# Patient Record
Sex: MALE | Race: WHITE | NOT HISPANIC OR LATINO | Employment: OTHER | URBAN - METROPOLITAN AREA
[De-identification: names, ages, dates, MRNs, and addresses within clinical notes are randomized per-mention and may not be internally consistent; named-entity substitution may affect disease eponyms.]

---

## 2017-01-10 ENCOUNTER — ALLSCRIPTS OFFICE VISIT (OUTPATIENT)
Dept: OTHER | Facility: OTHER | Age: 72
End: 2017-01-10

## 2017-01-18 ENCOUNTER — APPOINTMENT (OUTPATIENT)
Dept: OCCUPATIONAL THERAPY | Facility: MEDICAL CENTER | Age: 72
End: 2017-01-18
Payer: MEDICARE

## 2017-01-18 ENCOUNTER — GENERIC CONVERSION - ENCOUNTER (OUTPATIENT)
Dept: OTHER | Facility: OTHER | Age: 72
End: 2017-01-18

## 2017-01-18 PROCEDURE — 97165 OT EVAL LOW COMPLEX 30 MIN: CPT

## 2017-01-18 PROCEDURE — G8991 OTHER PT/OT GOAL STATUS: HCPCS

## 2017-01-18 PROCEDURE — G8990 OTHER PT/OT CURRENT STATUS: HCPCS

## 2017-01-18 PROCEDURE — 97140 MANUAL THERAPY 1/> REGIONS: CPT

## 2017-01-20 ENCOUNTER — ALLSCRIPTS OFFICE VISIT (OUTPATIENT)
Dept: OTHER | Facility: OTHER | Age: 72
End: 2017-01-20

## 2017-01-23 ENCOUNTER — APPOINTMENT (OUTPATIENT)
Dept: OCCUPATIONAL THERAPY | Facility: MEDICAL CENTER | Age: 72
End: 2017-01-23
Payer: MEDICARE

## 2017-01-23 PROCEDURE — 97010 HOT OR COLD PACKS THERAPY: CPT

## 2017-01-23 PROCEDURE — 97140 MANUAL THERAPY 1/> REGIONS: CPT

## 2017-01-23 PROCEDURE — 97110 THERAPEUTIC EXERCISES: CPT

## 2017-01-25 ENCOUNTER — APPOINTMENT (OUTPATIENT)
Dept: OCCUPATIONAL THERAPY | Facility: MEDICAL CENTER | Age: 72
End: 2017-01-25
Payer: MEDICARE

## 2017-01-25 PROCEDURE — 97140 MANUAL THERAPY 1/> REGIONS: CPT

## 2017-01-25 PROCEDURE — 97110 THERAPEUTIC EXERCISES: CPT

## 2017-01-30 ENCOUNTER — APPOINTMENT (OUTPATIENT)
Dept: OCCUPATIONAL THERAPY | Facility: MEDICAL CENTER | Age: 72
End: 2017-01-30
Payer: MEDICARE

## 2017-01-30 PROCEDURE — 97140 MANUAL THERAPY 1/> REGIONS: CPT

## 2017-01-30 PROCEDURE — 97010 HOT OR COLD PACKS THERAPY: CPT

## 2017-01-30 PROCEDURE — 97110 THERAPEUTIC EXERCISES: CPT

## 2017-02-01 ENCOUNTER — APPOINTMENT (OUTPATIENT)
Dept: OCCUPATIONAL THERAPY | Facility: MEDICAL CENTER | Age: 72
End: 2017-02-01
Payer: MEDICARE

## 2017-02-01 PROCEDURE — 97140 MANUAL THERAPY 1/> REGIONS: CPT

## 2017-02-01 PROCEDURE — 97110 THERAPEUTIC EXERCISES: CPT

## 2017-02-01 PROCEDURE — 97010 HOT OR COLD PACKS THERAPY: CPT

## 2017-02-06 ENCOUNTER — APPOINTMENT (OUTPATIENT)
Dept: OCCUPATIONAL THERAPY | Facility: MEDICAL CENTER | Age: 72
End: 2017-02-06
Payer: MEDICARE

## 2017-02-06 PROCEDURE — 97010 HOT OR COLD PACKS THERAPY: CPT

## 2017-02-06 PROCEDURE — 97110 THERAPEUTIC EXERCISES: CPT

## 2017-02-06 PROCEDURE — 97140 MANUAL THERAPY 1/> REGIONS: CPT

## 2017-02-08 ENCOUNTER — APPOINTMENT (OUTPATIENT)
Dept: OCCUPATIONAL THERAPY | Facility: MEDICAL CENTER | Age: 72
End: 2017-02-08
Payer: MEDICARE

## 2017-02-08 PROCEDURE — 97140 MANUAL THERAPY 1/> REGIONS: CPT

## 2017-02-08 PROCEDURE — 97110 THERAPEUTIC EXERCISES: CPT

## 2017-02-08 PROCEDURE — 97010 HOT OR COLD PACKS THERAPY: CPT

## 2017-02-13 ENCOUNTER — APPOINTMENT (OUTPATIENT)
Dept: OCCUPATIONAL THERAPY | Facility: MEDICAL CENTER | Age: 72
End: 2017-02-13
Payer: MEDICARE

## 2017-02-13 PROCEDURE — 97140 MANUAL THERAPY 1/> REGIONS: CPT

## 2017-02-13 PROCEDURE — 97010 HOT OR COLD PACKS THERAPY: CPT

## 2017-02-13 PROCEDURE — 97110 THERAPEUTIC EXERCISES: CPT

## 2017-02-15 ENCOUNTER — APPOINTMENT (OUTPATIENT)
Dept: OCCUPATIONAL THERAPY | Facility: MEDICAL CENTER | Age: 72
End: 2017-02-15
Payer: MEDICARE

## 2017-02-15 PROCEDURE — 97140 MANUAL THERAPY 1/> REGIONS: CPT

## 2017-02-15 PROCEDURE — 97010 HOT OR COLD PACKS THERAPY: CPT

## 2017-02-15 PROCEDURE — 97110 THERAPEUTIC EXERCISES: CPT

## 2017-02-20 ENCOUNTER — APPOINTMENT (OUTPATIENT)
Dept: OCCUPATIONAL THERAPY | Facility: MEDICAL CENTER | Age: 72
End: 2017-02-20
Payer: MEDICARE

## 2017-02-20 PROCEDURE — 97110 THERAPEUTIC EXERCISES: CPT

## 2017-02-20 PROCEDURE — 97140 MANUAL THERAPY 1/> REGIONS: CPT

## 2017-02-20 PROCEDURE — G8991 OTHER PT/OT GOAL STATUS: HCPCS

## 2017-02-20 PROCEDURE — G8990 OTHER PT/OT CURRENT STATUS: HCPCS

## 2017-02-20 PROCEDURE — 97010 HOT OR COLD PACKS THERAPY: CPT

## 2017-02-21 ENCOUNTER — ALLSCRIPTS OFFICE VISIT (OUTPATIENT)
Dept: OTHER | Facility: OTHER | Age: 72
End: 2017-02-21

## 2017-02-22 ENCOUNTER — GENERIC CONVERSION - ENCOUNTER (OUTPATIENT)
Dept: OTHER | Facility: OTHER | Age: 72
End: 2017-02-22

## 2017-03-04 ENCOUNTER — OFFICE VISIT (OUTPATIENT)
Dept: URGENT CARE | Facility: MEDICAL CENTER | Age: 72
End: 2017-03-04
Payer: MEDICARE

## 2017-03-04 PROCEDURE — 99213 OFFICE O/P EST LOW 20 MIN: CPT

## 2017-03-04 PROCEDURE — G0463 HOSPITAL OUTPT CLINIC VISIT: HCPCS

## 2017-03-10 ENCOUNTER — ALLSCRIPTS OFFICE VISIT (OUTPATIENT)
Dept: OTHER | Facility: OTHER | Age: 72
End: 2017-03-10

## 2017-03-10 DIAGNOSIS — E11.9 TYPE 2 DIABETES MELLITUS WITHOUT COMPLICATIONS (HCC): ICD-10-CM

## 2017-03-10 DIAGNOSIS — N28.89 OTHER SPECIFIED DISORDERS OF KIDNEY AND URETER: ICD-10-CM

## 2017-03-10 DIAGNOSIS — R10.11 RIGHT UPPER QUADRANT PAIN: ICD-10-CM

## 2017-03-21 ENCOUNTER — HOSPITAL ENCOUNTER (OUTPATIENT)
Dept: ULTRASOUND IMAGING | Facility: HOSPITAL | Age: 72
Discharge: HOME | End: 2017-03-21
Payer: MEDICARE

## 2017-03-21 DIAGNOSIS — R10.11 RIGHT UPPER QUADRANT PAIN: ICD-10-CM

## 2017-03-21 PROCEDURE — 76705 ECHO EXAM OF ABDOMEN: CPT

## 2017-03-22 ENCOUNTER — GENERIC CONVERSION - ENCOUNTER (OUTPATIENT)
Dept: OTHER | Facility: OTHER | Age: 72
End: 2017-03-22

## 2017-03-25 LAB
BUN SERPL-MCNC: 21 MG/DL (ref 7–25)
BUN/CREA RATIO (HISTORICAL): ABNORMAL (CALC) (ref 6–22)
CALCIUM SERPL-MCNC: 9.2 MG/DL (ref 8.6–10.3)
CHLORIDE SERPL-SCNC: 103 MMOL/L (ref 98–110)
CO2 SERPL-SCNC: 25 MMOL/L (ref 20–31)
CREAT SERPL-MCNC: 0.97 MG/DL (ref 0.7–1.18)
EGFR AFRICAN AMERICAN (HISTORICAL): 91 ML/MIN/1.73M2
EGFR-AMERICAN CALC (HISTORICAL): 79 ML/MIN/1.73M2
GLUCOSE (HISTORICAL): 141 MG/DL (ref 65–99)
POTASSIUM SERPL-SCNC: 4.1 MMOL/L (ref 3.5–5.3)
SODIUM SERPL-SCNC: 138 MMOL/L (ref 135–146)

## 2017-03-28 ENCOUNTER — HOSPITAL ENCOUNTER (OUTPATIENT)
Dept: CT IMAGING | Facility: HOSPITAL | Age: 72
Discharge: HOME | End: 2017-03-28
Payer: MEDICARE

## 2017-03-28 DIAGNOSIS — N28.89 OTHER SPECIFIED DISORDERS OF KIDNEY AND URETER: ICD-10-CM

## 2017-03-28 PROCEDURE — 74170 CT ABD WO CNTRST FLWD CNTRST: CPT

## 2017-03-28 RX ADMIN — IOHEXOL 100 ML: 350 INJECTION, SOLUTION INTRAVENOUS at 20:28

## 2017-03-30 ENCOUNTER — GENERIC CONVERSION - ENCOUNTER (OUTPATIENT)
Dept: OTHER | Facility: OTHER | Age: 72
End: 2017-03-30

## 2017-04-03 ENCOUNTER — TRANSCRIBE ORDERS (OUTPATIENT)
Dept: ADMINISTRATIVE | Facility: HOSPITAL | Age: 72
End: 2017-04-03

## 2017-04-03 ENCOUNTER — ALLSCRIPTS OFFICE VISIT (OUTPATIENT)
Dept: OTHER | Facility: OTHER | Age: 72
End: 2017-04-03

## 2017-04-03 DIAGNOSIS — N28.89 VASCULAR DISORDERS OF KIDNEY: Primary | ICD-10-CM

## 2017-04-03 LAB
CLARITY UR: NORMAL
COLOR UR: YELLOW
GLUCOSE (HISTORICAL): NORMAL
HGB UR QL STRIP.AUTO: NORMAL
KETONES UR STRIP-MCNC: NORMAL MG/DL
LEUKOCYTE ESTERASE UR QL STRIP: NORMAL
NITRITE UR QL STRIP: NORMAL
PH UR STRIP.AUTO: 6 [PH]
PROT UR STRIP-MCNC: NORMAL MG/DL
SP GR UR STRIP.AUTO: 1.03

## 2017-04-04 ENCOUNTER — HOSPITAL ENCOUNTER (OUTPATIENT)
Dept: CT IMAGING | Facility: HOSPITAL | Age: 72
Discharge: HOME | End: 2017-04-04
Attending: UROLOGY
Payer: MEDICARE

## 2017-04-04 DIAGNOSIS — N28.89 OTHER SPECIFIED DISORDERS OF KIDNEY AND URETER: ICD-10-CM

## 2017-04-04 PROCEDURE — 71250 CT THORAX DX C-: CPT

## 2017-04-05 ENCOUNTER — GENERIC CONVERSION - ENCOUNTER (OUTPATIENT)
Dept: OTHER | Facility: OTHER | Age: 72
End: 2017-04-05

## 2017-04-12 ENCOUNTER — HOSPITAL ENCOUNTER (OUTPATIENT)
Dept: NON INVASIVE DIAGNOSTICS | Facility: HOSPITAL | Age: 72
Discharge: HOME | End: 2017-04-12
Attending: UROLOGY
Payer: MEDICARE

## 2017-04-12 ENCOUNTER — LAB (OUTPATIENT)
Dept: LAB | Facility: HOSPITAL | Age: 72
End: 2017-04-12
Attending: UROLOGY
Payer: MEDICARE

## 2017-04-12 ENCOUNTER — TRANSCRIBE ORDERS (OUTPATIENT)
Dept: ADMINISTRATIVE | Facility: HOSPITAL | Age: 72
End: 2017-04-12

## 2017-04-12 DIAGNOSIS — N28.89 OTHER SPECIFIED DISORDERS OF KIDNEY AND URETER: ICD-10-CM

## 2017-04-12 DIAGNOSIS — N28.89 VASCULAR DISORDERS OF KIDNEY: Primary | ICD-10-CM

## 2017-04-12 DIAGNOSIS — E11.9 TYPE 2 DIABETES MELLITUS WITHOUT COMPLICATIONS (HCC): ICD-10-CM

## 2017-04-12 DIAGNOSIS — N28.89 VASCULAR DISORDERS OF KIDNEY: ICD-10-CM

## 2017-04-12 LAB
ATRIAL RATE: 60 BPM
PR INTERVAL: 184 MS
QRS AXIS: 161 DEGREES
QRSD INTERVAL: 102 MS
QT INTERVAL: 408 MS
QTC INTERVAL: 408 MS
T WAVE AXIS: 146 DEGREES
VENTRICULAR RATE: 60 BPM

## 2017-04-12 PROCEDURE — 93005 ELECTROCARDIOGRAM TRACING: CPT

## 2017-04-17 ENCOUNTER — LAB (OUTPATIENT)
Dept: LAB | Facility: HOSPITAL | Age: 72
End: 2017-04-17
Attending: UROLOGY
Payer: MEDICARE

## 2017-04-17 ENCOUNTER — LAB REQUISITION (OUTPATIENT)
Dept: LAB | Facility: HOSPITAL | Age: 72
End: 2017-04-17
Payer: MEDICARE

## 2017-04-17 ENCOUNTER — TRANSCRIBE ORDERS (OUTPATIENT)
Dept: ADMINISTRATIVE | Facility: HOSPITAL | Age: 72
End: 2017-04-17

## 2017-04-17 DIAGNOSIS — E13.8 DIABETES MELLITUS OF OTHER TYPE WITH COMPLICATION, UNSPECIFIED LONG TERM INSULIN USE STATUS: ICD-10-CM

## 2017-04-17 DIAGNOSIS — N28.89 OTHER SPECIFIED DISORDERS OF KIDNEY AND URETER: ICD-10-CM

## 2017-04-17 DIAGNOSIS — E13.8 OTHER SPECIFIED DIABETES MELLITUS WITH UNSPECIFIED COMPLICATIONS (HCC): ICD-10-CM

## 2017-04-17 DIAGNOSIS — N28.89 VASCULAR DISORDERS OF KIDNEY: ICD-10-CM

## 2017-04-17 DIAGNOSIS — N28.89 VASCULAR DISORDERS OF KIDNEY: Primary | ICD-10-CM

## 2017-04-17 LAB
ABO GROUP BLD: NORMAL
ANION GAP SERPL CALCULATED.3IONS-SCNC: 8 MMOL/L (ref 4–13)
BASOPHILS # BLD AUTO: 0.03 THOUSANDS/ΜL (ref 0–0.1)
BASOPHILS NFR BLD AUTO: 1 % (ref 0–1)
BLD GP AB SCN SERPL QL: NEGATIVE
BUN SERPL-MCNC: 19 MG/DL (ref 5–25)
CALCIUM SERPL-MCNC: 9 MG/DL (ref 8.3–10.1)
CHLORIDE SERPL-SCNC: 104 MMOL/L (ref 100–108)
CO2 SERPL-SCNC: 27 MMOL/L (ref 21–32)
CREAT SERPL-MCNC: 0.96 MG/DL (ref 0.6–1.3)
EOSINOPHIL # BLD AUTO: 0.38 THOUSAND/ΜL (ref 0–0.61)
EOSINOPHIL NFR BLD AUTO: 6 % (ref 0–6)
ERYTHROCYTE [DISTWIDTH] IN BLOOD BY AUTOMATED COUNT: 14 % (ref 11.6–15.1)
EST. AVERAGE GLUCOSE BLD GHB EST-MCNC: 134 MG/DL
GFR SERPL CREATININE-BSD FRML MDRD: >60 ML/MIN/1.73SQ M
GLUCOSE SERPL-MCNC: 105 MG/DL (ref 65–140)
HBA1C MFR BLD: 6.3 % (ref 4.2–6.3)
HCT VFR BLD AUTO: 43.2 % (ref 36.5–49.3)
HGB BLD-MCNC: 15.1 G/DL (ref 12–17)
LYMPHOCYTES # BLD AUTO: 2.12 THOUSANDS/ΜL (ref 0.6–4.47)
LYMPHOCYTES NFR BLD AUTO: 35 % (ref 14–44)
MCH RBC QN AUTO: 31.7 PG (ref 26.8–34.3)
MCHC RBC AUTO-ENTMCNC: 35 G/DL (ref 31.4–37.4)
MCV RBC AUTO: 91 FL (ref 82–98)
MONOCYTES # BLD AUTO: 0.44 THOUSAND/ΜL (ref 0.17–1.22)
MONOCYTES NFR BLD AUTO: 7 % (ref 4–12)
NEUTROPHILS # BLD AUTO: 3.15 THOUSANDS/ΜL (ref 1.85–7.62)
NEUTS SEG NFR BLD AUTO: 51 % (ref 43–75)
NRBC BLD AUTO-RTO: 0 /100 WBCS
PLATELET # BLD AUTO: 203 THOUSANDS/UL (ref 149–390)
PMV BLD AUTO: 10.4 FL (ref 8.9–12.7)
POTASSIUM SERPL-SCNC: 4 MMOL/L (ref 3.5–5.3)
RBC # BLD AUTO: 4.77 MILLION/UL (ref 3.88–5.62)
RH BLD: POSITIVE
SODIUM SERPL-SCNC: 139 MMOL/L (ref 136–145)
SPECIMEN EXPIRATION DATE: NORMAL
WBC # BLD AUTO: 6.12 THOUSAND/UL (ref 4.31–10.16)

## 2017-04-17 PROCEDURE — 85025 COMPLETE CBC W/AUTO DIFF WBC: CPT

## 2017-04-17 PROCEDURE — 86901 BLOOD TYPING SEROLOGIC RH(D): CPT | Performed by: UROLOGY

## 2017-04-17 PROCEDURE — 80048 BASIC METABOLIC PNL TOTAL CA: CPT

## 2017-04-17 PROCEDURE — 86850 RBC ANTIBODY SCREEN: CPT | Performed by: UROLOGY

## 2017-04-17 PROCEDURE — 36415 COLL VENOUS BLD VENIPUNCTURE: CPT

## 2017-04-17 PROCEDURE — 83036 HEMOGLOBIN GLYCOSYLATED A1C: CPT

## 2017-04-17 PROCEDURE — 86900 BLOOD TYPING SEROLOGIC ABO: CPT | Performed by: UROLOGY

## 2017-04-27 ENCOUNTER — ALLSCRIPTS OFFICE VISIT (OUTPATIENT)
Dept: OTHER | Facility: OTHER | Age: 72
End: 2017-04-27

## 2017-05-01 ENCOUNTER — ANESTHESIA EVENT (OUTPATIENT)
Dept: PERIOP | Facility: HOSPITAL | Age: 72
DRG: 658 | End: 2017-05-01
Payer: MEDICARE

## 2017-05-08 ENCOUNTER — HOSPITAL ENCOUNTER (INPATIENT)
Facility: HOSPITAL | Age: 72
LOS: 3 days | Discharge: HOME | DRG: 658 | End: 2017-05-11
Attending: UROLOGY | Admitting: UROLOGY
Payer: MEDICARE

## 2017-05-08 ENCOUNTER — ANESTHESIA (OUTPATIENT)
Dept: PERIOP | Facility: HOSPITAL | Age: 72
DRG: 658 | End: 2017-05-08
Payer: MEDICARE

## 2017-05-08 DIAGNOSIS — N28.89 RENAL MASS: ICD-10-CM

## 2017-05-08 LAB
ABO GROUP BLD: NORMAL
ANION GAP SERPL CALCULATED.3IONS-SCNC: 7 MMOL/L (ref 4–13)
BLD GP AB SCN SERPL QL: NEGATIVE
BUN SERPL-MCNC: 17 MG/DL (ref 5–25)
CALCIUM SERPL-MCNC: 9.1 MG/DL (ref 8.3–10.1)
CHLORIDE SERPL-SCNC: 105 MMOL/L (ref 100–108)
CO2 SERPL-SCNC: 27 MMOL/L (ref 21–32)
CREAT SERPL-MCNC: 1.09 MG/DL (ref 0.6–1.3)
ERYTHROCYTE [DISTWIDTH] IN BLOOD BY AUTOMATED COUNT: 14.1 % (ref 11.6–15.1)
GFR SERPL CREATININE-BSD FRML MDRD: >60 ML/MIN/1.73SQ M
GLUCOSE SERPL-MCNC: 112 MG/DL (ref 65–140)
GLUCOSE SERPL-MCNC: 162 MG/DL (ref 65–140)
GLUCOSE SERPL-MCNC: 173 MG/DL (ref 65–140)
HCT VFR BLD AUTO: 31.8 % (ref 36.5–49.3)
HGB BLD-MCNC: 10.9 G/DL (ref 12–17)
MCH RBC QN AUTO: 31.1 PG (ref 26.8–34.3)
MCHC RBC AUTO-ENTMCNC: 34.3 G/DL (ref 31.4–37.4)
MCV RBC AUTO: 91 FL (ref 82–98)
PLATELET # BLD AUTO: 164 THOUSANDS/UL (ref 149–390)
PMV BLD AUTO: 10.2 FL (ref 8.9–12.7)
POTASSIUM SERPL-SCNC: 3.9 MMOL/L (ref 3.5–5.3)
POTASSIUM SERPL-SCNC: 4.3 MMOL/L (ref 3.5–5.3)
RBC # BLD AUTO: 3.51 MILLION/UL (ref 3.88–5.62)
RH BLD: POSITIVE
SODIUM SERPL-SCNC: 139 MMOL/L (ref 136–145)
SPECIMEN EXPIRATION DATE: NORMAL
WBC # BLD AUTO: 11.81 THOUSAND/UL (ref 4.31–10.16)

## 2017-05-08 PROCEDURE — 84295 ASSAY OF SERUM SODIUM: CPT

## 2017-05-08 PROCEDURE — 84132 ASSAY OF SERUM POTASSIUM: CPT

## 2017-05-08 PROCEDURE — 86900 BLOOD TYPING SEROLOGIC ABO: CPT | Performed by: UROLOGY

## 2017-05-08 PROCEDURE — 86901 BLOOD TYPING SEROLOGIC RH(D): CPT | Performed by: UROLOGY

## 2017-05-08 PROCEDURE — 82948 REAGENT STRIP/BLOOD GLUCOSE: CPT

## 2017-05-08 PROCEDURE — 82947 ASSAY GLUCOSE BLOOD QUANT: CPT

## 2017-05-08 PROCEDURE — 88307 TISSUE EXAM BY PATHOLOGIST: CPT | Performed by: UROLOGY

## 2017-05-08 PROCEDURE — 85027 COMPLETE CBC AUTOMATED: CPT | Performed by: UROLOGY

## 2017-05-08 PROCEDURE — 0TB04ZZ EXCISION OF RIGHT KIDNEY, PERCUTANEOUS ENDOSCOPIC APPROACH: ICD-10-PCS | Performed by: UROLOGY

## 2017-05-08 PROCEDURE — 85014 HEMATOCRIT: CPT

## 2017-05-08 PROCEDURE — 86850 RBC ANTIBODY SCREEN: CPT | Performed by: UROLOGY

## 2017-05-08 PROCEDURE — 8E0W4CZ ROBOTIC ASSISTED PROCEDURE OF TRUNK REGION, PERCUTANEOUS ENDOSCOPIC APPROACH: ICD-10-PCS | Performed by: UROLOGY

## 2017-05-08 PROCEDURE — 86923 COMPATIBILITY TEST ELECTRIC: CPT

## 2017-05-08 PROCEDURE — 82330 ASSAY OF CALCIUM: CPT

## 2017-05-08 PROCEDURE — 84132 ASSAY OF SERUM POTASSIUM: CPT | Performed by: UROLOGY

## 2017-05-08 PROCEDURE — 82803 BLOOD GASES ANY COMBINATION: CPT

## 2017-05-08 PROCEDURE — 80048 BASIC METABOLIC PNL TOTAL CA: CPT | Performed by: UROLOGY

## 2017-05-08 RX ORDER — DEXTROSE, SODIUM CHLORIDE, SODIUM LACTATE, POTASSIUM CHLORIDE, AND CALCIUM CHLORIDE 5; .6; .31; .03; .02 G/100ML; G/100ML; G/100ML; G/100ML; G/100ML
125 INJECTION, SOLUTION INTRAVENOUS CONTINUOUS
Status: DISCONTINUED | OUTPATIENT
Start: 2017-05-08 | End: 2017-05-10

## 2017-05-08 RX ORDER — MAGNESIUM HYDROXIDE 1200 MG/15ML
LIQUID ORAL
Status: DISCONTINUED | OUTPATIENT
Start: 1840-12-31 | End: 2017-05-08 | Stop reason: HOSPADM

## 2017-05-08 RX ORDER — SODIUM CHLORIDE 9 MG/ML
INJECTION, SOLUTION INTRAVENOUS CONTINUOUS PRN
Status: DISCONTINUED | OUTPATIENT
Start: 2017-05-08 | End: 2017-05-08 | Stop reason: SURG

## 2017-05-08 RX ORDER — MAGNESIUM HYDROXIDE/ALUMINUM HYDROXICE/SIMETHICONE 120; 1200; 1200 MG/30ML; MG/30ML; MG/30ML
30 SUSPENSION ORAL EVERY 6 HOURS PRN
Status: DISCONTINUED | OUTPATIENT
Start: 2017-05-08 | End: 2017-05-11 | Stop reason: HOSPADM

## 2017-05-08 RX ORDER — MAGNESIUM HYDROXIDE 1200 MG/15ML
LIQUID ORAL AS NEEDED
Status: DISCONTINUED | OUTPATIENT
Start: 2017-05-08 | End: 2017-05-08 | Stop reason: HOSPADM

## 2017-05-08 RX ORDER — ONDANSETRON 2 MG/ML
INJECTION INTRAMUSCULAR; INTRAVENOUS AS NEEDED
Status: DISCONTINUED | OUTPATIENT
Start: 2017-05-08 | End: 2017-05-08 | Stop reason: SURG

## 2017-05-08 RX ORDER — LIDOCAINE HYDROCHLORIDE 10 MG/ML
INJECTION, SOLUTION INFILTRATION; PERINEURAL AS NEEDED
Status: DISCONTINUED | OUTPATIENT
Start: 2017-05-08 | End: 2017-05-08 | Stop reason: SURG

## 2017-05-08 RX ORDER — ONDANSETRON 2 MG/ML
4 INJECTION INTRAMUSCULAR; INTRAVENOUS EVERY 6 HOURS PRN
Status: DISCONTINUED | OUTPATIENT
Start: 2017-05-08 | End: 2017-05-11 | Stop reason: HOSPADM

## 2017-05-08 RX ORDER — PRAVASTATIN SODIUM 40 MG
40 TABLET ORAL
Status: DISCONTINUED | OUTPATIENT
Start: 2017-05-09 | End: 2017-05-11 | Stop reason: HOSPADM

## 2017-05-08 RX ORDER — ROCURONIUM BROMIDE 10 MG/ML
INJECTION, SOLUTION INTRAVENOUS AS NEEDED
Status: DISCONTINUED | OUTPATIENT
Start: 2017-05-08 | End: 2017-05-08 | Stop reason: SURG

## 2017-05-08 RX ORDER — FENTANYL CITRATE/PF 50 MCG/ML
25 SYRINGE (ML) INJECTION
Status: COMPLETED | OUTPATIENT
Start: 2017-05-08 | End: 2017-05-08

## 2017-05-08 RX ORDER — GLYCOPYRROLATE 0.2 MG/ML
INJECTION INTRAMUSCULAR; INTRAVENOUS AS NEEDED
Status: DISCONTINUED | OUTPATIENT
Start: 2017-05-08 | End: 2017-05-08 | Stop reason: SURG

## 2017-05-08 RX ORDER — ALBUMIN, HUMAN INJ 5% 5 %
SOLUTION INTRAVENOUS CONTINUOUS PRN
Status: DISCONTINUED | OUTPATIENT
Start: 2017-05-08 | End: 2017-05-08 | Stop reason: SURG

## 2017-05-08 RX ORDER — PROPOFOL 10 MG/ML
INJECTION, EMULSION INTRAVENOUS AS NEEDED
Status: DISCONTINUED | OUTPATIENT
Start: 2017-05-08 | End: 2017-05-08 | Stop reason: SURG

## 2017-05-08 RX ORDER — FENTANYL CITRATE 50 UG/ML
INJECTION, SOLUTION INTRAMUSCULAR; INTRAVENOUS AS NEEDED
Status: DISCONTINUED | OUTPATIENT
Start: 2017-05-08 | End: 2017-05-08 | Stop reason: SURG

## 2017-05-08 RX ORDER — MIDAZOLAM HYDROCHLORIDE 1 MG/ML
INJECTION INTRAMUSCULAR; INTRAVENOUS AS NEEDED
Status: DISCONTINUED | OUTPATIENT
Start: 2017-05-08 | End: 2017-05-08 | Stop reason: SURG

## 2017-05-08 RX ORDER — ONDANSETRON 2 MG/ML
4 INJECTION INTRAMUSCULAR; INTRAVENOUS ONCE
Status: DISCONTINUED | OUTPATIENT
Start: 2017-05-08 | End: 2017-05-08 | Stop reason: HOSPADM

## 2017-05-08 RX ORDER — HYDROMORPHONE HYDROCHLORIDE 2 MG/ML
INJECTION, SOLUTION INTRAMUSCULAR; INTRAVENOUS; SUBCUTANEOUS AS NEEDED
Status: DISCONTINUED | OUTPATIENT
Start: 2017-05-08 | End: 2017-05-08 | Stop reason: SURG

## 2017-05-08 RX ORDER — DOCUSATE SODIUM 100 MG/1
100 CAPSULE, LIQUID FILLED ORAL 2 TIMES DAILY
Status: DISCONTINUED | OUTPATIENT
Start: 2017-05-08 | End: 2017-05-11 | Stop reason: HOSPADM

## 2017-05-08 RX ORDER — METOCLOPRAMIDE HYDROCHLORIDE 5 MG/ML
INJECTION INTRAMUSCULAR; INTRAVENOUS AS NEEDED
Status: DISCONTINUED | OUTPATIENT
Start: 2017-05-08 | End: 2017-05-08 | Stop reason: SURG

## 2017-05-08 RX ORDER — MORPHINE SULFATE 4 MG/ML
3 INJECTION, SOLUTION INTRAMUSCULAR; INTRAVENOUS
Status: DISCONTINUED | OUTPATIENT
Start: 2017-05-08 | End: 2017-05-11 | Stop reason: HOSPADM

## 2017-05-08 RX ORDER — TAMSULOSIN HYDROCHLORIDE 0.4 MG/1
0.4 CAPSULE ORAL
Status: DISCONTINUED | OUTPATIENT
Start: 2017-05-09 | End: 2017-05-11 | Stop reason: HOSPADM

## 2017-05-08 RX ORDER — SODIUM CHLORIDE, SODIUM LACTATE, POTASSIUM CHLORIDE, CALCIUM CHLORIDE 600; 310; 30; 20 MG/100ML; MG/100ML; MG/100ML; MG/100ML
50 INJECTION, SOLUTION INTRAVENOUS CONTINUOUS
Status: DISCONTINUED | OUTPATIENT
Start: 2017-05-08 | End: 2017-05-08

## 2017-05-08 RX ADMIN — LIDOCAINE HYDROCHLORIDE 100 MG: 10 INJECTION, SOLUTION INFILTRATION; PERINEURAL at 13:49

## 2017-05-08 RX ADMIN — PROPOFOL 200 MG: 10 INJECTION, EMULSION INTRAVENOUS at 13:49

## 2017-05-08 RX ADMIN — NEOSTIGMINE METHYLSULFATE 4 MG: 1 INJECTION, SOLUTION INTRAMUSCULAR; INTRAVENOUS; SUBCUTANEOUS at 18:52

## 2017-05-08 RX ADMIN — DEXAMETHASONE SODIUM PHOSPHATE 10 MG: 10 INJECTION INTRAMUSCULAR; INTRAVENOUS at 13:43

## 2017-05-08 RX ADMIN — ONDANSETRON 4 MG: 2 INJECTION INTRAMUSCULAR; INTRAVENOUS at 19:18

## 2017-05-08 RX ADMIN — ONDANSETRON 4 MG: 2 INJECTION INTRAMUSCULAR; INTRAVENOUS at 18:52

## 2017-05-08 RX ADMIN — ROCURONIUM BROMIDE 20 MG: 10 INJECTION, SOLUTION INTRAVENOUS at 16:35

## 2017-05-08 RX ADMIN — ALBUMIN HUMAN: 0.05 INJECTION, SOLUTION INTRAVENOUS at 17:37

## 2017-05-08 RX ADMIN — DEXMEDETOMIDINE HYDROCHLORIDE 0.5 MCG/KG/HR: 100 INJECTION, SOLUTION INTRAVENOUS at 14:00

## 2017-05-08 RX ADMIN — SODIUM CHLORIDE, SODIUM LACTATE, POTASSIUM CHLORIDE, AND CALCIUM CHLORIDE 50 ML/HR: .6; .31; .03; .02 INJECTION, SOLUTION INTRAVENOUS at 11:37

## 2017-05-08 RX ADMIN — ROCURONIUM BROMIDE 50 MG: 10 INJECTION, SOLUTION INTRAVENOUS at 13:49

## 2017-05-08 RX ADMIN — FENTANYL CITRATE 50 MCG: 50 INJECTION, SOLUTION INTRAMUSCULAR; INTRAVENOUS at 14:54

## 2017-05-08 RX ADMIN — GLYCOPYRROLATE 0.1 MG: 0.2 INJECTION INTRAMUSCULAR; INTRAVENOUS at 13:43

## 2017-05-08 RX ADMIN — FENTANYL CITRATE 25 MCG: 50 INJECTION INTRAMUSCULAR; INTRAVENOUS at 19:55

## 2017-05-08 RX ADMIN — Medication 2000 MG: at 18:00

## 2017-05-08 RX ADMIN — FENTANYL CITRATE 25 MCG: 50 INJECTION INTRAMUSCULAR; INTRAVENOUS at 20:01

## 2017-05-08 RX ADMIN — ROCURONIUM BROMIDE 20 MG: 10 INJECTION, SOLUTION INTRAVENOUS at 14:54

## 2017-05-08 RX ADMIN — DEXTROSE, SODIUM CHLORIDE, SODIUM LACTATE, POTASSIUM CHLORIDE, AND CALCIUM CHLORIDE 125 ML/HR: 5; .6; .31; .03; .02 INJECTION, SOLUTION INTRAVENOUS at 20:37

## 2017-05-08 RX ADMIN — HYDROMORPHONE HYDROCHLORIDE 0.5 MG: 2 INJECTION, SOLUTION INTRAMUSCULAR; INTRAVENOUS; SUBCUTANEOUS at 19:10

## 2017-05-08 RX ADMIN — SODIUM CHLORIDE: 0.9 INJECTION, SOLUTION INTRAVENOUS at 13:59

## 2017-05-08 RX ADMIN — SODIUM BICARBONATE 50 MEQ: 84 INJECTION, SOLUTION INTRAVENOUS at 18:34

## 2017-05-08 RX ADMIN — HYDROMORPHONE HYDROCHLORIDE 0.4 MG: 1 INJECTION, SOLUTION INTRAMUSCULAR; INTRAVENOUS; SUBCUTANEOUS at 20:21

## 2017-05-08 RX ADMIN — GLYCOPYRROLATE 0.8 MG: 0.2 INJECTION INTRAMUSCULAR; INTRAVENOUS at 18:52

## 2017-05-08 RX ADMIN — FENTANYL CITRATE 25 MCG: 50 INJECTION INTRAMUSCULAR; INTRAVENOUS at 19:45

## 2017-05-08 RX ADMIN — ALBUMIN HUMAN: 0.05 INJECTION, SOLUTION INTRAVENOUS at 14:55

## 2017-05-08 RX ADMIN — METOCLOPRAMIDE HYDROCHLORIDE 10 MG: 5 INJECTION INTRAMUSCULAR; INTRAVENOUS at 19:19

## 2017-05-08 RX ADMIN — ALBUMIN HUMAN: 0.05 INJECTION, SOLUTION INTRAVENOUS at 14:30

## 2017-05-08 RX ADMIN — FENTANYL CITRATE 50 MCG: 50 INJECTION, SOLUTION INTRAMUSCULAR; INTRAVENOUS at 13:49

## 2017-05-08 RX ADMIN — ALBUMIN HUMAN: 0.05 INJECTION, SOLUTION INTRAVENOUS at 15:45

## 2017-05-08 RX ADMIN — Medication 2000 MG: at 14:01

## 2017-05-08 RX ADMIN — MIDAZOLAM HYDROCHLORIDE 2 MG: 1 INJECTION, SOLUTION INTRAMUSCULAR; INTRAVENOUS at 13:43

## 2017-05-08 RX ADMIN — HYDROMORPHONE HYDROCHLORIDE 0.5 MG: 2 INJECTION, SOLUTION INTRAMUSCULAR; INTRAVENOUS; SUBCUTANEOUS at 19:23

## 2017-05-08 RX ADMIN — ALBUMIN HUMAN: 0.05 INJECTION, SOLUTION INTRAVENOUS at 18:00

## 2017-05-08 RX ADMIN — ONDANSETRON 4 MG: 2 INJECTION INTRAMUSCULAR; INTRAVENOUS at 13:43

## 2017-05-08 RX ADMIN — FENTANYL CITRATE 25 MCG: 50 INJECTION INTRAMUSCULAR; INTRAVENOUS at 20:07

## 2017-05-08 RX ADMIN — SODIUM CHLORIDE, SODIUM LACTATE, POTASSIUM CHLORIDE, AND CALCIUM CHLORIDE: .6; .31; .03; .02 INJECTION, SOLUTION INTRAVENOUS at 13:45

## 2017-05-08 RX ADMIN — HYDROMORPHONE HYDROCHLORIDE 0.4 MG: 1 INJECTION, SOLUTION INTRAMUSCULAR; INTRAVENOUS; SUBCUTANEOUS at 20:30

## 2017-05-08 RX ADMIN — GLYCOPYRROLATE 0.3 MG: 0.2 INJECTION INTRAMUSCULAR; INTRAVENOUS at 15:20

## 2017-05-09 LAB
ANION GAP SERPL CALCULATED.3IONS-SCNC: 11 MMOL/L (ref 4–13)
BUN SERPL-MCNC: 20 MG/DL (ref 5–25)
CALCIUM SERPL-MCNC: 8.8 MG/DL (ref 8.3–10.1)
CHLORIDE SERPL-SCNC: 103 MMOL/L (ref 100–108)
CO2 SERPL-SCNC: 26 MMOL/L (ref 21–32)
CREAT SERPL-MCNC: 1.1 MG/DL (ref 0.6–1.3)
ERYTHROCYTE [DISTWIDTH] IN BLOOD BY AUTOMATED COUNT: 14.3 % (ref 11.6–15.1)
GFR SERPL CREATININE-BSD FRML MDRD: >60 ML/MIN/1.73SQ M
GLUCOSE SERPL-MCNC: 132 MG/DL (ref 65–140)
GLUCOSE SERPL-MCNC: 149 MG/DL (ref 65–140)
GLUCOSE SERPL-MCNC: 154 MG/DL (ref 65–140)
GLUCOSE SERPL-MCNC: 160 MG/DL (ref 65–140)
GLUCOSE SERPL-MCNC: 175 MG/DL (ref 65–140)
GLUCOSE SERPL-MCNC: 182 MG/DL (ref 65–140)
GLUCOSE SERPL-MCNC: 188 MG/DL (ref 65–140)
HCT VFR BLD AUTO: 31.7 % (ref 36.5–49.3)
HGB BLD-MCNC: 10.7 G/DL (ref 12–17)
MCH RBC QN AUTO: 30.7 PG (ref 26.8–34.3)
MCHC RBC AUTO-ENTMCNC: 33.8 G/DL (ref 31.4–37.4)
MCV RBC AUTO: 91 FL (ref 82–98)
PLATELET # BLD AUTO: 189 THOUSANDS/UL (ref 149–390)
PMV BLD AUTO: 10.9 FL (ref 8.9–12.7)
POTASSIUM SERPL-SCNC: 4.1 MMOL/L (ref 3.5–5.3)
RBC # BLD AUTO: 3.49 MILLION/UL (ref 3.88–5.62)
SODIUM SERPL-SCNC: 140 MMOL/L (ref 136–145)
WBC # BLD AUTO: 9.66 THOUSAND/UL (ref 4.31–10.16)

## 2017-05-09 PROCEDURE — 85027 COMPLETE CBC AUTOMATED: CPT | Performed by: UROLOGY

## 2017-05-09 PROCEDURE — 80048 BASIC METABOLIC PNL TOTAL CA: CPT | Performed by: UROLOGY

## 2017-05-09 PROCEDURE — 82948 REAGENT STRIP/BLOOD GLUCOSE: CPT

## 2017-05-09 RX ORDER — HYDROCODONE BITARTRATE AND ACETAMINOPHEN 5; 325 MG/1; MG/1
2 TABLET ORAL EVERY 6 HOURS PRN
Qty: 20 TABLET | Refills: 0 | Status: SHIPPED | OUTPATIENT
Start: 2017-05-09 | End: 2017-05-19

## 2017-05-09 RX ORDER — HYDROCODONE BITARTRATE AND ACETAMINOPHEN 5; 325 MG/1; MG/1
2 TABLET ORAL EVERY 6 HOURS PRN
Status: DISCONTINUED | OUTPATIENT
Start: 2017-05-09 | End: 2017-05-11 | Stop reason: HOSPADM

## 2017-05-09 RX ADMIN — MORPHINE SULFATE 3 MG: 4 INJECTION, SOLUTION INTRAMUSCULAR; INTRAVENOUS at 11:17

## 2017-05-09 RX ADMIN — CEFAZOLIN SODIUM 1000 MG: 1 SOLUTION INTRAVENOUS at 10:36

## 2017-05-09 RX ADMIN — MORPHINE SULFATE 3 MG: 4 INJECTION, SOLUTION INTRAMUSCULAR; INTRAVENOUS at 07:48

## 2017-05-09 RX ADMIN — MORPHINE SULFATE 3 MG: 4 INJECTION, SOLUTION INTRAMUSCULAR; INTRAVENOUS at 01:23

## 2017-05-09 RX ADMIN — INSULIN LISPRO 1 UNITS: 100 INJECTION, SOLUTION INTRAVENOUS; SUBCUTANEOUS at 13:06

## 2017-05-09 RX ADMIN — DEXTROSE, SODIUM CHLORIDE, SODIUM LACTATE, POTASSIUM CHLORIDE, AND CALCIUM CHLORIDE 125 ML/HR: 5; .6; .31; .03; .02 INJECTION, SOLUTION INTRAVENOUS at 04:24

## 2017-05-09 RX ADMIN — INSULIN LISPRO 1 UNITS: 100 INJECTION, SOLUTION INTRAVENOUS; SUBCUTANEOUS at 05:18

## 2017-05-09 RX ADMIN — ENOXAPARIN SODIUM 40 MG: 40 INJECTION SUBCUTANEOUS at 08:48

## 2017-05-09 RX ADMIN — CEFAZOLIN SODIUM 1000 MG: 1 SOLUTION INTRAVENOUS at 01:15

## 2017-05-09 RX ADMIN — DEXTROSE, SODIUM CHLORIDE, SODIUM LACTATE, POTASSIUM CHLORIDE, AND CALCIUM CHLORIDE 125 ML/HR: 5; .6; .31; .03; .02 INJECTION, SOLUTION INTRAVENOUS at 12:40

## 2017-05-09 RX ADMIN — DOCUSATE SODIUM 100 MG: 100 CAPSULE, LIQUID FILLED ORAL at 17:08

## 2017-05-09 RX ADMIN — INSULIN LISPRO 1 UNITS: 100 INJECTION, SOLUTION INTRAVENOUS; SUBCUTANEOUS at 17:40

## 2017-05-09 RX ADMIN — HYDROCODONE BITARTRATE AND ACETAMINOPHEN 2 TABLET: 5; 325 TABLET ORAL at 13:47

## 2017-05-09 RX ADMIN — DOCUSATE SODIUM 100 MG: 100 CAPSULE, LIQUID FILLED ORAL at 08:48

## 2017-05-09 RX ADMIN — DEXTROSE, SODIUM CHLORIDE, SODIUM LACTATE, POTASSIUM CHLORIDE, AND CALCIUM CHLORIDE 125 ML/HR: 5; .6; .31; .03; .02 INJECTION, SOLUTION INTRAVENOUS at 20:35

## 2017-05-09 RX ADMIN — MORPHINE SULFATE 3 MG: 4 INJECTION, SOLUTION INTRAMUSCULAR; INTRAVENOUS at 04:19

## 2017-05-09 RX ADMIN — Medication 1 SPRAY: at 01:33

## 2017-05-09 RX ADMIN — INSULIN LISPRO 1 UNITS: 100 INJECTION, SOLUTION INTRAVENOUS; SUBCUTANEOUS at 02:05

## 2017-05-09 RX ADMIN — MORPHINE SULFATE 3 MG: 4 INJECTION, SOLUTION INTRAMUSCULAR; INTRAVENOUS at 23:46

## 2017-05-09 RX ADMIN — TAMSULOSIN HYDROCHLORIDE 0.4 MG: 0.4 CAPSULE ORAL at 17:08

## 2017-05-09 RX ADMIN — HYDROCODONE BITARTRATE AND ACETAMINOPHEN 2 TABLET: 5; 325 TABLET ORAL at 19:41

## 2017-05-09 RX ADMIN — PRAVASTATIN SODIUM 40 MG: 40 TABLET ORAL at 17:08

## 2017-05-10 LAB
ANION GAP SERPL CALCULATED.3IONS-SCNC: 7 MMOL/L (ref 4–13)
BUN SERPL-MCNC: 13 MG/DL (ref 5–25)
CALCIUM SERPL-MCNC: 8.8 MG/DL (ref 8.3–10.1)
CHLORIDE SERPL-SCNC: 104 MMOL/L (ref 100–108)
CO2 SERPL-SCNC: 29 MMOL/L (ref 21–32)
CREAT FLD-MCNC: 0.99 MG/DL
CREAT SERPL-MCNC: 0.98 MG/DL (ref 0.6–1.3)
ERYTHROCYTE [DISTWIDTH] IN BLOOD BY AUTOMATED COUNT: 14.5 % (ref 11.6–15.1)
GFR SERPL CREATININE-BSD FRML MDRD: >60 ML/MIN/1.73SQ M
GLUCOSE SERPL-MCNC: 124 MG/DL (ref 65–140)
GLUCOSE SERPL-MCNC: 136 MG/DL (ref 65–140)
GLUCOSE SERPL-MCNC: 142 MG/DL (ref 65–140)
GLUCOSE SERPL-MCNC: 145 MG/DL (ref 65–140)
GLUCOSE SERPL-MCNC: 160 MG/DL (ref 65–140)
HCT VFR BLD AUTO: 31.6 % (ref 36.5–49.3)
HGB BLD-MCNC: 10.5 G/DL (ref 12–17)
MCH RBC QN AUTO: 30.8 PG (ref 26.8–34.3)
MCHC RBC AUTO-ENTMCNC: 33.2 G/DL (ref 31.4–37.4)
MCV RBC AUTO: 93 FL (ref 82–98)
PLATELET # BLD AUTO: 164 THOUSANDS/UL (ref 149–390)
PMV BLD AUTO: 11.1 FL (ref 8.9–12.7)
POTASSIUM SERPL-SCNC: 3.4 MMOL/L (ref 3.5–5.3)
RBC # BLD AUTO: 3.41 MILLION/UL (ref 3.88–5.62)
SODIUM SERPL-SCNC: 140 MMOL/L (ref 136–145)
WBC # BLD AUTO: 8.16 THOUSAND/UL (ref 4.31–10.16)

## 2017-05-10 PROCEDURE — 82570 ASSAY OF URINE CREATININE: CPT | Performed by: UROLOGY

## 2017-05-10 PROCEDURE — 82948 REAGENT STRIP/BLOOD GLUCOSE: CPT

## 2017-05-10 PROCEDURE — 80048 BASIC METABOLIC PNL TOTAL CA: CPT | Performed by: UROLOGY

## 2017-05-10 PROCEDURE — 85027 COMPLETE CBC AUTOMATED: CPT | Performed by: UROLOGY

## 2017-05-10 RX ORDER — POTASSIUM CHLORIDE 20 MEQ/1
40 TABLET, EXTENDED RELEASE ORAL ONCE
Status: COMPLETED | OUTPATIENT
Start: 2017-05-10 | End: 2017-05-10

## 2017-05-10 RX ADMIN — POTASSIUM CHLORIDE 40 MEQ: 1500 TABLET, EXTENDED RELEASE ORAL at 11:53

## 2017-05-10 RX ADMIN — HYDROCODONE BITARTRATE AND ACETAMINOPHEN 2 TABLET: 5; 325 TABLET ORAL at 02:47

## 2017-05-10 RX ADMIN — DOCUSATE SODIUM 100 MG: 100 CAPSULE, LIQUID FILLED ORAL at 08:22

## 2017-05-10 RX ADMIN — HYDROCODONE BITARTRATE AND ACETAMINOPHEN 2 TABLET: 5; 325 TABLET ORAL at 17:29

## 2017-05-10 RX ADMIN — TAMSULOSIN HYDROCHLORIDE 0.4 MG: 0.4 CAPSULE ORAL at 16:15

## 2017-05-10 RX ADMIN — PRAVASTATIN SODIUM 40 MG: 40 TABLET ORAL at 16:15

## 2017-05-10 RX ADMIN — HYDROCODONE BITARTRATE AND ACETAMINOPHEN 2 TABLET: 5; 325 TABLET ORAL at 11:53

## 2017-05-10 RX ADMIN — INSULIN LISPRO 1 UNITS: 100 INJECTION, SOLUTION INTRAVENOUS; SUBCUTANEOUS at 08:32

## 2017-05-10 RX ADMIN — HYDROCODONE BITARTRATE AND ACETAMINOPHEN 2 TABLET: 5; 325 TABLET ORAL at 23:54

## 2017-05-10 RX ADMIN — ENOXAPARIN SODIUM 40 MG: 40 INJECTION SUBCUTANEOUS at 08:22

## 2017-05-10 RX ADMIN — DOCUSATE SODIUM 100 MG: 100 CAPSULE, LIQUID FILLED ORAL at 17:30

## 2017-05-11 VITALS
DIASTOLIC BLOOD PRESSURE: 73 MMHG | HEART RATE: 71 BPM | RESPIRATION RATE: 18 BRPM | HEIGHT: 71 IN | TEMPERATURE: 97.8 F | WEIGHT: 203.04 LBS | OXYGEN SATURATION: 95 % | SYSTOLIC BLOOD PRESSURE: 144 MMHG | BODY MASS INDEX: 28.43 KG/M2

## 2017-05-11 LAB
ABO GROUP BLD BPU: NORMAL
ABO GROUP BLD BPU: NORMAL
BASE EXCESS BLDA CALC-SCNC: -3 MMOL/L (ref -2–3)
BPU ID: NORMAL
BPU ID: NORMAL
CA-I BLD-SCNC: 1.11 MMOL/L (ref 1.12–1.32)
GLUCOSE SERPL-MCNC: 130 MG/DL (ref 65–140)
GLUCOSE SERPL-MCNC: 195 MG/DL (ref 65–140)
HCO3 BLDA-SCNC: 24.4 MMOL/L (ref 22–28)
HCT VFR BLD CALC: 32 % (ref 36.5–49.3)
HGB BLDA-MCNC: 10.9 G/DL (ref 12–17)
PCO2 BLD: 26 MMOL/L (ref 21–32)
PCO2 BLD: 51.5 MM HG (ref 36–44)
PH BLD: 7.28 [PH] (ref 7.35–7.45)
PO2 BLD: 179 MM HG (ref 75–129)
POTASSIUM BLD-SCNC: 4.1 MMOL/L (ref 3.5–5.3)
SAO2 % BLD FROM PO2: 99 % (ref 95–98)
SODIUM BLD-SCNC: 137 MMOL/L (ref 136–145)
SPECIMEN SOURCE: ABNORMAL
UNIT DISPENSE STATUS: NORMAL
UNIT DISPENSE STATUS: NORMAL
UNIT PRODUCT CODE: NORMAL
UNIT PRODUCT CODE: NORMAL
UNIT RH: NORMAL
UNIT RH: NORMAL

## 2017-05-11 PROCEDURE — 82948 REAGENT STRIP/BLOOD GLUCOSE: CPT

## 2017-05-11 RX ADMIN — MORPHINE SULFATE 3 MG: 4 INJECTION, SOLUTION INTRAMUSCULAR; INTRAVENOUS at 02:14

## 2017-05-11 RX ADMIN — HYDROCODONE BITARTRATE AND ACETAMINOPHEN 2 TABLET: 5; 325 TABLET ORAL at 05:41

## 2017-05-19 ENCOUNTER — ALLSCRIPTS OFFICE VISIT (OUTPATIENT)
Dept: OTHER | Facility: OTHER | Age: 72
End: 2017-05-19

## 2017-05-19 ENCOUNTER — TRANSCRIBE ORDERS (OUTPATIENT)
Dept: ADMINISTRATIVE | Facility: HOSPITAL | Age: 72
End: 2017-05-19

## 2017-05-19 DIAGNOSIS — N28.89 OTHER SPECIFIED DISORDERS OF KIDNEY AND URETER: ICD-10-CM

## 2017-05-19 DIAGNOSIS — C64.2 MALIGNANT NEOPLASM OF LEFT KIDNEY, EXCEPT RENAL PELVIS (HCC): ICD-10-CM

## 2017-05-19 DIAGNOSIS — C64.2 MALIGNANT NEOPLASM OF LEFT KIDNEY, EXCEPT RENAL PELVIS (HCC): Primary | ICD-10-CM

## 2017-05-19 DIAGNOSIS — N28.89 VASCULAR DISORDERS OF KIDNEY: ICD-10-CM

## 2017-05-25 ENCOUNTER — GENERIC CONVERSION - ENCOUNTER (OUTPATIENT)
Dept: OTHER | Facility: OTHER | Age: 72
End: 2017-05-25

## 2017-06-20 ENCOUNTER — ALLSCRIPTS OFFICE VISIT (OUTPATIENT)
Dept: OTHER | Facility: OTHER | Age: 72
End: 2017-06-20

## 2017-06-28 ENCOUNTER — GENERIC CONVERSION - ENCOUNTER (OUTPATIENT)
Dept: OTHER | Facility: OTHER | Age: 72
End: 2017-06-28

## 2017-06-29 ENCOUNTER — ALLSCRIPTS OFFICE VISIT (OUTPATIENT)
Dept: OTHER | Facility: OTHER | Age: 72
End: 2017-06-29

## 2017-06-29 ENCOUNTER — GENERIC CONVERSION - ENCOUNTER (OUTPATIENT)
Dept: OTHER | Facility: OTHER | Age: 72
End: 2017-06-29

## 2017-06-29 DIAGNOSIS — S39.012A STRAIN OF MUSCLE, FASCIA AND TENDON OF LOWER BACK, INITIAL ENCOUNTER: ICD-10-CM

## 2017-06-30 ENCOUNTER — APPOINTMENT (OUTPATIENT)
Dept: RADIOLOGY | Facility: MEDICAL CENTER | Age: 72
End: 2017-06-30
Payer: MEDICARE

## 2017-06-30 DIAGNOSIS — S39.012A STRAIN OF MUSCLE, FASCIA AND TENDON OF LOWER BACK, INITIAL ENCOUNTER: ICD-10-CM

## 2017-06-30 PROCEDURE — 72110 X-RAY EXAM L-2 SPINE 4/>VWS: CPT

## 2017-07-05 ENCOUNTER — GENERIC CONVERSION - ENCOUNTER (OUTPATIENT)
Dept: OTHER | Facility: OTHER | Age: 72
End: 2017-07-05

## 2017-08-01 ENCOUNTER — APPOINTMENT (OUTPATIENT)
Dept: RADIOLOGY | Facility: CLINIC | Age: 72
End: 2017-08-01
Payer: MEDICARE

## 2017-08-01 ENCOUNTER — ALLSCRIPTS OFFICE VISIT (OUTPATIENT)
Dept: OTHER | Facility: OTHER | Age: 72
End: 2017-08-01

## 2017-08-01 DIAGNOSIS — M17.10 PRIMARY OSTEOARTHRITIS OF ONE KNEE: ICD-10-CM

## 2017-08-01 DIAGNOSIS — M17.11 PRIMARY OSTEOARTHRITIS OF RIGHT KNEE: ICD-10-CM

## 2017-08-01 PROCEDURE — 73564 X-RAY EXAM KNEE 4 OR MORE: CPT

## 2017-08-01 PROCEDURE — 73562 X-RAY EXAM OF KNEE 3: CPT

## 2017-08-17 ENCOUNTER — ANESTHESIA EVENT (OUTPATIENT)
Dept: PERIOP | Facility: HOSPITAL | Age: 72
DRG: 470 | End: 2017-08-17
Payer: MEDICARE

## 2017-08-17 RX ORDER — SODIUM CHLORIDE 9 MG/ML
125 INJECTION, SOLUTION INTRAVENOUS CONTINUOUS
Status: CANCELLED | OUTPATIENT
Start: 2017-09-11

## 2017-08-18 ENCOUNTER — TRANSCRIBE ORDERS (OUTPATIENT)
Dept: ADMINISTRATIVE | Facility: HOSPITAL | Age: 72
End: 2017-08-18

## 2017-08-18 ENCOUNTER — APPOINTMENT (OUTPATIENT)
Dept: PREADMISSION TESTING | Facility: HOSPITAL | Age: 72
End: 2017-08-18
Payer: MEDICARE

## 2017-08-18 ENCOUNTER — APPOINTMENT (OUTPATIENT)
Dept: LAB | Facility: HOSPITAL | Age: 72
End: 2017-08-18
Attending: ORTHOPAEDIC SURGERY
Payer: MEDICARE

## 2017-08-18 VITALS
WEIGHT: 193.2 LBS | SYSTOLIC BLOOD PRESSURE: 120 MMHG | HEIGHT: 71 IN | HEART RATE: 61 BPM | TEMPERATURE: 96.4 F | RESPIRATION RATE: 16 BRPM | DIASTOLIC BLOOD PRESSURE: 80 MMHG | BODY MASS INDEX: 27.05 KG/M2

## 2017-08-18 DIAGNOSIS — Z01.818 PREOP EXAMINATION: ICD-10-CM

## 2017-08-18 DIAGNOSIS — Z01.818 PREOP EXAMINATION: Primary | ICD-10-CM

## 2017-08-18 DIAGNOSIS — M17.11 OSTEOARTHRITIS OF RIGHT KNEE, UNSPECIFIED OSTEOARTHRITIS TYPE: ICD-10-CM

## 2017-08-18 LAB
ABO GROUP BLD: NORMAL
ANION GAP SERPL CALCULATED.3IONS-SCNC: 7 MMOL/L (ref 4–13)
APTT PPP: 24 SECONDS (ref 23–35)
BASOPHILS # BLD AUTO: 0.07 THOUSANDS/ΜL (ref 0–0.1)
BASOPHILS NFR BLD AUTO: 1 % (ref 0–1)
BLD GP AB SCN SERPL QL: NEGATIVE
BUN SERPL-MCNC: 23 MG/DL (ref 5–25)
CALCIUM SERPL-MCNC: 9.3 MG/DL (ref 8.3–10.1)
CHLORIDE SERPL-SCNC: 103 MMOL/L (ref 100–108)
CO2 SERPL-SCNC: 31 MMOL/L (ref 21–32)
CREAT SERPL-MCNC: 1.13 MG/DL (ref 0.6–1.3)
EOSINOPHIL # BLD AUTO: 0.38 THOUSAND/ΜL (ref 0–0.61)
EOSINOPHIL NFR BLD AUTO: 6 % (ref 0–6)
ERYTHROCYTE [DISTWIDTH] IN BLOOD BY AUTOMATED COUNT: 14.7 % (ref 11.6–15.1)
EST. AVERAGE GLUCOSE BLD GHB EST-MCNC: 143 MG/DL
GFR SERPL CREATININE-BSD FRML MDRD: 65 ML/MIN/1.73SQ M
GLUCOSE P FAST SERPL-MCNC: 132 MG/DL (ref 65–99)
HBA1C MFR BLD: 6.6 % (ref 4.2–6.3)
HCT VFR BLD AUTO: 41.5 % (ref 36.5–49.3)
HGB BLD-MCNC: 14.5 G/DL (ref 12–17)
INR PPP: 0.97 (ref 0.86–1.16)
LYMPHOCYTES # BLD AUTO: 1.89 THOUSANDS/ΜL (ref 0.6–4.47)
LYMPHOCYTES NFR BLD AUTO: 29 % (ref 14–44)
MCH RBC QN AUTO: 30.8 PG (ref 26.8–34.3)
MCHC RBC AUTO-ENTMCNC: 34.9 G/DL (ref 31.4–37.4)
MCV RBC AUTO: 88 FL (ref 82–98)
MONOCYTES # BLD AUTO: 0.55 THOUSAND/ΜL (ref 0.17–1.22)
MONOCYTES NFR BLD AUTO: 8 % (ref 4–12)
NEUTROPHILS # BLD AUTO: 3.72 THOUSANDS/ΜL (ref 1.85–7.62)
NEUTS SEG NFR BLD AUTO: 56 % (ref 43–75)
NRBC BLD AUTO-RTO: 0 /100 WBCS
PLATELET # BLD AUTO: 220 THOUSANDS/UL (ref 149–390)
PMV BLD AUTO: 10.9 FL (ref 8.9–12.7)
POTASSIUM SERPL-SCNC: 4.6 MMOL/L (ref 3.5–5.3)
PROTHROMBIN TIME: 12.9 SECONDS (ref 12.1–14.4)
RBC # BLD AUTO: 4.71 MILLION/UL (ref 3.88–5.62)
RH BLD: POSITIVE
SODIUM SERPL-SCNC: 141 MMOL/L (ref 136–145)
SPECIMEN EXPIRATION DATE: NORMAL
WBC # BLD AUTO: 6.61 THOUSAND/UL (ref 4.31–10.16)

## 2017-08-18 PROCEDURE — 86900 BLOOD TYPING SEROLOGIC ABO: CPT

## 2017-08-18 PROCEDURE — 85730 THROMBOPLASTIN TIME PARTIAL: CPT

## 2017-08-18 PROCEDURE — 86850 RBC ANTIBODY SCREEN: CPT

## 2017-08-18 PROCEDURE — 85610 PROTHROMBIN TIME: CPT

## 2017-08-18 PROCEDURE — 83036 HEMOGLOBIN GLYCOSYLATED A1C: CPT

## 2017-08-18 PROCEDURE — 80048 BASIC METABOLIC PNL TOTAL CA: CPT

## 2017-08-18 PROCEDURE — 86901 BLOOD TYPING SEROLOGIC RH(D): CPT

## 2017-08-18 PROCEDURE — 85025 COMPLETE CBC W/AUTO DIFF WBC: CPT

## 2017-08-18 PROCEDURE — 36415 COLL VENOUS BLD VENIPUNCTURE: CPT

## 2017-08-18 RX ORDER — FERROUS SULFATE 325(65) MG
325 TABLET ORAL 2 TIMES DAILY WITH MEALS
COMMUNITY
End: 2018-04-27 | Stop reason: ALTCHOICE

## 2017-08-18 RX ORDER — ASCORBIC ACID 500 MG
500 TABLET ORAL 2 TIMES DAILY
COMMUNITY
End: 2018-04-27 | Stop reason: ALTCHOICE

## 2017-08-18 RX ORDER — FOLIC ACID 1 MG/1
1 TABLET ORAL DAILY
COMMUNITY
End: 2018-04-27 | Stop reason: ALTCHOICE

## 2017-08-18 RX ORDER — LORATADINE 10 MG/1
10 TABLET ORAL DAILY PRN
COMMUNITY
End: 2018-04-27 | Stop reason: ALTCHOICE

## 2017-08-24 ENCOUNTER — ALLSCRIPTS OFFICE VISIT (OUTPATIENT)
Dept: OTHER | Facility: OTHER | Age: 72
End: 2017-08-24

## 2017-09-11 ENCOUNTER — HOSPITAL ENCOUNTER (INPATIENT)
Facility: HOSPITAL | Age: 72
LOS: 1 days | Discharge: HOME HEALTH | DRG: 470 | End: 2017-09-12
Attending: ORTHOPAEDIC SURGERY | Admitting: ORTHOPAEDIC SURGERY
Payer: MEDICARE

## 2017-09-11 ENCOUNTER — ANESTHESIA (OUTPATIENT)
Dept: PERIOP | Facility: HOSPITAL | Age: 72
DRG: 470 | End: 2017-09-11
Payer: MEDICARE

## 2017-09-11 DIAGNOSIS — M17.11 PRIMARY OSTEOARTHRITIS OF RIGHT KNEE: Primary | ICD-10-CM

## 2017-09-11 LAB
ABO GROUP BLD: NORMAL
BLD GP AB SCN SERPL QL: NEGATIVE
GLUCOSE SERPL-MCNC: 119 MG/DL (ref 65–140)
GLUCOSE SERPL-MCNC: 137 MG/DL (ref 65–140)
GLUCOSE SERPL-MCNC: 158 MG/DL (ref 65–140)
RH BLD: POSITIVE
SPECIMEN EXPIRATION DATE: NORMAL

## 2017-09-11 PROCEDURE — 86901 BLOOD TYPING SEROLOGIC RH(D): CPT | Performed by: ORTHOPAEDIC SURGERY

## 2017-09-11 PROCEDURE — C1776 JOINT DEVICE (IMPLANTABLE): HCPCS | Performed by: ORTHOPAEDIC SURGERY

## 2017-09-11 PROCEDURE — 97163 PT EVAL HIGH COMPLEX 45 MIN: CPT

## 2017-09-11 PROCEDURE — 0SRC0J9 REPLACEMENT OF RIGHT KNEE JOINT WITH SYNTHETIC SUBSTITUTE, CEMENTED, OPEN APPROACH: ICD-10-PCS | Performed by: ORTHOPAEDIC SURGERY

## 2017-09-11 PROCEDURE — C1713 ANCHOR/SCREW BN/BN,TIS/BN: HCPCS | Performed by: ORTHOPAEDIC SURGERY

## 2017-09-11 PROCEDURE — 82948 REAGENT STRIP/BLOOD GLUCOSE: CPT

## 2017-09-11 PROCEDURE — 86850 RBC ANTIBODY SCREEN: CPT | Performed by: ORTHOPAEDIC SURGERY

## 2017-09-11 PROCEDURE — G8978 MOBILITY CURRENT STATUS: HCPCS

## 2017-09-11 PROCEDURE — 86900 BLOOD TYPING SEROLOGIC ABO: CPT | Performed by: ORTHOPAEDIC SURGERY

## 2017-09-11 DEVICE — ATTUNE KNEE SYSTEM TIBIAL INSERT FIXED BEARING POSTERIOR STABILIZED 6 5MM AOX
Type: IMPLANTABLE DEVICE | Site: KNEE | Status: FUNCTIONAL
Brand: ATTUNE

## 2017-09-11 DEVICE — ATTUNE PATELLA MEDIALIZED DOME 38MM CEMENTED AOX
Type: IMPLANTABLE DEVICE | Site: PATELLA | Status: FUNCTIONAL
Brand: ATTUNE

## 2017-09-11 DEVICE — SMARTSET GMV HIGH PERFORMANCE GENTAMICIN MEDIUM VISCOSITY BONE CEMENT 40G
Type: IMPLANTABLE DEVICE | Site: KNEE | Status: FUNCTIONAL
Brand: SMARTSET

## 2017-09-11 DEVICE — ATTUNE KNEE SYSTEM TIBIAL BASE FIXED BEARING SIZE 7 CEMENTED
Type: IMPLANTABLE DEVICE | Site: KNEE | Status: FUNCTIONAL
Brand: ATTUNE

## 2017-09-11 DEVICE — ATTUNE KNEE SYSTEM FEMORAL POSTERIOR STABILIZED SIZE 6 RIGHT CEMENTED
Type: IMPLANTABLE DEVICE | Site: KNEE | Status: FUNCTIONAL
Brand: ATTUNE

## 2017-09-11 RX ORDER — SODIUM CHLORIDE, SODIUM LACTATE, POTASSIUM CHLORIDE, CALCIUM CHLORIDE 600; 310; 30; 20 MG/100ML; MG/100ML; MG/100ML; MG/100ML
100 INJECTION, SOLUTION INTRAVENOUS CONTINUOUS
Status: DISCONTINUED | OUTPATIENT
Start: 2017-09-11 | End: 2017-09-12 | Stop reason: HOSPADM

## 2017-09-11 RX ORDER — FENTANYL CITRATE/PF 50 MCG/ML
25 SYRINGE (ML) INJECTION
Status: DISCONTINUED | OUTPATIENT
Start: 2017-09-11 | End: 2017-09-11 | Stop reason: HOSPADM

## 2017-09-11 RX ORDER — OXYCODONE HYDROCHLORIDE 10 MG/1
10 TABLET ORAL EVERY 4 HOURS PRN
Status: DISCONTINUED | OUTPATIENT
Start: 2017-09-12 | End: 2017-09-12 | Stop reason: HOSPADM

## 2017-09-11 RX ORDER — DOCUSATE SODIUM 100 MG/1
100 CAPSULE, LIQUID FILLED ORAL 2 TIMES DAILY
Status: DISCONTINUED | OUTPATIENT
Start: 2017-09-11 | End: 2017-09-12 | Stop reason: HOSPADM

## 2017-09-11 RX ORDER — CALCIUM CARBONATE 200(500)MG
1000 TABLET,CHEWABLE ORAL DAILY PRN
Status: DISCONTINUED | OUTPATIENT
Start: 2017-09-11 | End: 2017-09-12 | Stop reason: HOSPADM

## 2017-09-11 RX ORDER — SENNOSIDES 8.6 MG
1 TABLET ORAL DAILY
Status: DISCONTINUED | OUTPATIENT
Start: 2017-09-12 | End: 2017-09-12 | Stop reason: HOSPADM

## 2017-09-11 RX ORDER — TRAMADOL HYDROCHLORIDE 50 MG/1
50 TABLET ORAL EVERY 6 HOURS SCHEDULED
Status: DISCONTINUED | OUTPATIENT
Start: 2017-09-12 | End: 2017-09-12 | Stop reason: HOSPADM

## 2017-09-11 RX ORDER — HYDROMORPHONE HCL 110MG/55ML
0.5 PATIENT CONTROLLED ANALGESIA SYRINGE INTRAVENOUS EVERY 2 HOUR PRN
Status: DISCONTINUED | OUTPATIENT
Start: 2017-09-11 | End: 2017-09-12 | Stop reason: HOSPADM

## 2017-09-11 RX ORDER — EPHEDRINE SULFATE 50 MG/ML
INJECTION, SOLUTION INTRAVENOUS AS NEEDED
Status: DISCONTINUED | OUTPATIENT
Start: 2017-09-11 | End: 2017-09-11 | Stop reason: SURG

## 2017-09-11 RX ORDER — PROPOFOL 10 MG/ML
INJECTION, EMULSION INTRAVENOUS AS NEEDED
Status: DISCONTINUED | OUTPATIENT
Start: 2017-09-11 | End: 2017-09-11 | Stop reason: SURG

## 2017-09-11 RX ORDER — CELECOXIB 200 MG/1
400 CAPSULE ORAL
Status: DISCONTINUED | OUTPATIENT
Start: 2017-09-11 | End: 2017-09-11 | Stop reason: HOSPADM

## 2017-09-11 RX ORDER — PANTOPRAZOLE SODIUM 40 MG/1
40 TABLET, DELAYED RELEASE ORAL
Status: DISCONTINUED | OUTPATIENT
Start: 2017-09-12 | End: 2017-09-12 | Stop reason: HOSPADM

## 2017-09-11 RX ORDER — ASPIRIN 325 MG
325 TABLET ORAL 2 TIMES DAILY
Status: DISCONTINUED | OUTPATIENT
Start: 2017-09-11 | End: 2017-09-12 | Stop reason: HOSPADM

## 2017-09-11 RX ORDER — ACETAMINOPHEN 325 MG/1
650 TABLET ORAL EVERY 6 HOURS SCHEDULED
Status: DISCONTINUED | OUTPATIENT
Start: 2017-09-11 | End: 2017-09-12 | Stop reason: HOSPADM

## 2017-09-11 RX ORDER — BUPIVACAINE HYDROCHLORIDE 7.5 MG/ML
INJECTION, SOLUTION INTRASPINAL AS NEEDED
Status: DISCONTINUED | OUTPATIENT
Start: 2017-09-11 | End: 2017-09-11 | Stop reason: SURG

## 2017-09-11 RX ORDER — PROPOFOL 10 MG/ML
INJECTION, EMULSION INTRAVENOUS CONTINUOUS PRN
Status: DISCONTINUED | OUTPATIENT
Start: 2017-09-11 | End: 2017-09-11 | Stop reason: SURG

## 2017-09-11 RX ORDER — SIMETHICONE 80 MG
80 TABLET,CHEWABLE ORAL 4 TIMES DAILY PRN
Status: DISCONTINUED | OUTPATIENT
Start: 2017-09-11 | End: 2017-09-12 | Stop reason: HOSPADM

## 2017-09-11 RX ORDER — ACETAMINOPHEN 325 MG/1
650 TABLET ORAL EVERY 4 HOURS PRN
Status: DISCONTINUED | OUTPATIENT
Start: 2017-09-11 | End: 2017-09-12 | Stop reason: HOSPADM

## 2017-09-11 RX ORDER — GABAPENTIN 300 MG/1
300 CAPSULE ORAL
Status: DISCONTINUED | OUTPATIENT
Start: 2017-09-11 | End: 2017-09-11 | Stop reason: HOSPADM

## 2017-09-11 RX ORDER — ONDANSETRON 2 MG/ML
4 INJECTION INTRAMUSCULAR; INTRAVENOUS EVERY 4 HOURS PRN
Status: ACTIVE | OUTPATIENT
Start: 2017-09-11 | End: 2017-09-12

## 2017-09-11 RX ORDER — TRANEXAMIC ACID 100 MG/ML
1000 INJECTION, SOLUTION INTRAVENOUS ONCE
Status: DISCONTINUED | OUTPATIENT
Start: 2017-09-11 | End: 2017-09-12 | Stop reason: HOSPADM

## 2017-09-11 RX ORDER — SODIUM CHLORIDE, SODIUM LACTATE, POTASSIUM CHLORIDE, CALCIUM CHLORIDE 600; 310; 30; 20 MG/100ML; MG/100ML; MG/100ML; MG/100ML
75 INJECTION, SOLUTION INTRAVENOUS CONTINUOUS
Status: DISCONTINUED | OUTPATIENT
Start: 2017-09-11 | End: 2017-09-12 | Stop reason: HOSPADM

## 2017-09-11 RX ORDER — METOCLOPRAMIDE HYDROCHLORIDE 5 MG/ML
5 INJECTION INTRAMUSCULAR; INTRAVENOUS EVERY 6 HOURS PRN
Status: ACTIVE | OUTPATIENT
Start: 2017-09-11 | End: 2017-09-12

## 2017-09-11 RX ORDER — ROPIVACAINE HYDROCHLORIDE 5 MG/ML
INJECTION, SOLUTION EPIDURAL; INFILTRATION; PERINEURAL AS NEEDED
Status: DISCONTINUED | OUTPATIENT
Start: 2017-09-11 | End: 2017-09-11 | Stop reason: SURG

## 2017-09-11 RX ORDER — CELECOXIB 200 MG/1
200 CAPSULE ORAL DAILY
Status: DISCONTINUED | OUTPATIENT
Start: 2017-09-14 | End: 2017-09-12 | Stop reason: HOSPADM

## 2017-09-11 RX ORDER — ACETAMINOPHEN 325 MG/1
975 TABLET ORAL
Status: DISCONTINUED | OUTPATIENT
Start: 2017-09-11 | End: 2017-09-11 | Stop reason: HOSPADM

## 2017-09-11 RX ORDER — HYDRALAZINE HYDROCHLORIDE 20 MG/ML
5 INJECTION INTRAMUSCULAR; INTRAVENOUS EVERY 6 HOURS PRN
Status: DISCONTINUED | OUTPATIENT
Start: 2017-09-11 | End: 2017-09-12 | Stop reason: HOSPADM

## 2017-09-11 RX ORDER — KETOROLAC TROMETHAMINE 30 MG/ML
15 INJECTION, SOLUTION INTRAMUSCULAR; INTRAVENOUS EVERY 6 HOURS SCHEDULED
Status: DISCONTINUED | OUTPATIENT
Start: 2017-09-11 | End: 2017-09-12 | Stop reason: HOSPADM

## 2017-09-11 RX ORDER — GABAPENTIN 300 MG/1
300 CAPSULE ORAL DAILY
Status: DISCONTINUED | OUTPATIENT
Start: 2017-09-12 | End: 2017-09-12 | Stop reason: HOSPADM

## 2017-09-11 RX ORDER — MIDAZOLAM HYDROCHLORIDE 1 MG/ML
INJECTION INTRAMUSCULAR; INTRAVENOUS AS NEEDED
Status: DISCONTINUED | OUTPATIENT
Start: 2017-09-11 | End: 2017-09-11 | Stop reason: SURG

## 2017-09-11 RX ORDER — MORPHINE SULFATE 0.5 MG/ML
INJECTION, SOLUTION EPIDURAL; INTRATHECAL; INTRAVENOUS AS NEEDED
Status: DISCONTINUED | OUTPATIENT
Start: 2017-09-11 | End: 2017-09-11 | Stop reason: SURG

## 2017-09-11 RX ORDER — TRANEXAMIC ACID 100 MG/ML
INJECTION, SOLUTION INTRAVENOUS AS NEEDED
Status: DISCONTINUED | OUTPATIENT
Start: 2017-09-11 | End: 2017-09-11 | Stop reason: SURG

## 2017-09-11 RX ORDER — FENTANYL CITRATE 50 UG/ML
INJECTION, SOLUTION INTRAMUSCULAR; INTRAVENOUS AS NEEDED
Status: DISCONTINUED | OUTPATIENT
Start: 2017-09-11 | End: 2017-09-11 | Stop reason: SURG

## 2017-09-11 RX ORDER — MAGNESIUM HYDROXIDE 1200 MG/15ML
LIQUID ORAL AS NEEDED
Status: DISCONTINUED | OUTPATIENT
Start: 2017-09-11 | End: 2017-09-11 | Stop reason: HOSPADM

## 2017-09-11 RX ORDER — ONDANSETRON 2 MG/ML
4 INJECTION INTRAMUSCULAR; INTRAVENOUS ONCE AS NEEDED
Status: DISCONTINUED | OUTPATIENT
Start: 2017-09-11 | End: 2017-09-11 | Stop reason: HOSPADM

## 2017-09-11 RX ORDER — OXYCODONE HYDROCHLORIDE 5 MG/1
5 TABLET ORAL EVERY 4 HOURS PRN
Status: DISCONTINUED | OUTPATIENT
Start: 2017-09-12 | End: 2017-09-12 | Stop reason: HOSPADM

## 2017-09-11 RX ORDER — SODIUM CHLORIDE 9 MG/ML
125 INJECTION, SOLUTION INTRAVENOUS CONTINUOUS
Status: DISCONTINUED | OUTPATIENT
Start: 2017-09-11 | End: 2017-09-12 | Stop reason: HOSPADM

## 2017-09-11 RX ORDER — MORPHINE SULFATE 4 MG/ML
2 INJECTION, SOLUTION INTRAMUSCULAR; INTRAVENOUS EVERY 4 HOURS PRN
Status: DISCONTINUED | OUTPATIENT
Start: 2017-09-12 | End: 2017-09-12 | Stop reason: HOSPADM

## 2017-09-11 RX ADMIN — SODIUM CHLORIDE 125 ML/HR: 0.9 INJECTION, SOLUTION INTRAVENOUS at 09:25

## 2017-09-11 RX ADMIN — CEFAZOLIN SODIUM 1000 MG: 1 SOLUTION INTRAVENOUS at 22:29

## 2017-09-11 RX ADMIN — PROPOFOL 100 MCG/KG/MIN: 10 INJECTION, EMULSION INTRAVENOUS at 13:28

## 2017-09-11 RX ADMIN — EPHEDRINE SULFATE 5 MG: 50 INJECTION, SOLUTION INTRAMUSCULAR; INTRAVENOUS; SUBCUTANEOUS at 14:12

## 2017-09-11 RX ADMIN — CELECOXIB 400 MG: 200 CAPSULE ORAL at 04:00

## 2017-09-11 RX ADMIN — EPHEDRINE SULFATE 10 MG: 50 INJECTION, SOLUTION INTRAMUSCULAR; INTRAVENOUS; SUBCUTANEOUS at 14:31

## 2017-09-11 RX ADMIN — ASPIRIN 325 MG: 325 TABLET ORAL at 20:11

## 2017-09-11 RX ADMIN — MORPHINE SULFATE 0.15 MG: 0.5 INJECTION, SOLUTION EPIDURAL; INTRATHECAL; INTRAVENOUS at 13:21

## 2017-09-11 RX ADMIN — MIDAZOLAM HYDROCHLORIDE 2 MG: 1 INJECTION, SOLUTION INTRAMUSCULAR; INTRAVENOUS at 12:34

## 2017-09-11 RX ADMIN — ACETAMINOPHEN 975 MG: 325 TABLET, FILM COATED ORAL at 09:21

## 2017-09-11 RX ADMIN — TRANEXAMIC ACID 1000 MG: 100 INJECTION, SOLUTION INTRAVENOUS at 13:34

## 2017-09-11 RX ADMIN — KETOROLAC TROMETHAMINE 15 MG: 30 INJECTION, SOLUTION INTRAMUSCULAR at 18:11

## 2017-09-11 RX ADMIN — BUPIVACAINE HYDROCHLORIDE IN DEXTROSE 1.8 ML: 7.5 INJECTION, SOLUTION SUBARACHNOID at 13:21

## 2017-09-11 RX ADMIN — FENTANYL CITRATE 100 MCG: 50 INJECTION, SOLUTION INTRAMUSCULAR; INTRAVENOUS at 12:34

## 2017-09-11 RX ADMIN — ROPIVACAINE HYDROCHLORIDE 30 ML: 5 INJECTION, SOLUTION EPIDURAL; INFILTRATION; PERINEURAL at 12:37

## 2017-09-11 RX ADMIN — EPHEDRINE SULFATE 5 MG: 50 INJECTION, SOLUTION INTRAMUSCULAR; INTRAVENOUS; SUBCUTANEOUS at 14:10

## 2017-09-11 RX ADMIN — KETOROLAC TROMETHAMINE 15 MG: 30 INJECTION, SOLUTION INTRAMUSCULAR at 23:02

## 2017-09-11 RX ADMIN — ACETAMINOPHEN 650 MG: 325 TABLET, FILM COATED ORAL at 23:01

## 2017-09-11 RX ADMIN — SODIUM CHLORIDE: 0.9 INJECTION, SOLUTION INTRAVENOUS at 13:34

## 2017-09-11 RX ADMIN — DEXAMETHASONE SODIUM PHOSPHATE 4 MG: 10 INJECTION INTRAMUSCULAR; INTRAVENOUS at 14:02

## 2017-09-11 RX ADMIN — PROPOFOL 30 MG: 10 INJECTION, EMULSION INTRAVENOUS at 13:26

## 2017-09-11 RX ADMIN — SODIUM CHLORIDE, SODIUM LACTATE, POTASSIUM CHLORIDE, AND CALCIUM CHLORIDE 100 ML/HR: 600; 310; 30; 20 INJECTION, SOLUTION INTRAVENOUS at 16:12

## 2017-09-11 RX ADMIN — ACETAMINOPHEN 650 MG: 325 TABLET, FILM COATED ORAL at 18:11

## 2017-09-11 RX ADMIN — GABAPENTIN 300 MG: 300 CAPSULE ORAL at 09:19

## 2017-09-11 RX ADMIN — ONDANSETRON HYDROCHLORIDE 4 MG: 2 INJECTION, SOLUTION INTRAVENOUS at 14:02

## 2017-09-11 RX ADMIN — DOCUSATE SODIUM 100 MG: 100 CAPSULE, LIQUID FILLED ORAL at 22:29

## 2017-09-11 RX ADMIN — CEFAZOLIN SODIUM 2000 MG: 2 SOLUTION INTRAVENOUS at 13:24

## 2017-09-12 VITALS
DIASTOLIC BLOOD PRESSURE: 65 MMHG | OXYGEN SATURATION: 100 % | TEMPERATURE: 96.9 F | RESPIRATION RATE: 16 BRPM | HEART RATE: 47 BPM | BODY MASS INDEX: 26.6 KG/M2 | WEIGHT: 190 LBS | HEIGHT: 71 IN | SYSTOLIC BLOOD PRESSURE: 101 MMHG

## 2017-09-12 LAB
ANION GAP SERPL CALCULATED.3IONS-SCNC: 5 MMOL/L (ref 4–13)
BUN SERPL-MCNC: 18 MG/DL (ref 5–25)
CALCIUM SERPL-MCNC: 7.9 MG/DL (ref 8.3–10.1)
CHLORIDE SERPL-SCNC: 104 MMOL/L (ref 100–108)
CO2 SERPL-SCNC: 26 MMOL/L (ref 21–32)
CREAT SERPL-MCNC: 1.03 MG/DL (ref 0.6–1.3)
GFR SERPL CREATININE-BSD FRML MDRD: 73 ML/MIN/1.73SQ M
GLUCOSE SERPL-MCNC: 105 MG/DL (ref 65–140)
GLUCOSE SERPL-MCNC: 117 MG/DL (ref 65–140)
GLUCOSE SERPL-MCNC: 125 MG/DL (ref 65–140)
HCT VFR BLD AUTO: 35.9 % (ref 36.5–49.3)
HGB BLD-MCNC: 12.3 G/DL (ref 12–17)
POTASSIUM SERPL-SCNC: 4.2 MMOL/L (ref 3.5–5.3)
SODIUM SERPL-SCNC: 135 MMOL/L (ref 136–145)

## 2017-09-12 PROCEDURE — 82948 REAGENT STRIP/BLOOD GLUCOSE: CPT

## 2017-09-12 PROCEDURE — 97110 THERAPEUTIC EXERCISES: CPT

## 2017-09-12 PROCEDURE — 80048 BASIC METABOLIC PNL TOTAL CA: CPT | Performed by: PHYSICIAN ASSISTANT

## 2017-09-12 PROCEDURE — G8987 SELF CARE CURRENT STATUS: HCPCS

## 2017-09-12 PROCEDURE — 97116 GAIT TRAINING THERAPY: CPT

## 2017-09-12 PROCEDURE — 85014 HEMATOCRIT: CPT | Performed by: PHYSICIAN ASSISTANT

## 2017-09-12 PROCEDURE — 85018 HEMOGLOBIN: CPT | Performed by: PHYSICIAN ASSISTANT

## 2017-09-12 PROCEDURE — 97166 OT EVAL MOD COMPLEX 45 MIN: CPT

## 2017-09-12 PROCEDURE — G8988 SELF CARE GOAL STATUS: HCPCS

## 2017-09-12 PROCEDURE — G8989 SELF CARE D/C STATUS: HCPCS

## 2017-09-12 RX ORDER — GABAPENTIN 300 MG/1
300 CAPSULE ORAL DAILY
Qty: 30 CAPSULE | Refills: 0 | Status: SHIPPED | OUTPATIENT
Start: 2017-09-12 | End: 2018-04-27 | Stop reason: ALTCHOICE

## 2017-09-12 RX ORDER — ASPIRIN 325 MG
325 TABLET ORAL 2 TIMES DAILY
Qty: 60 TABLET | Refills: 0 | Status: SHIPPED | OUTPATIENT
Start: 2017-09-12 | End: 2018-04-27 | Stop reason: ALTCHOICE

## 2017-09-12 RX ORDER — DIPHENHYDRAMINE HYDROCHLORIDE 50 MG/ML
12.5 INJECTION INTRAMUSCULAR; INTRAVENOUS EVERY 6 HOURS PRN
Status: DISCONTINUED | OUTPATIENT
Start: 2017-09-12 | End: 2017-09-12 | Stop reason: HOSPADM

## 2017-09-12 RX ORDER — PROMETHAZINE HYDROCHLORIDE 12.5 MG/1
12.5 TABLET ORAL EVERY 6 HOURS PRN
Qty: 30 TABLET | Refills: 0 | Status: SHIPPED | OUTPATIENT
Start: 2017-09-12 | End: 2018-03-09 | Stop reason: HOSPADM

## 2017-09-12 RX ORDER — TRAMADOL HYDROCHLORIDE 50 MG/1
50 TABLET ORAL EVERY 6 HOURS SCHEDULED
Qty: 40 TABLET | Refills: 0 | Status: SHIPPED | OUTPATIENT
Start: 2017-09-12 | End: 2017-09-22

## 2017-09-12 RX ORDER — CELECOXIB 200 MG/1
200 CAPSULE ORAL DAILY
Qty: 30 CAPSULE | Refills: 0 | Status: SHIPPED | OUTPATIENT
Start: 2017-09-14 | End: 2018-04-27 | Stop reason: ALTCHOICE

## 2017-09-12 RX ORDER — OXYCODONE HYDROCHLORIDE 5 MG/1
5 TABLET ORAL EVERY 4 HOURS PRN
Qty: 60 TABLET | Refills: 0 | Status: SHIPPED | OUTPATIENT
Start: 2017-09-12 | End: 2017-09-22

## 2017-09-12 RX ADMIN — GABAPENTIN 300 MG: 300 CAPSULE ORAL at 09:11

## 2017-09-12 RX ADMIN — SENNOSIDES 8.6 MG: 8.6 TABLET, FILM COATED ORAL at 09:11

## 2017-09-12 RX ADMIN — ACETAMINOPHEN 650 MG: 325 TABLET, FILM COATED ORAL at 11:19

## 2017-09-12 RX ADMIN — DIPHENHYDRAMINE HYDROCHLORIDE 12.5 MG: 50 INJECTION, SOLUTION INTRAMUSCULAR; INTRAVENOUS at 03:42

## 2017-09-12 RX ADMIN — KETOROLAC TROMETHAMINE 15 MG: 30 INJECTION, SOLUTION INTRAMUSCULAR at 06:20

## 2017-09-12 RX ADMIN — ASPIRIN 325 MG: 325 TABLET ORAL at 09:11

## 2017-09-12 RX ADMIN — ACETAMINOPHEN 650 MG: 325 TABLET, FILM COATED ORAL at 06:20

## 2017-09-12 RX ADMIN — DOCUSATE SODIUM 100 MG: 100 CAPSULE, LIQUID FILLED ORAL at 09:11

## 2017-09-12 RX ADMIN — CEFAZOLIN SODIUM 1000 MG: 1 SOLUTION INTRAVENOUS at 06:20

## 2017-09-12 RX ADMIN — PANTOPRAZOLE SODIUM 40 MG: 40 TABLET, DELAYED RELEASE ORAL at 07:02

## 2017-09-12 RX ADMIN — KETOROLAC TROMETHAMINE 15 MG: 30 INJECTION, SOLUTION INTRAMUSCULAR at 11:18

## 2017-09-12 RX ADMIN — SODIUM CHLORIDE, SODIUM LACTATE, POTASSIUM CHLORIDE, AND CALCIUM CHLORIDE 100 ML/HR: 600; 310; 30; 20 INJECTION, SOLUTION INTRAVENOUS at 03:21

## 2017-09-19 ENCOUNTER — ALLSCRIPTS OFFICE VISIT (OUTPATIENT)
Dept: OTHER | Facility: OTHER | Age: 72
End: 2017-09-19

## 2017-09-19 ENCOUNTER — APPOINTMENT (OUTPATIENT)
Dept: RADIOLOGY | Facility: CLINIC | Age: 72
End: 2017-09-19
Payer: MEDICARE

## 2017-09-19 DIAGNOSIS — Z47.1 AFTERCARE FOLLOWING JOINT REPLACEMENT SURGERY: ICD-10-CM

## 2017-09-19 DIAGNOSIS — M19.90 OSTEOARTHRITIS: ICD-10-CM

## 2017-09-19 PROCEDURE — 73562 X-RAY EXAM OF KNEE 3: CPT

## 2017-10-02 ENCOUNTER — APPOINTMENT (OUTPATIENT)
Dept: PHYSICAL THERAPY | Facility: MEDICAL CENTER | Age: 72
End: 2017-10-02
Payer: MEDICARE

## 2017-10-02 DIAGNOSIS — M19.90 OSTEOARTHRITIS: ICD-10-CM

## 2017-10-02 DIAGNOSIS — Z47.1 AFTERCARE FOLLOWING JOINT REPLACEMENT SURGERY: ICD-10-CM

## 2017-10-02 PROCEDURE — G8990 OTHER PT/OT CURRENT STATUS: HCPCS

## 2017-10-02 PROCEDURE — G8991 OTHER PT/OT GOAL STATUS: HCPCS

## 2017-10-02 PROCEDURE — 97161 PT EVAL LOW COMPLEX 20 MIN: CPT

## 2017-10-03 ENCOUNTER — GENERIC CONVERSION - ENCOUNTER (OUTPATIENT)
Dept: OTHER | Facility: OTHER | Age: 72
End: 2017-10-03

## 2017-10-04 ENCOUNTER — APPOINTMENT (OUTPATIENT)
Dept: PHYSICAL THERAPY | Facility: MEDICAL CENTER | Age: 72
End: 2017-10-04
Payer: MEDICARE

## 2017-10-10 ENCOUNTER — GENERIC CONVERSION - ENCOUNTER (OUTPATIENT)
Dept: OTHER | Facility: OTHER | Age: 72
End: 2017-10-10

## 2017-10-10 ENCOUNTER — APPOINTMENT (OUTPATIENT)
Dept: PHYSICAL THERAPY | Facility: MEDICAL CENTER | Age: 72
End: 2017-10-10
Payer: MEDICARE

## 2017-10-12 ENCOUNTER — APPOINTMENT (OUTPATIENT)
Dept: PHYSICAL THERAPY | Facility: MEDICAL CENTER | Age: 72
End: 2017-10-12
Payer: MEDICARE

## 2017-10-12 PROCEDURE — 97140 MANUAL THERAPY 1/> REGIONS: CPT

## 2017-10-12 PROCEDURE — 97110 THERAPEUTIC EXERCISES: CPT

## 2017-10-17 ENCOUNTER — APPOINTMENT (OUTPATIENT)
Dept: PHYSICAL THERAPY | Facility: MEDICAL CENTER | Age: 72
End: 2017-10-17
Payer: MEDICARE

## 2017-10-17 ENCOUNTER — GENERIC CONVERSION - ENCOUNTER (OUTPATIENT)
Dept: OTHER | Facility: OTHER | Age: 72
End: 2017-10-17

## 2017-10-17 PROCEDURE — 97140 MANUAL THERAPY 1/> REGIONS: CPT

## 2017-10-17 PROCEDURE — 97110 THERAPEUTIC EXERCISES: CPT

## 2017-10-19 ENCOUNTER — APPOINTMENT (OUTPATIENT)
Dept: PHYSICAL THERAPY | Facility: MEDICAL CENTER | Age: 72
End: 2017-10-19
Payer: MEDICARE

## 2017-10-19 PROCEDURE — 97140 MANUAL THERAPY 1/> REGIONS: CPT

## 2017-10-19 PROCEDURE — 97110 THERAPEUTIC EXERCISES: CPT

## 2017-10-24 ENCOUNTER — APPOINTMENT (OUTPATIENT)
Dept: PHYSICAL THERAPY | Facility: MEDICAL CENTER | Age: 72
End: 2017-10-24
Payer: MEDICARE

## 2017-10-24 PROCEDURE — 97140 MANUAL THERAPY 1/> REGIONS: CPT

## 2017-10-24 PROCEDURE — 97110 THERAPEUTIC EXERCISES: CPT

## 2017-10-26 ENCOUNTER — APPOINTMENT (OUTPATIENT)
Dept: PHYSICAL THERAPY | Facility: MEDICAL CENTER | Age: 72
End: 2017-10-26
Payer: MEDICARE

## 2017-10-26 PROCEDURE — 97140 MANUAL THERAPY 1/> REGIONS: CPT

## 2017-10-26 PROCEDURE — 97110 THERAPEUTIC EXERCISES: CPT

## 2017-10-31 ENCOUNTER — APPOINTMENT (OUTPATIENT)
Dept: PHYSICAL THERAPY | Facility: MEDICAL CENTER | Age: 72
End: 2017-10-31
Payer: MEDICARE

## 2017-10-31 PROCEDURE — 97140 MANUAL THERAPY 1/> REGIONS: CPT

## 2017-10-31 PROCEDURE — 97112 NEUROMUSCULAR REEDUCATION: CPT

## 2017-11-02 ENCOUNTER — APPOINTMENT (OUTPATIENT)
Dept: PHYSICAL THERAPY | Facility: MEDICAL CENTER | Age: 72
End: 2017-11-02
Payer: MEDICARE

## 2017-11-02 PROCEDURE — 97112 NEUROMUSCULAR REEDUCATION: CPT

## 2017-11-02 PROCEDURE — 97140 MANUAL THERAPY 1/> REGIONS: CPT

## 2017-11-02 PROCEDURE — 97110 THERAPEUTIC EXERCISES: CPT

## 2017-11-06 ENCOUNTER — TRANSCRIBE ORDERS (OUTPATIENT)
Dept: ADMINISTRATIVE | Facility: HOSPITAL | Age: 72
End: 2017-11-06

## 2017-11-06 ENCOUNTER — APPOINTMENT (OUTPATIENT)
Dept: RADIOLOGY | Facility: MEDICAL CENTER | Age: 72
End: 2017-11-06
Payer: MEDICARE

## 2017-11-06 DIAGNOSIS — N28.89 OTHER SPECIFIED DISORDERS OF KIDNEY AND URETER: ICD-10-CM

## 2017-11-06 DIAGNOSIS — C64.2 MALIGNANT NEOPLASM OF LEFT KIDNEY, EXCEPT RENAL PELVIS (HCC): ICD-10-CM

## 2017-11-06 PROCEDURE — 71020 HB CHEST X-RAY 2VW FRONTAL&LATL: CPT

## 2017-11-07 ENCOUNTER — GENERIC CONVERSION - ENCOUNTER (OUTPATIENT)
Dept: OTHER | Facility: OTHER | Age: 72
End: 2017-11-07

## 2017-11-07 ENCOUNTER — APPOINTMENT (OUTPATIENT)
Dept: PHYSICAL THERAPY | Facility: MEDICAL CENTER | Age: 72
End: 2017-11-07
Payer: MEDICARE

## 2017-11-07 PROCEDURE — 97110 THERAPEUTIC EXERCISES: CPT

## 2017-11-07 PROCEDURE — 97140 MANUAL THERAPY 1/> REGIONS: CPT

## 2017-11-09 ENCOUNTER — HOSPITAL ENCOUNTER (OUTPATIENT)
Dept: CT IMAGING | Facility: HOSPITAL | Age: 72
Discharge: HOME | End: 2017-11-09
Attending: UROLOGY
Payer: MEDICARE

## 2017-11-09 DIAGNOSIS — N28.89 OTHER SPECIFIED DISORDERS OF KIDNEY AND URETER: ICD-10-CM

## 2017-11-09 DIAGNOSIS — C64.2 MALIGNANT NEOPLASM OF LEFT KIDNEY, EXCEPT RENAL PELVIS (HCC): ICD-10-CM

## 2017-11-09 DIAGNOSIS — C64.1 MALIGNANT NEOPLASM OF RIGHT KIDNEY, EXCEPT RENAL PELVIS (HCC): ICD-10-CM

## 2017-11-09 PROCEDURE — 74178 CT ABD&PLV WO CNTR FLWD CNTR: CPT

## 2017-11-09 RX ADMIN — IOHEXOL 100 ML: 350 INJECTION, SOLUTION INTRAVENOUS at 10:03

## 2017-11-14 ENCOUNTER — APPOINTMENT (OUTPATIENT)
Dept: PHYSICAL THERAPY | Facility: MEDICAL CENTER | Age: 72
End: 2017-11-14
Payer: MEDICARE

## 2017-11-14 PROCEDURE — 97110 THERAPEUTIC EXERCISES: CPT

## 2017-11-14 PROCEDURE — 97140 MANUAL THERAPY 1/> REGIONS: CPT

## 2017-11-16 ENCOUNTER — APPOINTMENT (OUTPATIENT)
Dept: PHYSICAL THERAPY | Facility: MEDICAL CENTER | Age: 72
End: 2017-11-16
Payer: MEDICARE

## 2017-11-16 PROCEDURE — 97140 MANUAL THERAPY 1/> REGIONS: CPT

## 2017-11-16 PROCEDURE — 97110 THERAPEUTIC EXERCISES: CPT

## 2017-11-17 ENCOUNTER — ALLSCRIPTS OFFICE VISIT (OUTPATIENT)
Dept: OTHER | Facility: OTHER | Age: 72
End: 2017-11-17

## 2017-11-17 ENCOUNTER — TRANSCRIBE ORDERS (OUTPATIENT)
Dept: ADMINISTRATIVE | Facility: HOSPITAL | Age: 72
End: 2017-11-17

## 2017-11-17 DIAGNOSIS — C64.2 MALIGNANT NEOPLASM OF LEFT KIDNEY, EXCEPT RENAL PELVIS (HCC): Primary | ICD-10-CM

## 2017-11-19 NOTE — PROGRESS NOTES
Assessment    1  Renal cell carcinoma of right kidney (189 0) (C64 1)    Plan  Renal cell carcinoma of left kidney    · US RETROPERITONEAL COMPLETE; Status:Hold For - Scheduling; Requestedfor:28Uxb7931; Perform:[facility] Radiology; IVL:64RSD4206;ZCMZFTK;HCHQ carcinoma of left kidney; Ordered By:Ray Hidalgo; Renal cell carcinoma of left kidney, Renal cell carcinoma of right kidney    · Follow-up visit in 6 months Evaluation and Treatment  Follow-up  Status: Hold For -Scheduling  Requested for: 14OFU2303   Ordered; For: Renal cell carcinoma of left kidney, Renal cell carcinoma of right kidney; Ordered By: Vida Finley Performed:  Due: 44DIT0133   · * CT CHEST WO CONTRAST; Status:Active; Requested for:72Eqf9782 08:00AM;    Perform:[facility] Radiology; Order Comments:no oral contrast; YZI:29BCF6092; Last Updated By:Nereyda Lomas; 11/17/2017 4:26:34 PM;Ordered;cell carcinoma of left kidney, Renal cell carcinoma of right kidney; Ordered By:Ray Hidalgo; Discussion/Summary  Discussion Summary:   My impression is status post da Walter robot-assisted laparoscopic right partial nephrectomy secondary to 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 (pathologic stage pTI a)  scan and CXR discussed  Recommend f/u 6 mos with renal U/S and CT scan chest (h/o 4 mm lung nodule)  Resume tamsulosin 0 4 mg QHS  Goals and Barriers: The patient has the current Goals: Remain cancer free  The patent has the current Barriers: None  Patient's Capacity to Self-Care: Patient is able to Self-Care  Chief Complaint  Chief Complaint Free Text Note Form: Patient presents for f/u RCC s/p R nephrectomy 5/8/17      History of Present Illness  HPI: Danette Whittington is a 61-year-old male who underwent a right da Walter robot-assisted laparoscopic partial nephrectomy  Surgery is performed in May 2017  Pathology revealed a 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   Surgery was challenging based on his significant abdominal adhesions requiring an extensive laparoscopic lysis of adhesions prior to docking the robot  The operative case was uneventful  he has no issues other than nocturia  he is intermittently taking his tamsulosin  Review of Systems  Complete-Male Urology:  Genitourinary: Empty sensation, but-- no dysuria,-- no hematuria,-- no incontinence-- and-- no feelings of urinary urgency--   The patient presents with complaints of occasional episodes of urinary hesitancy  The patient presents with complaints of 4 episodes of nocturia  Active Problems  1  Aftercare following right knee joint replacement surgery (V54 81,V43 65) (Z47 1,Z96 651)   2  Arthritis (716 90) (M19 90)   3  Arthritis of knee (716 96) (M17 10)   4  BPH (benign prostatic hyperplasia) (600 00) (N40 0)   5  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 02)   6  CMC arthritis, thumb, degenerative (715 34) (M18 9)   7  DM2 (diabetes mellitus, type 2) (250 00) (E11 9)   8  Mixed hyperlipidemia (272 2) (E78 2)   9  Osteoarthritis of right knee (715 96) (M17 11)   10  Postherpetic neuralgia (053 19) (B02 29)   11  Renal cell carcinoma of left kidney (189 0) (C64 2)   12  Status post total bilateral knee replacement (V43 65) (Z96 653)   13  Strain of abdominal muscle, initial encounter (848 8) (S39 011A)   14  Strain of lumbar region, initial encounter (847 2) (S39 012A)   15  Vitamin D deficiency (268 9) (E55 9)    Past Medical History  1  History of Acute bacterial prostatitis (601 0) (N41 0)   2  History of Acute right-sided low back pain without sciatica (724 2) (M54 5)   3  History of Allergic dermatitis due to poison ivy (692 6) (L23 7)   4  History of Cellulitis (682 9) (L03 90)   5  History of Colonoscopy (Fiberoptic) Screening   6  History of Contact dermatitis (692 9) (L25 9)   7  History of Contact dermatitis due to poison ivy (692 6) (L23 7)   8  History of Depression screening (V79 0) (Z13 89)   9   History of Encounter for pre-operative examination (V72 84) (Z01 818)   10  History of Encounter for pre-operative examination (V72 84) (Z01 818)   11  History of Encounter for prostate cancer screening (V76 44) (Z12 5)   12  History of Enlarged prostate without lower urinary tract symptoms (luts) (600 00) (N40 0)   13  History of Fatigue (780 79) (R53 83)   14  History of Flu vaccine need (V04 81) (Z23)   15  History of Flu vaccine need (V04 81) (Z23)   16  History of allergic reaction (V15 09) (Z88 9)   17  History of bee sting allergy (V15 06) (Z91 030)   18  History of dermatitis (V13 3) (Z87 2)   19  History of epistaxis (V12 69) (Z87 898)   20  History of fatigue (V13 89) (Z87 898)   21  History of herpes zoster (V12 09) (Z86 19)   22  History of insect bite (V15 59) (Z87 828)   23  History of leukocytosis (V12 3) (Z86 2)   24  History of seborrheic keratosis (V13 3) (Z87 2)   25  History of Hornet/Wasp/Bee Sting (989 5)   26  History of Knee pain (719 46) (M25 569)   27  History of Need for pneumococcal vaccine (V03 82) (Z23)   28  History of Nonvenomous Insect Bite Of Right Forearm (913 4)   29  History of Osteoarthrosis, localized, primary, knee (715 16) (M17 10)   30  History of Renal mass (593 9) (N28 89)   31  History of Rhus dermatitis (692 6) (L23 7)   32  History of Right upper quadrant abdominal pain (789 01) (R10 11)   33  History of Screening for genitourinary condition (V81 6) (Z13 89)   34  History of Screening for neurological condition (V80 09) (Z13 89)   35  History of Thumb pain (729 5) (M79 646)    Surgical History  1  History of Nephrectomy    Family History  Mother    1  Family history of Cancer (199 1) (C80 1)  Father    2  Family history of Stroke (434 91) (I63 9)    Social History     · Former smoker (O98 41) (R76 295)    Current Meds   1  [employer] Vitamin D 2000 UNIT CAPS; Therapy: (Recorded:71Zks1314) to Recorded   2  EpiPen 2-Ramses 0 3 MG/0 3ML Injection Solution Auto-injector; use as directed;  Therapy: 97MEB6906 to (Last Rx:26May2015)  Requested for: 08EYJ2751 Ordered   3  MetFORMIN HCl - 500 MG Oral Tablet; Take 1 tablet twice daily; Therapy: 45Zyc1291 to (Last Taran Elders)  Requested for: 01Tia1526 Ordered   4  Simvastatin 20 MG Oral Tablet; Take 1 tablet daily; Therapy: 70BCR6243 to (Evaluate:64Pnc1710)  Requested for: 43PZU8705; Last Rx:71Hpx8316 Ordered   5  Tamsulosin HCl - 0 4 MG Oral Capsule; TAKE 1 CAPSULE Bedtime; Therapy: 52YZG4272 to (Evaluate:28Jun2017)  Requested for: 30Edl7729; Last Rx:85Hvn4350 Ordered   6  Tetanus-Diphtheria Toxoids Td 2-2 LF/0 5ML Intramuscular Suspension; 0 5 mls IM; Therapy: 20Qma2297 to (Last Rx:29Hiv1747) Ordered   7  TraMADol HCl - 50 MG Oral Tablet; TAKE 1 TABLET EVERY 6 HOURS AS NEEDED Recorded    Allergies  1  No Known Drug Allergies    Vitals  Vital Signs    Recorded: 39EPS0712 03:48PM   Heart Rate 64   Systolic 780   Diastolic 78   Height 5 ft 11 in   Weight 180 lb 4 oz   BMI Calculated 25 14   BSA Calculated 2 02       Physical Exam   Additional Exam:  NAD, soft, nt, nd, surgical scars noted  no hernia  skin warm, ext - edema, neuro intact  Results/Data  CT ABDOMEN PELVIS W WO CONTRAST 39NRC9162 09:19AM [facility] Order Number: [id number]  Performing Comments: CT scan abdomen and pelvis with, without, and delayed IV contrast  Oral contrast not required  - Patient Instructions: To schedule this appointment, please contact Central Scheduling at [phone]  **  [facility] **               NOTHING TO EAT 3 HOURS PRIOR TO TEST               CLEAR LIQUIDS ARE OK     Test Name Result Flag Reference   CT ABDOMEN PELVIS W WO CONTRAST (Report)       CT ABDOMEN AND PELVIS WITH AND WITHOUT IV CONTRAST   INDICATION: 61-year-old man with history of partial right nephrectomy for renal cell carcinoma in 5/2017  Follow-up  COMPARISON: Preoperative CT from March 28, 2017  Abdominal sonogram from March 21, 2017     TECHNIQUE: CT of the kidneys was performed without intravenous contrast  Dynamic postcontrast CT evaluation of the abdomen and pelvis was performed in both nephrographic and delayed phases after the administration of intravenous contrast  Reformatted   images were created in axial, sagittal, and coronal planes  Radiation dose length product (DLP) for this visit: 1264 mGy-cm   This examination, like all CT scans performed in the [facility], was performed utilizing techniques to minimize radiation dose exposure, including the use of iterative   reconstruction and automated exposure control  IV Contrast: 100 mL of iohexol (OMNIPAQUE)     Enteric Contrast: Not administered  FINDINGS:   ABDOMEN   RIGHT KIDNEY AND URETER:  Postoperative changes, following partial nephrectomy  No evidence of residual or recurrent renal cell carcinoma  No evidence of ureteral mass  No hydronephrosis or hydroureter  No urinary tract calculi  No perinephric collection  LEFT KIDNEY AND URETER:  Renal sinus cysts  No solid left renal mass  No evidence of left ureteral mass  No hydronephrosis or hydroureter  No urinary tract calculi  No perinephric collection  URINARY BLADDER:  Bladder floor elevated by enlarged prostate  Bladder wall thickness normal  No evidence of mass  No calculi  LUNG BASES: Unremarkable  LIVER/BILIARY TREE: Stable 2 2 cm cyst in the medial segment of the left lobe  No other liver masses  Bile ducts normal    GALLBLADDER: Postcholecystectomy  SPLEEN: Unremarkable  PANCREAS: Unremarkable  ADRENAL GLANDS: Unremarkable  STOMACH, BOWEL AND APPENDIX: Small hiatal hernia containing a portion of the gastric fundus  Stomach otherwise normal in appearance  Small intestine and colon unremarkable  Normal appendix  ABDOMINOPELVIC CAVITY: No lymphadenopathy or mass  No ascites  No extraluminal gas  VESSELS: Unremarkable for patient's age  No aortic aneurysm  PELVIS   REPRODUCTIVE ORGANS: Prostatomegaly     ABDOMINAL WALL/INGUINAL REGIONS: Unremarkable  OSSEOUS STRUCTURES: No acute fracture or destructive osseous lesion  IMPRESSION:   1  Post partial right nephrectomy for renal cell carcinoma  2  No evidence of local recurrence of tumor  3  No evidence of metastases in the abdomen, pelvis or included portions of the skeleton  Workstation performed: [id number]   Signed by:  Leona Walton MD  11/13/17     * XR CHEST PA & LATERAL [id number] 08:08AM [facility] Order Number: [id number]     Test Name Result Flag Reference   XR CHEST PA & LATERAL (Report)       CHEST - DUAL ENERGY   INDICATION: N28 89: Other specified disorders of kidney and ureter  C64 2: Malignant neoplasm of left kidney, except renal pelvis  History taken directly from the electronic ordering system  COMPARISON: Chest x-ray dated August 18, 2015  VIEWS: PA (including soft tissue/bone algorithms) and lateral projections   IMAGES: 4   FINDINGS:      Cardiomediastinal silhouette appears unremarkable  The lungs are clear  Mild chronic obstructive airway changes are present  No pneumothorax or pleural effusion  Visualized osseous structures appear within normal limits for the patient's age  There are right upper quadrant surgical clips  IMPRESSION:   No active pulmonary disease  Workstation performed: [id number]   Signed by:  Porsche Oakes MD  11/7/17     Future Appointments    Date/Time Provider Specialty Site   03/09/2018 08:30 AM WILLIAM Conti   Orthopedic Surgery [facility]   01/04/2018 09:15 AM Devon Conroy DO Family Medicine [address]       Signatures   Electronically signed by : Shadi Mcdaniel MD; Nov 17 2017  4:27PM EST                       (Author)

## 2017-11-21 ENCOUNTER — APPOINTMENT (OUTPATIENT)
Dept: PHYSICAL THERAPY | Facility: MEDICAL CENTER | Age: 72
End: 2017-11-21
Payer: MEDICARE

## 2017-11-24 ENCOUNTER — APPOINTMENT (OUTPATIENT)
Dept: PHYSICAL THERAPY | Facility: MEDICAL CENTER | Age: 72
End: 2017-11-24
Payer: MEDICARE

## 2017-12-28 ENCOUNTER — LAB CONVERSION - ENCOUNTER (OUTPATIENT)
Dept: OTHER | Facility: OTHER | Age: 72
End: 2017-12-28

## 2017-12-28 LAB
A/G RATIO (HISTORICAL): 1.2 (CALC) (ref 1–2.5)
ALBUMIN SERPL BCP-MCNC: 4.1 G/DL (ref 3.6–5.1)
ALP SERPL-CCNC: 49 U/L (ref 40–115)
ALT SERPL W P-5'-P-CCNC: 18 U/L (ref 9–46)
AST SERPL W P-5'-P-CCNC: 13 U/L (ref 10–35)
BASOPHILS # BLD AUTO: 1 %
BASOPHILS # BLD AUTO: 71 CELLS/UL (ref 0–200)
BILIRUB SERPL-MCNC: 0.3 MG/DL (ref 0.2–1.2)
BUN SERPL-MCNC: 18 MG/DL (ref 7–25)
BUN/CREA RATIO (HISTORICAL): ABNORMAL (CALC) (ref 6–22)
CALCIUM SERPL-MCNC: 9.7 MG/DL (ref 8.6–10.3)
CHLORIDE SERPL-SCNC: 102 MMOL/L (ref 98–110)
CHOLEST SERPL-MCNC: 149 MG/DL
CHOLEST/HDLC SERPL: 4 (CALC)
CO2 SERPL-SCNC: 27 MMOL/L (ref 20–31)
CREAT SERPL-MCNC: 0.98 MG/DL (ref 0.7–1.18)
DEPRECATED RDW RBC AUTO: 13.4 % (ref 11–15)
EGFR AFRICAN AMERICAN (HISTORICAL): 90 ML/MIN/1.73M2
EGFR-AMERICAN CALC (HISTORICAL): 77 ML/MIN/1.73M2
EOSINOPHIL # BLD AUTO: 426 CELLS/UL (ref 15–500)
EOSINOPHIL # BLD AUTO: 6 %
GAMMA GLOBULIN (HISTORICAL): 3.5 G/DL (CALC) (ref 1.9–3.7)
GLUCOSE (HISTORICAL): 134 MG/DL (ref 65–99)
HBA1C MFR BLD HPLC: 6.2 % OF TOTAL HGB
HCT VFR BLD AUTO: 43.2 % (ref 38.5–50)
HDLC SERPL-MCNC: 37 MG/DL
HGB BLD-MCNC: 14.6 G/DL (ref 13.2–17.1)
LDL CHOLESTEROL (HISTORICAL): 88 MG/DL (CALC)
LYMPHOCYTES # BLD AUTO: 2286 CELLS/UL (ref 850–3900)
LYMPHOCYTES # BLD AUTO: 32.2 %
MCH RBC QN AUTO: 30.3 PG (ref 27–33)
MCHC RBC AUTO-ENTMCNC: 33.8 G/DL (ref 32–36)
MCV RBC AUTO: 89.6 FL (ref 80–100)
MONOCYTES # BLD AUTO: 568 CELLS/UL (ref 200–950)
MONOCYTES (HISTORICAL): 8 %
NEUTROPHILS # BLD AUTO: 3749 CELLS/UL (ref 1500–7800)
NEUTROPHILS # BLD AUTO: 52.8 %
NON-HDL-CHOL (CHOL-HDL) (HISTORICAL): 112 MG/DL (CALC)
PLATELET # BLD AUTO: 231 THOUSAND/UL (ref 140–400)
PMV BLD AUTO: 11.2 FL (ref 7.5–12.5)
POTASSIUM SERPL-SCNC: 4.3 MMOL/L (ref 3.5–5.3)
PROSTATE SPECIFIC ANTIGEN TOTAL (HISTORICAL): 2.6 NG/ML
RBC # BLD AUTO: 4.82 MILLION/UL (ref 4.2–5.8)
SODIUM SERPL-SCNC: 138 MMOL/L (ref 135–146)
TOTAL PROTEIN (HISTORICAL): 7.6 G/DL (ref 6.1–8.1)
TRIGL SERPL-MCNC: 141 MG/DL
TSH SERPL DL<=0.05 MIU/L-ACNC: 2.37 MIU/L (ref 0.4–4.5)
WBC # BLD AUTO: 7.1 THOUSAND/UL (ref 3.8–10.8)

## 2018-01-04 ENCOUNTER — ALLSCRIPTS OFFICE VISIT (OUTPATIENT)
Dept: OTHER | Facility: OTHER | Age: 73
End: 2018-01-04

## 2018-01-11 NOTE — PROGRESS NOTES
Assessment    1  Strain of abdominal muscle, initial encounter (848 8) (S39 011A)   2  Right upper quadrant abdominal pain (789 01) (R10 11)    Plan  Right upper quadrant abdominal pain    · US ABDOMEN LIMITED; Status:Hold For - Scheduling; Requested for:10Mar2017;   Strain of abdominal muscle, initial encounter    · Cyclobenzaprine HCl - 10 MG Oral Tablet; TAKE 1 TABLET AT BEDTIME AS  NEEDED    Discussion/Summary    --Right upper quadrant abdominal pain/strain: Most likely musculoskeletal, no evidence of visceral etiology    pain under R sided ribcage x 1 month  no trauma  worse w/ certain movements  appetitie good  no n/v/d/c  no bloody stools  pt was seen at urgent care on 3/4 and given naproxen w/o benefit  His examination today is consistent with musculoskeletal strain  Will give Rx for Flexeril  I also advised him to apply warm compresses  Lastly, I gave him an Rx for limited abdominal ultrasound to schedule if symptoms do not improve within one week  Possible side effects of new medications were reviewed with the patient/guardian today  The treatment plan was reviewed with the patient/guardian  The patient/guardian understands and agrees with the treatment plan      Chief Complaint  pt here for a f/u to his urgent care visit from the 4th for a strain of his abdominal wall muscle      History of Present Illness  pain under R sided ribcage x 1 month  no trauma  worse w/ certain movements  appetitie good  no n/v/d/c  no bloody stools  pt was seen at urgent care on 3/4 and given naproxen      Review of Systems    Constitutional: No fever or chills, feels well, no tiredness, no recent weight gain or weight loss  Cardiovascular: No complaints of slow heart rate, no fast heart rate, no chest pain, no palpitations, no leg claudication, no lower extremity  Respiratory: No complaints of shortness of breath, no wheezing, no cough, no SOB on exertion, no orthopnea or PND  Gastrointestinal: as noted in HPI  Genitourinary: No complaints of dysuria, no incontinence, no hesitancy, no nocturia, no genital lesion, no testicular pain  Musculoskeletal: as noted in HPI  Active Problems    1  Acute right-sided low back pain without sciatica (724 2) (M54 5)   2  Arthritis (716 90) (M19 90)   3  Arthritis of knee (716 96) (M17 10)   4  BPH (benign prostatic hyperplasia) (600 00) (N40 0)   5  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 02)   6  CMC arthritis, thumb, degenerative (715 34) (M18 9)   7  Contact dermatitis due to poison ivy (692 6) (L23 7)   8  Depression screening (V79 0) (Z13 89)   9  Dermatitis (692 9) (L30 9)   10  DM2 (diabetes mellitus, type 2) (250 00) (E11 9)   11  Encounter for pre-operative examination (V72 84) (Z01 818)   12  Enlarged prostate without lower urinary tract symptoms (luts) (600 00) (N40 0)   13  Epistaxis (784 7) (R04 0)   14  Fatigue (780 79) (R53 83)   15  Flu vaccine need (V04 81) (Z23)   16  Knee pain (719 46) (M25 569)   17  Mixed hyperlipidemia (272 2) (E78 2)   18  Need for pneumococcal vaccine (V03 82) (Z23)   19  Postherpetic neuralgia (053 19) (B02 29)   20  Screening for genitourinary condition (V81 6) (Z13 89)   21  Screening for neurological condition (V80 09) (Z13 89)   22  Seborrheic keratosis (702 19) (L82 1)   23  Status post total left knee replacement (V43 65) (Z96 652)   24  Strain of abdominal muscle, initial encounter (848 8) (S39 011A)   25  Thumb pain (729 5) (M79 646)   26  Vitamin D deficiency (268 9) (E55 9)    Past Medical History    1  History of Acute bacterial prostatitis (601 0) (N41 0)   2  History of Allergic dermatitis due to poison ivy (692 6) (L23 7)   3  History of Cellulitis (682 9) (L03 90)   4  History of Colonoscopy (Fiberoptic) Screening   5  History of Contact dermatitis (692 9) (L25 9)   6  History of Fatigue (780 79) (R53 83)   7  History of Flu vaccine need (V04 81) (Z23)   8  History of allergic reaction (V15 09) (Z88 9)   9  History of bee sting allergy (V15 06) (Z91 030)   10  History of herpes zoster (V12 09) (Z86 19)   11  History of insect bite (V15 59) (Z87 828)   12  History of leukocytosis (V12 3) (Z86 2)   13  History of Hornet/Wasp/Bee Sting (989 5)   14  History of Nonvenomous Insect Bite Of Right Forearm (913 4)   15  History of Osteoarthrosis, localized, primary, knee (715 16) (M17 10)   16  History of Rhus dermatitis (692 6) (L23 7)    The active problems and past medical history were reviewed and updated today  Family History  Mother    1  Family history of Cancer (199 1) (C80 1)  Father    2  Family history of Stroke (434 91) (I63 9)    Social History    · Former smoker (R52 33) (B63 318)  The social history was reviewed and updated today  The social history was reviewed and is unchanged  Current Meds   1  Betamethasone Dipropionate 0 05 % External Cream; APPLY SPARINGLY TO AFFECTED   AREA(S) TWICE DAILY; Therapy: 48Shf3609 to (Last Rx:20Jun2016)  Requested for: 20Jun2016 Ordered   2  EpiPen 2-Ramses 0 3 MG/0 3ML Injection Solution Auto-injector; use as directed; Therapy: 41KQB0082 to (Last Rx:96Oap1840)  Requested for: 07BRB9672 Ordered   3  MetFORMIN HCl - 500 MG Oral Tablet; take 1 tablet twice a day; Therapy: 32Ylc4121 to (Rhiannon Clubs)  Requested for: 81OJY4213; Last   Rx:02Nov2016 Ordered   4  Naproxen 500 MG Oral Tablet; TAKE 1 TABLET TWICE DAILY WITH MEALS; Therapy: 14LNS6109 to (Evaluate:18Mar2017)  Requested for: 40MEB3516; Last   Rx:04Mar2017 Ordered   5  Simvastatin 20 MG Oral Tablet; Take 1 tablet daily; Therapy: 55JIY0076 to (Evaluate:09Mqb9229)  Requested for: 89Gjs5137; Last   Rx:96Zfg7586 Ordered   6  Tamsulosin HCl - 0 4 MG Oral Capsule; TAKE 1 CAPSULE Bedtime; Therapy: 34VLA5374 to (Evaluate:61Vxp9294)  Requested for: 35Sem5504; Last   Rx:00Iup7728 Ordered   7  Tetanus-Diphtheria Toxoids Td 2-2 LF/0 5ML Intramuscular Suspension; 0 5 mls IM;    Therapy: 46Tpp5329 to (Last Rx:79Hkb8517) Ordered    The medication list was reviewed and updated today  Allergies    1  No Known Drug Allergies    Vitals  Vital Signs    Recorded: 08ACA6832 12:49PM   Temperature 97 F, Tympanic   Heart Rate 69   Respiration 20   Systolic 326   Diastolic 74   Height 5 ft 11 3 in   Weight 196 lb 4 96 oz   BMI Calculated 27 15   BSA Calculated 2 1   O2 Saturation 98   Pain Scale 0     Physical Exam    Constitutional   General appearance: No acute distress, well appearing and well nourished  Pulmonary   Respiratory effort: No increased work of breathing or signs of respiratory distress  Auscultation of lungs: Clear to auscultation, equal breath sounds bilaterally, no wheezes, no rales, no rhonci  Cardiovascular   Palpation of heart: Normal PMI, no thrills  Auscultation of heart: Normal rate and rhythm, normal S1 and S2, without murmurs  Examination of extremities for edema and/or varicosities: Normal     Carotid pulses: Normal     Abdomen   Abdomen: Abnormal   Mild point tenderness right upper quadrant    Somewhat reproducible with certain range of motion  Liver and spleen: No hepatomegaly or splenomegaly  Health Management  History of Colonoscopy (Fiberoptic) Screening   COLONOSCOPY; every 3 years; Last 41ZQS9323; Next Due: 85OWM3853; Near Due    Signatures   Electronically signed by :  Conrad Montague DO; Mar 10 2017  1:16PM EST                       (Author)

## 2018-01-12 VITALS
SYSTOLIC BLOOD PRESSURE: 150 MMHG | HEIGHT: 71 IN | BODY MASS INDEX: 26.93 KG/M2 | HEART RATE: 70 BPM | DIASTOLIC BLOOD PRESSURE: 86 MMHG | WEIGHT: 192.38 LBS

## 2018-01-12 VITALS
DIASTOLIC BLOOD PRESSURE: 76 MMHG | WEIGHT: 180.25 LBS | HEIGHT: 71 IN | SYSTOLIC BLOOD PRESSURE: 138 MMHG | HEART RATE: 88 BPM | BODY MASS INDEX: 25.23 KG/M2

## 2018-01-13 VITALS
HEART RATE: 64 BPM | WEIGHT: 180.25 LBS | HEIGHT: 71 IN | BODY MASS INDEX: 25.23 KG/M2 | SYSTOLIC BLOOD PRESSURE: 132 MMHG | DIASTOLIC BLOOD PRESSURE: 78 MMHG

## 2018-01-13 VITALS
RESPIRATION RATE: 18 BRPM | DIASTOLIC BLOOD PRESSURE: 78 MMHG | TEMPERATURE: 96.8 F | WEIGHT: 187.5 LBS | OXYGEN SATURATION: 98 % | HEART RATE: 70 BPM | BODY MASS INDEX: 26.25 KG/M2 | HEIGHT: 71 IN | SYSTOLIC BLOOD PRESSURE: 120 MMHG

## 2018-01-13 NOTE — MISCELLANEOUS
Message  I spoke with patient regarding abdominal ultrasound from March 21 showing right renal mass measuring 3 5 cm at greatest dimension  I advised him to schedule follow-up CAT scan of abdomen with contrast ( attention right kidney)  Will check BUN/creatinine creatinine at [facility] prior to contrast administration  Will call with results      Signatures   Electronically signed by :  Joel Sam DO; Mar 22 2017  6:48PM EST                       (Author)

## 2018-01-14 VITALS
RESPIRATION RATE: 20 BRPM | WEIGHT: 188.13 LBS | SYSTOLIC BLOOD PRESSURE: 122 MMHG | BODY MASS INDEX: 26.34 KG/M2 | HEIGHT: 71 IN | HEART RATE: 79 BPM | DIASTOLIC BLOOD PRESSURE: 78 MMHG | TEMPERATURE: 96.3 F | OXYGEN SATURATION: 97 %

## 2018-01-14 VITALS
HEIGHT: 71 IN | BODY MASS INDEX: 26.77 KG/M2 | TEMPERATURE: 96.1 F | DIASTOLIC BLOOD PRESSURE: 80 MMHG | WEIGHT: 191.25 LBS | HEART RATE: 60 BPM | RESPIRATION RATE: 16 BRPM | SYSTOLIC BLOOD PRESSURE: 132 MMHG | OXYGEN SATURATION: 99 %

## 2018-01-14 VITALS
BODY MASS INDEX: 27.16 KG/M2 | HEIGHT: 71 IN | WEIGHT: 194 LBS | DIASTOLIC BLOOD PRESSURE: 90 MMHG | SYSTOLIC BLOOD PRESSURE: 150 MMHG | HEART RATE: 72 BPM

## 2018-01-14 VITALS
SYSTOLIC BLOOD PRESSURE: 126 MMHG | DIASTOLIC BLOOD PRESSURE: 74 MMHG | WEIGHT: 180.4 LBS | HEART RATE: 68 BPM | HEIGHT: 71 IN | BODY MASS INDEX: 25.26 KG/M2

## 2018-01-14 VITALS
DIASTOLIC BLOOD PRESSURE: 69 MMHG | BODY MASS INDEX: 28.05 KG/M2 | WEIGHT: 200.38 LBS | HEART RATE: 64 BPM | HEIGHT: 71 IN | SYSTOLIC BLOOD PRESSURE: 145 MMHG

## 2018-01-14 VITALS
WEIGHT: 191.25 LBS | HEIGHT: 71 IN | BODY MASS INDEX: 26.77 KG/M2 | SYSTOLIC BLOOD PRESSURE: 123 MMHG | HEART RATE: 64 BPM | DIASTOLIC BLOOD PRESSURE: 77 MMHG

## 2018-01-14 VITALS
RESPIRATION RATE: 20 BRPM | HEIGHT: 71 IN | DIASTOLIC BLOOD PRESSURE: 86 MMHG | BODY MASS INDEX: 26.51 KG/M2 | WEIGHT: 189.38 LBS | OXYGEN SATURATION: 98 % | TEMPERATURE: 97.6 F | HEART RATE: 71 BPM | SYSTOLIC BLOOD PRESSURE: 136 MMHG

## 2018-01-14 VITALS
OXYGEN SATURATION: 98 % | BODY MASS INDEX: 27.48 KG/M2 | HEIGHT: 71 IN | SYSTOLIC BLOOD PRESSURE: 122 MMHG | WEIGHT: 196.31 LBS | RESPIRATION RATE: 20 BRPM | DIASTOLIC BLOOD PRESSURE: 74 MMHG | HEART RATE: 69 BPM | TEMPERATURE: 97 F

## 2018-01-14 NOTE — PROGRESS NOTES
Preliminary Nursing Report                Patient Information    Initial Encounter Entry Date:   2017 9:42 AM EST (Automated Transmission Automated Transmission)       Last Modified:   {Karishma Skinner}              Legal Name: Carol Gallo Number:        YOB: 1945        Age (years): 80        Gender: M        Body Mass Index (BMI): 27 kg/m2        Height: 71 in  Weight: 194 lbs (88 kgs)           Address:   [address] [place] [id number] [place]              Phone: [phone]   (consent to leave messages)        Email:        Ethnicity: Decline to State        Druze:        Marital Status:        Preferred Language: English        Race: Other Race                    Patient Insurance Information        Primary Insurance Information Carrier Name: {Primary  CarrierName}           Carrier Address:   {Primary  CarrierAddress}              Carrier Phone: {Primary  CarrierPhone}          Group Number: {Primary  GroupNumber}          Policy Number: {Primary  PolicyNumber}          Insured Name: {Primary  InsuredName}          Insured : {Primary  InsuredDOB}          Relationship to Insured: {Primary  RelationshiptoInsured}           Secondary Insurance Information Carrier Name: {Secondary  CarrierName}           Carrier Address:   {Secondary  CarrierAddress}              Carrier Phone: {Secondary  CarrierPhone}          Group Number: {Secondary  GroupNumber}          Policy Number: {Secondary  PolicyNumber}          Insured Name: {Secondary  InsuredName}          Insured : {Secondary  InsuredDOB}          Relationship to Insured: {Secondary  RelationshiptoInsured}                       Health Profile   Booking #:   Han Slater #: [id number]               DOS: 2017    Surgery : LAPAROSCOPY, SURGICAL; PARTIAL NEPHRECTOMY    Add'l Procedures/Notes:     Surgery Risk: Intermediate          Precautions     DM2 (diabetes mellitus, type 2)       Renal mass Allergies    No Known Drug Allergies             Medications    [employer] Vitamin D 2000 UNIT CAPS       EpiPen 2-Ramses 0 3 MG/0 3ML Injection Solution Auto-injector       MetFORMIN HCl - 500 MG Oral Tablet       Simvastatin 20 MG Oral Tablet       Tamsulosin HCl - 0 4 MG Oral Capsule       Tetanus-Diphtheria Toxoids Td 2-2 LF/0 5ML Intramuscular Suspension               Conditions    Acute right-sided low back pain without sciatica       Arthritis       Arthritis of knee       BPH (benign prostatic hyperplasia)       Carpal tunnel syndrome of left wrist       CMC arthritis, thumb, degenerative       Contact dermatitis due to poison ivy       Depression screening       DM2 (diabetes mellitus, type 2)       Encounter for pre-operative examination       Enlarged prostate without lower urinary tract symptoms (luts)       Epistaxis       Fatigue       Flu vaccine need       Knee pain       Mixed hyperlipidemia       Need for pneumococcal vaccine       Postherpetic neuralgia       Renal mass       Right upper quadrant abdominal pain       Screening for genitourinary condition       Screening for neurological condition       Seborrheic keratosis       Status post total left knee replacement       Strain of abdominal muscle, initial encounter       Thumb pain       Vitamin D deficiency               Family History    None             Surgical History    None             Social History    Former smoker                               Patient Instructions       Medical Procedure Risk  NPO Instructions   The day before surgery it is recommended to have a light dinner at your usual time and you are allowed a light snack early in the evening  Do not eat anything heavy or eat a big meal after 7pm  Do not eat or drink anything after midnight prior to your surgery  If you are supposed to take any of your medications, do so with a sip of water   Failure to follow these instructions can lead to an increased risk of lung complications and may result in a delay or cancellation of your procedure  If you have any questions, contact your institution for further instructions  No candy, no gum, no mints, no chewing tobacco          DM2 (diabetes mellitus, type 2), , MetFORMIN HCl - 500 MG Oral Tablet  Medication Instruction (Diabetic Medication)   Please decrease your morning insulin dose to one-half of normal dose  If you are taking oral diabetes medications, the morning dose should be omitted  If taking metformin (Glucophage), discontinue the medication for 24 hours prior to surgery  If you have an insulin pump, continue at a basal rate only  Simvastatin 20 MG Oral Tablet  Medication Instruction (Cholesterol Medication) 81, 82  Please continue to take this medication on your normal schedule  If this is an oral medication and you take in the morning, you may do so with a sip of water  Renal mass  Dialysis   If undergoing dialysis, please perform 24 to 48 before surgery and avoid interfering with dialysis schedule  Testing Considerations       ? Basic Metabolic Panel (BMP) t  If test was completed and normal within last six months, repeat test is not necessary  Triggered by: DM2 (diabetes mellitus, type 2), Renal mass         ? Blood Glucose on Day of Surgery t  Please check the blood sugar on the morning of surgery  Triggered by: DM2 (diabetes mellitus, type 2)         ? Complete Blood Count (CBC) t, client, client  If test was completed and normal within last six months, repeat test is not necessary  Triggered by: Renal mass, Age or Facility Rec         ? Electrocardiogram (ECG) t  Patient does not need new test if normal ECG is present within the last six months and no change in clinical condition  Triggered by: DM2 (diabetes mellitus, type 2), Renal mass, Age or Facility Rec         ?  Hemoglobin A1c (HbA1c) client  If test was completed and normal within the last three months, repeat test is not necessary  Triggered by: DM2 (diabetes mellitus, type 2), Age or Facility Rec         ? Serum Potassium (K) on the morning of surgery t  Triggered by: Renal mass         ? Type and Screen client  Type and Screen - Blood: If there is anticipated or possible large blood loss with this procedure, then a Type and Screen for Blood should be ordered  Triggered by: Age or Facility Rec               Consultations       ? Primary Care Physician Evaluation   Primary care physician may need to evaluate patient prior to surgery  This is likely NOT necessary if the listed conditions are chronic and stable  Triggered by: Renal mass               Miscellaneous Questions         Question: Are you able to walk up a flight of stairs, walk up a hill or do heavy housework WITHOUT having chest pain or shortness of breath? Answer: YES                   Allergies/Conditions/Medications Not Found        The following were not recognized by our system when generating the recommendations  Please consider if this would impact any preoperative protocols  ? Mixed hyperlipidemia       ? Screening for neurological condition       ? Tetanus-Diphtheria Toxoids Td 2-2 LF/0 5ML Intramuscular Suspension                  Appointment Information         Date:    05/08/2017        Location:    [facility]        Address:           Directions:                      Footnotes revision 14      ?? Denotes a free-text entry  Legal Disclaimer: Any and all recommendations and services provided herein are designed to assist in the preoperative care of the patient  Nothing contained herein is designed to replace, eliminate or alleviate the responsibility of the attending physician to supervise and determine the patient?s preoperative care and course of treatment  Failure to provide complete, accurate information may negatively impact the system?s ability to recommend the proper preoperative protocol       THE ATTENDING PHYSICIAN IS RESPONSIBLE TO REVIEW THE SUGGESTED PREOPERATIVE PROTOCOLS/COURSE OF TREATMENT AND PRESCRIBE THE FINAL COURSE OF PREOPERATIVE TREATMENT IN CONSULTATION WITH THE PATIENT  THE ePREOP SYSTEM AND ITS MATERIALS ARE PROVIDED ? AS IS? WITHOUT WARRANTY OF ANY KIND, EXPRESS OR IMPLIED, INCLUDING, BUT NOT LIMITED TO, WARRANTIES OF PERFORMANCE OR MERCHANTABILITY OR FITNESS FOR A PARTICULAR PURPOSE  PATIENT AND PHYSICIANS HEREBY AGREE THAT THEIR USE OF THE MATERIALS AND RESOURCES ACT AS A CONSENT TO RELEASE AND WAIVE ePREOP FROM ANY AND ALL CLAIMS OF WARRANTY, TORT OR CONTRACT LAW OF ANY KIND             Electronically signed by:Frank Jeneal Ahumada MD  Apr 5 2017 12:06PM EST

## 2018-01-15 VITALS
HEART RATE: 67 BPM | SYSTOLIC BLOOD PRESSURE: 148 MMHG | WEIGHT: 196.38 LBS | BODY MASS INDEX: 27.16 KG/M2 | DIASTOLIC BLOOD PRESSURE: 89 MMHG

## 2018-01-15 NOTE — RESULT NOTES
Verified Results  * XR SPINE LUMBAR MINIMUM 4 VIEWS NON INJURY [id number] 11:13AM Lashaun Heard Order Number: [id number]     Test Name Result Flag Reference   XR SPINE LUMBAR MINIMUM 4 VIEWS (Report)     LUMBAR SPINE     INDICATION: Lower back pain  COMPARISON: Lumbar spine radiograph 6/21/2016     VIEWS: AP, lateral and bilateral oblique projections;      IMAGES: 5     FINDINGS:     Minor scoliotic deformity is noted  Alignment is otherwise unremarkable  There is no radiographic evidence of acute fracture or destructive osseous lesion  There are scattered endplate degenerative changes throughout the lumbar spine  There are facet joint degenerative changes at L4-5 and L5-S1  There are surgical clips in the right upper quadrant of the abdomen  IMPRESSION:     No acute osseous abnormality  Degenerative changes as described         Workstation performed: [id number]     Signed by:   Erica Espitia MD   7/5/17

## 2018-01-16 NOTE — MISCELLANEOUS
Message  Message Free Text Note Form: Radiologist called yesterday regarding patient's ultrasound  He needs further workup regarding possible mass in kidney  CT with attention to kidney  I was in contact with Dr Agatha Coreas and he would prefer to call patient with results later today        Signatures   Electronically signed by : Negro Ramirez DO; Mar 22 2017  7:09AM EST                       (Author)

## 2018-01-16 NOTE — MISCELLANEOUS
Message  I spoke with patient  The CAT scan of his abdomen showed 3 5 cm right renal mass  Will refer to urology for evaluation and probable nephrectomy      Signatures   Electronically signed by :  Candie Thorne DO; Mar 30 2017  5:23PM EST                       (Author)

## 2018-01-17 NOTE — RESULT NOTES
Verified Results  * XR SPINE LUMBAR 2 OR 3 VIEWS 21Jun2016 02:04PM Boni Peer Order Number: [id number]     Test Name Result Flag Reference   XR SPINE LUMBAR 2 OR 3 VIEWS (Report)     LUMBAR SPINE     INDICATION: Low back pain  Pain radiating down the right leg  COMPARISON: None     VIEWS: AP, lateral and coned-down projections; 3 images     FINDINGS:     Alignment is unremarkable  There is no radiographic evidence of acute fracture or destructive osseous lesion  Minor age-appropriate lumbar degenerative changes are seen  Visualized soft tissues appear unremarkable  IMPRESSION:     No acute osseous abnormality  Minor age-appropriate degenerative changes         Workstation performed: [id number]     Signed by:   Gregor Eisenmenger, MD   6/22/16

## 2018-01-18 NOTE — PROGRESS NOTES
History of Present Illness  Care Coordination Encounter Information:   Type of Encounter: Telephonic   Contact: Initial Contact    Spoke to Patient   Initial contact with patient following discharge 5/11/17 dx: partial right nephrectomy, pleasant, reports he is doing well  Followed-up with urologist on 5/19/17 for results of biopsy; Pt reports he is cancer free  No further treatment required, needs CT scan, CXR and blood work in 6 months  Denies any pain, incisions healing well, no fever/chills  Blood sugars WNL has not taken blood sugar recently, last A1c 4/17/17 6 3%, continues taking Metformin 500mg BID  Follows diabetic diet, appetite good  CC contact information given with any questions or concerns, no further outreach required at this time  Care Coordination SL Nurse [employer]:   The reason for call is to discuss outreach for follow up/needed services  Active Problems    1  Arthritis (716 90) (M19 90)   2  Arthritis of knee (716 96) (M17 10)   3  BPH (benign prostatic hyperplasia) (600 00) (N40 0)   4  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 02)   5  CMC arthritis, thumb, degenerative (715 34) (M18 9)   6  DM2 (diabetes mellitus, type 2) (250 00) (E11 9)   7  Enlarged prostate without lower urinary tract symptoms (luts) (600 00) (N40 0)   8  Mixed hyperlipidemia (272 2) (E78 2)   9  Postherpetic neuralgia (053 19) (B02 29)   10  Renal cell carcinoma of left kidney (189 0) (C64 2)   11  Renal mass (593 9) (N28 89)   12  Right upper quadrant abdominal pain (789 01) (R10 11)   13  Status post total left knee replacement (V43 65) (Z96 652)   14  Strain of abdominal muscle, initial encounter (848 8) (S39 011A)   15  Vitamin D deficiency (268 9) (E55 9)    Past Medical History    1  History of Acute bacterial prostatitis (601 0) (N41 0)   2  History of Acute right-sided low back pain without sciatica (724 2) (M54 5)   3  History of Allergic dermatitis due to poison ivy (692 6) (L23 7)   4  History of Cellulitis (682 9) (L03 90)   5  History of Colonoscopy (Fiberoptic) Screening   6  History of Contact dermatitis (692 9) (L25 9)   7  History of Contact dermatitis due to poison ivy (692 6) (L23 7)   8  History of Depression screening (V79 0) (Z13 89)   9  History of Encounter for pre-operative examination (V72 84) (Z01 818)   10  History of Encounter for pre-operative examination (V72 84) (Z01 818)   11  History of Fatigue (780 79) (R53 83)   12  History of Flu vaccine need (V04 81) (Z23)   13  History of Flu vaccine need (V04 81) (Z23)   14  History of allergic reaction (V15 09) (Z88 9)   15  History of bee sting allergy (V15 06) (Z91 030)   16  History of dermatitis (V13 3) (Z87 2)   17  History of epistaxis (V12 69) (Z87 898)   18  History of fatigue (V13 89) (Z87 898)   19  History of herpes zoster (V12 09) (Z86 19)   20  History of insect bite (V15 59) (Z87 828)   21  History of leukocytosis (V12 3) (Z86 2)   22  History of seborrheic keratosis (V13 3) (Z87 2)   23  History of Hornet/Wasp/Bee Sting (989 5)   24  History of Knee pain (719 46) (M25 569)   25  History of Need for pneumococcal vaccine (V03 82) (Z23)   26  History of Nonvenomous Insect Bite Of Right Forearm (913 4)   27  History of Osteoarthrosis, localized, primary, knee (715 16) (M17 10)   28  History of Rhus dermatitis (692 6) (L23 7)   29  History of Screening for genitourinary condition (V81 6) (Z13 89)   30  History of Screening for neurological condition (V80 09) (Z13 89)   31  History of Thumb pain (729 5) (M79 646)    Surgical History    1  History of Nephrectomy    Family History  Mother    1  Family history of Cancer (199 1) (C80 1)  Father    2  Family history of Stroke (434 91) (I63 9)    Social History    · Former smoker (Q05 79) (U17 821)    Current Meds    1  Tamsulosin HCl - 0 4 MG Oral Capsule; TAKE 1 CAPSULE Bedtime;    Therapy: 45ZEZ5970 to (Evaluate:28Jun2017)  Requested for: 20Jcf9801; Last   Rx:77Kxm2316 Ordered 2  MetFORMIN HCl - 500 MG Oral Tablet; Take 1 tablet twice daily; Therapy: 14Apr2013 to (Evaluate:98Qes0838)  Requested for: 50JHZ1400; Last   Rx:45Hqe7181 Ordered    3  Tetanus-Diphtheria Toxoids Td 2-2 LF/0 5ML Intramuscular Suspension   (Tetanus-Diphtheria Toxoids Td 2-2 LF/0 5ML Intramuscular Suspension); 0 5 mls   IM; Therapy: 78Mhd1324 to (Last Rx:24Mnn0726) Ordered    4  Simvastatin 20 MG Oral Tablet; Take 1 tablet daily; Therapy: 11DWE7173 to (Evaluate:29Apr2017)  Requested for: 31Oct2016; Last   Rx:20Qbu4995 Ordered    5  EpiPen 2-Ramses 0 3 MG/0 3ML Injection Solution Auto-injector; use as directed; Therapy: 55DHE7761 to (Last Rx:26May2015)  Requested for: 07AHD3299 Ordered    6  CVS Vitamin D 2000 UNIT CAPS; Therapy: (Recorded:03Apr2017) to Recorded    Allergies    1  No Known Drug Allergies    Health Management   COLONOSCOPY; every 3 years; Last 55UKH9846; Next Due: 48WFM8234; Near Due    End of Encounter Meds    1  Tamsulosin HCl - 0 4 MG Oral Capsule; TAKE 1 CAPSULE Bedtime; Therapy: 26NKR6035 to (Evaluate:28Jun2017)  Requested for: 51Auf8275; Last   Rx:13Hwa6969 Ordered    2  MetFORMIN HCl - 500 MG Oral Tablet; Take 1 tablet twice daily; Therapy: 14Apr2013 to (Evaluate:90Vww5295)  Requested for: 56IJZ6535; Last   Rx:90Wdq6305 Ordered    3  Tetanus-Diphtheria Toxoids Td 2-2 LF/0 5ML Intramuscular Suspension   (Tetanus-Diphtheria Toxoids Td 2-2 LF/0 5ML Intramuscular Suspension); 0 5 mls   IM; Therapy: 34Lbc1150 to (Last Rx:19Rtc1056) Ordered    4  Simvastatin 20 MG Oral Tablet; Take 1 tablet daily; Therapy: 64POV0859 to (Evaluate:29Apr2017)  Requested for: 31Oct2016; Last   Rx:86Gtt4763 Ordered    5  EpiPen 2-Ramses 0 3 MG/0 3ML Injection Solution Auto-injector; use as directed; Therapy: 08EFU9080 to (Last Rx:40Tmb5703)  Requested for: 95OCL8553 Ordered    6  CVS Vitamin D 2000 UNIT CAPS;    Therapy: (Recorded:03Apr2017) to Recorded    Future Appointments    Date/Time Provider Specialty Site   06/29/2017 08:45 AM Riki Madrigal DO Family Medicine [address]   11/17/2017 03:45 PM Odilon Diaz MD Urology [facility]     Patient Care Team    Care Team Member Role Specialty Office Number   Chagon[place]ul Yogesh ORTIZ   Specialist Orthopedic Surgery [phone]   Gómez Daley MD  Urology [phone]     Signatures   Electronically signed by : Citlali Leal RN; May 25 2017  2:07PM EST                       (Author)

## 2018-01-22 VITALS
BODY MASS INDEX: 26.07 KG/M2 | HEART RATE: 74 BPM | DIASTOLIC BLOOD PRESSURE: 70 MMHG | WEIGHT: 186.25 LBS | HEIGHT: 71 IN | SYSTOLIC BLOOD PRESSURE: 140 MMHG

## 2018-01-22 VITALS
WEIGHT: 185.25 LBS | HEART RATE: 78 BPM | HEIGHT: 71 IN | SYSTOLIC BLOOD PRESSURE: 132 MMHG | DIASTOLIC BLOOD PRESSURE: 72 MMHG | BODY MASS INDEX: 25.94 KG/M2

## 2018-01-22 VITALS
HEIGHT: 71 IN | SYSTOLIC BLOOD PRESSURE: 132 MMHG | HEART RATE: 92 BPM | DIASTOLIC BLOOD PRESSURE: 78 MMHG | BODY MASS INDEX: 26.07 KG/M2 | WEIGHT: 186.25 LBS

## 2018-01-22 VITALS
WEIGHT: 182.25 LBS | HEART RATE: 87 BPM | SYSTOLIC BLOOD PRESSURE: 151 MMHG | DIASTOLIC BLOOD PRESSURE: 79 MMHG | BODY MASS INDEX: 25.42 KG/M2

## 2018-01-23 VITALS
BODY MASS INDEX: 27.95 KG/M2 | WEIGHT: 195.25 LBS | TEMPERATURE: 96.4 F | RESPIRATION RATE: 17 BRPM | OXYGEN SATURATION: 98 % | SYSTOLIC BLOOD PRESSURE: 130 MMHG | HEART RATE: 92 BPM | DIASTOLIC BLOOD PRESSURE: 90 MMHG | HEIGHT: 70 IN

## 2018-01-23 NOTE — PROGRESS NOTES
Assessment    1  DM2 (diabetes mellitus, type 2) (250 00) (E11 9)   2  Mixed hyperlipidemia (272 2) (E78 2)   3  Encounter for preventive health examination (V70 0) (Z00 00)   4  Insomnia (780 52) (G47 00)   5  Arthritis of knee (716 96) (M17 10)   6  Renal cell carcinoma of right kidney (189 0) (C64 1)    Plan  BPH (benign prostatic hyperplasia), DM2 (diabetes mellitus, type 2), Mixed  hyperlipidemia    · Tamsulosin HCl - 0 4 MG Oral Capsule; TAKE 1 CAPSULE Bedtime  DM2 (diabetes mellitus, type 2)    · MetFORMIN HCl - 500 MG Oral Tablet; Take 1 tablet twice daily  DM2 (diabetes mellitus, type 2), Mixed hyperlipidemia    · (1) COMPREHENSIVE METABOLIC PANEL; Status:Hold For - Exact Date; Requested  for:After 63PTI4817;    · (1) HEMOGLOBIN A1C; Status:Hold For - Exact Date; Requested for:After 09PJC6410;    · (1) LIPID PANEL FASTING W DIRECT LDL REFLEX; Status:Hold For - Exact Date; Requested for:After 71FFI2839;    · (1) MICROALBUMIN CREATININE RATIO, RANDOM URINE; Status:Hold For - Exact  Date; Requested for:After 37DYF4635; Mixed hyperlipidemia    · Simvastatin 20 MG Oral Tablet; Take 1 tablet daily    Discussion/Summary    Initially AWV  Patient was given a freezer packet and living will information today  Depression screen and fall risk were negative  Patient was given a flu shot today  Current with all other age appropriate vaccinations, colonoscopy, and PSA screening  Impression: Initial Annual Wellness Visit  Cardiovascular screening and counseling: the risks and benefits of screening were discussed  Diabetes screening and counseling: the risks and benefits of screening were discussed  Colorectal cancer screening and counseling: the risks and benefits of screening were discussed  Prostate cancer screening and counseling: the risks and benefits of screening were discussed  Osteoporosis screening and counseling: the risks and benefits of screening were discussed     Abdominal aortic aneurysm screening and counseling: the risks and benefits of screening were discussed  Glaucoma screening and counseling: the risks and benefits of screening were discussed  HIV screening and counseling: the risks and benefits of screening were discussed  Immunizations: the risks and benefits of influenza vaccination were discussed with the patient, influenza vaccine is due today, the risks and benefits of pneumococcal vaccination were discussed with the patient and the risks and benefits of the Zostavax vaccine were discussed with the patient  Advance Directive Planning: not complete, paperwork and instructions were given to the patient, he was encouraged to follow-up with me to discuss his questions and/or decisions  Advice and education were given regarding nutrition (non-diabetic)  Chief Complaint  Pt presents for a subsequent AWV  History of Present Illness  HPI: Initially [address]   Welcome to [employer] and Wellness Visits: The patient is being seen for the initial annual wellness visit  Medicare Screening and Risk Factors   Hospitalizations: he has been previously hospitalizied, he has been hospitalized 2 times and Kidney cancer and knee replacement  Medicare Screening Tests Risk Questions   Abdominal aortic aneurysm risk assessment: over 72years of age  Osteoporosis risk assessment:  and over 48years of age  HIV risk assessment: none indicated  Drug and Alcohol Use: The patient has never smoked cigarettes  The patient reports never drinking alcohol  Alcohol concern:   The patient has no concerns about alcohol abuse  He has never used illicit drugs  Diet and Physical Activity: Current diet includes low salt food choices, frequent junk food, 1 servings of fruit per day, 2 servings of meat per day, 3 servings of whole grains per day and 3 cups of coffee per day  He exercises 4 times per week  Exercise: strength training 60 minutes per day     Mood Disorder and Cognitive Impairment Screening: PHQ-9 Depression Scale   Over the past 2 weeks, how often have you been bothered by the following problems? 1 ) Little interest or pleasure in doing things? Not at all    2 ) Feeling down, depressed or hopeless? Not at all    3 ) Trouble falling asleep or sleeping too much? Several days  4 ) Feeling tired or having little energy? Not at all    5 ) Poor appetite or overeating? Not at all    6 ) Feeling bad about yourself, or that you are a failure, or have let yourself or your family down? Not at all    7 ) Trouble concentrating on things, such as reading a newspaper or watching television? Not at all    8 ) Moving or speaking so slowly that other people could have noticed, or the opposite, moving or speaking faster than usual? Not at all  TOTAL SCORE: 1  How difficult have these problems made it for you to do your work, take care of things at home, or get along with people? Not at all  Depression screening score was 1     negative for symptoms  He denies feeling down, depressed, or hopeless over the past two weeks  He denies feeling little interest or pleasure in doing things over the past two weeks  Cognitive impairment screening: denies difficulty learning/retaining new information, denies difficulty handling complex tasks, denies difficulty with reasoning, denies difficulty with spatial ability and orientation, denies difficulty with language and denies difficulty with behavior  Functional Ability/Level of Safety: Hearing is normal bilaterally, normal in the right ear and normal in the left ear  He denies hearing difficulties  He does not use a hearing aid  The patient is currently able to do activities of daily living without limitations, able to do instrumental activities of daily living without limitations, able to participate in social activities without limitations and able to drive without limitations   Activities of daily living details: does not need help using the phone, no transportation help needed, does not need help shopping, no meal preparation help needed, does not need help doing housework, does not need help doing laundry, does not need help managing medications and does not need help managing money  Fall risk factors: The patient fell 0 times in the past 12 months  Injury History: polypharmacy, no alcohol use, no mobility impairment, no antidepressant use, no deconditioning, no postural hypotension, no sedative use, no visual impairment, no urinary incontinence, antihypertensive use, no cognitive impairment, up and go test was normal and no previous fall  Home safety risk factors:  no unfamiliar surroundings, no loose rugs, no poor household lighting, no uneven floors, no household clutter, grab bars in the bathroom and handrails on the stairs  Advance Directives: Advance directives: living will, durable power of  for health care directives and advance directives  Co-Managers and Medical Equipment/Suppliers: See Patient Care Team      Patient Care Team    Care Team Member Role Specialty Office Number   Evelyn Mamie ORTIZ  Specialist Orthopedic Surgery [phone]   Susie Cleveland MD  Urology [phone]     Active Problems    1  Aftercare following right knee joint replacement surgery (V54 81,V43 65) (Z47 1,Z96 651)   2  Arthritis of knee (716 96) (M17 10)   3  BPH (benign prostatic hyperplasia) (600 00) (N40 0)   4  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 02)   5  CMC arthritis, thumb, degenerative (715 34) (M18 9)   6  DM2 (diabetes mellitus, type 2) (250 00) (E11 9)   7  Mixed hyperlipidemia (272 2) (E78 2)   8  Osteoarthritis of right knee (715 96) (M17 11)   9  Postherpetic neuralgia (053 19) (B02 29)   10  Renal cell carcinoma of right kidney (189 0) (C64 1)   11  Status post total bilateral knee replacement (V43 65) (Z96 653)   12  Strain of abdominal muscle, initial encounter (848 8) (S39 011A)   13   Strain of lumbar region, initial encounter (847 2) (S39 012A)   14   Vitamin D deficiency (268 9) (E55 9)    Past Medical History    · History of Acute bacterial prostatitis (601 0) (N41 0)   · History of Acute right-sided low back pain without sciatica (724 2) (M54 5)   · History of Allergic dermatitis due to poison ivy (692 6) (L23 7)   · History of Cellulitis (682 9) (L03 90)   · History of Colonoscopy (Fiberoptic) Screening   · History of Contact dermatitis (692 9) (L25 9)   · History of Contact dermatitis due to poison ivy (692 6) (L23 7)   · History of Depression screening (V79 0) (Z13 89)   · History of Encounter for pre-operative examination (V72 84) (Z01 818)   · History of Encounter for pre-operative examination (V72 84) (Z01 818)   · History of Encounter for prostate cancer screening (V76 44) (Z12 5)   · History of Enlarged prostate without lower urinary tract symptoms (luts) (600 00) (N40 0)   · History of Fatigue (780 79) (R53 83)   · History of Flu vaccine need (V04 81) (Z23)   · History of Flu vaccine need (V04 81) (Z23)   · History of allergic reaction (V15 09) (Z88 9)   · History of arthritis (V13 4) (Z87 39)   · History of bee sting allergy (V15 06) (Z91 030)   · History of dermatitis (V13 3) (Z87 2)   · History of epistaxis (V12 69) (U09 224)   · History of fatigue (V13 89) (R21 962)   · History of herpes zoster (V12 09) (Z86 19)   · History of insect bite (V15 59) (Z87 828)   · History of leukocytosis (V12 3) (Z86 2)   · History of seborrheic keratosis (V13 3) (Z87 2)   · History of Hornet/Wasp/Bee Sting (989 5)   · History of Knee pain (719 46) (M25 569)   · History of Need for pneumococcal vaccine (V03 82) (Z23)   · History of Nonvenomous Insect Bite Of Right Forearm (913 4)   · History of Osteoarthrosis, localized, primary, knee (715 16) (M17 10)   · History of Renal mass (593 9) (N28 89)   · History of Rhus dermatitis (692 6) (L23 7)   · History of Right upper quadrant abdominal pain (789 01) (R10 11)   · History of Screening for genitourinary condition (V81 6) (Z13 89)   · History of Screening for neurological condition (V80 09) (Z13 89)   · History of Thumb pain (729 5) (M79 646)    Surgical History    · History of Nephrectomy    Family History  Mother    · Family history of Cancer (199 1) (C80 1)  Father    · Family history of Stroke (434 91) (I63 9)    Social History    · Former smoker (M10 97) (G32 031)    Current Meds   1  [employer] Vitamin D 2000 UNIT CAPS; Therapy: (Recorded:03Apr2017) to Recorded   2  EpiPen 2-Ramses 0 3 MG/0 3ML Injection Solution Auto-injector; use as directed; Therapy: 55HTE2277 to (Last Rx:00Fnn2707)  Requested for: 23FQR1968 Ordered   3  MetFORMIN HCl - 500 MG Oral Tablet; Take 1 tablet twice daily; Therapy: 37Jrl6247 to (Last Rx:03Jan2018)  Requested for: 73RQB5248 Ordered   4  Simvastatin 20 MG Oral Tablet; Take 1 tablet daily; Therapy: 76WXU7640 to ([facility])  Requested for: 12ZZT9609; Last   Rx:03Jan2018 Ordered   5  Tamsulosin HCl - 0 4 MG Oral Capsule; TAKE 1 CAPSULE Bedtime; Therapy: 31UKV3010 to ([facility])  Requested for: 36WDJ5391; Last   SH:15CSH6004 Ordered   6  Tetanus-Diphtheria Toxoids Td 2-2 LF/0 5ML Intramuscular Suspension; 0 5 mls IM; Therapy: 44Bjw9750 to (Last Rx:68Mbg8416) Ordered    Allergies    1  No Known Drug Allergies    Immunizations   ** Printed in Appendix #1 below  Vitals  Signs    Temperature: 96 4 F, Tympanic  Heart Rate: 92  Respiration Quality: Normal  Respiration: 17  Systolic: 006, LUE, Sitting  Diastolic: 90, LUE, Sitting  Height: 5 ft 9 78 in  Weight: 195 lb 4 oz  BMI Calculated: 28 19  BSA Calculated: 2 06  O2 Saturation: 98, RA  Pain Scale: 0    Health Management  History of Colonoscopy (Fiberoptic) Screening   COLONOSCOPY; every 3 years; Last 83HVP0199; Next Due: 89KEX8708; Overdue    Future Appointments    Date/Time Provider Specialty Site   03/09/2018 08:30 AM WILLIAM Ashby   Orthopedic Surgery [facility]   2018 11:00 AM Ellen Freed MD Urology [address]     Signatures   Electronically signed by :  Ahmet Hargrove DO; 2018 10:07AM EST                       (Author)    Appendix #1     Patient: Jeferson Castillo ; : 1945; MRN: [id number]      1 2 3 4 5    Influenza  05-Oct-2012 09-Oct-2013 18-Nov-2014 05-Oct-2015 28-Nov-2016    PCV  08-Dec-2015        PPSV  09-Oct-2013        Tdap  15-Nov-2004        Zoster  05-Oct-2012

## 2018-01-29 ENCOUNTER — TELEPHONE (OUTPATIENT)
Dept: FAMILY MEDICINE CLINIC | Facility: CLINIC | Age: 73
End: 2018-01-29

## 2018-01-29 DIAGNOSIS — E78.2 MIXED HYPERLIPIDEMIA: ICD-10-CM

## 2018-01-29 DIAGNOSIS — E11.8 TYPE 2 DIABETES MELLITUS WITH COMPLICATION, WITHOUT LONG-TERM CURRENT USE OF INSULIN (HCC): Primary | ICD-10-CM

## 2018-01-29 PROBLEM — C64.1 RENAL CELL CARCINOMA OF RIGHT KIDNEY (HCC): Status: ACTIVE | Noted: 2017-11-17

## 2018-01-29 PROBLEM — C64.2 RENAL CELL CARCINOMA OF LEFT KIDNEY (HCC): Status: ACTIVE | Noted: 2017-05-19

## 2018-01-29 PROBLEM — G47.00 INSOMNIA: Status: ACTIVE | Noted: 2018-01-04

## 2018-01-29 RX ORDER — EPINEPHRINE 0.3 MG/.3ML
INJECTION SUBCUTANEOUS
COMMUNITY
Start: 2015-05-26

## 2018-01-29 RX ORDER — TAMSULOSIN HYDROCHLORIDE 0.4 MG/1
1 CAPSULE ORAL
COMMUNITY
Start: 2015-12-08 | End: 2019-10-03 | Stop reason: SDUPTHER

## 2018-01-29 RX ORDER — SIMVASTATIN 20 MG
20 TABLET ORAL DAILY
Qty: 90 TABLET | Refills: 3 | Status: SHIPPED | OUTPATIENT
Start: 2018-01-29 | End: 2018-04-27 | Stop reason: SDUPTHER

## 2018-01-29 RX ORDER — SIMVASTATIN 20 MG
1 TABLET ORAL DAILY
COMMUNITY
Start: 2013-02-26 | End: 2018-01-29 | Stop reason: SDUPTHER

## 2018-01-29 NOTE — TELEPHONE ENCOUNTER
Pt needs 2 rxs on your desk sent to [facility] but the rx for metformin needs to be a 90 day and it is only written for 20 tablets  [employer] is his new carrier

## 2018-01-30 ENCOUNTER — APPOINTMENT (OUTPATIENT)
Dept: RADIOLOGY | Facility: CLINIC | Age: 73
End: 2018-01-30
Payer: MEDICARE

## 2018-01-30 ENCOUNTER — OFFICE VISIT (OUTPATIENT)
Dept: OBGYN CLINIC | Facility: MEDICAL CENTER | Age: 73
End: 2018-01-30
Payer: MEDICARE

## 2018-01-30 VITALS
HEART RATE: 67 BPM | DIASTOLIC BLOOD PRESSURE: 74 MMHG | HEIGHT: 71 IN | WEIGHT: 194 LBS | SYSTOLIC BLOOD PRESSURE: 139 MMHG | BODY MASS INDEX: 27.16 KG/M2

## 2018-01-30 DIAGNOSIS — M25.561 RIGHT KNEE PAIN, UNSPECIFIED CHRONICITY: ICD-10-CM

## 2018-01-30 DIAGNOSIS — M17.11 PRIMARY OSTEOARTHRITIS OF RIGHT KNEE: Primary | ICD-10-CM

## 2018-01-30 PROCEDURE — 99213 OFFICE O/P EST LOW 20 MIN: CPT | Performed by: ORTHOPAEDIC SURGERY

## 2018-01-30 PROCEDURE — 73562 X-RAY EXAM OF KNEE 3: CPT

## 2018-01-30 NOTE — PROGRESS NOTES
Brianna Lopez  (54 y o  male)  : 1945  MRN: [id number]  Encounter Date: 2018    Chief Complaint   Patient presents with    Right Knee - Pain       Visit Diagnosis / Orders   Diagnoses and all orders for this visit:    Primary osteoarthritis of right knee    Right knee pain, unspecified chronicity  -     XR knee 3 vw right non injury          Assessment / Plan  4 1/2 months s/p R total knee replacement on 17  · Patient was instructed continue with his HEP for strengthening his hip and quad  · Patient was instructed to take NSAIDs prn as well as applying ice/heat  · Patient was instructed to call back sooner if he is experiencing increased pain, any drainage or signs of infections  Return in about 6 weeks (around 3/13/2018) for Recheck with 4VW R knee xrays  History of Present Illness  Brianna Lopez is a 71 y o  male who presents today s/p R total knee replacement on 17  Pt is here before his 6 month check up which was scheduled in March  Pt here c/o severe pain that is limiting him  Pt states that he cannot get up from sitting in a chair and cannot get up from a kneeling position without severe pain from mid thigh to mid shin  Pt states that sometimes his knee is Stiff and warm  As the day goes on it progressively gets worse  Pt also c/o medial and lateral knee pain when pushing an item with his leg  Pt rates his pain a 9/10 at his worst and a 3/10 at rest  Pt denies numbness and tingling  Review of Systems  Pertinent items are noted in HPI  All other systems were reviewed and are negative  Physical Exam  /74   Pulse 67   Ht 5' 11" (1 803 m)   Wt 88 kg (194 lb)   BMI 27 06 kg/m²   Cons: Appears well  No apparent distress  Psych: Alert  Oriented x3  Mood and affect normal   Eyes: PERRLA, EOMI  Resp: Normal effort  No audible wheezing or stridor  CV: Palpable pulse  No discernable arrhythmia  No LE edema    Lymph:  No palpable cervical, axillary, or inguinal lymphadenopathy  Skin: Warm  No palpable masses  No visible lesions  Neuro: Normal and symmetric DTR's  Normal muscle tone  Right Knee Exam  Alignment:  Normal knee alignment  Inspection:  No swelling  No erythema  No ecchymosis  No muscle atrophy  No deformity  Incision healed  no signs of infection  Palpation:  moderate tenderness at medial and lateral joint line as well as medial retinaculum   mild effusion  No warmth  No crepitus  ROM:  Knee Extension- 10  Knee Flexion- 125  Strength:  Knee Extension- 5/5  Knee Flexion- 5/5  Stability:  No objective knee instability  Stable Varus / Valgus stress, Lachman, and Posterior drawer  Tests:  can perform a SLR  Patella:  Patella tracks centrally without crepitus  Neurovascular:  Sensation intact in DP/SP/Mratin/Sa/T nerve distributions  Brisk capillary refill in all toes  Gait:  Normal       Studies Reviewed  I have personally reviewed pertinent films in PACS  XR: right knee - TKA components appear well-aligned, new radiolucencies seen under the tibial component compared with prior films      Medical, Surgical, Family, and Social History  The patient's medical history, family history, and social history, were reviewed and updated as appropriate      Past Medical History:   Diagnosis Date    Arthritis     right knee    BPH (benign prostatic hypertrophy)     Cancer (HCC)     kidney    Diabetes mellitus ([facility] [address] )     Type II, NIDDM    History of colon polyps     Hyperlipidemia     Seasonal allergies     Wears glasses     Wears partial dentures     Upper and Lower       Past Surgical History:   Procedure Laterality Date    CHOLECYSTECTOMY      Open    COLONOSCOPY      GANGLION CYST EXCISION Left     Left wrist    JOINT REPLACEMENT Left 08/2015    Left TKR    KIDNEY SURGERY      HI LAP,PARTIAL NEPHRECTOMY Right 5/8/2017    Procedure: LAPAROSCOPIC LYSIS OF ADHESIONS, ROBOTIC PARTIAL NEPHRECTOMY, WITH INTRA-OP LAPAROSCOPIC ULTRASOUND; Surgeon: Breanna Patino MD;  [facility]: [facility];  Service: Urology    PA REPAIR INTERCARP/CARP-METACARP JT Left 12/5/2016    Procedure: ARTHROPLASTY THUMB [facility] ;  Surgeon: Charlotte Culver MD;  Location: [facility];  Service: Orthopedics    PA REVISE MEDIAN N/CARPAL TUNNEL SURG Left 12/5/2016    Procedure: RELEASE CARPAL TUNNEL;  Surgeon: Charlotte Culver MD;  [facility];  Service: Orthopedics    PA TOTAL KNEE ARTHROPLASTY Right 9/11/2017    Procedure: ARTHROPLASTY KNEE TOTAL;  Surgeon: Charlotte Culver MD;  [facility];  Service: Orthopedics    TONSILLECTOMY AND ADENOIDECTOMY         Family History   Problem Relation Age of Onset    Cancer Mother        Social History     Occupational History    Not on file  Social History Main Topics    Smoking status: Former Smoker     Packs/day: 0 25     Types: Cigarettes    Smokeless tobacco: Never Used      Comment: Smoked from ages 16 to 25      Alcohol use Yes      Comment: 1-2 x year    Drug use: No    Sexual activity: Not on file       Allergies   Allergen Reactions    Other Other (See Comments)     Bee stings - redness  Epi Pen         Current Outpatient Prescriptions:     ascorbic acid (VITAMIN C) 500 mg tablet, Take 500 mg by mouth 2 (two) times a day, Disp: , Rfl:     Cholecalciferol (VITAMIN D-3 PO), Take 2,000 Units by mouth daily, Disp: , Rfl:     EPINEPHrine (EPIPEN 2-MOIZ) 0 3 mg/0 3 mL SOAJ, Inject as directed, Disp: , Rfl:     loratadine (CLARITIN) 10 mg tablet, Take 10 mg by mouth daily as needed for allergies, Disp: , Rfl:     metFORMIN (GLUCOPHAGE) 500 mg tablet, Take 1 tablet (500 mg total) by mouth 2 (two) times a day, Disp: 180 tablet, Rfl: 3    simvastatin (ZOCOR) 20 mg tablet, Take 1 tablet (20 mg total) by mouth daily, Disp: 90 tablet, Rfl: 3    tamsulosin (FLOMAX) 0 4 mg, Take 0 4 mg by mouth daily with dinner, Disp: , Rfl:     aspirin 325 mg tablet, Take 1 tablet by mouth 2 (two) times a day for 30 days, Disp: 60 tablet, Rfl: 0    celecoxib (CeleBREX) 200 mg capsule, Take 1 capsule by mouth daily, Disp: 30 capsule, Rfl: 0    ferrous sulfate 325 (65 Fe) mg tablet, Take 325 mg by mouth 2 (two) times a day with meals, Disp: , Rfl:     folic acid (FOLVITE) 1 mg tablet, Take 1 mg by mouth daily, Disp: , Rfl:     gabapentin (NEURONTIN) 300 mg capsule, Take 1 capsule by mouth daily, Disp: 30 capsule, Rfl: 0    promethazine (PHENERGAN) 12 5 MG tablet, Take 1 tablet by mouth every 6 (six) hours as needed for nausea or vomiting, Disp: 30 tablet, Rfl: 0    tamsulosin (FLOMAX) 0 4 mg, Take 1 capsule by mouth, Disp: , Rfl:

## 2018-02-20 ENCOUNTER — TELEPHONE (OUTPATIENT)
Dept: OBGYN CLINIC | Facility: HOSPITAL | Age: 73
End: 2018-02-20

## 2018-02-20 DIAGNOSIS — Z96.651 TOTAL KNEE REPLACEMENT STATUS, RIGHT: Primary | ICD-10-CM

## 2018-02-20 RX ORDER — AMOXICILLIN 500 MG/1
2000 CAPSULE ORAL
Qty: 8 CAPSULE | Refills: 1 | Status: SHIPPED | OUTPATIENT
Start: 2018-02-20 | End: 2018-02-21

## 2018-02-20 NOTE — TELEPHONE ENCOUNTER
Caller: patient  Call back number: [phone]  Patient's doctor: Dr Tona Chiu    Patient needs an antibiotic for his dentist appointment 3/9/18  He has no allergies  He uses [address]

## 2018-03-09 ENCOUNTER — APPOINTMENT (OUTPATIENT)
Dept: RADIOLOGY | Facility: CLINIC | Age: 73
End: 2018-03-09
Payer: MEDICARE

## 2018-03-09 ENCOUNTER — OFFICE VISIT (OUTPATIENT)
Dept: OBGYN CLINIC | Facility: MEDICAL CENTER | Age: 73
End: 2018-03-09
Payer: MEDICARE

## 2018-03-09 VITALS
WEIGHT: 190 LBS | HEART RATE: 65 BPM | DIASTOLIC BLOOD PRESSURE: 79 MMHG | SYSTOLIC BLOOD PRESSURE: 127 MMHG | HEIGHT: 71 IN | BODY MASS INDEX: 26.6 KG/M2

## 2018-03-09 DIAGNOSIS — Z96.651 S/P TKR (TOTAL KNEE REPLACEMENT) USING CEMENT, RIGHT: ICD-10-CM

## 2018-03-09 DIAGNOSIS — Z96.651 TOTAL KNEE REPLACEMENT STATUS, RIGHT: Primary | ICD-10-CM

## 2018-03-09 PROCEDURE — 73564 X-RAY EXAM KNEE 4 OR MORE: CPT

## 2018-03-09 PROCEDURE — 99213 OFFICE O/P EST LOW 20 MIN: CPT | Performed by: ORTHOPAEDIC SURGERY

## 2018-03-09 NOTE — PROGRESS NOTES
Elsa Shipman  (29 y o  male)  : 1945  MRN: [id number]  Encounter Date: 3/9/2018    Chief Complaint   Patient presents with    Right Knee - Follow-up       Visit Diagnosis / Orders   Diagnoses and all orders for this visit:    Total knee replacement status, right  -     Cancel: XR knee 4+ vw right injury  -     XR knee 4+ vw right injury          Assessment / Plan  S/p R total knee replacement on 17  · Patient was instructed continue with his HEP for strengthening his hip and quad  · Patient was instructed to take NSAIDs prn as well as applying ice/heat  · Patient was instructed to call back sooner if he is experiencing increased pain, any drainage or signs of infections  · Will consider referring him to Dr Cheli Gage in the future if his knee pain continues for back evaluation to rule out that as a potential cause  Return in about 3 months (around 2018)  We will get repeat x-rays at that time of his right knee  History of Present Illness  Elsa Shipman is a 71 y o  male who presents today s/p R total knee replacement on 17  Patient feels that his discomfort he has experience last visit on 2018 has continued since last visit  Pt states that he still has discomfort while getting up from sitting in a chair and cannot get up from a kneeling position without pain in the lateral posterior aspect of his knee  He is denying swelling getting worse since last visit the most days he does not really notice most swelling  He denies any instability  He does have occasional back discomfort that only seems to bother him when he does certain exercises  He denies any radiation of the pain from his back down his legs at this time  Review of Systems  Pertinent items are noted in HPI  All other systems were reviewed and are negative  Physical Exam  /79   Pulse 65   Ht 5' 11" (1 803 m)   Wt 86 2 kg (190 lb)   BMI 26 50 kg/m²   Cons: Appears well    No apparent distress  Psych: Alert  Oriented x3  Mood and affect normal   Eyes: PERRLA, EOMI  Resp: Normal effort  No audible wheezing or stridor  CV: Palpable pulse  No discernable arrhythmia  No LE edema  Lymph:  No palpable cervical, axillary, or inguinal lymphadenopathy  Skin: Warm  No palpable masses  No visible lesions  Neuro: Normal and symmetric DTR's  Normal muscle tone  Right Knee Exam  Alignment:  Normal knee alignment  Inspection:  No swelling  No erythema  No ecchymosis  No muscle atrophy  No deformity  Incision healed  no signs of infection  Palpation:  moderate tenderness at the lateral joint line and slightly over the lateral hamstring tendons  No effusion noted  No warmth  No crepitus  ROM:  Knee Extension -5  Knee Flexion- 125  Strength:  Knee Extension- 5/5  Knee Flexion- 5/5  Stability:  No objective knee instability  Stable Varus / Valgus stress, Lachman, and Posterior drawer  Tests:  can perform a SLR  Patella:  Patella tracks centrally without crepitus  Neurovascular:  Sensation intact in DP/SP/Martin/Sa/T nerve distributions  Brisk capillary refill in all toes  Gait:  Normal       Studies Reviewed  I have personally reviewed pertinent films in PACS  XR: right knee from 03/09/2018- TKA components appear well-aligned, radiolucencies 1st visualized last visit under the tibial component do not seem to have changed  Medical, Surgical, Family, and Social History  The patient's medical history, family history, and social history, were reviewed and updated as appropriate      Past Medical History:   Diagnosis Date    Arthritis     right knee    BPH (benign prostatic hypertrophy)     Cancer (HCC)     kidney    Diabetes mellitus (HCC)     Type II, NIDDM    History of colon polyps     Hyperlipidemia     Seasonal allergies     Wears glasses     Wears partial dentures     Upper and Lower       Past Surgical History:   Procedure Laterality Date    CHOLECYSTECTOMY      Open    COLONOSCOPY      GANGLION CYST EXCISION Left     Left wrist    JOINT REPLACEMENT Left 08/2015    Left TKR    KIDNEY SURGERY      NV LAP,PARTIAL NEPHRECTOMY Right 5/8/2017    Procedure: LAPAROSCOPIC LYSIS OF ADHESIONS, ROBOTIC PARTIAL NEPHRECTOMY, WITH INTRA-OP LAPAROSCOPIC ULTRASOUND;  Surgeon: Lenny Erickson MD;  [facility];  Service: Urology    NV REPAIR INTERCARP/CARP-METACARP JT Left 12/5/2016    Procedure: ARTHROPLASTY THUMB [facility] ;  Surgeon: Shen Ramirez MD;  [facility];  Service: Orthopedics    NV REVISE MEDIAN N/CARPAL TUNNEL SURG Left 12/5/2016    Procedure: RELEASE CARPAL TUNNEL;  Surgeon: Shen Ramirez MD;  [facility];  Service: Orthopedics    NV TOTAL KNEE ARTHROPLASTY Right 9/11/2017    Procedure: ARTHROPLASTY KNEE TOTAL;  Surgeon: Shen Ramirez MD;  [facility];  Service: Orthopedics    TONSILLECTOMY AND ADENOIDECTOMY         Family History   Problem Relation Age of Onset    Cancer Mother        Social History     Occupational History    Not on file  Social History Main Topics    Smoking status: Former Smoker     Packs/day: 0 25     Types: Cigarettes    Smokeless tobacco: Never Used      Comment: Smoked from ages 16 to 25      Alcohol use Yes      Comment: 1-2 x year    Drug use: No    Sexual activity: Not on file       Allergies   Allergen Reactions    Other Other (See Comments)     Bee stings - redness  Epi Pen         Current Outpatient Prescriptions:     ascorbic acid (VITAMIN C) 500 mg tablet, Take 500 mg by mouth 2 (two) times a day, Disp: , Rfl:     celecoxib (CeleBREX) 200 mg capsule, Take 1 capsule by mouth daily, Disp: 30 capsule, Rfl: 0    Cholecalciferol (VITAMIN D-3 PO), Take 2,000 Units by mouth daily, Disp: , Rfl:     EPINEPHrine (EPIPEN 2-MOIZ) 0 3 mg/0 3 mL SOAJ, Inject as directed, Disp: , Rfl:     ferrous sulfate 325 (65 Fe) mg tablet, Take 325 mg by mouth 2 (two) times a day with meals, Disp: , Rfl:     folic acid (FOLVITE) 1 mg tablet, Take 1 mg by mouth daily, Disp: , Rfl:     gabapentin (NEURONTIN) 300 mg capsule, Take 1 capsule by mouth daily, Disp: 30 capsule, Rfl: 0    loratadine (CLARITIN) 10 mg tablet, Take 10 mg by mouth daily as needed for allergies, Disp: , Rfl:     metFORMIN (GLUCOPHAGE) 500 mg tablet, Take 1 tablet (500 mg total) by mouth 2 (two) times a day, Disp: 180 tablet, Rfl: 3    simvastatin (ZOCOR) 20 mg tablet, Take 1 tablet (20 mg total) by mouth daily, Disp: 90 tablet, Rfl: 3    tamsulosin (FLOMAX) 0 4 mg, Take 0 4 mg by mouth daily with dinner, Disp: , Rfl:     tamsulosin (FLOMAX) 0 4 mg, Take 1 capsule by mouth, Disp: , Rfl:     aspirin 325 mg tablet, Take 1 tablet by mouth 2 (two) times a day for 30 days, Disp: 60 tablet, Rfl: 0

## 2018-03-14 ENCOUNTER — TELEPHONE (OUTPATIENT)
Dept: OBGYN CLINIC | Facility: HOSPITAL | Age: 73
End: 2018-03-14

## 2018-03-14 NOTE — TELEPHONE ENCOUNTER
Patient sees Dr Phillip Galeano  He had a total knee replacement and he is inquiring about the exact implant that was used  He has a few questions regarding it

## 2018-04-25 PROBLEM — N28.89 RENAL MASS, RIGHT: Status: RESOLVED | Noted: 2017-05-08 | Resolved: 2018-04-25

## 2018-04-27 ENCOUNTER — OFFICE VISIT (OUTPATIENT)
Dept: FAMILY MEDICINE CLINIC | Facility: CLINIC | Age: 73
End: 2018-04-27
Payer: MEDICARE

## 2018-04-27 VITALS
TEMPERATURE: 96.9 F | OXYGEN SATURATION: 99 % | HEIGHT: 70 IN | RESPIRATION RATE: 18 BRPM | WEIGHT: 193.9 LBS | BODY MASS INDEX: 27.76 KG/M2 | DIASTOLIC BLOOD PRESSURE: 82 MMHG | SYSTOLIC BLOOD PRESSURE: 126 MMHG | HEART RATE: 66 BPM

## 2018-04-27 DIAGNOSIS — E11.8 TYPE 2 DIABETES MELLITUS WITH COMPLICATION, WITHOUT LONG-TERM CURRENT USE OF INSULIN (HCC): ICD-10-CM

## 2018-04-27 DIAGNOSIS — E78.2 MIXED HYPERLIPIDEMIA: ICD-10-CM

## 2018-04-27 DIAGNOSIS — N40.0 BENIGN PROSTATIC HYPERPLASIA WITHOUT LOWER URINARY TRACT SYMPTOMS: ICD-10-CM

## 2018-04-27 DIAGNOSIS — M19.90 ARTHRITIS: ICD-10-CM

## 2018-04-27 DIAGNOSIS — S16.1XXA STRAIN OF NECK MUSCLE, INITIAL ENCOUNTER: ICD-10-CM

## 2018-04-27 DIAGNOSIS — S16.1XXA STRAIN OF NECK MUSCLE, INITIAL ENCOUNTER: Primary | ICD-10-CM

## 2018-04-27 PROCEDURE — 99214 OFFICE O/P EST MOD 30 MIN: CPT | Performed by: FAMILY MEDICINE

## 2018-04-27 RX ORDER — METHOCARBAMOL 750 MG/1
TABLET, FILM COATED ORAL
Qty: 30 TABLET | Refills: 1 | Status: SHIPPED | OUTPATIENT
Start: 2018-04-27 | End: 2018-04-27 | Stop reason: SDUPTHER

## 2018-04-27 RX ORDER — METHOCARBAMOL 750 MG/1
TABLET, FILM COATED ORAL
Qty: 30 TABLET | Refills: 0 | Status: SHIPPED | OUTPATIENT
Start: 2018-04-27 | End: 2018-07-09 | Stop reason: ALTCHOICE

## 2018-04-27 RX ORDER — MELOXICAM 15 MG/1
15 TABLET ORAL DAILY
Qty: 30 TABLET | Refills: 0 | Status: SHIPPED | OUTPATIENT
Start: 2018-04-27 | End: 2018-10-30

## 2018-04-27 RX ORDER — MELOXICAM 15 MG/1
15 TABLET ORAL DAILY
Qty: 30 TABLET | Refills: 1 | Status: SHIPPED | OUTPATIENT
Start: 2018-04-27 | End: 2018-04-27 | Stop reason: SDUPTHER

## 2018-04-27 RX ORDER — SIMVASTATIN 20 MG
20 TABLET ORAL DAILY
Qty: 90 TABLET | Refills: 0 | Status: SHIPPED | OUTPATIENT
Start: 2018-04-27 | End: 2018-07-09 | Stop reason: SDUPTHER

## 2018-04-27 RX ORDER — SIMVASTATIN 20 MG
20 TABLET ORAL DAILY
Qty: 90 TABLET | Refills: 1 | Status: SHIPPED | OUTPATIENT
Start: 2018-04-27 | End: 2018-04-27 | Stop reason: SDUPTHER

## 2018-04-27 NOTE — ASSESSMENT & PLAN NOTE
Patient with 2 week history of right-sided neck pain without radiation  Patient denies any recent trauma  No past history of cervical problems  Patient is a side sleeper  Upon examination he exhibits mild point tenderness paravertebral musculature  No neurologic abnormalities were appreciated  Recommend, trial of meloxicam and Robaxin  Apply heat twice daily  Start OTC capsaicin cream twice daily  I also advised him to have his wife check his sleep position to make sure that his spine is in alignment when he is sleeping    Lastly, I gave him an order for an x-ray of cervical spine to get done in 1 week if symptoms are not improving

## 2018-04-27 NOTE — PROGRESS NOTES
Assessment/Plan:    Cervical strain  Patient with 2 week history of right-sided neck pain without radiation  Patient denies any recent trauma  No past history of cervical problems  Patient is a side sleeper  Upon examination he exhibits mild point tenderness paravertebral musculature  No neurologic abnormalities were appreciated  Recommend, trial of meloxicam and Robaxin  Apply heat twice daily  Start OTC capsaicin cream twice daily  I also advised him to have his wife check his sleep position to make sure that his spine is in alignment when he is sleeping  Lastly, I gave him an order for an x-ray of cervical spine to get done in 1 week if symptoms are not improving    Mixed hyperlipidemia  Doing well on simvastatin 20 mg daily  He is current with lab testing  Med refilled today    DM2 (diabetes mellitus, type 2) ([facility] [address] )  Doing well on metformin 500 b i d  without side effects  Patient is current with laboratory testing  Medication was refilled today       Diagnoses and all orders for this visit:    Strain of neck muscle, initial encounter  -     meloxicam (MOBIC) 15 mg tablet; Take 1 tablet (15 mg total) by mouth daily  -     methocarbamol (ROBAXIN) 750 mg tablet; 1 tab PO BID PRN  -     XR spine cervical complete 4 or 5 vw non injury; Future    Mixed hyperlipidemia  -     simvastatin (ZOCOR) 20 mg tablet; Take 1 tablet (20 mg total) by mouth daily    Type 2 diabetes mellitus with complication, without long-term current use of insulin (HCC)  -     metFORMIN (GLUCOPHAGE) 500 mg tablet; Take 1 tablet (500 mg total) by mouth 2 (two) times a day    Benign prostatic hyperplasia without lower urinary tract symptoms    Arthritis          Subjective:      Patient ID: Nano Davila is a 71 y o  male  2 week hx of R post neck pain  No recent injuries/trauma  No radiculopathy  Taking aleve w/o benefit  Patient also would like refills of his simvastatin and metformin sent to his mail order    He is doing well on these medications without side effects  The following portions of the patient's history were reviewed and updated as appropriate: allergies, current medications, past family history, past medical history, past social history, past surgical history and problem list     Review of Systems   Respiratory: Negative  Cardiovascular: Negative  Gastrointestinal: Negative  Genitourinary: Negative  Musculoskeletal: Positive for neck pain  Objective:      /82   Pulse 66   Temp (!) 96 9 °F (36 1 °C) (Tympanic)   Resp 18   Ht 5' 10 47" (1 79 m)   Wt 88 kg (193 lb 14 4 oz)   SpO2 99%   BMI 27 45 kg/m²          Physical Exam   Musculoskeletal:   Mild point tenderness right paravertebral musculature right cervical region  Mild decrease in range-of-motion with right rotation and neck flexion

## 2018-04-27 NOTE — ASSESSMENT & PLAN NOTE
Doing well on metformin 500 b i d  without side effects  Patient is current with laboratory testing    Medication was refilled today

## 2018-05-10 ENCOUNTER — APPOINTMENT (OUTPATIENT)
Dept: RADIOLOGY | Facility: MEDICAL CENTER | Age: 73
End: 2018-05-10
Payer: MEDICARE

## 2018-05-10 DIAGNOSIS — S16.1XXA STRAIN OF NECK MUSCLE, INITIAL ENCOUNTER: ICD-10-CM

## 2018-05-10 PROCEDURE — 72050 X-RAY EXAM NECK SPINE 4/5VWS: CPT

## 2018-05-16 ENCOUNTER — TELEPHONE (OUTPATIENT)
Dept: FAMILY MEDICINE CLINIC | Facility: CLINIC | Age: 73
End: 2018-05-16

## 2018-05-17 ENCOUNTER — HOSPITAL ENCOUNTER (OUTPATIENT)
Dept: ULTRASOUND IMAGING | Facility: HOSPITAL | Age: 73
Discharge: HOME | End: 2018-05-17
Attending: UROLOGY
Payer: MEDICARE

## 2018-05-17 ENCOUNTER — HOSPITAL ENCOUNTER (OUTPATIENT)
Dept: CT IMAGING | Facility: HOSPITAL | Age: 73
Discharge: HOME | End: 2018-05-17
Attending: UROLOGY
Payer: MEDICARE

## 2018-05-17 DIAGNOSIS — C64.1 MALIGNANT NEOPLASM OF RIGHT KIDNEY, EXCEPT RENAL PELVIS (HCC): ICD-10-CM

## 2018-05-17 DIAGNOSIS — C64.2 MALIGNANT NEOPLASM OF LEFT KIDNEY, EXCEPT RENAL PELVIS (HCC): ICD-10-CM

## 2018-05-17 PROCEDURE — 76770 US EXAM ABDO BACK WALL COMP: CPT

## 2018-05-17 PROCEDURE — 71250 CT THORAX DX C-: CPT

## 2018-05-18 NOTE — TELEPHONE ENCOUNTER
I called and left a message for Anna Monzon on his cell phone  consent ok informing him of  the results of his xray  I requested that he please call us back to confirm where he would prefer to go for physical therapy

## 2018-05-18 NOTE — TELEPHONE ENCOUNTER
Pt called the office back he was given information of his x ray results  Pt will call back he said he feels a little better  He may take pills temporarily pt will call if he decides to do physical therapy

## 2018-05-24 ENCOUNTER — TELEPHONE (OUTPATIENT)
Dept: UROLOGY | Facility: AMBULATORY SURGERY CENTER | Age: 73
End: 2018-05-24

## 2018-05-25 ENCOUNTER — TELEPHONE (OUTPATIENT)
Dept: UROLOGY | Facility: AMBULATORY SURGERY CENTER | Age: 73
End: 2018-05-25

## 2018-05-25 NOTE — TELEPHONE ENCOUNTER
Regarding: Test Results Question  Contact: [phone]  ----- Message from Diego Cuellar sent at 5/24/2018  3:29 PM EDT -----       ----- Message from Bryan Mcgee to Steven Howe MD sent at 5/24/2018  3:12 PM -----   Did you get the results of my chest CT? I did get the ultrasound results on my kidneys and bladder

## 2018-05-25 NOTE — TELEPHONE ENCOUNTER
Spoke with patient and informed of CT findings  He will come for scheduled appointment with FT on 6/15 @ 1:45  All questions answered at this time

## 2018-05-25 NOTE — TELEPHONE ENCOUNTER
Attempted to call patient  Spoke with patient's wife, Batsheva Mancia  She will have patient return call for CT results

## 2018-05-25 NOTE — TELEPHONE ENCOUNTER
Ct chest shows stable 2mm let lung nodule  4mm lung nodule has resolved  Patient should keep appointment as scheduled with Dr Cisneros [address]  Will arrange future follow up imaging then

## 2018-05-25 NOTE — TELEPHONE ENCOUNTER
Pt returned call ,nurse was in a room with a pt asked if nurse could call him on his cell @ [phone]

## 2018-06-12 ENCOUNTER — APPOINTMENT (OUTPATIENT)
Dept: RADIOLOGY | Facility: CLINIC | Age: 73
End: 2018-06-12
Payer: MEDICARE

## 2018-06-12 ENCOUNTER — OFFICE VISIT (OUTPATIENT)
Dept: OBGYN CLINIC | Facility: MEDICAL CENTER | Age: 73
End: 2018-06-12
Payer: MEDICARE

## 2018-06-12 VITALS
SYSTOLIC BLOOD PRESSURE: 145 MMHG | HEIGHT: 71 IN | WEIGHT: 190 LBS | HEART RATE: 62 BPM | DIASTOLIC BLOOD PRESSURE: 75 MMHG | BODY MASS INDEX: 26.6 KG/M2

## 2018-06-12 DIAGNOSIS — Z96.651 TOTAL KNEE REPLACEMENT STATUS, RIGHT: ICD-10-CM

## 2018-06-12 DIAGNOSIS — Z96.651 S/P TKR (TOTAL KNEE REPLACEMENT) USING CEMENT, RIGHT: ICD-10-CM

## 2018-06-12 DIAGNOSIS — Z96.651 TOTAL KNEE REPLACEMENT STATUS, RIGHT: Primary | ICD-10-CM

## 2018-06-12 PROCEDURE — 99213 OFFICE O/P EST LOW 20 MIN: CPT | Performed by: ORTHOPAEDIC SURGERY

## 2018-06-12 PROCEDURE — 73562 X-RAY EXAM OF KNEE 3: CPT

## 2018-06-12 NOTE — PROGRESS NOTES
Darline Sapp  (5 y o  male)  : 1945  MRN: [id number]  Encounter Date: 2018    Chief Complaint   Patient presents with    Right Knee - Follow-up       Assessment / Plan  S/p R total knee replacement on 17  · Patient was instructed to continue with his HEP for strengthening his hip and quad  · Continue with NSAIDs prn as well as applying ice/heat  · Patient was instructed to call back sooner if he is experiencing increased pain, any drainage or signs of infections  Return in about 3 months (around 2018)  We will get repeat x-rays at that time of his right knee  History of Present Illness  Darline Sapp is a 68 y o  male who presents today s/p R total knee replacement on 17  Feels that he is improving but very slowly at this time  He continues with some discomfort in the posterior and lateral aspect of his knee with occasional radiation up the IT band/hamstrings when he is kneeling  He is tolerating other activities without any issue at this time and feels that he still has days when his discomfort wakes him up at night  He is denying any swelling at this time  He denies any instability  He does have occasional back discomfort that only seems to bother him when he does certain exercises  He denies any radiation of the pain from his back down his legs at this time  Review of Systems  Pertinent items are noted in HPI  All other systems were reviewed and are negative  Physical Exam  /75   Pulse 62   Ht 5' 11" (1 803 m)   Wt 86 2 kg (190 lb)   BMI 26 50 kg/m²   Cons: Appears well  No apparent distress  Psych: Alert  Oriented x3  Mood and affect normal   Eyes: PERRLA, EOMI  Resp: Normal effort  No audible wheezing or stridor  CV: Palpable pulse  No discernable arrhythmia  No LE edema  Lymph:  No palpable cervical, axillary, or inguinal lymphadenopathy  Skin: Warm  No palpable masses  No visible lesions  Neuro: Normal and symmetric DTR's  Normal muscle tone  Right Knee Exam  Alignment:  Normal knee alignment  Inspection:  No swelling  No erythema  No ecchymosis  No muscle atrophy  No deformity  Incision healed  no signs of infection  Palpation:  moderate tenderness at the lateral joint line and slightly over the lateral hamstring tendons  No effusion noted  No warmth  No crepitus  ROM:  Knee Extension 0°  Knee Flexion 125°  Strength:  Knee Extension- 5/5  Knee Flexion- 5/5  Stability:  No objective knee instability  Stable Varus / Valgus stress, Lachman, and Posterior drawer  Tests:  can perform a SLR  Patella:  Patella tracks centrally without crepitus  Neurovascular:  Sensation intact in DP/SP/Martin/Sa/T nerve distributions  Brisk capillary refill in all toes  Gait:  Normal       Studies Reviewed  I have personally reviewed pertinent films in PACS  XR: right knee from 06/12/2018- TKA components appear well-aligned, radiolucencies under the tibial component do not seem to have changed since last x-ray 3 months ago  Medical, Surgical, Family, and Social History  The patient's medical history, family history, and social history, were reviewed and updated as appropriate      Past Medical History:   Diagnosis Date    Allergic dermatitis due to poison ivy     last assessed: 7/28/2015    Allergic reaction     last assessed: 5/26/2015    Arthritis     right knee    Bee sting allergy     last assessed: 6/30/2015    BPH (benign prostatic hypertrophy)     Cancer (HCC)     kidney    [facility]     Type II, NIDDM    Enlarged prostate with lower urinary tract symptoms (LUTS)     last assessed: 8/17/2015    Epistaxis     last assessed: 6/20/2016    Herpes zoster     last assessed: 11/21/2013    History of colon polyps     Hyperlipidemia     Leukocytosis     last assessed: 11/18/2014    Osteoarthrosis, localized, primary, knee     last assessed: 10/25/2016    Renal mass     last assessed: 5/19/2017    Seasonal allergies  Wears glasses     Wears partial dentures     Upper and Lower       Past Surgical History:   Procedure Laterality Date    CHOLECYSTECTOMY      Open    COLONOSCOPY      GANGLION CYST EXCISION Left     Left wrist    JOINT REPLACEMENT Left 08/2015    Left TKR    KIDNEY SURGERY      NEPHRECTOMY  05/08/2017    KS LAP,PARTIAL NEPHRECTOMY Right 5/8/2017    Procedure: LAPAROSCOPIC LYSIS OF ADHESIONS, ROBOTIC PARTIAL NEPHRECTOMY, WITH INTRA-OP LAPAROSCOPIC ULTRASOUND;  Surgeon: Jose Rhoades MD;  [facility];  Service: Urology    KS REPAIR INTERCARP/CARP-METACARP JT Left 12/5/2016    Procedure: ARTHROPLASTY THUMB [address] ;  Surgeon: Anisha Pierre MD;  [facility];  Service: Orthopedics    KS REVISE MEDIAN N/CARPAL TUNNEL SURG Left 12/5/2016    Procedure: RELEASE CARPAL TUNNEL;  Surgeon: Anisha Pierre MD;  [facility];  Service: Orthopedics    KS TOTAL KNEE ARTHROPLASTY Right 9/11/2017    Procedure: ARTHROPLASTY KNEE TOTAL;  Surgeon: Anisha Pierre MD;  [facility];  Service: Orthopedics    TONSILLECTOMY AND ADENOIDECTOMY         Family History   Problem Relation Age of Onset    Cancer Mother     Stroke Father        Social History     Occupational History    Not on file  Social History Main Topics    Smoking status: Former Smoker     Packs/day: 0 25     Types: Cigarettes    Smokeless tobacco: Never Used      Comment: Smoked from ages 16 to 25      Alcohol use Yes      Comment: 1-2 x year    Drug use: No    Sexual activity: Not on file       Allergies   Allergen Reactions    Other Other (See Comments)     Bee stings - redness  Epi Pen         Current Outpatient Prescriptions:     Cholecalciferol (VITAMIN D-3 PO), Take 2,000 Units by mouth daily, Disp: , Rfl:     EPINEPHrine (EPIPEN 2-MOIZ) 0 3 mg/0 3 mL SOAJ, Inject as directed, Disp: , Rfl:     meloxicam (MOBIC) 15 mg tablet, Take 1 tablet (15 mg total) by mouth daily, Disp: 30 tablet, Rfl: 0    metFORMIN (GLUCOPHAGE) 500 mg tablet, Take 1 tablet (500 mg total) by mouth 2 (two) times a day, Disp: 180 tablet, Rfl: 0    methocarbamol (ROBAXIN) 750 mg tablet, 1 tab PO BID PRN, Disp: 30 tablet, Rfl: 0    simvastatin (ZOCOR) 20 mg tablet, Take 1 tablet (20 mg total) by mouth daily, Disp: 90 tablet, Rfl: 0    tamsulosin (FLOMAX) 0 4 mg, Take 1 capsule by mouth, Disp: , Rfl:

## 2018-06-15 ENCOUNTER — OFFICE VISIT (OUTPATIENT)
Dept: UROLOGY | Facility: CLINIC | Age: 73
End: 2018-06-15
Payer: MEDICARE

## 2018-06-15 VITALS
HEIGHT: 71 IN | DIASTOLIC BLOOD PRESSURE: 82 MMHG | SYSTOLIC BLOOD PRESSURE: 124 MMHG | BODY MASS INDEX: 27.3 KG/M2 | WEIGHT: 195 LBS

## 2018-06-15 DIAGNOSIS — C64.1 MALIGNANT NEOPLASM OF RIGHT KIDNEY (HCC): Primary | ICD-10-CM

## 2018-06-15 PROCEDURE — 99213 OFFICE O/P EST LOW 20 MIN: CPT | Performed by: UROLOGY

## 2018-06-15 NOTE — PROGRESS NOTES
6/15/2018    Brigitte Arellano  1945  [id number]        Assessment  Status post right robot assisted laparoscopic partial nephrectomy secondary to right renal cell carcinoma, resolution of 4 mm lung nodule    Discussion  I provided the patient with reassurance that follow-up imaging has not shown signs of recurrence  I recommend that he return in 6 months with a BMP, CT scan abdomen and pelvis with and without IV contrast         History of Present Illness  68 y o  male with a history of a right renal mass suspicious for renal cell carcinoma  In May 2017 he underwent a robot assisted laparoscopic right partial nephrectomy  Final pathology revealed a 2 6 cm renal cell carcinoma with negative surgical margins  Final pathology pT1a  He returns in follow-up today  A CT of the chest shows resolution of the 4 mm lung nodule  A retroperitoneal ultrasound shows no evidence of recurrence  AUA Symptom Score  AUA SYMPTOM SCORE      Most Recent Value   AUA SYMPTOM SCORE   How often have you had a sensation of not emptying your bladder completely after you finished urinating? 0   How often have you had to urinate again less than two hours after you finished urinating? 1   How often have you found you stopped and started again several times when you urinate?  0   How often have you found it difficult to postpone urination? 0   How often have you had a weak urinary stream?  0   How often have you had to push or strain to begin urination? 0   How many times did you most typically get up to urinate from the time you went to bed at night until the time you got up in the morning? 2   Quality of Life: If you were to spend the rest of your life with your urinary condition just the way it is now, how would you feel about that?  1   AUA SYMPTOM SCORE  3          Review of Systems  Review of Systems   Constitutional: Negative  HENT: Negative  Eyes: Negative  Respiratory: Negative      Cardiovascular: Negative  Gastrointestinal: Negative  Endocrine: Negative  Genitourinary: Negative  Musculoskeletal: Negative  Skin: Negative  Allergic/Immunologic: Negative  Neurological: Negative  Hematological: Negative  Psychiatric/Behavioral: Negative  All other systems reviewed and are negative          Past Medical History  Past Medical History:   Diagnosis Date    Allergic dermatitis due to poison ivy     last assessed: 7/28/2015    Allergic reaction     last assessed: 5/26/2015    Arthritis     right knee    Bee sting allergy     last assessed: 6/30/2015    BPH (benign prostatic hypertrophy)     Cancer (HCC)     kidney    [facility]     Type II, NIDDM    Enlarged prostate with lower urinary tract symptoms (LUTS)     last assessed: 8/17/2015    Epistaxis     last assessed: 6/20/2016    Herpes zoster     last assessed: 11/21/2013    History of colon polyps     Hyperlipidemia     Leukocytosis     last assessed: 11/18/2014    Osteoarthrosis, localized, primary, knee     last assessed: 10/25/2016    Renal mass     last assessed: 5/19/2017    Seasonal allergies     Wears glasses     Wears partial dentures     Upper and Lower       Past Social History  Past Surgical History:   Procedure Laterality Date    CHOLECYSTECTOMY      Open    COLONOSCOPY      GANGLION CYST EXCISION Left     Left wrist    JOINT REPLACEMENT Left 08/2015    Left TKR    KIDNEY SURGERY      NEPHRECTOMY  05/08/2017    MO LAP,PARTIAL NEPHRECTOMY Right 5/8/2017    Procedure: LAPAROSCOPIC LYSIS OF ADHESIONS, ROBOTIC PARTIAL NEPHRECTOMY, WITH INTRA-OP LAPAROSCOPIC ULTRASOUND;  Surgeon: Kota Jenkins MD;  [facility];  Service: Urology    MO REPAIR INTERCARP/CARP-METACARP JT Left 12/5/2016    Procedure: ARTHROPLASTY THUMB [facility] ;  Surgeon: Regan Haro MD;  [facility];  Service: Orthopedics    MO REVISE MEDIAN N/CARPAL TUNNEL SURG Left 12/5/2016    Procedure: RELEASE CARPAL TUNNEL;  Surgeon: Jose Raul Paz MD;  [facility];  Service: Orthopedics    OR TOTAL KNEE ARTHROPLASTY Right 9/11/2017    Procedure: ARTHROPLASTY KNEE TOTAL;  Surgeon: Jose Raul Paz MD;  [facility];  Service: Orthopedics    TONSILLECTOMY AND ADENOIDECTOMY         Past Family History  Family History   Problem Relation Age of Onset    Cancer Mother     Stroke Father        Past Social history  Social History     Social History    Marital status: /Civil Union     Spouse name: N/A    Number of children: N/A    Years of education: N/A     Occupational History    Not on file  Social History Main Topics    Smoking status: Former Smoker     Packs/day: 0 25     Types: Cigarettes    Smokeless tobacco: Never Used      Comment: Smoked from ages 16 to 25   Alcohol use Yes      Comment: 1-2 x year    Drug use: No    Sexual activity: Not on file     Other Topics Concern    Not on file     Social History Narrative    No narrative on file       Current Medications  Current Outpatient Prescriptions   Medication Sig Dispense Refill    Cholecalciferol (VITAMIN D-3 PO) Take 2,000 Units by mouth daily      EPINEPHrine (EPIPEN 2-MOIZ) 0 3 mg/0 3 mL SOAJ Inject as directed      metFORMIN (GLUCOPHAGE) 500 mg tablet Take 1 tablet (500 mg total) by mouth 2 (two) times a day 180 tablet 0    methocarbamol (ROBAXIN) 750 mg tablet 1 tab PO BID PRN 30 tablet 0    simvastatin (ZOCOR) 20 mg tablet Take 1 tablet (20 mg total) by mouth daily 90 tablet 0    meloxicam (MOBIC) 15 mg tablet Take 1 tablet (15 mg total) by mouth daily 30 tablet 0    tamsulosin (FLOMAX) 0 4 mg Take 1 capsule by mouth       No current facility-administered medications for this visit  Allergies  Allergies   Allergen Reactions    Other Other (See Comments)     Bee stings - redness  Epi Pen       Past Medical History, Social History, Family History, medications and allergies were reviewed      Vitals  Vitals: 06/15/18 1345   BP: 124/82   Weight: 88 5 kg (195 lb)   Height: 5' 11" (1 803 m)       Physical Exam  Physical Exam   On examination he is in no acute distress  His abdomen is soft nontender nondistended  Incisions are well-healed without evidence of herniation  Skin is warm  Extremities without edema    Neurologic is grossly intact and nonfocal   Gait normal   Affect normal         Results  No results found for: PSA  Lab Results   Component Value Date    GLUCOSE 125 09/12/2017    CALCIUM 9 7 12/27/2017     12/27/2017    K 4 3 12/27/2017    CO2 27 12/27/2017     12/27/2017    BUN 18 12/27/2017    CREATININE 0 98 12/27/2017     Lab Results   Component Value Date    WBC 7 1 12/27/2017    HGB 14 6 12/27/2017    HCT 43 2 12/27/2017    MCV 89 6 12/27/2017     12/27/2017         Office Urine Dip  No results found for this or any previous visit (from the past 1 hour(s)) ]

## 2018-07-03 LAB
ALBUMIN SERPL-MCNC: 4.1 G/DL (ref 3.6–5.1)
ALBUMIN/CREAT UR: 3 MCG/MG CREAT
ALBUMIN/GLOB SERPL: 1.4 (CALC) (ref 1–2.5)
ALP SERPL-CCNC: 40 U/L (ref 40–115)
ALT SERPL-CCNC: 19 U/L (ref 9–46)
AST SERPL-CCNC: 13 U/L (ref 10–35)
BILIRUB SERPL-MCNC: 0.4 MG/DL (ref 0.2–1.2)
BUN SERPL-MCNC: 32 MG/DL (ref 7–25)
BUN/CREAT SERPL: 30 (CALC) (ref 6–22)
CALCIUM SERPL-MCNC: 9.5 MG/DL (ref 8.6–10.3)
CHLORIDE SERPL-SCNC: 105 MMOL/L (ref 98–110)
CHOLEST SERPL-MCNC: 127 MG/DL
CHOLEST/HDLC SERPL: 3.3 (CALC)
CO2 SERPL-SCNC: 25 MMOL/L (ref 20–31)
CREAT SERPL-MCNC: 1.07 MG/DL (ref 0.7–1.18)
CREAT UR-MCNC: 198 MG/DL (ref 20–370)
GLOBULIN SER CALC-MCNC: 2.9 G/DL (CALC) (ref 1.9–3.7)
GLUCOSE SERPL-MCNC: 130 MG/DL (ref 65–99)
HBA1C MFR BLD: 6.3 % OF TOTAL HGB
HDLC SERPL-MCNC: 38 MG/DL
LDLC SERPL CALC-MCNC: 68 MG/DL (CALC)
MICROALBUMIN UR-MCNC: 0.5 MG/DL
NONHDLC SERPL-MCNC: 89 MG/DL (CALC)
POTASSIUM SERPL-SCNC: 4.2 MMOL/L (ref 3.5–5.3)
PROT SERPL-MCNC: 7 G/DL (ref 6.1–8.1)
SL AMB EGFR AFRICAN AMERICAN: 80 ML/MIN/1.73M2
SL AMB EGFR NON AFRICAN AMERICAN: 69 ML/MIN/1.73M2
SODIUM SERPL-SCNC: 139 MMOL/L (ref 135–146)
TRIGL SERPL-MCNC: 126 MG/DL

## 2018-07-09 ENCOUNTER — OFFICE VISIT (OUTPATIENT)
Dept: FAMILY MEDICINE CLINIC | Facility: CLINIC | Age: 73
End: 2018-07-09
Payer: MEDICARE

## 2018-07-09 VITALS
BODY MASS INDEX: 27.61 KG/M2 | OXYGEN SATURATION: 98 % | WEIGHT: 197.2 LBS | HEART RATE: 67 BPM | DIASTOLIC BLOOD PRESSURE: 78 MMHG | SYSTOLIC BLOOD PRESSURE: 126 MMHG | RESPIRATION RATE: 17 BRPM | TEMPERATURE: 96.7 F | HEIGHT: 71 IN

## 2018-07-09 DIAGNOSIS — E11.8 TYPE 2 DIABETES MELLITUS WITH COMPLICATION, WITHOUT LONG-TERM CURRENT USE OF INSULIN (HCC): ICD-10-CM

## 2018-07-09 DIAGNOSIS — Z13.0 SCREENING FOR DEFICIENCY ANEMIA: ICD-10-CM

## 2018-07-09 DIAGNOSIS — C64.2 RENAL CELL CARCINOMA OF LEFT KIDNEY (HCC): ICD-10-CM

## 2018-07-09 DIAGNOSIS — C64.1 RENAL CELL CARCINOMA OF RIGHT KIDNEY (HCC): ICD-10-CM

## 2018-07-09 DIAGNOSIS — E78.2 MIXED HYPERLIPIDEMIA: ICD-10-CM

## 2018-07-09 DIAGNOSIS — N40.0 BENIGN PROSTATIC HYPERPLASIA WITHOUT LOWER URINARY TRACT SYMPTOMS: ICD-10-CM

## 2018-07-09 DIAGNOSIS — M19.90 ARTHRITIS: Primary | ICD-10-CM

## 2018-07-09 DIAGNOSIS — M67.442 GANGLION OF FLEXOR TENDON SHEATH OF LEFT THUMB: ICD-10-CM

## 2018-07-09 DIAGNOSIS — Z12.5 SCREENING FOR PROSTATE CANCER: ICD-10-CM

## 2018-07-09 PROBLEM — G47.00 INSOMNIA: Status: RESOLVED | Noted: 2018-01-04 | Resolved: 2018-07-09

## 2018-07-09 PROCEDURE — 99214 OFFICE O/P EST MOD 30 MIN: CPT | Performed by: FAMILY MEDICINE

## 2018-07-09 RX ORDER — SIMVASTATIN 20 MG
20 TABLET ORAL DAILY
Qty: 90 TABLET | Refills: 1 | Status: SHIPPED | OUTPATIENT
Start: 2018-07-09 | End: 2019-01-09 | Stop reason: SDUPTHER

## 2018-07-09 NOTE — ASSESSMENT & PLAN NOTE
Status post right total knee arthroplasty 2017 by Dr Juno Shirley  Patient has been having recent left shoulder problems    I advised him to follow up with Dr Mccoy Skill should his pain persist or worsen

## 2018-07-09 NOTE — ASSESSMENT & PLAN NOTE
Lab Results   Component Value Date    HGBA1C 6 3 (H) 07/02/2018       No results for input(s): POCGLU in the last 72 hours  Blood Sugar Average: Last 72 hrs:   A1c 6 3%, was 6 2%    Doing well on metformin 500 b i d   Patient is due for eye exam   He states he will be scheduling this shortly

## 2018-07-09 NOTE — PROGRESS NOTES
Assessment/Plan:    Arthritis   Status post right total knee arthroplasty 2017 by Dr Nati Lee  Patient has been having recent left shoulder problems  I advised him to follow up with Dr Neva Bearden should his pain persist or worsen    DM2 (diabetes mellitus, type 2) ([facility] )  Lab Results   Component Value Date    HGBA1C 6 3 (H) 07/02/2018       No results for input(s): POCGLU in the last 72 hours  Blood Sugar Average: Last 72 hrs:   A1c 6 3%, was 6 2%  Doing well on metformin 500 b i d   Patient is due for eye exam   He states he will be scheduling this shortly    Ganglion of flexor tendon sheath of left thumb   Small ganglion left thumb  Will refer to hand specialist if symptoms worsen    Mixed hyperlipidemia   Cholesterol 127/68, was 149/88  Improved since last blood draw  Continue simvastatin 20 mg daily  Will continue to follow    Renal cell carcinoma of right kidney [facility])   Status post right partial nephrectomy  Continue regular follow-up with Urology (Dr Francisca Pineda)    BPH (benign prostatic hyperplasia)   Stable on tamsulosin 0 4 mg nightly  Diagnoses and all orders for this visit:    Arthritis    Type 2 diabetes mellitus with complication, without long-term current use of insulin (HCC)  -     Comprehensive metabolic panel; Future  -     Hemoglobin A1C; Future  -     Microalbumin, Random Urine (W/Creatinine); Future  -     TSH, 3rd generation with Free T4 reflex; Future  -     metFORMIN (GLUCOPHAGE) 500 mg tablet; Take 1 tablet (500 mg total) by mouth 2 (two) times a day  -     Comprehensive metabolic panel  -     Microalbumin, Random Urine (W/Creatinine)  -     TSH, 3rd generation with Free T4 reflex    Mixed hyperlipidemia  -     Lipid Panel with Direct LDL reflex; Future  -     simvastatin (ZOCOR) 20 mg tablet;  Take 1 tablet (20 mg total) by mouth daily  -     Lipid Panel with Direct LDL reflex    Renal cell carcinoma of left kidney (HCC)    Screening for deficiency anemia  -     CBC and differential; Future  -     CBC and differential    Screening for prostate cancer  -     PSA, Total Screen; Future  -     PSA, Total Screen    Ganglion of flexor tendon sheath of left thumb    Renal cell carcinoma of right kidney (HCC)    Benign prostatic hyperplasia without lower urinary tract symptoms      6 MOS, FBW [facility]IOR AT [facility]    Subjective:      Patient ID: Nano Davila is a 68 y o  male  Patient presents for recheck of chronic medical problems today  Overall is feeling well  He does complain of a lump on his left thumb  He also is having occasional pain in his right shoulder  Otherwise doing well on metformin for diabetes  Doing well on simvastatin for cholesterol        The following portions of the patient's history were reviewed and updated as appropriate: allergies, current medications, past family history, past medical history, past social history, past surgical history and problem list     Review of Systems   Respiratory: Negative  Cardiovascular: Negative  Gastrointestinal: Negative  Genitourinary: Negative  Objective:      /78 (BP Location: Left arm, Patient Position: Sitting, Cuff Size: Standard)   Pulse 67   Temp (!) 96 7 °F (35 9 °C) (Tympanic)   Resp 17   Ht 5' 11" (1 803 m)   Wt 89 4 kg (197 lb 3 2 oz)   SpO2 98%   BMI 27 50 kg/m²          Physical Exam   Cardiovascular: Normal rate, regular rhythm, normal heart sounds and intact distal pulses  Carotids: no bruits  Ext: no edema   Pulmonary/Chest: Effort normal  No respiratory distress  He has no wheezes  He has no rales  Psychiatric: He has a normal mood and affect   His behavior is normal  Thought content normal

## 2018-07-09 NOTE — ASSESSMENT & PLAN NOTE
Cholesterol 127/68, was 149/88  Improved since last blood draw  Continue simvastatin 20 mg daily    Will continue to follow

## 2018-07-16 ENCOUNTER — TELEPHONE (OUTPATIENT)
Dept: FAMILY MEDICINE CLINIC | Facility: CLINIC | Age: 73
End: 2018-07-16

## 2018-07-16 NOTE — TELEPHONE ENCOUNTER
Pt called the office and stated that [facility] a [facility]  When he stepped on a gavin nail  They way [facility] billed it out medicare will not pay for this  The pt stated if you would write a letter stating this was preventive that it would be covered  He does not recall the date, and is not at home at this time  He will call back wit that information  Thank you!

## 2018-07-17 NOTE — TELEPHONE ENCOUNTER
Can you please type letter stating that patient received a tetanus show on 10/16/17 for preventative care reasons?

## 2018-07-17 NOTE — TELEPHONE ENCOUNTER
Pt called the office and states he had the tetanus injection on 10/16/2017  Pt was informed I will out a message out for Dr Kline to address when he is back in the office

## 2018-07-20 ENCOUNTER — TELEPHONE (OUTPATIENT)
Dept: FAMILY MEDICINE CLINIC | Facility: CLINIC | Age: 73
End: 2018-07-20

## 2018-08-14 LAB
LEFT EYE DIABETIC RETINOPATHY: NORMAL
RIGHT EYE DIABETIC RETINOPATHY: NORMAL

## 2018-08-21 ENCOUNTER — APPOINTMENT (OUTPATIENT)
Dept: RADIOLOGY | Facility: CLINIC | Age: 73
End: 2018-08-21
Payer: MEDICARE

## 2018-08-21 ENCOUNTER — OFFICE VISIT (OUTPATIENT)
Dept: OBGYN CLINIC | Facility: MEDICAL CENTER | Age: 73
End: 2018-08-21
Payer: MEDICARE

## 2018-08-21 VITALS
HEART RATE: 67 BPM | HEIGHT: 71 IN | SYSTOLIC BLOOD PRESSURE: 142 MMHG | DIASTOLIC BLOOD PRESSURE: 85 MMHG | BODY MASS INDEX: 27.72 KG/M2 | WEIGHT: 198 LBS

## 2018-08-21 DIAGNOSIS — M75.81 TENDINITIS OF RIGHT ROTATOR CUFF: ICD-10-CM

## 2018-08-21 DIAGNOSIS — M25.511 ACUTE PAIN OF RIGHT SHOULDER: ICD-10-CM

## 2018-08-21 DIAGNOSIS — M25.511 ACUTE PAIN OF RIGHT SHOULDER: Primary | ICD-10-CM

## 2018-08-21 PROCEDURE — 99213 OFFICE O/P EST LOW 20 MIN: CPT | Performed by: ORTHOPAEDIC SURGERY

## 2018-08-21 PROCEDURE — 73030 X-RAY EXAM OF SHOULDER: CPT

## 2018-08-21 NOTE — PROGRESS NOTES
Orthopaedic Surgery - Office Note  Esperanza Kowalski (13 y o  male)   : 1945   MRN: [id number]  Encounter Date: 2018    Chief Complaint   Patient presents with    Right Shoulder - Pain       Assessment / Plan  Right rotator cuff tendinitis     · Start physical therapy for right shoulder strengthening, may transition to a HEP  · Apply ice and heat as needed   · NSAID'S as needed for pain   · Consider MRI or CSI at next visit if no improvement of symptoms with physical therapy   Return for as previously scheduled   History of Present Illness  Esperanza Kowalski is a 68 y o  male who presents to the office today for an initial evaluation of right shoulder pain  The patient states the pain has been ongoing for 2 months without a result of an injury  The patient describes the pain as being deep and achy in nature  The patient experiences most of his pain anteriorly with overhead lifting and twisting movements, such as twisting a door knob  The patient states this pain wakes him up from sleep at night  The patient denies associated numbness or tingling and he is not currently taking anything for pain relief  Review of Systems  Pertinent items are noted in HPI  All other systems were reviewed and are negative  Physical Exam  /85   Pulse 67   Ht 5' 11" (1 803 m)   Wt 89 8 kg (198 lb)   BMI 27 62 kg/m²   Cons: Appears well  No apparent distress  Psych: Alert  Oriented x3  Mood and affect normal   Eyes: PERRLA, EOMI  Resp: Normal effort  No audible wheezing or stridor  CV: Palpable pulse  No discernable arrhythmia  No LE edema  Lymph:  No palpable cervical, axillary, or inguinal lymphadenopathy  Skin: Warm  No palpable masses  No visible lesions  Neuro: Normal muscle tone  Normal and symmetric DTR's  Right Shoulder Exam  Alignment / Posture:  Normal shoulder posture  Inspection:  No swelling  No ecchymosis  Palpation:  moderate subacromial tenderness    ROM:  Normal shoulder ROM  Strength:  4/5 supraspinatus, infraspinatus, and subscapularis  Stability:  No objective shoulder instability  Tests: (+) Alfonso  (+) Neer  (-) Empty can  (-) Belly press  Neurovascular:  Sensation intact in Ax/R/M/U nerve distributions  Palpable radial pulse  Studies Reviewed  I have personally reviewed pertinent films in PACS and my interpretation is No fracture, mild degenerative changes   Procedures  No procedures today  Medical, Surgical, Family, and Social History  The patient's medical history, family history, and social history, were reviewed and updated as appropriate      Past Medical History:   Diagnosis Date    Allergic dermatitis due to poison ivy     last assessed: 7/28/2015    Allergic reaction     last assessed: 5/26/2015    Arthritis     right knee    Bee sting allergy     last assessed: 6/30/2015    BPH (benign prostatic hypertrophy)     Cancer (HCC)     kidney    [facility]     Type II, NIDDM    Enlarged prostate with lower urinary tract symptoms (LUTS)     last assessed: 8/17/2015    Epistaxis     last assessed: 6/20/2016    Herpes zoster     last assessed: 11/21/2013    History of colon polyps     Hyperlipidemia     Leukocytosis     last assessed: 11/18/2014    Osteoarthrosis, localized, primary, knee     last assessed: 10/25/2016    Renal mass     last assessed: 5/19/2017    Seasonal allergies     Wears glasses     Wears partial dentures     Upper and Lower       Past Surgical History:   Procedure Laterality Date    CHOLECYSTECTOMY      Open    COLONOSCOPY      GANGLION CYST EXCISION Left     Left wrist    JOINT REPLACEMENT Left 08/2015    Left TKR    KIDNEY SURGERY      NEPHRECTOMY  05/08/2017    MT LAP,PARTIAL NEPHRECTOMY Right 5/8/2017    Procedure: LAPAROSCOPIC LYSIS OF ADHESIONS, ROBOTIC PARTIAL NEPHRECTOMY, WITH INTRA-OP LAPAROSCOPIC ULTRASOUND;  Surgeon: Kodi Mack MD;  [facility];  Service: Urology    MT REPAIR INTERCARP/CARP-METACARP JT Left 12/5/2016    Procedure: ARTHROPLASTY THUMB [facility] ;  Surgeon: Rene Sommer MD;  [facility];  Service: Orthopedics    ME REVISE MEDIAN N/CARPAL TUNNEL SURG Left 12/5/2016    Procedure: RELEASE CARPAL TUNNEL;  Surgeon: Rene Sommer MD;  [facility];  Service: Orthopedics    ME TOTAL KNEE ARTHROPLASTY Right 9/11/2017    Procedure: ARTHROPLASTY KNEE TOTAL;  Surgeon: Rene Sommer MD;  [facility];  Service: Orthopedics    TONSILLECTOMY AND ADENOIDECTOMY         Family History   Problem Relation Age of Onset    Cancer Mother     Stroke Father        Social History     Occupational History    Not on file  Social History Main Topics    Smoking status: Former Smoker     Packs/day: 0 25     Types: Cigarettes    Smokeless tobacco: Never Used      Comment: Smoked from ages 16 to 25      Alcohol use Yes      Comment: 1-2 x year    Drug use: No    Sexual activity: Not on file       Allergies   Allergen Reactions    Other Other (See Comments)     Bee stings - redness           Current Outpatient Prescriptions:     Cholecalciferol (VITAMIN D-3 PO), Take 2,000 Units by mouth daily, Disp: , Rfl:     EPINEPHrine (EPIPEN 2-MOIZ) 0 3 mg/0 3 mL SOAJ, Inject as directed, Disp: , Rfl:     meloxicam (MOBIC) 15 mg tablet, Take 1 tablet (15 mg total) by mouth daily, Disp: 30 tablet, Rfl: 0    metFORMIN (GLUCOPHAGE) 500 mg tablet, Take 1 tablet (500 mg total) by mouth 2 (two) times a day, Disp: 180 tablet, Rfl: 1    simvastatin (ZOCOR) 20 mg tablet, Take 1 tablet (20 mg total) by mouth daily, Disp: 90 tablet, Rfl: 1    tamsulosin (FLOMAX) 0 4 mg, Take 1 capsule by mouth, Disp: , Rfl:       Ericka Jasso    Scribe Attestation    I,:   Mary Jasso am acting as a scribe while in the presence of the attending physician :        I,:   Rene Sommer MD personally performed the services described in this documentation    as scribed in my presence :

## 2018-08-27 ENCOUNTER — EVALUATION (OUTPATIENT)
Dept: PHYSICAL THERAPY | Facility: MEDICAL CENTER | Age: 73
End: 2018-08-27
Payer: MEDICARE

## 2018-08-27 DIAGNOSIS — M25.511 ACUTE PAIN OF RIGHT SHOULDER: Primary | ICD-10-CM

## 2018-08-27 PROCEDURE — 97161 PT EVAL LOW COMPLEX 20 MIN: CPT | Performed by: PHYSICAL THERAPIST

## 2018-08-27 PROCEDURE — G8991 OTHER PT/OT GOAL STATUS: HCPCS | Performed by: PHYSICAL THERAPIST

## 2018-08-27 PROCEDURE — G8990 OTHER PT/OT CURRENT STATUS: HCPCS | Performed by: PHYSICAL THERAPIST

## 2018-08-27 NOTE — PROGRESS NOTES
PT Evaluation     Today's date: 2018  Patient name: Yomi Terry  :   MRN: [id number]  Referring provider: Baljeet Lal MD  Dx:   Encounter Diagnosis     ICD-10-CM    1  Acute pain of right shoulder M25 511 Ambulatory referral to Physical Therapy                  Assessment  Impairments: abnormal muscle firing, abnormal muscle tone, abnormal or restricted ROM, activity intolerance, impaired physical strength, lacks appropriate home exercise program and pain with function    Assessment details: Yomi Terry is a pleasant 68 y o  male presents with complaints of right shoulder pain  No further referral appears necessary at this time  Primary movement diagnosis of poor scapulohumeral rhythm coupled with thoracic hypomobility resulting in symptoms of pain and limiting his participation/performance in reaching, sleeping, and lifting  Primary impairments include:  1) ER weakness/pain - addressing via neuromuscular re-education   2) Thoracic hypomobility - addressing via mobilizations and self performed mobility   3) R sided cervical mobility deficits - addressing via mobilizations   4) Mild R GHJ capsular restriction - addressing via mobilizations     Skilled PT services appropriate to facilitate return to prior level of function with transition to home exercise program for independent management when appropriate  Understanding of Dx/Px/POC: good   Prognosis: good    Goals  Patient will successfully transition to home exercise program   Patient will be able to manage symptoms independently      Patient will report no pain with lifting of 5LB or more   Patient will report ability to sleep on right side  Patient will return to normal fitness routine without limitation     Plan  Patient would benefit from: skilled PT  Referral necessary: No  Planned modality interventions: thermotherapy: hydrocollator packs  Planned therapy interventions: home exercise program, manual therapy, neuromuscular re-education, patient education, functional ROM exercises, strengthening, stretching, joint mobilization, graded activity, graded exercise, therapeutic exercise, body mechanics training, motor coordination training and activity modification  Frequency: 2x week  Duration in weeks: 12  Treatment plan discussed with: patient        Subjective Evaluation    History of Present Illness  Mechanism of injury: Kim Kyle is a 68 y o  male presenting to therapy with complaints of right shoulder pain of 2 month duration  Insidious onset  Pain with reaching, laying with arms behind head, or sleeping on his right side  Denies numbness or tingling but does note long standing history of R neck pain and limited motion  Saw Dr Nael Fink and was referred to PT prior to consideration of further imaging or treatment  Pain  Current pain ratin  At best pain ratin  At worst pain ratin  Quality: dull ache and sharp    Patient Goals  Patient goals for therapy: decreased pain          Objective     Postural Observations  Seated posture: fair  Standing posture: fair        Neurological Testing     Sensation   Cervical/Thoracic   Left   Intact: light touch    Right   Intact: light touch    Shoulder   Left Shoulder   Intact: light touch    Right Shoulder   Intact: light touch    Reflexes   Left   Biceps (C5/C6): normal (2+)  Triceps (C7): normal (2+)    Right   Biceps (C5/C6): normal (2+)  Triceps (C7): normal (2+)    Active Range of Motion   Cervical/Thoracic Spine   Cervical    Extension: with pain  Left rotation: 55 degrees   Right rotation: 45 degrees with pain  Left Shoulder   Normal active range of motion    Right Shoulder   Normal active range of motion  Flexion: Right shoulder active forward flexion: end range  WFL and with pain  Extension: WFL  Abduction: Right shoulder active abduction: end range   WFL and with pain  External rotation BT: [facility]  Internal rotation BTB: WFL    Passive Range of Motion   Left Shoulder   Normal passive range of motion    Right Shoulder   Normal passive range of motion    Joint Play   Left Shoulder  Joints within functional limits are the anterior capsule, posterior capsule, inferior capsule and AC joint  Right Shoulder  Joints within functional limits are the anterior capsule, inferior capsule and AC joint  Hypomobile in the posterior capsule  Hypomobile: T1, T2, T3, T4 and T5     Strength/Myotome Testing     Left Shoulder   Normal muscle strength    Right Shoulder   Normal muscle strength    Planes of Motion   External rotation at 0°: 3+     Tests     Right Shoulder   Positive empty can and Hawkin's  Negative belly press, drop arm and external rotation lag sign       Additional Tests Details  Painful resisted ER           Precautions: Hx Cancer    Daily Treatment Diary     Manual  8/27            GHJ posterior mobilization Grade IV  AF            Supine mid thoracic HVLAT AF            Cervical R sided upglides AF                                          Exercise Diary              Gloria 3 X 10             Supine thoracic extension stretch 2 min             Supine ABC's 2#  2X                                                                                                                                                                                                                                             Modalities

## 2018-09-04 ENCOUNTER — OFFICE VISIT (OUTPATIENT)
Dept: PHYSICAL THERAPY | Facility: MEDICAL CENTER | Age: 73
End: 2018-09-04
Payer: MEDICARE

## 2018-09-04 DIAGNOSIS — M25.511 ACUTE PAIN OF RIGHT SHOULDER: Primary | ICD-10-CM

## 2018-09-04 PROCEDURE — 97110 THERAPEUTIC EXERCISES: CPT | Performed by: PHYSICAL THERAPIST

## 2018-09-04 PROCEDURE — G8990 OTHER PT/OT CURRENT STATUS: HCPCS | Performed by: PHYSICAL THERAPIST

## 2018-09-04 PROCEDURE — G8991 OTHER PT/OT GOAL STATUS: HCPCS | Performed by: PHYSICAL THERAPIST

## 2018-09-04 PROCEDURE — 97140 MANUAL THERAPY 1/> REGIONS: CPT | Performed by: PHYSICAL THERAPIST

## 2018-09-05 NOTE — PROGRESS NOTES
Daily Note     Today's date: 2018  Patient name: Leopoldo Conquest  :   MRN: [id number]  Referring provider: Maida Rodriguez MD  Dx:   Encounter Diagnosis     ICD-10-CM    1  Acute pain of right shoulder M25 511                   Subjective: Suhail Mansfield reports that his shoulder is feeling better  He has continued to modify his workout routine to avoid overhead activity as directed and follows up with MD next week        Objective: See treatment diary below  Precautions: None    Daily Treatment Diary     Manual             GHJ posterior mobilizations   AF           Posterior capsule stretch  AF           R side cervical upglides C2-C6  AF                                         Exercise Diary              UBE  5' back           Supine ABC's   2#  2X           TB row and extension  Black   3X10           Robbery  RTB  3X10           Thoracic stretch over pillow  3 min                                                                                                                                                                                                                   Modalities                                                               Assessment:  Primary impairments include:  1) ER weakness/pain - addressing via neuromuscular re-education   2) Thoracic hypomobility - addressing via mobilizations and self performed mobility   3) R sided cervical mobility deficits - addressing via mobilizations   4) Mild R GHJ capsular restriction - addressing via mobilizations   Goals  Patient will successfully transition to home exercise program  - met   Patient will be able to manage symptoms independently  - met   Patient will report no pain with lifting of 5LB or more  - progressing   Patient will report ability to sleep on right side - progressing   Patient will return to normal fitness routine without limitation - partially met with current modifications     Plan:  Will continue with current HEP and return only if needed

## 2018-09-14 ENCOUNTER — APPOINTMENT (OUTPATIENT)
Dept: RADIOLOGY | Facility: CLINIC | Age: 73
End: 2018-09-14
Payer: MEDICARE

## 2018-09-14 ENCOUNTER — OFFICE VISIT (OUTPATIENT)
Dept: OBGYN CLINIC | Facility: MEDICAL CENTER | Age: 73
End: 2018-09-14
Payer: MEDICARE

## 2018-09-14 VITALS
HEART RATE: 62 BPM | BODY MASS INDEX: 26.6 KG/M2 | HEIGHT: 71 IN | SYSTOLIC BLOOD PRESSURE: 147 MMHG | WEIGHT: 190 LBS | DIASTOLIC BLOOD PRESSURE: 79 MMHG

## 2018-09-14 DIAGNOSIS — M75.81 TENDINITIS OF RIGHT ROTATOR CUFF: ICD-10-CM

## 2018-09-14 DIAGNOSIS — Z96.651 TOTAL KNEE REPLACEMENT STATUS, RIGHT: Primary | ICD-10-CM

## 2018-09-14 DIAGNOSIS — Z96.651 TOTAL KNEE REPLACEMENT STATUS, RIGHT: ICD-10-CM

## 2018-09-14 PROCEDURE — 73562 X-RAY EXAM OF KNEE 3: CPT

## 2018-09-14 PROCEDURE — 99213 OFFICE O/P EST LOW 20 MIN: CPT | Performed by: ORTHOPAEDIC SURGERY

## 2018-09-14 NOTE — PROGRESS NOTES
Orthopaedic Surgery - Office Note  Paul Sharpe (70 y o  male)   : 1945   MRN: [id number]  Encounter Date: 2018    Chief Complaint   Patient presents with    Right Shoulder - Follow-up       Assessment / Plan  Status post right TKA on 2017  Right rotator cuff tendonitis    · Continue activities as tolerated right knee  · MRI for right shoulder was ordered  · Consider cortisone injection  Return for after MRI  History of Present Illness  Paul Sharpe is a 68 y o  male who presents to the office status post right TKA on 2017  He reports no pain or symptoms at this time  He no longer has pain with kneeling  He can perform all daily functions without limitations  He denies any numbness or tingling  He is also experiencing right shoulder pain  He describes a deep ache constantly, worse at night  His pain is keeping him awake at night  He has been going to physical therapy which has made his pain worse since last visit  Review of Systems  Pertinent items are noted in HPI  All other systems were reviewed and are negative  Physical Exam  /79   Pulse 62   Ht 5' 11" (1 803 m)   Wt 86 2 kg (190 lb)   BMI 26 50 kg/m²   Cons: Appears well  No apparent distress  Psych: Alert  Oriented x3  Mood and affect normal   Eyes: PERRLA, EOMI  Resp: Normal effort  No audible wheezing or stridor  CV: Palpable pulse  No discernable arrhythmia  No LE edema  Lymph:  No palpable cervical, axillary, or inguinal lymphadenopathy  Skin: Warm  No palpable masses  No visible lesions  Neuro: Normal muscle tone  Normal and symmetric DTR's  Right Knee Exam  Alignment:  Normal knee alignment  Inspection:  No swelling  No erythema  No ecchymosis  Incision healed  Palpation:  No tenderness  ROM:  Normal hip ROM  5-130 degrees knee ROM  Strength:  5/5 quadriceps and hamstrings  Able to actively dorsiflex & plantarflex ankle and toes  Stability:  No objective knee instability  Stable Varus / Valgus stress, Lachman, and Posterior drawer  Tests:  No pertinent positive or negative tests  Patella:  Patella tracks centrally without crepitus  Neurovascular:  Sensation intact in DP/SP/Martin/Sa/T nerve distributions  Palpable DP & PT pulses  Gait:  Normal      Right Shoulder Exam  Alignment / Posture:  Normal cervical alignment  Normal shoulder posture  Inspection:  No swelling  No edema  No ecchymosis  No muscle atrophy  Palpation:  Tender to palpation at subacromial bursa  ROM:  Normal shoulder ROM with pain  Strength:  FE 5/5  ER 4/5  Stability:  No objective shoulder instability  Tests: (+) Alfonso  (+) Neer  (-) Belly press  Neurovascular:  Sensation intact in Ax/R/M/U nerve distributions  Palpable radial pulse  Studies Reviewed  I have personally reviewed pertinent films in PACS  XR of right knee - Hardware in good alignment with no signs of loosening    Procedures  No procedures today  Medical, Surgical, Family, and Social History  The patient's medical history, family history, and social history, were reviewed and updated as appropriate      Past Medical History:   Diagnosis Date    Allergic dermatitis due to poison ivy     last assessed: 7/28/2015    Allergic reaction     last assessed: 5/26/2015    Arthritis     right knee    Bee sting allergy     last assessed: 6/30/2015    BPH (benign prostatic hypertrophy)     Cancer (HCC)     kidney    [facility]     Type II, NIDDM    Enlarged prostate with lower urinary tract symptoms (LUTS)     last assessed: 8/17/2015    Epistaxis     last assessed: 6/20/2016    Herpes zoster     last assessed: 11/21/2013    History of colon polyps     Hyperlipidemia     Leukocytosis     last assessed: 11/18/2014    Osteoarthrosis, localized, primary, knee     last assessed: 10/25/2016    Renal mass     last assessed: 5/19/2017    Seasonal allergies     Wears glasses     Wears partial dentures     Upper and Lower Past Surgical History:   Procedure Laterality Date    CHOLECYSTECTOMY      Open    COLONOSCOPY      GANGLION CYST EXCISION Left     Left wrist    JOINT REPLACEMENT Left 08/2015    Left TKR    KIDNEY SURGERY      NEPHRECTOMY  05/08/2017    UT LAP,PARTIAL NEPHRECTOMY Right 5/8/2017    Procedure: LAPAROSCOPIC LYSIS OF ADHESIONS, ROBOTIC PARTIAL NEPHRECTOMY, WITH INTRA-OP LAPAROSCOPIC ULTRASOUND;  Surgeon: Emma eLe MD;  [facility];  Service: Urology    UT REPAIR INTERCARP/CARP-METACARP JT Left 12/5/2016    Procedure: ARTHROPLASTY THUMB Aia 16 ;  Surgeon: Shelly Staples MD;  [facility];  Service: Orthopedics    UT REVISE MEDIAN N/CARPAL TUNNEL SURG Left 12/5/2016    Procedure: RELEASE CARPAL TUNNEL;  Surgeon: Shelly Staples MD;  [facility];  Service: Orthopedics    UT TOTAL KNEE ARTHROPLASTY Right 9/11/2017    Procedure: ARTHROPLASTY KNEE TOTAL;  Surgeon: Shelly Staples MD;  [facility];  Service: Orthopedics    TONSILLECTOMY AND ADENOIDECTOMY         Family History   Problem Relation Age of Onset    Cancer Mother     Stroke Father        Social History     Occupational History    Not on file  Social History Main Topics    Smoking status: Former Smoker     Packs/day: 0 25     Types: Cigarettes    Smokeless tobacco: Never Used      Comment: Smoked from ages 16 to 25      Alcohol use Yes      Comment: 1-2 x year    Drug use: No    Sexual activity: Not on file       Allergies   Allergen Reactions    Other Other (See Comments)     Bee stings - redness           Current Outpatient Prescriptions:     Cholecalciferol (VITAMIN D-3 PO), Take 2,000 Units by mouth daily, Disp: , Rfl:     EPINEPHrine (EPIPEN 2-MOIZ) 0 3 mg/0 3 mL SOAJ, Inject as directed, Disp: , Rfl:     meloxicam (MOBIC) 15 mg tablet, Take 1 tablet (15 mg total) by mouth daily, Disp: 30 tablet, Rfl: 0    metFORMIN (GLUCOPHAGE) 500 mg tablet, Take 1 tablet (500 mg total) by mouth 2 (two) times a day, Disp: 180 tablet, Rfl: 1    simvastatin (ZOCOR) 20 mg tablet, Take 1 tablet (20 mg total) by mouth daily, Disp: 90 tablet, Rfl: 1    tamsulosin (FLOMAX) 0 4 mg, Take 1 capsule by mouth, Disp: , Rfl:       [facility]    I,:   Kris Starks am acting as a scribe while in the presence of the attending physician :        I,:   Julian Holly MD personally performed the services described in this documentation    as scribed in my presence :

## 2018-09-24 ENCOUNTER — HOSPITAL ENCOUNTER (OUTPATIENT)
Dept: MRI IMAGING | Facility: HOSPITAL | Age: 73
Discharge: HOME | End: 2018-09-24
Attending: ORTHOPAEDIC SURGERY
Payer: MEDICARE

## 2018-09-24 DIAGNOSIS — M75.81 TENDINITIS OF RIGHT ROTATOR CUFF: ICD-10-CM

## 2018-09-24 PROCEDURE — 73221 MRI JOINT UPR EXTREM W/O DYE: CPT

## 2018-10-02 ENCOUNTER — OFFICE VISIT (OUTPATIENT)
Dept: OBGYN CLINIC | Facility: MEDICAL CENTER | Age: 73
End: 2018-10-02
Payer: MEDICARE

## 2018-10-02 ENCOUNTER — TELEPHONE (OUTPATIENT)
Dept: FAMILY MEDICINE CLINIC | Facility: CLINIC | Age: 73
End: 2018-10-02

## 2018-10-02 VITALS
HEIGHT: 71 IN | SYSTOLIC BLOOD PRESSURE: 151 MMHG | DIASTOLIC BLOOD PRESSURE: 76 MMHG | HEART RATE: 73 BPM | WEIGHT: 197 LBS | BODY MASS INDEX: 27.58 KG/M2

## 2018-10-02 DIAGNOSIS — IMO0001 SUPRASPINATUS TENDON TEAR, RIGHT, SUBSEQUENT ENCOUNTER: Primary | ICD-10-CM

## 2018-10-02 PROCEDURE — 99213 OFFICE O/P EST LOW 20 MIN: CPT | Performed by: ORTHOPAEDIC SURGERY

## 2018-10-02 PROCEDURE — 20610 DRAIN/INJ JOINT/BURSA W/O US: CPT | Performed by: ORTHOPAEDIC SURGERY

## 2018-10-02 RX ORDER — BUPIVACAINE HYDROCHLORIDE 2.5 MG/ML
4 INJECTION, SOLUTION INFILTRATION; PERINEURAL
Status: COMPLETED | OUTPATIENT
Start: 2018-10-02 | End: 2018-10-02

## 2018-10-02 RX ORDER — METHYLPREDNISOLONE ACETATE 40 MG/ML
1 INJECTION, SUSPENSION INTRA-ARTICULAR; INTRALESIONAL; INTRAMUSCULAR; SOFT TISSUE
Status: COMPLETED | OUTPATIENT
Start: 2018-10-02 | End: 2018-10-02

## 2018-10-02 RX ADMIN — METHYLPREDNISOLONE ACETATE 1 ML: 40 INJECTION, SUSPENSION INTRA-ARTICULAR; INTRALESIONAL; INTRAMUSCULAR; SOFT TISSUE at 10:43

## 2018-10-02 RX ADMIN — BUPIVACAINE HYDROCHLORIDE 4 ML: 2.5 INJECTION, SOLUTION INFILTRATION; PERINEURAL at 10:43

## 2018-10-02 NOTE — TELEPHONE ENCOUNTER
Call patient  I reviewed his vascular screening test results  Overall everything looked good    We will scan it into our chart

## 2018-10-02 NOTE — TELEPHONE ENCOUNTER
Pt came into the office and dropped off results form his life line screening exam for you to review  Pt's results are in on your desk in  your physical/ forms folder for you to review  Once reviewed they need to be scanned into pt's chart  Any questions and or concerns please call Kasie Matias at [phone]

## 2018-10-02 NOTE — PROGRESS NOTES
Orthopaedic Surgery - Office Note  Jaja Mcneill (62 y o  male)   : 1945   MRN: [id number]  Encounter Date: 10/2/2018    Chief Complaint   Patient presents with    Right Shoulder - Follow-up     Assessment / Plan  Right shoulder partial-thickness articular sided supraspinatus tear  · Corticosteroid injection of the right subacromial space  · Continue home physical therapy exercises learned previously  · With regards to the enchondroma found on the right shoulder MRI, we discussed with the patient at this is most likely benign however a bone biopsy is required for definitive diagnosis  If he should decide to pursue bone biopsy, a referral to interventional Radiology will be made for biopsy  Return in about 4 weeks (around 10/30/2018)  History of Present Illness  Jaja Mcneill is a 68 y o  male who presents for follow-up regarding his right shoulder  He received an MRI  With regards to his symptoms, they remain unchanged with pain in the anterolateral aspect of the shoulder that is worse at night  Pain does not radiate  Pain is associated with any numbness or tingling  He did complete the 2 week course of physical therapy that did not significantly improve his pain  Review of Systems  Pertinent items are noted in HPI  All other systems were reviewed and are negative  Physical Exam  /76   Pulse 73   Ht 5' 11" (1 803 m)   Wt 89 4 kg (197 lb)   BMI 27 48 kg/m²   Cons: Appears well  No apparent distress  Psych: Alert  Oriented x3  Mood and affect normal   Eyes: PERRLA, EOMI  Resp: Normal effort  No audible wheezing or stridor  CV: Palpable pulse  No discernable arrhythmia  No LE edema  Lymph:  No palpable cervical, axillary, or inguinal lymphadenopathy  Skin: Warm  No palpable masses  No visible lesions  Neuro: Normal muscle tone  Normal and symmetric DTR's  Right Shoulder Exam  Alignment / Posture:  Normal cervical alignment  Inspection:  No swelling  No erythema  No ecchymosis  Palpation:  No tenderness  ROM:  Normal shoulder ROM  Strength:  5/5 forward elevation, 4/5 external rotation  Stability:  No objective shoulder instability  Tests: (+) Alfonso  (+) Neer  (-) Belly press  Neurovascular:  Sensation intact in Ax/R/M/U nerve distributions  2+ radial pulse  Studies Reviewed  MRI of the right shoulder reviewed personally today showing near full-thickness articular surface insertional tear of the posterior supraspinatus measuring 1 2 cm anterior to posterior  No tendon retraction or muscle atrophy  Of note is a benign-appearing 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    Large joint arthrocentesis  Date/Time: 10/2/2018 10:43 AM  Consent given by: patient  Site marked: site marked  Timeout: Immediately prior to procedure a time out was called to verify the correct patient, procedure, equipment, support staff and site/side marked as required   Supporting Documentation  Indications: pain   Procedure Details  Location: shoulder - R subacromial bursa  Needle size: 22 G  Ultrasound guidance: no  Approach: anterolateral  Medications administered: 4 mL bupivacaine 0 25 %; 1 mL methylPREDNISolone acetate 40 mg/mL    Patient tolerance: patient tolerated the procedure well with no immediate complications  Dressing:  Sterile dressing applied        Medical, Surgical, Family, and Social History  The patient's medical history, family history, and social history, were reviewed and updated as appropriate      Past Medical History:   Diagnosis Date    Allergic dermatitis due to poison ivy     last assessed: 7/28/2015    Allergic reaction     last assessed: 5/26/2015    Arthritis     right knee    Bee sting allergy     last assessed: 6/30/2015    BPH (benign prostatic hypertrophy)     Cancer (HCC)     kidney    Diabetes mellitus (HCC)     Type II, NIDDM    Enlarged prostate with lower urinary tract symptoms (LUTS)     last assessed: 8/17/2015    Epistaxis     last assessed: 6/20/2016    Herpes zoster     last assessed: 11/21/2013    History of colon polyps     Hyperlipidemia     Leukocytosis     last assessed: 11/18/2014    Osteoarthrosis, localized, primary, knee     last assessed: 10/25/2016    Renal mass     last assessed: 5/19/2017    Seasonal allergies     Wears glasses     Wears partial dentures     Upper and Lower       Past Surgical History:   Procedure Laterality Date    CHOLECYSTECTOMY      Open    COLONOSCOPY      GANGLION CYST EXCISION Left     Left wrist    JOINT REPLACEMENT Left 08/2015    Left TKR    KIDNEY SURGERY      NEPHRECTOMY  05/08/2017    HI LAP,PARTIAL NEPHRECTOMY Right 5/8/2017    Procedure: LAPAROSCOPIC LYSIS OF ADHESIONS, ROBOTIC PARTIAL NEPHRECTOMY, WITH INTRA-OP LAPAROSCOPIC ULTRASOUND;  Surgeon: Bonita Pickering MD;  [facility];  Service: Urology    HI REPAIR INTERCARP/CARP-METACARP JT Left 12/5/2016    Procedure: ARTHROPLASTY THUMB Aia 16 ;  Surgeon: Clare Thomas MD;  [facility];  Service: Orthopedics    HI REVISE MEDIAN N/CARPAL TUNNEL SURG Left 12/5/2016    Procedure: RELEASE CARPAL TUNNEL;  Surgeon: Clare Thomas MD;  [facility];  Service: Orthopedics    HI TOTAL KNEE ARTHROPLASTY Right 9/11/2017    Procedure: ARTHROPLASTY KNEE TOTAL;  Surgeon: Clare Thomas MD;  [facility];  Service: Orthopedics    TONSILLECTOMY AND ADENOIDECTOMY         Family History   Problem Relation Age of Onset    Cancer Mother     Stroke Father        Social History     Occupational History    Not on file  Social History Main Topics    Smoking status: Former Smoker     Packs/day: 0 25     Types: Cigarettes    Smokeless tobacco: Never Used      Comment: Smoked from ages 16 to 25      Alcohol use Yes      Comment: 1-2 x year    Drug use: No    Sexual activity: Not on file       Allergies   Allergen Reactions    Other Other (See Comments)     Bee stings - redness           Current Outpatient Prescriptions:     Cholecalciferol (VITAMIN D-3 PO), Take 2,000 Units by mouth daily, Disp: , Rfl:     EPINEPHrine (EPIPEN 2-MOIZ) 0 3 mg/0 3 mL SOAJ, Inject as directed, Disp: , Rfl:     meloxicam (MOBIC) 15 mg tablet, Take 1 tablet (15 mg total) by mouth daily, Disp: 30 tablet, Rfl: 0    metFORMIN (GLUCOPHAGE) 500 mg tablet, Take 1 tablet (500 mg total) by mouth 2 (two) times a day, Disp: 180 tablet, Rfl: 1    simvastatin (ZOCOR) 20 mg tablet, Take 1 tablet (20 mg total) by mouth daily, Disp: 90 tablet, Rfl: 1    tamsulosin (FLOMAX) 0 4 mg, Take 1 capsule by mouth, Disp: , Rfl:       Jean Omer    I,:    am acting as a scribe while in the presence of the attending physician :        I,:    personally performed the services described in this documentation    as scribed in my presence :

## 2018-10-05 ENCOUNTER — TELEPHONE (OUTPATIENT)
Dept: OBGYN CLINIC | Facility: HOSPITAL | Age: 73
End: 2018-10-05

## 2018-10-05 DIAGNOSIS — M89.9 HUMERUS LESION, RIGHT: Primary | ICD-10-CM

## 2018-10-10 ENCOUNTER — TELEPHONE (OUTPATIENT)
Dept: FAMILY MEDICINE CLINIC | Facility: CLINIC | Age: 73
End: 2018-10-10

## 2018-10-10 NOTE — TELEPHONE ENCOUNTER
Call patient, I reviewed his MRI  I agree with Dr Ambar West    I do not see any reason to have this biopsied

## 2018-10-10 NOTE — TELEPHONE ENCOUNTER
----- Message from Colon Falling sent at 10/10/2018  4:26 PM EDT -----  Regarding: Test Results Question  Contact: [phone]  Dr Pranav Shelton  I had an mri on my shoulder a few weeks ago  The results showed an abnormal lesion  Dr Erich Brooks said it didn't require a biopsy but with my history I was going to have one  Could you read the results of my mri and let me know what your opinion is? Thank you

## 2018-10-11 ENCOUNTER — TELEPHONE (OUTPATIENT)
Dept: OBGYN CLINIC | Facility: HOSPITAL | Age: 73
End: 2018-10-11

## 2018-10-11 NOTE — TELEPHONE ENCOUNTER
Dr Tip Solomon    Patient called to say that he spoke with his PCP about the biopsy  He said that his PCP agreed with you and that he does not want to have it done now  The appt on 10/25/18 looks like it was already CX by Dr Ousmane Holland  Patient wants to make sure that everyone knows that he does not want it rescheduled

## 2018-10-15 ENCOUNTER — IMMUNIZATION (OUTPATIENT)
Dept: FAMILY MEDICINE CLINIC | Facility: CLINIC | Age: 73
End: 2018-10-15
Payer: MEDICARE

## 2018-10-15 DIAGNOSIS — Z23 NEED FOR INFLUENZA VACCINATION: Primary | ICD-10-CM

## 2018-10-15 PROCEDURE — 90662 IIV NO PRSV INCREASED AG IM: CPT | Performed by: FAMILY MEDICINE

## 2018-10-15 PROCEDURE — G0008 ADMIN INFLUENZA VIRUS VAC: HCPCS | Performed by: FAMILY MEDICINE

## 2018-10-16 ENCOUNTER — TELEPHONE (OUTPATIENT)
Dept: UROLOGY | Facility: CLINIC | Age: 73
End: 2018-10-16

## 2018-10-16 NOTE — TELEPHONE ENCOUNTER
Advised patient that appointment was moved to PA's schedule, as Dr Juan Hernandez not in the office that afternoon  Instructed patient appointment was moved to [facility] TREVON GUEVARA's schedule to avoid moving his appointment day  Patient agreeable to seeing Valentin Nunesutant instead of Dr Juan Hernandez

## 2018-10-16 NOTE — TELEPHONE ENCOUNTER
----- Message from Ashvin Arreola sent at 10/16/2018  8:00 AM EDT -----  Regarding: Visit Follow-Up Question  Contact: [phone]  Why is my December 21 appointment with a different doctor

## 2018-10-26 ENCOUNTER — OFFICE VISIT (OUTPATIENT)
Dept: FAMILY MEDICINE CLINIC | Facility: CLINIC | Age: 73
End: 2018-10-26
Payer: MEDICARE

## 2018-10-26 ENCOUNTER — TELEPHONE (OUTPATIENT)
Dept: FAMILY MEDICINE CLINIC | Facility: CLINIC | Age: 73
End: 2018-10-26

## 2018-10-26 ENCOUNTER — DOCUMENTATION (OUTPATIENT)
Dept: FAMILY MEDICINE CLINIC | Facility: CLINIC | Age: 73
End: 2018-10-26

## 2018-10-26 ENCOUNTER — HOSPITAL ENCOUNTER (OUTPATIENT)
Dept: MRI IMAGING | Facility: HOSPITAL | Age: 73
Discharge: HOME | End: 2018-10-26
Payer: MEDICARE

## 2018-10-26 VITALS
WEIGHT: 199.9 LBS | SYSTOLIC BLOOD PRESSURE: 142 MMHG | HEART RATE: 77 BPM | HEIGHT: 70 IN | DIASTOLIC BLOOD PRESSURE: 80 MMHG | RESPIRATION RATE: 17 BRPM | BODY MASS INDEX: 28.62 KG/M2 | OXYGEN SATURATION: 99 % | TEMPERATURE: 96.3 F

## 2018-10-26 DIAGNOSIS — G44.53 THUNDERCLAP HEADACHE: ICD-10-CM

## 2018-10-26 DIAGNOSIS — R51.9 SUDDEN ONSET OF SEVERE HEADACHE: ICD-10-CM

## 2018-10-26 DIAGNOSIS — R51.9 SUDDEN ONSET OF SEVERE HEADACHE: Primary | ICD-10-CM

## 2018-10-26 PROCEDURE — 70551 MRI BRAIN STEM W/O DYE: CPT

## 2018-10-26 PROCEDURE — 70544 MR ANGIOGRAPHY HEAD W/O DYE: CPT

## 2018-10-26 PROCEDURE — 99214 OFFICE O/P EST MOD 30 MIN: CPT | Performed by: FAMILY MEDICINE

## 2018-10-26 NOTE — PROGRESS NOTES
Assessment/Plan:    Sudden onset of severe headache  Pt c/o of brief severe jolts of pain R posterior head x 1 week,   Worsening in the past day or so     No n/v  No blurred vision  Pt never experienced anything like this before  He describes them as the worst headache of his life  (10/10)  No family history of aneurysm  Upon examination, patient has been exhibiting sudden brief severe jolts of pain in his right posterior head  Region approximately every 30-60 seconds  Will sent for stat MRI/ MRA of the brain         Diagnoses and all orders for this visit:    Sudden onset of severe headache  -     MRI brain wo contrast mra head wo; Future    Thunderclap headache  -     MRI brain wo contrast mra head wo; Future          Subjective:      Patient ID: Brianna Lopez is a 68 y o  male  Pt c/o of brief severe jolts of pain R posterior head x 1 week,   Worsening in the past day or so     No n/v  No blurred vision  Pt never experienced anything like this before  He describes them as the worst headache of his life  (10/10)  No family history of aneurysm  The following portions of the patient's history were reviewed and updated as appropriate: allergies, current medications, past family history, past medical history, past social history, past surgical history and problem list     Review of Systems   Respiratory: Negative  Cardiovascular: Negative  Gastrointestinal: Negative  Genitourinary: Negative  Neurological: Positive for headaches  Negative for dizziness, tremors, seizures, syncope, facial asymmetry, speech difficulty, weakness, light-headedness and numbness           Objective:      /80 (BP Location: Left arm, Patient Position: Sitting, Cuff Size: Adult)   Pulse 77   Temp (!) 96 3 °F (35 7 °C) (Tympanic)   Resp 17   Ht 5' 10" (1 778 m)   Wt 90 7 kg (199 lb 14 4 oz)   SpO2 99%   BMI 28 68 kg/m²          Physical Exam   Constitutional: He is oriented to person, place, and time    Neurological: He is alert and oriented to person, place, and time  He has normal reflexes

## 2018-10-26 NOTE — TELEPHONE ENCOUNTER
I spoke to patient - right now nothing but Tylenol for headache  I did call radiology and they will have a radiologist read the exam this evening

## 2018-10-26 NOTE — TELEPHONE ENCOUNTER
Kayla Monaco called the office he went to [address] [place] [address] for an MRI of his brain at 1:00pm today  Pt is asking if there are no results back he is asking could you please send something for the pain to the [facility] on  [address] [place] [address] so he can sleep tonight

## 2018-10-26 NOTE — ASSESSMENT & PLAN NOTE
Pt c/o of brief severe jolts of pain R posterior head x 1 week,   Worsening in the past day or so     No n/v  No blurred vision  Pt never experienced anything like this before  He describes them as the worst headache of his life  (10/10)  No family history of aneurysm  Upon examination, patient has been exhibiting sudden brief severe jolts of pain in his right posterior head  Region approximately every 30-60 seconds    Will sent for stat MRI/ MRA of the brain

## 2018-10-27 NOTE — TELEPHONE ENCOUNTER
I spoke w/ patient  He states symptoms pain has improved since his office visit  I will call him w/ final MRI/MRA results   He was also notified that if his sxs were to worsen, he should go to the ER

## 2018-10-29 DIAGNOSIS — G44.009 CLUSTER HEADACHE, NOT INTRACTABLE, UNSPECIFIED CHRONICITY PATTERN: Primary | ICD-10-CM

## 2018-10-29 RX ORDER — TRAMADOL HYDROCHLORIDE 50 MG/1
TABLET ORAL
Qty: 30 TABLET | Refills: 0 | Status: SHIPPED | OUTPATIENT
Start: 2018-10-29 | End: 2018-10-30

## 2018-10-30 ENCOUNTER — TELEPHONE (OUTPATIENT)
Dept: FAMILY MEDICINE CLINIC | Facility: CLINIC | Age: 73
End: 2018-10-30

## 2018-10-30 ENCOUNTER — OFFICE VISIT (OUTPATIENT)
Dept: OBGYN CLINIC | Facility: MEDICAL CENTER | Age: 73
End: 2018-10-30
Payer: MEDICARE

## 2018-10-30 VITALS
SYSTOLIC BLOOD PRESSURE: 134 MMHG | BODY MASS INDEX: 27.16 KG/M2 | WEIGHT: 194 LBS | HEIGHT: 71 IN | DIASTOLIC BLOOD PRESSURE: 86 MMHG | HEART RATE: 55 BPM

## 2018-10-30 DIAGNOSIS — M75.111 INCOMPLETE TEAR OF RIGHT ROTATOR CUFF: Primary | ICD-10-CM

## 2018-10-30 DIAGNOSIS — D16.9 ENCHONDROMA: ICD-10-CM

## 2018-10-30 PROCEDURE — 99213 OFFICE O/P EST LOW 20 MIN: CPT | Performed by: STUDENT IN AN ORGANIZED HEALTH CARE EDUCATION/TRAINING PROGRAM

## 2018-10-30 NOTE — PROGRESS NOTES
Orthopaedic Surgery - Office Note  Amos Arora (47 y o  male)   : 1945   MRN: [id number]  Encounter Date: 10/30/2018    Chief Complaint   Patient presents with    Right Shoulder - Follow-up       Assessment / Plan  Right near full-thickness, articular surface insertional tear of posterior supraspinatus  Enchondroma    · Caitlin Huerta has had good response with CSI received on 10/2/2018  · He had also decided against getting a biopsy of his enchondroma  · At this time, we recommended continued home exercises  · He will follow up PRN if pain worsens and/or desired surveillance of enchondroma with repeat imaging  · He acknowledged understanding and agreement with the plan    History of Present Illness  Amos Arora is a 68 y o  male who presents for follow up of right near full-thickness supraspinatus tear as evidenced by MRI  He was last seen at our office on 10/2/18, at which time he received a CSI  Incidentally, he was noted to have an enchondroma on MRI  Since the last visit, Caitlin Huerta states that he has improved pain control with the CSI  He occasionally gets intermittent right anterior shoulder pain, sharp in quality, 7/10 intensity, resolves on its own quickly, without radiating  He is not taking anything for the pain  He is performing home exercises 3-4 times a week  Movement exacerbates his pain, without any specific movements  Denies numbness, tingling, weakness  Review of Systems  Pertinent items are noted in HPI  All other systems were reviewed and are negative  Physical Exam  /86   Pulse 55   Ht 5' 11" (1 803 m)   Wt 88 kg (194 lb)   BMI 27 06 kg/m²   Cons: Appears well  No apparent distress  Psych: Alert  Oriented x3  Mood and affect normal   Eyes: PERRLA, EOMI  Resp: Normal effort  No audible wheezing or stridor  CV: Palpable pulse  No discernable arrhythmia  Lymph:  No palpable cervical, axillary, or inguinal lymphadenopathy  Skin: Warm  No palpable masses  No visible lesions  Neuro: Normal muscle tone  Normal and symmetric DTR's  Right Shoulder Exam  Alignment / Posture:  Normal shoulder posture  Inspection:  No swelling  No edema  No erythema  No ecchymosis  Palpation:  No tenderness  ROM:  Shoulder   Shoulder ER 60  Shoulder IR T8  Strength:  Supraspinatus 4+/5  Infraspinatus 4/5  Subscapularis 5/5  Stability:  Not tested  Tests: (-) Kendra Oz  (-) Neer  (-) Drop arm  (-) Lift off  (-) Speed  Neurovascular:  Sensation intact in Ax/R/M/U nerve distributions  2+ radial pulse  Studies Reviewed  I have personally reviewed pertinent films in PACS  Procedures  No procedures today  Medical, Surgical, Family, and Social History  The patient's medical history, family history, and social history, were reviewed and updated as appropriate      Past Medical History:   Diagnosis Date    Allergic dermatitis due to poison ivy     last assessed: 7/28/2015    Allergic reaction     last assessed: 5/26/2015    Arthritis     right knee    Bee sting allergy     last assessed: 6/30/2015    BPH (benign prostatic hypertrophy)     Cancer (HCC)     kidney    Diabetes [facility] )     Type II, NIDDM    Enlarged prostate with lower urinary tract symptoms (LUTS)     last assessed: 8/17/2015    Epistaxis     last assessed: 6/20/2016    Herpes zoster     last assessed: 11/21/2013    History of colon polyps     Hyperlipidemia     Leukocytosis     last assessed: 11/18/2014    Osteoarthrosis, localized, primary, knee     last assessed: 10/25/2016    Renal mass     last assessed: 5/19/2017    Seasonal allergies     Wears glasses     Wears partial dentures     Upper and Lower       Past Surgical History:   Procedure Laterality Date    CHOLECYSTECTOMY      Open    COLONOSCOPY      GANGLION CYST EXCISION Left     Left wrist    JOINT REPLACEMENT Left 08/2015    Left TKR    KIDNEY SURGERY      NEPHRECTOMY  05/08/2017    [place] LAP,PARTIAL NEPHRECTOMY Right 5/8/2017    Procedure: LAPAROSCOPIC LYSIS OF ADHESIONS, ROBOTIC PARTIAL NEPHRECTOMY, WITH INTRA-OP LAPAROSCOPIC ULTRASOUND;  Surgeon: Chidi Ross MD;  [facility];  Service: Urology    NV REPAIR INTERCARP/CARP-METACARP JT Left 12/5/2016    Procedure: ARTHROPLASTY THUMB [facility] ;  Surgeon: Kevin Chirinos MD;  [facility];  Service: Orthopedics    NV REVISE MEDIAN N/CARPAL TUNNEL SURG Left 12/5/2016    Procedure: RELEASE CARPAL TUNNEL;  Surgeon: Kevin Chirinos MD;  [facility];  Service: Orthopedics    NV TOTAL KNEE ARTHROPLASTY Right 9/11/2017    Procedure: ARTHROPLASTY KNEE TOTAL;  Surgeon: Kevin Chirinos MD;  [facility];  Service: Orthopedics    TONSILLECTOMY AND ADENOIDECTOMY         Family History   Problem Relation Age of Onset    Cancer Mother     Stroke Father        Social History     Occupational History    Not on file  Social History Main Topics    Smoking status: Former Smoker     Packs/day: 0 25     Types: Cigarettes    Smokeless tobacco: Never Used      Comment: Smoked from ages 16 to 25      Alcohol use Yes      Comment: 1-2 x year    Drug use: No    Sexual activity: Not on file       Allergies   Allergen Reactions    Other Other (See Comments)     Bee stings - redness           Current Outpatient Prescriptions:     Cholecalciferol (VITAMIN D-3 PO), Take 2,000 Units by mouth daily, Disp: , Rfl:     EPINEPHrine (EPIPEN 2-MOIZ) 0 3 mg/0 3 mL SOAJ, Inject as directed, Disp: , Rfl:     metFORMIN (GLUCOPHAGE) 500 mg tablet, Take 1 tablet (500 mg total) by mouth 2 (two) times a day, Disp: 180 tablet, Rfl: 1    simvastatin (ZOCOR) 20 mg tablet, Take 1 tablet (20 mg total) by mouth daily, Disp: 90 tablet, Rfl: 1    tamsulosin (FLOMAX) 0 4 mg, Take 1 capsule by mouth, Disp: , Rfl:       Rivera Doherty MD    Scribe Attestation    I,:    am acting as a scribe while in the presence of the attending physician :        I,:    personally performed the services described in this documentation    as scribed in my presence :

## 2018-12-08 LAB
BUN SERPL-MCNC: 20 MG/DL (ref 7–25)
BUN/CREAT SERPL: NORMAL (CALC) (ref 6–22)
CALCIUM SERPL-MCNC: 9.8 MG/DL (ref 8.6–10.3)
CHLORIDE SERPL-SCNC: 101 MMOL/L (ref 98–110)
CO2 SERPL-SCNC: 28 MMOL/L (ref 20–32)
CREAT SERPL-MCNC: 1.07 MG/DL (ref 0.7–1.18)
GLUCOSE SERPL-MCNC: 95 MG/DL (ref 65–139)
POTASSIUM SERPL-SCNC: 4.2 MMOL/L (ref 3.5–5.3)
SL AMB EGFR AFRICAN AMERICAN: 80 ML/MIN/1.73M2
SL AMB EGFR NON AFRICAN AMERICAN: 69 ML/MIN/1.73M2
SODIUM SERPL-SCNC: 137 MMOL/L (ref 135–146)

## 2018-12-15 ENCOUNTER — HOSPITAL ENCOUNTER (OUTPATIENT)
Dept: CT IMAGING | Facility: HOSPITAL | Age: 73
Discharge: HOME | End: 2018-12-15
Attending: UROLOGY
Payer: MEDICARE

## 2018-12-15 DIAGNOSIS — C64.1 MALIGNANT NEOPLASM OF RIGHT KIDNEY (HCC): ICD-10-CM

## 2018-12-15 PROCEDURE — 74178 CT ABD&PLV WO CNTR FLWD CNTR: CPT

## 2018-12-15 RX ADMIN — IODIXANOL 100 ML: 320 INJECTION, SOLUTION INTRAVASCULAR at 07:54

## 2018-12-20 NOTE — PROGRESS NOTES
12/21/2018      Chief Complaint   Patient presents with    renal cell carcinoma       Assessment and Plan    68 y o  male managed by Dr Lizzie Rivas    1  pT1a right renal cell carcinoma status post right robot assisted laparoscopic partial nephrectomy 5/8/17  - BMP with a Cr of 1 07  - CT reveals no CT evidence of recurrent or metastatic disease within abdomen and pelvis  - according to guidelines can begin yearly surveillance   - FU 1 year with BMP, CT, and chest xray prior       History of Present Illness  Brianna Lopez is a 68 y o  male here for follow up evaluation of pT1a right renal cell carcinoma status post right robot assisted laparoscopic partial nephrectomy 5/8/17  He had a 4 mm nodule in the lungs which has resolved  His most recent BMP and CT scan within normal limits  His creatinine is 1 07 his CT reveals no evidence of recurrent or metastatic disease  His lower urinary tract symptoms are as below  He has no urinary complaints at his visit today  Review of Systems   Constitutional: Negative for activity change, chills and fever  Gastrointestinal: Negative for abdominal distention and abdominal pain  Musculoskeletal: Negative for back pain and gait problem  Psychiatric/Behavioral: Negative for behavioral problems and confusion  Urinary Incontinence Screening      Most Recent Value   Urinary Incontinence   Urinary Incontinence? No   Incomplete emptying? Yes   Urinary frequency? Yes [sometimes]   Urinary urgency? Yes [once in awhile]   Urinary hesitancy? No   Dysuria (painful difficult urination)? No   Nocturia (waking up to use the bathroom)? Yes [sometimes x 4]   Straining (having to push to go)? No   Weak stream?  No   Intermittent stream?  No   Post void dribbling? No          AUA SYMPTOM SCORE      Most Recent Value   AUA SYMPTOM SCORE   How often have you had a sensation of not emptying your bladder completely after you finished urinating?   1   How often have you had to urinate again less than two hours after you finished urinating? 3   How often have you found you stopped and started again several times when you urinate? 1   How often have you found it difficult to postpone urination? 2   How often have you had a weak urinary stream?  1   How often have you had to push or strain to begin urination? 0   How many times did you most typically get up to urinate from the time you went to bed at night until the time you got up in the morning?   2   Quality of Life: If you were to spend the rest of your life with your urinary condition just the way it is now, how would you feel about that?  0   AUA SYMPTOM SCORE  10          Past Medical History  Past Medical History:   Diagnosis Date    Allergic dermatitis due to poison ivy     last assessed: 7/28/2015    Allergic reaction     last assessed: 5/26/2015    Arthritis     right knee    Bee sting allergy     last assessed: 6/30/2015    BPH (benign prostatic hypertrophy)     Cancer (HCC)     kidney    [facility]     Type II, NIDDM    Enlarged prostate with lower urinary tract symptoms (LUTS)     last assessed: 8/17/2015    Epistaxis     last assessed: 6/20/2016    Herpes zoster     last assessed: 11/21/2013    History of colon polyps     Hyperlipidemia     Leukocytosis     last assessed: 11/18/2014    Osteoarthrosis, localized, primary, knee     last assessed: 10/25/2016    Renal mass     last assessed: 5/19/2017    Seasonal allergies     Wears glasses     Wears partial dentures     Upper and Lower       Past Social History  Past Surgical History:   Procedure Laterality Date    CHOLECYSTECTOMY      Open    COLONOSCOPY      GANGLION CYST EXCISION Left     Left wrist    JOINT REPLACEMENT Left 08/2015    Left TKR    KIDNEY SURGERY      NEPHRECTOMY  05/08/2017    [place] LAP,PARTIAL NEPHRECTOMY Right 5/8/2017    Procedure: LAPAROSCOPIC LYSIS OF ADHESIONS, ROBOTIC PARTIAL NEPHRECTOMY, WITH INTRA-OP LAPAROSCOPIC ULTRASOUND;  Surgeon: Sayda Villalobos MD;  [facility];  Service: Urology    MA REPAIR INTERCARP/CARP-METACARP JT Left 12/5/2016    Procedure: ARTHROPLASTY THUMB [address] ;  Surgeon: Carrie Carrera MD;  [facility];  Service: Orthopedics    MA REVISE MEDIAN N/CARPAL TUNNEL SURG Left 12/5/2016    Procedure: RELEASE CARPAL TUNNEL;  Surgeon: Carrie Carrera MD;  [facility];  Service: Orthopedics    MA TOTAL KNEE ARTHROPLASTY Right 9/11/2017    Procedure: ARTHROPLASTY KNEE TOTAL;  Surgeon: Carrie Carrera MD;  [facility];  Service: Orthopedics    TONSILLECTOMY AND ADENOIDECTOMY       History   Smoking Status    Former Smoker    Packs/day: 0 25    Types: Cigarettes   Smokeless Tobacco    Never Used     Comment: Smoked from ages 16 to 25  Past Family History  Family History   Problem Relation Age of Onset    Cancer Mother     Stroke Father        Past Social history  Social History     Social History    Marital status: /Civil Union     Spouse name: N/A    Number of children: N/A    Years of education: N/A     Occupational History    Not on file  Social History Main Topics    Smoking status: Former Smoker     Packs/day: 0 25     Types: Cigarettes    Smokeless tobacco: Never Used      Comment: Smoked from ages 16 to 25      Alcohol use Yes      Comment: 1-2 x year    Drug use: No    Sexual activity: Not on file     Other Topics Concern    Not on file     Social History Narrative    No narrative on file       Current Medications  Current Outpatient Prescriptions   Medication Sig Dispense Refill    Cholecalciferol (VITAMIN D-3 PO) Take 2,000 Units by mouth daily      EPINEPHrine (EPIPEN 2-MOIZ) 0 3 mg/0 3 mL SOAJ Inject as directed      metFORMIN (GLUCOPHAGE) 500 mg tablet Take 1 tablet (500 mg total) by mouth 2 (two) times a day 180 tablet 1    simvastatin (ZOCOR) 20 mg tablet Take 1 tablet (20 mg total) by mouth daily 90 tablet 1    tamsulosin (FLOMAX) 0 4 mg Take 1 capsule by mouth      traMADol (ULTRAM) 50 mg tablet        No current facility-administered medications for this visit  Allergies  Allergies   Allergen Reactions    Other Other (See Comments)     Bee stings - redness           The following portions of the patient's history were reviewed and updated as appropriate: allergies, current medications, past medical history, past social history, past surgical history and problem list       Vitals  Vitals:    12/21/18 1342   BP: 118/60   Pulse: 68   Weight: 88 5 kg (195 lb)   Height: 5' 11" (1 803 m)       Physical Exam  Constitutional   General appearance: Patient is seated and in no acute distress, well appearing and well nourished  Head and Face   Head and face: Normal     Eyes   Conjunctiva and lids: No erythema, swelling or discharge  Ears, Nose, Mouth, and Throat   Hearing: Normal     Pulmonary   Respiratory effort: No increased work of breathing or signs of respiratory distress  Cardiovascular   Examination of extremities for edema and/or varicosities: Normal     Abdomen   Abdomen: Non-tender, no masses  Musculoskeletal   Gait and station: Normal     Skin   Skin and subcutaneous tissue: Warm, dry, and intact  No visible lesions or rashes  Psychiatric   Judgment and insight: Normal  Recent and remote memory:  Normal  Mood and affect: Normal      Results  No results found for this or any previous visit (from the past 1 hour(s))  ]  No results found for: PSA  Lab Results   Component Value Date    GLUCOSE 195 (H) 05/08/2017    CALCIUM 9 8 12/07/2018     12/27/2017    K 4 2 12/07/2018    CO2 28 12/07/2018     12/07/2018    BUN 20 12/07/2018    CREATININE 0 98 12/27/2017     Lab Results   Component Value Date    WBC 7 1 12/27/2017    HGB 14 6 12/27/2017    HCT 43 2 12/27/2017    MCV 89 6 12/27/2017     12/27/2017           Orders  Orders Placed This Encounter   Procedures    CT abdomen pelvis w wo contrast     Standing Status:   Future     Standing Expiration Date:   12/21/2019     Order Specific Question:   What is the patient's sedation requirement? Answer:   No Sedation    Basic metabolic panel     This is a patient instruction: Patient fasting for 8 hours or longer recommended       Standing Status:   Future     Standing Expiration Date:   12/21/2019

## 2018-12-21 ENCOUNTER — OFFICE VISIT (OUTPATIENT)
Dept: UROLOGY | Facility: CLINIC | Age: 73
End: 2018-12-21
Payer: MEDICARE

## 2018-12-21 VITALS
HEART RATE: 68 BPM | HEIGHT: 71 IN | WEIGHT: 195 LBS | DIASTOLIC BLOOD PRESSURE: 60 MMHG | SYSTOLIC BLOOD PRESSURE: 118 MMHG | BODY MASS INDEX: 27.3 KG/M2

## 2018-12-21 DIAGNOSIS — C64.1 RENAL CELL CARCINOMA OF RIGHT KIDNEY (HCC): Primary | ICD-10-CM

## 2018-12-21 PROCEDURE — 99213 OFFICE O/P EST LOW 20 MIN: CPT | Performed by: PHYSICIAN ASSISTANT

## 2018-12-21 RX ORDER — TRAMADOL HYDROCHLORIDE 50 MG/1
50 TABLET ORAL EVERY 6 HOURS PRN
COMMUNITY
Start: 2018-10-30 | End: 2019-02-04 | Stop reason: HOSPADM

## 2019-01-04 LAB
ALBUMIN SERPL-MCNC: 4.4 G/DL (ref 3.6–5.1)
ALBUMIN/CREAT UR: 4 MCG/MG CREAT
ALBUMIN/GLOB SERPL: 1.5 (CALC) (ref 1–2.5)
ALP SERPL-CCNC: 44 U/L (ref 40–115)
ALT SERPL-CCNC: 26 U/L (ref 9–46)
AST SERPL-CCNC: 17 U/L (ref 10–35)
BASOPHILS # BLD AUTO: 63 CELLS/UL (ref 0–200)
BASOPHILS NFR BLD AUTO: 0.9 %
BILIRUB SERPL-MCNC: 0.7 MG/DL (ref 0.2–1.2)
BUN SERPL-MCNC: 26 MG/DL (ref 7–25)
BUN/CREAT SERPL: 24 (CALC) (ref 6–22)
CALCIUM SERPL-MCNC: 9.7 MG/DL (ref 8.6–10.3)
CHLORIDE SERPL-SCNC: 101 MMOL/L (ref 98–110)
CHOLEST SERPL-MCNC: 144 MG/DL
CHOLEST/HDLC SERPL: 4.1 (CALC)
CO2 SERPL-SCNC: 30 MMOL/L (ref 20–32)
CREAT SERPL-MCNC: 1.09 MG/DL (ref 0.7–1.18)
CREAT UR-MCNC: 146 MG/DL (ref 20–320)
EOSINOPHIL # BLD AUTO: 427 CELLS/UL (ref 15–500)
EOSINOPHIL NFR BLD AUTO: 6.1 %
ERYTHROCYTE [DISTWIDTH] IN BLOOD BY AUTOMATED COUNT: 13.4 % (ref 11–15)
GLOBULIN SER CALC-MCNC: 3 G/DL (CALC) (ref 1.9–3.7)
GLUCOSE SERPL-MCNC: 143 MG/DL (ref 65–99)
HBA1C MFR BLD: 6.6 % OF TOTAL HGB
HCT VFR BLD AUTO: 44 % (ref 38.5–50)
HDLC SERPL-MCNC: 35 MG/DL
HGB BLD-MCNC: 15 G/DL (ref 13.2–17.1)
LDLC SERPL CALC-MCNC: 85 MG/DL (CALC)
LYMPHOCYTES # BLD AUTO: 2639 CELLS/UL (ref 850–3900)
LYMPHOCYTES NFR BLD AUTO: 37.7 %
MCH RBC QN AUTO: 30.9 PG (ref 27–33)
MCHC RBC AUTO-ENTMCNC: 34.1 G/DL (ref 32–36)
MCV RBC AUTO: 90.7 FL (ref 80–100)
MICROALBUMIN UR-MCNC: 0.6 MG/DL
MONOCYTES # BLD AUTO: 686 CELLS/UL (ref 200–950)
MONOCYTES NFR BLD AUTO: 9.8 %
NEUTROPHILS # BLD AUTO: 3185 CELLS/UL (ref 1500–7800)
NEUTROPHILS NFR BLD AUTO: 45.5 %
NONHDLC SERPL-MCNC: 109 MG/DL (CALC)
PLATELET # BLD AUTO: 228 THOUSAND/UL (ref 140–400)
PMV BLD REES-ECKER: 11.2 FL (ref 7.5–12.5)
POTASSIUM SERPL-SCNC: 4.8 MMOL/L (ref 3.5–5.3)
PROT SERPL-MCNC: 7.4 G/DL (ref 6.1–8.1)
PSA SERPL-MCNC: 2.9 NG/ML
RBC # BLD AUTO: 4.85 MILLION/UL (ref 4.2–5.8)
SL AMB EGFR AFRICAN AMERICAN: 78 ML/MIN/1.73M2
SL AMB EGFR NON AFRICAN AMERICAN: 67 ML/MIN/1.73M2
SODIUM SERPL-SCNC: 138 MMOL/L (ref 135–146)
TRIGL SERPL-MCNC: 138 MG/DL
TSH SERPL-ACNC: 4.04 MIU/L (ref 0.4–4.5)
WBC # BLD AUTO: 7 THOUSAND/UL (ref 3.8–10.8)

## 2019-01-09 ENCOUNTER — OFFICE VISIT (OUTPATIENT)
Dept: FAMILY MEDICINE CLINIC | Facility: CLINIC | Age: 74
End: 2019-01-09
Payer: MEDICARE

## 2019-01-09 VITALS
SYSTOLIC BLOOD PRESSURE: 142 MMHG | HEIGHT: 71 IN | DIASTOLIC BLOOD PRESSURE: 78 MMHG | BODY MASS INDEX: 27.29 KG/M2 | OXYGEN SATURATION: 98 % | WEIGHT: 194.9 LBS | RESPIRATION RATE: 17 BRPM | HEART RATE: 69 BPM | TEMPERATURE: 97 F

## 2019-01-09 DIAGNOSIS — M75.81 TENDINITIS OF RIGHT ROTATOR CUFF: ICD-10-CM

## 2019-01-09 DIAGNOSIS — E78.2 MIXED HYPERLIPIDEMIA: ICD-10-CM

## 2019-01-09 DIAGNOSIS — M19.90 ARTHRITIS: ICD-10-CM

## 2019-01-09 DIAGNOSIS — Z96.651 S/P TKR (TOTAL KNEE REPLACEMENT) USING CEMENT, RIGHT: ICD-10-CM

## 2019-01-09 DIAGNOSIS — G47.00 INSOMNIA, UNSPECIFIED TYPE: ICD-10-CM

## 2019-01-09 DIAGNOSIS — E11.8 TYPE 2 DIABETES MELLITUS WITH COMPLICATION, WITHOUT LONG-TERM CURRENT USE OF INSULIN (HCC): Primary | ICD-10-CM

## 2019-01-09 PROCEDURE — 99214 OFFICE O/P EST MOD 30 MIN: CPT | Performed by: FAMILY MEDICINE

## 2019-01-09 RX ORDER — SIMVASTATIN 40 MG
40 TABLET ORAL DAILY
Qty: 90 TABLET | Refills: 1 | Status: SHIPPED | OUTPATIENT
Start: 2019-01-09 | End: 2019-07-12 | Stop reason: SDUPTHER

## 2019-01-09 NOTE — ASSESSMENT & PLAN NOTE
Cholesterol 144/85  Patient still not at LDL goal of less than 70  Will increase simvastatin to 40 mg daily    Will continue to monitor

## 2019-01-09 NOTE — PROGRESS NOTES
[facility]      NAME: Amos Arora  AGE: 68 y o  SEX: male  : 1945   MRN: [id number]    DATE: 2019  TIME: 9:24 AM    Assessment and Plan     Problem List Items Addressed This Visit     Tendinitis of right rotator cuff       Patient still with ongoing right shoulder pain  He is being followed by Dr Xiomara Cooper         DM2 [facility]diabetes mellitus, type 2) [facility] ) - Primary     Lab Results   Component Value Date    HGBA1C 6 6 (H) 2019       No results for input(s): POCGLU in the last 72 hours  Blood Sugar Average: Last 72 hrs:   A1c 6 6%, was 6 2%  Doing well on metformin 500 mg b i d , but his diet has not been good over the holidays  He is still exercising regularly  Stressed dietary compliance  Microfilament negative today  Microalbumin was negative  Current with eye exams         Relevant Orders    Comprehensive metabolic panel    Hemoglobin A1c (w/out EAG)    Mixed hyperlipidemia      Cholesterol 144/85  Patient still not at LDL goal of less than 70  Will increase simvastatin to 40 mg daily  Will continue to monitor         Relevant Medications    simvastatin (ZOCOR) 40 mg tablet    Other Relevant Orders    Lipid Panel with Direct LDL reflex    S/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, right      Status post bilateral total knee replacements by Dr Xiomara Cooper  Patient doing well         RESOLVED: Insomnia              Return to office in:  6 months, AWV, FBW prior at [facility]    Chief Complaint     Chief Complaint   Patient presents with    Follow-up     6M & BW Review       History of Present Illness      Patient presents for recheck of chronic medical problems today  He has been having ongoing problems with his right rotator cuff  He is seeing Orthopedics for this  He is doing well on metformin for diabetes, but his diet has not been good over the holidays  Doing well on simvastatin for cholesterol    Patient had labs drawn         The following portions of the patient's history were reviewed and updated as appropriate: allergies, current medications, past family history, past medical history, past social history, past surgical history and problem list     Review of Systems   Review of Systems   Respiratory: Negative  Cardiovascular: Negative  Gastrointestinal: Negative  Genitourinary: Negative  Active Problem List     Patient Active Problem List   Diagnosis    Primary osteoarthritis of right knee    Tendinitis of right rotator cuff    BPH (benign prostatic hyperplasia)    DM2 (diabetes mellitus, type 2) ([address] )    Mixed hyperlipidemia    Postherpetic neuralgia    Renal cell carcinoma of right kidney (HCC)    Vitamin D deficiency    S/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, right    Cervical strain    Ganglion of flexor tendon sheath of left thumb    Sudden onset of severe headache       Objective   /78 (BP Location: Left arm, Patient Position: Sitting, Cuff Size: Standard)   Pulse 69   Temp (!) 97 °F (36 1 °C) (Tympanic)   Resp 17   Ht 5' 11" (1 803 m)   Wt 88 4 kg (194 lb 14 4 oz)   SpO2 98%   BMI 27 18 kg/m²     Physical Exam   Cardiovascular: Normal rate, regular rhythm, normal heart sounds and intact distal pulses  Pulses are no weak pulses  Pulses:       Dorsalis pedis pulses are 2+ on the right side, and 2+ on the left side  Posterior tibial pulses are 2+ on the right side, and 2+ on the left side  Carotids: no bruits  Ext: no edema   Pulmonary/Chest: Effort normal  No respiratory distress  He has no wheezes  He has no rales  Feet:   Right Foot:   Skin Integrity: Negative for ulcer, skin breakdown, erythema, warmth, callus or dry skin  Left Foot:   Skin Integrity: Negative for ulcer, skin breakdown, erythema, warmth, callus or dry skin  Psychiatric: He has a normal mood and affect   His behavior is normal  Thought content normal      Right Foot/Ankle   Right Foot Inspection  Skin Exam: skin normal and skin intact no dry skin, no warmth, no callus, no erythema, no maceration, no abnormal color, no pre-ulcer, no ulcer and no callus                          Toe Exam: ROM and strength within normal limits  Sensory       Monofilament testing: intact  Vascular  Capillary refills: < 3 seconds  The right DP pulse is 2+  The right PT pulse is 2+  Left Foot/Ankle  Left Foot Inspection  Skin Exam: skin normal and skin intactno dry skin, no warmth, no erythema, no maceration, normal color, no pre-ulcer, no ulcer and no callus                         Toe Exam: ROM and strength within normal limits                   Sensory       Monofilament: intact  Vascular  Capillary refills: < 3 seconds  The left DP pulse is 2+  The left PT pulse is 2+  Assign Risk Category:  No deformity present; No loss of protective sensation;  No weak pulses       Risk: 0    Pertinent Laboratory/Diagnostic Studies:   lab results from January 3rd    Current Medications     Current Outpatient Prescriptions:     Cholecalciferol (VITAMIN D-3 PO), Take 2,000 Units by mouth daily, Disp: , Rfl:     EPINEPHrine (EPIPEN 2-MOIZ) 0 3 mg/0 3 mL SOAJ, Inject as directed, Disp: , Rfl:     metFORMIN (GLUCOPHAGE) 500 mg tablet, Take 1 tablet (500 mg total) by mouth 2 (two) times a day, Disp: 180 tablet, Rfl: 1    simvastatin (ZOCOR) 40 mg tablet, Take 1 tablet (40 mg total) by mouth daily, Disp: 90 tablet, Rfl: 1    tamsulosin (FLOMAX) 0 4 mg, Take 1 capsule by mouth, Disp: , Rfl:     traMADol (ULTRAM) 50 mg tablet, , Disp: , Rfl:     Health Maintenance     Health Maintenance   Topic Date Due    Hepatitis C Screening  1945    Medicare Annual Wellness Visit (AWV)  1945    CRC Screening: Colonoscopy  06/05/2017    Diabetic Foot Exam  12/20/2017    HEMOGLOBIN A1C  07/03/2019    DM Eye Exam  08/14/2019    Fall Risk  08/27/2019    Depression Screening PHQ  08/27/2019    URINE MICROALBUMIN  01/03/2020    DTaP,Tdap,and Td Vaccines (3 - Td) 10/16/2027    INFLUENZA VACCINE  Completed    Pneumococcal PPSV23/PCV13 65+ Years / High and Highest Risk  Completed     Immunization History   Administered Date(s) Administered    Influenza 11/18/2014, 10/05/2015, 11/28/2016, 01/04/2018, 10/15/2018    Influenza Split High Dose Preservative Free IM 11/18/2014, 10/05/2015, 11/28/2016, 01/04/2018    Influenza TIV (IM) 10/05/2012, 10/09/2013    Influenza, high dose seasonal 0 5 mL 10/15/2018    Pneumococcal Conjugate 13-Valent 12/08/2015, 12/09/2015    Pneumococcal Polysaccharide PPV23 10/09/2013    Tdap 11/15/2004, 10/16/2017    Zoster 10/05/2012       Ruddy Fabry, DO  [facility]

## 2019-01-09 NOTE — ASSESSMENT & PLAN NOTE
Lab Results   Component Value Date    HGBA1C 6 6 (H) 01/03/2019       No results for input(s): POCGLU in the last 72 hours  Blood Sugar Average: Last 72 hrs:   A1c 6 6%, was 6 2%  Doing well on metformin 500 mg b i d , but his diet has not been good over the holidays  He is still exercising regularly  Stressed dietary compliance  Microfilament negative today  Microalbumin was negative    Current with eye exams

## 2019-01-15 ENCOUNTER — OFFICE VISIT (OUTPATIENT)
Dept: OBGYN CLINIC | Facility: MEDICAL CENTER | Age: 74
End: 2019-01-15
Payer: MEDICARE

## 2019-01-15 VITALS
HEIGHT: 71 IN | HEART RATE: 59 BPM | WEIGHT: 195.2 LBS | BODY MASS INDEX: 27.33 KG/M2 | DIASTOLIC BLOOD PRESSURE: 82 MMHG | SYSTOLIC BLOOD PRESSURE: 120 MMHG

## 2019-01-15 DIAGNOSIS — M75.111 INCOMPLETE TEAR OF RIGHT ROTATOR CUFF: Primary | ICD-10-CM

## 2019-01-15 DIAGNOSIS — D16.9 ENCHONDROMA: ICD-10-CM

## 2019-01-15 PROCEDURE — 1123F ACP DISCUSS/DSCN MKR DOCD: CPT | Performed by: PHYSICIAN ASSISTANT

## 2019-01-15 PROCEDURE — 99214 OFFICE O/P EST MOD 30 MIN: CPT | Performed by: ORTHOPAEDIC SURGERY

## 2019-01-15 NOTE — PROGRESS NOTES
Orthopaedic Surgery - Office Note  Rob Duarte (22 y o  male)   : 1945   MRN: [id number]  Encounter Date: 1/15/2019    Chief Complaint   Patient presents with    Right Shoulder - Follow-up       Assessment / Plan  Right near full-thickness, articular surface insertional tear of posterior supraspinatus    · The diagnosis and treatment options were reviewed  · The patient wishes to proceed with right shoulder arthroscopy with rotator cuff repair and possible biceps tenodesis   · The risks, benefits, and alternatives were discussed  · Written consent was obtained  · Post op therapy was ordered today   · Patient was fit with a post op sling today   Return for Recheck after sx  History of Present Illness  Rob Duarte is a 68 y o  male who presents Right near full-thickness, articular surface insertional tear of posterior supraspinatus  Patient states that the pain in his right shoulder has returned  He did receive a CSI on 10/2/18 into his subacromial bursa which did provide him with partial relief  Patient states that when he reaches to grab something or driving causes him the most pain  He describes a pain to the lateral aspect of his right shoulder  He states that the pain in his right shoulder is waking him up nightly  He does take tramadol for his pain however this medication tends to keep him awake  Patient denies numbness and tingling  Review of Systems  Pertinent items are noted in HPI  All other systems were reviewed and are negative  Physical Exam  /82 Comment: manual  Pulse 59   Ht 5' 11" (1 803 m)   Wt 88 5 kg (195 lb 3 2 oz)   BMI 27 22 kg/m²   Cons: Appears well  No apparent distress  Psych: Alert  Oriented x3  Mood and affect normal   Eyes: PERRLA, EOMI  Resp: Normal effort  No audible wheezing or stridor  CV: Palpable pulse  No discernable arrhythmia  No LE edema  Lymph:  No palpable cervical, axillary, or inguinal lymphadenopathy  Skin: Warm  No palpable masses  No visible lesions  Neuro: Normal muscle tone  Normal and symmetric DTR's  Right Shoulder Exam  Alignment / Posture:  Normal shoulder posture  Inspection:  No swelling  Palpation:  subacromial bursa  tenderness  ROM:  Shoulder   Shoulder ER neutral  Shoulder IR T12  Strength:  Supraspinatus 4+/5  Infraspinatus 4/5  Subscapularis 5/5  Stability:  Not tested  Tests: (+) Alfonso  (+) Speed  (-) Drop arm  (-) Belly press  Neurovascular:  Sensation intact in Ax/R/M/U nerve distributions  2+ radial pulse  Studies Reviewed  No studies to review    Procedures  No procedures today  Medical, Surgical, Family, and Social History  The patient's medical history, family history, and social history, were reviewed and updated as appropriate      Past Medical History:   Diagnosis Date    Allergic dermatitis due to poison ivy     last assessed: 7/28/2015    Allergic reaction     last assessed: 5/26/2015    Arthritis     right knee    Bee sting allergy     last assessed: 6/30/2015    BPH (benign prostatic hypertrophy)     Cancer (HCC)     kidney    Diabetes mellitus ([facility] )     Type II, NIDDM    Enlarged prostate with lower urinary tract symptoms (LUTS)     last assessed: 8/17/2015    Epistaxis     last assessed: 6/20/2016    Herpes zoster     last assessed: 11/21/2013    History of colon polyps     Hyperlipidemia     Leukocytosis     last assessed: 11/18/2014    Osteoarthrosis, localized, primary, knee     last assessed: 10/25/2016    Renal mass     last assessed: 5/19/2017    Seasonal allergies     Wears glasses     Wears partial dentures     Upper and Lower       Past Surgical History:   Procedure Laterality Date    CHOLECYSTECTOMY      Open    COLONOSCOPY      GANGLION CYST EXCISION Left     Left wrist    JOINT REPLACEMENT Left 08/2015    Left TKR    KIDNEY SURGERY      NEPHRECTOMY  05/08/2017    VA LAP,PARTIAL NEPHRECTOMY Right 5/8/2017    Procedure: LAPAROSCOPIC LYSIS OF ADHESIONS, ROBOTIC PARTIAL NEPHRECTOMY, WITH INTRA-OP LAPAROSCOPIC ULTRASOUND;  Surgeon: Amelia Will MD;  [facility];  Service: Urology    FL REPAIR INTERCARP/CARP-METACARP JT Left 12/5/2016    Procedure: ARTHROPLASTY THUMB Aia 16 ;  Surgeon: Umberto Zavaleta MD;  [facility];  Service: Orthopedics    FL REVISE MEDIAN N/CARPAL TUNNEL SURG Left 12/5/2016    Procedure: RELEASE CARPAL TUNNEL;  Surgeon: Umberto Zavaleta MD;  [facility];  Service: Orthopedics    FL TOTAL KNEE ARTHROPLASTY Right 9/11/2017    Procedure: ARTHROPLASTY KNEE TOTAL;  Surgeon: Umberto Zavaleta MD;  [facility];  Service: Orthopedics    TONSILLECTOMY AND ADENOIDECTOMY         Family History   Problem Relation Age of Onset    Cancer Mother     Stroke Father        Social History     Occupational History    Not on file  Social History Main Topics    Smoking status: Former Smoker     Packs/day: 0 25     Types: Cigarettes    Smokeless tobacco: Never Used      Comment: Smoked from ages 16 to 25      Alcohol use Yes      Comment: 1-2 x year    Drug use: No    Sexual activity: Not on file       Allergies   Allergen Reactions    Other Other (See Comments)     Bee stings - redness           Current Outpatient Prescriptions:     Cholecalciferol (VITAMIN D-3 PO), Take 2,000 Units by mouth daily, Disp: , Rfl:     EPINEPHrine (EPIPEN 2-MOIZ) 0 3 mg/0 3 mL SOAJ, Inject as directed, Disp: , Rfl:     metFORMIN (GLUCOPHAGE) 500 mg tablet, Take 1 tablet (500 mg total) by mouth 2 (two) times a day, Disp: 180 tablet, Rfl: 1    simvastatin (ZOCOR) 40 mg tablet, Take 1 tablet (40 mg total) by mouth daily, Disp: 90 tablet, Rfl: 1    tamsulosin (FLOMAX) 0 4 mg, Take 1 capsule by mouth, Disp: , Rfl:     traMADol (ULTRAM) 50 mg tablet, , Disp: , Rfl:       Betsy Wilkins    Scribe Attestation    I,:   Ramon Gibson am acting as a scribe while in the presence of the attending physician :        I,:   José Viramontes Paige Askew MD personally performed the services described in this documentation    as scribed in my presence :

## 2019-01-23 ENCOUNTER — APPOINTMENT (OUTPATIENT)
Dept: PREADMISSION TESTING | Facility: HOSPITAL | Age: 74
End: 2019-01-23
Payer: MEDICARE

## 2019-01-23 ENCOUNTER — ANESTHESIA EVENT (OUTPATIENT)
Dept: PERIOP | Facility: HOSPITAL | Age: 74
End: 2019-01-23
Payer: MEDICARE

## 2019-01-23 ENCOUNTER — HOSPITAL ENCOUNTER (OUTPATIENT)
Dept: NON INVASIVE DIAGNOSTICS | Facility: HOSPITAL | Age: 74
Discharge: HOME | End: 2019-01-23
Attending: ORTHOPAEDIC SURGERY
Payer: MEDICARE

## 2019-01-23 DIAGNOSIS — Z01.818 PREOP EXAMINATION: ICD-10-CM

## 2019-01-23 PROCEDURE — 93005 ELECTROCARDIOGRAM TRACING: CPT

## 2019-01-23 RX ORDER — SODIUM CHLORIDE 9 MG/ML
125 INJECTION, SOLUTION INTRAVENOUS CONTINUOUS
Status: CANCELLED | OUTPATIENT
Start: 2019-02-04

## 2019-01-23 RX ORDER — COVID-19 ANTIGEN TEST
440 KIT MISCELLANEOUS AS NEEDED
COMMUNITY
End: 2019-02-04 | Stop reason: HOSPADM

## 2019-01-23 NOTE — ANESTHESIA PREPROCEDURE EVALUATION
Review of Systems/Medical History  Patient summary reviewed  Chart reviewed      Cardiovascular  Exercise tolerance (METS): >4,  Hyperlipidemia,    Pulmonary  Negative pulmonary ROS        GI/Hepatic            Endo/Other  Diabetes type 2 Oral agent,      GYN  Negative gynecology ROS          Hematology  Negative hematology ROS      Musculoskeletal    Arthritis     Neurology    Headaches,    Psychology   Negative psychology ROS              Physical Exam    Airway    Mallampati score: II  TM Distance: <3 FB  Neck ROM: full     Dental       Cardiovascular  Rhythm: regular, Rate: normal,     Pulmonary  Breath sounds clear to auscultation,     Other Findings        Anesthesia Plan  ASA Score- 2     Anesthesia Type- general with ASA Monitors  Additional Monitors:   Airway Plan:     Comment: No Block preop/  Will consider post op if needed  Plan Factors- Patient instructed to abstain from smoking on day of procedure  Patient did not smoke on day of surgery  Induction- intravenous  Postoperative Plan- Plan for postoperative opioid use  Informed Consent- Anesthetic plan and risks discussed with patient

## 2019-01-23 NOTE — PRE-PROCEDURE INSTRUCTIONS
Pre-Surgery Instructions:   Medication Instructions    Cholecalciferol (VITAMIN D-3 PO) Patient was instructed by Physician and understands   EPINEPHrine (EPIPEN 2-MOIZ) 0 3 mg/0 3 mL SOAJ Patient was instructed by Physician and understands   metFORMIN (GLUCOPHAGE) 500 mg tablet Patient was instructed by Physician and understands   Naproxen Sodium (ALEVE) 220 MG CAPS Patient was instructed by Physician and understands   simvastatin (ZOCOR) 40 mg tablet Patient was instructed by Physician and understands   tamsulosin (FLOMAX) 0 4 mg Patient was instructed by Physician and understands   traMADol (ULTRAM) 50 mg tablet Patient was instructed by Physician and understands  Seen by Dr Sanya Field and was told to stop NSAIDS and supplements one week preop and no medications are needed on morning of surgery  Instructed to let PCP, surgeon and PAT office know if becomes ill with fever/infection symptoms

## 2019-01-24 LAB
ATRIAL RATE: 56 BPM
P AXIS: 21 DEGREES
PR INTERVAL: 212 MS
QRS AXIS: -23 DEGREES
QRSD INTERVAL: 118 MS
QT INTERVAL: 426 MS
QTC INTERVAL: 411 MS
T WAVE AXIS: 26 DEGREES
VENTRICULAR RATE: 56 BPM

## 2019-01-24 PROCEDURE — 93010 ELECTROCARDIOGRAM REPORT: CPT | Performed by: INTERNAL MEDICINE

## 2019-02-04 ENCOUNTER — HOSPITAL ENCOUNTER (OUTPATIENT)
Facility: HOSPITAL | Age: 74
Setting detail: OUTPATIENT SURGERY
Discharge: HOME | End: 2019-02-04
Attending: ORTHOPAEDIC SURGERY | Admitting: ORTHOPAEDIC SURGERY
Payer: MEDICARE

## 2019-02-04 ENCOUNTER — ANESTHESIA (OUTPATIENT)
Dept: PERIOP | Facility: HOSPITAL | Age: 74
End: 2019-02-04
Payer: MEDICARE

## 2019-02-04 VITALS
OXYGEN SATURATION: 95 % | BODY MASS INDEX: 27.47 KG/M2 | RESPIRATION RATE: 16 BRPM | HEIGHT: 71 IN | SYSTOLIC BLOOD PRESSURE: 135 MMHG | TEMPERATURE: 98.2 F | HEART RATE: 72 BPM | DIASTOLIC BLOOD PRESSURE: 68 MMHG | WEIGHT: 196.2 LBS

## 2019-02-04 DIAGNOSIS — M75.111 INCOMPLETE TEAR OF RIGHT ROTATOR CUFF: Primary | ICD-10-CM

## 2019-02-04 LAB
GLUCOSE SERPL-MCNC: 145 MG/DL (ref 65–140)
GLUCOSE SERPL-MCNC: 146 MG/DL (ref 65–140)

## 2019-02-04 PROCEDURE — C1713 ANCHOR/SCREW BN/BN,TIS/BN: HCPCS | Performed by: ORTHOPAEDIC SURGERY

## 2019-02-04 PROCEDURE — 23430 REPAIR BICEPS TENDON: CPT | Performed by: PHYSICIAN ASSISTANT

## 2019-02-04 PROCEDURE — 29827 SHO ARTHRS SRG RT8TR CUF RPR: CPT | Performed by: PHYSICIAN ASSISTANT

## 2019-02-04 PROCEDURE — 82948 REAGENT STRIP/BLOOD GLUCOSE: CPT

## 2019-02-04 PROCEDURE — 29826 SHO ARTHRS SRG DECOMPRESSION: CPT | Performed by: ORTHOPAEDIC SURGERY

## 2019-02-04 PROCEDURE — 29827 SHO ARTHRS SRG RT8TR CUF RPR: CPT | Performed by: ORTHOPAEDIC SURGERY

## 2019-02-04 PROCEDURE — 29824 SHO ARTHRS SRG DSTL CLAVICLC: CPT | Performed by: PHYSICIAN ASSISTANT

## 2019-02-04 PROCEDURE — 29824 SHO ARTHRS SRG DSTL CLAVICLC: CPT | Performed by: ORTHOPAEDIC SURGERY

## 2019-02-04 PROCEDURE — 23430 REPAIR BICEPS TENDON: CPT | Performed by: ORTHOPAEDIC SURGERY

## 2019-02-04 PROCEDURE — 29826 SHO ARTHRS SRG DECOMPRESSION: CPT | Performed by: PHYSICIAN ASSISTANT

## 2019-02-04 DEVICE — ANCHOR SUT 4.5 X 14MM BCMPS CRKSCR FLLY THRD: Type: IMPLANTABLE DEVICE | Site: SHOULDER | Status: FUNCTIONAL

## 2019-02-04 DEVICE — ANCHOR SUT 4.75 X 19.1MM W/CLD EYELET SWIVELOCK STRL: Type: IMPLANTABLE DEVICE | Site: SHOULDER | Status: FUNCTIONAL

## 2019-02-04 DEVICE — SYSTEM IMPLANT DEL PROX TENODSIS REPAIR: Type: IMPLANTABLE DEVICE | Site: SHOULDER | Status: FUNCTIONAL

## 2019-02-04 RX ORDER — MIDAZOLAM HYDROCHLORIDE 1 MG/ML
INJECTION INTRAMUSCULAR; INTRAVENOUS AS NEEDED
Status: DISCONTINUED | OUTPATIENT
Start: 2019-02-04 | End: 2019-02-04 | Stop reason: SURG

## 2019-02-04 RX ORDER — SODIUM CHLORIDE 9 MG/ML
125 INJECTION, SOLUTION INTRAVENOUS CONTINUOUS
Status: DISCONTINUED | OUTPATIENT
Start: 2019-02-04 | End: 2019-02-04 | Stop reason: HOSPADM

## 2019-02-04 RX ORDER — PROMETHAZINE HYDROCHLORIDE 12.5 MG/1
12.5 TABLET ORAL EVERY 6 HOURS PRN
Qty: 30 TABLET | Refills: 0 | Status: SHIPPED | OUTPATIENT
Start: 2019-02-04 | End: 2019-05-31 | Stop reason: ALTCHOICE

## 2019-02-04 RX ORDER — OXYCODONE HYDROCHLORIDE AND ACETAMINOPHEN 5; 325 MG/1; MG/1
2 TABLET ORAL EVERY 4 HOURS PRN
Status: DISCONTINUED | OUTPATIENT
Start: 2019-02-04 | End: 2019-02-04 | Stop reason: HOSPADM

## 2019-02-04 RX ORDER — DEXAMETHASONE SODIUM PHOSPHATE 4 MG/ML
INJECTION, SOLUTION INTRA-ARTICULAR; INTRALESIONAL; INTRAMUSCULAR; INTRAVENOUS; SOFT TISSUE AS NEEDED
Status: DISCONTINUED | OUTPATIENT
Start: 2019-02-04 | End: 2019-02-04 | Stop reason: SURG

## 2019-02-04 RX ORDER — CEFAZOLIN SODIUM 2 G/50ML
2000 SOLUTION INTRAVENOUS ONCE
Status: COMPLETED | OUTPATIENT
Start: 2019-02-04 | End: 2019-02-04

## 2019-02-04 RX ORDER — ALBUTEROL SULFATE 2.5 MG/3ML
2.5 SOLUTION RESPIRATORY (INHALATION) ONCE AS NEEDED
Status: DISCONTINUED | OUTPATIENT
Start: 2019-02-04 | End: 2019-02-04 | Stop reason: HOSPADM

## 2019-02-04 RX ORDER — OXYCODONE HYDROCHLORIDE 5 MG/1
5 TABLET ORAL EVERY 4 HOURS PRN
Qty: 45 TABLET | Refills: 0 | Status: SHIPPED | OUTPATIENT
Start: 2019-02-04 | End: 2019-02-14

## 2019-02-04 RX ORDER — NAPROXEN 500 MG/1
500 TABLET ORAL 2 TIMES DAILY WITH MEALS
Qty: 60 TABLET | Refills: 0 | Status: SHIPPED | OUTPATIENT
Start: 2019-02-04 | End: 2019-05-31 | Stop reason: ALTCHOICE

## 2019-02-04 RX ORDER — NEOSTIGMINE METHYLSULFATE 1 MG/ML
INJECTION INTRAVENOUS AS NEEDED
Status: DISCONTINUED | OUTPATIENT
Start: 2019-02-04 | End: 2019-02-04 | Stop reason: SURG

## 2019-02-04 RX ORDER — ROCURONIUM BROMIDE 10 MG/ML
INJECTION, SOLUTION INTRAVENOUS AS NEEDED
Status: DISCONTINUED | OUTPATIENT
Start: 2019-02-04 | End: 2019-02-04 | Stop reason: SURG

## 2019-02-04 RX ORDER — FENTANYL CITRATE 50 UG/ML
INJECTION, SOLUTION INTRAMUSCULAR; INTRAVENOUS AS NEEDED
Status: DISCONTINUED | OUTPATIENT
Start: 2019-02-04 | End: 2019-02-04 | Stop reason: SURG

## 2019-02-04 RX ORDER — ROPIVACAINE HYDROCHLORIDE 5 MG/ML
INJECTION, SOLUTION EPIDURAL; INFILTRATION; PERINEURAL AS NEEDED
Status: DISCONTINUED | OUTPATIENT
Start: 2019-02-04 | End: 2019-02-04 | Stop reason: SURG

## 2019-02-04 RX ORDER — ONDANSETRON 2 MG/ML
INJECTION INTRAMUSCULAR; INTRAVENOUS AS NEEDED
Status: DISCONTINUED | OUTPATIENT
Start: 2019-02-04 | End: 2019-02-04 | Stop reason: SURG

## 2019-02-04 RX ORDER — GLYCOPYRROLATE 0.2 MG/ML
INJECTION INTRAMUSCULAR; INTRAVENOUS AS NEEDED
Status: DISCONTINUED | OUTPATIENT
Start: 2019-02-04 | End: 2019-02-04 | Stop reason: SURG

## 2019-02-04 RX ORDER — ONDANSETRON 2 MG/ML
4 INJECTION INTRAMUSCULAR; INTRAVENOUS ONCE AS NEEDED
Status: DISCONTINUED | OUTPATIENT
Start: 2019-02-04 | End: 2019-02-04 | Stop reason: HOSPADM

## 2019-02-04 RX ORDER — EPHEDRINE SULFATE 50 MG/ML
INJECTION, SOLUTION INTRAVENOUS AS NEEDED
Status: DISCONTINUED | OUTPATIENT
Start: 2019-02-04 | End: 2019-02-04 | Stop reason: SURG

## 2019-02-04 RX ORDER — OXYCODONE HYDROCHLORIDE AND ACETAMINOPHEN 5; 325 MG/1; MG/1
1 TABLET ORAL EVERY 4 HOURS PRN
Status: DISCONTINUED | OUTPATIENT
Start: 2019-02-04 | End: 2019-02-04 | Stop reason: HOSPADM

## 2019-02-04 RX ORDER — FENTANYL CITRATE/PF 50 MCG/ML
25 SYRINGE (ML) INJECTION
Status: DISCONTINUED | OUTPATIENT
Start: 2019-02-04 | End: 2019-02-04 | Stop reason: HOSPADM

## 2019-02-04 RX ORDER — PROPOFOL 10 MG/ML
INJECTION, EMULSION INTRAVENOUS AS NEEDED
Status: DISCONTINUED | OUTPATIENT
Start: 2019-02-04 | End: 2019-02-04 | Stop reason: SURG

## 2019-02-04 RX ADMIN — ONDANSETRON 4 MG: 2 INJECTION INTRAMUSCULAR; INTRAVENOUS at 12:57

## 2019-02-04 RX ADMIN — EPHEDRINE SULFATE 10 MG: 50 INJECTION, SOLUTION INTRAMUSCULAR; INTRAVENOUS; SUBCUTANEOUS at 11:25

## 2019-02-04 RX ADMIN — SODIUM CHLORIDE: 0.9 INJECTION, SOLUTION INTRAVENOUS at 11:37

## 2019-02-04 RX ADMIN — FENTANYL CITRATE 100 MCG: 50 INJECTION, SOLUTION INTRAMUSCULAR; INTRAVENOUS at 10:09

## 2019-02-04 RX ADMIN — MIDAZOLAM 4 MG: 1 INJECTION INTRAMUSCULAR; INTRAVENOUS at 10:09

## 2019-02-04 RX ADMIN — GLYCOPYRROLATE 0.6 MG: 0.2 INJECTION, SOLUTION INTRAMUSCULAR; INTRAVENOUS at 13:04

## 2019-02-04 RX ADMIN — ROCURONIUM BROMIDE 40 MG: 10 INJECTION INTRAVENOUS at 11:13

## 2019-02-04 RX ADMIN — DEXAMETHASONE SODIUM PHOSPHATE 8 MG: 4 INJECTION, SOLUTION INTRAMUSCULAR; INTRAVENOUS at 11:20

## 2019-02-04 RX ADMIN — LIDOCAINE HYDROCHLORIDE 50 MG: 20 INJECTION, SOLUTION INTRAVENOUS at 11:13

## 2019-02-04 RX ADMIN — CEFAZOLIN SODIUM 2000 MG: 2 SOLUTION INTRAVENOUS at 11:25

## 2019-02-04 RX ADMIN — SODIUM CHLORIDE 125 ML/HR: 0.9 INJECTION, SOLUTION INTRAVENOUS at 08:46

## 2019-02-04 RX ADMIN — NEOSTIGMINE METHYLSULFATE 3 MG: 1 INJECTION INTRAVENOUS at 13:04

## 2019-02-04 RX ADMIN — PROPOFOL 40 MG: 10 INJECTION, EMULSION INTRAVENOUS at 12:53

## 2019-02-04 RX ADMIN — PROPOFOL 200 MG: 10 INJECTION, EMULSION INTRAVENOUS at 11:13

## 2019-02-04 RX ADMIN — SODIUM CHLORIDE 125 ML/HR: 0.9 INJECTION, SOLUTION INTRAVENOUS at 13:52

## 2019-02-04 RX ADMIN — ROPIVACAINE HYDROCHLORIDE 30 ML: 5 INJECTION, SOLUTION EPIDURAL; INFILTRATION; PERINEURAL at 10:14

## 2019-02-04 NOTE — ANESTHESIA PROCEDURE NOTES
Peripheral Block    Patient location during procedure: holding area  Start time: 2/4/2019 10:09 AM  Reason for block: at surgeon's request and post-op pain management  Staffing  Anesthesiologist: Anastacio Ware  Performed: anesthesiologist   Preanesthetic Checklist  Completed: patient identified, site marked, surgical consent, pre-op evaluation, timeout performed, IV checked, risks and benefits discussed and monitors and equipment checked  Peripheral Block  Patient position: supine  Prep: ChloraPrep  Patient monitoring: continuous pulse ox and frequent blood pressure checks  Block type: interscalene  Laterality: right  Injection technique: single-shot  Procedures: ultrasound guided and nerve stimulator  Local infiltration: ropivacaine  Infiltration strength: 0 5 %  Dose: 30 mL  Needle  Needle type: Stimuplex   Needle gauge: 22 G  Needle length: 5 cm  Needle localization: nerve stimulator, anatomical landmarks and ultrasound guidance  Assessment  Injection assessment: incremental injection, local visualized surrounding nerve on ultrasound and negative aspiration for heme  Paresthesia pain: none  Heart rate change: no  Slow fractionated injection: yes  Post-procedure:  site cleaned  patient tolerated the procedure well with no immediate complications

## 2019-02-04 NOTE — OP NOTE
OPERATIVE REPORT    PATIENT NAME: Colleen Weeks   :    MRN: [id number]  Pt Location: [facility]    SURGERY DATE: 2019    SURGEON(S) and ROLE:  Primary: Rivera Dumont MD  Assisting: Alvin Cazares PA-C; Lala Almeida PA-C    NOTE:  The presence of a physician assistant was necessary to help with patient positioning, surgical exposure, wound retraction, wound closure, and other key portions of the procedure  No qualified resident was available for this case  PREOPERATIVE DIAGNOSES:  Right Shoulder  Rotator Cuff Tear (supraspinatus)  Superior Labral Tear  AC Joint Arthrosis    POSTOPERATIVE DIAGNOSES:  Right Shoulder  Same as Preoperative Diagnoses    PROCEDURES:  Right Shoulder Arthroscopy with:  Rotator Cuff Repair (supraspinatus)  Subacromial Decompression  Distal Clavicle Excision  Open Subpectoral Biceps Tenodesis      ANESTHESIA TYPE:  General endotracheal and Interscalene block    ANESTHESIA STAFF:   Anesthesiologist: Vicky Pacheco DO  CRNA: Jose Rafael Baez CRNA    ESTIMATED BLOOD LOSS:  4 mL    PERIOPERATIVE ANTIBIOTICS:  cefazolin, 2 grams    IMPLANTS:  Arthrex Corkscrew 4 5 mm (x1)     Swivelock 4 75 mm (x2)     LHB Tenodesis Button      Implant Name Type Inv  Item Serial No   Lot No  LRB No  Used Action   ANCHOR SUT 4 5 X 14MM BCMPS CRKSCR FLLY THRD - [id number]  ANCHOR SUT 4 5 X 14MM BCMPS CRKSCR FLLY THRD  [employer] [id number] Right 1 Implanted   ANCHOR SUT 4 75 X 19  1MM CLS EYELET Adah Pink - [id number]  ANCHOR SUT 4 75 X 19  1MM CLS EYELET Adah [place]  [employer] [id number] Right 1 Implanted   ANCHOR SUT 4 75 X 19  1MM CLS EYELET Adah [place] - [id number]  ANCHOR SUT 4 75 X 19  1MM CLS EYELET Adah [place]  [employer] [id number] Right 1 Implanted   SYSTEM IMPLANT DEL PROX TENODSIS REPAIR - [id number]  SYSTEM IMPLANT DEL PROX TENODSIS REPAIR  [address] [id number] Right 1 Implanted       SPECIMENS:  * No specimens in log *      OPERATIVE INDICATIONS:  The patient is a 68 y o  male with right shoulder pain and a rotator cuff tear  Surgical treatment was indicated due to persistent symptoms despite non-surgical treatment  After a thorough discussion of the potential risks, benefits, and alternative treatments, the patient agreed to proceed with surgery  The patient understands that the risks of surgery include, but are not limited to: failure of repair, infection, neurovascular injury, wound healing complications, venous thromboembolism, persistent pain, stiffness, instability, recurrence of symptoms, potential need for additional surgeries, and loss of limb or life  Oral and written consent for surgery was obtained from the patient preoperatively  EXAM UNDER ANESTHESIA:  Operative shoulder:   FE-170   ER-80   AER-90   AIR-80    PROCEDURE AND TECHNIQUE:  On the day of surgery, the patient was identified in the preoperative holding area  The operative site was marked by the surgeon  The patient was taken into the operating room  A time-out was conducted to confirm the patient's identity, the operative site, and the proposed procedure  The patient was anesthetized, and perioperative antibiotics were administered  The patient was positioned beach chair on the OSI frame  All bony prominences were padded  The operative site was prepped and draped using standard sterile technique  A posterior portal was established, and the arthroscope was placed into the glenohumeral joint  An anterosuperior portal was established through the rotator interval   Diagnostic arthroscopy was performed  The glenohumeral joint demonstrated moderate synovitis which was debrided with a motorized shaver  The glenoid demonstrated pristine articular cartilage  The humeral head demonstrated pristine articular cartilage  The long head of the biceps tendon had  mild inflammation    The superior labrum had a Type 2 degenerative tear from 10 o'clock posterosuperior to 1 o'clock anterosuperior  The biceps tendon was released from the superior labrum  The superior labrum was debrided with a motorized shaver to remove all unstable tissue  An open subpectoral biceps tenodesis was performed  An incision was made just lateral to the axillary crease centered over the inferior border of the pectoralis major tendon  The biceps tendon was identified in the biciptial groove and delivered into the wound  The tendon was stitiched with a FiberLoop suture  The biciptial groove was prepared with a rasp, and the tenodesis was secured with a unicortical biceps button  The repair demonstrated appropriate soft tissue tension and excellent stability through a full elbow range of motion  The posterior capsulolabral complex  was intact  The anterior capsulolabral complex was intact       The subscapularis tendon was intact       The posterosuperior rotator cuff had a partial-thickness articular side tear > 50% supraspinatus measuring 1 cm anterior to posterior  The subacromial bursa was inflamed  A thorough subacromial bursectomy was performed through a lateral portal   The rotator cuff was debrided to remove the torn and degenerative tissue  The greater tuberosity was prepared to a bleeding bone bed with a humaira  The rotator cuff was repaired with a double row suture bridge construct using one 4 5mm Corkscrew (Arthrex) and two 4 75mm Swivelock (Arthrex) placed in the greater tuberosity  The tendon was fixed securely to its footprint without undue tension, and the repair was stable with gentle shoulder rotation  There was a Type 2 acromion, and acromioplasty was performed  The [facility] joint was identified medially, and a distal clavicle excision was performed  Approximately 8-10 mm of distal clavicle was removed with a humaira, taking care to preserve the posterior and superior AC ligaments  At the conclusion of the procedure, the instruments were withdrawn    The portals and incisions were closed with absorbable sutures and steri-strips  A sterile dressing was applied  The surgical drapes were removed  The operative arm was immobilized in a sling with abduction pillow  The patient was awakened from anesthesia and transported to the PACU in stable condition        COMPLICATIONS:  None    PATIENT DISPOSITION:  PACU       SIGNATURE:  Rivera Dumont MD  DATE:  February 4, 2019  TIME:  1:05 PM

## 2019-02-04 NOTE — DISCHARGE INSTRUCTIONS
POSTOPERATIVE INSTRUCTIONS following SHOULDER SURGERY    MEDICATIONS:  · Resume all home medications unless otherwise instructed by your surgeon  · Pain Medication:  Oxycodone 5 mg, 1-3 tablets every 3 hours as needed  · If you were given a regional anesthetic (nerve block), please begin taking the pain medication as soon as you get home, even if you have minimal or no pain  DO NOT WAIT FOR THE NERVE BLOCK TO WEAR OFF  · Possible side effects include nausea, constipation, and urinary retention  If you experience these side effects, please call our office for assistance  · Pain med refills are authorized only during office hours (8am-4pm, Mon-Fri)  · Anti-Inflammatory:  Naproxen 500 mg, 1 tablet every 12 hours for 4 weeks  · TAKE WITH FOOD  Stop if you experience nausea, reflux, or stomach pain  · Nausea Medication:  Promethazine 12 5 mg, 1 tablet every 6 hours as needed  · Fill prescription ONLY if you expericnce severe nausea  WOUND CARE:  · Keep the dressing clean and dry  Light drainage may occur the first 2 days postop  · You may remove the dressings and get the incision wet in the shower 72 hours after surgery  DO NOT remove steri-strips or sutures  DO NOT immerse the incision under water  Carefully pat the incision dry  If there is wound drainage, re-apply a fresh dry gauze dressing  · Please call our office ([phone]) if you experience either of the following:  · Sudden increase in swelling, redness, or warmth at the surgical site  · Excessive incisional drainage that persists beyond the 3rd day after surgery  · Oral temperature greater than 101 degrees, not relieved with Tylenol  · Shortness of breath, chest pain, nausea, or any other concerning symptoms    SWELLING CONTROL:  · Cold Therapy: The cold therapy device may be used either continuously or only as needed, according to your preference  Do not let the pad directly touch your skin    Alternatively, apply ice (20 min on, 20 min off) as often as you feel is necessary  SLING:  · Wear your sling AT ALL TIMES (including sleep) until your first postoperative office visit  You may remove the sling for showering but must keep your arm at your side  ACTIVITY:   · DO NOT lift, carry, push, or pull anything with your operative arm  · Shoulder:  DO NOT actively (on your own) raise your operative arm away from the side of you body or rotate it out away from your body unless instructed by your surgeon or physical therapist   · Place a pillow behind the elbow while lying down  · Sleeping in a more upright position (recliner) may be more comfortable initially  · Wrist / Finger Motion:  With the sling on, move your wrist and fingers through a full range of motion 20 times per hour while awake  PHYSICAL THERAPY:  · Begin therapy 5 TO 7 DAYS AFTER SURGERY  You were given a prescription for therapy at your preoperative office visit  If you do not have physical therapy scheduled yet, please call our office for assistance  FOLLOW-UP APPOINTMENT:  · 4-5 days after surgery with:    Dr Zenobia De La O Thompson, [facility]  [address] [place] [address], [address] [place] [address], [facility], [address]  [phone] ([facility] [address])  [phone] (After Hours)

## 2019-02-04 NOTE — H&P (VIEW-ONLY)
Orthopaedic Surgery - Office Note  Bobbi Gomez (54 y o  male)   : 1945   MRN: [id number]  Encounter Date: 1/15/2019    Chief Complaint   Patient presents with    Right Shoulder - Follow-up       Assessment / Plan  Right near full-thickness, articular surface insertional tear of posterior supraspinatus    · The diagnosis and treatment options were reviewed  · The patient wishes to proceed with right shoulder arthroscopy with rotator cuff repair and possible biceps tenodesis   · The risks, benefits, and alternatives were discussed  · Written consent was obtained  · Post op therapy was ordered today   · Patient was fit with a post op sling today   Return for Recheck after sx  History of Present Illness  Bobbi Gomez is a 68 y o  male who presents Right near full-thickness, articular surface insertional tear of posterior supraspinatus  Patient states that the pain in his right shoulder has returned  He did receive a CSI on 10/2/18 into his subacromial bursa which did provide him with partial relief  Patient states that when he reaches to grab something or driving causes him the most pain  He describes a pain to the lateral aspect of his right shoulder  He states that the pain in his right shoulder is waking him up nightly  He does take tramadol for his pain however this medication tends to keep him awake  Patient denies numbness and tingling  Review of Systems  Pertinent items are noted in HPI  All other systems were reviewed and are negative  Physical Exam  /82 Comment: manual  Pulse 59   Ht 5' 11" (1 803 m)   Wt 88 5 kg (195 lb 3 2 oz)   BMI 27 22 kg/m²   Cons: Appears well  No apparent distress  Psych: Alert  Oriented x3  Mood and affect normal   Eyes: PERRLA, EOMI  Resp: Normal effort  No audible wheezing or stridor  CV: Palpable pulse  No discernable arrhythmia  No LE edema  Lymph:  No palpable cervical, axillary, or inguinal lymphadenopathy  Skin: Warm  No palpable masses  No visible lesions  Neuro: Normal muscle tone  Normal and symmetric DTR's  Right Shoulder Exam  Alignment / Posture:  Normal shoulder posture  Inspection:  No swelling  Palpation:  subacromial bursa  tenderness  ROM:  Shoulder   Shoulder ER neutral  Shoulder IR T12  Strength:  Supraspinatus 4+/5  Infraspinatus 4/5  Subscapularis 5/5  Stability:  Not tested  Tests: (+) Alfonso  (+) Speed  (-) Drop arm  (-) Belly press  Neurovascular:  Sensation intact in Ax/R/M/U nerve distributions  2+ radial pulse  Studies Reviewed  No studies to review    Procedures  No procedures today  Medical, Surgical, Family, and Social History  The patient's medical history, family history, and social history, were reviewed and updated as appropriate      Past Medical History:   Diagnosis Date    Allergic dermatitis due to poison ivy     last assessed: 7/28/2015    Allergic reaction     last assessed: 5/26/2015    Arthritis     right knee    Bee sting allergy     last assessed: 6/30/2015    BPH (benign prostatic hypertrophy)     Cancer (HCC)     kidney    [facility] )     Type II, NIDDM    Enlarged prostate with lower urinary tract symptoms (LUTS)     last assessed: 8/17/2015    Epistaxis     last assessed: 6/20/2016    Herpes zoster     last assessed: 11/21/2013    History of colon polyps     Hyperlipidemia     Leukocytosis     last assessed: 11/18/2014    Osteoarthrosis, localized, primary, knee     last assessed: 10/25/2016    Renal mass     last assessed: 5/19/2017    Seasonal allergies     Wears glasses     Wears partial dentures     Upper and Lower       Past Surgical History:   Procedure Laterality Date    CHOLECYSTECTOMY      Open    COLONOSCOPY      GANGLION CYST EXCISION Left     Left wrist    JOINT REPLACEMENT Left 08/2015    Left TKR    KIDNEY SURGERY      NEPHRECTOMY  05/08/2017    MI LAP,PARTIAL NEPHRECTOMY Right 5/8/2017    Procedure: LAPAROSCOPIC LYSIS OF ADHESIONS, ROBOTIC PARTIAL NEPHRECTOMY, WITH INTRA-OP LAPAROSCOPIC ULTRASOUND;  Surgeon: Joey Diaz MD;  [facility];  Service: Urology    WI REPAIR INTERCARP/CARP-METACARP JT Left 12/5/2016    Procedure: ARTHROPLASTY THUMB [facility] ;  Surgeon: David Lazcano MD;  [facility];  Service: Orthopedics    WI REVISE MEDIAN N/CARPAL TUNNEL SURG Left 12/5/2016    Procedure: RELEASE CARPAL TUNNEL;  Surgeon: David Lazcano MD;  [facility];  Service: Orthopedics    WI TOTAL KNEE ARTHROPLASTY Right 9/11/2017    Procedure: ARTHROPLASTY KNEE TOTAL;  Surgeon: David Lazcano MD;  [facility];  Service: Orthopedics    TONSILLECTOMY AND ADENOIDECTOMY         Family History   Problem Relation Age of Onset    Cancer Mother     Stroke Father        Social History     Occupational History    Not on file  Social History Main Topics    Smoking status: Former Smoker     Packs/day: 0 25     Types: Cigarettes    Smokeless tobacco: Never Used      Comment: Smoked from ages 16 to 25      Alcohol use Yes      Comment: 1-2 x year    Drug use: No    Sexual activity: Not on file       Allergies   Allergen Reactions    Other Other (See Comments)     Bee stings - redness           Current Outpatient Prescriptions:     Cholecalciferol (VITAMIN D-3 PO), Take 2,000 Units by mouth daily, Disp: , Rfl:     EPINEPHrine (EPIPEN 2-MOIZ) 0 3 mg/0 3 mL SOAJ, Inject as directed, Disp: , Rfl:     metFORMIN (GLUCOPHAGE) 500 mg tablet, Take 1 tablet (500 mg total) by mouth 2 (two) times a day, Disp: 180 tablet, Rfl: 1    simvastatin (ZOCOR) 40 mg tablet, Take 1 tablet (40 mg total) by mouth daily, Disp: 90 tablet, Rfl: 1    tamsulosin (FLOMAX) 0 4 mg, Take 1 capsule by mouth, Disp: , Rfl:     traMADol (ULTRAM) 50 mg tablet, , Disp: , Rfl:       Betsy Wilkins    Scribe Attestation    I,:   Jefry Winters am acting as a scribe while in the presence of the attending physician :        I,:   Nancy Real Tip Solomon MD personally performed the services described in this documentation    as scribed in my presence :

## 2019-02-08 ENCOUNTER — OFFICE VISIT (OUTPATIENT)
Dept: OBGYN CLINIC | Facility: MEDICAL CENTER | Age: 74
End: 2019-02-08

## 2019-02-08 VITALS
HEIGHT: 71 IN | SYSTOLIC BLOOD PRESSURE: 122 MMHG | WEIGHT: 195 LBS | BODY MASS INDEX: 27.3 KG/M2 | HEART RATE: 62 BPM | DIASTOLIC BLOOD PRESSURE: 75 MMHG

## 2019-02-08 DIAGNOSIS — Z98.890 STATUS POST ARTHROSCOPY OF RIGHT SHOULDER: ICD-10-CM

## 2019-02-08 DIAGNOSIS — M75.111 INCOMPLETE TEAR OF RIGHT ROTATOR CUFF: Primary | ICD-10-CM

## 2019-02-08 PROCEDURE — 99024 POSTOP FOLLOW-UP VISIT: CPT | Performed by: ORTHOPAEDIC SURGERY

## 2019-02-08 NOTE — PROGRESS NOTES
Orthopaedic Surgery - Office Note  Diya Fields (32 y o  male)   : 1945   MRN: [id number]  Encounter Date: 2019    Chief Complaint   Patient presents with    Right Shoulder - Post-op       Assessment / Plan  Status post right shoulder arthroscopy with rotator cuff repair, subacromial decompression, distal clavicle excision and open subpectoral biceps tenodesis on 2019  · Start physical therapy following accelerated rotator cuff repair protocol which was provided to the patient at today's visit  · Continue with ice and analgesics as needed  · Continue with sling and lifting restrictions as instructed  Return in about 4 weeks (around 3/8/2019)  History of Present Illness  Diya Fields is a 68 y o  male who presents status post right shoulder arthroscopy with rotator cuff repair, subacromial decompression, distal clavicle excision and open subpectoral biceps tenodesis on 2019  Patient is doing well at this time he has been compliant with the sling and lifting restrictions up to this point  He is scheduled for outpatient physical therapy to start on 2019  He does note to have numbness on the medial aspect of his right thumb since surgery but full movement and no numbness anywhere else  He is denying any drainage or erythema from the incisions and his pain has been well controlled with ice and postop analgesics  Review of Systems  Pertinent items are noted in HPI  All other systems were reviewed and are negative  Physical Exam  /75   Pulse 62   Ht 5' 11" (1 803 m)   Wt 88 5 kg (195 lb)   BMI 27 20 kg/m²   Cons: Appears well  No apparent distress  Psych: Alert  Oriented x3  Mood and affect normal   Eyes: PERRLA, EOMI  Resp: Normal effort  No audible wheezing or stridor  CV: Palpable pulse  No discernable arrhythmia  No LE edema  Lymph:  No palpable cervical, axillary, or inguinal lymphadenopathy  Skin: Warm  No palpable masses    No visible lesions  Neuro: Normal muscle tone  Normal and symmetric DTR's  Right Shoulder Exam  Alignment / Posture:  Normal shoulder posture  Normal scapular position  Inspection:  Mild as expected swelling  No erythema  Incisions clean and dry  Palpation:  Mild tenderness at Mallika-incisional area  ROM:  Normal elbow ROM  Normal wrist ROM  Normal finger ROM  Strength:  Not tested  Stability:  Not tested  Tests: No pertinent positive or negative tests  Neurovascular:  Patient does have numbness on the medial surface of his right thumb only all other nerve distributions out without numbness or tingling  Fingers warm and perfused  Studies Reviewed  No studies to review    Procedures  No procedures today  Medical, Surgical, Family, and Social History  The patient's medical history, family history, and social history, were reviewed and updated as appropriate      Past Medical History:   Diagnosis Date    Allergic reaction     last assessed: 5/26/2015    Arthritis     right knee    Bee sting allergy     last assessed: 6/30/2015    BPH (benign prostatic hypertrophy)     Cancer ([facility] ) 2017    Right kidney    Diabetes mellitus ([facility] )     Type II, NIDDM    Enlarged prostate with lower urinary tract symptoms (LUTS)     last assessed: 8/17/2015    Herpes zoster     last assessed: 11/21/2013    History of colon polyps     Hyperlipidemia     Leukocytosis     last assessed: 11/18/2014    Osteoarthrosis, localized, primary, knee     last assessed: 10/25/2016    Seasonal allergies     Shoulder pain, right     Wears glasses     Wears partial dentures     Upper and Lower       Past Surgical History:   Procedure Laterality Date    CARPAL TUNNEL RELEASE Left     CHOLECYSTECTOMY      Open    COLONOSCOPY      GANGLION CYST EXCISION Left     Left wrist    JOINT REPLACEMENT Left 08/2015    Left TKR    JOINT REPLACEMENT Right 09/2017    Right TKR    NEPHRECTOMY  05/08/2017    Partial right nephrectomy    MN LAP,PARTIAL NEPHRECTOMY Right 5/8/2017    Procedure: LAPAROSCOPIC LYSIS OF ADHESIONS, ROBOTIC PARTIAL NEPHRECTOMY, WITH INTRA-OP LAPAROSCOPIC ULTRASOUND;  Surgeon: Lenny Erickson MD;  [facility];  Service: Urology    MN REPAIR BICEPS LONG TENDON  2/4/2019    Procedure: TENODESIS BICEPS OPEN PROXIMAL;  Surgeon: Shen Ramirez MD;  [facility];  Service: Orthopedics    MN REPAIR INTERCARP/CARP-METACARP JT Left 12/5/2016    Procedure: ARTHROPLASTY THUMB [facility] ;  Surgeon: Shen Ramirez MD;  [facility];  Service: Orthopedics    MN REVISE MEDIAN N/CARPAL TUNNEL SURG Left 12/5/2016    Procedure: RELEASE CARPAL TUNNEL;  Surgeon: Shen Rmairez MD;  [facility];  Service: Orthopedics    MN SHLDR ARTHROSCOP,SURG,W/ROTAT CUFF REPR Right 2/4/2019    Procedure: REPAIR ROTATOR CUFF ARTHROSCOPIC; SUBACROMIAL DECOMPRESSION; DISTAL CLAVICLE EXCISION;  Surgeon: Shen Ramirez MD;  [facility];  Service: Orthopedics    MN TOTAL KNEE ARTHROPLASTY Right 9/11/2017    Procedure: ARTHROPLASTY KNEE TOTAL;  Surgeon: Shen Ramirez MD;  [facility];  Service: Orthopedics    TONSILLECTOMY AND ADENOIDECTOMY         Family History   Problem Relation Age of Onset    Cancer Mother     Stroke Father        Social History     Occupational History    Not on file  Social History Main Topics    Smoking status: Former Smoker     Packs/day: 0 25     Types: Cigarettes    Smokeless tobacco: Never Used      Comment: Smoked from ages 16 to 25      Alcohol use Yes      Comment: 1-2 x year    Drug use: No    Sexual activity: Not on file       Allergies   Allergen Reactions    Bee Venom Edema         Current Outpatient Prescriptions:     naproxen (NAPROSYN) 500 mg tablet, Take 1 tablet (500 mg total) by mouth 2 (two) times a day with meals, Disp: 60 tablet, Rfl: 0    oxyCODONE (ROXICODONE) 5 mg immediate release tablet, Take 1 tablet (5 mg total) by mouth every 4 (four) hours as needed for moderate pain for up to 10 days Max Daily Amount: 30 mg, Disp: 45 tablet, Rfl: 0    Cholecalciferol (VITAMIN D-3 PO), Take 2,000 Units by mouth daily, Disp: , Rfl:     EPINEPHrine (EPIPEN 2-MOIZ) 0 3 mg/0 3 mL SOAJ, Inject as directed, Disp: , Rfl:     metFORMIN (GLUCOPHAGE) 500 mg tablet, Take 1 tablet (500 mg total) by mouth 2 (two) times a day, Disp: 180 tablet, Rfl: 1    promethazine (PHENERGAN) 12 5 MG tablet, Take 1 tablet (12 5 mg total) by mouth every 6 (six) hours as needed for nausea or vomiting (Patient not taking: Reported on 2/8/2019 ), Disp: 30 tablet, Rfl: 0    simvastatin (ZOCOR) 40 mg tablet, Take 1 tablet (40 mg total) by mouth daily, Disp: 90 tablet, Rfl: 1    tamsulosin (FLOMAX) 0 4 mg, Take 1 capsule by mouth daily with dinner  , Disp: , Rfl:       Lala Almeida PA-C    Scribe Attestation    I,:    am acting as a scribe while in the presence of the attending physician :        I,:    personally performed the services described in this documentation    as scribed in my presence :

## 2019-02-11 ENCOUNTER — EVALUATION (OUTPATIENT)
Dept: PHYSICAL THERAPY | Facility: MEDICAL CENTER | Age: 74
End: 2019-02-11
Payer: MEDICARE

## 2019-02-11 DIAGNOSIS — Z47.89 ENCOUNTER FOR OTHER ORTHOPEDIC AFTERCARE: ICD-10-CM

## 2019-02-11 DIAGNOSIS — M75.111 INCOMPLETE TEAR OF RIGHT ROTATOR CUFF: Primary | ICD-10-CM

## 2019-02-11 PROCEDURE — 97161 PT EVAL LOW COMPLEX 20 MIN: CPT | Performed by: PHYSICAL THERAPIST

## 2019-02-11 NOTE — PROGRESS NOTES
PT Evaluation     Today's date: 2019  Patient name: Colleen Weeks  :   MRN: [id number]  Referring provider: Sobia Peterson MD  Dx:   Encounter Diagnosis     ICD-10-CM    1  Incomplete tear of right rotator cuff M75 111 Ambulatory referral to Physical Therapy   2  Encounter for other orthopedic aftercare Z47 89                   Assessment  Assessment details: Colleen Weeks is a 68 y o  male was evaluated on 2019  for Incomplete tear of right rotator cuff  (primary encounter diagnosis)  Encounter for other orthopedic aftercare  Colleen Weeks has the above listed impairments resulting in functional deficits and negative impact to quality of life  Patient is appropriate for skilled PT intervention to promote maximal return to function and patient specific goals  Patient agrees with outlined treatment plan and all questions were answered to their satisfaction  Impairments: abnormal muscle firing, abnormal muscle tone, abnormal or restricted ROM, impaired physical strength, lacks appropriate home exercise program and pain with function  Understanding of Dx/Px/POC: good   Prognosis: good    Goals  Patient will successfully transition to home exercise program   Patient will be able to manage symptoms independently      Patient will maximize passive shoulder ROM by 12 weeks  Patient will return to yardwork without limitation in 16 weeks  Patient will dress self without limitation in 12 weeks       Plan  Patient would benefit from: skilled PT  Referral necessary: No  Planned modality interventions: thermotherapy: hydrocollator packs  Planned therapy interventions: home exercise program, manual therapy, neuromuscular re-education, patient education, functional ROM exercises, strengthening, stretching, joint mobilization, graded activity, graded exercise, therapeutic exercise, body mechanics training, motor coordination training and activity modification  Frequency: 2x week  Duration in weeks: 12  Treatment plan discussed with: patient        Subjective Evaluation    History of Present Illness  Date of surgery: 2019  Mechanism of injury: Maria Guadalupe Aldrich is 68 y o  male presenting to therapy s/p R RTC repair on   He reports pain is well controlled post operatively, notes that he is sleeping fairly well  Has been compliant with sling and ready to begin outpatient PT  He is right hand dominant and is hoping to return to daily activity including yardwork, painting, dressing, bathing  Denies any fever, chills, shortness of breath  Incisions are clean and dry  Pain  Current pain ratin  At best pain ratin  At worst pain ratin  Quality: dull ache    Patient Goals  Patient goals for therapy: decreased pain, independence with ADLs/IADLs, return to sport/leisure activities and increased strength          Objective   Red flag screen (-)  Dermatomes:  WNL - notes some thumb numbness that was present upon waking from surgery, feels sling may have been compressing  Shoulder AROM: Full on left, not tested on R   Shoulder PROM:  Flexion 90 deg,  ER 25 deg, IR NT      Flowsheet Rows      Most Recent Value   PT/OT G-Codes   Current Score  32   Projected Score  69          Precautions: Protocol for accelerated with bicep tenodesis (no active flexion)     Daily Treatment Diary     Manual              PROM within limits  AF                                                                    Exercise Diary              Pendulums              UT stretch             Scapular retractions             Elbow PROM                                                                                                                                                                                                                                  Modalities

## 2019-02-13 ENCOUNTER — APPOINTMENT (OUTPATIENT)
Dept: PHYSICAL THERAPY | Facility: MEDICAL CENTER | Age: 74
End: 2019-02-13
Payer: MEDICARE

## 2019-02-15 ENCOUNTER — OFFICE VISIT (OUTPATIENT)
Dept: PHYSICAL THERAPY | Facility: MEDICAL CENTER | Age: 74
End: 2019-02-15
Payer: MEDICARE

## 2019-02-15 DIAGNOSIS — Z47.89 ENCOUNTER FOR OTHER ORTHOPEDIC AFTERCARE: ICD-10-CM

## 2019-02-15 DIAGNOSIS — M75.111 INCOMPLETE TEAR OF RIGHT ROTATOR CUFF: Primary | ICD-10-CM

## 2019-02-15 PROCEDURE — 97140 MANUAL THERAPY 1/> REGIONS: CPT

## 2019-02-15 NOTE — PROGRESS NOTES
Daily Note     Today's date: 2/15/2019  Patient name: Vitaly Sanabria  : [birth year]  MRN: [id number]  Referring provider: Esteban Gonzalez MD  Dx:   Encounter Diagnosis     ICD-10-CM    1  Incomplete tear of right rotator cuff M75 111    2  Encounter for other orthopedic aftercare Z47 89                   Subjective: Pt stated his R shldr was feeling "pretty good" today prior to start of session with no pain  Reports if he accidentally bumps into something or forgets he shouldn't be using his arm, he gets a sharp pain  Objective: See treatment diary below  Precautions: Protocol for accelerated with bicep tenodesis (no active flexion)     Daily Treatment Diary     Manual  2/11 2/15           PROM within limits  AF AFB                                                                   Exercise Diary              Pendulums   cc/ccw,  A/P,  M/L  20x ea           UT stretch  30"x3           Scapular retractions  3"x20           Elbow PROM   AFB                                                                                                                                                                                                                               Modalities                                                         Assessment: Tolerated treatment well  Was able to perform all exercise with no increase of pain  PROM performed within limits dictated by referring physician and overseeing PT  Occasional verbal cues needed for proper form with exercises  Reminder needed to use motion of body to move arm during pendulums  Was able to self correct once cues received  Patient would benefit from continued PT to improve available ROM and limit pain  Plan: Continue per plan of care

## 2019-02-18 ENCOUNTER — OFFICE VISIT (OUTPATIENT)
Dept: PHYSICAL THERAPY | Facility: MEDICAL CENTER | Age: 74
End: 2019-02-18
Payer: MEDICARE

## 2019-02-18 DIAGNOSIS — M75.111 INCOMPLETE TEAR OF RIGHT ROTATOR CUFF: Primary | ICD-10-CM

## 2019-02-18 DIAGNOSIS — Z47.89 ENCOUNTER FOR OTHER ORTHOPEDIC AFTERCARE: ICD-10-CM

## 2019-02-18 PROCEDURE — 97140 MANUAL THERAPY 1/> REGIONS: CPT | Performed by: PHYSICAL THERAPIST

## 2019-02-18 NOTE — PROGRESS NOTES
Daily Note     Today's date: 2019  Patient name: Yordy Adler  :   MRN: [id number]  Referring provider: Yonis Vasquez MD  Dx:   Encounter Diagnosis     ICD-10-CM    1  Incomplete tear of right rotator cuff M75 111    2  Encounter for other orthopedic aftercare Z47 89                   Subjective: Juanita Mosqueda reports that he is doing well, no new issues        Objective: See treatment diary below  Precautions: Protocol for accelerated with bicep tenodesis (no active flexion)     Daily Treatment Diary     Manual  2/11 2/15 2/18          PROM within limits  AF AFB AF                                                                  Exercise Diary              Pendulums   cc/ccw,  A/P,  M/L  20x ea home          UT stretch  30"x3           Scapular retractions  3"x20           Elbow PROM   AFB                                                                                                                                                                                                                               Modalities                                                         Assessment: Tolerated treatment well  Patient performed manual only given time constraints  Able to achieve PROM at  goals by end of session  Progress as able      Plan: Continue per plan of care

## 2019-02-20 ENCOUNTER — OFFICE VISIT (OUTPATIENT)
Dept: PHYSICAL THERAPY | Facility: MEDICAL CENTER | Age: 74
End: 2019-02-20
Payer: MEDICARE

## 2019-02-20 DIAGNOSIS — Z47.89 ENCOUNTER FOR OTHER ORTHOPEDIC AFTERCARE: ICD-10-CM

## 2019-02-20 DIAGNOSIS — M75.111 INCOMPLETE TEAR OF RIGHT ROTATOR CUFF: Primary | ICD-10-CM

## 2019-02-20 PROCEDURE — 97140 MANUAL THERAPY 1/> REGIONS: CPT

## 2019-02-20 NOTE — PROGRESS NOTES
Daily Note     Today's date: 2019  Patient name: Brianna Lopez  :   MRN: [id number]  Referring provider: Mercedes Rivas MD  Dx:   Encounter Diagnosis     ICD-10-CM    1  Incomplete tear of right rotator cuff M75 111    2  Encounter for other orthopedic aftercare Z47 89                   Subjective: Pt reports that his shoulder is feeling pretty good and denies pain   Pt states that the only thing that bothers him is tightness in his elbow from wearing the sling  Objective: See treatment diary below  Precautions: Protocol for accelerated with bicep tenodesis (no active flexion)     Daily Treatment Diary     Manual  2/11 2/15 2/18 2/20         PROM within limits  AF AFB AF KO                                                                 Exercise Diary              Pendulums   cc/ccw,  A/P,  M/L  20x ea home hep         UT stretch  30"x3  hep         Scapular retractions  3"x20  hep         Elbow PROM   AFB  KO                                                                                                                                                                                                                             Modalities                                                           Assessment: Tolerated treatment well  Patient displays good mobility and no guarding w/PROM  Plan: Continue per plan of care

## 2019-02-25 ENCOUNTER — OFFICE VISIT (OUTPATIENT)
Dept: PHYSICAL THERAPY | Facility: MEDICAL CENTER | Age: 74
End: 2019-02-25
Payer: MEDICARE

## 2019-02-25 DIAGNOSIS — M75.111 INCOMPLETE TEAR OF RIGHT ROTATOR CUFF: Primary | ICD-10-CM

## 2019-02-25 DIAGNOSIS — Z47.89 ENCOUNTER FOR OTHER ORTHOPEDIC AFTERCARE: ICD-10-CM

## 2019-02-25 DIAGNOSIS — E11.8 TYPE 2 DIABETES MELLITUS WITH COMPLICATION, WITHOUT LONG-TERM CURRENT USE OF INSULIN (HCC): ICD-10-CM

## 2019-02-25 PROCEDURE — 97110 THERAPEUTIC EXERCISES: CPT | Performed by: PHYSICAL THERAPIST

## 2019-02-25 PROCEDURE — 97140 MANUAL THERAPY 1/> REGIONS: CPT | Performed by: PHYSICAL THERAPIST

## 2019-02-25 NOTE — PROGRESS NOTES
Daily Note     Today's date: 2019  Patient name: Rocío Greene  :   MRN: [id number]  Referring provider: Bisi Odell MD  Dx:   Encounter Diagnosis     ICD-10-CM    1  Incomplete tear of right rotator cuff M75 111    2  Encounter for other orthopedic aftercare Z47 89        Start Time: 136  Stop Time:  [id number]  Total time in clinic (min): 30 minutes    Subjective: Patient reports no pain pre-treatment  He mentions everything at home is going pretty good thus far  Objective: See treatment diary below    Precautions: Protocol for accelerated with bicep tenodesis (no active flexion)     Daily Treatment Diary     Manual  2/11 2/15 2/18 2/20 2/25        PROM within limits  AF AFB AF KO SK                                                                Exercise Diary              Pendulums   cc/ccw,  A/P,  M/L  20x ea home hep Cc/cc A/P  MlL  20x ea        UT stretch  30"x3  hep         Scapular retractions  3"x20  hep 3" x20        Elbow PROM   AFB  KO SK                                                                                                                                                                                                                            Modalities                                                         Assessment: Tolerated treatment well  Patient exhibited good technique with therapeutic exercises and would benefit from continued PT      Plan: Continue per plan of care  Progress as per protocol and patient tolerance

## 2019-02-27 ENCOUNTER — APPOINTMENT (OUTPATIENT)
Dept: PHYSICAL THERAPY | Facility: MEDICAL CENTER | Age: 74
End: 2019-02-27
Payer: MEDICARE

## 2019-03-01 ENCOUNTER — OFFICE VISIT (OUTPATIENT)
Dept: PHYSICAL THERAPY | Facility: MEDICAL CENTER | Age: 74
End: 2019-03-01
Payer: MEDICARE

## 2019-03-01 DIAGNOSIS — Z47.89 ENCOUNTER FOR OTHER ORTHOPEDIC AFTERCARE: ICD-10-CM

## 2019-03-01 DIAGNOSIS — M75.111 INCOMPLETE TEAR OF RIGHT ROTATOR CUFF: Primary | ICD-10-CM

## 2019-03-01 PROCEDURE — 97110 THERAPEUTIC EXERCISES: CPT

## 2019-03-01 PROCEDURE — 97140 MANUAL THERAPY 1/> REGIONS: CPT

## 2019-03-01 NOTE — PROGRESS NOTES
Daily Note     Today's date: 3/1/2019  Patient name: Luz Marcelino  :   MRN: [id number]  Referring provider: Fabienne Copeland MD  Dx:   Encounter Diagnosis     ICD-10-CM    1  Incomplete tear of right rotator cuff M75 111    2  Encounter for other orthopedic aftercare Z47 [id number]        Start Time: 5162  Stop Time: 0940  Total time in clinic (min): 35 minutes    Subjective: Pt reports feeling ok today with pain level rated as 3-4/10 prior to start of session  States yesterday was a really bad day with 0-6/38 pain, and uncertain as to what could have caused this increased pain  Objective: See treatment diary below  Precautions: Protocol for accelerated with bicep tenodesis (no active flexion)     Daily Treatment Diary     Manual  2/11 2/15 2/18 2/20 2/25 3/1       PROM within limits  AF AFB AF KO SK [employer]                                                               Exercise Diary      2/25 3/1       Pendulums   cc/ccw,  A/P,  M/L  20x ea home hep Cc/cc A/P  MlL  20x ea Cc/cc A/P  MlL  30x ea       UT stretch  30"x3  hep  30"x3       Scapular retractions  3"x20  hep 3" x20 3" x20       Elbow PROM   AFB  KO SK AFB       Digiflex      Green  20x                                                                                                                                                                                                              Modalities                                                         Assessment: Tolerated treatment well  Digiflex added to program today  Pain reported during PROM in ER at proximal tricep mm, which resolved with short rest break  Was able to perform all exercise with no c/o pain  Pt notes he has appt with referring physician early next week  Patient would benefit from continued PT to further improve ROM and gain strength as set per protocol  Plan: Continue per plan of care

## 2019-03-04 ENCOUNTER — APPOINTMENT (OUTPATIENT)
Dept: PHYSICAL THERAPY | Facility: MEDICAL CENTER | Age: 74
End: 2019-03-04
Payer: MEDICARE

## 2019-03-05 ENCOUNTER — OFFICE VISIT (OUTPATIENT)
Dept: OBGYN CLINIC | Facility: MEDICAL CENTER | Age: 74
End: 2019-03-05

## 2019-03-05 VITALS
BODY MASS INDEX: 27.3 KG/M2 | HEIGHT: 71 IN | WEIGHT: 195 LBS | SYSTOLIC BLOOD PRESSURE: 149 MMHG | DIASTOLIC BLOOD PRESSURE: 82 MMHG | HEART RATE: 64 BPM

## 2019-03-05 DIAGNOSIS — M75.111 INCOMPLETE TEAR OF RIGHT ROTATOR CUFF: Primary | ICD-10-CM

## 2019-03-05 PROCEDURE — 99024 POSTOP FOLLOW-UP VISIT: CPT | Performed by: ORTHOPAEDIC SURGERY

## 2019-03-05 NOTE — PROGRESS NOTES
Orthopaedic Surgery - Office Note  Ronna Hoffman (2 y o  male)   : 1945   MRN: [id number]  Encounter Date: 3/5/2019    Chief Complaint   Patient presents with    Right Shoulder - Follow-up       Assessment / Plan  Status post right shoulder arthroscopy with rotator cuff repair, subacromial decompression, distal clavicle excision and open subpectoral biceps tenodesis on 2019  · Continue outpatient PT   · He may dc use of his sling at this point in time  · He was instructed to refrain from pushing, lifting and pulling activities   · He is cleared for driving   · No strengthening exercises at this point in time  Another copy of the protocol was given to the patient today to bring to therapy   Return in about 1 month (around 2019) for Recheck  History of Present Illness  Ronna Hoffman is a 68 y o  male who presents Status post right shoulder arthroscopy with rotator cuff repair, subacromial decompression, distal clavicle excision and open subpectoral biceps tenodesis on 2019  Patient has been in formal physical therapy 2x per week and is tolerating her exercises well  Patient Still complains of numbness to his right thumb that has been present since after sx which has remained unchanged  He only describes occasional achy pain to his right shoulder  Review of Systems  Pertinent items are noted in HPI  All other systems were reviewed and are negative  Physical Exam  /82   Pulse 64   Ht 5' 11" (1 803 m)   Wt 88 5 kg (195 lb)   BMI 27 20 kg/m²   Cons: Appears well  No apparent distress  Psych: Alert  Oriented x3  Mood and affect normal   Eyes: PERRLA, EOMI  Resp: Normal effort  No audible wheezing or stridor  CV: Palpable pulse  No discernable arrhythmia  No LE edema  Lymph:  No palpable cervical, axillary, or inguinal lymphadenopathy  Skin: Warm  No palpable masses  No visible lesions  Neuro: Normal muscle tone  Normal and symmetric DTR's       Right Shoulder Exam  Alignment / Posture:  Normal shoulder posture  Inspection:  Incision healed  Palpation:  minimal bicipital groove tenderness  ROM:  Shoulder FE passive 110  Shoulder ER passive 10  Strength:  Not tested  Stability:  Not tested  Tests: not tested  Neurovascular:   2+ radial pulse  Studies Reviewed  No studies to review    Procedures  No procedures today  Medical, Surgical, Family, and Social History  The patient's medical history, family history, and social history, were reviewed and updated as appropriate      Past Medical History:   Diagnosis Date    Allergic reaction     last assessed: 5/26/2015    Arthritis     right knee    Bee sting allergy     last assessed: 6/30/2015    BPH (benign prostatic hypertrophy)     Cancer ([facility] ) 2017    Right kidney    Diabetes mellitus ([facility] )     Type II, NIDDM    Enlarged prostate with lower urinary tract symptoms (LUTS)     last assessed: 8/17/2015    Herpes zoster     last assessed: 11/21/2013    History of colon polyps     Hyperlipidemia     Leukocytosis     last assessed: 11/18/2014    Osteoarthrosis, localized, primary, knee     last assessed: 10/25/2016    Seasonal allergies     Shoulder pain, right     Wears glasses     Wears partial dentures     Upper and Lower       Past Surgical History:   Procedure Laterality Date    CARPAL TUNNEL RELEASE Left     CHOLECYSTECTOMY      Open    COLONOSCOPY      GANGLION CYST EXCISION Left     Left wrist    JOINT REPLACEMENT Left 08/2015    Left TKR    JOINT REPLACEMENT Right 09/2017    Right TKR    NEPHRECTOMY  05/08/2017    Partial right nephrectomy    NJ LAP,PARTIAL NEPHRECTOMY Right 5/8/2017    Procedure: LAPAROSCOPIC LYSIS OF ADHESIONS, ROBOTIC PARTIAL NEPHRECTOMY, WITH INTRA-OP LAPAROSCOPIC ULTRASOUND;  Surgeon: Alexx Purcell MD;  [facility];  Service: Urology    NJ REPAIR BICEPS LONG TENDON  2/4/2019    Procedure: TENODESIS BICEPS OPEN PROXIMAL;  Surgeon: Leonie Briones MD;  [facility];  Service: Orthopedics    FL REPAIR INTERCARP/CARP-METACARP JT Left 12/5/2016    Procedure: ARTHROPLASTY THUMB Aia 16 ;  Surgeon: Tj Cook MD;  [facility];  Service: Orthopedics    FL REVISE MEDIAN N/CARPAL TUNNEL SURG Left 12/5/2016    Procedure: RELEASE CARPAL TUNNEL;  Surgeon: Tj Cook MD;  [facility];  Service: Orthopedics    FL SHLDR ARTHROSCOP,SURG,W/ROTAT CUFF REPR Right 2/4/2019    Procedure: REPAIR ROTATOR CUFF ARTHROSCOPIC; SUBACROMIAL DECOMPRESSION; DISTAL CLAVICLE EXCISION;  Surgeon: Tj Cook MD;  [facility];  Service: Orthopedics    FL TOTAL KNEE ARTHROPLASTY Right 9/11/2017    Procedure: ARTHROPLASTY KNEE TOTAL;  Surgeon: Tj Cook MD;  [facility];  Service: Orthopedics    TONSILLECTOMY AND ADENOIDECTOMY         Family History   Problem Relation Age of Onset    Cancer Mother     Stroke Father        Social History     Occupational History    Not on file   Tobacco Use    Smoking status: Former Smoker     Packs/day: 0 25     Types: Cigarettes    Smokeless tobacco: Never Used    Tobacco comment: Smoked from ages 16 to 25     Substance and Sexual Activity    Alcohol use: Yes     Comment: 1-2 x year    Drug use: No    Sexual activity: Not on file       Allergies   Allergen Reactions    Bee Venom Edema         Current Outpatient Medications:     Cholecalciferol (VITAMIN D-3 PO), Take 2,000 Units by mouth daily, Disp: , Rfl:     EPINEPHrine (EPIPEN 2-MOIZ) 0 3 mg/0 3 mL SOAJ, Inject as directed, Disp: , Rfl:     metFORMIN (GLUCOPHAGE) 500 mg tablet, Take 1 tablet (500 mg total) by mouth 2 (two) times a day, Disp: 180 tablet, Rfl: 1    naproxen (NAPROSYN) 500 mg tablet, Take 1 tablet (500 mg total) by mouth 2 (two) times a day with meals (Patient not taking: Reported on 3/5/2019), Disp: 60 tablet, Rfl: 0    promethazine (PHENERGAN) 12 5 MG tablet, Take 1 tablet (12 5 mg total) by mouth every 6 (six) hours as needed for nausea or vomiting (Patient not taking: Reported on 2/8/2019 ), Disp: 30 tablet, Rfl: 0    simvastatin (ZOCOR) 40 mg tablet, Take 1 tablet (40 mg total) by mouth daily, Disp: 90 tablet, Rfl: 1    tamsulosin (FLOMAX) 0 4 mg, Take 1 capsule by mouth daily with dinner  , Disp: , Rfl:       Kyle Hastings    Scribe Attestation    I,:   Kyle Hastings am acting as a scribe while in the presence of the attending physician :        I,:   Rivera Dumont MD personally performed the services described in this documentation    as scribed in my presence :

## 2019-03-05 NOTE — PATIENT INSTRUCTIONS
· Continue outpatient PT   · He may discontinue use of his sling at this point in time  · He was instructed to refrain from pushing, lifting and pulling activities   · He is cleared for driving   · No strengthening exercises at this point in time  Another copy of the protocol was given to the patient today to bring to therapy   Return in about 1 month (around 4/2/2019) for Recheck

## 2019-03-06 ENCOUNTER — OFFICE VISIT (OUTPATIENT)
Dept: PHYSICAL THERAPY | Facility: MEDICAL CENTER | Age: 74
End: 2019-03-06
Payer: MEDICARE

## 2019-03-06 DIAGNOSIS — M75.111 INCOMPLETE TEAR OF RIGHT ROTATOR CUFF: Primary | ICD-10-CM

## 2019-03-06 DIAGNOSIS — Z47.89 ENCOUNTER FOR OTHER ORTHOPEDIC AFTERCARE: ICD-10-CM

## 2019-03-06 PROCEDURE — 97140 MANUAL THERAPY 1/> REGIONS: CPT

## 2019-03-06 PROCEDURE — 97110 THERAPEUTIC EXERCISES: CPT

## 2019-03-06 NOTE — PROGRESS NOTES
Daily Note     Today's date: 3/6/2019  Patient name: Amos Arora  :   MRN: [id number]  Referring provider: Bobbi Mccauley MD  Dx:   Encounter Diagnosis     ICD-10-CM    1  Incomplete tear of right rotator cuff M75 111    2  Encounter for other orthopedic aftercare Z47 89        Start Time: 1130  Stop Time: 1210  Total time in clinic (min): 40 minutes    Subjective: Pt reports 2-4/10 pain today prior to start of session  States this pain is more of an "ache" than anything  Notices this increase of soreness since the discharge of his sling and having less restrictions on the use of his R UE  Objective: See treatment diary below  Precautions: Protocol for accelerated with bicep tenodesis (no active flexion)     Daily Treatment Diary     Manual  2/11 2/15 2/18 2/20 2/25 3/1 3/6      PROM within limits  AF AFB AF KO SK [employer] AFB                                                              Exercise Diary      2/25 3/1 3/6      Pendulums   cc/ccw,  A/P,  M/L  20x ea home hep Cc/cc A/P  MlL  20x ea Cc/cc A/P  MlL  30x ea Cc/cc A/P  MlL  30x ea      UT stretch  30"x3  hep  30"x3 30"x3      Scapular retractions  3"x20  hep 3" x20 3" x20 5" x30      Elbow PROM   AFB  KO SK AFB Self PROM  x20      Digiflex      Green  20x Green  20x      Supine cane  AAROM flex       10"  x10      Supine scap punches       3"  2x10      SL ER       3"  2x10                                                                                                                                                                      Modalities                                                         Assessment: Tolerated treatment well  Available PROM continues to show improvement  Supine AAROM w/ cane, sup scap punches, and SL ER added to program per protocol  Would likely be able to handle prone I ext next session  Was able to complete all exercise with no c/o pain    Patient would benefit from continued PT      Plan: Continue per plan of care  Progress per protocol

## 2019-03-08 ENCOUNTER — OFFICE VISIT (OUTPATIENT)
Dept: PHYSICAL THERAPY | Facility: MEDICAL CENTER | Age: 74
End: 2019-03-08
Payer: MEDICARE

## 2019-03-08 DIAGNOSIS — M75.111 INCOMPLETE TEAR OF RIGHT ROTATOR CUFF: Primary | ICD-10-CM

## 2019-03-08 DIAGNOSIS — Z47.89 ENCOUNTER FOR OTHER ORTHOPEDIC AFTERCARE: ICD-10-CM

## 2019-03-08 PROCEDURE — 97110 THERAPEUTIC EXERCISES: CPT | Performed by: PHYSICAL THERAPIST

## 2019-03-08 PROCEDURE — 97140 MANUAL THERAPY 1/> REGIONS: CPT | Performed by: PHYSICAL THERAPIST

## 2019-03-08 NOTE — PROGRESS NOTES
Daily Note     Today's date: 3/8/2019  Patient name: Jaja Mcneill  :   MRN: [id number]  Referring provider: Chaya Kanner, MD  Dx:   Encounter Diagnosis     ICD-10-CM    1  Incomplete tear of right rotator cuff M75 111    2  Encounter for other orthopedic aftercare Z47 89        Start Time: 1000  Stop Time: 1035  Total time in clinic (min): 35 minutes    Subjective: Patient reports having no pain pre-treatment  He mentions he was a little sore after the added exercises last visit but it subsided relatively quickly  Objective: See treatment diary below    Precautions: Protocol for accelerated with bicep tenodesis (no active flexion)     Daily Treatment Diary     Manual  2/11 2/15 2/18 2/20 2/25 3/1 3/6 3/8     PROM within limits  AF AFB AF KO SK AFB AFB SK                                                             Exercise Diary      2/25 3/1 3/6 3/8     Pendulums   cc/ccw,  A/P,  M/L  20x ea home hep Cc/cc A/P  MlL  20x ea Cc/cc A/P  MlL  30x ea Cc/cc A/P  MlL  30x ea Cc/cc A/P  MlL  30x ea     UT stretch  30"x3  hep  30"x3 30"x3 30"x3     Scapular retractions  3"x20  hep 3" x20 3" x20 5" x30 5" x30     Elbow PROM   AFB  KO SK AFB Self PROM  x20      Digiflex      Green  20x Green  20x Green  25x     Supine cane  AAROM flex       10"  x10 10"  x10     Supine scap punches       3"  2x10 3"  2x10     SL ER       3"  2x10 3"  2x10     Elbow AROM        x25                                                                                                                                                        Modalities                                                         Assessment: Tolerated treatment well  Completed elbow AROM with no limitations  He reports newer exercises today felt easier than last visit  Patient exhibited good technique with therapeutic exercises and would benefit from continued PT      Plan: Continue per plan of care    Progress as per protocol and patient tolerance

## 2019-03-12 ENCOUNTER — OFFICE VISIT (OUTPATIENT)
Dept: PHYSICAL THERAPY | Facility: MEDICAL CENTER | Age: 74
End: 2019-03-12
Payer: MEDICARE

## 2019-03-12 DIAGNOSIS — M75.111 INCOMPLETE TEAR OF RIGHT ROTATOR CUFF: Primary | ICD-10-CM

## 2019-03-12 DIAGNOSIS — Z47.89 ENCOUNTER FOR OTHER ORTHOPEDIC AFTERCARE: ICD-10-CM

## 2019-03-12 PROCEDURE — 97140 MANUAL THERAPY 1/> REGIONS: CPT | Performed by: PHYSICAL THERAPIST

## 2019-03-12 PROCEDURE — 97110 THERAPEUTIC EXERCISES: CPT | Performed by: PHYSICAL THERAPIST

## 2019-03-13 NOTE — PROGRESS NOTES
Daily Note     Today's date: 3/12/2019  Patient name: Maria Guadalupe Aldrich  :   MRN: [id number]  Referring provider: Abel Leung MD  Dx:   Encounter Diagnosis     ICD-10-CM    1  Incomplete tear of right rotator cuff M75 111    2  Encounter for other orthopedic aftercare Z47 89                   Subjective:  Tyron Lorenzana reports that he is doing well with no issues, feels that he is using his shoulder more than he should and it gets sore from time to time       Objective: See treatment diary below  Precautions: Protocol for accelerated with bicep tenodesis (no active flexion)     Daily Treatment Diary     Manual  2/11 2/15 2/18 2/20 2/25 3/1 3/6 3/8 3/12    PROM within limits  AF AFB AF KO SK AFB AFB SK AF                                                            Exercise Diary      2/25 3/1 3/6 3/8     Pendulums   cc/ccw,  A/P,  M/L  20x ea home hep Cc/cc A/P  MlL  20x ea Cc/cc A/P  MlL  30x ea Cc/cc A/P  MlL  30x ea Cc/cc A/P  MlL  30x ea     UT stretch  30"x3  hep  30"x3 30"x3 30"x3     Scapular retractions  3"x20  hep 3" x20 3" x20 5" x30 5" x30     Elbow PROM   AFB  KO SK AFB Self PROM  x20      Digiflex      Green  20x Green  20x Green  25x     Supine cane  AAROM flex       10"  x10 10"  x10 10"x1-    Supine scap punches       3"  2x10 3"  2x10 3X10    SL ER       3"  2x10 3"  2x10 3X10    Elbow AROM        x25 30    Prone extension         3X10    Supine ER stretch         30 sec  X5                                                                                                                             Modalities                                                         Assessment: Tolerated treatment well  Patient improving ROM, remains limited in ER  Continue to progress motion within limits of protocol      Plan: Continue per plan of care

## 2019-03-15 ENCOUNTER — OFFICE VISIT (OUTPATIENT)
Dept: PHYSICAL THERAPY | Facility: MEDICAL CENTER | Age: 74
End: 2019-03-15
Payer: MEDICARE

## 2019-03-15 DIAGNOSIS — Z47.89 ENCOUNTER FOR OTHER ORTHOPEDIC AFTERCARE: ICD-10-CM

## 2019-03-15 DIAGNOSIS — M75.111 INCOMPLETE TEAR OF RIGHT ROTATOR CUFF: Primary | ICD-10-CM

## 2019-03-15 PROCEDURE — 97110 THERAPEUTIC EXERCISES: CPT | Performed by: PHYSICAL THERAPIST

## 2019-03-15 PROCEDURE — 97140 MANUAL THERAPY 1/> REGIONS: CPT | Performed by: PHYSICAL THERAPIST

## 2019-03-15 NOTE — PROGRESS NOTES
Daily Note     Today's date: 3/15/2019  Patient name: Rachel Cochran  :   MRN: [id number]  Referring provider: Jen Fam MD  Dx:   Encounter Diagnosis     ICD-10-CM    1  Incomplete tear of right rotator cuff M75 111    2  Encounter for other orthopedic aftercare Z47 89                   Subjective: Savoy Legacy reports that he is doing well, has some upper trap soreness today but otherwise doing well       Objective: See treatment diary below  Precautions: Protocol for accelerated with bicep tenodesis (no active flexion)     Daily Treatment Diary     Manual  2/11 2/15 2/18 2/20 2/25 3/1 3/6 3/8 3/12 3/15   PROM within limits  AF AFB AF KO SK AFB AFB SK AF AF   Cervical mob's           AF   Mid thoracic seated HVLAT - single arm          AF                                 Exercise Diary      2/25 3/1 3/6 3/8     Pendulums   cc/ccw,  A/P,  M/L  20x ea home hep Cc/cc A/P  MlL  20x ea Cc/cc A/P  MlL  30x ea Cc/cc A/P  MlL  30x ea Cc/cc A/P  MlL  30x ea     UT stretch  30"x3  hep  30"x3 30"x3 30"x3  30 sec  X3   Scapular retractions  3"x20  hep 3" x20 3" x20 5" x30 5" x30     Elbow PROM   AFB  KO SK AFB Self PROM  x20      Digiflex      Green  20x Green  20x Green  25x     Supine cane  AAROM flex       10"  x10 10"  x10 10"x1- No cane  2X10   Supine scap punches       3"  2x10 3"  2x10 3X10 [id number]   SL ER       3"  2x10 3"  2x10 3X10    Elbow AROM        x25 30 30   Prone extension         3X10 3X10   Supine ER stretch         30 sec  X5 30 sec  X5                                                                                                                            Modalities                                            Assessment: Tolerated treatment well  Patient with reduced UT soreness post session, instructed to continue working on scapular exercises for home to reduce UT overcompensation  Progress as able      Plan: Continue per plan of care

## 2019-03-19 ENCOUNTER — APPOINTMENT (OUTPATIENT)
Dept: PHYSICAL THERAPY | Facility: MEDICAL CENTER | Age: 74
End: 2019-03-19
Payer: MEDICARE

## 2019-03-22 ENCOUNTER — OFFICE VISIT (OUTPATIENT)
Dept: PHYSICAL THERAPY | Facility: MEDICAL CENTER | Age: 74
End: 2019-03-22
Payer: MEDICARE

## 2019-03-22 DIAGNOSIS — M75.111 INCOMPLETE TEAR OF RIGHT ROTATOR CUFF: Primary | ICD-10-CM

## 2019-03-22 DIAGNOSIS — Z47.89 ENCOUNTER FOR OTHER ORTHOPEDIC AFTERCARE: ICD-10-CM

## 2019-03-22 PROCEDURE — 97140 MANUAL THERAPY 1/> REGIONS: CPT | Performed by: PHYSICAL MEDICINE & REHABILITATION

## 2019-03-22 PROCEDURE — 97112 NEUROMUSCULAR REEDUCATION: CPT | Performed by: PHYSICAL MEDICINE & REHABILITATION

## 2019-03-22 PROCEDURE — 97110 THERAPEUTIC EXERCISES: CPT | Performed by: PHYSICAL MEDICINE & REHABILITATION

## 2019-03-22 NOTE — PROGRESS NOTES
PT Re-Evaluation     Today's date: 3/22/2019  Patient name: Yordy Dior  :   MRN: [id number]  Referring provider: Alesia Polk MD  Dx:   Encounter Diagnosis     ICD-10-CM    1  Incomplete tear of right rotator cuff M75 111    2  Encounter for other orthopedic aftercare Z47 89                 Assessment  Assessment details: Yordy Dior is a 68 y o  male was evaluated on 2019  for Incomplete tear of right rotator cuff  (primary encounter diagnosis)  Encounter for other orthopedic aftercare  Yordy Dior has the above listed impairments resulting in functional deficits and negative impact to quality of life  Timi Profit has made good progress inside of his protocol  Continued skilled physical therapy is warranted to allow him to continue to make progress towards his goals  Patient agrees with outlined treatment plan and all questions were answered to their satisfaction  Impairments: abnormal muscle firing, abnormal muscle tone, abnormal or restricted ROM, impaired physical strength, lacks appropriate home exercise program and pain with function  Understanding of Dx/Px/POC: good   Prognosis: good    Goals  Patient will successfully transition to home exercise program  - partially met  Patient will be able to manage symptoms independently  - partially met  Patient will maximize passive shoulder ROM by 12 weeks   - partially met  Patient will return to yardwork without limitation in 16 weeks - partially met  Patient will dress self without limitation in 12 weeks - met      Plan  Patient would benefit from: skilled PT  Referral necessary: No  Planned modality interventions: thermotherapy: hydrocollator packs  Planned therapy interventions: home exercise program, manual therapy, neuromuscular re-education, patient education, functional ROM exercises, strengthening, stretching, joint mobilization, graded activity, graded exercise, therapeutic exercise, body mechanics training, motor coordination training and activity modification  Frequency: 2x week  Duration in weeks: 12  Treatment plan discussed with: patient        Subjective Evaluation    History of Present Illness  Date of surgery: 2019  Mechanism of injury: Stephanie Orta reported "I feel 90% better than I did at the beginning  I still feel some twinge at certain points and strength wise I have not been allowed to make progress just yet "  Pain  Current pain ratin  At best pain ratin  At worst pain ratin  Quality: dull ache    Patient Goals  Patient goals for therapy: decreased pain, independence with ADLs/IADLs, return to sport/leisure activities and increased strength  Patient goal: wants to be able to go back to working out again, I need to be able to rake leaves        Objective     Red flag screen (-)  Dermatomes:  WNL - notes some thumb numbness that was present upon waking from surgery, feels sling may have been compressing  Shoulder AROM: R: flex: 140                                     Abduction: 88                                     ER: 47                                     IR: 60                                         Shoulder PROM:  Flex: 153                                  Abd: 97                                  ER: 53                                   IR: 60    Precautions: Protocol for accelerated with bicep tenodesis (no active flexion)     Daily Treatment Diary     Manual  3/22/2019  2/18 2/20 2/25 3/1 3/6 3/8 3/12 3/15   PROM within limits  JR  AF KO SK AFB AFB SK AF AF   Cervical mob's           AF   Mid thoracic seated HVLAT - single arm          AF                                 Exercise Diary  3/22/2019    2/25 3/1 3/6 3/8     Pendulums    home hep Cc/cc A/P  MlL  20x ea Cc/cc A/P  MlL  30x ea Cc/cc A/P  MlL  30x ea Cc/cc A/P  MlL  30x ea     UT stretch 3x 30sec   hep  30"x3 30"x3 30"x3  30 sec  X3   Scapular retractions    hep 3" x20 3" x20 5" x30 5" x30     Elbow PROM     KO SK AFB Self PROM  x20      Digiflex      Green  20x Green  20x Green  25x     Supine cane  AAROM flex 2x10      10"  x10 10"  x10 10"x1- No cane  2X10   Supine scap punches 3" 3x12      3"  2x10 3"  2x10 3X10 [id number]   SL ER 3x12      3"  2x10 3"  2x10 3X10    Elbow AROM x30       x25 30 30   Prone extension 3x12        3X10 3X10   Supine ER stretch 30sec x5        30 sec  X5 30 sec  X5                                                                                                                            Modalities

## 2019-03-26 ENCOUNTER — OFFICE VISIT (OUTPATIENT)
Dept: PHYSICAL THERAPY | Facility: MEDICAL CENTER | Age: 74
End: 2019-03-26
Payer: MEDICARE

## 2019-03-26 DIAGNOSIS — M75.111 INCOMPLETE TEAR OF RIGHT ROTATOR CUFF: Primary | ICD-10-CM

## 2019-03-26 DIAGNOSIS — Z47.89 ENCOUNTER FOR OTHER ORTHOPEDIC AFTERCARE: ICD-10-CM

## 2019-03-26 PROCEDURE — 97140 MANUAL THERAPY 1/> REGIONS: CPT | Performed by: PHYSICAL MEDICINE & REHABILITATION

## 2019-03-26 PROCEDURE — 97110 THERAPEUTIC EXERCISES: CPT | Performed by: PHYSICAL MEDICINE & REHABILITATION

## 2019-03-26 NOTE — PROGRESS NOTES
Daily Note     Today's date: 3/26/2019  Patient name: Yordy Adler  : [birth year]  MRN: [id number]  Referring provider: Yonis Vasquez MD  Dx:   Encounter Diagnosis     ICD-10-CM    1  Incomplete tear of right rotator cuff M75 111    2  Encounter for other orthopedic aftercare Z47 89                  Subjective: Juanita Mosqueda reported "I am feeling pretty good, I am looking forward to being able to start moving weight "      Objective: See treatment diary below  Precautions: Protocol for accelerated with bicep tenodesis (no active flexion)     Daily Treatment Diary     Manual  3/22/2019 3/26/2019  2/20 2/25 3/1 3/6 3/8 3/12 3/15   PROM within limits  JR JR  KO SK AFB AFB SK AF AF   Cervical mob's           AF   Mid thoracic seated HVLAT - single arm          AF                                 Exercise Diary  3/22/2019    2/25 3/1 3/6 3/8     Pendulums     hep Cc/cc A/P  MlL  20x ea Cc/cc A/P  MlL  30x ea Cc/cc A/P  MlL  30x ea Cc/cc A/P  MlL  30x ea     UT stretch 3x 30sec 3x 30sec  hep  30"x3 30"x3 30"x3  30 sec  X3   Scapular retractions    hep 3" x20 3" x20 5" x30 5" x30     Elbow PROM     KO SK AFB Self PROM  x20      Digiflex      Green  20x Green  20x Green  25x     Supine cane  AAROM flex 2x10 3x10     10"  x10 10"  x10 10"x1- No cane  2X10   Supine scap punches 3" 3x12 3" 3x12     3"  2x10 3"  2x10 3X10 [id number]   SL ER 3x12 3x12     3"  2x10 3"  2x10 3X10    Elbow AROM x30 x30      x25 30 30   Prone extension 3x12        3X10 3X10   Supine ER stretch 30sec x5 30sec x5       30 sec  X5 30 sec  X5   Rhythmic Stab  JR                                                                                                                       Modalities                                            Assessment: Tolerated treatment well  Patient would benefit from continued PT  Juanita Mosqueda was able to add rhythmic stabilization to his program which shows progress towards his goals   He denied pain or fatigue with the increase in exercises  He continues to demonstrate decreased flexion and ER ROM  Plan: Continue per plan of care

## 2019-03-29 ENCOUNTER — OFFICE VISIT (OUTPATIENT)
Dept: PHYSICAL THERAPY | Facility: MEDICAL CENTER | Age: 74
End: 2019-03-29
Payer: MEDICARE

## 2019-03-29 DIAGNOSIS — Z47.89 ENCOUNTER FOR OTHER ORTHOPEDIC AFTERCARE: ICD-10-CM

## 2019-03-29 DIAGNOSIS — M75.111 INCOMPLETE TEAR OF RIGHT ROTATOR CUFF: Primary | ICD-10-CM

## 2019-03-29 PROCEDURE — 97110 THERAPEUTIC EXERCISES: CPT | Performed by: PHYSICAL THERAPIST

## 2019-03-29 PROCEDURE — 97140 MANUAL THERAPY 1/> REGIONS: CPT | Performed by: PHYSICAL THERAPIST

## 2019-03-29 NOTE — PROGRESS NOTES
Daily Note     Today's date: 3/29/2019  Patient name: Tal Morrissey  : [birth year]  MRN: [id number]  Referring provider: Leonor Darden MD  Dx:   Encounter Diagnosis     ICD-10-CM    1  Incomplete tear of right rotator cuff M75 111    2  Encounter for other orthopedic aftercare Z47 [id number]                  Subjective: Max Young states that he continues to feel good  Objective: See treatment diary below    Precautions: Protocol for accelerated with bicep tenodesis (no active flexion)     Daily Treatment Diary     Manual  3/22/2019 3/26 3/29 2/20 2/25 3/1 3/6 3/8 3/12 3/15   PROM within limits  JR JR AZ KO SK AFB AFB SK AF AF   Cervical mob's           AF   Mid thoracic seated HVLAT - single arm          AF                                 Exercise Diary  3/22/2019    2/25 3/1 3/6 3/8     Pendulums     hep Cc/cc A/P  MlL  20x ea Cc/cc A/P  MlL  30x ea Cc/cc A/P  MlL  30x ea Cc/cc A/P  MlL  30x ea     UT stretch 3x 30sec 3x 30sec 3x 30s hep  30"x3 30"x3 30"x3  30 sec  X3   Scapular retractions   HEP hep 3" x20 3" x20 5" x30 5" x30     Elbow PROM     KO SK AFB Self PROM  x20      Digiflex      Green  20x Green  20x Green  25x     Supine cane  AAROM flex 2x10 3x10 3x10    10"  x10 10"  x10 10"x1- No cane  2X10   Supine scap punches 3" 3x12 3" 3x12 3x10    3"  2x10 3"  2x10 3X10 [id number]   SL ER 3x12 3x12 3x12    3"  2x10 3"  2x10 3X10    Elbow AROM x30 x30      x25 30 30   Prone extension 3x12        3X10 3X10   Supine ER stretch 30sec x5 30sec x5 30s x5      30 sec  X5 30 sec  X5   Rhythmic Stab  JR                                                                                                                       Modalities                                          Assessment: Patient demonstrates appropriate gains in PROM and scapular neuromuscular control  No pain reported with exercise progressions  Tolerated treatment well  Patient would benefit from continued PT  Plan: Continue per plan of care

## 2019-04-02 ENCOUNTER — OFFICE VISIT (OUTPATIENT)
Dept: OBGYN CLINIC | Facility: MEDICAL CENTER | Age: 74
End: 2019-04-02

## 2019-04-02 VITALS
SYSTOLIC BLOOD PRESSURE: 151 MMHG | BODY MASS INDEX: 27.1 KG/M2 | WEIGHT: 193.6 LBS | HEIGHT: 71 IN | HEART RATE: 59 BPM | DIASTOLIC BLOOD PRESSURE: 87 MMHG

## 2019-04-02 DIAGNOSIS — Z98.890 STATUS POST ARTHROSCOPY OF RIGHT SHOULDER: Primary | ICD-10-CM

## 2019-04-02 PROCEDURE — 99024 POSTOP FOLLOW-UP VISIT: CPT | Performed by: ORTHOPAEDIC SURGERY

## 2019-04-04 ENCOUNTER — OFFICE VISIT (OUTPATIENT)
Dept: PHYSICAL THERAPY | Facility: MEDICAL CENTER | Age: 74
End: 2019-04-04
Payer: MEDICARE

## 2019-04-04 DIAGNOSIS — S46.011D TRAUMATIC INCOMPLETE TEAR OF RIGHT ROTATOR CUFF, SUBSEQUENT ENCOUNTER: Primary | ICD-10-CM

## 2019-04-04 PROCEDURE — 97140 MANUAL THERAPY 1/> REGIONS: CPT | Performed by: PHYSICAL MEDICINE & REHABILITATION

## 2019-04-04 PROCEDURE — 97110 THERAPEUTIC EXERCISES: CPT | Performed by: PHYSICAL MEDICINE & REHABILITATION

## 2019-04-09 ENCOUNTER — OFFICE VISIT (OUTPATIENT)
Dept: PHYSICAL THERAPY | Facility: MEDICAL CENTER | Age: 74
End: 2019-04-09
Payer: MEDICARE

## 2019-04-09 DIAGNOSIS — S46.011D TRAUMATIC INCOMPLETE TEAR OF RIGHT ROTATOR CUFF, SUBSEQUENT ENCOUNTER: Primary | ICD-10-CM

## 2019-04-09 PROCEDURE — 97110 THERAPEUTIC EXERCISES: CPT | Performed by: PHYSICAL MEDICINE & REHABILITATION

## 2019-04-09 PROCEDURE — 97112 NEUROMUSCULAR REEDUCATION: CPT | Performed by: PHYSICAL MEDICINE & REHABILITATION

## 2019-04-11 ENCOUNTER — APPOINTMENT (OUTPATIENT)
Dept: PHYSICAL THERAPY | Facility: MEDICAL CENTER | Age: 74
End: 2019-04-11
Payer: MEDICARE

## 2019-04-16 ENCOUNTER — OFFICE VISIT (OUTPATIENT)
Dept: PHYSICAL THERAPY | Facility: MEDICAL CENTER | Age: 74
End: 2019-04-16
Payer: MEDICARE

## 2019-04-16 DIAGNOSIS — Z47.89 ENCOUNTER FOR OTHER ORTHOPEDIC AFTERCARE: ICD-10-CM

## 2019-04-16 DIAGNOSIS — S46.011D TRAUMATIC INCOMPLETE TEAR OF RIGHT ROTATOR CUFF, SUBSEQUENT ENCOUNTER: Primary | ICD-10-CM

## 2019-04-16 PROCEDURE — 97110 THERAPEUTIC EXERCISES: CPT | Performed by: PHYSICAL MEDICINE & REHABILITATION

## 2019-04-16 PROCEDURE — 97112 NEUROMUSCULAR REEDUCATION: CPT | Performed by: PHYSICAL MEDICINE & REHABILITATION

## 2019-04-23 ENCOUNTER — OFFICE VISIT (OUTPATIENT)
Dept: PHYSICAL THERAPY | Facility: MEDICAL CENTER | Age: 74
End: 2019-04-23
Payer: MEDICARE

## 2019-04-23 DIAGNOSIS — S46.011D TRAUMATIC INCOMPLETE TEAR OF RIGHT ROTATOR CUFF, SUBSEQUENT ENCOUNTER: Primary | ICD-10-CM

## 2019-04-23 DIAGNOSIS — Z47.89 ENCOUNTER FOR OTHER ORTHOPEDIC AFTERCARE: ICD-10-CM

## 2019-04-23 PROCEDURE — 97112 NEUROMUSCULAR REEDUCATION: CPT | Performed by: PHYSICAL MEDICINE & REHABILITATION

## 2019-04-23 PROCEDURE — 97110 THERAPEUTIC EXERCISES: CPT | Performed by: PHYSICAL MEDICINE & REHABILITATION

## 2019-04-29 ENCOUNTER — TELEPHONE (OUTPATIENT)
Dept: FAMILY MEDICINE CLINIC | Facility: CLINIC | Age: 74
End: 2019-04-29

## 2019-04-30 ENCOUNTER — OFFICE VISIT (OUTPATIENT)
Dept: OBGYN CLINIC | Facility: MEDICAL CENTER | Age: 74
End: 2019-04-30

## 2019-04-30 ENCOUNTER — OFFICE VISIT (OUTPATIENT)
Dept: PHYSICAL THERAPY | Facility: MEDICAL CENTER | Age: 74
End: 2019-04-30
Payer: MEDICARE

## 2019-04-30 VITALS
WEIGHT: 193 LBS | HEIGHT: 71 IN | SYSTOLIC BLOOD PRESSURE: 145 MMHG | HEART RATE: 65 BPM | BODY MASS INDEX: 27.02 KG/M2 | DIASTOLIC BLOOD PRESSURE: 79 MMHG

## 2019-04-30 DIAGNOSIS — Z98.890 STATUS POST ARTHROSCOPY OF RIGHT SHOULDER: Primary | ICD-10-CM

## 2019-04-30 DIAGNOSIS — Z47.89 ENCOUNTER FOR OTHER ORTHOPEDIC AFTERCARE: ICD-10-CM

## 2019-04-30 DIAGNOSIS — S46.011D TRAUMATIC INCOMPLETE TEAR OF RIGHT ROTATOR CUFF, SUBSEQUENT ENCOUNTER: Primary | ICD-10-CM

## 2019-04-30 PROCEDURE — 97110 THERAPEUTIC EXERCISES: CPT | Performed by: PHYSICAL MEDICINE & REHABILITATION

## 2019-04-30 PROCEDURE — 97140 MANUAL THERAPY 1/> REGIONS: CPT | Performed by: PHYSICAL MEDICINE & REHABILITATION

## 2019-04-30 PROCEDURE — 99024 POSTOP FOLLOW-UP VISIT: CPT | Performed by: ORTHOPAEDIC SURGERY

## 2019-05-15 ENCOUNTER — OFFICE VISIT (OUTPATIENT)
Dept: PODIATRY | Facility: CLINIC | Age: 74
End: 2019-05-15
Payer: MEDICARE

## 2019-05-15 VITALS
WEIGHT: 193 LBS | SYSTOLIC BLOOD PRESSURE: 145 MMHG | BODY MASS INDEX: 27.02 KG/M2 | HEIGHT: 71 IN | HEART RATE: 65 BPM | DIASTOLIC BLOOD PRESSURE: 79 MMHG

## 2019-05-15 DIAGNOSIS — E11.9 CONTROLLED TYPE 2 DIABETES MELLITUS WITHOUT COMPLICATION, WITHOUT LONG-TERM CURRENT USE OF INSULIN (HCC): Primary | ICD-10-CM

## 2019-05-15 DIAGNOSIS — B35.1 ONYCHOMYCOSIS: ICD-10-CM

## 2019-05-15 PROCEDURE — 99202 OFFICE O/P NEW SF 15 MIN: CPT | Performed by: PODIATRIST

## 2019-05-31 ENCOUNTER — OFFICE VISIT (OUTPATIENT)
Dept: FAMILY MEDICINE CLINIC | Facility: CLINIC | Age: 74
End: 2019-05-31
Payer: MEDICARE

## 2019-05-31 VITALS
HEIGHT: 71 IN | OXYGEN SATURATION: 95 % | RESPIRATION RATE: 16 BRPM | DIASTOLIC BLOOD PRESSURE: 70 MMHG | HEART RATE: 60 BPM | BODY MASS INDEX: 27.3 KG/M2 | SYSTOLIC BLOOD PRESSURE: 120 MMHG | WEIGHT: 195 LBS | TEMPERATURE: 97 F

## 2019-05-31 DIAGNOSIS — H66.90 ACUTE OTITIS MEDIA, UNSPECIFIED OTITIS MEDIA TYPE: Primary | ICD-10-CM

## 2019-05-31 PROBLEM — N28.89 VASCULAR DISORDER OF KIDNEY: Status: ACTIVE | Noted: 2019-05-31

## 2019-05-31 PROCEDURE — 99213 OFFICE O/P EST LOW 20 MIN: CPT | Performed by: NURSE PRACTITIONER

## 2019-05-31 RX ORDER — AMOXICILLIN 875 MG/1
875 TABLET, COATED ORAL 2 TIMES DAILY
Qty: 14 TABLET | Refills: 0 | Status: SHIPPED | OUTPATIENT
Start: 2019-05-31 | End: 2019-06-07

## 2019-06-29 ENCOUNTER — APPOINTMENT (OUTPATIENT)
Dept: LAB | Facility: CLINIC | Age: 74
End: 2019-06-29
Payer: MEDICARE

## 2019-06-29 ENCOUNTER — TRANSCRIBE ORDERS (OUTPATIENT)
Dept: LAB | Facility: CLINIC | Age: 74
End: 2019-06-29

## 2019-06-29 DIAGNOSIS — E78.2 MIXED HYPERLIPIDEMIA: ICD-10-CM

## 2019-06-29 DIAGNOSIS — E11.8 DIABETIC COMPLICATION (HCC): Primary | ICD-10-CM

## 2019-06-29 DIAGNOSIS — E11.8 DIABETIC COMPLICATION (HCC): ICD-10-CM

## 2019-06-29 LAB
ALBUMIN SERPL BCP-MCNC: 3.9 G/DL (ref 3.5–5)
ALP SERPL-CCNC: 40 U/L (ref 46–116)
ALT SERPL W P-5'-P-CCNC: 35 U/L (ref 12–78)
ANION GAP SERPL CALCULATED.3IONS-SCNC: 7 MMOL/L (ref 4–13)
AST SERPL W P-5'-P-CCNC: 17 U/L (ref 5–45)
BILIRUB SERPL-MCNC: 0.79 MG/DL (ref 0.2–1)
BUN SERPL-MCNC: 20 MG/DL (ref 5–25)
CALCIUM SERPL-MCNC: 9 MG/DL (ref 8.3–10.1)
CHLORIDE SERPL-SCNC: 105 MMOL/L (ref 100–108)
CHOLEST SERPL-MCNC: 129 MG/DL (ref 50–200)
CO2 SERPL-SCNC: 26 MMOL/L (ref 21–32)
CREAT SERPL-MCNC: 1.06 MG/DL (ref 0.6–1.3)
EST. AVERAGE GLUCOSE BLD GHB EST-MCNC: 148 MG/DL
GFR SERPL CREATININE-BSD FRML MDRD: 69 ML/MIN/1.73SQ M
GLUCOSE P FAST SERPL-MCNC: 108 MG/DL (ref 65–99)
HBA1C MFR BLD: 6.8 % (ref 4.2–6.3)
HDLC SERPL-MCNC: 42 MG/DL (ref 40–60)
LDLC SERPL CALC-MCNC: 65 MG/DL (ref 0–100)
POTASSIUM SERPL-SCNC: 4.1 MMOL/L (ref 3.5–5.3)
PROT SERPL-MCNC: 7.8 G/DL (ref 6.4–8.2)
SODIUM SERPL-SCNC: 138 MMOL/L (ref 136–145)
TRIGL SERPL-MCNC: 108 MG/DL

## 2019-06-29 PROCEDURE — 83036 HEMOGLOBIN GLYCOSYLATED A1C: CPT

## 2019-06-29 PROCEDURE — 80053 COMPREHEN METABOLIC PANEL: CPT

## 2019-06-29 PROCEDURE — 36415 COLL VENOUS BLD VENIPUNCTURE: CPT

## 2019-06-29 PROCEDURE — 80061 LIPID PANEL: CPT

## 2019-07-12 ENCOUNTER — OFFICE VISIT (OUTPATIENT)
Dept: FAMILY MEDICINE CLINIC | Facility: CLINIC | Age: 74
End: 2019-07-12
Payer: MEDICARE

## 2019-07-12 VITALS
OXYGEN SATURATION: 98 % | HEART RATE: 70 BPM | SYSTOLIC BLOOD PRESSURE: 120 MMHG | BODY MASS INDEX: 26.88 KG/M2 | WEIGHT: 192 LBS | HEIGHT: 71 IN | TEMPERATURE: 97 F | DIASTOLIC BLOOD PRESSURE: 70 MMHG | RESPIRATION RATE: 16 BRPM

## 2019-07-12 DIAGNOSIS — Z12.5 SCREENING FOR MALIGNANT NEOPLASM OF PROSTATE: ICD-10-CM

## 2019-07-12 DIAGNOSIS — E11.8 TYPE 2 DIABETES MELLITUS WITH COMPLICATION, WITHOUT LONG-TERM CURRENT USE OF INSULIN (HCC): ICD-10-CM

## 2019-07-12 DIAGNOSIS — C64.1 RENAL CELL CARCINOMA OF RIGHT KIDNEY (HCC): ICD-10-CM

## 2019-07-12 DIAGNOSIS — Z96.651 S/P TKR (TOTAL KNEE REPLACEMENT) USING CEMENT, RIGHT: ICD-10-CM

## 2019-07-12 DIAGNOSIS — Z00.00 ENCOUNTER FOR MEDICARE ANNUAL WELLNESS EXAM: Primary | ICD-10-CM

## 2019-07-12 DIAGNOSIS — G47.00 INSOMNIA, UNSPECIFIED TYPE: ICD-10-CM

## 2019-07-12 DIAGNOSIS — Z23 NEED FOR VACCINATION: ICD-10-CM

## 2019-07-12 DIAGNOSIS — E78.2 MIXED HYPERLIPIDEMIA: ICD-10-CM

## 2019-07-12 PROCEDURE — 99214 OFFICE O/P EST MOD 30 MIN: CPT | Performed by: FAMILY MEDICINE

## 2019-07-12 PROCEDURE — G0439 PPPS, SUBSEQ VISIT: HCPCS | Performed by: FAMILY MEDICINE

## 2019-07-12 RX ORDER — TRAZODONE HYDROCHLORIDE 50 MG/1
50 TABLET ORAL
Qty: 30 TABLET | Refills: 1 | Status: SHIPPED | OUTPATIENT
Start: 2019-07-12 | End: 2019-10-03 | Stop reason: SDUPTHER

## 2019-07-12 RX ORDER — SIMVASTATIN 40 MG
40 TABLET ORAL DAILY
Qty: 90 TABLET | Refills: 1 | Status: SHIPPED | OUTPATIENT
Start: 2019-07-12 | End: 2020-01-20

## 2019-07-12 NOTE — PATIENT INSTRUCTIONS
Obesity   AMBULATORY CARE:   Obesity  is when your body mass index (BMI) is greater than 30  Your healthcare provider will use your height and weight to measure your BMI  The risks of obesity include  many health problems, such as injuries or physical disability  You may need tests to check for the following:  · Diabetes     · High blood pressure or high cholesterol     · Heart disease     · Gallbladder or liver disease     · Cancer of the colon, breast, prostate, liver, or kidney     · Sleep apnea     · Arthritis or gout  Seek care immediately if:   · You have a severe headache, confusion, or difficulty speaking  · You have weakness on one side of your body  · You have chest pain, sweating, or shortness of breath  Contact your healthcare provider if:   · You have symptoms of gallbladder or liver disease, such as pain in your upper abdomen  · You have knee or hip pain and discomfort while walking  · You have symptoms of diabetes, such as intense hunger and thirst, and frequent urination  · You have symptoms of sleep apnea, such as snoring or daytime sleepiness  · You have questions or concerns about your condition or care  Treatment for obesity  focuses on helping you lose weight to improve your health  Even a small decrease in BMI can reduce the risk for many health problems  Your healthcare provider will help you set a weight-loss goal   · Lifestyle changes  are the first step in treating obesity  These include making healthy food choices and getting regular physical activity  Your healthcare provider may suggest a weight-loss program that involves coaching, education, and therapy  · Medicine  may help you lose weight when it is used with a healthy diet and physical activity  · Surgery  can help you lose weight if you are very obese and have other health problems  There are several types of weight-loss surgery  Ask your healthcare provider for more information    Be successful losing weight:   · Set small, realistic goals  An example of a small goal is to walk for 20 minutes 5 days a week  Anther goal is to lose 5% of your body weight  · Tell friends, family members, and coworkers about your goals  and ask for their support  Ask a friend to lose weight with you, or join a weight-loss support group  · Identify foods or triggers that may cause you to overeat , and find ways to avoid them  Remove tempting high-calorie foods from your home and workplace  Place a bowl of fresh fruit on your kitchen counter  If stress causes you to eat, then find other ways to cope with stress  · Keep a diary to track what you eat and drink  Also write down how many minutes of physical activity you do each day  Weigh yourself once a week and record it in your diary  Eating changes: You will need to eat 500 to 1,000 fewer calories each day than you currently eat to lose 1 to 2 pounds a week  The following changes will help you cut calories:  · Eat smaller portions  Use small plates, no larger than 9 inches in diameter  Fill your plate half full of fruits and vegetables  Measure your food using measuring cups until you know what a serving size looks like  · Eat 3 meals and 1 or 2 snacks each day  Plan your meals in advance  Marybeth Baker and eat at home most of the time  Eat slowly  · Eat fruits and vegetables at every meal   They are low in calories and high in fiber, which makes you feel full  Do not add butter, margarine, or cream sauce to vegetables  Use herbs to season steamed vegetables  · Eat less fat and fewer fried foods  Eat more baked or grilled chicken and fish  These protein sources are lower in calories and fat than red meat  Limit fast food  Dress your salads with olive oil and vinegar instead of bottled dressing  · Limit the amount of sugar you eat  Do not drink sugary beverages  Limit alcohol  Activity changes:  Physical activity is good for your body in many ways   It helps you burn calories and build strong muscles  It decreases stress and depression, and improves your mood  It can also help you sleep better  Talk to your healthcare provider before you begin an exercise program   · Exercise for at least 30 minutes 5 days a week  Start slowly  Set aside time each day for physical activity that you enjoy and that is convenient for you  It is best to do both weight training and an activity that increases your heart rate, such as walking, bicycling, or swimming  · Find ways to be more active  Do yard work and housecleaning  Walk up the stairs instead of using elevators  Spend your leisure time going to events that require walking, such as outdoor festivals or fairs  This extra physical activity can help you lose weight and keep it off  Follow up with your healthcare provider as directed: You may need to meet with a dietitian  Write down your questions so you remember to ask them during your visits  © 2017 [address] [employer] Information is for End User's use only and may not be sold, redistributed or otherwise used for commercial purposes  All illustrations and images included in CareNotes® are the copyrighted property of [employer]  or Julian Condon  The above information is an  only  It is not intended as medical advice for individual conditions or treatments  Talk to your doctor, nurse or pharmacist before following any medical regimen to see if it is safe and effective for you  Urinary Incontinence   WHAT YOU NEED TO KNOW:   What is urinary incontinence? Urinary incontinence (UI) is when you lose control of your bladder  What causes UI? UI occurs because your bladder cannot store or empty urine properly  The following are the most common types of UI:  · Stress incontinence  is when you leak urine due to increased bladder pressure  This may happen when you cough, sneeze, or exercise       · Urge incontinence  is when you feel the need to urinate right away and leak urine accidentally  · Mixed incontinence  is when you have both stress and urge UI  What are the signs and symptoms of UI?   · You feel like your bladder does not empty completely when you urinate  · You urinate often and need to urinate immediately  · You leak urine when you sleep, or you wake up with the urge to urinate  · You leak urine when you cough, sneeze, exercise, or laugh  How is UI diagnosed? Your healthcare provider will ask how often you leak urine and whether you have stress or urge symptoms  Tell him which medicines you take, how often you urinate, and how much liquid you drink each day  You may need any of the following tests:  · Urine tests  may show infection or kidney function  · A pelvic exam  may be done to check for blockages  A pelvic exam will also show if your bladder, uterus, or other organs have moved out of place  · An x-ray, ultrasound, or CT  may show problems with parts of your urinary system  You may be given contrast liquid to help your organs show up better in the pictures  Tell the healthcare provider if you have ever had an allergic reaction to contrast liquid  Do not enter the MRI room with anything metal  Metal can cause serious injury  Tell the healthcare provider if you have any metal in or on your body  · A bladder scan  will show how much urine is left in your bladder after you urinate  You will be asked to urinate and then healthcare providers will use a small ultrasound machine to check the urine left in your bladder  · Cystometry  is used to check the function of your urinary system  Your healthcare provider checks the pressure in your bladder while filling it with fluid  Your bladder pressure may also be tested when your bladder is full and while you urinate  How is UI treated? · Medicines  can help strengthen your bladder control      · Electrical stimulation  is used to send a small amount of electrical energy to your pelvic floor muscles  This helps control your bladder function  Electrodes may be placed outside your body or in your rectum  For women, the electrodes may be placed in the vagina  · A bulking agent  may be injected into the wall of your urethra to make it thicker  This helps keep your urethra closed and decreases urine leakage  · Devices  such as a clamp, pessary, or tampon may help stop urine leaks  Ask your healthcare provider for more information about these and other devices  · Surgery  may be needed if other treatments do not work  Several types of surgery can help improve your bladder control  Ask your healthcare provider for more information about the surgery you may need  How can I manage my symptoms? · Do pelvic muscle exercises often  Your pelvic muscles help you stop urinating  Squeeze these muscles tight for 5 seconds, then relax for 5 seconds  Gradually work up to squeezing for 10 seconds  Do 3 sets of 15 repetitions a day, or as directed  This will help strengthen your pelvic muscles and improve bladder control  · A catheter  may be used to help empty your bladder  A catheter is a tiny, plastic tube that is put into your bladder to drain your urine  Your healthcare provider may tell you to use a catheter to prevent your bladder from getting too full and leaking urine  · Keep a UI record  Write down how often you leak urine and how much you leak  Make a note of what you were doing when you leaked urine  · Train your bladder  Go to the bathroom at set times, such as every 2 hours, even if you do not feel the urge to go  You can also try to hold your urine when you feel the urge to go  For example, hold your urine for 5 minutes when you feel the urge to go  As that becomes easier, hold your urine for 10 minutes  · Drink liquids as directed  Ask your healthcare provider how much liquid to drink each day and which liquids are best for you   You may need to limit the amount of liquid you drink to help control your urine leakage  Limit or do not have drinks that contain caffeine or alcohol  Do not drink any liquid right before you go to bed  · Prevent constipation  Eat a variety of high-fiber foods  Good examples are high-fiber cereals, beans, vegetables, and whole-grain breads  Prune juice may help make your bowel movement softer  Walking is the best way to trigger your intestines to have a bowel movement  · Exercise regularly and maintain a healthy weight  Ask your healthcare provider how much you should weigh and about the best exercise plan for you  Weight loss and exercise will decrease pressure on your bladder and help you control your leakage  Ask him to help you create a weight loss plan if you are overweight  When should I seek immediate care? · You have severe pain  · You are confused or cannot think clearly  When should I contact my healthcare provider? · You have a fever  · You see blood in your urine  · You have pain when you urinate  · You have new or worse pain, even after treatment  · Your mouth feels dry or you have vision changes  · Your urine is cloudy or smells bad  · You have questions or concerns about your condition or care  CARE AGREEMENT:   You have the right to help plan your care  Learn about your health condition and how it may be treated  Discuss treatment options with your caregivers to decide what care you want to receive  You always have the right to refuse treatment  The above information is an  only  It is not intended as medical advice for individual conditions or treatments  Talk to your doctor, nurse or pharmacist before following any medical regimen to see if it is safe and effective for you  © 2017 [address] [employer] Information is for End User's use only and may not be sold, redistributed or otherwise used for commercial purposes   All illustrations and images included in CareNotes® are the copyrighted property of [employer]  or Julian Condon  Cigarette Smoking and Your Health   AMBULATORY CARE:   Risks to your health if you smoke:  Nicotine and other chemicals found in tobacco damage every cell in your body  Even if you are a light smoker, you have an increased risk for cancer, heart disease, and lung disease  If you are pregnant or have diabetes, smoking increases your risk for complications  Benefits to your health if you stop smoking:   · You decrease respiratory symptoms such as coughing, wheezing, and shortness of breath  · You reduce your risk for cancers of the lung, mouth, throat, kidney, bladder, pancreas, stomach, and cervix  If you already have cancer, you increase the benefits of chemotherapy  You also reduce your risk for cancer returning or a second cancer from developing  · You reduce your risk for heart disease, blood clots, heart attack, and stroke  · You reduce your risk for lung infections, and diseases such as pneumonia, asthma, chronic bronchitis, and emphysema  · Your circulation improves  More oxygen can be delivered to your body  If you have diabetes, you lower your risk for complications, such as kidney, artery, and eye diseases  You also lower your risk for nerve damage  Nerve damage can lead to amputations, poor vision, and blindness  · You improve your body's ability to heal and to fight infections  Benefits to the health of others if you stop smoking:  Tobacco is harmful to nonsmokers who breathe in your secondhand smoke  The following are ways the health of others around you may improve when you stop smoking:  · You lower the risks for lung cancer and heart disease in nonsmoking adults  · If you are pregnant, you lower the risk for miscarriage, early delivery, low birth weight, and stillbirth  You also lower your baby's risk for SIDS, obesity, developmental delay, and neurobehavioral problems, such as ADHD  · If you have children, you lower their risk for ear infections, colds, pneumonia, bronchitis, and asthma  For more information and support to stop smoking:   · Smokefree  gov  Phone: 0- 918 - 051-4908  Web Address: www smokefrMetaset  Follow up with your healthcare provider as directed:  Write down your questions so you remember to ask them during your visits  © 2017 [address] [employer] Information is for End User's use only and may not be sold, redistributed or otherwise used for commercial purposes  All illustrations and images included in CareNotes® are the copyrighted property of [employer]  or Julian Condon  The above information is an  only  It is not intended as medical advice for individual conditions or treatments  Talk to your doctor, nurse or pharmacist before following any medical regimen to see if it is safe and effective for you  Fall Prevention   WHAT YOU NEED TO KNOW:   What is fall prevention? Fall prevention includes ways to make your home and other areas safer  It also includes ways you can move more carefully to prevent a fall  What increases my risk for falls? · Lack of vitamin D    · Not getting enough sleep each night    · Trouble walking or keeping your balance, or foot problems    · Health conditions that cause changes in your blood pressure, vision, or muscle strength and coordination    · Medicines that make you dizzy, weak, or sleepy    · Problems seeing clearly    · Shoes that have high heels or are not supportive    · Tripping hazards, such as items left on the floor, no handrails on the stairs, or broken steps  How can I help protect myself from falls? · Stand or sit up slowly  This may help you keep your balance and prevent falls  If you need to get up during the night, sit up first  Be sure you are fully awake before you stand  Turn on the light before you start walking  Go slowly in case you are still sleepy   Make sure you will not trip over any pets sleeping in the bedroom  · Use assistive devices as directed  Your healthcare provider may suggest that you use a cane or walker to help you keep your balance  You may need to have grab bars put in your bathroom near the toilet or in the shower  · Wear shoes that fit well and have soles that   Wear shoes both inside and outside  Use slippers with good   Do not wear shoes with high heels  · Wear a personal alarm  This is a device that allows you to call 911 if you fall and need help  Ask your healthcare provider for more information  · Stay active  Exercise can help strengthen your muscles and improve your balance  Your healthcare provider may recommend water aerobics or walking  He or she may also recommend physical therapy to improve your coordination  Never start an exercise program without talking to your healthcare provider first      · Manage medical conditions  Keep all appointments with your healthcare providers  Visit your eye doctor as directed  How can I make my home safer? · Add items to prevent falls in the bathroom  Put nonslip strips on your bath or shower floor to prevent you from slipping  Use a bath mat if you do not have carpet in the bathroom  This will prevent you from falling when you step out of the bath or shower  Use a shower seat so you do not need to stand while you shower  Sit on the toilet or a chair in your bathroom to dry yourself and put on clothing  This will prevent you from losing your balance from drying or dressing yourself while you are standing  · Keep paths clear  Remove books, shoes, and other objects from walkways and stairs  Place cords for telephones and lamps out of the way so that you do not need to walk over them  Tape them down if you cannot move them  Remove small rugs  If you cannot remove a rug, secure it with double-sided tape  This will prevent you from tripping  · Install bright lights in your home  Use night lights to help light paths to the bathroom or kitchen  Always turn on the light before you start walking  · Keep items you use often on shelves within reach  Do not use a step stool to help you reach an item  · Paint or place reflective tape on the edges of your stairs  This will help you see the stairs better  Call 911 or have someone else call if:   · You have fallen and are unconscious  · You have fallen and cannot move part of your body  Contact your healthcare provider if:   · You have fallen and have pain or a headache  · You have questions or concerns about your condition or care  CARE AGREEMENT:   You have the right to help plan your care  Learn about your health condition and how it may be treated  Discuss treatment options with your caregivers to decide what care you want to receive  You always have the right to refuse treatment  The above information is an  only  It is not intended as medical advice for individual conditions or treatments  Talk to your doctor, nurse or pharmacist before following any medical regimen to see if it is safe and effective for you  © 2017 [address] [facility] Information is for End User's use only and may not be sold, redistributed or otherwise used for commercial purposes  All illustrations and images included in CareNotes® are the copyrighted property of [employer]  or Julian Condon  Advance Directives   WHAT YOU NEED TO KNOW:   What are advance directives? Advance directives are legal documents that state your wishes and plans for medical care  These plans are made ahead of time in case you lose your ability to make decisions for yourself  Advance directives can apply to any medical decision, such as the treatments you want, and if you want to donate organs  What are the types of advance directives? There are many types of advance directives, and each state has rules about how to use them   You may choose a combination of any of the following:  · Living will: This is a written record of the treatment you want  You can also choose which treatments you do not want, which to limit, and which to stop at a certain time  This includes surgery, medicine, IV fluid, and tube feedings  · Durable power of  for healthcare [facility]): This is a written record that states who you want to make healthcare choices for you when you are unable to make them for yourself  This person, called a proxy, is usually a family member or a friend  You may choose more than 1 proxy  · Do not resuscitate (DNR) order:  A DNR order is used in case your heart stops beating or you stop breathing  It is a request not to have certain forms of treatment, such as CPR  A DNR order may be included in other types of advance directives  · Medical directive: This covers the care that you want if you are in a coma, near death, or unable to make decisions for yourself  You can list the treatments you want for each condition  Treatment may include pain medicine, surgery, blood transfusions, dialysis, IV or tube feedings, and a ventilator (breathing machine)  · Values history: This document has questions about your views, beliefs, and how you feel and think about life  This information can help others choose the care that you would choose  Why are advance directives important? An advance directive helps you control your care  Although spoken wishes may be used, it is better to have your wishes written down  Spoken wishes can be misunderstood, or not followed  Treatments may be given even if you do not want them  An advance directive may make it easier for your family to make difficult choices about your care  How do I decide what to put in my advance directives? · Make informed decisions:  Make sure you fully understand treatments or care you may receive   Think about the benefits and problems your decisions could cause for you or your family  Talk to healthcare providers if you have concerns or questions before you write down your wishes  You may also want to talk with your Jainism or , or a   Check your state laws to make sure that what you put in your advance directive is legal      · Sign all forms:  Sign and date your advance directive when you have finished  You may also need 2 witnesses to sign the forms  Witnesses cannot be your doctor or his staff, your spouse, heirs or beneficiaries, people you owe money to, or your chosen proxy  Talk to your family, proxy, and healthcare providers about your advance directive  Give each person a copy, and keep one for yourself in a place you can get to easily  Do not keep it hidden or locked away  · Review and revise your plans: You can revise your advance directive at any time, as long as you are able to make decisions  Review your plan every year, and when there are changes in your life, or your health  When you make changes, let your family, proxy, and healthcare providers know  Give each a new copy  Where can I find more information? · [employer]  [address] , [address]  Phone: 2- 862 - 424-5668  Phone: 7- 916 - [phone]  Web Address: http://www  aafp org  · [address] [facility]  [address] [facility], [address] [facility] , [address] [place] [address]  Phone: 7- [phone] - [phone]  Phone: [phone]  Web Address: Rey tolbert  [facility] AGREEMENT:   You have the right to help plan your care  To help with this plan, you must learn about your health condition and treatment options  You must also learn about advance directives and how they are used  Work with your healthcare providers to decide what care will be used to treat you  You always have the right to refuse treatment  The above information is an  only   It is not intended as medical advice for individual conditions or treatments  Talk to your doctor, nurse or pharmacist before following any medical regimen to see if it is safe and effective for you  © 2017 [address] [employer] Information is for End User's use only and may not be sold, redistributed or otherwise used for commercial purposes  All illustrations and images included in CareNotes® are the copyrighted property of [employer]  or Julian Condon

## 2019-07-12 NOTE — PROGRESS NOTES
Assessment and Plan:   MEDICARE WELLNESS VISIT TODAY  PATIENT HAS A LIVING WILL AND HEALTHCARE POWER OF   DEPRESSION SCREEN AND FALL RISK WERE NEGATIVE  PATIENT IS CURRENT WITH AGE APPROPRIATE VACCINATIONS  RX GIVEN FOR SHINGRIX VACCINE  PATIENT IS CURRENT WITH COLONOSCOPY AND PSA      Problem List Items Addressed This Visit     DM2 (diabetes mellitus, type 2) ([facility] [address] )    Mixed hyperlipidemia    S/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, right      Other Visit Diagnoses     Encounter for Medicare annual wellness exam    -  Primary    Need for vaccination             History of Present Illness:     Patient presents for Welcome to Medicare visit  Patient Care Team:  Bernice Salomon DO as PCP - General  MD Joey Bird MD     Review of Systems:     Review of Systems   Gastrointestinal: Negative for bowel incontinence  Psychiatric/Behavioral: The patient is not nervous/anxious           Problem List:     Patient Active Problem List   Diagnosis    Primary osteoarthritis of right knee    Tendinitis of right rotator cuff    BPH (benign prostatic hyperplasia)    DM2 (diabetes mellitus, type 2) ([facility] [address] )    Mixed hyperlipidemia    Postherpetic neuralgia    Renal cell carcinoma of right kidney (HCC)    Vitamin D deficiency    S/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, right    Cervical strain    Ganglion of flexor tendon sheath of left thumb    Sudden onset of severe headache    Incomplete tear of right rotator cuff    Status post arthroscopy of right shoulder    Encounter for other orthopedic aftercare    Vascular disorder of kidney      Past Medical and Surgical History:     Past Medical History:   Diagnosis Date    Allergic reaction     last assessed: 5/26/2015    Arthritis     right knee    Bee sting allergy     last assessed: 6/30/2015    BPH (benign prostatic hypertrophy)     Cancer ([facility] [address] ) 2017    Right kidney    Diabetes mellitus ([facility] [address] )     Type II, NIDDM    Enlarged prostate with lower urinary tract symptoms (LUTS)     last assessed: 8/17/2015    Herpes zoster     last assessed: 11/21/2013    History of colon polyps     Hyperlipidemia     Leukocytosis     last assessed: 11/18/2014    Osteoarthrosis, localized, primary, knee     last assessed: 10/25/2016    Seasonal allergies     Shoulder pain, right     Wears glasses     Wears partial dentures     Upper and Lower     Past Surgical History:   Procedure Laterality Date    CARPAL TUNNEL RELEASE Left     CHOLECYSTECTOMY      Open    COLONOSCOPY      GANGLION CYST EXCISION Left     Left wrist    JOINT REPLACEMENT Left 08/2015    Left TKR    JOINT REPLACEMENT Right 09/2017    Right TKR    NEPHRECTOMY  05/08/2017    Partial right nephrectomy    MA LAP,PARTIAL NEPHRECTOMY Right 5/8/2017    Procedure: LAPAROSCOPIC LYSIS OF ADHESIONS, ROBOTIC PARTIAL NEPHRECTOMY, WITH INTRA-OP LAPAROSCOPIC ULTRASOUND;  Surgeon: Leo Correia MD;  [facility];  Service: Urology    MA REPAIR BICEPS LONG TENDON  2/4/2019    Procedure: TENODESIS BICEPS OPEN PROXIMAL;  Surgeon: Jose Raul Paz MD;  [facility];  Service: Orthopedics    MA REPAIR INTERCARP/CARP-METACARP JT Left 12/5/2016    Procedure: ARTHROPLASTY THUMB [facility] ;  Surgeon: Jose Raul Paz MD;  [facility];  Service: Orthopedics    MA REVISE MEDIAN N/CARPAL TUNNEL SURG Left 12/5/2016    Procedure: RELEASE CARPAL TUNNEL;  Surgeon: Jose Raul Paz MD;  [facility];  Service: Orthopedics    MA [address] ARTHROSCOP,SURG,W/ROTAT CUFF REPR Right 2/4/2019    Procedure: REPAIR ROTATOR CUFF ARTHROSCOPIC; SUBACROMIAL DECOMPRESSION; DISTAL CLAVICLE EXCISION;  Surgeon: Jose Raul Paz MD;  [facility];  Service: Orthopedics    MA TOTAL KNEE ARTHROPLASTY Right 9/11/2017    Procedure: ARTHROPLASTY KNEE TOTAL;  Surgeon: Jose Raul Paz MD;  [facility];  Service: Orthopedics    TONSILLECTOMY AND ADENOIDECTOMY        Family History:     Family History   Problem Relation Age of Onset    Cancer Mother     Stroke Father       Social History:     Social History     Tobacco Use   Smoking Status Former Smoker    Packs/day: 0 25    Types: Cigarettes   Smokeless Tobacco Never Used   Tobacco Comment    Smoked from ages 16 to 25  Social History     Substance and Sexual Activity   Alcohol Use Yes    Comment: 1-2 x year     Social History     Substance and Sexual Activity   Drug Use No      Medications and Allergies:     Current Outpatient Medications   Medication Sig Dispense Refill    Cholecalciferol (VITAMIN D-3 PO) Take 2,000 Units by mouth daily      EPINEPHrine (EPIPEN 2-MOIZ) 0 3 mg/0 3 mL SOAJ Inject as directed      metFORMIN (GLUCOPHAGE) 500 mg tablet Take 1 tablet (500 mg total) by mouth 2 (two) times a day 180 tablet 1    simvastatin (ZOCOR) 40 mg tablet Take 1 tablet (40 mg total) by mouth daily 90 tablet 1    tamsulosin (FLOMAX) 0 4 mg Take 1 capsule by mouth daily with dinner         No current facility-administered medications for this visit  Allergies   Allergen Reactions    Bee Venom Edema      Immunizations:     Immunization History   Administered Date(s) Administered    INFLUENZA 11/18/2014, 10/05/2015, 11/28/2016, 01/04/2018, 10/15/2018    Influenza Split High Dose Preservative Free IM 11/18/2014, 10/05/2015, 11/28/2016, 01/04/2018    Influenza TIV (IM) 10/05/2012, 10/09/2013    Influenza, high dose seasonal 0 5 mL 10/15/2018    Pneumococcal Conjugate 13-Valent 12/08/2015, 12/09/2015    Pneumococcal Polysaccharide PPV23 10/09/2013    Tdap 11/15/2004, 10/16/2017    Zoster 10/05/2012      Medicare Screening Tests and Risk Assessments:     Last Medicare Wellness visit information reviewed, patient interviewed and updates made to the record today  Health Risk Assessment:  Patient rates overall health as good  Patient feels that their physical health rating is Same  Eyesight was rated as Same   Hearing was rated as Same  Patient feels that their emotional and mental health rating is Same  Pain experienced by patient in the last 7 days has been A lot  Patient states that he has experienced weight loss or gain in last 6 months  Emotional/Mental Health:  Patient has not been feeling nervous/anxious  PHQ-9 Depression Screening:    Frequency of the following problems over the past two weeks:      1  Little interest or pleasure in doing things: 0 - not at all      2  Feeling down, depressed, or hopeless: 0 - not at all  PHQ-2 Score: 0          Broken Bones/Falls: Fall Risk Assessment:    In the past year, patient has experienced: No history of falling in past year          Bladder/Bowel:  Patient has not leaked urine accidently in the last six months  Patient reports no loss of bowel control  Immunizations:  Patient has had a flu vaccination within the last year  Patient has received a pneumonia shot  Patient has received a shingles shot  Patient has received tetanus/diphtheria shot  Home Safety:  Patient does not have trouble with stairs inside or outside of their home  Patient currently reports that there are no safety hazards present in home, working smoke alarms, working carbon monoxide detectors  Preventative Screenings:   prostate cancer screen performed, colon cancer screen completed, cholesterol screen completed, glaucoma eye exam completed,     Nutrition:  Current diet: Regular with servings of the following:    Medications:  Patient is currently taking over-the-counter supplements  List of OTC medications includes: vitamins  Patient is able to manage medications  Lifestyle Choices:  Patient reports no tobacco use  Patient has not smoked or used tobacco in the past   Patient reports no alcohol use  Patient drives a vehicle  Patient wears seat belt  Current level of exercise of physical activity described by patient as: walking,weight lifting          Activities of Daily Living:  Can get out of bed by his or her self, able to dress self, able to make own meals, able to do own shopping, able to bathe self, can do own laundry/housekeeping, can manage own money, pay bills and track expenses    Previous Hospitalizations:  No hospitalization or ED visit in past 12 months        Advanced Directives:  Patient has decided on a power of   Patient has spoken to designated power of   Patient has completed advanced directive  Preventative Screening/Counseling:      Cardiovascular:      General: Risks and Benefits Discussed          Diabetes:      General: Risks and Benefits Discussed          Colorectal Cancer:      General: Risks and Benefits Discussed          Prostate Cancer:      General: Risks and Benefits Discussed          Osteoporosis:      General: Risks and Benefits Discussed          AAA:      General: Risks and Benefits Discussed          Glaucoma:      General: Risks and Benefits Discussed          HIV:      General: Risks and Benefits Discussed          Hepatitis C:      General: Risks and Benefits Discussed        Advanced Directives:   Patient has living will for healthcare, Information on ACP and/or AD provided  End of life assessment reviewed with patient       Immunizations:      Influenza: Influenza UTD This Year and Influenza Recommended Annually      Pneumococcal: Risks & Benefits Discussed and Lifetime Vaccine Completed      Shingrix: Risks & Benefits Discussed      TD: Risks & Benefits Discussed        Referrals:  Referral(s) to: Nutrition      No exam data present     Physical Exam:     /70 (BP Location: Left arm, Patient Position: Sitting, Cuff Size: Adult)   Pulse 70   Temp (!) 97 °F (36 1 °C) (Tympanic)   Resp 16   Ht 5' 10 87" (1 8 m)   Wt 87 1 kg (192 lb)   SpO2 98%   BMI 26 88 kg/m²     Physical Exam

## 2019-07-12 NOTE — ASSESSMENT & PLAN NOTE
Lab Results   Component Value Date    HGBA1C 6 8 (H) 06/29/2019       No results for input(s): POCGLU in the last 72 hours  Blood Sugar Average: Last 72 hrs:   A1c 6 8%, was 6 6%  Patient compliant with metformin 500 b i d     We had a long discussion regarding improving diet and continuing exercise    Renal function is normal   Will continue to monitor

## 2019-07-12 NOTE — PROGRESS NOTES
[facility]      NAME: Becky Gupta  AGE: 69 y o  SEX: male  : 1945   MRN: [id number]    DATE: 2019  TIME: 9:31 AM    Assessment and Plan     Problem List Items Addressed This Visit     DM2 (diabetes mellitus, type 2) ([facility] [address] )     Lab Results   Component Value Date    HGBA1C 6 8 (H) 2019       No results for input(s): POCGLU in the last 72 hours  Blood Sugar Average: Last 72 hrs:   A1c 6 8%, was 6 6%  Patient compliant with metformin 500 b i d     We had a long discussion regarding improving diet and continuing exercise  Renal function is normal   Will continue to monitor         Relevant Orders    Comprehensive metabolic panel    Hemoglobin A1C    Insomnia    Relevant Medications    traZODone (DESYREL) 50 mg tablet    Mixed hyperlipidemia    Relevant Medications    simvastatin (ZOCOR) 40 mg tablet    Renal cell carcinoma of right kidney [facility])     Patient is status post robotic partial right nephrectomy 2017 by Dr Cisco Barros  Patient doing well and is seeing urologist on a yearly basis         S/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, right     Status post bilateral total knee replacement by Dr Werner Canales  Patient stable           Other Visit Diagnoses     Encounter for Medicare annual wellness exam    -  Primary    Need for vaccination        Relevant Medications    Zoster Vac Recomb Adjuvanted ([facility]) 48 MCG/0 5ML SUSR    Screening for malignant neoplasm of prostate        Relevant Orders    PSA, Total Screen              Return to office in:  6 MONTHS, FBW prior at [facility]) lab    Chief Complaint     Chief Complaint   Patient presents with   [facility] Wellness Visit    Follow-up     6 months check up       History of Present Illness     Patient presents for recheck of chronic medical problems today  Overall is feeling well  He still watches his diet and exercises regularly  He is compliant with all prescribed medications  Taking metformin for diabetes  Doing well on simvastatin for cholesterol  Still sees urology yearly  Patient had labs drawn on June 29      The following portions of the patient's history were reviewed and updated as appropriate: allergies, current medications, past family history, past medical history, past social history, past surgical history and problem list     Review of Systems   Review of Systems   Respiratory: Negative  Cardiovascular: Negative  Gastrointestinal: Negative  Genitourinary: Negative  Active Problem List     Patient Active Problem List   Diagnosis    Primary osteoarthritis of right knee    Tendinitis of right rotator cuff    BPH (benign prostatic hyperplasia)    DM2 (diabetes mellitus, type 2) ([facility] [address] )    Insomnia    Mixed hyperlipidemia    Postherpetic neuralgia    Renal cell carcinoma of right kidney (HCC)    Vitamin D deficiency    S/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, right    Cervical strain    Ganglion of flexor tendon sheath of left thumb    Sudden onset of severe headache    Incomplete tear of right rotator cuff    Status post arthroscopy of right shoulder    Encounter for other orthopedic aftercare    Vascular disorder of kidney       Objective   /70 (BP Location: Left arm, Patient Position: Sitting, Cuff Size: Adult)   Pulse 70   Temp (!) 97 °F (36 1 °C) (Tympanic)   Resp 16   Ht 5' 10 87" (1 8 m)   Wt 87 1 kg (192 lb)   SpO2 98%   BMI 26 88 kg/m²     Physical Exam   Cardiovascular: Normal rate, regular rhythm, normal heart sounds and intact distal pulses  Carotids: no bruits  Ext: no edema   Pulmonary/Chest: Effort normal  No respiratory distress  He has no wheezes  He has no rales  Psychiatric: He has a normal mood and affect   His behavior is normal  Thought content normal        Pertinent Laboratory/Diagnostic Studies:  LAB RESULTS    Current Medications     Current Outpatient Medications:     Cholecalciferol (VITAMIN D-3 PO), Take 2,000 Units by mouth daily, Disp: , Rfl:     EPINEPHrine (EPIPEN 2-MOIZ) 0 3 mg/0 3 mL SOAJ, Inject as directed, Disp: , Rfl:     metFORMIN (GLUCOPHAGE) 500 mg tablet, Take 1 tablet (500 mg total) by mouth 2 (two) times a day, Disp: 180 tablet, Rfl: 1    simvastatin (ZOCOR) 40 mg tablet, Take 1 tablet (40 mg total) by mouth daily, Disp: 90 tablet, Rfl: 1    tamsulosin (FLOMAX) 0 4 mg, Take 1 capsule by mouth daily with dinner  , Disp: , Rfl:     traZODone (DESYREL) 50 mg tablet, Take 1 tablet (50 mg total) by mouth daily at bedtime, Disp: 30 tablet, Rfl: 1    Zoster Vac Recomb Adjuvanted (SHINGRIX) 50 MCG/0 5ML SUSR, Inject 1 Syringe into a muscle once for 1 dose, Disp: 1 each, Rfl: 1    Health Maintenance     Health Maintenance   Topic Date Due    Hepatitis C Screening  1945    Medicare Annual Wellness Visit (AWV)  1945    BMI: Followup Plan  05/31/1964    HEPATITIS B VACCINES (1 of 3 - Risk 3-dose series) 05/31/1965    CRC Screening: Colonoscopy  06/05/2017    INFLUENZA VACCINE  07/01/2019    DM Eye Exam  08/14/2019    HEMOGLOBIN A1C  12/29/2019    URINE MICROALBUMIN  01/03/2020    Fall Risk  02/11/2020    Depression Screening PHQ  02/11/2020    Diabetic Foot Exam  05/15/2020    BMI: Adult  05/31/2020    DTaP,Tdap,and Td Vaccines (3 - Td) 10/16/2027    Pneumococcal Vaccine: 65+ Years  Completed    Pneumococcal Vaccine: Pediatrics (0 to 5 Years) and At-Risk Patients (6 to 59 Years)  Aged [employer] History   Administered Date(s) Administered    INFLUENZA 11/18/2014, 10/05/2015, 11/28/2016, 01/04/2018, 10/15/2018    Influenza Split High Dose Preservative Free IM 11/18/2014, 10/05/2015, 11/28/2016, 01/04/2018    Influenza TIV (IM) 10/05/2012, 10/09/2013    Influenza, high dose seasonal 0 5 mL 10/15/2018    Pneumococcal Conjugate 13-Valent 12/08/2015, 12/09/2015    Pneumococcal Polysaccharide PPV23 10/09/2013    Tdap 11/15/2004, 10/16/2017    Zoster 10/05/2012       DO Manuel Key  [facility]

## 2019-07-12 NOTE — ASSESSMENT & PLAN NOTE
Patient is status post robotic partial right nephrectomy 5/8/2017 by Dr Daphney Brooke    Patient doing well and is seeing urologist on a yearly basis

## 2019-07-22 DIAGNOSIS — R51.9 NONINTRACTABLE HEADACHE, UNSPECIFIED CHRONICITY PATTERN, UNSPECIFIED HEADACHE TYPE: Primary | ICD-10-CM

## 2019-07-22 RX ORDER — TRAMADOL HYDROCHLORIDE 50 MG/1
TABLET ORAL
Qty: 20 TABLET | Refills: 0 | Status: SHIPPED | OUTPATIENT
Start: 2019-07-22 | End: 2020-01-15 | Stop reason: ALTCHOICE

## 2019-08-07 ENCOUNTER — OFFICE VISIT (OUTPATIENT)
Dept: FAMILY MEDICINE CLINIC | Facility: CLINIC | Age: 74
End: 2019-08-07
Payer: MEDICARE

## 2019-08-07 VITALS
OXYGEN SATURATION: 98 % | HEART RATE: 61 BPM | DIASTOLIC BLOOD PRESSURE: 70 MMHG | RESPIRATION RATE: 17 BRPM | SYSTOLIC BLOOD PRESSURE: 128 MMHG | BODY MASS INDEX: 27.61 KG/M2 | WEIGHT: 197.25 LBS | TEMPERATURE: 97.8 F

## 2019-08-07 DIAGNOSIS — N28.89 VASCULAR DISORDER OF KIDNEY: ICD-10-CM

## 2019-08-07 DIAGNOSIS — L25.5 RHUS DERMATITIS: Primary | ICD-10-CM

## 2019-08-07 PROCEDURE — 99213 OFFICE O/P EST LOW 20 MIN: CPT | Performed by: FAMILY MEDICINE

## 2019-08-07 RX ORDER — METHYLPREDNISOLONE 4 MG/1
TABLET ORAL
Qty: 21 TABLET | Refills: 0 | Status: SHIPPED | OUTPATIENT
Start: 2019-08-07 | End: 2019-08-30 | Stop reason: ALTCHOICE

## 2019-08-07 RX ORDER — MOMETASONE FUROATE 1 MG/G
CREAM TOPICAL DAILY
Qty: 45 G | Refills: 2 | Status: SHIPPED | OUTPATIENT
Start: 2019-08-07 | End: 2020-01-15

## 2019-08-07 NOTE — PROGRESS NOTES
[facility]      NAME: Donya Lerma  AGE: 69 y o  SEX: male  : 1945   MRN: [id number]    DATE: 2019  TIME: 4:43 PM    Assessment and Plan     Problem List Items Addressed This Visit     Vascular disorder of kidney      Other Visit Diagnoses     Rhus dermatitis    -  Primary    2-3 day history of itchy rash on L forearm and under right eye  Pt has been working outside lately  will give rx for medrol dose pack along w/ elocon crm    Relevant Medications    methylPREDNISolone 4 MG tablet therapy pack    mometasone (ELOCON) 0 1 % cream              Return to office in: prn    Chief Complaint     Chief Complaint   Patient presents with    Rash on L forearm     and R side of face x yesterday  Pt states he was pulling weeds        History of Present Illness     2-3 day history of itchy rash on L forearm and under right eye  Pt has been working outside lately  The following portions of the patient's history were reviewed and updated as appropriate: allergies, current medications, past family history, past medical history, past social history, past surgical history and problem list     Review of Systems   Review of Systems   Skin: Positive for rash         Active Problem List     Patient Active Problem List   Diagnosis    Primary osteoarthritis of right knee    Tendinitis of right rotator cuff    BPH (benign prostatic hyperplasia)    DM2 (diabetes mellitus, type 2) ([facility] [address] )    Insomnia    Mixed hyperlipidemia    Postherpetic neuralgia    Renal cell carcinoma of right kidney (HCC)    Vitamin D deficiency    S/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, right    Cervical strain    Ganglion of flexor tendon sheath of left thumb    Sudden onset of severe headache    Incomplete tear of right rotator cuff    Status post arthroscopy of right shoulder    Encounter for other orthopedic aftercare    Vascular disorder of kidney    Nonintractable headache       Objective   BP 128/70 (BP Location: Left arm, Patient Position: Sitting, Cuff Size: Adult)   Pulse 61   Temp 97 8 °F (36 6 °C) (Tympanic)   Resp 17   Wt 89 5 kg (197 lb 4 oz)   SpO2 98%   BMI 27 61 kg/m²     Physical Exam   Skin: Rash noted         Pertinent Laboratory/Diagnostic Studies:  none    Current Medications     Current Outpatient Medications:     Cholecalciferol (VITAMIN D-3 PO), Take 2,000 Units by mouth daily, Disp: , Rfl:     EPINEPHrine (EPIPEN 2-MOIZ) 0 3 mg/0 3 mL SOAJ, Inject as directed, Disp: , Rfl:     metFORMIN (GLUCOPHAGE) 500 mg tablet, Take 1 tablet (500 mg total) by mouth 2 (two) times a day, Disp: 180 tablet, Rfl: 1    simvastatin (ZOCOR) 40 mg tablet, Take 1 tablet (40 mg total) by mouth daily, Disp: 90 tablet, Rfl: 1    tamsulosin (FLOMAX) 0 4 mg, Take 1 capsule by mouth daily with dinner  , Disp: , Rfl:     traMADol (ULTRAM) 50 mg tablet, 1 tab BID PRN, Disp: 20 tablet, Rfl: 0    traZODone (DESYREL) 50 mg tablet, Take 1 tablet (50 mg total) by mouth daily at bedtime, Disp: 30 tablet, Rfl: 1    methylPREDNISolone 4 MG tablet therapy pack, Use as directed on package, Disp: 21 tablet, Rfl: 0    mometasone (ELOCON) 0 1 % cream, Apply topically daily, Disp: 45 g, Rfl: 2    Health Maintenance     Health Maintenance   Topic Date Due    Hepatitis C Screening  1945    BMI: Followup Plan  05/31/1964    CRC Screening: Colonoscopy  06/05/2017    HEPATITIS B VACCINES (1 of 3 - Risk 3-dose series) 09/07/2019 (Originally 5/31/1965)    INFLUENZA VACCINE  10/07/2019 (Originally 7/1/2019)    DM Eye Exam  08/14/2019    HEMOGLOBIN A1C  12/29/2019    URINE MICROALBUMIN  01/03/2020    Diabetic Foot Exam  05/15/2020    Fall Risk  07/12/2020    Depression Screening PHQ  07/12/2020    Medicare Annual Wellness Visit (AWV)  07/12/2020    BMI: Adult  07/12/2020    DTaP,Tdap,and Td Vaccines (3 - Td) 10/16/2027    Pneumococcal Vaccine: 65+ Years  Completed    Pneumococcal Vaccine: Pediatrics (0 to 5 Years) and At-Risk Patients (6 to 59 Years)  Aged [employer] History   Administered Date(s) Administered    INFLUENZA 11/18/2014, 10/05/2015, 11/28/2016, 01/04/2018, 10/15/2018    Influenza Split High Dose Preservative Free IM 11/18/2014, 10/05/2015, 11/28/2016, 01/04/2018    Influenza TIV (IM) 10/05/2012, 10/09/2013    Influenza, high dose seasonal 0 5 mL 10/15/2018    Pneumococcal Conjugate 13-Valent 12/08/2015, 12/09/2015    Pneumococcal Polysaccharide PPV23 10/09/2013    Tdap 11/15/2004, 10/16/2017    Zoster 10/05/2012       Merle White DO  [facility]

## 2019-08-19 NOTE — TELEPHONE ENCOUNTER
I put in a referral for IR for bone biopsy for this patient can be please try to get this patient scheduled with them 
I spoke to Rashida in IR today and she states that the radiologist wanted to speak to Dr Baldev Stokes or Sadaf Antonio to discuss the case  I did provide the necessary numbers for him to reach them 
Jim Waggoner  [phone]    Dr Phillip Galeano    Patient would like to proceed with bone biopsy  Please advise   thanks
See note from 10/11/2018 regarding requesting for bone biopsy not to be done at this time
no

## 2019-08-21 ENCOUNTER — OFFICE VISIT (OUTPATIENT)
Dept: PODIATRY | Facility: CLINIC | Age: 74
End: 2019-08-21
Payer: MEDICARE

## 2019-08-21 VITALS
SYSTOLIC BLOOD PRESSURE: 120 MMHG | WEIGHT: 197.2 LBS | DIASTOLIC BLOOD PRESSURE: 71 MMHG | BODY MASS INDEX: 28.23 KG/M2 | HEIGHT: 70 IN

## 2019-08-21 DIAGNOSIS — M79.674 PAIN IN TOE OF RIGHT FOOT: ICD-10-CM

## 2019-08-21 DIAGNOSIS — B35.1 ONYCHOMYCOSIS: Primary | ICD-10-CM

## 2019-08-21 DIAGNOSIS — M79.675 PAIN IN TOE OF LEFT FOOT: ICD-10-CM

## 2019-08-21 PROCEDURE — 11720 DEBRIDE NAIL 1-5: CPT | Performed by: PODIATRIST

## 2019-08-21 NOTE — PROGRESS NOTES
Patient presents for nail care  Chief complaint is pain in his right 3rd toenail and left 2nd toenail  Each of these toenails are thickened yellow secondary to onychomycosis  Shoe pressure causes pain  The remainder of the toenails are elongated  Treatment:  Debridement of right 3rd toenail and left 2nd toenail performed reducing the toenails in thickness and removing devitalized tissue and subungual debris  Trimmed remaining elongated toenails

## 2019-08-26 DIAGNOSIS — E11.8 TYPE 2 DIABETES MELLITUS WITH COMPLICATION, WITHOUT LONG-TERM CURRENT USE OF INSULIN (HCC): ICD-10-CM

## 2019-08-27 ENCOUNTER — OFFICE VISIT (OUTPATIENT)
Dept: URGENT CARE | Facility: CLINIC | Age: 74
End: 2019-08-27
Payer: MEDICARE

## 2019-08-27 VITALS
WEIGHT: 197 LBS | BODY MASS INDEX: 28.2 KG/M2 | SYSTOLIC BLOOD PRESSURE: 142 MMHG | OXYGEN SATURATION: 96 % | TEMPERATURE: 97.6 F | HEIGHT: 70 IN | HEART RATE: 66 BPM | DIASTOLIC BLOOD PRESSURE: 80 MMHG | RESPIRATION RATE: 18 BRPM

## 2019-08-27 DIAGNOSIS — J02.9 ACUTE PHARYNGITIS, UNSPECIFIED ETIOLOGY: Primary | ICD-10-CM

## 2019-08-27 DIAGNOSIS — J06.9 UPPER RESPIRATORY TRACT INFECTION, UNSPECIFIED TYPE: ICD-10-CM

## 2019-08-27 LAB — S PYO AG THROAT QL: NEGATIVE

## 2019-08-27 PROCEDURE — 99213 OFFICE O/P EST LOW 20 MIN: CPT | Performed by: PHYSICIAN ASSISTANT

## 2019-08-27 PROCEDURE — G0463 HOSPITAL OUTPT CLINIC VISIT: HCPCS | Performed by: PHYSICIAN ASSISTANT

## 2019-08-27 PROCEDURE — 87880 STREP A ASSAY W/OPTIC: CPT | Performed by: PHYSICIAN ASSISTANT

## 2019-08-27 NOTE — PATIENT INSTRUCTIONS
This appears to be  a viral illness and no antibiotic is indicated at this time  Tylenol  as needed for sore throat, or any fevers, body aches and pains  Cough drops, throat lozenges as needed  Vaporizer  Fluids  Rest     You may use over the counter cough / cold medication as directed  Coricidin HPB  Nasal saline spray may be helpful or steamy air and then blowing nose to help clear sinuses and nasal passages  Cough suppressants may cause drowsiness therefore recommend home use only or bedtime use only  If you have a fever, it  typically lasts  approximately 3-5 days  Sore throat typically lasts 5-7 days  Nasal drainage, congestions and / or cough typically peak around 8-10 days and then slowly resolve over next few weeks  Yellow or green mucous does not necessarily mean a bacterial infection  If any difficulty breathing in lungs, profound weakness, chest pain proceed to ER for further evaluation, otherwise follow up with PCP if not improving as anticipated

## 2019-08-27 NOTE — PROGRESS NOTES
[facility]    NAME: Mariza Vasquez is a 69 y o  male  : 1945    MRN: [id number]  DATE: 2019  TIME: 7:44 PM    Assessment and Plan   Acute pharyngitis, unspecified etiology [J02 9]  1  Acute pharyngitis, unspecified etiology  POCT rapid strepA   2  Upper respiratory tract infection, unspecified type         Patient Instructions     Patient Instructions   This appears to be  a viral illness and no antibiotic is indicated at this time  Tylenol  as needed for sore throat, or any fevers, body aches and pains  Cough drops, throat lozenges as needed  Vaporizer  Fluids  Rest     You may use over the counter cough / cold medication as directed  Coricidin HPB  Nasal saline spray may be helpful or steamy air and then blowing nose to help clear sinuses and nasal passages  Cough suppressants may cause drowsiness therefore recommend home use only or bedtime use only  If you have a fever, it  typically lasts  approximately 3-5 days  Sore throat typically lasts 5-7 days  Nasal drainage, congestions and / or cough typically peak around 8-10 days and then slowly resolve over next few weeks  Yellow or green mucous does not necessarily mean a bacterial infection  If any difficulty breathing in lungs, profound weakness, chest pain proceed to ER for further evaluation, otherwise follow up with PCP if not improving as anticipated  Chief Complaint     Chief Complaint   Patient presents with    Sore Throat     sore throat for 2 day       History of Present Illness   Mariza Vasquez presents to the clinic c/o  50-year-old male with sore throat that started a couple days ago  About 5 days ago in noted runny nose nasal congestion and postnasal drip  No fever chills  No chest pain or shortness of breath  Has sucked on some throat lozenges but not has not done anything else to help himself  Review of Systems   Review of Systems   Constitutional: Negative      HENT: Positive for congestion, postnasal drip, rhinorrhea, sore throat and trouble swallowing  Negative for facial swelling, hearing loss, mouth sores, nosebleeds, sinus pressure, sinus pain, sneezing, tinnitus and voice change  Eyes: Negative  Respiratory: Positive for cough  Negative for apnea, choking, chest tightness, shortness of breath, wheezing and stridor  Cardiovascular: Negative  Hematological: Negative          Current Medications     Long-Term Medications   Medication Sig Dispense Refill    metFORMIN (GLUCOPHAGE) 500 mg tablet Take 1 tablet (500 mg total) by mouth 2 (two) times a day 180 tablet 1    mometasone (ELOCON) 0 1 % cream Apply topically daily 45 g 2    simvastatin (ZOCOR) 40 mg tablet Take 1 tablet (40 mg total) by mouth daily 90 tablet 1    tamsulosin (FLOMAX) 0 4 mg Take 1 capsule by mouth daily with dinner        traZODone (DESYREL) 50 mg tablet Take 1 tablet (50 mg total) by mouth daily at bedtime 30 tablet 1       Current Allergies     Allergies as of 08/27/2019 - Reviewed 08/27/2019   Allergen Reaction Noted    Bee venom Edema 02/04/2019          The following portions of the patient's history were reviewed and updated as appropriate: allergies, current medications, past family history, past medical history, past social history, past surgical history and problem list   Past Medical History:   Diagnosis Date    Allergic reaction     last assessed: 5/26/2015    Arthritis     right knee    Bee sting allergy     last assessed: 6/30/2015    BPH (benign prostatic hypertrophy)     Cancer ([facility] ) 2017    Right kidney    Diabetes mellitus ([facility] )     Type II, NIDDM    Enlarged prostate with lower urinary tract symptoms (LUTS)     last assessed: 8/17/2015    Herpes zoster     last assessed: 11/21/2013    History of colon polyps     Hyperlipidemia     Leukocytosis     last assessed: 11/18/2014    Osteoarthrosis, localized, primary, knee     last assessed: 10/25/2016    Seasonal allergies  Shoulder pain, right     Wears glasses     Wears partial dentures     Upper and Lower     Past Surgical History:   Procedure Laterality Date    CARPAL TUNNEL RELEASE Left     CHOLECYSTECTOMY      Open    COLONOSCOPY      GANGLION CYST EXCISION Left     Left wrist    JOINT REPLACEMENT Left 08/2015    Left TKR    JOINT REPLACEMENT Right 09/2017    Right TKR    NEPHRECTOMY  05/08/2017    Partial right nephrectomy    WI LAP,PARTIAL NEPHRECTOMY Right 5/8/2017    Procedure: LAPAROSCOPIC LYSIS OF ADHESIONS, ROBOTIC PARTIAL NEPHRECTOMY, WITH INTRA-OP LAPAROSCOPIC ULTRASOUND;  Surgeon: Ari Olea MD;  [facility];  Service: Urology    WI REPAIR BICEPS LONG TENDON  2/4/2019    Procedure: TENODESIS BICEPS OPEN PROXIMAL;  Surgeon: Kael Kiran MD;  [facility];  Service: Orthopedics    WI REPAIR INTERCARP/CARP-METACARP JT Left 12/5/2016    Procedure: ARTHROPLASTY THUMB [address] ;  Surgeon: Kael Kiran MD;  [facility];  Service: Orthopedics    WI REVISE MEDIAN N/CARPAL TUNNEL SURG Left 12/5/2016    Procedure: RELEASE CARPAL TUNNEL;  Surgeon: Kael Kiran MD;  [facility];  Service: Orthopedics    WI [address] ARTHROSCOP,SURG,W/ROTAT CUFF REPR Right 2/4/2019    Procedure: REPAIR ROTATOR CUFF ARTHROSCOPIC; SUBACROMIAL DECOMPRESSION; DISTAL CLAVICLE EXCISION;  Surgeon: Kael Kiran MD;  [facility];  Service: Orthopedics    WI TOTAL KNEE ARTHROPLASTY Right 9/11/2017    Procedure: ARTHROPLASTY KNEE TOTAL;  Surgeon: Kael Kiran MD;  [facility];  Service: Orthopedics    TONSILLECTOMY AND ADENOIDECTOMY       Family History   Problem Relation Age of Onset    Cancer Mother     Stroke Father        Objective   /80   Pulse 66   Temp 97 6 °F (36 4 °C) (Tympanic)   Resp 18   Ht 5' 10" (1 778 m)   Wt 89 4 kg (197 lb)   SpO2 96%   BMI 28 27 kg/m²   No LMP for male patient         Physical Exam     Physical Exam   Constitutional: He is oriented to person, place, and time  He appears well-developed and well-nourished  Non-toxic appearance  He does not appear ill  No distress  HENT:   Head: Normocephalic and atraumatic  Right Ear: Hearing, tympanic membrane, external ear and ear canal normal  No drainage, swelling or tenderness  No middle ear effusion  Left Ear: Hearing, tympanic membrane, external ear and ear canal normal  No drainage, swelling or tenderness  No middle ear effusion  Mouth/Throat: Uvula is midline and mucous membranes are normal  No uvula swelling  Posterior oropharyngeal edema and posterior oropharyngeal erythema present  No oropharyngeal exudate or tonsillar abscesses  Tonsils are 0 on the right  Tonsils are 0 on the left  Cobblestoning and patchy redness posterior pharynx  Nares red edematous with decreased patency and clear mucus bilaterally  Eyes: Pupils are equal, round, and reactive to light  Conjunctivae and EOM are normal  Right eye exhibits no discharge  Left eye exhibits no discharge  No scleral icterus  Neck: Normal range of motion  Neck supple  No tracheal deviation present  Cardiovascular: Normal rate, regular rhythm, normal heart sounds and intact distal pulses  Exam reveals no gallop and no friction rub  No murmur heard  Pulmonary/Chest: Effort normal and breath sounds normal  No respiratory distress  He has no wheezes  He has no rales  Lymphadenopathy:     He has no cervical adenopathy  Neurological: He is alert and oriented to person, place, and time  Skin: Skin is warm and dry  No rash noted  He is not diaphoretic  Psychiatric: He has a normal mood and affect  Nursing note and vitals reviewed

## 2019-08-30 ENCOUNTER — OFFICE VISIT (OUTPATIENT)
Dept: FAMILY MEDICINE CLINIC | Facility: CLINIC | Age: 74
End: 2019-08-30
Payer: MEDICARE

## 2019-08-30 VITALS
DIASTOLIC BLOOD PRESSURE: 78 MMHG | OXYGEN SATURATION: 96 % | WEIGHT: 193.6 LBS | HEART RATE: 69 BPM | TEMPERATURE: 98 F | SYSTOLIC BLOOD PRESSURE: 132 MMHG | HEIGHT: 70 IN | RESPIRATION RATE: 18 BRPM | BODY MASS INDEX: 27.72 KG/M2

## 2019-08-30 DIAGNOSIS — J02.9 PHARYNGITIS, UNSPECIFIED ETIOLOGY: Primary | ICD-10-CM

## 2019-08-30 DIAGNOSIS — H69.83 DYSFUNCTION OF BOTH EUSTACHIAN TUBES: ICD-10-CM

## 2019-08-30 PROCEDURE — 99213 OFFICE O/P EST LOW 20 MIN: CPT | Performed by: NURSE PRACTITIONER

## 2019-08-30 PROCEDURE — 1123F ACP DISCUSS/DSCN MKR DOCD: CPT | Performed by: NURSE PRACTITIONER

## 2019-08-30 RX ORDER — AMOXICILLIN 875 MG/1
875 TABLET, COATED ORAL 2 TIMES DAILY
Qty: 14 TABLET | Refills: 0 | Status: SHIPPED | OUTPATIENT
Start: 2019-08-30 | End: 2019-09-06

## 2019-08-30 RX ORDER — FLUTICASONE PROPIONATE 50 MCG
1 SPRAY, SUSPENSION (ML) NASAL DAILY
Qty: 1 BOTTLE | Refills: 2 | Status: SHIPPED | OUTPATIENT
Start: 2019-08-30

## 2019-08-30 NOTE — PROGRESS NOTES
[facility]    ASSESSMENT AND PLAN     1  Pharyngitis, unspecified etiology  Presents today with S/S consistent of pharyngitis  Recently evaluated for same 8/27 at urgent care  Has been taking OTC cold medicine without relief  Symptoms have been worsening  Physical assessment today as below  Treat with 7 day course amoxicillin  Dosing use reviewed  Maintain hydration, warm salt water gargles, OTC cold medicine and or lozenges  Re-evaluate if symptoms persist or worsen    - amoxicillin (AMOXIL) 875 mg tablet; Take 1 tablet (875 mg total) by mouth 2 (two) times a day for 7 days  Dispense: 14 tablet; Refill: 0    2  Dysfunction of both eustachian tubes  c/o ear fullness  Bilateral ear assessment benign as below  Recommend trialing Flonase  Consider allergy med    - fluticasone (FLONASE) 50 mcg/act nasal spray; 1 spray into each nostril daily  Dispense: 1 Bottle; Refill: 2            SUBJECTIVE       Patient ID: Rosy Chatman is a 69 y o  male  Chief Complaint   Patient presents with    Sore Throat     started Sunday night  hard to swallow        HISTORY OF PRESENT ILLNESS    Presents today with complaints of sore throat and full ears  States his symptoms started 3 days ago  He was evaluated at urgent care on the 28th  They told him to continue taking over-the-counter sinus medicine  He states his symptoms have worsened  Throat is red, scratchy and painful to swallow  He denies any fever        The following portions of the patient's history were reviewed and updated as appropriate: allergies, current medications, past family history, past medical history, past social history, past surgical history and problem list     REVIEW OF SYSTEMS  Review of Systems   Constitutional: Negative  HENT: Positive for congestion, postnasal drip, sinus pressure, sore throat and trouble swallowing  Ear pain: Fullness  Respiratory: Negative  Cardiovascular: Negative  Neurological: Negative  Psychiatric/Behavioral: Negative  OBJECTIVE      VITAL SIGNS  /78 (BP Location: Left arm, Patient Position: Sitting, Cuff Size: Adult)   Pulse 69   Temp 98 °F (36 7 °C) (Tympanic)   Resp 18   Ht 5' 10" (1 778 m)   Wt 87 8 kg (193 lb 9 6 oz)   SpO2 96%   BMI 27 78 kg/m²     CURRENT MEDICATIONS    Current Outpatient Medications:     Cholecalciferol (VITAMIN D-3 PO), Take 2,000 Units by mouth daily, Disp: , Rfl:     EPINEPHrine (EPIPEN 2-MOIZ) 0 3 mg/0 3 mL SOAJ, Inject as directed, Disp: , Rfl:     metFORMIN (GLUCOPHAGE) 500 mg tablet, Take 1 tablet (500 mg total) by mouth 2 (two) times a day, Disp: 180 tablet, Rfl: 1    mometasone (ELOCON) 0 1 % cream, Apply topically daily, Disp: 45 g, Rfl: 2    simvastatin (ZOCOR) 40 mg tablet, Take 1 tablet (40 mg total) by mouth daily, Disp: 90 tablet, Rfl: 1    tamsulosin (FLOMAX) 0 4 mg, Take 1 capsule by mouth daily with dinner  , Disp: , Rfl:     traMADol (ULTRAM) 50 mg tablet, 1 tab BID PRN, Disp: 20 tablet, Rfl: 0    traZODone (DESYREL) 50 mg tablet, Take 1 tablet (50 mg total) by mouth daily at bedtime, Disp: 30 tablet, Rfl: 1    amoxicillin (AMOXIL) 875 mg tablet, Take 1 tablet (875 mg total) by mouth 2 (two) times a day for 7 days, Disp: 14 tablet, Rfl: 0    fluticasone (FLONASE) 50 mcg/act nasal spray, 1 spray into each nostril daily, Disp: 1 Bottle, Rfl: 2      PHYSICAL EXAMINATION   Physical Exam   Constitutional: He is oriented to person, place, and time  Vital signs are normal  He appears well-developed and well-nourished  HENT:   Head: Normocephalic  Right Ear: Hearing, tympanic membrane, external ear and ear canal normal  No middle ear effusion  Left Ear: Hearing, tympanic membrane, external ear and ear canal normal   No middle ear effusion  Nose: Mucosal edema present  Mouth/Throat: Mucous membranes are normal  Posterior oropharyngeal erythema present  No oropharyngeal exudate     Cardiovascular: Normal rate and regular rhythm  Pulmonary/Chest: Effort normal and breath sounds normal  No respiratory distress  Musculoskeletal: Normal range of motion  Lymphadenopathy:        Head (right side): No submental and no submandibular adenopathy present  Head (left side): No submental and no submandibular adenopathy present  He has no cervical adenopathy  Neurological: He is alert and oriented to person, place, and time  Skin: Skin is warm, dry and intact  Psychiatric: He has a normal mood and affect  Nursing note and vitals reviewed

## 2019-10-03 ENCOUNTER — OFFICE VISIT (OUTPATIENT)
Dept: FAMILY MEDICINE CLINIC | Facility: CLINIC | Age: 74
End: 2019-10-03
Payer: MEDICARE

## 2019-10-03 VITALS
OXYGEN SATURATION: 98 % | HEART RATE: 66 BPM | HEIGHT: 71 IN | RESPIRATION RATE: 16 BRPM | SYSTOLIC BLOOD PRESSURE: 130 MMHG | BODY MASS INDEX: 26.71 KG/M2 | TEMPERATURE: 97.8 F | WEIGHT: 190.8 LBS | DIASTOLIC BLOOD PRESSURE: 78 MMHG

## 2019-10-03 DIAGNOSIS — R39.12 BENIGN PROSTATIC HYPERPLASIA WITH WEAK URINARY STREAM: ICD-10-CM

## 2019-10-03 DIAGNOSIS — N40.1 BENIGN PROSTATIC HYPERPLASIA WITH WEAK URINARY STREAM: ICD-10-CM

## 2019-10-03 DIAGNOSIS — K12.1 STOMATITIS: ICD-10-CM

## 2019-10-03 DIAGNOSIS — Z23 NEED FOR INFLUENZA VACCINATION: Primary | ICD-10-CM

## 2019-10-03 DIAGNOSIS — G47.00 INSOMNIA, UNSPECIFIED TYPE: ICD-10-CM

## 2019-10-03 DIAGNOSIS — N41.1 CHRONIC PROSTATITIS: ICD-10-CM

## 2019-10-03 LAB
SL AMB  POCT GLUCOSE, UA: NEGATIVE
SL AMB LEUKOCYTE ESTERASE,UA: NEGATIVE
SL AMB POCT BILIRUBIN,UA: NEGATIVE
SL AMB POCT BLOOD,UA: NEGATIVE
SL AMB POCT CLARITY,UA: NORMAL
SL AMB POCT COLOR,UA: YELLOW
SL AMB POCT KETONES,UA: NEGATIVE
SL AMB POCT NITRITE,UA: NEGATIVE
SL AMB POCT PH,UA: 5.5
SL AMB POCT SPECIFIC GRAVITY,UA: 1.01
SL AMB POCT URINE PROTEIN: NEGATIVE
SL AMB POCT UROBILINOGEN: 0.2

## 2019-10-03 PROCEDURE — 81003 URINALYSIS AUTO W/O SCOPE: CPT | Performed by: FAMILY MEDICINE

## 2019-10-03 PROCEDURE — 99214 OFFICE O/P EST MOD 30 MIN: CPT | Performed by: FAMILY MEDICINE

## 2019-10-03 PROCEDURE — G0008 ADMIN INFLUENZA VIRUS VAC: HCPCS | Performed by: FAMILY MEDICINE

## 2019-10-03 PROCEDURE — 90662 IIV NO PRSV INCREASED AG IM: CPT | Performed by: FAMILY MEDICINE

## 2019-10-03 RX ORDER — TAMSULOSIN HYDROCHLORIDE 0.4 MG/1
0.4 CAPSULE ORAL
Qty: 30 CAPSULE | Refills: 5 | Status: SHIPPED | OUTPATIENT
Start: 2019-10-03 | End: 2020-07-15

## 2019-10-03 RX ORDER — CIPROFLOXACIN 500 MG/1
500 TABLET, FILM COATED ORAL EVERY 12 HOURS SCHEDULED
Qty: 20 TABLET | Refills: 0 | Status: SHIPPED | OUTPATIENT
Start: 2019-10-03 | End: 2019-10-13

## 2019-10-03 RX ORDER — TRAZODONE HYDROCHLORIDE 50 MG/1
50 TABLET ORAL
Qty: 30 TABLET | Refills: 1 | Status: SHIPPED | OUTPATIENT
Start: 2019-10-03 | End: 2019-12-02 | Stop reason: SDUPTHER

## 2019-10-03 NOTE — PROGRESS NOTES
[address] [facility]      NAME: Shashank Meza  AGE: 69 y o  SEX: male  : 1945   MRN: [id number]    DATE: 10/3/2019  TIME: 12:39 PM    Assessment and Plan     Problem List Items Addressed This Visit     BPH (benign prostatic hyperplasia)     Intermittent urinary hesitancy/urgency x  A few months  Stream is weak at time  No dysuria  Ran out of tamsulosin a few mos ago  Sees dr Yoselin Ko regularly due to hx of renal cell CA  BITA exam today shows  Boggy / enlarged prostate  Urinalysis dip was unremarkable  Give Rx for Cipro 500 b i d  Times 10 days  Also recommend restarting tamsulosin 0 4 mg at HS  Continue regular follow-up with Urology  Call further problems           Relevant Medications    tamsulosin (FLOMAX) 0 4 mg    Insomnia      Patient had done well on trazodone 50 mg HS, but ran out of Rx  Will refill today         Relevant Medications    traZODone (DESYREL) 50 mg tablet    Stomatitis      Patient was sore tongue Rx given Magic mouthwash  Call further problems         Relevant Medications    al mag oxide-diphenhydramine-lidocaine viscous (MAGIC MOUTHWASH) 1:1:1 suspension    Chronic prostatitis    Relevant Medications    ciprofloxacin (CIPRO) 500 mg tablet    Other Relevant Orders    POCT urine dip auto non-scope (Completed)      Other Visit Diagnoses     Need for influenza vaccination    -  Primary    Relevant Orders    influenza vaccine, [id number], high-dose, PF 0 5 mL (FLUZONE HIGH-DOSE) (Completed)              Return to office in:  TR[facility] Keep next regularly scheduled appointment    Chief Complaint     Chief Complaint   Patient presents with    Urinary Frequency     Difficultly Urinating       History of Present Illness     Intermittent urinary hesitancy/urgency x  A few months  Stream is weak at time  No dysuria  Ran out of tamsulosin a few mos ago  Sees dr Yoselin Ko regularly due to hx of renal cell CA         The following portions of the patient's history were reviewed and updated as appropriate: allergies, current medications, past family history, past medical history, past social history, past surgical history and problem list     Review of Systems   Review of Systems   Respiratory: Negative  Cardiovascular: Negative  Gastrointestinal: Negative  Genitourinary: Positive for decreased urine volume, difficulty urinating and frequency  Negative for dysuria         Active Problem List     Patient Active Problem List   Diagnosis    Primary osteoarthritis of right knee    Tendinitis of right rotator cuff    BPH (benign prostatic hyperplasia)    DM2 [facility]diabetes mellitus, type 2) ([facility] )    Insomnia    Mixed hyperlipidemia    Postherpetic neuralgia    Renal cell carcinoma of right kidney (HCC)    Vitamin D deficiency    S/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, right    Cervical strain    Ganglion of flexor tendon sheath of left thumb    Sudden onset of severe headache    Incomplete tear of right rotator cuff    Status post arthroscopy of right shoulder    Encounter for other orthopedic aftercare    Vascular disorder of kidney    Nonintractable headache    Stomatitis    Chronic prostatitis       Objective   /78 (BP Location: Left arm, Patient Position: Sitting, Cuff Size: Standard)   Pulse 66   Temp 97 8 °F (36 6 °C) (Tympanic)   Resp 16   Ht 5' 11" (1 803 m)   Wt 86 5 kg (190 lb 12 8 oz)   SpO2 98%   BMI 26 61 kg/m²     Physical Exam    Pertinent Laboratory/Diagnostic Studies:   urinalysis    Current Medications     Current Outpatient Medications:     Cholecalciferol (VITAMIN D-3 PO), Take 2,000 Units by mouth daily, Disp: , Rfl:     EPINEPHrine (EPIPEN 2-MOIZ) 0 3 mg/0 3 mL SOAJ, Inject as directed, Disp: , Rfl:     fluticasone (FLONASE) 50 mcg/act nasal spray, 1 spray into each nostril daily, Disp: 1 Bottle, Rfl: 2    metFORMIN (GLUCOPHAGE) 500 mg tablet, Take 1 tablet (500 mg total) by mouth 2 (two) times a day, Disp: 180 tablet, Rfl: 1   mometasone (ELOCON) 0 1 % cream, Apply topically daily, Disp: 45 g, Rfl: 2    simvastatin (ZOCOR) 40 mg tablet, Take 1 tablet (40 mg total) by mouth daily, Disp: 90 tablet, Rfl: 1    tamsulosin (FLOMAX) 0 4 mg, Take 1 capsule (0 4 mg total) by mouth daily with dinner, Disp: 30 capsule, Rfl: 5    traMADol (ULTRAM) 50 mg tablet, 1 tab BID PRN, Disp: 20 tablet, Rfl: 0    traZODone (DESYREL) 50 mg tablet, Take 1 tablet (50 mg total) by mouth daily at bedtime, Disp: 30 tablet, Rfl: 1    al mag oxide-diphenhydramine-lidocaine viscous (MAGIC MOUTHWASH) 1:1:1 suspension, Swish and spit 10 mL every 4 (four) hours as needed for mouth pain or discomfort, Disp: 120 mL, Rfl: 2    ciprofloxacin (CIPRO) 500 mg tablet, Take 1 tablet (500 mg total) by mouth every 12 (twelve) hours for 10 days, Disp: 20 tablet, Rfl: 0    Health Maintenance     Health Maintenance   Topic Date Due    Hepatitis C Screening  1945    BMI: Followup Plan  05/31/1964    HEPATITIS B VACCINES (1 of 3 - Risk 3-dose series) 05/31/1965    INFLUENZA VACCINE  10/07/2019 (Originally 7/1/2019)    HEMOGLOBIN A1C  12/29/2019    URINE MICROALBUMIN  01/03/2020    Diabetic Foot Exam  05/15/2020    Fall Risk  07/12/2020    Depression Screening PHQ  07/12/2020    Medicare Annual Wellness Visit (AWV)  07/12/2020    DM Eye Exam  08/14/2020    BMI: Adult  08/30/2020    CRC Screening: Colonoscopy  06/05/2024    DTaP,Tdap,and Td Vaccines (3 - Td) 10/16/2027    Pneumococcal Vaccine: 65+ Years  Completed    Pneumococcal Vaccine: Pediatrics (0 to 5 Years) and At-Risk Patients (6 to 59 Years)  Aged [employer] History   Administered Date(s) Administered    INFLUENZA 11/18/2014, 10/05/2015, 11/28/2016, 01/04/2018, 10/15/2018    Influenza Split High Dose Preservative Free IM 11/18/2014, 10/05/2015, 11/28/2016, 01/04/2018    Influenza TIV (IM) 10/05/2012, 10/09/2013    Influenza, high dose seasonal 0 5 mL 10/15/2018, 10/03/2019    Pneumococcal Conjugate 13-Valent 12/08/2015, 12/09/2015    Pneumococcal Polysaccharide PPV23 10/09/2013    Tdap 11/15/2004, 10/16/2017    Zoster 10/05/2012       Kate Ennis DO  [facility]

## 2019-10-03 NOTE — ASSESSMENT & PLAN NOTE
Intermittent urinary hesitancy/urgency x  A few months  Stream is weak at time  No dysuria  Ran out of tamsulosin a few mos ago  Sees dr Luz Robin regularly due to hx of renal cell CA  BITA exam today shows  Boggy / enlarged prostate  Urinalysis dip was unremarkable  Give Rx for Cipro 500 b i d  Times 10 days  Also recommend restarting tamsulosin 0 4 mg at HS  Continue regular follow-up with Urology    Call further problems

## 2019-10-16 ENCOUNTER — OFFICE VISIT (OUTPATIENT)
Dept: PODIATRY | Facility: CLINIC | Age: 74
End: 2019-10-16
Payer: MEDICARE

## 2019-10-16 VITALS — BODY MASS INDEX: 27.38 KG/M2 | WEIGHT: 195.6 LBS | HEIGHT: 71 IN

## 2019-10-16 DIAGNOSIS — M79.672 LEFT FOOT PAIN: ICD-10-CM

## 2019-10-16 DIAGNOSIS — M65.872 OTHER SYNOVITIS AND TENOSYNOVITIS, LEFT ANKLE AND FOOT: Primary | ICD-10-CM

## 2019-10-16 PROCEDURE — 20550 NJX 1 TENDON SHEATH/LIGAMENT: CPT | Performed by: PODIATRIST

## 2019-10-16 PROCEDURE — 99213 OFFICE O/P EST LOW 20 MIN: CPT | Performed by: PODIATRIST

## 2019-10-16 RX ORDER — LIDOCAINE HYDROCHLORIDE 10 MG/ML
1 INJECTION, SOLUTION INFILTRATION; PERINEURAL ONCE
Status: COMPLETED | OUTPATIENT
Start: 2019-10-16 | End: 2019-10-16

## 2019-10-16 RX ORDER — TRIAMCINOLONE ACETONIDE 40 MG/ML
20 INJECTION, SUSPENSION INTRA-ARTICULAR; INTRAMUSCULAR ONCE
Status: COMPLETED | OUTPATIENT
Start: 2019-10-16 | End: 2019-10-16

## 2019-10-16 RX ADMIN — TRIAMCINOLONE ACETONIDE 20 MG: 40 INJECTION, SUSPENSION INTRA-ARTICULAR; INTRAMUSCULAR at 16:27

## 2019-10-16 RX ADMIN — LIDOCAINE HYDROCHLORIDE 1 ML: 10 INJECTION, SOLUTION INFILTRATION; PERINEURAL at 16:26

## 2019-10-16 NOTE — PROGRESS NOTES
Assessment/Plan:    Explained to patient that symptoms are most consistent with tendinitis  Discussed treatment options  Recommend avoiding oral anti-inflammatories due to renal issues  Injected       Foot injection     Date/Time 10/16/2019 4:31 PM     Performed by  Milana Ignacio DPM     Authorized by Milana Ignacio DPM      Universal Protocol Consent: Verbal consent obtained  Consent given by: patient  Patient understanding: patient states understanding of the procedure being performed  Patient identity confirmed: verbally with patient        Site preparation: Isopropyl alcohol    Local anesthesia used: yes     Anesthesia   Local anesthesia used: yes  Local Anesthetic: lidocaine 1% without epinephrine     Procedure Details   Procedure Notes: Injected left foot with 0 5 cc Kenalog 40 along with 1 cc 1% xylocaine  the medial aspect of the left foot with 0 5 cc Kenalog 40 along with 1 cc 1% xylocaine  Patient is rescheduled in 1 month  No problem-specific Assessment & Plan notes found for this encounter  Diagnoses and all orders for this visit:    Other synovitis and tenosynovitis, left ankle and foot  -     lidocaine (XYLOCAINE) 1 % injection 1 mL  -     triamcinolone acetonide (KENALOG-40) 40 mg/mL injection 20 mg    Left foot pain          Subjective:      Patient ID: Ray Gaitan is a 69 y o  male  HPI     Patient, a 71-year-old type 2 diabetic presents with left foot pain  Patient states that he has been in pain for approximately 3 weeks  No trauma related  Pain is present along the inner aspect of the left foot and occurs only when he walks  Patient notes that the pain is intermittent and that there is times when he has no discomfort  He relates no discomfort when trying to sleep  No right foot pain is related      The following portions of the patient's history were reviewed and updated as appropriate: allergies, current medications, past family history, past medical history, past social history, past surgical history and problem list     Review of Systems   Constitutional: Negative  Cardiovascular: Negative  Gastrointestinal: Negative  Genitourinary:        History of kidney disorder  Musculoskeletal: Negative  Objective:      Ht 5' 11" (1 803 m)   Wt 88 7 kg (195 lb 9 6 oz)   BMI 27 28 kg/m²          Physical Exam   Cardiovascular: Regular rhythm and intact distal pulses  Musculoskeletal: He exhibits tenderness  Pain with palpation medial aspect left foot at the abductor hallucis muscle belly  No pain with forced abduction of the left foot  No edema or ecchymosis  Skin:   All toenails are thickened yellow secondary to onychomycosis

## 2019-11-13 ENCOUNTER — OFFICE VISIT (OUTPATIENT)
Dept: PODIATRY | Facility: CLINIC | Age: 74
End: 2019-11-13
Payer: MEDICARE

## 2019-11-13 VITALS
BODY MASS INDEX: 27.16 KG/M2 | HEIGHT: 71 IN | WEIGHT: 194 LBS | DIASTOLIC BLOOD PRESSURE: 72 MMHG | HEART RATE: 81 BPM | SYSTOLIC BLOOD PRESSURE: 121 MMHG

## 2019-11-13 DIAGNOSIS — M79.672 LEFT FOOT PAIN: ICD-10-CM

## 2019-11-13 DIAGNOSIS — M65.872 OTHER SYNOVITIS AND TENOSYNOVITIS, LEFT ANKLE AND FOOT: Primary | ICD-10-CM

## 2019-11-13 PROCEDURE — 99213 OFFICE O/P EST LOW 20 MIN: CPT | Performed by: PODIATRIST

## 2019-11-13 NOTE — PROGRESS NOTES
Assessment/Plan:    Explained to patient that his symptoms are most consistent with a contusion of the left foot  Patient cannot take oral anti-inflammatories  He prefers to avoid another cortisone injection  Patient placed on Voltaren gel for t i d  application  No problem-specific Assessment & Plan notes found for this encounter  Diagnoses and all orders for this visit:    Other synovitis and tenosynovitis, left ankle and foot    Left foot pain          Subjective:      Patient ID: Arlene Wood is a 69 y o  male  HPI     Patient presents for assessment of left foot  Patient has no pain along the abductor hallucis muscle belly where injection was given at last visit  However, he now has shooting pain in the area of the 1st metatarsal shaft and neck  This pain has developed over the past few weeks  Patient recalls no trauma to the area  The following portions of the patient's history were reviewed and updated as appropriate: allergies, current medications, past family history, past medical history, past social history, past surgical history and problem list     Review of Systems   Gastrointestinal: Negative  Genitourinary:        History of renal disease   Musculoskeletal: Negative  Skin:        All toenails mycotic             Objective:      /72   Pulse 81   Ht 5' 11" (1 803 m)   Wt 88 kg (194 lb)   BMI 27 06 kg/m²          Physical Exam   Constitutional: He appears well-developed and well-nourished  Cardiovascular: Regular rhythm and intact distal pulses  Musculoskeletal: He exhibits tenderness  Pain with palpation shaft and neck of 1st metatarsal bone left foot  No pain with range of motion left great toe  No pain with palpation of EHL tendon     Skin:   All toenails are thickened yellow with subungual debris

## 2019-12-02 DIAGNOSIS — G47.00 INSOMNIA, UNSPECIFIED TYPE: ICD-10-CM

## 2019-12-02 RX ORDER — TRAZODONE HYDROCHLORIDE 50 MG/1
50 TABLET ORAL
Qty: 90 TABLET | Refills: 0 | Status: SHIPPED | OUTPATIENT
Start: 2019-12-02 | End: 2020-01-29 | Stop reason: SDUPTHER

## 2019-12-02 NOTE — TELEPHONE ENCOUNTER
Last seen 10/3/19  Patient requests 90 day supply Trazodone to be [place] [facility] [place]    Next OV 1/15/20

## 2019-12-09 ENCOUNTER — APPOINTMENT (OUTPATIENT)
Dept: LAB | Facility: CLINIC | Age: 74
End: 2019-12-09
Payer: MEDICARE

## 2019-12-09 DIAGNOSIS — C64.1 RENAL CELL CARCINOMA OF RIGHT KIDNEY (HCC): ICD-10-CM

## 2019-12-09 LAB
ANION GAP SERPL CALCULATED.3IONS-SCNC: 4 MMOL/L (ref 4–13)
BUN SERPL-MCNC: 24 MG/DL (ref 5–25)
CALCIUM SERPL-MCNC: 9.7 MG/DL (ref 8.3–10.1)
CHLORIDE SERPL-SCNC: 105 MMOL/L (ref 100–108)
CO2 SERPL-SCNC: 30 MMOL/L (ref 21–32)
CREAT SERPL-MCNC: 1.08 MG/DL (ref 0.6–1.3)
GFR SERPL CREATININE-BSD FRML MDRD: 68 ML/MIN/1.73SQ M
GLUCOSE P FAST SERPL-MCNC: 133 MG/DL (ref 65–99)
POTASSIUM SERPL-SCNC: 4.1 MMOL/L (ref 3.5–5.3)
SODIUM SERPL-SCNC: 139 MMOL/L (ref 136–145)

## 2019-12-09 PROCEDURE — 36415 COLL VENOUS BLD VENIPUNCTURE: CPT

## 2019-12-09 PROCEDURE — 80048 BASIC METABOLIC PNL TOTAL CA: CPT

## 2019-12-16 ENCOUNTER — HOSPITAL ENCOUNTER (OUTPATIENT)
Dept: RADIOLOGY | Facility: HOSPITAL | Age: 74
Discharge: HOME | End: 2019-12-16
Payer: MEDICARE

## 2019-12-16 ENCOUNTER — HOSPITAL ENCOUNTER (OUTPATIENT)
Dept: CT IMAGING | Facility: HOSPITAL | Age: 74
Discharge: HOME | End: 2019-12-16
Payer: MEDICARE

## 2019-12-16 DIAGNOSIS — C64.1 RENAL CELL CARCINOMA OF RIGHT KIDNEY (HCC): ICD-10-CM

## 2019-12-16 PROCEDURE — 71046 X-RAY EXAM CHEST 2 VIEWS: CPT

## 2019-12-16 PROCEDURE — 74178 CT ABD&PLV WO CNTR FLWD CNTR: CPT

## 2019-12-16 RX ADMIN — IOHEXOL 100 ML: 350 INJECTION, SOLUTION INTRAVENOUS at 08:37

## 2020-01-07 ENCOUNTER — OFFICE VISIT (OUTPATIENT)
Dept: UROLOGY | Facility: CLINIC | Age: 75
End: 2020-01-07
Payer: MEDICARE

## 2020-01-07 VITALS
SYSTOLIC BLOOD PRESSURE: 124 MMHG | WEIGHT: 194 LBS | HEART RATE: 66 BPM | HEIGHT: 71 IN | BODY MASS INDEX: 27.16 KG/M2 | DIASTOLIC BLOOD PRESSURE: 72 MMHG

## 2020-01-07 DIAGNOSIS — Z12.5 SCREENING FOR PROSTATE CANCER: ICD-10-CM

## 2020-01-07 DIAGNOSIS — N40.0 BENIGN PROSTATIC HYPERPLASIA WITHOUT LOWER URINARY TRACT SYMPTOMS: ICD-10-CM

## 2020-01-07 DIAGNOSIS — C64.1 RENAL CELL CARCINOMA OF RIGHT KIDNEY (HCC): Primary | ICD-10-CM

## 2020-01-07 PROBLEM — N41.1 CHRONIC PROSTATITIS: Status: RESOLVED | Noted: 2019-10-03 | Resolved: 2020-01-07

## 2020-01-07 PROCEDURE — 99214 OFFICE O/P EST MOD 30 MIN: CPT | Performed by: UROLOGY

## 2020-01-07 NOTE — LETTER
January 7, 2020 Sunny Lo, [address] [facility] [address]  [address]    Patient: Genesis Juarez   YOB: 1945   Date of Visit: 1/7/2020       Dear Dr Pawel Simental:    Thank you for referring Ana Lara to me for evaluation  Below are my notes for this consultation  If you have questions, please do not hesitate to call me  I look forward to following your patient along with you  Sincerely,        Gabe Suarez MD        CC: MD Gabe Stanford MD  1/7/2020  9:04 AM  Incomplete    UROLOGY ROUTINE FOLLOW UP NOTE     CHIEF COMPLAINT   Genesis Juarez is a 69 y o  male with a complaint of   Chief Complaint   Patient presents with    Renal Cell Carcinoma     History of Present Illness:     69 y o  male with a history of pT1a right renal cell carcinoma status post right robotic assisted laparoscopic partial nephrectomy 5/8/2017  Patient undergoing yearly imaging surveillance  Patient presents to review his updated films  He remains on Flomax for some very mild urinary symptoms  He has no urinary bother  He has been undergoing routine PSA testing and digital rectal exams with his primary care doctor  AUA SYMPTOM SCORE      Most Recent Value   AUA SYMPTOM SCORE   How often have you had a sensation of not emptying your bladder completely after you finished urinating? 0   How often have you had to urinate again less than two hours after you finished urinating? 1   How often have you found you stopped and started again several times when you urinate?  0   How often have you found it difficult to postpone urination? 1   How often have you had a weak urinary stream?  1   How often have you had to push or strain to begin urination? 0   How many times did you most typically get up to urinate from the time you went to bed at night until the time you got up in the morning?   3   Quality of Life: If you were to spend the rest of your life with your urinary condition just the way it is now, how would you feel about that?  1   AUA SYMPTOM SCORE  6        Lab Results   Component Value Date    PSA 2 9 01/03/2019 2017 PSA 2 6    Past Medical History:     Past Medical History:   Diagnosis Date    Allergic reaction     last assessed: 5/26/2015    Arthritis     right knee    Bee sting allergy     last assessed: 6/30/2015    BPH (benign prostatic hypertrophy)     Cancer ([facility] [address] ) 2017    Right kidney    [facility]     Type II, NIDDM    Enlarged prostate with lower urinary tract symptoms (LUTS)     last assessed: 8/17/2015    Herpes zoster     last assessed: 11/21/2013    History of colon polyps     Hyperlipidemia     Leukocytosis     last assessed: 11/18/2014    Osteoarthrosis, localized, primary, knee     last assessed: 10/25/2016    Seasonal allergies     Shoulder pain, right     Wears glasses     Wears partial dentures     Upper and Lower       PAST SURGICAL HISTORY:     Past Surgical History:   Procedure Laterality Date    CARPAL TUNNEL RELEASE Left     CHOLECYSTECTOMY      Open    COLONOSCOPY      GANGLION CYST EXCISION Left     Left wrist    JOINT REPLACEMENT Left 08/2015    Left TKR    JOINT REPLACEMENT Right 09/2017    Right TKR    NEPHRECTOMY  05/08/2017    Partial right nephrectomy    WY LAP,PARTIAL NEPHRECTOMY Right 5/8/2017    Procedure: LAPAROSCOPIC LYSIS OF ADHESIONS, ROBOTIC PARTIAL NEPHRECTOMY, WITH INTRA-OP LAPAROSCOPIC ULTRASOUND;  Surgeon: Korey Harvey MD;  [facility];  Service: Urology    WY REPAIR BICEPS LONG TENDON  2/4/2019    Procedure: TENODESIS BICEPS OPEN PROXIMAL;  Surgeon: Deisy Lang MD;  [facility];  Service: Orthopedics    WY REPAIR INTERCARP/CARP-METACARP JT Left 12/5/2016    Procedure: ARTHROPLASTY THUMB [address] 16 ;  Surgeon: Deisy Lang MD;  [facility];  Service: Orthopedics    WY REVISE MEDIAN N/CARPAL TUNNEL SURG Left 12/5/2016    Procedure: RELEASE CARPAL TUNNEL;  Surgeon: Adams Erazo MD;  [facility];  Service: Orthopedics    AL SHLDR ARTHROSCOP,SURG,W/ROTAT CUFF REPR Right 2/4/2019    Procedure: REPAIR ROTATOR CUFF ARTHROSCOPIC; SUBACROMIAL DECOMPRESSION; DISTAL CLAVICLE EXCISION;  Surgeon: Adams Erazo MD;  [facility];  Service: Orthopedics    AL TOTAL KNEE ARTHROPLASTY Right 9/11/2017    Procedure: ARTHROPLASTY KNEE TOTAL;  Surgeon: Adams Erazo MD;  [facility];  Service: Orthopedics    TONSILLECTOMY AND ADENOIDECTOMY         CURRENT MEDICATIONS:     Current Outpatient Medications   Medication Sig Dispense Refill    al mag oxide-diphenhydramine-lidocaine viscous (MAGIC MOUTHWASH) 1:1:1 suspension Swish and spit 10 mL every 4 (four) hours as needed for mouth pain or discomfort 120 mL 2    Cholecalciferol (VITAMIN D-3 PO) Take 2,000 Units by mouth daily      diclofenac sodium (VOLTAREN) 1 % Apply 2 g topically 3 (three) times a day 180 g 2    EPINEPHrine (EPIPEN 2-MOIZ) 0 3 mg/0 3 mL SOAJ Inject as directed      fluticasone (FLONASE) 50 mcg/act nasal spray 1 spray into each nostril daily 1 Bottle 2    metFORMIN (GLUCOPHAGE) 500 mg tablet Take 1 tablet (500 mg total) by mouth 2 (two) times a day 180 tablet 1    mometasone (ELOCON) 0 1 % cream Apply topically daily 45 g 2    simvastatin (ZOCOR) 40 mg tablet Take 1 tablet (40 mg total) by mouth daily 90 tablet 1    tamsulosin (FLOMAX) 0 4 mg Take 1 capsule (0 4 mg total) by mouth daily with dinner 30 capsule 5    traMADol (ULTRAM) 50 mg tablet 1 tab BID PRN 20 tablet 0    traZODone (DESYREL) 50 mg tablet Take 1 tablet (50 mg total) by mouth daily at bedtime 90 tablet 0     No current facility-administered medications for this visit          ALLERGIES:     Allergies   Allergen Reactions    Bee Venom Edema       SOCIAL HISTORY:     Social History     Socioeconomic History    Marital status: /Civil Union     Spouse name: None    Number of children: None    Years of education: None    Highest education level: None   Occupational History    None   Social Needs    Financial resource strain: None    Food insecurity:     Worry: None     Inability: None    Transportation needs:     Medical: None     Non-medical: None   Tobacco Use    Smoking status: Former Smoker     Packs/day: 0 25     Types: Cigarettes    Smokeless tobacco: Never Used    Tobacco comment: Smoked from ages 16 to 25  Substance and Sexual Activity    Alcohol use: Yes     Comment: 1-2 x year    Drug use: No    Sexual activity: None   Lifestyle    Physical activity:     Days per week: None     Minutes per session: None    Stress: None   Relationships    Social connections:     Talks on phone: None     Gets together: None     Attends Taoism service: None     Active member of club or organization: None     Attends meetings of clubs or organizations: None     Relationship status: None    Intimate partner violence:     Fear of current or ex partner: None     Emotionally abused: None     Physically abused: None     Forced sexual activity: None   Other Topics Concern    None   Social History Narrative    None       SOCIAL HISTORY:     Family History   Problem Relation Age of Onset    Cancer Mother     Stroke Father        REVIEW OF SYSTEMS:     Review of Systems   Constitutional: Negative  Respiratory: Negative  Cardiovascular: Negative  Gastrointestinal: Negative  Genitourinary: Negative  Musculoskeletal: Negative  Skin: Negative  Psychiatric/Behavioral: Negative  PHYSICAL EXAM:     /72   Pulse 66   Ht 5' 11" (1 803 m)   Wt 88 kg (194 lb)   BMI 27 06 kg/m²      General:  Healthy appearing male in no acute distress  They have a normal affect  There is not appear to be any gross neurologic defects or abnormalities  HEENT:  Normocephalic, atraumatic    Neck is supple without any palpable lymphadenopathy  Cardiovascular:  Patient has normal palpable distal radial pulses  There is no significant peripheral edema  No JVD is noted  Respiratory:  Patient has unlabored respirations  There is no audible wheeze or rhonchi  Abdomen:  Abdomen is with healed midline ex-lap and separate robotic surgical scars  Abdomen is soft and nontender  There is no tympany  Inguinal and umbilical hernia are not appreciated  Genitourinary: Patient defers rectal exam, undergoes BITA with PCP q6mos    Musculoskeletal:  Patient does not have significant CVA tenderness in the  flank with palpation or percussion  They full range of motion in all 4 extremities  Strength in all 4 extremities appears congruent  Patient is able to ambulate without assistance or difficulty  Dermatologic:  Patient has no skin abnormalities or rashes  LABS:     CBC:   Lab Results   Component Value Date    WBC 7 0 2019    HGB 15 0 2019    HCT 44 0 2019    MCV 90 7 2019     2019       BMP:   Lab Results   Component Value Date    GLUCOSE 195 (H) 2017    CALCIUM 9 7 2019     2017    K 4 1 2019    CO2 30 2019     2019    BUN 24 2019    CREATININE 1 08 2019       IMAGIN/16/19  CHEST      INDICATION:   C64 1: Malignant neoplasm of right kidney, except renal pelvis        COMPARISON:  Chest radiograph from 2017 and chest CT from 2017      EXAM PERFORMED/VIEWS:  XR CHEST PA & LATERAL  WITH DUAL ENERGY SUBTRACTION      FINDINGS:     Cardiomediastinal silhouette appears unremarkable      Lungs are clear  No effusion or pneumothorax       Mild degenerative disease in the spine        Cholecystectomy      IMPRESSION:     No acute cardiopulmonary disease  19  CT ABDOMEN AND PELVIS WITH AND WITHOUT IV CONTRAST     INDICATION:   C64 1: Malignant neoplasm of right kidney, except renal pelvis  Hematuria    Right-sided kidney cancer      COMPARISON:  Abdomen and pelvic CT from 12/15/2018      TECHNIQUE: Initial CT of the abdomen and pelvis was performed without intravenous contrast   Subsequent dynamic CT evaluation of the abdomen and pelvis was performed after the administration of intravenous contrast in both nephrographic and delayed   phases after the administration of intravenous contrast   Axial, sagittal, and coronal 2D reformatted images were created from the source data and submitted for interpretation       Radiation dose length product (DLP) for this visit:  1450 mGy-cm   This examination, like all CT scans performed in the [facility], was performed utilizing techniques to minimize radiation dose exposure, including the use of iterative   reconstruction and automated exposure control      IV Contrast:  100 mL of iohexol (OMNIPAQUE)  Enteric Contrast:  Enteric contrast was not administered      FINDINGS:     ABDOMEN     RIGHT KIDNEY AND URETER:  Unchanged partial nephrectomy defect in the right kidney  No solid renal mass  No detectable urothelial mass  No hydronephrosis or hydroureter  No urinary tract calculi  No perinephric collection      LEFT KIDNEY AND URETER:  No solid renal mass  No detectable urothelial mass  No hydronephrosis or hydroureter  No urinary tract calculi  No perinephric collection      URINARY BLADDER:  No bladder wall mass  No calculi         LOWER CHEST:  No clinically significant abnormality identified in the visualized lower chest      LIVER/BILIARY TREE:  Small simple cyst in the left lobe of liver  No suspicious renal masses      GALLBLADDER:  Gallbladder is surgically absent      SPLEEN:  Unremarkable      PANCREAS:  Unremarkable      ADRENAL GLANDS:  Unremarkable      STOMACH AND BOWEL:  There is colonic diverticulosis without evidence of acute diverticulitis      ABDOMINOPELVIC CAVITY:  No ascites  No free intraperitoneal air   No lymphadenopathy      VESSELS:  Unremarkable for patient's age      PELVIS     REPRODUCTIVE ORGANS:  Unchanged enlarged prostate      APPENDIX: No findings to suggest appendicitis      ABDOMINAL WALL/INGUINAL REGIONS:  Small bilateral fat-containing inguinal hernias      OSSEOUS STRUCTURES: Stable nonaggressive appearing lucent lesion in the right iliac bone (series 3 image 121 )  No acute fracture or destructive osseous lesion      IMPRESSION:     Stable abdomen and pelvic CT status post right partial nephrectomy, without evidence of metastatic disease or new renal parenchymal or urothelial neoplasm      Unchanged enlarged prostate  PATHOLOGY:     5/8/17  Final Diagnosis   A  Kidney, right, nephrectomy:  1  Specimen: Right kidney     - Procedure: Right partial nephrectomy     - Specimen laterality: Right   2  Tumor:     - Tumor site: Lateral mid kidney     - Tumor size (largest tumor if multiple): 2 6 centimeters     - Tumor focality (unifocal or multifocal): Unifocal     - Macroscopic extent of tumor: Confined to kidney     - Histologic type: Clear cell renal cell carcinoma  - Sarcomatoid features: Not identified     - Tumor necrosis (any amount): Not identified      - Histologic grade (G1-G2): ISUP nucleolar grade: G1-G2      - Microscopic tumor extension (pT1a): The tumor is less than 4 cm in greatest dimension and is limited to the kidney   3  Margins:     - Renal parenchymal margin (partial nephrectomy only): Negative for tumor     - Renal capsular margin (partial nephrectomy only): Negative for tumor, see note     - Perinephric fat margin (partial nephrectomy only): Negative for tumor, see note     - Gerotas fascial margin: Not applicable     - Renal vein margin: Not applicable     - Ureteral margin: Not applicable      - Other (specify): Not applicable   4  Lymph-vascular invasion (excluding renal vein and its muscle containing segmental branches and inferior vena cava): Not identified   5  Regional lymph nodes (pNX): No lymph nodes submitted     6   Pathologic findings in nonneoplastic kidney (Note H): Not identified   7  Other tumors and/or tumor-like lesions: Not identified   8  7th Ed AJCC Tumor Stage:  at least Stage  I pT1a, pNX, G1-2     ASSESSMENT:     69 y o  male with pT1a ccRCC s/p RALPNx 3/2227    PLAN:     1  H/o ccRCC s/p RALPNx - patient approaching 3 years s/p surgery  Reviewed films in depth with patient  He did have a friend who had a localized recurrence on films and was somewhat anxious  He does have a benign hepatic cyst and a small stable lesion in the right iliac bone  Recommend continue yearly imaging  Patient prefers to see his surgeon at next visit  2    Prostate cancer screening - I discussed the recommendations for tailored screening in men above the age of 79  Given his good health, I think it is not unreasonable perform every other year digital rectal exams for prostate examination and PSA testing until the patient turned 76  At this point time, would consider cessation of screening  Discussed this at length with the patient  3    LUTS -  Patient has very minimal symptoms on Flomax  Would like to continue this medication  Yonis Bustillo MD  1/7/2020  8:50 AM  Sign at close encounter    [place] UP NOTE     CHIEF COMPLAINT   Ray Gaitan is a 69 y o  male with a complaint of   Chief Complaint   Patient presents with    Renal Cell Carcinoma     History of Present Illness:     69 y o  male with a history of pT1a right renal cell carcinoma status post right robotic assisted laparoscopic partial nephrectomy 5/8/2017  Patient undergoing yearly imaging surveillance      Lab Results   Component Value Date    PSA 2 9 01/03/2019 2017 PSA 2 6      Patient described problem as:    Location:  Quality:  Severity:  Duration:  Timing:  Context:   Modifying factors:  Associated signs and symptoms:    Past Medical History:     Past Medical History:   Diagnosis Date    Allergic reaction     last assessed: 5/26/2015    Arthritis right knee    Bee sting allergy     last assessed: 6/30/2015    BPH (benign prostatic hypertrophy)     Cancer ([facility] [address] ) 2017    Right kidney    Diabetes mellitus ([facility] [address] )     Type II, NIDDM    Enlarged prostate with lower urinary tract symptoms (LUTS)     last assessed: 8/17/2015    Herpes zoster     last assessed: 11/21/2013    History of colon polyps     Hyperlipidemia     Leukocytosis     last assessed: 11/18/2014    Osteoarthrosis, localized, primary, knee     last assessed: 10/25/2016    Seasonal allergies     Shoulder pain, right     Wears glasses     Wears partial dentures     Upper and Lower       PAST SURGICAL HISTORY:     Past Surgical History:   Procedure Laterality Date    CARPAL TUNNEL RELEASE Left     CHOLECYSTECTOMY      Open    COLONOSCOPY      GANGLION CYST EXCISION Left     Left wrist    JOINT REPLACEMENT Left 08/2015    Left TKR    JOINT REPLACEMENT Right 09/2017    Right TKR    NEPHRECTOMY  05/08/2017    Partial right nephrectomy    NV LAP,PARTIAL NEPHRECTOMY Right 5/8/2017    Procedure: LAPAROSCOPIC LYSIS OF ADHESIONS, ROBOTIC PARTIAL NEPHRECTOMY, WITH INTRA-OP LAPAROSCOPIC ULTRASOUND;  Surgeon: Kemi Acosta MD;  [facility];  Service: Urology    NV REPAIR BICEPS LONG TENDON  2/4/2019    Procedure: TENODESIS BICEPS OPEN PROXIMAL;  Surgeon: Latasha Huerta MD;  [facility];  Service: Orthopedics    NV REPAIR INTERCARP/CARP-METACARP JT Left 12/5/2016    Procedure: ARTHROPLASTY THUMB Aia 16 ;  Surgeon: Latasha Huerta MD;  [facility];  Service: Orthopedics    NV REVISE MEDIAN N/CARPAL TUNNEL SURG Left 12/5/2016    Procedure: RELEASE CARPAL TUNNEL;  Surgeon: Latasha Huerta MD;  [facility];  Service: Orthopedics    [place] [address] ARTHROSCOP,SURG,W/ROTAT CUFF REPR Right 2/4/2019    Procedure: REPAIR ROTATOR CUFF ARTHROSCOPIC; SUBACROMIAL DECOMPRESSION; DISTAL CLAVICLE EXCISION;  Surgeon: Latasha Huerta MD;  [facility];  Service: Orthopedics    [place] TOTAL KNEE ARTHROPLASTY Right 9/11/2017    Procedure: ARTHROPLASTY KNEE TOTAL;  Surgeon: Kizzy Sutton MD;  [facility];  Service: Orthopedics    TONSILLECTOMY AND ADENOIDECTOMY         CURRENT MEDICATIONS:     Current Outpatient Medications   Medication Sig Dispense Refill    al mag oxide-diphenhydramine-lidocaine viscous (MAGIC MOUTHWASH) 1:1:1 suspension Swish and spit 10 mL every 4 (four) hours as needed for mouth pain or discomfort 120 mL 2    Cholecalciferol (VITAMIN D-3 PO) Take 2,000 Units by mouth daily      diclofenac sodium (VOLTAREN) 1 % Apply 2 g topically 3 (three) times a day 180 g 2    EPINEPHrine (EPIPEN 2-MOIZ) 0 3 mg/0 3 mL SOAJ Inject as directed      fluticasone (FLONASE) 50 mcg/act nasal spray 1 spray into each nostril daily 1 Bottle 2    metFORMIN (GLUCOPHAGE) 500 mg tablet Take 1 tablet (500 mg total) by mouth 2 (two) times a day 180 tablet 1    mometasone (ELOCON) 0 1 % cream Apply topically daily 45 g 2    simvastatin (ZOCOR) 40 mg tablet Take 1 tablet (40 mg total) by mouth daily 90 tablet 1    tamsulosin (FLOMAX) 0 4 mg Take 1 capsule (0 4 mg total) by mouth daily with dinner 30 capsule 5    traMADol (ULTRAM) 50 mg tablet 1 tab BID PRN 20 tablet 0    traZODone (DESYREL) 50 mg tablet Take 1 tablet (50 mg total) by mouth daily at bedtime 90 tablet 0     No current facility-administered medications for this visit          ALLERGIES:     Allergies   Allergen Reactions    Bee Venom Edema       SOCIAL HISTORY:     Social History     Socioeconomic History    Marital status: /Civil Union     Spouse name: Not on file    Number of children: Not on file    Years of education: Not on file    Highest education level: Not on file   Occupational History    Not on file   Social Needs    Financial resource strain: Not on file    Food insecurity:     Worry: Not on file     Inability: Not on file    Transportation needs:     Medical: Not on file Non-medical: Not on file   Tobacco Use    Smoking status: Former Smoker     Packs/day: 0 25     Types: Cigarettes    Smokeless tobacco: Never Used    Tobacco comment: Smoked from ages 16 to 25  Substance and Sexual Activity    Alcohol use: Yes     Comment: 1-2 x year    Drug use: No    Sexual activity: Not on file   Lifestyle    Physical activity:     Days per week: Not on file     Minutes per session: Not on file    Stress: Not on file   Relationships    Social connections:     Talks on phone: Not on file     Gets together: Not on file     Attends Holiness service: Not on file     Active member of club or organization: Not on file     Attends meetings of clubs or organizations: Not on file     Relationship status: Not on file    Intimate partner violence:     Fear of current or ex partner: Not on file     Emotionally abused: Not on file     Physically abused: Not on file     Forced sexual activity: Not on file   Other Topics Concern    Not on file   Social History Narrative    Not on file       SOCIAL HISTORY:     Family History   Problem Relation Age of Onset    Cancer Mother     Stroke Father        REVIEW OF SYSTEMS:     Review of Systems      PHYSICAL EXAM:     There were no vitals taken for this visit  General:  Healthy appearing *** in no acute distress  They have a normal affect  There is not appear to be any gross neurologic defects or abnormalities  HEENT:  Normocephalic, atraumatic  Neck is supple without any palpable lymphadenopathy  Cardiovascular:  Patient has normal palpable distal radial pulses  There is no significant peripheral edema  No JVD is noted  Respiratory:  Patient has unlabored respirations  There is no audible wheeze or rhonchi  Abdomen:  Abdomen is *** surgical scars  Abdomen is soft and nontender  There is no tympany  Inguinal and umbilical hernia are not appreciated      Genitourinary: {pe male genitalia:443668::"no penile lesions or discharge, no testicular masses or tenderness, no hernias"}    Musculoskeletal:  Patient {DOES NOT does:50560::"does not"} have significant CVA tenderness in the {RIGHT/LEFT:} flank with palpation or percussion  They full range of motion in all 4 extremities  Strength in all 4 extremities appears congruent  Patient is able to ambulate without assistance or difficulty  Dermatologic:  Patient has no skin abnormalities or rashes  LABS:     CBC:   Lab Results   Component Value Date    WBC 7 0 2019    HGB 15 0 2019    HCT 44 0 2019    MCV 90 7 2019     2019       BMP:   Lab Results   Component Value Date    GLUCOSE 195 (H) 2017    CALCIUM 9 7 2019     2017    K 4 1 2019    CO2 30 2019     2019    BUN 24 2019    CREATININE 1 08 2019       IMAGIN/16/19  CHEST      INDICATION:   C64 1: Malignant neoplasm of right kidney, except renal pelvis        COMPARISON:  Chest radiograph from 2017 and chest CT from 2017      EXAM PERFORMED/VIEWS:  XR CHEST PA & LATERAL  WITH DUAL ENERGY SUBTRACTION      FINDINGS:     Cardiomediastinal silhouette appears unremarkable      Lungs are clear  No effusion or pneumothorax       Mild degenerative disease in the spine        Cholecystectomy      IMPRESSION:     No acute cardiopulmonary disease  19  CT ABDOMEN AND PELVIS WITH AND WITHOUT IV CONTRAST     INDICATION:   C64 1: Malignant neoplasm of right kidney, except renal pelvis  Hematuria    Right-sided kidney cancer      COMPARISON:  Abdomen and pelvic CT from 12/15/2018      TECHNIQUE: Initial CT of the abdomen and pelvis was performed without intravenous contrast   Subsequent dynamic CT evaluation of the abdomen and pelvis was performed after the administration of intravenous contrast in both nephrographic and delayed   phases after the administration of intravenous contrast   Axial, sagittal, and coronal 2D reformatted images were created from the source data and submitted for interpretation       Radiation dose length product (DLP) for this visit:  1450 mGy-cm   This examination, like all CT scans performed in the [facility], was performed utilizing techniques to minimize radiation dose exposure, including the use of iterative   reconstruction and automated exposure control      IV Contrast:  100 mL of iohexol (OMNIPAQUE)  Enteric Contrast:  Enteric contrast was not administered      FINDINGS:     ABDOMEN     RIGHT KIDNEY AND URETER:  Unchanged partial nephrectomy defect in the right kidney  No solid renal mass  No detectable urothelial mass  No hydronephrosis or hydroureter  No urinary tract calculi  No perinephric collection      LEFT KIDNEY AND URETER:  No solid renal mass  No detectable urothelial mass  No hydronephrosis or hydroureter  No urinary tract calculi  No perinephric collection      URINARY BLADDER:  No bladder wall mass  No calculi         LOWER CHEST:  No clinically significant abnormality identified in the visualized lower chest      LIVER/BILIARY TREE:  Small simple cyst in the left lobe of liver  No suspicious renal masses      GALLBLADDER:  Gallbladder is surgically absent      SPLEEN:  Unremarkable      PANCREAS:  Unremarkable      ADRENAL GLANDS:  Unremarkable      STOMACH AND BOWEL:  There is colonic diverticulosis without evidence of acute diverticulitis      ABDOMINOPELVIC CAVITY:  No ascites  No free intraperitoneal air   No lymphadenopathy      VESSELS:  Unremarkable for patient's age      PELVIS     REPRODUCTIVE ORGANS:  Unchanged enlarged prostate      APPENDIX: No findings to suggest appendicitis      ABDOMINAL WALL/INGUINAL REGIONS:  Small bilateral fat-containing inguinal hernias      OSSEOUS STRUCTURES: Stable nonaggressive appearing lucent lesion in the right iliac bone (series 3 image 121 )  No acute fracture or destructive osseous lesion      IMPRESSION:     Stable abdomen and pelvic CT status post right partial nephrectomy, without evidence of metastatic disease or new renal parenchymal or urothelial neoplasm      Unchanged enlarged prostate  PATHOLOGY:     5/8/17  Final Diagnosis   A  Kidney, right, nephrectomy:  1  Specimen: Right kidney     - Procedure: Right partial nephrectomy     - Specimen laterality: Right   2  Tumor:     - Tumor site: Lateral mid kidney     - Tumor size (largest tumor if multiple): 2 6 centimeters     - Tumor focality (unifocal or multifocal): Unifocal     - Macroscopic extent of tumor: Confined to kidney     - Histologic type: Clear cell renal cell carcinoma  - Sarcomatoid features: Not identified     - Tumor necrosis (any amount): Not identified      - Histologic grade (G1-G2): ISUP nucleolar grade: G1-G2      - Microscopic tumor extension (pT1a): The tumor is less than 4 cm in greatest dimension and is limited to the kidney   3  Margins:     - Renal parenchymal margin (partial nephrectomy only): Negative for tumor     - Renal capsular margin (partial nephrectomy only): Negative for tumor, see note     - Perinephric fat margin (partial nephrectomy only): Negative for tumor, see note     - Gerotas fascial margin: Not applicable     - Renal vein margin: Not applicable     - Ureteral margin: Not applicable      - Other (specify): Not applicable   4  Lymph-vascular invasion (excluding renal vein and its muscle containing segmental branches and inferior vena cava): Not identified   5  Regional lymph nodes (pNX): No lymph nodes submitted     6  Pathologic findings in nonneoplastic kidney (Note H): Not identified   7   Other tumors and/or tumor-like lesions: Not identified   8  7th Ed AJCC Tumor Stage:  at least Stage  I pT1a, pNX, G1-2     ASSESSMENT:     69 y o  male with pT1a ccRCC s/p RALPNx 5/6423    PLAN:     ***

## 2020-01-07 NOTE — LETTER
January 7, 2020     Liane Zarco, [address] [facility] [address]  [address]    Patient: Aaron Shultz   YOB: 1945   Date of Visit: 1/7/2020       Dear Dr Velma Billingsley:    Thank you for referring Matthew Medrano to me for evaluation  Below are my notes for this consultation  If you have questions, please do not hesitate to call me  I look forward to following your patient along with you  Sincerely,        Melissa Thrasher MD        CC: MD Melissa Waddell MD  1/7/2020  9:04 AM  Sign at close encounter    [place] [facility] NOTE     CHIEF COMPLAINT   Aaron Shultz is a 69 y o  male with a complaint of   Chief Complaint   Patient presents with    Renal Cell Carcinoma     History of Present Illness:     69 y o  male with a history of pT1a right renal cell carcinoma status post right robotic assisted laparoscopic partial nephrectomy 5/8/2017  Patient undergoing yearly imaging surveillance  Patient presents to review his updated films  He remains on Flomax for some very mild urinary symptoms  He has no urinary bother  He has been undergoing routine PSA testing and digital rectal exams with his primary care doctor  AUA SYMPTOM SCORE      Most Recent Value   AUA SYMPTOM SCORE   How often have you had a sensation of not emptying your bladder completely after you finished urinating? 0   How often have you had to urinate again less than two hours after you finished urinating? 1   How often have you found you stopped and started again several times when you urinate?  0   How often have you found it difficult to postpone urination? 1   How often have you had a weak urinary stream?  1   How often have you had to push or strain to begin urination? 0   How many times did you most typically get up to urinate from the time you went to bed at night until the time you got up in the morning?   3   Quality of Life: If you were to spend the rest of your life with your urinary condition just the way it is now, how would you feel about that?  1   AUA SYMPTOM SCORE  6        Lab Results   Component Value Date    PSA 2 9 01/03/2019 2017 PSA 2 6    Past Medical History:     Past Medical History:   Diagnosis Date    Allergic reaction     last assessed: 5/26/2015    Arthritis     right knee    Bee sting allergy     last assessed: 6/30/2015    BPH (benign prostatic hypertrophy)     [facility] ([facility] [address] ) 2017    Right kidney    Diabetes mellitus ([facility] )     Type II, NIDDM    Enlarged prostate with lower urinary tract symptoms (LUTS)     last assessed: 8/17/2015    Herpes zoster     last assessed: 11/21/2013    History of colon polyps     Hyperlipidemia     Leukocytosis     last assessed: 11/18/2014    Osteoarthrosis, localized, primary, knee     last assessed: 10/25/2016    Seasonal allergies     Shoulder pain, right     Wears glasses     Wears partial dentures     Upper and Lower       PAST SURGICAL HISTORY:     Past Surgical History:   Procedure Laterality Date    CARPAL TUNNEL RELEASE Left     CHOLECYSTECTOMY      Open    COLONOSCOPY      GANGLION CYST EXCISION Left     Left wrist    JOINT REPLACEMENT Left 08/2015    Left TKR    JOINT REPLACEMENT Right 09/2017    Right TKR    NEPHRECTOMY  05/08/2017    Partial right nephrectomy    MO LAP,PARTIAL NEPHRECTOMY Right 5/8/2017    Procedure: LAPAROSCOPIC LYSIS OF ADHESIONS, ROBOTIC PARTIAL NEPHRECTOMY, WITH INTRA-OP LAPAROSCOPIC ULTRASOUND;  Surgeon: Gene Alcaraz MD;  [facility];  Service: Urology    MO REPAIR BICEPS LONG TENDON  2/4/2019    Procedure: TENODESIS BICEPS OPEN PROXIMAL;  Surgeon: Mikey Christianson MD;  [facility];  Service: Orthopedics    MO REPAIR INTERCARP/CARP-METACARP JT Left 12/5/2016    Procedure: ARTHROPLASTY THUMB [address] ;  Surgeon: Mikey Christianson MD;  [facility];  Service: Orthopedics    MO REVISE MEDIAN N/CARPAL TUNNEL SURG Left 12/5/2016    Procedure: RELEASE CARPAL TUNNEL;  Surgeon: Otis Salvador MD;  [facility];  Service: Orthopedics    NC SHLDR ARTHROSCOP,SURG,W/ROTAT CUFF REPR Right 2/4/2019    Procedure: REPAIR ROTATOR CUFF ARTHROSCOPIC; SUBACROMIAL DECOMPRESSION; DISTAL CLAVICLE EXCISION;  Surgeon: Otis Salvador MD;  [facility];  Service: Orthopedics    NC TOTAL KNEE ARTHROPLASTY Right 9/11/2017    Procedure: ARTHROPLASTY KNEE TOTAL;  Surgeon: Otis Salvador MD;  [facility];  Service: Orthopedics    TONSILLECTOMY AND ADENOIDECTOMY         CURRENT MEDICATIONS:     Current Outpatient Medications   Medication Sig Dispense Refill    al mag oxide-diphenhydramine-lidocaine viscous (MAGIC MOUTHWASH) 1:1:1 suspension Swish and spit 10 mL every 4 (four) hours as needed for mouth pain or discomfort 120 mL 2    Cholecalciferol (VITAMIN D-3 PO) Take 2,000 Units by mouth daily      diclofenac sodium (VOLTAREN) 1 % Apply 2 g topically 3 (three) times a day 180 g 2    EPINEPHrine (EPIPEN 2-MOIZ) 0 3 mg/0 3 mL SOAJ Inject as directed      fluticasone (FLONASE) 50 mcg/act nasal spray 1 spray into each nostril daily 1 Bottle 2    metFORMIN (GLUCOPHAGE) 500 mg tablet Take 1 tablet (500 mg total) by mouth 2 (two) times a day 180 tablet 1    mometasone (ELOCON) 0 1 % cream Apply topically daily 45 g 2    simvastatin (ZOCOR) 40 mg tablet Take 1 tablet (40 mg total) by mouth daily 90 tablet 1    tamsulosin (FLOMAX) 0 4 mg Take 1 capsule (0 4 mg total) by mouth daily with dinner 30 capsule 5    traMADol (ULTRAM) 50 mg tablet 1 tab BID PRN 20 tablet 0    traZODone (DESYREL) 50 mg tablet Take 1 tablet (50 mg total) by mouth daily at bedtime 90 tablet 0     No current facility-administered medications for this visit          ALLERGIES:     Allergies   Allergen Reactions    Bee Venom Edema       SOCIAL HISTORY:     Social History     Socioeconomic History    Marital status: /Civil Union     Spouse name: None    Number of children: None    Years of education: None    Highest education level: None   Occupational History    None   Social Needs    Financial resource strain: None    Food insecurity:     Worry: None     Inability: None    Transportation needs:     Medical: None     Non-medical: None   Tobacco Use    Smoking status: Former Smoker     Packs/day: 0 25     Types: Cigarettes    Smokeless tobacco: Never Used    Tobacco comment: Smoked from ages 16 to 25  Substance and Sexual Activity    Alcohol use: Yes     Comment: 1-2 x year    Drug use: No    Sexual activity: None   Lifestyle    Physical activity:     Days per week: None     Minutes per session: None    Stress: None   Relationships    Social connections:     Talks on phone: None     Gets together: None     Attends Congregation service: None     Active member of club or organization: None     Attends meetings of clubs or organizations: None     Relationship status: None    Intimate partner violence:     Fear of current or ex partner: None     Emotionally abused: None     Physically abused: None     Forced sexual activity: None   Other Topics Concern    None   Social History Narrative    None       SOCIAL HISTORY:     Family History   Problem Relation Age of Onset    Cancer Mother     Stroke Father        REVIEW OF SYSTEMS:     Review of Systems   Constitutional: Negative  Respiratory: Negative  Cardiovascular: Negative  Gastrointestinal: Negative  Genitourinary: Negative  Musculoskeletal: Negative  Skin: Negative  Psychiatric/Behavioral: Negative  PHYSICAL EXAM:     /72   Pulse 66   Ht 5' 11" (1 803 m)   Wt 88 kg (194 lb)   BMI 27 06 kg/m²      General:  Healthy appearing male in no acute distress  They have a normal affect  There is not appear to be any gross neurologic defects or abnormalities  HEENT:  Normocephalic, atraumatic    Neck is supple without any palpable lymphadenopathy  Cardiovascular:  Patient has normal palpable distal radial pulses  There is no significant peripheral edema  No JVD is noted  Respiratory:  Patient has unlabored respirations  There is no audible wheeze or rhonchi  Abdomen:  Abdomen is with healed midline ex-lap and separate robotic surgical scars  Abdomen is soft and nontender  There is no tympany  Inguinal and umbilical hernia are not appreciated  Genitourinary: Patient defers rectal exam, undergoes BITA with PCP q6mos    Musculoskeletal:  Patient does not have significant CVA tenderness in the  flank with palpation or percussion  They full range of motion in all 4 extremities  Strength in all 4 extremities appears congruent  Patient is able to ambulate without assistance or difficulty  Dermatologic:  Patient has no skin abnormalities or rashes  LABS:     CBC:   Lab Results   Component Value Date    WBC 7 0 2019    HGB 15 0 2019    HCT 44 0 2019    MCV 90 7 2019     2019       BMP:   Lab Results   Component Value Date    GLUCOSE 195 (H) 2017    CALCIUM 9 7 2019     2017    K 4 1 2019    CO2 30 2019     2019    BUN 24 2019    CREATININE 1 08 2019       IMAGIN/16/19  CHEST      INDICATION:   C64 1: Malignant neoplasm of right kidney, except renal pelvis        COMPARISON:  Chest radiograph from 2017 and chest CT from 2017      EXAM PERFORMED/VIEWS:  XR CHEST PA & LATERAL  WITH DUAL ENERGY SUBTRACTION      FINDINGS:     Cardiomediastinal silhouette appears unremarkable      Lungs are clear  No effusion or pneumothorax       Mild degenerative disease in the spine        Cholecystectomy      IMPRESSION:     No acute cardiopulmonary disease  19  CT ABDOMEN AND PELVIS WITH AND WITHOUT IV CONTRAST     INDICATION:   C64 1: Malignant neoplasm of right kidney, except renal pelvis  Hematuria    Right-sided kidney cancer      COMPARISON:  Abdomen and pelvic CT from 12/15/2018      TECHNIQUE: Initial CT of the abdomen and pelvis was performed without intravenous contrast   Subsequent dynamic CT evaluation of the abdomen and pelvis was performed after the administration of intravenous contrast in both nephrographic and delayed   phases after the administration of intravenous contrast   Axial, sagittal, and coronal 2D reformatted images were created from the source data and submitted for interpretation       Radiation dose length product (DLP) for this visit:  1450 mGy-cm   This examination, like all CT scans performed in the [facility], was performed utilizing techniques to minimize radiation dose exposure, including the use of iterative   reconstruction and automated exposure control      IV Contrast:  100 mL of iohexol (OMNIPAQUE)  Enteric Contrast:  Enteric contrast was not administered      FINDINGS:     ABDOMEN     RIGHT KIDNEY AND URETER:  Unchanged partial nephrectomy defect in the right kidney  No solid renal mass  No detectable urothelial mass  No hydronephrosis or hydroureter  No urinary tract calculi  No perinephric collection      LEFT KIDNEY AND URETER:  No solid renal mass  No detectable urothelial mass  No hydronephrosis or hydroureter  No urinary tract calculi  No perinephric collection      URINARY BLADDER:  No bladder wall mass  No calculi         LOWER CHEST:  No clinically significant abnormality identified in the visualized lower chest      LIVER/BILIARY TREE:  Small simple cyst in the left lobe of liver  No suspicious renal masses      GALLBLADDER:  Gallbladder is surgically absent      SPLEEN:  Unremarkable      PANCREAS:  Unremarkable      ADRENAL GLANDS:  Unremarkable      STOMACH AND BOWEL:  There is colonic diverticulosis without evidence of acute diverticulitis      ABDOMINOPELVIC CAVITY:  No ascites  No free intraperitoneal air   No lymphadenopathy      VESSELS:  Unremarkable for patient's age      PELVIS     REPRODUCTIVE ORGANS:  Unchanged enlarged prostate      APPENDIX: No findings to suggest appendicitis      ABDOMINAL WALL/INGUINAL REGIONS:  Small bilateral fat-containing inguinal hernias      OSSEOUS STRUCTURES: Stable nonaggressive appearing lucent lesion in the right iliac bone (series 3 image 121 )  No acute fracture or destructive osseous lesion      IMPRESSION:     Stable abdomen and pelvic CT status post right partial nephrectomy, without evidence of metastatic disease or new renal parenchymal or urothelial neoplasm      Unchanged enlarged prostate  PATHOLOGY:     5/8/17  Final Diagnosis   A  Kidney, right, nephrectomy:  1  Specimen: Right kidney     - Procedure: Right partial nephrectomy     - Specimen laterality: Right   2  Tumor:     - Tumor site: Lateral mid kidney     - Tumor size (largest tumor if multiple): 2 6 centimeters     - Tumor focality (unifocal or multifocal): Unifocal     - Macroscopic extent of tumor: Confined to kidney     - Histologic type: Clear cell renal cell carcinoma  - Sarcomatoid features: Not identified     - Tumor necrosis (any amount): Not identified      - Histologic grade (G1-G2): ISUP nucleolar grade: G1-G2      - Microscopic tumor extension (pT1a): The tumor is less than 4 cm in greatest dimension and is limited to the kidney   3  Margins:     - Renal parenchymal margin (partial nephrectomy only): Negative for tumor     - Renal capsular margin (partial nephrectomy only): Negative for tumor, see note     - Perinephric fat margin (partial nephrectomy only): Negative for tumor, see note     - Gerotas fascial margin: Not applicable     - Renal vein margin: Not applicable     - Ureteral margin: Not applicable      - Other (specify): Not applicable   4  Lymph-vascular invasion (excluding renal vein and its muscle containing segmental branches and inferior vena cava): Not identified   5  Regional lymph nodes (pNX): No lymph nodes submitted     6   Pathologic findings in nonneoplastic kidney (Note H): Not identified   7  Other tumors and/or tumor-like lesions: Not identified   8  7th Ed AJCC Tumor Stage:  at least Stage  I pT1a, pNX, G1-2     ASSESSMENT:     69 y o  male with pT1a ccRCC s/p RALPNx 1/9690    PLAN:     1  H/o ccRCC s/p RALPNx - patient approaching 3 years s/p surgery  Reviewed films in depth with patient  He did have a friend who had a localized recurrence on films and was somewhat anxious  He does have a benign hepatic cyst and a small stable lesion in the right iliac bone  Recommend continue yearly imaging  Patient prefers to see his surgeon at next visit  2    Prostate cancer screening - I discussed the recommendations for tailored screening in men above the age of 79  Given his good health, I think it is not unreasonable perform every other year digital rectal exams for prostate examination and PSA testing until the patient turned 76  At this point time, would consider cessation of screening  Discussed this at length with the patient  3    LUTS -  Patient has very minimal symptoms on Flomax  Would like to continue this medication

## 2020-01-07 NOTE — PROGRESS NOTES
UROLOGY ROUTINE FOLLOW UP NOTE     CHIEF COMPLAINT   Denise Castorena is a 69 y o  male with a complaint of   Chief Complaint   Patient presents with    Renal Cell Carcinoma     History of Present Illness:     69 y o  male with a history of pT1a right renal cell carcinoma status post right robotic assisted laparoscopic partial nephrectomy 5/8/2017  Patient undergoing yearly imaging surveillance  Patient presents to review his updated films  He remains on Flomax for some very mild urinary symptoms  He has no urinary bother  He has been undergoing routine PSA testing and digital rectal exams with his primary care doctor  AUA SYMPTOM SCORE      Most Recent Value   AUA SYMPTOM SCORE   How often have you had a sensation of not emptying your bladder completely after you finished urinating? 0   How often have you had to urinate again less than two hours after you finished urinating? 1   How often have you found you stopped and started again several times when you urinate?  0   How often have you found it difficult to postpone urination? 1   How often have you had a weak urinary stream?  1   How often have you had to push or strain to begin urination? 0   How many times did you most typically get up to urinate from the time you went to bed at night until the time you got up in the morning?   3   Quality of Life: If you were to spend the rest of your life with your urinary condition just the way it is now, how would you feel about that?  1   AUA SYMPTOM SCORE  6        Lab Results   Component Value Date    PSA 2 9 01/03/2019 2017 PSA 2 6    Past Medical History:     Past Medical History:   Diagnosis Date    Allergic reaction     last assessed: 5/26/2015    Arthritis     right knee    Bee sting allergy     last assessed: 6/30/2015    BPH (benign prostatic hypertrophy)     Cancer ([facility] ) 2017    Right kidney    Diabetes mellitus ([facility] )     Type II, NIDDM    Enlarged prostate with lower urinary tract symptoms (LUTS)     last assessed: 8/17/2015    Herpes zoster     last assessed: 11/21/2013    History of colon polyps     Hyperlipidemia     Leukocytosis     last assessed: 11/18/2014    Osteoarthrosis, localized, primary, knee     last assessed: 10/25/2016    Seasonal allergies     Shoulder pain, right     Wears glasses     Wears partial dentures     Upper and Lower       PAST SURGICAL HISTORY:     Past Surgical History:   Procedure Laterality Date    CARPAL TUNNEL RELEASE Left     CHOLECYSTECTOMY      Open    COLONOSCOPY      GANGLION CYST EXCISION Left     Left wrist    JOINT REPLACEMENT Left 08/2015    Left TKR    JOINT REPLACEMENT Right 09/2017    Right TKR    NEPHRECTOMY  05/08/2017    Partial right nephrectomy    CO LAP,PARTIAL NEPHRECTOMY Right 5/8/2017    Procedure: LAPAROSCOPIC LYSIS OF ADHESIONS, ROBOTIC PARTIAL NEPHRECTOMY, WITH INTRA-OP LAPAROSCOPIC ULTRASOUND;  Surgeon: Nas Pozo MD;  [facility];  Service: Urology    CO REPAIR BICEPS LONG TENDON  2/4/2019    Procedure: TENODESIS BICEPS OPEN PROXIMAL;  Surgeon: Vy Radford MD;  [facility];  Service: Orthopedics    CO REPAIR INTERCARP/CARP-METACARP JT Left 12/5/2016    Procedure: ARTHROPLASTY THUMB [address] ;  Surgeon: Vy Radford MD;  [facility];  Service: Orthopedics    CO REVISE MEDIAN N/CARPAL TUNNEL SURG Left 12/5/2016    Procedure: RELEASE CARPAL TUNNEL;  Surgeon: Vy Radford MD;  [facility];  Service: Orthopedics    CO [address] ARTHROSCOP,SURG,W/ROTAT CUFF REPR Right 2/4/2019    Procedure: REPAIR ROTATOR CUFF ARTHROSCOPIC; SUBACROMIAL DECOMPRESSION; DISTAL CLAVICLE EXCISION;  Surgeon: Vy Radford MD;  [facility];  Service: Orthopedics    CO TOTAL KNEE ARTHROPLASTY Right 9/11/2017    Procedure: ARTHROPLASTY KNEE TOTAL;  Surgeon: Vy Radford MD;  [facility];  Service: Orthopedics    TONSILLECTOMY AND ADENOIDECTOMY         CURRENT MEDICATIONS: Current Outpatient Medications   Medication Sig Dispense Refill    al mag oxide-diphenhydramine-lidocaine viscous (MAGIC MOUTHWASH) 1:1:1 suspension Swish and spit 10 mL every 4 (four) hours as needed for mouth pain or discomfort 120 mL 2    Cholecalciferol (VITAMIN D-3 PO) Take 2,000 Units by mouth daily      diclofenac sodium (VOLTAREN) 1 % Apply 2 g topically 3 (three) times a day 180 g 2    EPINEPHrine (EPIPEN 2-MOIZ) 0 3 mg/0 3 mL SOAJ Inject as directed      fluticasone (FLONASE) 50 mcg/act nasal spray 1 spray into each nostril daily 1 Bottle 2    metFORMIN (GLUCOPHAGE) 500 mg tablet Take 1 tablet (500 mg total) by mouth 2 (two) times a day 180 tablet 1    mometasone (ELOCON) 0 1 % cream Apply topically daily 45 g 2    simvastatin (ZOCOR) 40 mg tablet Take 1 tablet (40 mg total) by mouth daily 90 tablet 1    tamsulosin (FLOMAX) 0 4 mg Take 1 capsule (0 4 mg total) by mouth daily with dinner 30 capsule 5    traMADol (ULTRAM) 50 mg tablet 1 tab BID PRN 20 tablet 0    traZODone (DESYREL) 50 mg tablet Take 1 tablet (50 mg total) by mouth daily at bedtime 90 tablet 0     No current facility-administered medications for this visit  ALLERGIES:     Allergies   Allergen Reactions    Bee Venom Edema       SOCIAL HISTORY:     Social History     Socioeconomic History    Marital status: /Civil Union     Spouse name: None    Number of children: None    Years of education: None    Highest education level: None   Occupational History    None   Social Needs    Financial resource strain: None    Food insecurity:     Worry: None     Inability: None    Transportation needs:     Medical: None     Non-medical: None   Tobacco Use    Smoking status: Former Smoker     Packs/day: 0 25     Types: Cigarettes    Smokeless tobacco: Never Used    Tobacco comment: Smoked from ages 16 to 25     Substance and Sexual Activity    Alcohol use: Yes     Comment: 1-2 x year    Drug use: No    Sexual activity: None   Lifestyle    Physical activity:     Days per week: None     Minutes per session: None    Stress: None   Relationships    Social connections:     Talks on phone: None     Gets together: None     Attends Sabianism service: None     Active member of club or organization: None     Attends meetings of clubs or organizations: None     Relationship status: None    Intimate partner violence:     Fear of current or ex partner: None     Emotionally abused: None     Physically abused: None     Forced sexual activity: None   Other Topics Concern    None   Social History Narrative    None       SOCIAL HISTORY:     Family History   Problem Relation Age of Onset    Cancer Mother     Stroke Father        REVIEW OF SYSTEMS:     Review of Systems   Constitutional: Negative  Respiratory: Negative  Cardiovascular: Negative  Gastrointestinal: Negative  Genitourinary: Negative  Musculoskeletal: Negative  Skin: Negative  Psychiatric/Behavioral: Negative  PHYSICAL EXAM:     /72   Pulse 66   Ht 5' 11" (1 803 m)   Wt 88 kg (194 lb)   BMI 27 06 kg/m²     General:  Healthy appearing male in no acute distress  They have a normal affect  There is not appear to be any gross neurologic defects or abnormalities  HEENT:  Normocephalic, atraumatic  Neck is supple without any palpable lymphadenopathy  Cardiovascular:  Patient has normal palpable distal radial pulses  There is no significant peripheral edema  No JVD is noted  Respiratory:  Patient has unlabored respirations  There is no audible wheeze or rhonchi  Abdomen:  Abdomen is with healed midline ex-lap and separate robotic surgical scars  Abdomen is soft and nontender  There is no tympany  Inguinal and umbilical hernia are not appreciated      Genitourinary: Patient defers rectal exam, undergoes BITA with PCP q6mos    Musculoskeletal:  Patient does not have significant CVA tenderness in the  flank with palpation or percussion  They full range of motion in all 4 extremities  Strength in all 4 extremities appears congruent  Patient is able to ambulate without assistance or difficulty  Dermatologic:  Patient has no skin abnormalities or rashes  LABS:     CBC:   Lab Results   Component Value Date    WBC 7 0 2019    HGB 15 0 2019    HCT 44 0 2019    MCV 90 7 2019     2019       BMP:   Lab Results   Component Value Date    GLUCOSE 195 (H) 2017    CALCIUM 9 7 2019     2017    K 4 1 2019    CO2 30 2019     2019    BUN 24 2019    CREATININE 1 08 2019       IMAGIN/16/19  CHEST      INDICATION:   C64 1: Malignant neoplasm of right kidney, except renal pelvis        COMPARISON:  Chest radiograph from 2017 and chest CT from 2017      EXAM PERFORMED/VIEWS:  XR CHEST PA & LATERAL  WITH DUAL ENERGY SUBTRACTION      FINDINGS:     Cardiomediastinal silhouette appears unremarkable      Lungs are clear  No effusion or pneumothorax       Mild degenerative disease in the spine        Cholecystectomy      IMPRESSION:     No acute cardiopulmonary disease  19  CT ABDOMEN AND PELVIS WITH AND WITHOUT IV CONTRAST     INDICATION:   C64 1: Malignant neoplasm of right kidney, except renal pelvis  Hematuria  Right-sided kidney cancer      COMPARISON:  Abdomen and pelvic CT from 12/15/2018      TECHNIQUE: Initial CT of the abdomen and pelvis was performed without intravenous contrast   Subsequent dynamic CT evaluation of the abdomen and pelvis was performed after the administration of intravenous contrast in both nephrographic and delayed   phases after the administration of intravenous contrast   Axial, sagittal, and coronal 2D reformatted images were created from the source data and submitted for interpretation       Radiation dose length product (DLP) for this visit:  1450 mGy-cm     This examination, like all CT scans performed in the [facility], was performed utilizing techniques to minimize radiation dose exposure, including the use of iterative   reconstruction and automated exposure control      IV Contrast:  100 mL of iohexol (OMNIPAQUE)  Enteric Contrast:  Enteric contrast was not administered      FINDINGS:     ABDOMEN     RIGHT KIDNEY AND URETER:  Unchanged partial nephrectomy defect in the right kidney  No solid renal mass  No detectable urothelial mass  No hydronephrosis or hydroureter  No urinary tract calculi  No perinephric collection      LEFT KIDNEY AND URETER:  No solid renal mass  No detectable urothelial mass  No hydronephrosis or hydroureter  No urinary tract calculi  No perinephric collection      URINARY BLADDER:  No bladder wall mass  No calculi         LOWER CHEST:  No clinically significant abnormality identified in the visualized lower chest      LIVER/BILIARY TREE:  Small simple cyst in the left lobe of liver  No suspicious renal masses      GALLBLADDER:  Gallbladder is surgically absent      SPLEEN:  Unremarkable      PANCREAS:  Unremarkable      ADRENAL GLANDS:  Unremarkable      STOMACH AND BOWEL:  There is colonic diverticulosis without evidence of acute diverticulitis      ABDOMINOPELVIC CAVITY:  No ascites  No free intraperitoneal air   No lymphadenopathy      VESSELS:  Unremarkable for patient's age      PELVIS     REPRODUCTIVE ORGANS:  Unchanged enlarged prostate      APPENDIX: No findings to suggest appendicitis      ABDOMINAL WALL/INGUINAL REGIONS:  Small bilateral fat-containing inguinal hernias      OSSEOUS STRUCTURES: Stable nonaggressive appearing lucent lesion in the right iliac bone (series 3 image 121 )  No acute fracture or destructive osseous lesion      IMPRESSION:     Stable abdomen and pelvic CT status post right partial nephrectomy, without evidence of metastatic disease or new renal parenchymal or urothelial neoplasm      Unchanged enlarged prostate  PATHOLOGY:     5/8/17  Final Diagnosis   A  Kidney, right, nephrectomy:  1  Specimen: Right kidney     - Procedure: Right partial nephrectomy     - Specimen laterality: Right   2  Tumor:     - Tumor site: Lateral mid kidney     - Tumor size (largest tumor if multiple): 2 6 centimeters     - Tumor focality (unifocal or multifocal): Unifocal     - Macroscopic extent of tumor: Confined to kidney     - Histologic type: Clear cell renal cell carcinoma  - Sarcomatoid features: Not identified     - Tumor necrosis (any amount): Not identified      - Histologic grade (G1-G2): ISUP nucleolar grade: G1-G2      - Microscopic tumor extension (pT1a): The tumor is less than 4 cm in greatest dimension and is limited to the kidney   3  Margins:     - Renal parenchymal margin (partial nephrectomy only): Negative for tumor     - Renal capsular margin (partial nephrectomy only): Negative for tumor, see note     - Perinephric fat margin (partial nephrectomy only): Negative for tumor, see note     - Gerotas fascial margin: Not applicable     - Renal vein margin: Not applicable     - Ureteral margin: Not applicable      - Other (specify): Not applicable   4  Lymph-vascular invasion (excluding renal vein and its muscle containing segmental branches and inferior vena cava): Not identified   5  Regional lymph nodes (pNX): No lymph nodes submitted     6  Pathologic findings in nonneoplastic kidney (Note H): Not identified   7  Other tumors and/or tumor-like lesions: Not identified   8  7th Ed AJCC Tumor Stage:  at least Stage  I pT1a, pNX, G1-2     ASSESSMENT:     69 y o  male with pT1a ccRCC s/p RALPNx 9/6426    PLAN:     1  H/o ccRCC s/p RALPNx - patient approaching 3 years s/p surgery  Reviewed films in depth with patient  He did have a friend who had a localized recurrence on films and was somewhat anxious  He does have a benign hepatic cyst and a small stable lesion in the right iliac bone    Recommend continue yearly imaging  Patient prefers to see his surgeon at next visit  2    Prostate cancer screening - I discussed the recommendations for tailored screening in men above the age of 79  Given his good health, I think it is not unreasonable perform every other year digital rectal exams for prostate examination and PSA testing until the patient turned 76  At this point time, would consider cessation of screening  Discussed this at length with the patient  3    LUTS -  Patient has very minimal symptoms on Flomax  Would like to continue this medication

## 2020-01-08 ENCOUNTER — APPOINTMENT (OUTPATIENT)
Dept: LAB | Facility: CLINIC | Age: 75
End: 2020-01-08
Payer: MEDICARE

## 2020-01-08 DIAGNOSIS — E11.8 TYPE 2 DIABETES MELLITUS WITH COMPLICATION, WITHOUT LONG-TERM CURRENT USE OF INSULIN (HCC): ICD-10-CM

## 2020-01-08 DIAGNOSIS — Z12.5 SCREENING FOR MALIGNANT NEOPLASM OF PROSTATE: ICD-10-CM

## 2020-01-08 LAB
ALBUMIN SERPL BCP-MCNC: 4 G/DL (ref 3.5–5)
ALP SERPL-CCNC: 42 U/L (ref 46–116)
ALT SERPL W P-5'-P-CCNC: 41 U/L (ref 12–78)
ANION GAP SERPL CALCULATED.3IONS-SCNC: 3 MMOL/L (ref 4–13)
AST SERPL W P-5'-P-CCNC: 16 U/L (ref 5–45)
BILIRUB SERPL-MCNC: 0.56 MG/DL (ref 0.2–1)
BUN SERPL-MCNC: 24 MG/DL (ref 5–25)
CALCIUM SERPL-MCNC: 9.4 MG/DL (ref 8.3–10.1)
CHLORIDE SERPL-SCNC: 105 MMOL/L (ref 100–108)
CO2 SERPL-SCNC: 29 MMOL/L (ref 21–32)
CREAT SERPL-MCNC: 1.11 MG/DL (ref 0.6–1.3)
EST. AVERAGE GLUCOSE BLD GHB EST-MCNC: 137 MG/DL
GFR SERPL CREATININE-BSD FRML MDRD: 66 ML/MIN/1.73SQ M
GLUCOSE P FAST SERPL-MCNC: 142 MG/DL (ref 65–99)
HBA1C MFR BLD: 6.4 % (ref 4.2–6.3)
POTASSIUM SERPL-SCNC: 4.1 MMOL/L (ref 3.5–5.3)
PROT SERPL-MCNC: 8 G/DL (ref 6.4–8.2)
PSA SERPL-MCNC: 2.8 NG/ML (ref 0–4)
SODIUM SERPL-SCNC: 137 MMOL/L (ref 136–145)

## 2020-01-08 PROCEDURE — 80053 COMPREHEN METABOLIC PANEL: CPT

## 2020-01-08 PROCEDURE — 83036 HEMOGLOBIN GLYCOSYLATED A1C: CPT

## 2020-01-08 PROCEDURE — G0103 PSA SCREENING: HCPCS

## 2020-01-08 PROCEDURE — 36415 COLL VENOUS BLD VENIPUNCTURE: CPT

## 2020-01-15 ENCOUNTER — OFFICE VISIT (OUTPATIENT)
Dept: FAMILY MEDICINE CLINIC | Facility: CLINIC | Age: 75
End: 2020-01-15
Payer: MEDICARE

## 2020-01-15 VITALS
HEIGHT: 71 IN | RESPIRATION RATE: 18 BRPM | DIASTOLIC BLOOD PRESSURE: 80 MMHG | SYSTOLIC BLOOD PRESSURE: 130 MMHG | HEART RATE: 70 BPM | OXYGEN SATURATION: 97 % | BODY MASS INDEX: 27.18 KG/M2 | TEMPERATURE: 97.7 F | WEIGHT: 194.13 LBS

## 2020-01-15 DIAGNOSIS — C64.1 RENAL CELL CARCINOMA OF RIGHT KIDNEY (HCC): ICD-10-CM

## 2020-01-15 DIAGNOSIS — Z96.651 S/P TKR (TOTAL KNEE REPLACEMENT) USING CEMENT, RIGHT: ICD-10-CM

## 2020-01-15 DIAGNOSIS — E78.2 MIXED HYPERLIPIDEMIA: ICD-10-CM

## 2020-01-15 DIAGNOSIS — R35.0 BENIGN PROSTATIC HYPERPLASIA WITH URINARY FREQUENCY: ICD-10-CM

## 2020-01-15 DIAGNOSIS — G47.00 INSOMNIA, UNSPECIFIED TYPE: ICD-10-CM

## 2020-01-15 DIAGNOSIS — R51.9 NONINTRACTABLE HEADACHE, UNSPECIFIED CHRONICITY PATTERN, UNSPECIFIED HEADACHE TYPE: ICD-10-CM

## 2020-01-15 DIAGNOSIS — E11.9 TYPE 2 DIABETES MELLITUS WITHOUT COMPLICATION, WITHOUT LONG-TERM CURRENT USE OF INSULIN (HCC): Primary | ICD-10-CM

## 2020-01-15 DIAGNOSIS — N40.1 BENIGN PROSTATIC HYPERPLASIA WITH URINARY FREQUENCY: ICD-10-CM

## 2020-01-15 PROBLEM — K12.1 STOMATITIS: Status: RESOLVED | Noted: 2019-10-03 | Resolved: 2020-01-15

## 2020-01-15 PROBLEM — Z12.5 SCREENING FOR PROSTATE CANCER: Status: RESOLVED | Noted: 2020-01-07 | Resolved: 2020-01-15

## 2020-01-15 PROCEDURE — 99214 OFFICE O/P EST MOD 30 MIN: CPT | Performed by: FAMILY MEDICINE

## 2020-01-15 NOTE — ASSESSMENT & PLAN NOTE
Lab Results   Component Value Date    HGBA1C 6 4 (H) 01/08/2020    A1c now 6 4%, was 6 8%  Doing well on metformin 500 mg b i d     Will continue to monitor

## 2020-01-15 NOTE — PROGRESS NOTES
[facility]      NAME: Ivan Garibay  AGE: 69 y o  SEX: male  : 1945   MRN: [id number]    DATE: 1/15/2020  TIME: 1:56 PM    Assessment and Plan     Problem List Items Addressed This Visit     BPH (benign prostatic hyperplasia)      Patient continues with ongoing problems with nocturia  Gets between 2-4 times per night to urinate  Currently taking tamsulosin  I encouraged him to schedule a follow-up with his urologist in the near future to address this further         DM2 (diabetes mellitus, type 2) ([facility] ) - Primary       Lab Results   Component Value Date    HGBA1C 6 4 (H) 2020    A1c now 6 4%, was 6 8%  Doing well on metformin 500 mg b i d     Will continue to monitor         Relevant Orders    CBC and differential    Comprehensive metabolic panel    Hemoglobin A1C    TSH, 3rd generation with Free T4 reflex    Insomnia      Patient still with ongoing issues with sleep  But these mostly stem from problems with nocturia  He is doing reasonably well on trazodone 50 mg nightly without side effects or falls  He will contact his urologist to address BPH         Mixed hyperlipidemia      Doing well on  Simvastatin 40 mg daily  Will continue to monitor         Relevant Orders    Lipid Panel with Direct LDL reflex    Renal cell carcinoma of right kidney [facility])      Status post right nephrectomy May 2017 by Dr Mallorie Joseph  Continue follow-up as directed         Relevant Orders    CBC and differential    Comprehensive metabolic panel    S/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, right      Status post bilateral total knee replacements by Dr Yunior Alarcon    Patient has been doing well         RESOLVED: Nonintractable headache      Other Visit Diagnoses     BMI 26 0-26 9,adult                  Return to office in: 6 mos, AWV, [address] prior    Chief Complaint     Chief Complaint   Patient presents with    Follow-up     6m check up to chronic conditions and to discuss blood work from 1/8/2020       History of Present Illness     Patient presents for recheck of chronic medical problems today  He has been having ongoing issues with urinary frequency, especially at night  Still taking tamsulosin  Otherwise doing well  Doing well on metformin for diabetes  Doing reasonably well on trazodone for insomnia  Doing well on simvastatin for cholesterol  Patient had labs drawn on January 8th      The following portions of the patient's history were reviewed and updated as appropriate: allergies, current medications, past family history, past medical history, past social history, past surgical history and problem list     Review of Systems   Review of Systems   Respiratory: Negative  Cardiovascular: Negative  Gastrointestinal: Negative  Genitourinary: Negative  Active Problem List     Patient Active Problem List   Diagnosis    Primary osteoarthritis of right knee    BPH (benign prostatic hyperplasia)    DM2 (diabetes mellitus, type 2) ([facility] )    Insomnia    Mixed hyperlipidemia    Renal cell carcinoma of right kidney (HCC)    Vitamin D deficiency    S/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, right    Cervical strain    Ganglion of flexor tendon sheath of left thumb    Incomplete tear of right rotator cuff    Status post arthroscopy of right shoulder    Encounter for other orthopedic aftercare    Vascular disorder of kidney       Objective   /80 (BP Location: Left arm, Patient Position: Sitting, Cuff Size: Adult)   Pulse 70   Temp 97 7 °F (36 5 °C) (Tympanic)   Resp 18   Ht 5' 11 26" (1 81 m)   Wt 88 1 kg (194 lb 2 oz)   SpO2 97%   BMI 26 88 kg/m²     Physical Exam   Cardiovascular: Normal rate, regular rhythm, normal heart sounds and intact distal pulses  Carotids: no bruits  Ext: no edema   Pulmonary/Chest: Effort normal  No respiratory distress  He has no wheezes  He has no rales  Psychiatric: He has a normal mood and affect   His behavior is normal  Thought content normal        Pertinent Laboratory/Diagnostic Studies:  labs    Current Medications     Current Outpatient Medications:     Cholecalciferol (VITAMIN D-3 PO), Take 2,000 Units by mouth daily, Disp: , Rfl:     EPINEPHrine (EPIPEN 2-MOIZ) 0 3 mg/0 3 mL SOAJ, Inject as directed, Disp: , Rfl:     fluticasone (FLONASE) 50 mcg/act nasal spray, 1 spray into each nostril daily, Disp: 1 Bottle, Rfl: 2    metFORMIN (GLUCOPHAGE) 500 mg tablet, Take 1 tablet (500 mg total) by mouth 2 (two) times a day, Disp: 180 tablet, Rfl: 1    simvastatin (ZOCOR) 40 mg tablet, Take 1 tablet (40 mg total) by mouth daily, Disp: 90 tablet, Rfl: 1    tamsulosin (FLOMAX) 0 4 mg, Take 1 capsule (0 4 mg total) by mouth daily with dinner, Disp: 30 capsule, Rfl: 5    traZODone (DESYREL) 50 mg tablet, Take 1 tablet (50 mg total) by mouth daily at bedtime, Disp: 90 tablet, Rfl: 0    Health Maintenance     Health Maintenance   Topic Date Due    Hepatitis C Screening  1945    BMI: Followup Plan  05/31/1964    URINE MICROALBUMIN  01/03/2020    Hepatitis B Vaccine (1 of 3 - Risk 3-dose series) 02/15/2020 (Originally 5/31/1964)    Diabetic Foot Exam  05/15/2020    HEMOGLOBIN A1C  07/08/2020    Fall Risk  07/12/2020    Depression Screening PHQ  07/12/2020    Medicare Annual Wellness Visit (AWV)  07/12/2020    DM Eye Exam  08/14/2020    BMI: Adult  01/07/2021    CRC Screening: Colonoscopy  06/05/2024    DTaP,Tdap,and Td Vaccines (3 - Td) 10/16/2027    Influenza Vaccine  Completed    Pneumococcal Vaccine: 65+ Years  Completed    Pneumococcal Vaccine: Pediatrics (0 to 5 Years) and At-Risk Patients (6 to 59 Years)  Aged Out    HIB Vaccine  Aged Out    IPV Vaccine  Aged Out    Hepatitis A Vaccine  Aged Out    Meningococcal ACWY Vaccine  Aged Out    HPV Vaccine  Aged [employer] History   Administered Date(s) Administered    INFLUENZA 11/18/2014, 10/05/2015, 11/28/2016, 01/04/2018, 10/15/2018    Influenza Split High Dose Preservative Free IM 11/18/2014, 10/05/2015, 11/28/2016, 01/04/2018    Influenza TIV (IM) 10/05/2012, 10/09/2013    Influenza, high dose seasonal 0 5 mL 10/15/2018, 10/03/2019    Pneumococcal Conjugate 13-Valent 12/08/2015, 12/09/2015    Pneumococcal Polysaccharide PPV23 10/09/2013    Tdap 11/15/2004, 10/16/2017    Zoster 10/05/2012       Julian Lawrence DO  [facility]  BMI Counseling: Body mass index is 26 88 kg/m²  The BMI is above normal  Nutrition recommendations include 3-5 servings of fruits/vegetables daily

## 2020-01-15 NOTE — ASSESSMENT & PLAN NOTE
Status post bilateral total knee replacements by Dr Jeanette Saunders    Patient has been doing well

## 2020-01-15 NOTE — ASSESSMENT & PLAN NOTE
Patient still with ongoing issues with sleep  But these mostly stem from problems with nocturia  He is doing reasonably well on trazodone 50 mg nightly without side effects or falls    He will contact his urologist to address BPH

## 2020-01-15 NOTE — ASSESSMENT & PLAN NOTE
Patient continues with ongoing problems with nocturia  Gets between 2-4 times per night to urinate  Currently taking tamsulosin    I encouraged him to schedule a follow-up with his urologist in the near future to address this further

## 2020-01-15 NOTE — PATIENT INSTRUCTIONS
Obesity   AMBULATORY CARE:   Obesity  is when your body mass index (BMI) is greater than 30  Your healthcare provider will use your height and weight to measure your BMI  The risks of obesity include  many health problems, such as injuries or physical disability  You may need tests to check for the following:  · Diabetes     · High blood pressure or high cholesterol     · Heart disease     · Gallbladder or liver disease     · Cancer of the colon, breast, prostate, liver, or kidney     · Sleep apnea     · Arthritis or gout  Seek care immediately if:   · You have a severe headache, confusion, or difficulty speaking  · You have weakness on one side of your body  · You have chest pain, sweating, or shortness of breath  Contact your healthcare provider if:   · You have symptoms of gallbladder or liver disease, such as pain in your upper abdomen  · You have knee or hip pain and discomfort while walking  · You have symptoms of diabetes, such as intense hunger and thirst, and frequent urination  · You have symptoms of sleep apnea, such as snoring or daytime sleepiness  · You have questions or concerns about your condition or care  Treatment for obesity  focuses on helping you lose weight to improve your health  Even a small decrease in BMI can reduce the risk for many health problems  Your healthcare provider will help you set a weight-loss goal   · Lifestyle changes  are the first step in treating obesity  These include making healthy food choices and getting regular physical activity  Your healthcare provider may suggest a weight-loss program that involves coaching, education, and therapy  · Medicine  may help you lose weight when it is used with a healthy diet and physical activity  · Surgery  can help you lose weight if you are very obese and have other health problems  There are several types of weight-loss surgery  Ask your healthcare provider for more information    Be successful losing weight:   · Set small, realistic goals  An example of a small goal is to walk for 20 minutes 5 days a week  Anther goal is to lose 5% of your body weight  · Tell friends, family members, and coworkers about your goals  and ask for their support  Ask a friend to lose weight with you, or join a weight-loss support group  · Identify foods or triggers that may cause you to overeat , and find ways to avoid them  Remove tempting high-calorie foods from your home and workplace  Place a bowl of fresh fruit on your kitchen counter  If stress causes you to eat, then find other ways to cope with stress  · Keep a diary to track what you eat and drink  Also write down how many minutes of physical activity you do each day  Weigh yourself once a week and record it in your diary  Eating changes: You will need to eat 500 to 1,000 fewer calories each day than you currently eat to lose 1 to 2 pounds a week  The following changes will help you cut calories:  · Eat smaller portions  Use small plates, no larger than 9 inches in diameter  Fill your plate half full of fruits and vegetables  Measure your food using measuring cups until you know what a serving size looks like  · Eat 3 meals and 1 or 2 snacks each day  Plan your meals in advance  Lakhwinder Dukes and eat at home most of the time  Eat slowly  · Eat fruits and vegetables at every meal   They are low in calories and high in fiber, which makes you feel full  Do not add butter, margarine, or cream sauce to vegetables  Use herbs to season steamed vegetables  · Eat less fat and fewer fried foods  Eat more baked or grilled chicken and fish  These protein sources are lower in calories and fat than red meat  Limit fast food  Dress your salads with olive oil and vinegar instead of bottled dressing  · Limit the amount of sugar you eat  Do not drink sugary beverages  Limit alcohol  Activity changes:  Physical activity is good for your body in many ways   It helps you burn calories and build strong muscles  It decreases stress and depression, and improves your mood  It can also help you sleep better  Talk to your healthcare provider before you begin an exercise program   · Exercise for at least 30 minutes 5 days a week  Start slowly  Set aside time each day for physical activity that you enjoy and that is convenient for you  It is best to do both weight training and an activity that increases your heart rate, such as walking, bicycling, or swimming  · Find ways to be more active  Do yard work and housecleaning  Walk up the stairs instead of using elevators  Spend your leisure time going to events that require walking, such as outdoor festivals or fairs  This extra physical activity can help you lose weight and keep it off  Follow up with your healthcare provider as directed: You may need to meet with a dietitian  Write down your questions so you remember to ask them during your visits  © 2017 [employer] Information is for End User's use only and may not be sold, redistributed or otherwise used for commercial purposes  All illustrations and images included in CareNotes® are the copyrighted property of A D A M , Inc  or Julian Condon  The above information is an  only  It is not intended as medical advice for individual conditions or treatments  Talk to your doctor, nurse or pharmacist before following any medical regimen to see if it is safe and effective for you

## 2020-01-20 DIAGNOSIS — E78.2 MIXED HYPERLIPIDEMIA: ICD-10-CM

## 2020-01-20 RX ORDER — SIMVASTATIN 40 MG
TABLET ORAL
Qty: 90 TABLET | Refills: 0 | Status: SHIPPED | OUTPATIENT
Start: 2020-01-20 | End: 2020-03-23

## 2020-01-29 ENCOUNTER — OFFICE VISIT (OUTPATIENT)
Dept: FAMILY MEDICINE CLINIC | Facility: CLINIC | Age: 75
End: 2020-01-29
Payer: MEDICARE

## 2020-01-29 VITALS
OXYGEN SATURATION: 99 % | TEMPERATURE: 97 F | RESPIRATION RATE: 16 BRPM | HEIGHT: 71 IN | DIASTOLIC BLOOD PRESSURE: 66 MMHG | WEIGHT: 193 LBS | HEART RATE: 67 BPM | SYSTOLIC BLOOD PRESSURE: 128 MMHG | BODY MASS INDEX: 27.02 KG/M2

## 2020-01-29 DIAGNOSIS — E11.69 TYPE 2 DIABETES MELLITUS WITH OTHER SPECIFIED COMPLICATION, WITHOUT LONG-TERM CURRENT USE OF INSULIN (HCC): ICD-10-CM

## 2020-01-29 DIAGNOSIS — M25.511 ACUTE PAIN OF RIGHT SHOULDER: Primary | ICD-10-CM

## 2020-01-29 DIAGNOSIS — G47.00 INSOMNIA, UNSPECIFIED TYPE: ICD-10-CM

## 2020-01-29 PROCEDURE — 99213 OFFICE O/P EST LOW 20 MIN: CPT | Performed by: FAMILY MEDICINE

## 2020-01-29 RX ORDER — TRAZODONE HYDROCHLORIDE 50 MG/1
50 TABLET ORAL
Qty: 90 TABLET | Refills: 1 | Status: SHIPPED | OUTPATIENT
Start: 2020-01-29 | End: 2020-06-04

## 2020-01-29 NOTE — PROGRESS NOTES
[facility]      NAME: Arlene Wood  AGE: 69 y o  SEX: male  : 1945   MRN: [id number]    DATE: 2020  TIME: 9:06 AM    Assessment and Plan     Problem List Items Addressed This Visit     DM2 (diabetes mellitus, type 2) ([facility] [address] )    Insomnia    Acute pain of right shoulder - Primary     Pt c/o of R shoulder pain that began 1 week ago (no recent trauma)  Pt does work out regularly  History of right rotator cuff repair in 2019 by Dr Antoinette Marcelino  Pt also found a lump in his axilla  Denies any fevers or chills  Upon examination, patient exhibits essentially full range of motion without pain  There is no crepitus in the shoulder joint     He does have a 1 cm soft easily movable prominence base of right Lat  Most likely lipoma  If lesion increases in size, will sent for ultrasound of right axilla  Otherwise just observation for now  Patient instructed to modify his weightlifting for now  If symptoms persist, will sent for x-ray of right shoulder                     Return to office in:  Keep next regularly scheduled appointment    Chief Complaint     Chief Complaint   Patient presents with    Shoulder Pain     right shoulder pain    Mass     under right arm       History of Present Illness     Pt c/o of R shoulder pain that began 1 week ago (no recent trauma)  Pt does work out regularly  History of right rotator cuff repair in 2019 by Dr Antoinette Marcelino  Pt also found a lump in his axilla  Denies any fevers or chills  The following portions of the patient's history were reviewed and updated as appropriate: allergies, current medications, past family history, past medical history, past social history, past surgical history and problem list     Review of Systems   Review of Systems   Respiratory: Negative  Cardiovascular: Negative  Gastrointestinal: Negative  Genitourinary: Negative  Musculoskeletal: Positive for arthralgias         Active Problem List     Patient Active Problem List   Diagnosis    Primary osteoarthritis of right knee    BPH (benign prostatic hyperplasia)    DM2 (diabetes mellitus, type 2) ([facility] [address] )    Insomnia    Mixed hyperlipidemia    Renal cell carcinoma of right kidney (HCC)    Vitamin D deficiency    S/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, right    Cervical strain    Ganglion of flexor tendon sheath of left thumb    Incomplete tear of right rotator cuff    Status post arthroscopy of right shoulder    Encounter for other orthopedic aftercare    Vascular disorder of kidney    Acute pain of right shoulder       Objective   /66 (BP Location: Left arm, Patient Position: Sitting, Cuff Size: Adult)   Pulse 67   Temp (!) 97 °F (36 1 °C) (Tympanic)   Resp 16   Ht 5' 11 26" (1 81 m)   Wt 87 5 kg (193 lb)   SpO2 99%   BMI 26 72 kg/m²     Physical Exam    Pertinent Laboratory/Diagnostic Studies:  none    Current Medications     Current Outpatient Medications:     Cholecalciferol (VITAMIN D-3 PO), Take 2,000 Units by mouth daily, Disp: , Rfl:     EPINEPHrine (EPIPEN 2-MOIZ) 0 3 mg/0 3 mL SOAJ, Inject as directed, Disp: , Rfl:     fluticasone (FLONASE) 50 mcg/act nasal spray, 1 spray into each nostril daily, Disp: 1 Bottle, Rfl: 2    metFORMIN (GLUCOPHAGE) 500 mg tablet, Take 1 tablet (500 mg total) by mouth 2 (two) times a day, Disp: 180 tablet, Rfl: 1    simvastatin (ZOCOR) 40 mg tablet, TAKE 1 TABLET EVERY DAY, Disp: 90 tablet, Rfl: 0    tamsulosin (FLOMAX) 0 4 mg, Take 1 capsule (0 4 mg total) by mouth daily with dinner, Disp: 30 capsule, Rfl: 5    traZODone (DESYREL) 50 mg tablet, Take 1 tablet (50 mg total) by mouth daily at bedtime, Disp: 90 tablet, Rfl: 0    Health Maintenance     Health Maintenance   Topic Date Due    URINE MICROALBUMIN  01/03/2020    Hepatitis B Vaccine (1 of 3 - Risk 3-dose series) 02/15/2020 (Originally 5/31/1964)    Hepatitis C Screening  01/29/2021 (Originally 1945)    Diabetic Foot Exam 05/15/2020    HEMOGLOBIN A1C  07/08/2020    Fall Risk  07/12/2020    Depression Screening PHQ  07/12/2020    Medicare Annual Wellness Visit (AWV)  07/12/2020    DM Eye Exam  08/14/2020    BMI: Followup Plan  01/15/2021    BMI: Adult  01/15/2021    CRC Screening: Colonoscopy  06/05/2024    DTaP,Tdap,and Td Vaccines (3 - Td) 10/16/2027    Influenza Vaccine  Completed    Pneumococcal Vaccine: 65+ Years  Completed    Pneumococcal Vaccine: Pediatrics (0 to 5 Years) and At-Risk Patients (6 to 59 Years)  Aged Out    HIB Vaccine  Aged Out    IPV Vaccine  Aged Out    Hepatitis A Vaccine  Aged Out    Meningococcal ACWY Vaccine  Aged Out    HPV Vaccine  Aged [employer] History   Administered Date(s) Administered    INFLUENZA 11/18/2014, 10/05/2015, 11/28/2016, 01/04/2018, 10/15/2018    Influenza Split High Dose Preservative Free IM 11/18/2014, 10/05/2015, 11/28/2016, 01/04/2018    Influenza TIV (IM) 10/05/2012, 10/09/2013    Influenza, high dose seasonal 0 5 mL 10/15/2018, 10/03/2019    Pneumococcal Conjugate 13-Valent 12/08/2015, 12/09/2015    Pneumococcal Polysaccharide PPV23 10/09/2013    Tdap 11/15/2004, 10/16/2017    Zoster 10/05/2012       Rosemarie Denson DO  [facility]

## 2020-01-29 NOTE — ASSESSMENT & PLAN NOTE
Pt c/o of R shoulder pain that began 1 week ago (no recent trauma)  Pt does work out regularly  History of right rotator cuff repair in 2019 by Dr Clementine Pappas  Pt also found a lump in his axilla  Denies any fevers or chills  Upon examination, patient exhibits essentially full range of motion without pain  There is no crepitus in the shoulder joint     He does have a 1 cm soft easily movable prominence base of right Lat  Most likely lipoma  If lesion increases in size, will sent for ultrasound of right axilla  Otherwise just observation for now  Patient instructed to modify his weightlifting for now    If symptoms persist, will sent for x-ray of right shoulder

## 2020-02-11 ENCOUNTER — OFFICE VISIT (OUTPATIENT)
Dept: OBGYN CLINIC | Facility: MEDICAL CENTER | Age: 75
End: 2020-02-11
Payer: MEDICARE

## 2020-02-11 ENCOUNTER — APPOINTMENT (OUTPATIENT)
Dept: RADIOLOGY | Facility: MEDICAL CENTER | Age: 75
End: 2020-02-11
Payer: MEDICARE

## 2020-02-11 VITALS
SYSTOLIC BLOOD PRESSURE: 142 MMHG | HEART RATE: 61 BPM | WEIGHT: 194 LBS | DIASTOLIC BLOOD PRESSURE: 77 MMHG | HEIGHT: 71 IN | BODY MASS INDEX: 27.16 KG/M2

## 2020-02-11 DIAGNOSIS — M25.511 RIGHT SHOULDER PAIN, UNSPECIFIED CHRONICITY: ICD-10-CM

## 2020-02-11 DIAGNOSIS — Z98.890 STATUS POST ARTHROSCOPY OF RIGHT SHOULDER: ICD-10-CM

## 2020-02-11 DIAGNOSIS — Z98.890 STATUS POST ARTHROSCOPY OF RIGHT SHOULDER: Primary | ICD-10-CM

## 2020-02-11 PROCEDURE — 73030 X-RAY EXAM OF SHOULDER: CPT

## 2020-02-11 PROCEDURE — 99213 OFFICE O/P EST LOW 20 MIN: CPT | Performed by: ORTHOPAEDIC SURGERY

## 2020-02-11 PROCEDURE — 4040F PNEUMOC VAC/ADMIN/RCVD: CPT | Performed by: ORTHOPAEDIC SURGERY

## 2020-02-11 PROCEDURE — 20610 DRAIN/INJ JOINT/BURSA W/O US: CPT | Performed by: ORTHOPAEDIC SURGERY

## 2020-02-11 PROCEDURE — 3008F BODY MASS INDEX DOCD: CPT | Performed by: ORTHOPAEDIC SURGERY

## 2020-02-11 PROCEDURE — 1160F RVW MEDS BY RX/DR IN RCRD: CPT | Performed by: ORTHOPAEDIC SURGERY

## 2020-02-11 PROCEDURE — 3044F HG A1C LEVEL LT 7.0%: CPT | Performed by: ORTHOPAEDIC SURGERY

## 2020-02-11 PROCEDURE — 1036F TOBACCO NON-USER: CPT | Performed by: ORTHOPAEDIC SURGERY

## 2020-02-11 RX ORDER — BUPIVACAINE HYDROCHLORIDE 2.5 MG/ML
4 INJECTION, SOLUTION INFILTRATION; PERINEURAL
Status: COMPLETED | OUTPATIENT
Start: 2020-02-11 | End: 2020-02-11

## 2020-02-11 RX ORDER — METHYLPREDNISOLONE ACETATE 40 MG/ML
2 INJECTION, SUSPENSION INTRA-ARTICULAR; INTRALESIONAL; INTRAMUSCULAR; SOFT TISSUE
Status: COMPLETED | OUTPATIENT
Start: 2020-02-11 | End: 2020-02-11

## 2020-02-11 RX ADMIN — BUPIVACAINE HYDROCHLORIDE 4 ML: 2.5 INJECTION, SOLUTION INFILTRATION; PERINEURAL at 11:07

## 2020-02-11 RX ADMIN — METHYLPREDNISOLONE ACETATE 2 ML: 40 INJECTION, SUSPENSION INTRA-ARTICULAR; INTRALESIONAL; INTRAMUSCULAR; SOFT TISSUE at 11:07

## 2020-02-11 NOTE — PROGRESS NOTES
Orthopaedic Surgery - Office Note  Devora Rodriguez (41 y o  male)   : 1945   MRN: [id number]  Encounter Date: 2020    Chief Complaint   Patient presents with    Right Shoulder - Follow-up       Assessment / Plan  Status post right shoulder her scope be with rotator cuff repair, subacromial decompression, distal clavicle excision and open subpectoral biceps tenodesis with continued pain  · CSI of right subacromial bursa  was performed  · Activity as tolerated  · Home exercise program reviewed, specifically scapular stabilizing exercises  · Pt is also interested in being seen for left thumb pain, he is going to schedule an appointment for his thumb today  Return if symptoms worsen or fail to improve  History of Present Illness  Devora Rodriguez is a 69 y o  male who presents for follow-up evaluation of right shoulder  He status post right shoulder arthroscopy with rotator cuff repair, subacromial decompression, distal clavicle excision and open subpectoral biceps tenodesis performed on 2019  He reports over the last 2-3 weeks he has been experiencing a daily intermittent sharp shooting right anterior shoulder pain  He does not experience any pain down his arm  He reports the pain does occasionally wake him up at night  He indicates that he never knows what movement is going to causes pain so he is unable to take any medication to prevent the pain  He states the pain last 2-3 minutes and occasionally he experiences a residual mild moderate pain for half an hour however the pain does go away on his own without intervention  Since his surgery he has been using his home gym however he reports he is not compliant with a home exercise program provided to him from formal physical therapy  He denies any further injury or trauma to his right shoulder  He denies any distal paresthesias  Review of Systems  Pertinent items are noted in HPI    All other systems were reviewed and are negative  Physical Exam  /77   Pulse 61   Ht 5' 11" (1 803 m)   Wt 88 kg (194 lb)   BMI 27 06 kg/m²   Cons: Appears well  No apparent distress  Psych: Alert  Oriented x3  Mood and affect normal   Eyes: PERRLA, EOMI  Resp: Normal effort  No audible wheezing or stridor  CV: Palpable pulse  No discernable arrhythmia  No LE edema  Lymph:  No palpable cervical, axillary, or inguinal lymphadenopathy  Skin: Warm  No palpable masses  No visible lesions  Neuro: Normal muscle tone  Normal and symmetric DTR's  Right Shoulder Exam  Alignment / Posture:  Normal shoulder posture  Inspection:  No swelling  No ecchymosis  Incisions healed  Palpation:  bursa tenderness  ROM:  Shoulder   Shoulder ER 40  Shoulder IR T8  Strength:  5/5 supraspinatus, infraspinatus, and subscapularis  Stability:  No objective shoulder instability  Tests: (-) Tammy Craw  (-) Neer  (-) Belly press  (-) Bear hug  (-) Lift off  (-) Speed  (-) [place]  (-) Cross-body adduction  Neurovascular:  Sensation intact in Ax/R/M/U nerve distributions  2+ radial pulse  Studies Reviewed  XR of right shoulder - Obtained February 11, 2020 demonstrated no dislocation, fracture or other obvious osseous abnormalities      Large joint arthrocentesis: R subacromial bursa  Date/Time: 2/11/2020 11:07 AM  Consent given by: patient  Site marked: site marked  Timeout: Immediately prior to procedure a time out was called to verify the correct patient, procedure, equipment, support staff and site/side marked as required   Supporting Documentation  Indications: pain and diagnostic evaluation   Procedure Details  Location: shoulder - R subacromial bursa  Preparation: Patient was prepped and draped in the usual sterile fashion  Needle size: 22 G  Ultrasound guidance: no  Approach: anterolateral  Medications administered: 2 mL methylPREDNISolone acetate 40 mg/mL; 4 mL bupivacaine 0 25 %    Patient tolerance: patient tolerated the procedure well with no immediate complications  Dressing:  Sterile dressing applied        No procedures today  Medical, Surgical, Family, and Social History  The patient's medical history, family history, and social history, were reviewed and updated as appropriate      Past Medical History:   Diagnosis Date    Allergic reaction     last assessed: 5/26/2015    Arthritis     right knee    Bee sting allergy     last assessed: 6/30/2015    BPH (benign prostatic hypertrophy)     Cancer ([facility] [address] ) 2017    Right kidney    Diabetes mellitus ([facility] )     Type II, NIDDM    Enlarged prostate with lower urinary tract symptoms (LUTS)     last assessed: 8/17/2015    Herpes zoster     last assessed: 11/21/2013    History of colon polyps     Hyperlipidemia     Leukocytosis     last assessed: 11/18/2014    Osteoarthrosis, localized, primary, knee     last assessed: 10/25/2016    Seasonal allergies     Shoulder pain, right     Wears glasses     Wears partial dentures     Upper and Lower       Past Surgical History:   Procedure Laterality Date    CARPAL TUNNEL RELEASE Left     CHOLECYSTECTOMY      Open    COLONOSCOPY      GANGLION CYST EXCISION Left     Left wrist    JOINT REPLACEMENT Left 08/2015    Left TKR    JOINT REPLACEMENT Right 09/2017    Right TKR    NEPHRECTOMY  05/08/2017    Partial right nephrectomy    AK LAP,PARTIAL NEPHRECTOMY Right 5/8/2017    Procedure: LAPAROSCOPIC LYSIS OF ADHESIONS, ROBOTIC PARTIAL NEPHRECTOMY, WITH INTRA-OP LAPAROSCOPIC ULTRASOUND;  Surgeon: Stephanie Finch MD;  [facility];  Service: Urology    AK REPAIR BICEPS LONG TENDON  2/4/2019    Procedure: TENODESIS BICEPS OPEN PROXIMAL;  Surgeon: Dorothea Johnson MD;  [facility];  Service: Orthopedics    AK REPAIR INTERCARP/CARP-METACARP JT Left 12/5/2016    Procedure: ARTHROPLASTY THUMB [address] ;  Surgeon: Dorothea Johnson MD;  [facility];  Service: Orthopedics    AK REVISE MEDIAN N/CARPAL TUNNEL SURG Left 12/5/2016 Procedure: RELEASE CARPAL TUNNEL;  Surgeon: Joanna Gr MD;  [facility];  Service: Orthopedics    NM SHLDR ARTHROSCOP,SURG,W/ROTAT CUFF REPR Right 2/4/2019    Procedure: REPAIR ROTATOR CUFF ARTHROSCOPIC; SUBACROMIAL DECOMPRESSION; DISTAL CLAVICLE EXCISION;  Surgeon: Joanna Gr MD;  [facility];  Service: Orthopedics    NM TOTAL KNEE ARTHROPLASTY Right 9/11/2017    Procedure: ARTHROPLASTY KNEE TOTAL;  Surgeon: Joanna Gr MD;  [facility];  Service: Orthopedics    TONSILLECTOMY AND ADENOIDECTOMY         Family History   Problem Relation Age of Onset    Cancer Mother     Stroke Father        Social History     Occupational History    Not on file   Tobacco Use    Smoking status: Former Smoker     Packs/day: 0 25     Types: Cigarettes    Smokeless tobacco: Never Used    Tobacco comment: Smoked from ages 16 to 25     Substance and Sexual Activity    Alcohol use: Yes     Comment: 1-2 x year    Drug use: No    Sexual activity: Not on file       Allergies   Allergen Reactions    Bee Venom Edema         Current Outpatient Medications:     Cholecalciferol (VITAMIN D-3 PO), Take 2,000 Units by mouth daily, Disp: , Rfl:     EPINEPHrine (EPIPEN 2-MOIZ) 0 3 mg/0 3 mL SOAJ, Inject as directed, Disp: , Rfl:     fluticasone (FLONASE) 50 mcg/act nasal spray, 1 spray into each nostril daily, Disp: 1 Bottle, Rfl: 2    metFORMIN (GLUCOPHAGE) 500 mg tablet, Take 1 tablet (500 mg total) by mouth 2 (two) times a day, Disp: 180 tablet, Rfl: 1    simvastatin (ZOCOR) 40 mg tablet, TAKE 1 TABLET EVERY DAY, Disp: 90 tablet, Rfl: 0    tamsulosin (FLOMAX) 0 4 mg, Take 1 capsule (0 4 mg total) by mouth daily with dinner, Disp: 30 capsule, Rfl: 5    traZODone (DESYREL) 50 mg tablet, Take 1 tablet (50 mg total) by mouth daily at bedtime, Disp: 90 tablet, Rfl: 1      Azra Peoples    Scribe Attestation    I,:   Waqar Hauser am acting as a scribe while in the presence of the attending physician :        I,:   Adams Erazo MD personally performed the services described in this documentation    as scribed in my presence :

## 2020-02-12 LAB
LEFT EYE DIABETIC RETINOPATHY: NORMAL
RIGHT EYE DIABETIC RETINOPATHY: NORMAL

## 2020-02-18 ENCOUNTER — APPOINTMENT (OUTPATIENT)
Dept: RADIOLOGY | Facility: MEDICAL CENTER | Age: 75
End: 2020-02-18
Payer: MEDICARE

## 2020-02-18 ENCOUNTER — OFFICE VISIT (OUTPATIENT)
Dept: OBGYN CLINIC | Facility: MEDICAL CENTER | Age: 75
End: 2020-02-18
Payer: MEDICARE

## 2020-02-18 VITALS
DIASTOLIC BLOOD PRESSURE: 84 MMHG | BODY MASS INDEX: 27.16 KG/M2 | SYSTOLIC BLOOD PRESSURE: 148 MMHG | HEART RATE: 66 BPM | HEIGHT: 71 IN | WEIGHT: 194 LBS

## 2020-02-18 DIAGNOSIS — M19.032 CMC DJD(CARPOMETACARPAL DEGENERATIVE JOINT DISEASE), LOCALIZED PRIMARY, LEFT: ICD-10-CM

## 2020-02-18 DIAGNOSIS — M25.532 PAIN IN LEFT WRIST: ICD-10-CM

## 2020-02-18 DIAGNOSIS — M25.532 PAIN IN LEFT WRIST: Primary | ICD-10-CM

## 2020-02-18 PROCEDURE — 3044F HG A1C LEVEL LT 7.0%: CPT | Performed by: ORTHOPAEDIC SURGERY

## 2020-02-18 PROCEDURE — 2022F DILAT RTA XM EVC RTNOPTHY: CPT | Performed by: ORTHOPAEDIC SURGERY

## 2020-02-18 PROCEDURE — 3008F BODY MASS INDEX DOCD: CPT | Performed by: ORTHOPAEDIC SURGERY

## 2020-02-18 PROCEDURE — 99214 OFFICE O/P EST MOD 30 MIN: CPT | Performed by: ORTHOPAEDIC SURGERY

## 2020-02-18 PROCEDURE — 1036F TOBACCO NON-USER: CPT | Performed by: ORTHOPAEDIC SURGERY

## 2020-02-18 PROCEDURE — 1160F RVW MEDS BY RX/DR IN RCRD: CPT | Performed by: ORTHOPAEDIC SURGERY

## 2020-02-18 PROCEDURE — 73110 X-RAY EXAM OF WRIST: CPT

## 2020-02-18 PROCEDURE — 4040F PNEUMOC VAC/ADMIN/RCVD: CPT | Performed by: ORTHOPAEDIC SURGERY

## 2020-02-18 NOTE — PROGRESS NOTES
Orthopaedic Surgery - Office Note  Christianne Munroe (80 y o  male)   : 1945   MRN: [id number]  Encounter Date: 2020    Chief Complaint   Patient presents with    Left Wrist - Pain       Assessment / Plan  Left wrist swelling status post arthroplasty left thumb [facility] on 2016    · X-rays shows maintenance of the joint space at this time without new fracture dislocation  Symptomatology suggest more arthritic nature of the swelling  · Patient was provided with a thumb comfort splint to use as needed at this time  · Patient can use ice and analgesics as needed  · If rest and splinting does not improve his symptoms or the symptoms get worse we will consider steroid injection  Return if symptoms worsen or fail to improve  History of Present Illness  Christianne Munroe is a 69 y o  male who presents with few months of left wrist swelling over the dorsal surface of the scaphoid  Patient is status post left thumb [facility] arthroplasty on 2016  He states that he has not had any specific injuries or accidents to increase this discomfort but noticed the swelling occur for few months  He does not have pain with any specific movements just some soreness over that area special with palpation  He is denying any distal numbness or tingling  Review of Systems  Pertinent items are noted in HPI  All other systems were reviewed and are negative  Physical Exam  /84   Pulse 66   Ht 5' 11" (1 803 m)   Wt 88 kg (194 lb)   BMI 27 06 kg/m²   Cons: Appears well  No apparent distress  Psych: Alert  Oriented x3  Mood and affect normal   Eyes: PERRLA, EOMI  Resp: Normal effort  No audible wheezing or stridor  CV: Palpable pulse  No discernable arrhythmia  No LE edema  Lymph:  No palpable cervical, axillary, or inguinal lymphadenopathy  Skin: Warm  No palpable masses  No visible lesions  Neuro: Normal muscle tone  Normal and symmetric DTR's       Left Hand & Wrist Exam  Alignment: Normal resting hand posture  Inspection:  Mild swelling  No erythema  No ecchymosis  Incision healed  Palpation:  Moderate tenderness at The area over the scaphoid associated with swelling  ROM:  Normal wrist ROM  Normal finger ROM  Strength:  5/5 wrist flexors and wrist extensors  5/5 pronation and supination  Stability:  No objective hand or wrist instability  Tests:  No pertinent positive or negative tests  Neurovascular:  Sensation intact in Ax/R/M/U nerve distributions  Sensation intact in all digital nerve distributions  Fingers warm and perfused  Studies Reviewed  I have personally reviewed pertinent films in PACS  XR of left wrist - From 02/18/2020 show no fracture dislocation with mild narrowing of the [facility] joint reconstruction  There is mild advancement an arthritic changes at the radial styloid area  Procedures  No procedures today  Medical, Surgical, Family, and Social History  The patient's medical history, family history, and social history, were reviewed and updated as appropriate      Past Medical History:   Diagnosis Date    Allergic reaction     last assessed: 5/26/2015    Arthritis     right knee    Bee sting allergy     last assessed: 6/30/2015    BPH (benign prostatic hypertrophy)     Cancer ([facility] [address] ) 2017    Right kidney    Diabetes mellitus ([facility] [address] )     Type II, NIDDM    Enlarged prostate with lower urinary tract symptoms (LUTS)     last assessed: 8/17/2015    Herpes zoster     last assessed: 11/21/2013    History of colon polyps     Hyperlipidemia     Leukocytosis     last assessed: 11/18/2014    Osteoarthrosis, localized, primary, knee     last assessed: 10/25/2016    Seasonal allergies     Shoulder pain, right     Wears glasses     Wears partial dentures     Upper and Lower       Past Surgical History:   Procedure Laterality Date    CARPAL TUNNEL RELEASE Left     CHOLECYSTECTOMY      Open    COLONOSCOPY      GANGLION CYST EXCISION Left     Left wrist    JOINT REPLACEMENT Left 08/2015    Left TKR    JOINT REPLACEMENT Right 09/2017    Right TKR    NEPHRECTOMY  05/08/2017    Partial right nephrectomy    IL LAP,PARTIAL NEPHRECTOMY Right 5/8/2017    Procedure: LAPAROSCOPIC LYSIS OF ADHESIONS, ROBOTIC PARTIAL NEPHRECTOMY, WITH INTRA-OP LAPAROSCOPIC ULTRASOUND;  Surgeon: Korey Harvey MD;  [facility];  Service: Urology    IL REPAIR BICEPS LONG TENDON  2/4/2019    Procedure: TENODESIS BICEPS OPEN PROXIMAL;  Surgeon: Deisy Lang MD;  [facility];  Service: Orthopedics    IL REPAIR INTERCARP/CARP-METACARP JT Left 12/5/2016    Procedure: ARTHROPLASTY THUMB [address] ;  Surgeon: Deisy Lang MD;  [facility];  Service: Orthopedics    IL REVISE MEDIAN N/CARPAL TUNNEL SURG Left 12/5/2016    Procedure: RELEASE CARPAL TUNNEL;  Surgeon: Deisy Lang MD;  [facility];  Service: Orthopedics    IL SHLDR ARTHROSCOP,SURG,W/ROTAT CUFF REPR Right 2/4/2019    Procedure: REPAIR ROTATOR CUFF ARTHROSCOPIC; SUBACROMIAL DECOMPRESSION; DISTAL CLAVICLE EXCISION;  Surgeon: Deisy Lang MD;  [facility];  Service: Orthopedics    IL TOTAL KNEE ARTHROPLASTY Right 9/11/2017    Procedure: ARTHROPLASTY KNEE TOTAL;  Surgeon: Deisy Lang MD;  [facility];  Service: Orthopedics    TONSILLECTOMY AND ADENOIDECTOMY         Family History   Problem Relation Age of Onset    Cancer Mother     Stroke Father        Social History     Occupational History    Not on file   Tobacco Use    Smoking status: Former Smoker     Packs/day: 0 25     Types: Cigarettes    Smokeless tobacco: Never Used    Tobacco comment: Smoked from ages 16 to 25     Substance and Sexual Activity    Alcohol use: Yes     Comment: 1-2 x year    Drug use: No    Sexual activity: Not on file       Allergies   Allergen Reactions    Bee Venom Edema         Current Outpatient Medications:     Cholecalciferol (VITAMIN D-3 PO), Take 2,000 Units by mouth daily, Disp: , Rfl:   EPINEPHrine (EPIPEN 2-MOIZ) 0 3 mg/0 3 mL SOAJ, Inject as directed, Disp: , Rfl:     fluticasone (FLONASE) 50 mcg/act nasal spray, 1 spray into each nostril daily, Disp: 1 Bottle, Rfl: 2    metFORMIN (GLUCOPHAGE) 500 mg tablet, Take 1 tablet (500 mg total) by mouth 2 (two) times a day, Disp: 180 tablet, Rfl: 1    simvastatin (ZOCOR) 40 mg tablet, TAKE 1 TABLET EVERY DAY, Disp: 90 tablet, Rfl: 0    tamsulosin (FLOMAX) 0 4 mg, Take 1 capsule (0 4 mg total) by mouth daily with dinner, Disp: 30 capsule, Rfl: 5    traZODone (DESYREL) 50 mg tablet, Take 1 tablet (50 mg total) by mouth daily at bedtime, Disp: 90 tablet, Rfl: 1      Sheldon Cardenas PA-C    Scribe Attestation    I,:    am acting as a scribe while in the presence of the attending physician :        I,:    personally performed the services described in this documentation    as scribed in my presence :

## 2020-02-26 DIAGNOSIS — E11.8 TYPE 2 DIABETES MELLITUS WITH COMPLICATION, WITHOUT LONG-TERM CURRENT USE OF INSULIN (HCC): ICD-10-CM

## 2020-03-10 ENCOUNTER — OFFICE VISIT (OUTPATIENT)
Dept: OBGYN CLINIC | Facility: MEDICAL CENTER | Age: 75
End: 2020-03-10
Payer: MEDICARE

## 2020-03-10 ENCOUNTER — APPOINTMENT (OUTPATIENT)
Dept: RADIOLOGY | Facility: MEDICAL CENTER | Age: 75
End: 2020-03-10
Payer: MEDICARE

## 2020-03-10 VITALS
HEIGHT: 71 IN | HEART RATE: 52 BPM | WEIGHT: 194 LBS | DIASTOLIC BLOOD PRESSURE: 80 MMHG | SYSTOLIC BLOOD PRESSURE: 171 MMHG | BODY MASS INDEX: 27.16 KG/M2

## 2020-03-10 DIAGNOSIS — M79.672 PAIN IN LEFT FOOT: ICD-10-CM

## 2020-03-10 DIAGNOSIS — M79.672 PAIN IN LEFT FOOT: Primary | ICD-10-CM

## 2020-03-10 DIAGNOSIS — M19.072 OSTEOARTHRITIS OF MIDFOOT, LEFT: ICD-10-CM

## 2020-03-10 PROCEDURE — 2022F DILAT RTA XM EVC RTNOPTHY: CPT | Performed by: ORTHOPAEDIC SURGERY

## 2020-03-10 PROCEDURE — 1160F RVW MEDS BY RX/DR IN RCRD: CPT | Performed by: ORTHOPAEDIC SURGERY

## 2020-03-10 PROCEDURE — 4040F PNEUMOC VAC/ADMIN/RCVD: CPT | Performed by: ORTHOPAEDIC SURGERY

## 2020-03-10 PROCEDURE — 3008F BODY MASS INDEX DOCD: CPT | Performed by: ORTHOPAEDIC SURGERY

## 2020-03-10 PROCEDURE — 99213 OFFICE O/P EST LOW 20 MIN: CPT | Performed by: ORTHOPAEDIC SURGERY

## 2020-03-10 PROCEDURE — 73630 X-RAY EXAM OF FOOT: CPT

## 2020-03-10 PROCEDURE — 1036F TOBACCO NON-USER: CPT | Performed by: ORTHOPAEDIC SURGERY

## 2020-03-10 PROCEDURE — 3044F HG A1C LEVEL LT 7.0%: CPT | Performed by: ORTHOPAEDIC SURGERY

## 2020-03-10 NOTE — PATIENT INSTRUCTIONS
Recommend patient try full length cushion orthotics, if that does work then try an orthotic with an arch support

## 2020-03-10 NOTE — PROGRESS NOTES
Orthopaedic Surgery - Office Note  Marvelyn Bamberger (79 y o  male)   : 1945   MRN: [id number]  Encounter Date: 3/10/2020    Chief Complaint   Patient presents with    Left Foot - Pain       Assessment / Plan  Left Mid foot osteoarthritis     · Orthotics: start with a cushion orthotic if that fails to provided relief try an orthotic with an arch   · Consider wearing a boot while in [employer] next month, patient's wife has one at home which he will try  If it doesn't fit he will call the office  · Follow up with Dr Hieu Fernandes if symptoms persist  Return if symptoms worsen or fail to improve  History of Present Illness  Marvelyn Bamberger is a 69 y o  male who presents with a chief complaint of left foot pain  The pain began 1 year(s) ago and is not associated with an acute injury  The patient describes the pain as aching and dull in intensity,  intermittent in timing, and localizes the pain to the  left medial midfoot  The pain is worse with weight bearing and relieved by rest   The pain is not associated with numbness and tingling  The pain is not associated with constitutional symptoms  The patient is not awoken at night by the pain  The patient saw Dr Lv Gomez DPM, who provided with patient an injection which did not provide any benefit of his pain symptoms  Review of Systems  Pertinent items are noted in HPI  All other systems were reviewed and are negative  Physical Exam  BP (!) 171/80   Pulse (!) 52   Ht 5' 11" (1 803 m)   Wt 88 kg (194 lb)   BMI 27 06 kg/m²   Cons: Appears well  No apparent distress  Psych: Alert  Oriented x3  Mood and affect normal   Eyes: PERRLA, EOMI  Resp: Normal effort  No audible wheezing or stridor  CV: Palpable pulse  No discernable arrhythmia  No LE edema  Lymph:  No palpable cervical, axillary, or inguinal lymphadenopathy  Skin: Warm  No palpable masses  No visible lesions  Neuro: Normal muscle tone  Normal and symmetric DTR's       Left Foot & Ankle Exam  Alignment:  Normal plantar arch  Inspection:  No swelling  No edema  No erythema  No ecchymosis  Palpation:  positive tenderness at medial navicular and 1st MTP   ROM:  Normal ankle ROM  Strength:  5/5 AT, GSC, PT, and peroneals  Able to actively dorsiflex & plantarflex ankle and toes  Stability:  No objective ankle instablity  (-) Anterior  neutral and PF  (-) Talar Tilt  Tests:  No pertinent positive or negative tests  Neurovascular:  Sensation intact in DP/SP/Martin/Sa/T nerve distributions  2+ DP & PT pulses  Gait:  Normal     Studies Reviewed  XR of left foot - no fracture or dislocation degenerative changes in the midfoot navicular and MTP  Procedures  No procedures today  Medical, Surgical, Family, and Social History  The patient's medical history, family history, and social history, were reviewed and updated as appropriate      Past Medical History:   Diagnosis Date    Allergic reaction     last assessed: 5/26/2015    Arthritis     right knee    Bee sting allergy     last assessed: 6/30/2015    BPH (benign prostatic hypertrophy)     Cancer ([facility] ) 2017    Right kidney    Diabetes mellitus ([facility] )     Type II, NIDDM    Enlarged prostate with lower urinary tract symptoms (LUTS)     last assessed: 8/17/2015    Herpes zoster     last assessed: 11/21/2013    History of colon polyps     Hyperlipidemia     Leukocytosis     last assessed: 11/18/2014    Osteoarthrosis, localized, primary, knee     last assessed: 10/25/2016    Seasonal allergies     Shoulder pain, right     Wears glasses     Wears partial dentures     Upper and Lower       Past Surgical History:   Procedure Laterality Date    CARPAL TUNNEL RELEASE Left     CHOLECYSTECTOMY      Open    COLONOSCOPY      GANGLION CYST EXCISION Left     Left wrist    JOINT REPLACEMENT Left 08/2015    Left TKR    JOINT REPLACEMENT Right 09/2017    Right TKR    NEPHRECTOMY  05/08/2017    Partial right nephrectomy    CO LAP,PARTIAL NEPHRECTOMY Right 5/8/2017    Procedure: LAPAROSCOPIC LYSIS OF ADHESIONS, ROBOTIC PARTIAL NEPHRECTOMY, WITH INTRA-OP LAPAROSCOPIC ULTRASOUND;  Surgeon: Korey Harvey MD;  [facility];  Service: Urology    CO REPAIR BICEPS LONG TENDON  2/4/2019    Procedure: TENODESIS BICEPS OPEN PROXIMAL;  Surgeon: Deisy Lang MD;  [facility];  Service: Orthopedics    CO REPAIR INTERCARP/CARP-METACARP JT Left 12/5/2016    Procedure: ARTHROPLASTY THUMB [facility] ;  Surgeon: Deisy Lang MD;  [facility];  Service: Orthopedics    CO REVISE MEDIAN N/CARPAL TUNNEL SURG Left 12/5/2016    Procedure: RELEASE CARPAL TUNNEL;  Surgeon: Deisy Lang MD;  [facility];  Service: Orthopedics    CO SHLDR ARTHROSCOP,SURG,W/ROTAT CUFF REPR Right 2/4/2019    Procedure: REPAIR ROTATOR CUFF ARTHROSCOPIC; SUBACROMIAL DECOMPRESSION; DISTAL CLAVICLE EXCISION;  Surgeon: Deisy Lang MD;  [facility];  Service: Orthopedics    CO TOTAL KNEE ARTHROPLASTY Right 9/11/2017    Procedure: ARTHROPLASTY KNEE TOTAL;  Surgeon: Deisy Lang MD;  [facility];  Service: Orthopedics    TONSILLECTOMY AND ADENOIDECTOMY         Family History   Problem Relation Age of Onset    Cancer Mother     Stroke Father        Social History     Occupational History    Not on file   Tobacco Use    Smoking status: Former Smoker     Packs/day: 0 25     Types: Cigarettes    Smokeless tobacco: Never Used    Tobacco comment: Smoked from ages 16 to 25     Substance and Sexual Activity    Alcohol use: Yes     Comment: 1-2 x year    Drug use: No    Sexual activity: Not on file       Allergies   Allergen Reactions    Bee Venom Edema         Current Outpatient Medications:     Cholecalciferol (VITAMIN D-3 PO), Take 2,000 Units by mouth daily, Disp: , Rfl:     EPINEPHrine (EPIPEN 2-MOIZ) 0 3 mg/0 3 mL SOAJ, Inject as directed, Disp: , Rfl:     fluticasone (FLONASE) 50 mcg/act nasal spray, 1 spray into each nostril daily, Disp: 1 Bottle, Rfl: 2    metFORMIN (GLUCOPHAGE) 500 mg tablet, Take 1 tablet (500 mg total) by mouth 2 (two) times a day, Disp: 180 tablet, Rfl: 1    simvastatin (ZOCOR) 40 mg tablet, TAKE 1 TABLET EVERY DAY, Disp: 90 tablet, Rfl: 0    tamsulosin (FLOMAX) 0 4 mg, Take 1 capsule (0 4 mg total) by mouth daily with dinner, Disp: 30 capsule, Rfl: 5    traZODone (DESYREL) 50 mg tablet, Take 1 tablet (50 mg total) by mouth daily at bedtime, Disp: 90 tablet, Rfl: 1      [facility]    I,:   Angeli Paulino am acting as a scribe while in the presence of the attending physician :        I,:   Stephanie Paredes MD personally performed the services described in this documentation    as scribed in my presence :

## 2020-03-16 DIAGNOSIS — Z96.651 TOTAL KNEE REPLACEMENT STATUS, RIGHT: Primary | ICD-10-CM

## 2020-03-16 RX ORDER — AMOXICILLIN 500 MG/1
2000 CAPSULE ORAL
Qty: 12 CAPSULE | Refills: 2 | Status: SHIPPED | OUTPATIENT
Start: 2020-03-16 | End: 2020-03-17

## 2020-03-18 DIAGNOSIS — I10 ESSENTIAL HYPERTENSION: Primary | ICD-10-CM

## 2020-03-18 RX ORDER — LISINOPRIL 10 MG/1
10 TABLET ORAL DAILY
Qty: 30 TABLET | Refills: 2 | Status: SHIPPED | OUTPATIENT
Start: 2020-03-18 | End: 2021-01-18

## 2020-03-23 DIAGNOSIS — E78.2 MIXED HYPERLIPIDEMIA: ICD-10-CM

## 2020-03-23 RX ORDER — SIMVASTATIN 40 MG
TABLET ORAL
Qty: 90 TABLET | Refills: 0 | Status: SHIPPED | OUTPATIENT
Start: 2020-03-23 | End: 2020-05-21

## 2020-05-14 ENCOUNTER — OFFICE VISIT (OUTPATIENT)
Dept: OBGYN CLINIC | Facility: CLINIC | Age: 75
End: 2020-05-14
Payer: MEDICARE

## 2020-05-14 VITALS
BODY MASS INDEX: 27.3 KG/M2 | DIASTOLIC BLOOD PRESSURE: 78 MMHG | WEIGHT: 195 LBS | SYSTOLIC BLOOD PRESSURE: 132 MMHG | HEART RATE: 77 BPM | HEIGHT: 71 IN

## 2020-05-14 DIAGNOSIS — M19.072 OSTEOARTHRITIS OF MIDFOOT, LEFT: Primary | ICD-10-CM

## 2020-05-14 PROCEDURE — 99213 OFFICE O/P EST LOW 20 MIN: CPT | Performed by: ORTHOPAEDIC SURGERY

## 2020-05-14 PROCEDURE — 3044F HG A1C LEVEL LT 7.0%: CPT | Performed by: ORTHOPAEDIC SURGERY

## 2020-05-20 DIAGNOSIS — E78.2 MIXED HYPERLIPIDEMIA: ICD-10-CM

## 2020-05-21 RX ORDER — SIMVASTATIN 40 MG
TABLET ORAL
Qty: 90 TABLET | Refills: 0 | Status: SHIPPED | OUTPATIENT
Start: 2020-05-21 | End: 2020-07-31

## 2020-06-01 ENCOUNTER — HOSPITAL ENCOUNTER (OUTPATIENT)
Dept: RADIOLOGY | Facility: HOSPITAL | Age: 75
Discharge: HOME | End: 2020-06-01
Attending: ORTHOPAEDIC SURGERY | Admitting: RADIOLOGY
Payer: MEDICARE

## 2020-06-01 DIAGNOSIS — M19.072 OSTEOARTHRITIS OF MIDFOOT, LEFT: ICD-10-CM

## 2020-06-01 PROCEDURE — 20600 DRAIN/INJ JOINT/BURSA W/O US: CPT

## 2020-06-01 PROCEDURE — 77002 NEEDLE LOCALIZATION BY XRAY: CPT

## 2020-06-01 RX ORDER — LIDOCAINE WITH 8.4% SOD BICARB 0.9%(10ML)
5 SYRINGE (ML) INJECTION ONCE
Status: DISCONTINUED | OUTPATIENT
Start: 2020-06-01 | End: 2020-06-02 | Stop reason: HOSPADM

## 2020-06-01 RX ORDER — BUPIVACAINE HYDROCHLORIDE 2.5 MG/ML
10 INJECTION, SOLUTION EPIDURAL; INFILTRATION; INTRACAUDAL
Status: DISCONTINUED | OUTPATIENT
Start: 2020-06-01 | End: 2020-06-02 | Stop reason: HOSPADM

## 2020-06-01 RX ORDER — METHYLPREDNISOLONE ACETATE 80 MG/ML
80 INJECTION, SUSPENSION INTRA-ARTICULAR; INTRALESIONAL; INTRAMUSCULAR; SOFT TISSUE
Status: DISCONTINUED | OUTPATIENT
Start: 2020-06-01 | End: 2020-06-02 | Stop reason: HOSPADM

## 2020-06-01 RX ADMIN — IOHEXOL 2 ML: 300 INJECTION, SOLUTION INTRAVENOUS at 13:30

## 2020-06-03 DIAGNOSIS — G47.00 INSOMNIA, UNSPECIFIED TYPE: ICD-10-CM

## 2020-06-04 RX ORDER — TRAZODONE HYDROCHLORIDE 50 MG/1
TABLET ORAL
Qty: 90 TABLET | Refills: 1 | Status: SHIPPED | OUTPATIENT
Start: 2020-06-04 | End: 2020-07-15

## 2020-07-06 DIAGNOSIS — E11.8 TYPE 2 DIABETES MELLITUS WITH COMPLICATION, WITHOUT LONG-TERM CURRENT USE OF INSULIN (HCC): ICD-10-CM

## 2020-07-08 ENCOUNTER — APPOINTMENT (OUTPATIENT)
Dept: LAB | Facility: CLINIC | Age: 75
End: 2020-07-08
Payer: MEDICARE

## 2020-07-08 DIAGNOSIS — C64.1 RENAL CELL CARCINOMA OF RIGHT KIDNEY (HCC): ICD-10-CM

## 2020-07-08 DIAGNOSIS — E78.2 MIXED HYPERLIPIDEMIA: ICD-10-CM

## 2020-07-08 DIAGNOSIS — E11.9 TYPE 2 DIABETES MELLITUS WITHOUT COMPLICATION, WITHOUT LONG-TERM CURRENT USE OF INSULIN (HCC): ICD-10-CM

## 2020-07-08 LAB
ALBUMIN SERPL BCP-MCNC: 3.8 G/DL (ref 3.5–5)
ALP SERPL-CCNC: 42 U/L (ref 46–116)
ALT SERPL W P-5'-P-CCNC: 40 U/L (ref 12–78)
ANION GAP SERPL CALCULATED.3IONS-SCNC: 5 MMOL/L (ref 4–13)
AST SERPL W P-5'-P-CCNC: 17 U/L (ref 5–45)
BASOPHILS # BLD AUTO: 0.04 THOUSANDS/ΜL (ref 0–0.1)
BASOPHILS NFR BLD AUTO: 1 % (ref 0–1)
BILIRUB SERPL-MCNC: 0.68 MG/DL (ref 0.2–1)
BUN SERPL-MCNC: 15 MG/DL (ref 5–25)
CALCIUM SERPL-MCNC: 9.7 MG/DL (ref 8.3–10.1)
CHLORIDE SERPL-SCNC: 105 MMOL/L (ref 100–108)
CHOLEST SERPL-MCNC: 137 MG/DL (ref 50–200)
CO2 SERPL-SCNC: 29 MMOL/L (ref 21–32)
CREAT SERPL-MCNC: 1.14 MG/DL (ref 0.6–1.3)
EOSINOPHIL # BLD AUTO: 0.26 THOUSAND/ΜL (ref 0–0.61)
EOSINOPHIL NFR BLD AUTO: 4 % (ref 0–6)
ERYTHROCYTE [DISTWIDTH] IN BLOOD BY AUTOMATED COUNT: 14.1 % (ref 11.6–15.1)
EST. AVERAGE GLUCOSE BLD GHB EST-MCNC: 137 MG/DL
GFR SERPL CREATININE-BSD FRML MDRD: 63 ML/MIN/1.73SQ M
GLUCOSE P FAST SERPL-MCNC: 127 MG/DL (ref 65–99)
HBA1C MFR BLD: 6.4 %
HCT VFR BLD AUTO: 43.8 % (ref 36.5–49.3)
HDLC SERPL-MCNC: 39 MG/DL
HGB BLD-MCNC: 14.6 G/DL (ref 12–17)
IMM GRANULOCYTES # BLD AUTO: 0.01 THOUSAND/UL (ref 0–0.2)
IMM GRANULOCYTES NFR BLD AUTO: 0 % (ref 0–2)
LDLC SERPL CALC-MCNC: 71 MG/DL (ref 0–100)
LYMPHOCYTES # BLD AUTO: 1.85 THOUSANDS/ΜL (ref 0.6–4.47)
LYMPHOCYTES NFR BLD AUTO: 31 % (ref 14–44)
MCH RBC QN AUTO: 31.4 PG (ref 26.8–34.3)
MCHC RBC AUTO-ENTMCNC: 33.3 G/DL (ref 31.4–37.4)
MCV RBC AUTO: 94 FL (ref 82–98)
MONOCYTES # BLD AUTO: 0.6 THOUSAND/ΜL (ref 0.17–1.22)
MONOCYTES NFR BLD AUTO: 10 % (ref 4–12)
NEUTROPHILS # BLD AUTO: 3.29 THOUSANDS/ΜL (ref 1.85–7.62)
NEUTS SEG NFR BLD AUTO: 54 % (ref 43–75)
NRBC BLD AUTO-RTO: 0 /100 WBCS
PLATELET # BLD AUTO: 215 THOUSANDS/UL (ref 149–390)
PMV BLD AUTO: 11.1 FL (ref 8.9–12.7)
POTASSIUM SERPL-SCNC: 4.2 MMOL/L (ref 3.5–5.3)
PROT SERPL-MCNC: 7.5 G/DL (ref 6.4–8.2)
RBC # BLD AUTO: 4.65 MILLION/UL (ref 3.88–5.62)
SODIUM SERPL-SCNC: 139 MMOL/L (ref 136–145)
TRIGL SERPL-MCNC: 136 MG/DL
TSH SERPL DL<=0.05 MIU/L-ACNC: 1.62 UIU/ML (ref 0.36–3.74)
WBC # BLD AUTO: 6.05 THOUSAND/UL (ref 4.31–10.16)

## 2020-07-08 PROCEDURE — 36415 COLL VENOUS BLD VENIPUNCTURE: CPT

## 2020-07-08 PROCEDURE — 85025 COMPLETE CBC W/AUTO DIFF WBC: CPT

## 2020-07-08 PROCEDURE — 83036 HEMOGLOBIN GLYCOSYLATED A1C: CPT

## 2020-07-08 PROCEDURE — 84443 ASSAY THYROID STIM HORMONE: CPT

## 2020-07-08 PROCEDURE — 80053 COMPREHEN METABOLIC PANEL: CPT

## 2020-07-08 PROCEDURE — 80061 LIPID PANEL: CPT

## 2020-07-15 ENCOUNTER — OFFICE VISIT (OUTPATIENT)
Dept: FAMILY MEDICINE CLINIC | Facility: CLINIC | Age: 75
End: 2020-07-15
Payer: MEDICARE

## 2020-07-15 VITALS
BODY MASS INDEX: 27.3 KG/M2 | WEIGHT: 195 LBS | HEART RATE: 73 BPM | OXYGEN SATURATION: 98 % | RESPIRATION RATE: 16 BRPM | DIASTOLIC BLOOD PRESSURE: 66 MMHG | HEIGHT: 71 IN | SYSTOLIC BLOOD PRESSURE: 126 MMHG | TEMPERATURE: 98.5 F

## 2020-07-15 DIAGNOSIS — E11.69 TYPE 2 DIABETES MELLITUS WITH OTHER SPECIFIED COMPLICATION, WITHOUT LONG-TERM CURRENT USE OF INSULIN (HCC): ICD-10-CM

## 2020-07-15 DIAGNOSIS — R35.0 BENIGN PROSTATIC HYPERPLASIA WITH URINARY FREQUENCY: ICD-10-CM

## 2020-07-15 DIAGNOSIS — Z12.5 SCREENING FOR PROSTATE CANCER: ICD-10-CM

## 2020-07-15 DIAGNOSIS — Z96.651 S/P TKR (TOTAL KNEE REPLACEMENT) USING CEMENT, RIGHT: ICD-10-CM

## 2020-07-15 DIAGNOSIS — Z00.00 WELLNESS EXAMINATION: Primary | ICD-10-CM

## 2020-07-15 DIAGNOSIS — N40.1 BENIGN PROSTATIC HYPERPLASIA WITH URINARY FREQUENCY: ICD-10-CM

## 2020-07-15 DIAGNOSIS — C64.1 RENAL CELL CARCINOMA OF RIGHT KIDNEY (HCC): ICD-10-CM

## 2020-07-15 DIAGNOSIS — G47.00 INSOMNIA, UNSPECIFIED TYPE: ICD-10-CM

## 2020-07-15 DIAGNOSIS — E78.2 MIXED HYPERLIPIDEMIA: ICD-10-CM

## 2020-07-15 PROCEDURE — 1123F ACP DISCUSS/DSCN MKR DOCD: CPT | Performed by: FAMILY MEDICINE

## 2020-07-15 PROCEDURE — 1125F AMNT PAIN NOTED PAIN PRSNT: CPT | Performed by: FAMILY MEDICINE

## 2020-07-15 PROCEDURE — 3074F SYST BP LT 130 MM HG: CPT | Performed by: FAMILY MEDICINE

## 2020-07-15 PROCEDURE — G0439 PPPS, SUBSEQ VISIT: HCPCS | Performed by: FAMILY MEDICINE

## 2020-07-15 PROCEDURE — 3078F DIAST BP <80 MM HG: CPT | Performed by: FAMILY MEDICINE

## 2020-07-15 PROCEDURE — 99214 OFFICE O/P EST MOD 30 MIN: CPT | Performed by: FAMILY MEDICINE

## 2020-07-15 PROCEDURE — 1170F FXNL STATUS ASSESSED: CPT | Performed by: FAMILY MEDICINE

## 2020-07-15 PROCEDURE — 3044F HG A1C LEVEL LT 7.0%: CPT | Performed by: FAMILY MEDICINE

## 2020-07-15 PROCEDURE — 1160F RVW MEDS BY RX/DR IN RCRD: CPT | Performed by: FAMILY MEDICINE

## 2020-07-15 PROCEDURE — 2022F DILAT RTA XM EVC RTNOPTHY: CPT | Performed by: FAMILY MEDICINE

## 2020-07-15 PROCEDURE — 1036F TOBACCO NON-USER: CPT | Performed by: FAMILY MEDICINE

## 2020-07-15 PROCEDURE — 4040F PNEUMOC VAC/ADMIN/RCVD: CPT | Performed by: FAMILY MEDICINE

## 2020-07-15 PROCEDURE — 3008F BODY MASS INDEX DOCD: CPT | Performed by: FAMILY MEDICINE

## 2020-07-15 RX ORDER — TRAZODONE HYDROCHLORIDE 100 MG/1
100 TABLET ORAL
Qty: 90 TABLET | Refills: 1 | Status: SHIPPED | OUTPATIENT
Start: 2020-07-15 | End: 2021-01-18 | Stop reason: SDUPTHER

## 2020-07-15 NOTE — ASSESSMENT & PLAN NOTE
Cholesterol 137/71 with fairly low HDL  Doing well on simvastatin 40  Continue diet exercise    Will continue to check yearly

## 2020-07-15 NOTE — ASSESSMENT & PLAN NOTE
Status post right nephrectomy May 2017 by Dr Kamryn Guzmán    Continue regular follow-up with urology as directed

## 2020-07-15 NOTE — PROGRESS NOTES
Assessment and Plan:    MEDICARE WELLNESS VISIT TODAY  OR PATIENT DOES NOT HAVE A LIVING WILL AND HEALTHCARE POWER OF   INFORMATION WAS PROVIDED TO HIM TODAY  DEPRESSION SCREEN AND FALL RISK WERE NEGATIVE  PATIENT IS CURRENT WITH AGE APPROPRIATE VACCINATIONS  CURRENT WITH COLONOSCOPY AND PSA  Problem List Items Addressed This Visit     BPH (benign prostatic hyperplasia)    DM2 (diabetes mellitus, type 2) ([facility])    Insomnia    Mixed hyperlipidemia    Renal cell carcinoma of right kidney (HCC)    S/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, right      Other Visit Diagnoses     Wellness examination    -  Primary           Preventive health issues were discussed with patient, and age appropriate screening tests were ordered as noted in patient's After Visit Summary  Personalized health advice and appropriate referrals for health education or preventive services given if needed, as noted in patient's After Visit Summary  History of Present Illness:     Patient presents for Welcome to Medicare visit       Patient Care Team:  Cheyenne Roach DO as PCP - General  MD Alexx Fulton MD     Review of Systems:     Review of Systems   Problem List:     Patient Active Problem List   Diagnosis    Primary osteoarthritis of right knee    BPH (benign prostatic hyperplasia)    DM2 (diabetes mellitus, type 2) ([facility] [address] [facility]    Insomnia    Mixed hyperlipidemia    Renal cell carcinoma of right kidney ([facility] [address] )    Vitamin D deficiency    S/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, right    Cervical strain    Ganglion of flexor tendon sheath of left thumb    Incomplete tear of right rotator cuff    Status post arthroscopy of right shoulder    Encounter for other orthopedic aftercare    Vascular disorder of kidney    Acute pain of right shoulder    [facility] DJD(carpometacarpal degenerative joint disease), localized primary, left      Past Medical and Surgical History:     Past Medical History:   Diagnosis Date    Allergic reaction     last assessed: 5/26/2015    Arthritis     right knee    Bee sting allergy     last assessed: 6/30/2015    BPH (benign prostatic hypertrophy)     [facility] 2017    Right kidney    Diabetes mellitus ([facility] )     Type II, NIDDM    Enlarged prostate with lower urinary tract symptoms (LUTS)     last assessed: 8/17/2015    Herpes zoster     last assessed: 11/21/2013    History of colon polyps     Hyperlipidemia     Leukocytosis     last assessed: 11/18/2014    Osteoarthrosis, localized, primary, knee     last assessed: 10/25/2016    Seasonal allergies     Shoulder pain, right     Wears glasses     Wears partial dentures     Upper and Lower     Past Surgical History:   Procedure Laterality Date    CARPAL TUNNEL RELEASE Left     CHOLECYSTECTOMY      Open    COLONOSCOPY      FL GUIDED NEEDLE PLAC BX/ASP/INJ  6/1/2020    GANGLION CYST EXCISION Left     Left wrist    JOINT REPLACEMENT Left 08/2015    Left TKR    JOINT REPLACEMENT Right 09/2017    Right TKR    NEPHRECTOMY  05/08/2017    Partial right nephrectomy    NH LAP,PARTIAL NEPHRECTOMY Right 5/8/2017    Procedure: LAPAROSCOPIC LYSIS OF ADHESIONS, ROBOTIC PARTIAL NEPHRECTOMY, WITH INTRA-OP LAPAROSCOPIC ULTRASOUND;  Surgeon: Diogenes Orta MD;  [facility];  Service: Urology    NH REPAIR BICEPS LONG TENDON  2/4/2019    Procedure: TENODESIS BICEPS OPEN PROXIMAL;  Surgeon: Brandon Rodriguez MD;  [facility];  Service: Orthopedics    NH REPAIR INTERCARP/CARP-METACARP JT Left 12/5/2016    Procedure: ARTHROPLASTY THUMB [address] ;  Surgeon: Brandon Rodriguez MD;  [facility];  Service: Orthopedics    NH REVISE MEDIAN N/CARPAL TUNNEL SURG Left 12/5/2016    Procedure: RELEASE CARPAL TUNNEL;  Surgeon: Brandon Rodriguez MD;  [facility];  Service: Orthopedics    NH SHLDR ARTHROSCOP,SURG,W/ROTAT CUFF REPR Right 2/4/2019    Procedure: REPAIR ROTATOR CUFF ARTHROSCOPIC; SUBACROMIAL DECOMPRESSION; DISTAL CLAVICLE EXCISION;  Surgeon: Carrie Carrera MD;  [facility];  Service: Orthopedics    NJ TOTAL KNEE ARTHROPLASTY Right 9/11/2017    Procedure: ARTHROPLASTY KNEE TOTAL;  Surgeon: Carrie Carrera MD;  [facility];  Service: Orthopedics    TONSILLECTOMY AND ADENOIDECTOMY        Family History:     Family History   Problem Relation Age of Onset    Cancer Mother     Stroke Father       Social History:        Social History     Socioeconomic History    Marital status: /Civil Union     Spouse name: None    Number of children: None    Years of education: None    Highest education level: None   Occupational History    None   Social Needs    Financial resource strain: None    Food insecurity:     Worry: None     Inability: None    Transportation needs:     Medical: None     Non-medical: None   Tobacco Use    Smoking status: Former Smoker     Packs/day: 0 25     Types: Cigarettes    Smokeless tobacco: Never Used    Tobacco comment: Smoked from ages 16 to 25     Substance and Sexual Activity    Alcohol use: Yes     Comment: 1-2 x year    Drug use: No    Sexual activity: None   Lifestyle    Physical activity:     Days per week: None     Minutes per session: None    Stress: None   Relationships    Social connections:     Talks on phone: None     Gets together: None     Attends Gnosticism service: None     Active member of club or organization: None     Attends meetings of clubs or organizations: None     Relationship status: None    Intimate partner violence:     Fear of current or ex partner: None     Emotionally abused: None     Physically abused: None     Forced sexual activity: None   Other Topics Concern    None   Social History Narrative    None      Medications and Allergies:     Current Outpatient Medications   Medication Sig Dispense Refill    Cholecalciferol (VITAMIN D-3 PO) Take 2,000 Units by mouth daily      EPINEPHrine (EPIPEN 2-MOIZ) 0 3 mg/0 3 mL SOAJ Inject as directed      fluticasone (FLONASE) 50 mcg/act nasal spray 1 spray into each nostril daily 1 Bottle 2    lisinopril (ZESTRIL) 10 mg tablet Take 1 tablet (10 mg total) by mouth daily 30 tablet 2    metFORMIN (GLUCOPHAGE) 500 mg tablet TAKE 1 TABLET TWICE DAILY 180 tablet 1    simvastatin (ZOCOR) 40 mg tablet TAKE 1 TABLET EVERY DAY 90 tablet 0    traZODone (DESYREL) 50 mg tablet TAKE 1 TABLET EVERY DAY AT BEDTIME 90 tablet 1    tamsulosin (FLOMAX) 0 4 mg Take 1 capsule (0 4 mg total) by mouth daily with dinner (Patient not taking: Reported on 7/15/2020) 30 capsule 5     No current facility-administered medications for this visit  Allergies   Allergen Reactions    Bee Venom Edema      Immunizations:     Immunization History   Administered Date(s) Administered    INFLUENZA 11/18/2014, 10/05/2015, 11/28/2016, 01/04/2018, 10/15/2018    Influenza Split High Dose Preservative Free IM 11/18/2014, 10/05/2015, 11/28/2016, 01/04/2018    Influenza TIV (IM) 10/05/2012, 10/09/2013    Influenza, high dose seasonal 0 5 mL 10/15/2018, 10/03/2019    Pneumococcal Conjugate 13-Valent 12/08/2015, 12/09/2015    Pneumococcal Polysaccharide PPV23 10/09/2013    Tdap 11/15/2004, 10/16/2017    Zoster 10/05/2012      Health Maintenance:         Topic Date Due    Hepatitis C Screening  01/29/2021 (Originally 1945)    CRC Screening: Colonoscopy  06/05/2024         Topic Date Due    Influenza Vaccine  07/01/2020      Medicare Screening Tests and Risk Assessments:     Bogdan Gao is here for his Subsequent Wellness visit  Last Medicare Wellness visit information reviewed, patient interviewed and updates made to the record today  Health Risk Assessment:   Patient rates overall health as good  Patient feels that their physical health rating is same  Eyesight was rated as same  Hearing was rated as same  Patient feels that their emotional and mental health rating is same  Pain experienced in the last 7 days has been none  Patient states that he has experienced no weight loss or gain in last 6 months  Depression Screening:   PHQ-2 Score: 0      Fall Risk Screening: In the past year, patient has experienced: no history of falling in past year      Home Safety:  Patient does not have trouble with stairs inside or outside of their home  Patient has working smoke alarms and has working carbon monoxide detector  Home safety hazards include: none  Nutrition:   Current diet is Regular  Medications:   Patient is currently taking over-the-counter supplements  OTC medications include: see medication list  Patient is able to manage medications  Activities of Daily Living (ADLs)/Instrumental Activities of Daily Living (IADLs):   Walk and transfer into and out of bed and chair?: Yes  Dress and groom yourself?: Yes    Bathe or shower yourself?: Yes    Feed yourself? Yes  Do your laundry/housekeeping?: Yes  Manage your money, pay your bills and track your expenses?: Yes  Make your own meals?: Yes    Do your own shopping?: Yes    Previous Hospitalizations:   Any hospitalizations or ED visits within the last 12 months?: No      Advance Care Planning:   Living will: No    Durable POA for healthcare:  Yes    Advanced directive: No    Advanced directive counseling given: Yes    Five wishes given: Yes    End of Life Decisions reviewed with patient: Yes    Provider agrees with end of life decisions: Yes      Cognitive Screening:   Provider or family/friend/caregiver concerned regarding cognition?: No    PREVENTIVE SCREENINGS      Cardiovascular Screening:    General: Screening Not Indicated and History Lipid Disorder      Diabetes Screening:     General: Screening Not Indicated and History Diabetes      Colorectal Cancer Screening:     General: Screening Current      Prostate Cancer Screening:    General: Screening Current      Osteoporosis Screening:    General: Risks and Benefits Discussed      Abdominal Aortic Aneurysm (AAA) Screening:    Risk factors include: age between 73-67 yo and tobacco use        General: Risks and Benefits Discussed      Lung Cancer Screening:     General: Screening Not Indicated      Hepatitis C Screening:    General: Screening Current    No exam data present     Physical Exam:     /78 (BP Location: Left arm, Patient Position: Sitting, Cuff Size: Adult)   Pulse 73   Temp 98 5 °F (36 9 °C) (Tympanic)   Resp 16   Ht 5' 10 87" (1 8 m)   Wt 88 5 kg (195 lb)   SpO2 98%   BMI 27 30 kg/m²     Physical Exam    Salvador Cruz DO

## 2020-07-15 NOTE — ASSESSMENT & PLAN NOTE
Patient states he is much better response since increasing his trazodone to 100 mg nightly  Refill given today    No side effects or falls

## 2020-07-15 NOTE — ASSESSMENT & PLAN NOTE
Lab Results   Component Value Date    HGBA1C 6 4 (H) 07/08/2020    A1c 6 4%, was 6 4%  Doing well on metformin 500 mg b i d   Continue diet exercise    Will continue to monitor

## 2020-07-15 NOTE — PATIENT INSTRUCTIONS

## 2020-07-15 NOTE — PROGRESS NOTES
[facility]      NAME: Denise Mcbride  AGE: 77 y o  SEX: male  : 1945   MRN: [id number]    DATE: 7/15/2020  TIME: 2:03 PM    Assessment and Plan     Problem List Items Addressed This Visit     BPH (benign prostatic hyperplasia)      Has been doing well  No longer taking tamsulosin  Due for PSA 2021         DM2 (diabetes mellitus, type 2) (HCC)       Lab Results   Component Value Date    HGBA1C 6 4 (H) 2020    A1c 6 4%, was 6 4%  Doing well on metformin 500 mg b i d   Continue diet exercise  Will continue to monitor         Relevant Orders    Comprehensive metabolic panel    Hemoglobin A1C    Insomnia      Patient states he is much better response since increasing his trazodone to 100 mg nightly  Refill given today  No side effects or falls         Relevant Medications    traZODone (DESYREL) 100 mg tablet    Mixed hyperlipidemia      Cholesterol 137/71 with fairly low HDL  Doing well on simvastatin 40  Continue diet exercise  Will continue to check yearly         Renal cell carcinoma of right kidney [facility])      Status post right nephrectomy May 2017 by Dr Maya Mendoza  Continue regular follow-up with urology as directed         S/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, right       Status post bilateral knee replacements by Dr Taylor Gage  Patient doing well           Other Visit Diagnoses     Wellness examination    -  Primary    Screening for prostate cancer        Relevant Orders    PSA, Total Screen              Return to office in: AWV  Today  Recheck 6 months (labs yearly )  Chief Complaint     Chief Complaint   Patient presents with    Medicare Wellness Visit    Follow-up     6 months check up       History of Present Illness      Patient presents for recheck chronic medical problems today  Overall patient is feeling well without complaints  He is watching his diet exercising regularly  Doing well on metformin for diabetes    He has noticed that when he takes 2 trazodone, he sleeps much better  Doing well on simvastatin for cholesterol  Patient had labs drawn on July 8      The following portions of the patient's history were reviewed and updated as appropriate: allergies, current medications, past family history, past medical history, past social history, past surgical history and problem list     Review of Systems   Review of Systems   Respiratory: Negative  Cardiovascular: Negative  Gastrointestinal: Negative  Genitourinary: Negative  Active Problem List     Patient Active Problem List   Diagnosis    Primary osteoarthritis of right knee    BPH (benign prostatic hyperplasia)    DM2 [facility]diabetes mellitus, type 2) ([facility] [address] [facility]    Insomnia    Mixed hyperlipidemia    Renal cell carcinoma of right kidney (HCC)    Vitamin D deficiency    S/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, right    Cervical strain    Ganglion of flexor tendon sheath of left thumb    Incomplete tear of right rotator cuff    Status post arthroscopy of right shoulder    Encounter for other orthopedic aftercare    Vascular disorder of kidney    Acute pain of right shoulder    [facility] DJD(carpometacarpal degenerative joint disease), localized primary, left       Objective   /66   Pulse 73   Temp 98 5 °F (36 9 °C) (Tympanic)   Resp 16   Ht 5' 10 87" (1 8 m)   Wt 88 5 kg (195 lb)   SpO2 98%   BMI 27 30 kg/m²   Right Foot/Ankle   Right Foot Inspection  Skin Exam: skin normal and skin intact no dry skin, no warmth, no callus, no erythema, no maceration, no abnormal color, no pre-ulcer, no ulcer and no callus                          Toe Exam: ROM and strength within normal limits  Sensory       Monofilament testing: intact  Vascular  Capillary refills: < 3 seconds  The right DP pulse is 2+  The right PT pulse is 2+       Left Foot/Ankle  Left Foot Inspection  Skin Exam: skin normal and skin intactno dry skin, no warmth, no erythema, no maceration, normal color, no pre-ulcer, no ulcer and no callus                         Toe Exam: ROM and strength within normal limits                   Sensory       Monofilament: intact  Vascular  Capillary refills: < 3 seconds  The left DP pulse is 2+  The left PT pulse is 2+  Assign Risk Category:  No deformity present; No loss of protective sensation; No weak pulses       Risk: 0    Physical Exam   Cardiovascular: Normal rate, regular rhythm, normal heart sounds and intact distal pulses  Pulses are no weak pulses  Pulses:       Dorsalis pedis pulses are 2+ on the right side, and 2+ on the left side  Posterior tibial pulses are 2+ on the right side, and 2+ on the left side  Carotids: no bruits  Ext: no edema   Pulmonary/Chest: Effort normal  No respiratory distress  He has no wheezes  He has no rales  Feet:   Right Foot:   Skin Integrity: Negative for ulcer, skin breakdown, erythema, warmth, callus or dry skin  Left Foot:   Skin Integrity: Negative for ulcer, skin breakdown, erythema, warmth, callus or dry skin  Psychiatric: He has a normal mood and affect   His behavior is normal  Thought content normal        Pertinent Laboratory/Diagnostic Studies:    Labs from July 8th reviewed    Current Medications     Current Outpatient Medications:     Cholecalciferol (VITAMIN D-3 PO), Take 2,000 Units by mouth daily, Disp: , Rfl:     EPINEPHrine (EPIPEN 2-MOIZ) 0 3 mg/0 3 mL SOAJ, Inject as directed, Disp: , Rfl:     fluticasone (FLONASE) 50 mcg/act nasal spray, 1 spray into each nostril daily, Disp: 1 Bottle, Rfl: 2    lisinopril (ZESTRIL) 10 mg tablet, Take 1 tablet (10 mg total) by mouth daily, Disp: 30 tablet, Rfl: 2    metFORMIN (GLUCOPHAGE) 500 mg tablet, TAKE 1 TABLET TWICE DAILY, Disp: 180 tablet, Rfl: 1    simvastatin (ZOCOR) 40 mg tablet, TAKE 1 TABLET EVERY DAY, Disp: 90 tablet, Rfl: 0    traZODone (DESYREL) 100 mg tablet, Take 1 tablet (100 mg total) by mouth daily at bedtime, Disp: 90 tablet, Rfl: 1    Health Maintenance     Health Maintenance   Topic Date Due    Diabetic Foot Exam  05/15/2020    Influenza Vaccine  07/01/2020    Depression Screening PHQ  07/12/2020    Medicare Annual Wellness Visit (AWV)  07/12/2020    BMI: Followup Plan  01/15/2021    Hepatitis C Screening  01/29/2021 (Originally 1945)    HEMOGLOBIN A1C  01/08/2021    Fall Risk  07/15/2021    BMI: Adult  07/15/2021    DM Eye Exam  02/12/2022    CRC Screening: Colonoscopy  06/05/2024    DTaP,Tdap,and Td Vaccines (3 - Td) 10/16/2027    Pneumococcal Vaccine: 65+ Years  Completed    Pneumococcal Vaccine: Pediatrics (0 to 5 Years) and At-Risk Patients (6 to 59 Years)  Aged Out    HIB Vaccine  Aged Out    Hepatitis B Vaccine  Aged Out    IPV Vaccine  Aged Out    Hepatitis A Vaccine  Aged Out    Meningococcal ACWY Vaccine  Aged Out    HPV Vaccine  Aged [employer] History   Administered Date(s) Administered    INFLUENZA 11/18/2014, 10/05/2015, 11/28/2016, 01/04/2018, 10/15/2018    Influenza Split High Dose Preservative Free IM 11/18/2014, 10/05/2015, 11/28/2016, 01/04/2018    Influenza TIV (IM) 10/05/2012, 10/09/2013    Influenza, high dose seasonal 0 5 mL 10/15/2018, 10/03/2019    Pneumococcal Conjugate 13-Valent 12/08/2015, 12/09/2015    Pneumococcal Polysaccharide PPV23 10/09/2013    Tdap 11/15/2004, 10/16/2017    Zoster 10/05/2012       Emily Perla DO  [facility]

## 2020-07-30 DIAGNOSIS — E78.2 MIXED HYPERLIPIDEMIA: ICD-10-CM

## 2020-07-31 RX ORDER — SIMVASTATIN 40 MG
TABLET ORAL
Qty: 90 TABLET | Refills: 0 | Status: SHIPPED | OUTPATIENT
Start: 2020-07-31 | End: 2020-10-10

## 2020-09-18 ENCOUNTER — IMMUNIZATIONS (OUTPATIENT)
Dept: FAMILY MEDICINE CLINIC | Facility: CLINIC | Age: 75
End: 2020-09-18
Payer: MEDICARE

## 2020-09-18 DIAGNOSIS — Z23 ENCOUNTER FOR IMMUNIZATION: ICD-10-CM

## 2020-09-18 PROCEDURE — 90662 IIV NO PRSV INCREASED AG IM: CPT | Performed by: FAMILY MEDICINE

## 2020-09-18 PROCEDURE — G0008 ADMIN INFLUENZA VIRUS VAC: HCPCS | Performed by: FAMILY MEDICINE

## 2020-10-07 ENCOUNTER — APPOINTMENT (OUTPATIENT)
Dept: RADIOLOGY | Facility: OTHER | Age: 75
End: 2020-10-07
Payer: MEDICARE

## 2020-10-07 ENCOUNTER — OFFICE VISIT (OUTPATIENT)
Dept: OBGYN CLINIC | Facility: OTHER | Age: 75
End: 2020-10-07
Payer: MEDICARE

## 2020-10-07 VITALS
WEIGHT: 195 LBS | HEART RATE: 59 BPM | HEIGHT: 71 IN | BODY MASS INDEX: 27.3 KG/M2 | SYSTOLIC BLOOD PRESSURE: 153 MMHG | TEMPERATURE: 95.6 F | DIASTOLIC BLOOD PRESSURE: 81 MMHG

## 2020-10-07 DIAGNOSIS — M25.561 ACUTE PAIN OF RIGHT KNEE: ICD-10-CM

## 2020-10-07 DIAGNOSIS — Z01.89 ENCOUNTER FOR LOWER EXTREMITY COMPARISON IMAGING STUDY: Primary | ICD-10-CM

## 2020-10-07 DIAGNOSIS — Z01.89 ENCOUNTER FOR LOWER EXTREMITY COMPARISON IMAGING STUDY: ICD-10-CM

## 2020-10-07 PROCEDURE — 99213 OFFICE O/P EST LOW 20 MIN: CPT | Performed by: ORTHOPAEDIC SURGERY

## 2020-10-07 PROCEDURE — 73564 X-RAY EXAM KNEE 4 OR MORE: CPT

## 2020-10-07 PROCEDURE — 73562 X-RAY EXAM OF KNEE 3: CPT

## 2020-10-10 DIAGNOSIS — E78.2 MIXED HYPERLIPIDEMIA: ICD-10-CM

## 2020-10-10 RX ORDER — SIMVASTATIN 40 MG
TABLET ORAL
Qty: 90 TABLET | Refills: 0 | Status: SHIPPED | OUTPATIENT
Start: 2020-10-10 | End: 2021-05-06

## 2020-11-05 ENCOUNTER — OFFICE VISIT (OUTPATIENT)
Dept: OBGYN CLINIC | Facility: CLINIC | Age: 75
End: 2020-11-05
Payer: MEDICARE

## 2020-11-05 VITALS
WEIGHT: 195 LBS | HEIGHT: 71 IN | DIASTOLIC BLOOD PRESSURE: 82 MMHG | TEMPERATURE: 98.4 F | SYSTOLIC BLOOD PRESSURE: 120 MMHG | HEART RATE: 74 BPM | BODY MASS INDEX: 27.3 KG/M2

## 2020-11-05 DIAGNOSIS — M19.072 OSTEOARTHRITIS OF MIDFOOT, LEFT: Primary | ICD-10-CM

## 2020-11-05 PROCEDURE — 99213 OFFICE O/P EST LOW 20 MIN: CPT | Performed by: ORTHOPAEDIC SURGERY

## 2020-11-11 ENCOUNTER — HOSPITAL ENCOUNTER (OUTPATIENT)
Dept: RADIOLOGY | Facility: HOSPITAL | Age: 75
Discharge: HOME | End: 2020-11-11
Attending: ORTHOPAEDIC SURGERY

## 2020-11-13 ENCOUNTER — TRANSCRIBE ORDERS (OUTPATIENT)
Dept: RADIOLOGY | Facility: HOSPITAL | Age: 75
End: 2020-11-13

## 2020-11-13 ENCOUNTER — HOSPITAL ENCOUNTER (OUTPATIENT)
Dept: RADIOLOGY | Facility: HOSPITAL | Age: 75
Discharge: HOME | End: 2020-11-13
Attending: ORTHOPAEDIC SURGERY | Admitting: RADIOLOGY
Payer: MEDICARE

## 2020-11-13 DIAGNOSIS — M19.072 OSTEOARTHRITIS OF MIDFOOT, LEFT: ICD-10-CM

## 2020-11-13 PROCEDURE — 77002 NEEDLE LOCALIZATION BY XRAY: CPT

## 2020-11-13 PROCEDURE — 20600 DRAIN/INJ JOINT/BURSA W/O US: CPT

## 2020-11-13 RX ORDER — METHYLPREDNISOLONE ACETATE 80 MG/ML
80 INJECTION, SUSPENSION INTRA-ARTICULAR; INTRALESIONAL; INTRAMUSCULAR; SOFT TISSUE
Status: COMPLETED | OUTPATIENT
Start: 2020-11-13 | End: 2020-11-13

## 2020-11-13 RX ORDER — LIDOCAINE HYDROCHLORIDE 10 MG/ML
5 INJECTION, SOLUTION EPIDURAL; INFILTRATION; INTRACAUDAL; PERINEURAL
Status: COMPLETED | OUTPATIENT
Start: 2020-11-13 | End: 2020-11-13

## 2020-11-13 RX ORDER — BUPIVACAINE HYDROCHLORIDE 2.5 MG/ML
5 INJECTION, SOLUTION EPIDURAL; INFILTRATION; INTRACAUDAL
Status: COMPLETED | OUTPATIENT
Start: 2020-11-13 | End: 2020-11-13

## 2020-11-13 RX ADMIN — BUPIVACAINE HYDROCHLORIDE 5 ML: 2.5 INJECTION, SOLUTION EPIDURAL; INFILTRATION; INTRACAUDAL at 12:15

## 2020-11-13 RX ADMIN — IOHEXOL 3 ML: 300 INJECTION, SOLUTION INTRAVENOUS at 12:10

## 2020-11-13 RX ADMIN — METHYLPREDNISOLONE ACETATE 80 MG: 80 INJECTION, SUSPENSION INTRA-ARTICULAR; INTRALESIONAL; INTRAMUSCULAR; SOFT TISSUE at 12:15

## 2020-11-13 RX ADMIN — LIDOCAINE HYDROCHLORIDE 5 ML: 10 INJECTION, SOLUTION EPIDURAL; INFILTRATION; INTRACAUDAL; PERINEURAL at 12:15

## 2020-12-28 ENCOUNTER — TELEPHONE (OUTPATIENT)
Dept: UROLOGY | Facility: MEDICAL CENTER | Age: 75
End: 2020-12-28

## 2020-12-28 DIAGNOSIS — C64.1 RENAL CELL CARCINOMA OF RIGHT KIDNEY (HCC): Primary | ICD-10-CM

## 2020-12-29 ENCOUNTER — TELEPHONE (OUTPATIENT)
Dept: FAMILY MEDICINE CLINIC | Facility: CLINIC | Age: 75
End: 2020-12-29

## 2020-12-29 ENCOUNTER — LAB (OUTPATIENT)
Dept: LAB | Facility: CLINIC | Age: 75
End: 2020-12-29
Payer: MEDICARE

## 2020-12-29 DIAGNOSIS — C64.1 RENAL CELL CARCINOMA OF RIGHT KIDNEY (HCC): ICD-10-CM

## 2020-12-29 DIAGNOSIS — E11.69 TYPE 2 DIABETES MELLITUS WITH OTHER SPECIFIED COMPLICATION, WITHOUT LONG-TERM CURRENT USE OF INSULIN (HCC): ICD-10-CM

## 2020-12-29 DIAGNOSIS — Z12.5 SCREENING FOR PROSTATE CANCER: ICD-10-CM

## 2020-12-29 LAB
ALBUMIN SERPL BCP-MCNC: 4 G/DL (ref 3.5–5)
ALP SERPL-CCNC: 41 U/L (ref 46–116)
ALT SERPL W P-5'-P-CCNC: 40 U/L (ref 12–78)
ANION GAP SERPL CALCULATED.3IONS-SCNC: 5 MMOL/L (ref 4–13)
AST SERPL W P-5'-P-CCNC: 17 U/L (ref 5–45)
BILIRUB SERPL-MCNC: 0.6 MG/DL (ref 0.2–1)
BUN SERPL-MCNC: 26 MG/DL (ref 5–25)
CALCIUM SERPL-MCNC: 9.9 MG/DL (ref 8.3–10.1)
CHLORIDE SERPL-SCNC: 107 MMOL/L (ref 100–108)
CO2 SERPL-SCNC: 28 MMOL/L (ref 21–32)
CREAT SERPL-MCNC: 1.13 MG/DL (ref 0.6–1.3)
EST. AVERAGE GLUCOSE BLD GHB EST-MCNC: 134 MG/DL
GFR SERPL CREATININE-BSD FRML MDRD: 64 ML/MIN/1.73SQ M
GLUCOSE P FAST SERPL-MCNC: 116 MG/DL (ref 65–99)
HBA1C MFR BLD: 6.3 %
POTASSIUM SERPL-SCNC: 4.4 MMOL/L (ref 3.5–5.3)
PROT SERPL-MCNC: 7.8 G/DL (ref 6.4–8.2)
PSA SERPL-MCNC: 3 NG/ML (ref 0–4)
SODIUM SERPL-SCNC: 140 MMOL/L (ref 136–145)

## 2020-12-29 PROCEDURE — 36415 COLL VENOUS BLD VENIPUNCTURE: CPT

## 2020-12-29 PROCEDURE — 80053 COMPREHEN METABOLIC PANEL: CPT

## 2020-12-29 PROCEDURE — G0103 PSA SCREENING: HCPCS

## 2020-12-29 PROCEDURE — 83036 HEMOGLOBIN GLYCOSYLATED A1C: CPT

## 2021-01-04 ENCOUNTER — HOSPITAL ENCOUNTER (OUTPATIENT)
Dept: CT IMAGING | Facility: HOSPITAL | Age: 76
Discharge: HOME | End: 2021-01-04
Attending: UROLOGY
Payer: MEDICARE

## 2021-01-04 ENCOUNTER — HOSPITAL ENCOUNTER (OUTPATIENT)
Dept: RADIOLOGY | Facility: HOSPITAL | Age: 76
Discharge: HOME | End: 2021-01-04
Attending: UROLOGY
Payer: MEDICARE

## 2021-01-04 DIAGNOSIS — C64.1 RENAL CELL CARCINOMA OF RIGHT KIDNEY (HCC): ICD-10-CM

## 2021-01-04 PROCEDURE — 71046 X-RAY EXAM CHEST 2 VIEWS: CPT

## 2021-01-04 PROCEDURE — 74170 CT ABD WO CNTRST FLWD CNTRST: CPT

## 2021-01-04 RX ADMIN — IOHEXOL 100 ML: 350 INJECTION, SOLUTION INTRAVENOUS at 11:48

## 2021-01-14 ENCOUNTER — TELEPHONE (OUTPATIENT)
Dept: UROLOGY | Facility: AMBULATORY SURGERY CENTER | Age: 76
End: 2021-01-14

## 2021-01-15 ENCOUNTER — OFFICE VISIT (OUTPATIENT)
Dept: UROLOGY | Facility: CLINIC | Age: 76
End: 2021-01-15
Payer: MEDICARE

## 2021-01-15 VITALS
BODY MASS INDEX: 27.41 KG/M2 | HEART RATE: 67 BPM | DIASTOLIC BLOOD PRESSURE: 76 MMHG | WEIGHT: 195.8 LBS | HEIGHT: 71 IN | SYSTOLIC BLOOD PRESSURE: 138 MMHG

## 2021-01-15 DIAGNOSIS — C64.1 MALIGNANT NEOPLASM OF RIGHT KIDNEY (HCC): Primary | ICD-10-CM

## 2021-01-15 PROCEDURE — 99213 OFFICE O/P EST LOW 20 MIN: CPT | Performed by: UROLOGY

## 2021-01-15 NOTE — PROGRESS NOTES
1/15/2021    Devora Rodriguez  1945  [id number]        Assessment  History of right renal cell carcinoma status post robot assisted laparoscopic right partial nephrectomy, routine prostate cancer screening      Discussion  Provided the patient with reassurance that his CT scan of the abdomen and pelvis, chest x-ray, and basic metabolic panel are within normal limits  There is no sign of disease recurrence or metastatic disease  Follow-up can be in 1 year with his next CT scan, chest x-ray, and basic metabolic panel  History of Present Illness  77 y o  male with a history of an incidental right renal mass  In 2017 he underwent a right robot assisted laparoscopic partial nephrectomy  He returns in follow-up today  He is without complaints  Chest x-ray, CT scan abdomen pelvis, basic metabolic panel are within normal limits without signs of disease recurrence  A recent PSA level is noted to be stable at 3 0  He denies any lower urinary tract symptoms  AUA Symptom Score      Review of Systems  Review of Systems   Constitutional: Negative  HENT: Negative  Eyes: Negative  Respiratory: Negative  Cardiovascular: Negative  Gastrointestinal: Negative  Endocrine: Negative  Genitourinary:        Per HPI   Musculoskeletal: Negative  Skin: Negative  Allergic/Immunologic: Negative  Neurological: Negative  Hematological: Negative  Psychiatric/Behavioral: Negative  All other systems reviewed and are negative          Past Medical History  Past Medical History:   Diagnosis Date    Allergic reaction     last assessed: 5/26/2015    Arthritis     right knee    Bee sting allergy     last assessed: 6/30/2015    BPH (benign prostatic hypertrophy)     Cancer ([facility] [address] ) 2017    Right kidney    Diabetes mellitus ([facility] [address] )     Type II, NIDDM    Enlarged prostate with lower urinary tract symptoms (LUTS)     last assessed: 8/17/2015    Herpes zoster     last assessed: 11/21/2013    History of colon polyps     Hyperlipidemia     Leukocytosis     last assessed: 11/18/2014    Osteoarthrosis, localized, primary, knee     last assessed: 10/25/2016    Seasonal allergies     Shoulder pain, right     Wears glasses     Wears partial dentures     Upper and Lower       Past Social History  Past Surgical History:   Procedure Laterality Date    CARPAL TUNNEL RELEASE Left     CHOLECYSTECTOMY      Open    COLONOSCOPY      FL GUIDED NEEDLE PLAC BX/ASP/INJ  6/1/2020    FL GUIDED NEEDLE PLAC BX/ASP/INJ  11/13/2020    GANGLION CYST EXCISION Left     Left wrist    JOINT REPLACEMENT Left 08/2015    Left TKR    JOINT REPLACEMENT Right 09/2017    Right TKR    NEPHRECTOMY  05/08/2017    Partial right nephrectomy    OH LAP,PARTIAL NEPHRECTOMY Right 5/8/2017    Procedure: LAPAROSCOPIC LYSIS OF ADHESIONS, ROBOTIC PARTIAL NEPHRECTOMY, WITH INTRA-OP LAPAROSCOPIC ULTRASOUND;  Surgeon: Abdoulaye Arthur MD;  [facility];  Service: Urology    OH REPAIR BICEPS LONG TENDON  2/4/2019    Procedure: TENODESIS BICEPS OPEN PROXIMAL;  Surgeon: Sirena Hsu MD;  [facility];  Service: Orthopedics    OH REPAIR INTERCARP/CARP-METACARP JT Left 12/5/2016    Procedure: ARTHROPLASTY THUMB [address] ;  Surgeon: Sirena Hsu MD;  [facility];  Service: Orthopedics    OH REVISE MEDIAN N/CARPAL TUNNEL SURG Left 12/5/2016    Procedure: RELEASE CARPAL TUNNEL;  Surgeon: Sirena Hsu MD;  [facility];  Service: Orthopedics    OH [address] ARTHROSCOP,SURG,W/ROTAT CUFF REPR Right 2/4/2019    Procedure: REPAIR ROTATOR CUFF ARTHROSCOPIC; SUBACROMIAL DECOMPRESSION; DISTAL CLAVICLE EXCISION;  Surgeon: Sirena Hsu MD;  [facility];  Service: Orthopedics    OH TOTAL KNEE ARTHROPLASTY Right 9/11/2017    Procedure: ARTHROPLASTY KNEE TOTAL;  Surgeon: Sirena Hsu MD;  [facility];  Service: Orthopedics    TONSILLECTOMY AND ADENOIDECTOMY         Past Family History  Family History   Problem Relation Age of Onset    Cancer Mother     Stroke Father        Past Social history  Social History     Socioeconomic History    Marital status: /Civil Union     Spouse name: Not on file    Number of children: Not on file    Years of education: Not on file    Highest education level: Not on file   Occupational History    Not on file   Social Needs    Financial resource strain: Not on file    Food insecurity     Worry: Not on file     Inability: Not on file   [employer] needs     Medical: Not on file     Non-medical: Not on file   Tobacco Use    Smoking status: Former Smoker     Packs/day: 0 25     Types: Cigarettes    Smokeless tobacco: Never Used    Tobacco comment: Smoked from ages 16 to 25     Substance and Sexual Activity    Alcohol use: Yes     Comment: 1-2 x year    Drug use: No    Sexual activity: Not on file   Lifestyle    Physical activity     Days per week: Not on file     Minutes per session: Not on file    Stress: Not on file   Relationships    Social connections     Talks on phone: Not on file     Gets together: Not on file     Attends Worship service: Not on file     Active member of club or organization: Not on file     Attends meetings of clubs or organizations: Not on file     Relationship status: Not on file    Intimate partner violence     Fear of current or ex partner: Not on file     Emotionally abused: Not on file     Physically abused: Not on file     Forced sexual activity: Not on file   Other Topics Concern    Not on file   Social History Narrative    Not on file       Current Medications  Current Outpatient Medications   Medication Sig Dispense Refill    Cholecalciferol (VITAMIN D-3 PO) Take 2,000 Units by mouth daily      EPINEPHrine (EPIPEN 2-MOIZ) 0 3 mg/0 3 mL SOAJ Inject as directed      fluticasone (FLONASE) 50 mcg/act nasal spray 1 spray into each nostril daily 1 Bottle 2    metFORMIN (GLUCOPHAGE) 500 mg tablet TAKE 1 TABLET TWICE DAILY 180 tablet 1    simvastatin (ZOCOR) 40 mg tablet TAKE 1 TABLET EVERY DAY 90 tablet 0    traZODone (DESYREL) 100 mg tablet Take 1 tablet (100 mg total) by mouth daily at bedtime 90 tablet 1    lisinopril (ZESTRIL) 10 mg tablet Take 1 tablet (10 mg total) by mouth daily 30 tablet 2     No current facility-administered medications for this visit  Allergies  Allergies   Allergen Reactions    Bee Venom Edema       Past Medical History, Social History, Family History, medications and allergies were reviewed  Vitals  Vitals:    01/15/21 1330   BP: 138/76   BP Location: Left arm   Patient Position: Sitting   Cuff Size: Adult   Pulse: 67   Weight: 88 8 kg (195 lb 12 8 oz)   Height: 5' 11" (1 803 m)       Physical Exam  Physical Exam  On examination he is in no acute distress  His abdomen is soft nontender nondistended  A well-healed midline scars appreciated  Right paramedian robotic scars are noted  There is no sign of herniation  Skin is warm  Extremities without edema  Neurologic is grossly intact and nonfocal   Gait normal   Affect normal      Results  Lab Results   Component Value Date    PSA 3 0 12/29/2020    PSA 2 8 01/08/2020    PSA 2 9 01/03/2019     Lab Results   Component Value Date    GLUCOSE 195 (H) 05/08/2017    CALCIUM 9 9 12/29/2020     12/27/2017    K 4 4 12/29/2020    CO2 28 12/29/2020     12/29/2020    BUN 26 (H) 12/29/2020    CREATININE 1 13 12/29/2020     Lab Results   Component Value Date    WBC 6 05 07/08/2020    HGB 14 6 07/08/2020    HCT 43 8 07/08/2020    MCV 94 07/08/2020     07/08/2020         Office Urine Dip  No results found for this or any previous visit (from the past 1 hour(s))  ]      Total visit time was 15 minutes of which over 50% was spent on counseling

## 2021-01-18 ENCOUNTER — OFFICE VISIT (OUTPATIENT)
Dept: FAMILY MEDICINE CLINIC | Facility: CLINIC | Age: 76
End: 2021-01-18
Payer: MEDICARE

## 2021-01-18 VITALS
DIASTOLIC BLOOD PRESSURE: 74 MMHG | WEIGHT: 195 LBS | OXYGEN SATURATION: 97 % | SYSTOLIC BLOOD PRESSURE: 142 MMHG | HEIGHT: 71 IN | HEART RATE: 66 BPM | BODY MASS INDEX: 27.3 KG/M2 | TEMPERATURE: 97 F | RESPIRATION RATE: 16 BRPM

## 2021-01-18 DIAGNOSIS — L82.0 SEBORRHEIC KERATOSES, INFLAMED: ICD-10-CM

## 2021-01-18 DIAGNOSIS — C64.1 RENAL CELL CARCINOMA OF RIGHT KIDNEY (HCC): ICD-10-CM

## 2021-01-18 DIAGNOSIS — G47.00 INSOMNIA, UNSPECIFIED TYPE: ICD-10-CM

## 2021-01-18 DIAGNOSIS — N40.1 BENIGN PROSTATIC HYPERPLASIA WITH URINARY FREQUENCY: ICD-10-CM

## 2021-01-18 DIAGNOSIS — E11.69 TYPE 2 DIABETES MELLITUS WITH OTHER SPECIFIED COMPLICATION, WITHOUT LONG-TERM CURRENT USE OF INSULIN (HCC): ICD-10-CM

## 2021-01-18 DIAGNOSIS — R35.0 BENIGN PROSTATIC HYPERPLASIA WITH URINARY FREQUENCY: ICD-10-CM

## 2021-01-18 DIAGNOSIS — E78.2 MIXED HYPERLIPIDEMIA: Primary | ICD-10-CM

## 2021-01-18 PROCEDURE — 99214 OFFICE O/P EST MOD 30 MIN: CPT | Performed by: FAMILY MEDICINE

## 2021-01-18 RX ORDER — TRAZODONE HYDROCHLORIDE 100 MG/1
100 TABLET ORAL
Qty: 90 TABLET | Refills: 1 | Status: SHIPPED | OUTPATIENT
Start: 2021-01-18 | End: 2021-07-21 | Stop reason: SDUPTHER

## 2021-01-18 NOTE — ASSESSMENT & PLAN NOTE
Status post right renal cell carcinoma with subsequent nephrectomy May 2017 by Dr Manfred Thao   Continue follow-up as directed

## 2021-01-18 NOTE — PROGRESS NOTES
[address] [facility]      NAME: Vandana Number  AGE: 77 y o  SEX: male  : 1945   MRN: [id number]    DATE: 2021  TIME: 2:43 PM    Assessment and Plan     Problem List Items Addressed This Visit     BPH (benign prostatic hyperplasia)      No longer on tamsulosin  Still sees urologist   PSA from  was 3 0  Will continue to check yearly         DM[facility]di[facility] mellitus[facility] 2[facility] )       Lab Results   Component Value Date    HGBA1C 6 3 (H) 2020    A1c from  was 6 3%, was 6 4%  Doing well on metformin 500 b i d   Continue diet exercise         Relevant Orders    CBC and differential    Comprehensive metabolic panel    Hemoglobin A1C    TSH, 3rd generation with Free T4 reflex    Insomnia      Patient doing well since increasing trazodone to 100 mg nightly  Denies any side effects or falls  Medication was refilled today         Relevant Medications    traZODone (DESYREL) 100 mg tablet    Mixed hyperlipidemia - Primary      Doing well on simvastatin 40         Relevant Orders    Comprehensive metabolic panel    Lipid Panel with Direct LDL reflex    Renal cell carcinoma of right kidney [facility])      Status post right renal cell carcinoma with subsequent nephrectomy May 2017 by Dr Tomer Field  Continue follow-up as directed         Seborrheic keratoses, inflamed      Patient with multiple inflamed seborrheic keratoses on torso and back  Recommend elective surgical appointment for destruction                   Return to office in:  6 months, AWV,  Fasting blood work prior    Chief Complaint     Chief Complaint   Patient presents with    Follow-up     6 months       History of Present Illness      Patient presents for recheck chronic medical problems today  He had labs done on   Doing well on metformin for diabetes  Doing well on trazodone for insomnia    Doing well on simvastatin for cholesterol      The following portions of the patient's history were reviewed and updated as appropriate: allergies, current medications, past family history, past medical history, past social history, past surgical history and problem list     Review of Systems   Review of Systems   Respiratory: Negative  Cardiovascular: Negative  Gastrointestinal: Negative  Genitourinary: Negative  Active Problem List     Patient Active Problem List   Diagnosis    Primary osteoarthritis of right knee    BPH (benign prostatic hyperplasia)    DM2 (diabetes mellitus, type 2) ([address] )    Insomnia    Mixed hyperlipidemia    Renal cell carcinoma of right kidney (HCC)    Vitamin D deficiency    S/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, right    Cervical strain    Ganglion of flexor tendon sheath of left thumb    Incomplete tear of right rotator cuff    Status post arthroscopy of right shoulder    Encounter for other orthopedic aftercare    Vascular disorder of kidney    Encounter for lower extremity comparison imaging study    Acute pain of right shoulder    [facility] DJD(carpometacarpal degenerative joint disease), localized primary, left    Acute pain of right knee    Seborrheic keratoses, inflamed       Objective   /74   Pulse 66   Temp (!) 97 °F (36 1 °C) (Tympanic)   Resp 16   Ht 5' 10 87" (1 8 m)   Wt 88 5 kg (195 lb)   SpO2 97%   BMI 27 30 kg/m²     Physical Exam  Cardiovascular:      Rate and Rhythm: Normal rate and regular rhythm  Heart sounds: Normal heart sounds  Comments: Carotids: no bruits  Ext: no edema  Pulmonary:      Effort: Pulmonary effort is normal  No respiratory distress  Breath sounds: No wheezing or rales  Psychiatric:         Behavior: Behavior normal          Thought Content:  Thought content normal          Pertinent Laboratory/Diagnostic Studies:  Labs December 29th reviewed    Current Medications     Current Outpatient Medications:     Cholecalciferol (VITAMIN D-3 PO), Take 2,000 Units by mouth daily, Disp: , Rfl:     EPINEPHrine (EPIPEN 2-MOIZ) 0 3 mg/0 3 mL SOAJ, Inject as directed, Disp: , Rfl:     fluticasone (FLONASE) 50 mcg/act nasal spray, 1 spray into each nostril daily, Disp: 1 Bottle, Rfl: 2    metFORMIN (GLUCOPHAGE) 500 mg tablet, TAKE 1 TABLET TWICE DAILY, Disp: 180 tablet, Rfl: 1    simvastatin (ZOCOR) 40 mg tablet, TAKE 1 TABLET EVERY DAY, Disp: 90 tablet, Rfl: 0    traZODone (DESYREL) 100 mg tablet, Take 1 tablet (100 mg total) by mouth daily at bedtime, Disp: 90 tablet, Rfl: 1    Health Maintenance     Health Maintenance   Topic Date Due    URINE MICROALBUMIN  01/03/2020    BMI: Followup Plan  01/15/2021    Depression Screening PHQ  07/15/2021    Hepatitis C Screening  01/29/2021 (Originally 1945)    HEMOGLOBIN A1C  06/29/2021    Fall Risk  07/15/2021    Medicare Annual Wellness Visit (AWV)  07/15/2021    Diabetic Foot Exam  07/15/2021    BMI: Adult  01/18/2022    DM Eye Exam  02/12/2022    Colorectal Cancer Screening  06/05/2024    DTaP,Tdap,and Td Vaccines (3 - Td) 10/16/2027    Pneumococcal Vaccine: 65+ Years  Completed    Influenza Vaccine  Completed    HIB Vaccine  Aged Out    Hepatitis B Vaccine  Aged Out    IPV Vaccine  Aged Out    Hepatitis A Vaccine  Aged Out    Meningococcal ACWY Vaccine  Aged Out    HPV Vaccine  Aged Out     Immunization History   Administered Date(s) Administered    INFLUENZA 11/18/2014, 10/05/2015, 11/28/2016, 01/04/2018, 10/15/2018    Influenza Split High Dose Preservative Free IM 11/18/2014, 10/05/2015, 11/28/2016, 01/04/2018    Influenza, high dose seasonal 0 7 mL 10/15/2018, 10/03/2019, 09/18/2020    Influenza, seasonal, injectable 10/05/2012, 10/09/2013    Pneumococcal Conjugate 13-Valent 12/08/2015, 12/09/2015    Pneumococcal Polysaccharide PPV23 10/09/2013    Tdap 11/15/2004, 10/16/2017    Zoster 10/05/2012       Adan Calle DO  [facility]

## 2021-01-18 NOTE — ASSESSMENT & PLAN NOTE
No longer on tamsulosin  Still sees urologist   PSA from December 29th was 3 0    Will continue to check yearly

## 2021-01-18 NOTE — ASSESSMENT & PLAN NOTE
Patient with multiple inflamed seborrheic keratoses on torso and back    Recommend elective surgical appointment for destruction

## 2021-01-18 NOTE — ASSESSMENT & PLAN NOTE
Lab Results   Component Value Date    HGBA1C 6 3 (H) 12/29/2020    A1c from December 29th was 6 3%, was 6 4%    Doing well on metformin 500 b i d   Continue diet exercise

## 2021-01-18 NOTE — ASSESSMENT & PLAN NOTE
Patient doing well since increasing trazodone to 100 mg nightly  Denies any side effects or falls    Medication was refilled today

## 2021-02-12 DIAGNOSIS — E11.8 TYPE 2 DIABETES MELLITUS WITH COMPLICATION, WITHOUT LONG-TERM CURRENT USE OF INSULIN (HCC): ICD-10-CM

## 2021-03-10 DIAGNOSIS — Z23 ENCOUNTER FOR IMMUNIZATION: ICD-10-CM

## 2021-03-12 ENCOUNTER — IMMUNIZATIONS (OUTPATIENT)
Dept: FAMILY MEDICINE CLINIC | Facility: HOSPITAL | Age: 76
End: 2021-03-12

## 2021-03-12 DIAGNOSIS — Z23 ENCOUNTER FOR IMMUNIZATION: Primary | ICD-10-CM

## 2021-03-12 PROCEDURE — 0011A SARS-COV-2 / COVID-19 MRNA VACCINE (MODERNA) 100 MCG: CPT

## 2021-03-12 PROCEDURE — 91301 SARS-COV-2 / COVID-19 MRNA VACCINE (MODERNA) 100 MCG: CPT

## 2021-03-25 ENCOUNTER — PROCEDURE VISIT (OUTPATIENT)
Dept: FAMILY MEDICINE CLINIC | Facility: CLINIC | Age: 76
End: 2021-03-25
Payer: MEDICARE

## 2021-03-25 VITALS
TEMPERATURE: 97.7 F | SYSTOLIC BLOOD PRESSURE: 138 MMHG | HEIGHT: 71 IN | DIASTOLIC BLOOD PRESSURE: 80 MMHG | HEART RATE: 64 BPM | WEIGHT: 193 LBS | BODY MASS INDEX: 27.02 KG/M2 | RESPIRATION RATE: 16 BRPM | OXYGEN SATURATION: 98 %

## 2021-03-25 DIAGNOSIS — L82.0 INFLAMED SEBORRHEIC KERATOSIS: Primary | ICD-10-CM

## 2021-03-25 PROCEDURE — 17000 DESTRUCT PREMALG LESION: CPT | Performed by: FAMILY MEDICINE

## 2021-03-25 PROCEDURE — 17003 DESTRUCT PREMALG LES 2-14: CPT | Performed by: FAMILY MEDICINE

## 2021-03-30 ENCOUNTER — TELEPHONE (OUTPATIENT)
Dept: FAMILY MEDICINE CLINIC | Facility: CLINIC | Age: 76
End: 2021-03-30

## 2021-03-30 NOTE — TELEPHONE ENCOUNTER
Patient dropped off form   Its in folder on your desk  Please just complete the CA section      When complete, pls call patient at       thx

## 2021-04-09 ENCOUNTER — IMMUNIZATIONS (OUTPATIENT)
Dept: FAMILY MEDICINE CLINIC | Facility: HOSPITAL | Age: 76
End: 2021-04-09

## 2021-04-09 DIAGNOSIS — Z23 ENCOUNTER FOR IMMUNIZATION: Primary | ICD-10-CM

## 2021-04-09 PROCEDURE — 0012A SARS-COV-2 / COVID-19 MRNA VACCINE (MODERNA) 100 MCG: CPT

## 2021-04-09 PROCEDURE — 91301 SARS-COV-2 / COVID-19 MRNA VACCINE (MODERNA) 100 MCG: CPT

## 2021-04-22 ENCOUNTER — TELEPHONE (OUTPATIENT)
Dept: FAMILY MEDICINE CLINIC | Facility: CLINIC | Age: 76
End: 2021-04-22

## 2021-05-05 DIAGNOSIS — E78.2 MIXED HYPERLIPIDEMIA: ICD-10-CM

## 2021-05-06 RX ORDER — SIMVASTATIN 40 MG
TABLET ORAL
Qty: 90 TABLET | Refills: 0 | Status: SHIPPED | OUTPATIENT
Start: 2021-05-06 | End: 2021-07-21 | Stop reason: SDUPTHER

## 2021-06-09 ENCOUNTER — OFFICE VISIT (OUTPATIENT)
Dept: FAMILY MEDICINE CLINIC | Facility: CLINIC | Age: 76
End: 2021-06-09
Payer: MEDICARE

## 2021-06-09 VITALS
RESPIRATION RATE: 14 BRPM | TEMPERATURE: 97.7 F | HEART RATE: 66 BPM | BODY MASS INDEX: 26.74 KG/M2 | WEIGHT: 191 LBS | SYSTOLIC BLOOD PRESSURE: 130 MMHG | OXYGEN SATURATION: 96 % | HEIGHT: 71 IN | DIASTOLIC BLOOD PRESSURE: 80 MMHG

## 2021-06-09 DIAGNOSIS — S39.012A STRAIN OF LUMBAR REGION, INITIAL ENCOUNTER: Primary | ICD-10-CM

## 2021-06-09 PROCEDURE — 99213 OFFICE O/P EST LOW 20 MIN: CPT | Performed by: FAMILY MEDICINE

## 2021-06-09 RX ORDER — MELOXICAM 15 MG/1
15 TABLET ORAL DAILY
Qty: 30 TABLET | Refills: 0 | Status: SHIPPED | OUTPATIENT
Start: 2021-06-09 | End: 2022-01-26

## 2021-06-09 NOTE — ASSESSMENT & PLAN NOTE
Patient presents with a few week history of low back pain that started when he was lifting a Styrofoam cooler  He describes pain as across his low back  No radiculopathy  No history of prior back problems  Taking OTC Aleve  Exam today did not reveal any significant abnormalities  Patient has tried muscle relaxers in the past without success  Will give Rx for meloxicam 15 mg daily  I asked patient to take for 7-10 days and then p r n  only  If symptoms not improving at that time, recommend checking x-rays of lumbar spine ( order placed )

## 2021-06-09 NOTE — PROGRESS NOTES
[address] [facility]      NAME: Luke Toure  AGE: 77 y o  SEX: male  : 1945   MRN: [id number]    DATE: 2021  TIME: 5:08 PM    Assessment and Plan     Problem List Items Addressed This Visit     Strain of lumbar region - Primary     Patient presents with a few week history of low back pain that started when he was lifting a Styrofoam cooler  He describes pain as across his low back  No radiculopathy  No history of prior back problems  Taking OTC Aleve  Exam today did not reveal any significant abnormalities  Patient has tried muscle relaxers in the past without success  Will give Rx for meloxicam 15 mg daily  I asked patient to take for 7-10 days and then p r n  only  If symptoms not improving at that time, recommend checking x-rays of lumbar spine ( order placed )  Relevant Medications    meloxicam (MOBIC) 15 mg tablet    Other Relevant Orders    XR spine lumbar minimum 4 views non injury              Return to office in:  Call further problems    Chief Complaint     Chief Complaint   Patient presents with    Back Pain     x 1 month       History of Present Illness       Patient presents with a few week history of low back pain that started when he was lifting a Styrofoam cooler  He describes pain as across his low back  No radiculopathy  No history of prior back problems  Taking OTC Aleve      The following portions of the patient's history were reviewed and updated as appropriate: allergies, current medications, past family history, past medical history, past social history, past surgical history and problem list     Review of Systems   Review of Systems   Respiratory: Negative  Cardiovascular: Negative  Gastrointestinal: Negative  Genitourinary: Negative  Musculoskeletal: Positive for back pain         Active Problem List     Patient Active Problem List   Diagnosis    Primary osteoarthritis of right knee    BPH (benign prostatic hyperplasia)    DM2 (diabetes mellitus, type 2) ([facility] [address] )    Insomnia    Mixed hyperlipidemia    Renal cell carcinoma of right kidney (HCC)    Vitamin D deficiency    S/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, right    Cervical strain    Ganglion of flexor tendon sheath of left thumb    Incomplete tear of right rotator cuff    Status post arthroscopy of right shoulder    Encounter for other orthopedic aftercare    Vascular disorder of kidney    Encounter for lower extremity comparison imaging study    Acute pain of right shoulder    [facility] DJD(carpometacarpal degenerative joint disease), localized primary, left    Acute pain of right knee    Seborrheic keratoses, inflamed    Strain of lumbar region       Objective   /80 (BP Location: Right arm, Patient Position: Sitting, Cuff Size: Adult)   Pulse 66   Temp 97 7 °F (36 5 °C) (Tympanic)   Resp 14   Ht 5' 10 87" (1 8 m)   Wt 86 6 kg (191 lb)   SpO2 96%   BMI 26 74 kg/m²     Physical Exam  Musculoskeletal:      Comments: Lumbar:  Mild bilateral paravertebral tenderness  Range of motion essentially full    Negative straight leg raising         Pertinent Laboratory/Diagnostic Studies:  None    Current Medications     Current Outpatient Medications:     Cholecalciferol (VITAMIN D-3 PO), Take 2,000 Units by mouth daily, Disp: , Rfl:     EPINEPHrine (EPIPEN 2-MOIZ) 0 3 mg/0 3 mL SOAJ, Inject as directed, Disp: , Rfl:     fluticasone (FLONASE) 50 mcg/act nasal spray, 1 spray into each nostril daily, Disp: 1 Bottle, Rfl: 2    metFORMIN (GLUCOPHAGE) 500 mg tablet, TAKE 1 TABLET TWICE DAILY, Disp: 180 tablet, Rfl: 1    simvastatin (ZOCOR) 40 mg tablet, TAKE 1 TABLET EVERY DAY, Disp: 90 tablet, Rfl: 0    traZODone (DESYREL) 100 mg tablet, Take 1 tablet (100 mg total) by mouth daily at bedtime, Disp: 90 tablet, Rfl: 1    meloxicam (MOBIC) 15 mg tablet, Take 1 tablet (15 mg total) by mouth daily prn, Disp: 30 tablet, Rfl: 0    Health Maintenance     Health Maintenance   Topic Date Due    Hepatitis C Screening  Never done    URINE MICROALBUMIN  01/03/2020    BMI: Followup Plan  01/15/2021    HEMOGLOBIN A1C  06/29/2021    Fall Risk  07/15/2021    Depression Screening PHQ  07/15/2021    Medicare Annual Wellness Visit (AWV)  07/15/2021    Diabetic Foot Exam  07/15/2021    DM Eye Exam  02/12/2022    BMI: Adult  06/09/2022    Colorectal Cancer Screening  06/05/2024    DTaP,Tdap,and Td Vaccines (3 - Td) 10/16/2027    Pneumococcal Vaccine: 65+ Years  Completed    Influenza Vaccine  Completed    COVID-19 Vaccine  Completed    HIB Vaccine  Aged Out    Hepatitis B Vaccine  Aged Out    IPV Vaccine  Aged Out    Hepatitis A Vaccine  Aged Out    Meningococcal ACWY Vaccine  Aged Out    HPV Vaccine  Aged Out     Immunization History   Administered Date(s) Administered    INFLUENZA 11/18/2014, 10/05/2015, 11/28/2016, 01/04/2018, 10/15/2018    Influenza Split High Dose Preservative Free IM 11/18/2014, 10/05/2015, 11/28/2016, 01/04/2018    Influenza, high dose seasonal 0 7 mL 10/15/2018, 10/03/2019, 09/18/2020    Influenza, seasonal, injectable 10/05/2012, 10/09/2013    Pneumococcal Conjugate 13-Valent 12/08/2015, 12/09/2015    Pneumococcal Polysaccharide PPV23 10/09/2013    SARS-CoV-2 / COVID-19 mRNA IM (Narda Hargrove) 03/12/2021, 04/09/2021    Tdap 11/15/2004, 10/16/2017    Zoster 10/05/2012    Zoster Vaccine Recombinant 10/30/2020, 01/25/2021       Ahmet Hargrove DO  [facility]

## 2021-07-20 ENCOUNTER — APPOINTMENT (OUTPATIENT)
Dept: LAB | Facility: CLINIC | Age: 76
End: 2021-07-20
Payer: MEDICARE

## 2021-07-20 DIAGNOSIS — E78.2 MIXED HYPERLIPIDEMIA: ICD-10-CM

## 2021-07-20 DIAGNOSIS — E11.69 TYPE 2 DIABETES MELLITUS WITH OTHER SPECIFIED COMPLICATION, WITHOUT LONG-TERM CURRENT USE OF INSULIN (HCC): ICD-10-CM

## 2021-07-20 LAB
ALBUMIN SERPL BCP-MCNC: 3.6 G/DL (ref 3.5–5)
ALP SERPL-CCNC: 38 U/L (ref 46–116)
ALT SERPL W P-5'-P-CCNC: 36 U/L (ref 12–78)
ANION GAP SERPL CALCULATED.3IONS-SCNC: 6 MMOL/L (ref 4–13)
AST SERPL W P-5'-P-CCNC: 20 U/L (ref 5–45)
BASOPHILS # BLD AUTO: 0.07 THOUSANDS/ΜL (ref 0–0.1)
BASOPHILS NFR BLD AUTO: 1 % (ref 0–1)
BILIRUB SERPL-MCNC: 1 MG/DL (ref 0.2–1)
BUN SERPL-MCNC: 20 MG/DL (ref 5–25)
CALCIUM SERPL-MCNC: 9.2 MG/DL (ref 8.3–10.1)
CHLORIDE SERPL-SCNC: 103 MMOL/L (ref 100–108)
CHOLEST SERPL-MCNC: 112 MG/DL (ref 50–200)
CO2 SERPL-SCNC: 27 MMOL/L (ref 21–32)
CREAT SERPL-MCNC: 1.08 MG/DL (ref 0.6–1.3)
EOSINOPHIL # BLD AUTO: 0.44 THOUSAND/ΜL (ref 0–0.61)
EOSINOPHIL NFR BLD AUTO: 6 % (ref 0–6)
ERYTHROCYTE [DISTWIDTH] IN BLOOD BY AUTOMATED COUNT: 14.3 % (ref 11.6–15.1)
EST. AVERAGE GLUCOSE BLD GHB EST-MCNC: 137 MG/DL
GFR SERPL CREATININE-BSD FRML MDRD: 67 ML/MIN/1.73SQ M
GLUCOSE P FAST SERPL-MCNC: 126 MG/DL (ref 65–99)
HBA1C MFR BLD: 6.4 %
HCT VFR BLD AUTO: 42.7 % (ref 36.5–49.3)
HDLC SERPL-MCNC: 39 MG/DL
HGB BLD-MCNC: 13.9 G/DL (ref 12–17)
IMM GRANULOCYTES # BLD AUTO: 0.02 THOUSAND/UL (ref 0–0.2)
IMM GRANULOCYTES NFR BLD AUTO: 0 % (ref 0–2)
LDLC SERPL CALC-MCNC: 52 MG/DL (ref 0–100)
LYMPHOCYTES # BLD AUTO: 2.1 THOUSANDS/ΜL (ref 0.6–4.47)
LYMPHOCYTES NFR BLD AUTO: 30 % (ref 14–44)
MCH RBC QN AUTO: 30.8 PG (ref 26.8–34.3)
MCHC RBC AUTO-ENTMCNC: 32.6 G/DL (ref 31.4–37.4)
MCV RBC AUTO: 95 FL (ref 82–98)
MONOCYTES # BLD AUTO: 0.75 THOUSAND/ΜL (ref 0.17–1.22)
MONOCYTES NFR BLD AUTO: 11 % (ref 4–12)
NEUTROPHILS # BLD AUTO: 3.66 THOUSANDS/ΜL (ref 1.85–7.62)
NEUTS SEG NFR BLD AUTO: 52 % (ref 43–75)
NRBC BLD AUTO-RTO: 0 /100 WBCS
PLATELET # BLD AUTO: 214 THOUSANDS/UL (ref 149–390)
PMV BLD AUTO: 10.9 FL (ref 8.9–12.7)
POTASSIUM SERPL-SCNC: 4 MMOL/L (ref 3.5–5.3)
PROT SERPL-MCNC: 7.2 G/DL (ref 6.4–8.2)
RBC # BLD AUTO: 4.51 MILLION/UL (ref 3.88–5.62)
SODIUM SERPL-SCNC: 136 MMOL/L (ref 136–145)
TRIGL SERPL-MCNC: 107 MG/DL
TSH SERPL DL<=0.05 MIU/L-ACNC: 1.96 UIU/ML (ref 0.36–3.74)
WBC # BLD AUTO: 7.04 THOUSAND/UL (ref 4.31–10.16)

## 2021-07-20 PROCEDURE — 83036 HEMOGLOBIN GLYCOSYLATED A1C: CPT

## 2021-07-20 PROCEDURE — 80053 COMPREHEN METABOLIC PANEL: CPT

## 2021-07-20 PROCEDURE — 85025 COMPLETE CBC W/AUTO DIFF WBC: CPT

## 2021-07-20 PROCEDURE — 80061 LIPID PANEL: CPT

## 2021-07-20 PROCEDURE — 84443 ASSAY THYROID STIM HORMONE: CPT

## 2021-07-20 PROCEDURE — 36415 COLL VENOUS BLD VENIPUNCTURE: CPT

## 2021-07-21 ENCOUNTER — OFFICE VISIT (OUTPATIENT)
Dept: FAMILY MEDICINE CLINIC | Facility: CLINIC | Age: 76
End: 2021-07-21
Payer: MEDICARE

## 2021-07-21 VITALS
BODY MASS INDEX: 27.16 KG/M2 | HEART RATE: 75 BPM | DIASTOLIC BLOOD PRESSURE: 80 MMHG | TEMPERATURE: 97.7 F | SYSTOLIC BLOOD PRESSURE: 130 MMHG | WEIGHT: 194 LBS | HEIGHT: 71 IN | RESPIRATION RATE: 14 BRPM | OXYGEN SATURATION: 98 %

## 2021-07-21 DIAGNOSIS — E11.69 TYPE 2 DIABETES MELLITUS WITH OTHER SPECIFIED COMPLICATION, WITHOUT LONG-TERM CURRENT USE OF INSULIN (HCC): ICD-10-CM

## 2021-07-21 DIAGNOSIS — Z00.00 ENCOUNTER FOR ANNUAL WELLNESS EXAM IN MEDICARE PATIENT: Primary | ICD-10-CM

## 2021-07-21 DIAGNOSIS — R35.0 BENIGN PROSTATIC HYPERPLASIA WITH URINARY FREQUENCY: ICD-10-CM

## 2021-07-21 DIAGNOSIS — C64.1 RENAL CELL CARCINOMA OF RIGHT KIDNEY (HCC): ICD-10-CM

## 2021-07-21 DIAGNOSIS — N40.1 BENIGN PROSTATIC HYPERPLASIA WITH URINARY FREQUENCY: ICD-10-CM

## 2021-07-21 DIAGNOSIS — G47.00 INSOMNIA, UNSPECIFIED TYPE: ICD-10-CM

## 2021-07-21 DIAGNOSIS — E78.2 MIXED HYPERLIPIDEMIA: ICD-10-CM

## 2021-07-21 PROCEDURE — G0439 PPPS, SUBSEQ VISIT: HCPCS | Performed by: FAMILY MEDICINE

## 2021-07-21 PROCEDURE — 1123F ACP DISCUSS/DSCN MKR DOCD: CPT | Performed by: FAMILY MEDICINE

## 2021-07-21 PROCEDURE — 99214 OFFICE O/P EST MOD 30 MIN: CPT | Performed by: FAMILY MEDICINE

## 2021-07-21 RX ORDER — TRAZODONE HYDROCHLORIDE 100 MG/1
100 TABLET ORAL
Qty: 90 TABLET | Refills: 1 | Status: SHIPPED | OUTPATIENT
Start: 2021-07-21 | End: 2022-01-17

## 2021-07-21 RX ORDER — SIMVASTATIN 40 MG
40 TABLET ORAL DAILY
Qty: 90 TABLET | Refills: 1 | Status: SHIPPED | OUTPATIENT
Start: 2021-07-21 | End: 2022-01-21

## 2021-07-21 NOTE — PROGRESS NOTES
Assessment and Plan:    MEDICARE WELLNESS VISIT      Problem List Items Addressed This Visit     BPH (benign prostatic hyperplasia)    DM2 (diabetes mellitus, type 2) ([facility] )    Insomnia    Mixed hyperlipidemia    Renal cell carcinoma of right kidney ([facility] )      Other Visit Diagnoses     Encounter for annual wellness exam in Medicare patient    -  Primary           Preventive health issues were discussed with patient, and age appropriate screening tests were ordered as noted in patient's After Visit Summary  Personalized health advice and appropriate referrals for health education or preventive services given if needed, as noted in patient's After Visit Summary  History of Present Illness:     Patient presents for Welcome to Medicare visit       Patient Care Team:  Luz Elizondo DO as PCP - MD Donte Black MD     Review of Systems:     Review of Systems   Problem List:     Patient Active Problem List   Diagnosis    Primary osteoarthritis of right knee    BPH (benign prostatic hyperplasia)    DM2 (diabetes mellitus, type 2) ([facility] )    Insomnia    Mixed hyperlipidemia    Renal cell carcinoma of right kidney ([facility] )    Vitamin D deficiency    S/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, right    Cervical strain    Ganglion of flexor tendon sheath of left thumb    Incomplete tear of right rotator cuff    Status post arthroscopy of right shoulder    Encounter for other orthopedic aftercare    Vascular disorder of kidney    Encounter for lower extremity comparison imaging study    Acute pain of right shoulder    [facility] DJD(carpometacarpal degenerative joint disease), localized primary, left    Acute pain of right knee    Seborrheic keratoses, inflamed    Strain of lumbar region      Past Medical and Surgical History:     Past Medical History:   Diagnosis Date    Allergic reaction     last assessed: 5/26/2015    Arthritis     right knee    Bee sting allergy     last assessed: 6/30/2015    BPH (benign prostatic hypertrophy)     Cancer ([facility] ) 2017    Right kidney    Diabetes mellitus ([facility] )     Type II, NIDDM    Enlarged prostate with lower urinary tract symptoms (LUTS)     last assessed: 8/17/2015    Herpes zoster     last assessed: 11/21/2013    History of colon polyps     Hyperlipidemia     Leukocytosis     last assessed: 11/18/2014    Osteoarthrosis, localized, primary, knee     last assessed: 10/25/2016    Seasonal allergies     Shoulder pain, right     Wears glasses     Wears partial dentures     Upper and Lower     Past Surgical History:   Procedure Laterality Date    CARPAL TUNNEL RELEASE Left     CHOLECYSTECTOMY      Open    COLONOSCOPY      FL GUIDED NEEDLE PLAC BX/ASP/INJ  6/1/2020    FL GUIDED NEEDLE PLAC BX/ASP/INJ  11/13/2020    GANGLION CYST EXCISION Left     Left wrist    JOINT REPLACEMENT Left 08/2015    Left TKR    JOINT REPLACEMENT Right 09/2017    Right TKR    NEPHRECTOMY  05/08/2017    Partial right nephrectomy    MD LAP,PARTIAL NEPHRECTOMY Right 5/8/2017    Procedure: LAPAROSCOPIC LYSIS OF ADHESIONS, ROBOTIC PARTIAL NEPHRECTOMY, WITH INTRA-OP LAPAROSCOPIC ULTRASOUND;  Surgeon: Steven Howe MD;  [facility];  Service: Urology    MD REPAIR BICEPS LONG TENDON  2/4/2019    Procedure: TENODESIS BICEPS OPEN PROXIMAL;  Surgeon: Dony Dykes MD;  [facility];  Service: Orthopedics    MD REPAIR INTERCARP/CARP-METACARP JT Left 12/5/2016    Procedure: ARTHROPLASTY THUMB [facility] ;  Surgeon: Dony Dykes MD;  [facility];  Service: Orthopedics    MD REVISE MEDIAN N/CARPAL TUNNEL SURG Left 12/5/2016    Procedure: RELEASE CARPAL TUNNEL;  Surgeon: Dony Dykes MD;  [facility];  Service: Orthopedics    MD [address] ARTHROSCOP,SURG,W/ROTAT CUFF REPR Right 2/4/2019    Procedure: REPAIR ROTATOR CUFF ARTHROSCOPIC; SUBACROMIAL DECOMPRESSION; DISTAL CLAVICLE EXCISION;  Surgeon: Dony Dykes MD;  [facility];  Service: Orthopedics    ND TOTAL KNEE ARTHROPLASTY Right 9/11/2017    Procedure: ARTHROPLASTY KNEE TOTAL;  Surgeon: Lois Mcbride MD;  [facility];  Service: Orthopedics    TONSILLECTOMY AND ADENOIDECTOMY        Family History:     Family History   Problem Relation Age of Onset    Cancer Mother     Stroke Father       Social History:     Social History     Socioeconomic History    Marital status: /Civil Union     Spouse name: None    Number of children: None    Years of education: None    Highest education level: None   Occupational History    None   Tobacco Use    Smoking status: Former Smoker     Packs/day: 0 25     Types: Cigarettes    Smokeless tobacco: Never Used    Tobacco comment: Smoked from ages 16 to 25  Substance and Sexual Activity    Alcohol use: Yes     Comment: 1-2 x year    Drug use: No    Sexual activity: None   Other Topics Concern    None   Social History Narrative    None     Social Determinants of Health     Financial Resource Strain:     Difficulty of Paying Living Expenses:    Food Insecurity:     Worried About Running Out of Food in the Last Year:     [address] in the Last Year:    Transportation Needs:     Lack of Transportation (Medical):      Lack of Transportation (Non-Medical):    Physical Activity:     Days of Exercise per Week:     Minutes of Exercise per Session:    Stress:     Feeling of Stress :    Social Connections:     Frequency of Communication with Friends and Family:     Frequency of Social Gatherings with Friends and Family:     Attends Church Services:     Active Member of Clubs or Organizations:     Attends Club or Organization Meetings:     Marital Status:    Intimate Partner Violence:     Fear of Current or Ex-Partner:     Emotionally Abused:     Physically Abused:     Sexually Abused:       Medications and Allergies:     Current Outpatient Medications   Medication Sig Dispense Refill    Cholecalciferol (VITAMIN D-3 PO) Take 2,000 Units by mouth daily      EPINEPHrine (EPIPEN 2-MOIZ) 0 3 mg/0 3 mL SOAJ Inject as directed      fluticasone (FLONASE) 50 mcg/act nasal spray 1 spray into each nostril daily 1 Bottle 2    meloxicam (MOBIC) 15 mg tablet Take 1 tablet (15 mg total) by mouth daily prn 30 tablet 0    metFORMIN (GLUCOPHAGE) 500 mg tablet TAKE 1 TABLET TWICE DAILY 180 tablet 1    simvastatin (ZOCOR) 40 mg tablet TAKE 1 TABLET EVERY DAY 90 tablet 0    traZODone (DESYREL) 100 mg tablet Take 1 tablet (100 mg total) by mouth daily at bedtime 90 tablet 1     No current facility-administered medications for this visit  Allergies   Allergen Reactions    Bee Venom Edema      Immunizations:     Immunization History   Administered Date(s) Administered    INFLUENZA 11/18/2014, 10/05/2015, 11/28/2016, 01/04/2018, 10/15/2018    Influenza Split High Dose Preservative Free IM 11/18/2014, 10/05/2015, 11/28/2016, 01/04/2018    Influenza, high dose seasonal 0 7 mL 10/15/2018, 10/03/2019, 09/18/2020    Influenza, seasonal, injectable 10/05/2012, 10/09/2013    Pneumococcal Conjugate 13-Valent 12/08/2015, 12/09/2015    Pneumococcal Polysaccharide PPV23 10/09/2013    SARS-CoV-2 / COVID-19 mRNA IM (Moderna) 03/12/2021, 04/09/2021    Tdap 11/15/2004, 10/16/2017    Zoster 10/05/2012    Zoster Vaccine Recombinant 10/30/2020, 01/25/2021      Health Maintenance:         Topic Date Due    Hepatitis C Screening  Never done    Colorectal Cancer Screening  06/05/2024         Topic Date Due    Influenza Vaccine (1) 09/01/2021      Medicare Screening Tests and Risk Assessments:     Alfred Valencia is here for his Subsequent Wellness visit  Last Medicare Wellness visit information reviewed, patient interviewed and updates made to the record today  Health Risk Assessment:   Patient rates overall health as good  Patient feels that their physical health rating is same  Patient is satisfied with their life   Eyesight was rated as same  Hearing was rated as same  Patient feels that their emotional and mental health rating is same  Patients states they are never, rarely angry  Patient states they are never, rarely unusually tired/fatigued  Pain experienced in the last 7 days has been none  Patient states that he has experienced no weight loss or gain in last 6 months  Depression Screening:   PHQ-2 Score: 0      Fall Risk Screening: In the past year, patient has experienced: no history of falling in past year      Home Safety:  Patient does not have trouble with stairs inside or outside of their home  Patient has working smoke alarms and has working carbon monoxide detector  Home safety hazards include: none  Nutrition:   Current diet is Regular  Medications:   Patient is currently taking over-the-counter supplements  OTC medications include: see medication list  Patient is able to manage medications  Activities of Daily Living (ADLs)/Instrumental Activities of Daily Living (IADLs):   Walk and transfer into and out of bed and chair?: Yes  Dress and groom yourself?: Yes    Bathe or shower yourself?: Yes    Feed yourself? Yes  Do your laundry/housekeeping?: Yes  Manage your money, pay your bills and track your expenses?: Yes  Make your own meals?: Yes    Do your own shopping?: Yes    Previous Hospitalizations:   Any hospitalizations or ED visits within the last 12 months?: No      Advance Care Planning:   Living will: No    Durable POA for healthcare:  Yes    Advanced directive: No    Advanced directive counseling given: Yes    Five wishes given: Yes    End of Life Decisions reviewed with patient: Yes    Provider agrees with end of life decisions: Yes      Cognitive Screening:   Provider or family/friend/caregiver concerned regarding cognition?: No    PREVENTIVE SCREENINGS      Cardiovascular Screening:    General: Screening Not Indicated and History Lipid Disorder      Diabetes Screening:     General: Screening Not Indicated and History Diabetes      Colorectal Cancer Screening:     General: Screening Current      Prostate Cancer Screening:    General: Screening Not Indicated      Osteoporosis Screening:    General: Risks and Benefits Discussed      Abdominal Aortic Aneurysm (AAA) Screening:    Risk factors include: age between 73-69 yo and tobacco use        General: Risks and Benefits Discussed      Lung Cancer Screening:     General: Screening Not Indicated      Hepatitis C Screening:    General: Risks and Benefits Discussed    Screening, Brief Intervention, and Referral to Treatment (SBIRT)    Screening    Typical number of drinks in a week: 0    Single Item Drug Screening:  How often have you used an illegal drug (including marijuana) or a prescription medication for non-medical reasons in the past year? never    Single Item Drug Screen Score: 0  Interpretation: Negative screen for possible drug use disorder    No exam data present     Physical Exam:     /80 (BP Location: Left arm, Patient Position: Sitting, Cuff Size: Adult)   Pulse 75   Temp 97 7 °F (36 5 °C) (Temporal)   Resp 14   Ht 5' 10 87" (1 8 m)   Wt 88 kg (194 lb)   SpO2 98%   BMI 27 16 kg/m²     Physical Exam     Gilda Quiroz DO

## 2021-07-21 NOTE — PATIENT INSTRUCTIONS

## 2021-07-21 NOTE — ASSESSMENT & PLAN NOTE
Lab Results   Component Value Date    HGBA1C 6 4 (H) 07/20/2021    A1c remains under good control  Current A1c 6 4%, was 6 3%  Doing well on metformin 500 b i d

## 2021-07-21 NOTE — ASSESSMENT & PLAN NOTE
History of renal cell carcinoma right kidney  Status post nephrectomy May 2017    Continue regular follow-up with his urologist, Dr Gabriel Ramos

## 2021-07-21 NOTE — PROGRESS NOTES
[facility]      NAME: Brigitte Arellano  AGE: 77 y o  SEX: male  : 1945   MRN: [id number]    DATE: 2021  TIME: 3:26 PM    Assessment and Plan     Problem List Items Addressed This Visit     BPH (benign prostatic hyperplasia)      No longer on tamsulosin  Continue regular follow-up with Urology as directed         DM2 (diabetes mellitus, type 2) ([facility] )       Lab Results   Component Value Date    HGBA1C 6 4 (H) 2021    A1c remains under good control  Current A1c 6 4%, was 6 3%  Doing well on metformin 500 b i d  Relevant Medications    glucose blood test strip    Other Relevant Orders    Comprehensive metabolic panel    Hemoglobin A1C    Insomnia      Doing well on trazodone 100 mg nightly  Denies any side effects         Relevant Medications    traZODone (DESYREL) 100 mg tablet    Mixed hyperlipidemia      Cholesterol from  112, LDL 52  Doing well on simvastatin 40         Relevant Medications    simvastatin (ZOCOR) 40 mg tablet    Renal cell carcinoma of right kidney (HCC)      History of renal cell carcinoma right kidney  Status post nephrectomy May 2017  Continue regular follow-up with his urologist, Dr Roberto Hartley           Other Visit Diagnoses     Encounter for annual wellness exam in Medicare patient    -  Primary        Pt had covid vaccination      Return to office in:  6 months, fasting blood work prior at [facility] labs    Chief Complaint     Chief Complaint   Patient presents with    Follow-up     6 months    Medicare Wellness Visit       History of Present Illness      Patient presents for recheck chronic medical problems today  Overall is feeling well  Doing well on metformin for diabetes  Doing well on trazodone for insomnia  Doing well on simvastatin for cholesterol    Patient had labs done on       The following portions of the patient's history were reviewed and updated as appropriate: allergies, current medications, past family history, past medical history, past social history, past surgical history and problem list     Review of Systems   Review of Systems   Respiratory: Negative  Cardiovascular: Negative  Gastrointestinal: Negative  Genitourinary: Negative  Active Problem List     Patient Active Problem List   Diagnosis    Primary osteoarthritis of right knee    BPH (benign prostatic hyperplasia)    DM2 (diabetes mellitus, type 2) ([address] )    Insomnia    Mixed hyperlipidemia    Renal cell carcinoma of right kidney (HCC)    Vitamin D deficiency    S/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, right    Cervical strain    Ganglion of flexor tendon sheath of left thumb    Incomplete tear of right rotator cuff    Status post arthroscopy of right shoulder    Encounter for other orthopedic aftercare    Vascular disorder of kidney    Encounter for lower extremity comparison imaging study    Acute pain of right shoulder    [facility] DJD(carpometacarpal degenerative joint disease), localized primary, left    Acute pain of right knee    Seborrheic keratoses, inflamed    Strain of lumbar region       Objective   /80 (BP Location: Left arm, Patient Position: Sitting, Cuff Size: Adult)   Pulse 75   Temp 97 7 °F (36 5 °C) (Temporal)   Resp 14   Ht 5' 10 87" (1 8 m)   Wt 88 kg (194 lb)   SpO2 98%   BMI 27 16 kg/m²     Physical Exam  Cardiovascular:      Rate and Rhythm: Normal rate and regular rhythm  Heart sounds: Normal heart sounds  Comments: Carotids: no bruits  Ext: no edema  Pulmonary:      Effort: Pulmonary effort is normal  No respiratory distress  Breath sounds: No wheezing or rales  Psychiatric:         Behavior: Behavior normal          Thought Content:  Thought content normal          Pertinent Laboratory/Diagnostic Studies:  Labs from July 21st reviewed    Current Medications     Current Outpatient Medications:     Cholecalciferol (VITAMIN D-3 PO), Take 2,000 Units by mouth daily, Disp: , Rfl:     EPINEPHrine (EPIPEN 2-MOIZ) 0 3 mg/0 3 mL SOAJ, Inject as directed, Disp: , Rfl:     fluticasone (FLONASE) 50 mcg/act nasal spray, 1 spray into each nostril daily, Disp: 1 Bottle, Rfl: 2    meloxicam (MOBIC) 15 mg tablet, Take 1 tablet (15 mg total) by mouth daily prn, Disp: 30 tablet, Rfl: 0    metFORMIN (GLUCOPHAGE) 500 mg tablet, TAKE 1 TABLET TWICE DAILY, Disp: 180 tablet, Rfl: 1    simvastatin (ZOCOR) 40 mg tablet, Take 1 tablet (40 mg total) by mouth daily, Disp: 90 tablet, Rfl: 1    traZODone (DESYREL) 100 mg tablet, Take 1 tablet (100 mg total) by mouth daily at bedtime, Disp: 90 tablet, Rfl: 1    glucose blood test strip, Use 1 each daily as needed (once a day) One touch ultra  Test strips, Disp: 100 strip, Rfl: 3    Health Maintenance     Health Maintenance   Topic Date Due    Hepatitis C Screening  Never done    URINE MICROALBUMIN  01/03/2020    BMI: Followup Plan  01/15/2021    Diabetic Foot Exam  07/15/2021    Influenza Vaccine (1) 09/01/2021    HEMOGLOBIN A1C  01/20/2022    DM Eye Exam  02/12/2022    Fall Risk  07/21/2022    Depression Screening PHQ  07/21/2022    Medicare Annual Wellness Visit (AWV)  07/21/2022    BMI: Adult  07/21/2022    Colorectal Cancer Screening  06/05/2024    DTaP,Tdap,and Td Vaccines (3 - Td or Tdap) 10/16/2027    Pneumococcal Vaccine: 65+ Years  Completed    COVID-19 Vaccine  Completed    HIB Vaccine  Aged Out    Hepatitis B Vaccine  Aged Out    IPV Vaccine  Aged Out    Hepatitis A Vaccine  Aged Out    Meningococcal ACWY Vaccine  Aged Out    HPV Vaccine  Aged [employer] History   Administered Date(s) Administered    INFLUENZA 11/18/2014, 10/05/2015, 11/28/2016, 01/04/2018, 10/15/2018    Influenza Split High Dose Preservative Free IM 11/18/2014, 10/05/2015, 11/28/2016, 01/04/2018    Influenza, high dose seasonal 0 7 mL 10/15/2018, 10/03/2019, 09/18/2020    Influenza, seasonal, injectable 10/05/2012, 10/09/2013    Pneumococcal Conjugate 13-Valent 12/08/2015, 12/09/2015    Pneumococcal Polysaccharide PPV23 10/09/2013    SARS-CoV-2 / COVID-19 mRNA IM (Moderna) 03/12/2021, 04/09/2021    Tdap 11/15/2004, 10/16/2017    Zoster 10/05/2012    Zoster Vaccine Recombinant 10/30/2020, 01/25/2021       Bernice Salomon DO  [facility]

## 2021-07-26 DIAGNOSIS — E11.69 TYPE 2 DIABETES MELLITUS WITH OTHER SPECIFIED COMPLICATION, WITHOUT LONG-TERM CURRENT USE OF INSULIN (HCC): ICD-10-CM

## 2021-07-26 NOTE — TELEPHONE ENCOUNTER
Patient called stating the last set of strips were sent to [facility] and they will not be covered  Patient requesting a refill for the test strips be called into [facility] and submitted to Medicare for coverage

## 2021-08-12 DIAGNOSIS — E11.8 TYPE 2 DIABETES MELLITUS WITH COMPLICATION, WITHOUT LONG-TERM CURRENT USE OF INSULIN (HCC): ICD-10-CM

## 2021-11-23 ENCOUNTER — OFFICE VISIT (OUTPATIENT)
Dept: FAMILY MEDICINE CLINIC | Facility: CLINIC | Age: 76
End: 2021-11-23
Payer: MEDICARE

## 2021-11-23 VITALS
HEIGHT: 71 IN | HEART RATE: 70 BPM | BODY MASS INDEX: 27.75 KG/M2 | OXYGEN SATURATION: 98 % | DIASTOLIC BLOOD PRESSURE: 82 MMHG | TEMPERATURE: 97.4 F | WEIGHT: 198.2 LBS | SYSTOLIC BLOOD PRESSURE: 140 MMHG

## 2021-11-23 DIAGNOSIS — M79.651 PAIN OF RIGHT THIGH: Primary | ICD-10-CM

## 2021-11-23 PROCEDURE — 99214 OFFICE O/P EST MOD 30 MIN: CPT | Performed by: FAMILY MEDICINE

## 2021-11-23 RX ORDER — PREDNISONE 10 MG/1
TABLET ORAL
Qty: 30 TABLET | Refills: 0 | Status: SHIPPED | OUTPATIENT
Start: 2021-11-23 | End: 2021-12-05

## 2021-11-23 RX ORDER — NAPROXEN 500 MG/1
500 TABLET ORAL 2 TIMES DAILY WITH MEALS
Qty: 30 TABLET | Refills: 0 | Status: SHIPPED | OUTPATIENT
Start: 2021-11-23 | End: 2022-01-26

## 2021-11-23 RX ORDER — METHOCARBAMOL 500 MG/1
500 TABLET, FILM COATED ORAL 3 TIMES DAILY
Qty: 30 TABLET | Refills: 0 | Status: SHIPPED | OUTPATIENT
Start: 2021-11-23 | End: 2022-01-26

## 2021-12-29 ENCOUNTER — APPOINTMENT (OUTPATIENT)
Dept: LAB | Facility: CLINIC | Age: 76
End: 2021-12-29
Payer: MEDICARE

## 2021-12-29 DIAGNOSIS — E11.69 TYPE 2 DIABETES MELLITUS WITH OTHER SPECIFIED COMPLICATION, WITHOUT LONG-TERM CURRENT USE OF INSULIN (HCC): ICD-10-CM

## 2021-12-29 LAB
ALBUMIN SERPL BCP-MCNC: 3.7 G/DL (ref 3.5–5)
ALP SERPL-CCNC: 42 U/L (ref 46–116)
ALT SERPL W P-5'-P-CCNC: 49 U/L (ref 12–78)
ANION GAP SERPL CALCULATED.3IONS-SCNC: 5 MMOL/L (ref 4–13)
AST SERPL W P-5'-P-CCNC: 25 U/L (ref 5–45)
BILIRUB SERPL-MCNC: 0.49 MG/DL (ref 0.2–1)
BUN SERPL-MCNC: 19 MG/DL (ref 5–25)
CALCIUM SERPL-MCNC: 9.2 MG/DL (ref 8.3–10.1)
CHLORIDE SERPL-SCNC: 105 MMOL/L (ref 100–108)
CO2 SERPL-SCNC: 27 MMOL/L (ref 21–32)
CREAT SERPL-MCNC: 1.14 MG/DL (ref 0.6–1.3)
EST. AVERAGE GLUCOSE BLD GHB EST-MCNC: 151 MG/DL
GFR SERPL CREATININE-BSD FRML MDRD: 62 ML/MIN/1.73SQ M
GLUCOSE P FAST SERPL-MCNC: 140 MG/DL (ref 65–99)
HBA1C MFR BLD: 6.9 %
POTASSIUM SERPL-SCNC: 4 MMOL/L (ref 3.5–5.3)
PROT SERPL-MCNC: 7.4 G/DL (ref 6.4–8.2)
SODIUM SERPL-SCNC: 137 MMOL/L (ref 136–145)

## 2021-12-29 PROCEDURE — 83036 HEMOGLOBIN GLYCOSYLATED A1C: CPT

## 2021-12-29 PROCEDURE — 80053 COMPREHEN METABOLIC PANEL: CPT

## 2021-12-29 PROCEDURE — 36415 COLL VENOUS BLD VENIPUNCTURE: CPT

## 2022-01-03 ENCOUNTER — HOSPITAL ENCOUNTER (OUTPATIENT)
Dept: CT IMAGING | Facility: HOSPITAL | Age: 77
Discharge: HOME | End: 2022-01-03
Attending: UROLOGY
Payer: MEDICARE

## 2022-01-03 ENCOUNTER — HOSPITAL ENCOUNTER (OUTPATIENT)
Dept: RADIOLOGY | Facility: HOSPITAL | Age: 77
Discharge: HOME | End: 2022-01-03
Attending: UROLOGY
Payer: MEDICARE

## 2022-01-03 DIAGNOSIS — C64.1 MALIGNANT NEOPLASM OF RIGHT KIDNEY (HCC): ICD-10-CM

## 2022-01-03 PROCEDURE — 71046 X-RAY EXAM CHEST 2 VIEWS: CPT

## 2022-01-03 PROCEDURE — 74178 CT ABD&PLV WO CNTR FLWD CNTR: CPT

## 2022-01-03 PROCEDURE — G1004 CDSM NDSC: HCPCS

## 2022-01-03 RX ADMIN — IOHEXOL 100 ML: 350 INJECTION, SOLUTION INTRAVENOUS at 07:28

## 2022-01-14 DIAGNOSIS — G47.00 INSOMNIA, UNSPECIFIED TYPE: ICD-10-CM

## 2022-01-17 RX ORDER — TRAZODONE HYDROCHLORIDE 100 MG/1
100 TABLET ORAL
Qty: 90 TABLET | Refills: 1 | Status: SHIPPED | OUTPATIENT
Start: 2022-01-17 | End: 2022-07-11

## 2022-01-19 ENCOUNTER — RA CDI HCC (OUTPATIENT)
Dept: OTHER | Facility: HOSPITAL | Age: 77
End: 2022-01-19

## 2022-01-19 NOTE — PROGRESS NOTES
[facility]  coding opportunities       Chart reviewed, no opportunity found: CHART REVIEWED, NO OPPORTUNITY FOUND                        Patients insurance company: [employer]

## 2022-01-20 ENCOUNTER — OFFICE VISIT (OUTPATIENT)
Dept: UROLOGY | Facility: CLINIC | Age: 77
End: 2022-01-20
Payer: MEDICARE

## 2022-01-20 VITALS
WEIGHT: 194.4 LBS | SYSTOLIC BLOOD PRESSURE: 130 MMHG | HEIGHT: 71 IN | HEART RATE: 62 BPM | DIASTOLIC BLOOD PRESSURE: 70 MMHG | BODY MASS INDEX: 27.22 KG/M2

## 2022-01-20 DIAGNOSIS — C64.1 MALIGNANT NEOPLASM OF RIGHT KIDNEY (HCC): Primary | ICD-10-CM

## 2022-01-20 PROCEDURE — 99214 OFFICE O/P EST MOD 30 MIN: CPT | Performed by: PHYSICIAN ASSISTANT

## 2022-01-20 NOTE — PROGRESS NOTES
1/20/2022      Chief Complaint   Patient presents with    Follow-up    Renal Cancer         Assessment and Plan    77 y o  male managed by Dr Cesilia Castro    1  pT1a Right renal cell carcinoma- 2 6cm, negative margins  - s/p robotic partial nephrectomy May 2017  - CXR no pulmonary disease  - CT a/p no evidence of disease  - GFR 62    2  BPH  - minimally symptomatic, continue observation    3  Prostate cancer screening  - Updated PSA/BITA planned next week at annual pcp visit    Follow-up in one year with chest x-ray and renal ultrasound  This will likely conclude his surveillance  History of Present Illness  Nereida Mcmullen is a 77 y o  male here for evaluation of one year follow-up right renal cancer, prostate cancer screening  In May 2017 underwent robot assisted laparoscopic right partial nephrectomy  Final pathology was a 2 6 cm renal cell carcinoma with negative surgical margins  Final pathology pT1 a  He has now undergone five years of surveillance with CT scans as well as chest x-rays  Earlier in 2018 he had resolution of a 4 mm lung nodule  He reports no significant urinary complaint  He has no hematuria  No discomfort or bulges at his surgical port sites  He sees his primary care physician next week reports he has his prostate exams done there and psa will be drawn with that bloodwork as well  Review of Systems   Constitutional: Negative for activity change, appetite change, chills, fever and unexpected weight change  HENT: Negative  Respiratory: Negative  Negative for shortness of breath  Cardiovascular: Negative  Negative for chest pain  Gastrointestinal: Negative for abdominal pain, diarrhea, nausea and vomiting  Endocrine: Negative  Genitourinary: Negative for decreased urine volume, difficulty urinating, dysuria, flank pain, frequency, hematuria, testicular pain and urgency  Musculoskeletal: Negative for back pain and gait problem  Skin: Negative  Allergic/Immunologic: Negative  Neurological: Negative  Hematological: Negative for adenopathy  Does not bruise/bleed easily  AUA SYMPTOM SCORE      Most Recent Value   AUA SYMPTOM SCORE    How often have you had a sensation of not emptying your bladder completely after you finished urinating? 1   How often have you had to urinate again less than two hours after you finished urinating? 1   How often have you found you stopped and started again several times when you urinate? 0   How often have you found it difficult to postpone urination? 0   How often have you had a weak urinary stream? 1   How often have you had to push or strain to begin urination? 0   How many times did you most typically get up to urinate from the time you went to bed at night until the time you got up in the morning? 1   Quality of Life: If you were to spend the rest of your life with your urinary condition just the way it is now, how would you feel about that? --   AUA SYMPTOM SCORE 4           Vitals  Vitals:    01/20/22 0927   BP: 130/70   Pulse: 62   Weight: 88 2 kg (194 lb 6 4 oz)   Height: 5' 10 5" (1 791 m)       Physical Exam  Vitals and nursing note reviewed  Constitutional:       General: He is not in acute distress  Appearance: Normal appearance  He is well-developed  He is not diaphoretic  HENT:      Head: Normocephalic and atraumatic  Pulmonary:      Effort: Pulmonary effort is normal       Comments: No cough or audible wheeze  Abdominal:      General: There is no distension  Tenderness: There is no abdominal tenderness  There is no right CVA tenderness or left CVA tenderness  Musculoskeletal:      Right lower leg: No edema  Left lower leg: No edema  Skin:     General: Skin is warm and dry  Neurological:      Mental Status: He is alert and oriented to person, place, and time        Gait: Gait normal    Psychiatric:         Speech: Speech normal          Behavior: Behavior normal  Past History  Past Medical History:   Diagnosis Date    Allergic reaction     last assessed: 5/26/2015    Arthritis     right knee    Bee sting allergy     last assessed: 6/30/2015    BPH (benign prostatic hypertrophy)     Cancer ([place] [address] ) 2017    Right kidney    Diabetes mellitus ([place] [address] )     Type II, NIDDM    Enlarged prostate with lower urinary tract symptoms (LUTS)     last assessed: 8/17/2015    Herpes zoster     last assessed: 11/21/2013    History of colon polyps     Hyperlipidemia     Leukocytosis     last assessed: 11/18/2014    Osteoarthrosis, localized, primary, knee     last assessed: 10/25/2016    Renal cancer ([place] [address] )     Seasonal allergies     Shoulder pain, right     Wears glasses     Wears partial dentures     Upper and Lower     Social History     Socioeconomic History    Marital status: /Civil Union     Spouse name: None    Number of children: None    Years of education: None    Highest education level: None   Occupational History    None   Tobacco Use    Smoking status: Former Smoker     Packs/day: 0 25     Types: Cigarettes    Smokeless tobacco: Never Used    Tobacco comment: Smoked from ages 16 to 25  Vaping Use    Vaping Use: Never used   Substance and Sexual Activity    Alcohol use: Yes     Comment: 1-2 x year    Drug use: No    Sexual activity: Yes     Partners: Female   Other Topics Concern    None   Social History Narrative    None     Social Determinants of Health     Financial Resource Strain: Not on file   Food Insecurity: Not on file   Transportation Needs: Not on file   Physical Activity: Not on file   Stress: Not on file   Social Connections: Not on file   Intimate Partner Violence: Not on file   Housing Stability: Not on file     Social History     Tobacco Use   Smoking Status Former Smoker    Packs/day: 0 25    Types: Cigarettes   Smokeless Tobacco Never Used   Tobacco Comment    Smoked from ages 16 to 25       Family History   Problem Relation Age of Onset    Cancer Mother     Stroke Father        The following portions of the patient's history were reviewed and updated as appropriate: allergies, current medications, past medical history, past social history, past surgical history and problem list     Results  No results found for this or any previous visit (from the past 1 hour(s)) ]  Lab Results   Component Value Date    PSA 3 0 12/29/2020    PSA 2 8 01/08/2020    PSA 2 9 01/03/2019     Lab Results   Component Value Date    GLUCOSE 195 (H) 05/08/2017    CALCIUM 9 2 12/29/2021     12/27/2017    K 4 0 12/29/2021    CO2 27 12/29/2021     12/29/2021    BUN 19 12/29/2021    CREATININE 1 14 12/29/2021     Lab Results   Component Value Date    WBC 7 04 07/20/2021    HGB 13 9 07/20/2021    HCT 42 7 07/20/2021    MCV 95 07/20/2021     07/20/2021

## 2022-01-21 DIAGNOSIS — E78.2 MIXED HYPERLIPIDEMIA: ICD-10-CM

## 2022-01-21 RX ORDER — SIMVASTATIN 40 MG
40 TABLET ORAL DAILY
Qty: 90 TABLET | Refills: 1 | Status: SHIPPED | OUTPATIENT
Start: 2022-01-21 | End: 2022-04-15 | Stop reason: HOSPADM

## 2022-01-26 ENCOUNTER — OFFICE VISIT (OUTPATIENT)
Dept: FAMILY MEDICINE CLINIC | Facility: CLINIC | Age: 77
End: 2022-01-26
Payer: MEDICARE

## 2022-01-26 ENCOUNTER — APPOINTMENT (OUTPATIENT)
Dept: LAB | Facility: CLINIC | Age: 77
End: 2022-01-26
Payer: MEDICARE

## 2022-01-26 VITALS
OXYGEN SATURATION: 98 % | WEIGHT: 197 LBS | SYSTOLIC BLOOD PRESSURE: 124 MMHG | HEIGHT: 70 IN | DIASTOLIC BLOOD PRESSURE: 70 MMHG | RESPIRATION RATE: 16 BRPM | TEMPERATURE: 97.5 F | BODY MASS INDEX: 28.2 KG/M2 | HEART RATE: 67 BPM

## 2022-01-26 DIAGNOSIS — C64.1 RENAL CELL CARCINOMA OF RIGHT KIDNEY (HCC): ICD-10-CM

## 2022-01-26 DIAGNOSIS — Z13.29 SCREENING FOR THYROID DISORDER: ICD-10-CM

## 2022-01-26 DIAGNOSIS — N40.1 BENIGN PROSTATIC HYPERPLASIA WITH URINARY FREQUENCY: ICD-10-CM

## 2022-01-26 DIAGNOSIS — Z12.5 SCREENING FOR PROSTATE CANCER: ICD-10-CM

## 2022-01-26 DIAGNOSIS — E78.2 MIXED HYPERLIPIDEMIA: ICD-10-CM

## 2022-01-26 DIAGNOSIS — E11.69 TYPE 2 DIABETES MELLITUS WITH OTHER SPECIFIED COMPLICATION, WITHOUT LONG-TERM CURRENT USE OF INSULIN (HCC): Primary | ICD-10-CM

## 2022-01-26 DIAGNOSIS — Z13.0 SCREENING FOR DEFICIENCY ANEMIA: ICD-10-CM

## 2022-01-26 DIAGNOSIS — G47.00 INSOMNIA, UNSPECIFIED TYPE: ICD-10-CM

## 2022-01-26 DIAGNOSIS — R35.0 BENIGN PROSTATIC HYPERPLASIA WITH URINARY FREQUENCY: ICD-10-CM

## 2022-01-26 LAB — PSA SERPL-MCNC: 3.1 NG/ML (ref 0–4)

## 2022-01-26 PROCEDURE — 36415 COLL VENOUS BLD VENIPUNCTURE: CPT

## 2022-01-26 PROCEDURE — G0103 PSA SCREENING: HCPCS

## 2022-01-26 PROCEDURE — 99214 OFFICE O/P EST MOD 30 MIN: CPT | Performed by: FAMILY MEDICINE

## 2022-01-26 NOTE — ASSESSMENT & PLAN NOTE
Lab Results   Component Value Date    HGBA1C 6 9 (H) 12/29/2021   A1c from December 29 6 9%, was 6 4%  Patient states his diet was poor during the holidays  He is now improved this  He is still exercising regularly  Will maintain on metformin 500 b i d     Will continue to monitor

## 2022-01-26 NOTE — ASSESSMENT & PLAN NOTE
History of renal cell carcinoma  Status post nephrectomy May 2017  Last CT abdomen pelvis, and chest x-ray were negative  Patient is now 5 years postop  Recommendation moving forward is now chest x-ray and ultrasound surveillance only    Continue follow-up with Urology as directed

## 2022-01-26 NOTE — PROGRESS NOTES
[facility]      NAME: Giuliano Nguyen  AGE: 77 y o  SEX: male  : 1945   MRN: [id number]    DATE: 2022  TIME: 1:55 PM    Assessment and Plan     Problem List Items Addressed This Visit     BPH (benign prostatic hyperplasia)     No longer and tamsulosin  Continue follow-up with Urology as directed         DM2 (diabetes mellitus, type 2) ([facility] ) - Primary       Lab Results   Component Value Date    HGBA1C 6 9 (H) 2021   A1c from  6 9%, was 6 4%  Patient states his diet was poor during the holidays  He is now improved this  He is still exercising regularly  Will maintain on metformin 500 b i d     Will continue to monitor         Relevant Orders    Comprehensive metabolic panel    Hemoglobin A1C    Insomnia     Doing well on trazodone 100 mg nightly  Mixed hyperlipidemia     Doing well on simvastatin 40 mg daily  Will check labs prior to next appointment         Relevant Orders    Lipid Panel with Direct LDL reflex    Renal cell carcinoma of right kidney ([facility] )     History of renal cell carcinoma  Status post nephrectomy May 2017  Last CT abdomen pelvis, and chest x-ray were negative  Patient is now 5 years postop  Recommendation moving forward is now chest x-ray and ultrasound surveillance only  Continue follow-up with Urology as directed             Other Visit Diagnoses     Screening for deficiency anemia        Relevant Orders    CBC and differential    Screening for thyroid disorder        Relevant Orders    TSH, 3rd generation with Free T4 reflex    Screening for prostate cancer        Relevant Orders    PSA, Total Screen      Patient COVID vaccination, and booster  Patient had flu shot  Patient had Shingrix    Patient is due for PSA  Patient will have drawn today    Will call with results    Return to office in:  6 months, fasting blood work prior at [facility] Complaint   Patient presents with    Follow-up 6 months       History of Present Illness     Patient presents recheck chronic medical problems  Doing well on current meds  Patient had labs done December 29th      The following portions of the patient's history were reviewed and updated as appropriate: allergies, current medications, past family history, past medical history, past social history, past surgical history and problem list     Review of Systems   Review of Systems   Respiratory: Negative  Cardiovascular: Negative  Gastrointestinal: Negative  Genitourinary: Negative  Active Problem List     Patient Active Problem List   Diagnosis    Primary osteoarthritis of right knee    BPH (benign prostatic hyperplasia)    DM2 [facility]diabetes mellitus, type 2) ([facility] )    Insomnia    Mixed hyperlipidemia    Renal cell carcinoma of right kidney (HCC)    Vitamin D deficiency    S/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, right    Cervical strain    Ganglion of flexor tendon sheath of left thumb    Incomplete tear of right rotator cuff    Status post arthroscopy of right shoulder    Encounter for other orthopedic aftercare    Vascular disorder of kidney    Encounter for lower extremity comparison imaging study    Acute pain of right shoulder    [facility] DJD(carpometacarpal degenerative joint disease), localized primary, left    Acute pain of right knee    Seborrheic keratoses, inflamed    Strain of lumbar region    Pain of right thigh       Objective   /70 (BP Location: Left arm, Patient Position: Sitting, Cuff Size: Adult)   Pulse 67   Temp 97 5 °F (36 4 °C) (Temporal)   Resp 16   Ht 5' 10 47" (1 79 m)   Wt 89 4 kg (197 lb)   SpO2 98%   BMI 27 89 kg/m²     Physical Exam  Cardiovascular:      Rate and Rhythm: Normal rate and regular rhythm  Heart sounds: Normal heart sounds  Comments: Carotids: no bruits  Ext: no edema  Pulmonary:      Effort: Pulmonary effort is normal  No respiratory distress  Breath sounds:  No wheezing or rales  Psychiatric:         Behavior: Behavior normal          Thought Content:  Thought content normal          Pertinent Laboratory/Diagnostic Studies:  Labs reviewed    Current Medications     Current Outpatient Medications:     Cholecalciferol (VITAMIN D-3 PO), Take 2,000 Units by mouth daily, Disp: , Rfl:     EPINEPHrine (EPIPEN 2-MOIZ) 0 3 mg/0 3 mL SOAJ, Inject as directed, Disp: , Rfl:     fluticasone (FLONASE) 50 mcg/act nasal spray, 1 spray into each nostril daily, Disp: 1 Bottle, Rfl: 2    glucose blood test strip, Use 1 each daily as needed (once a day) One touch ultra  Test strips, Disp: 100 strip, Rfl: 3    metFORMIN (GLUCOPHAGE) 500 mg tablet, TAKE 1 TABLET TWICE DAILY, Disp: 180 tablet, Rfl: 1    simvastatin (ZOCOR) 40 mg tablet, TAKE 1 TABLET (40 MG TOTAL) BY MOUTH DAILY, Disp: 90 tablet, Rfl: 1    traZODone (DESYREL) 100 mg tablet, TAKE 1 TABLET (100 MG TOTAL) BY MOUTH DAILY AT BEDTIME, Disp: 90 tablet, Rfl: 1    Health Maintenance     Health Maintenance   Topic Date Due    Hepatitis C Screening  Never done    URINE MICROALBUMIN  01/03/2020    BMI: Followup Plan  01/15/2021    Diabetic Foot Exam  07/15/2021    DM Eye Exam  02/12/2022    Depression Screening  07/21/2022    HEMOGLOBIN A1C  06/29/2022    Fall Risk  07/21/2022    Medicare Annual Wellness Visit (AWV)  07/21/2022    BMI: Adult  01/26/2023    Colorectal Cancer Screening  06/05/2024    DTaP,Tdap,and Td Vaccines (3 - Td or Tdap) 10/16/2027    Pneumococcal Vaccine: 65+ Years  Completed    Influenza Vaccine  Completed    COVID-19 Vaccine  Completed    HIB Vaccine  Aged Out    Hepatitis B Vaccine  Aged Out    IPV Vaccine  Aged Out    Hepatitis A Vaccine  Aged Out    Meningococcal ACWY Vaccine  Aged Out    HPV Vaccine  Aged Out     Immunization History   Administered Date(s) Administered    COVID-19 MODERNA VACC 0 5 ML IM 03/12/2021, 04/09/2021, 11/09/2021    INFLUENZA 11/18/2014, 10/05/2015, 11/28/2016, 01/04/2018, 10/15/2018, 10/18/2021    Influenza Split High Dose Preservative Free IM 11/18/2014, 10/05/2015, 11/28/2016, 01/04/2018    Influenza, high dose seasonal 0 7 mL 10/15/2018, 10/03/2019, 09/18/2020    Influenza, seasonal, injectable 10/05/2012, 10/09/2013    Pneumococcal Conjugate 13-Valent 12/08/2015, 12/09/2015    Pneumococcal Polysaccharide PPV23 10/09/2013    Tdap 11/15/2004, 10/16/2017    Zoster 10/05/2012    Zoster Vaccine Recombinant 10/30/2020, 01/25/2021       Halima Xiong DO  [facility]

## 2022-02-11 DIAGNOSIS — E11.8 TYPE 2 DIABETES MELLITUS WITH COMPLICATION, WITHOUT LONG-TERM CURRENT USE OF INSULIN (HCC): ICD-10-CM

## 2022-03-10 LAB
LEFT EYE DIABETIC RETINOPATHY: POSITIVE
RIGHT EYE DIABETIC RETINOPATHY: POSITIVE

## 2022-04-13 ENCOUNTER — APPOINTMENT (EMERGENCY)
Dept: RADIOLOGY | Facility: HOSPITAL | Age: 77
DRG: 247 | End: 2022-04-13
Payer: MEDICARE

## 2022-04-13 ENCOUNTER — HOSPITAL ENCOUNTER (INPATIENT)
Facility: HOSPITAL | Age: 77
LOS: 2 days | Discharge: HOME | DRG: 247 | End: 2022-04-15
Attending: EMERGENCY MEDICINE | Admitting: INTERNAL MEDICINE
Payer: MEDICARE

## 2022-04-13 DIAGNOSIS — I21.3 STEMI (ST ELEVATION MYOCARDIAL INFARCTION) (HCC): Primary | ICD-10-CM

## 2022-04-13 LAB
2HR DELTA HS TROPONIN: 3451 NG/L
4HR DELTA HS TROPONIN: ABNORMAL NG/L
ANION GAP SERPL CALCULATED.3IONS-SCNC: 7 MMOL/L (ref 4–13)
ANION GAP SERPL CALCULATED.3IONS-SCNC: 9 MMOL/L (ref 4–13)
APTT PPP: 24 SECONDS (ref 23–37)
ATRIAL RATE: 52 BPM
ATRIAL RATE: 54 BPM
ATRIAL RATE: 54 BPM
BASOPHILS # BLD AUTO: 0.06 THOUSANDS/ΜL (ref 0–0.1)
BASOPHILS NFR BLD AUTO: 1 % (ref 0–1)
BUN SERPL-MCNC: 21 MG/DL (ref 5–25)
BUN SERPL-MCNC: 24 MG/DL (ref 5–25)
CALCIUM SERPL-MCNC: 8.2 MG/DL (ref 8.3–10.1)
CALCIUM SERPL-MCNC: 9.5 MG/DL (ref 8.3–10.1)
CARDIAC TROPONIN I PNL SERPL HS: 3500 NG/L
CARDIAC TROPONIN I PNL SERPL HS: 49 NG/L
CARDIAC TROPONIN I PNL SERPL HS: ABNORMAL NG/L
CHLORIDE SERPL-SCNC: 100 MMOL/L (ref 100–108)
CHLORIDE SERPL-SCNC: 102 MMOL/L (ref 100–108)
CHOLEST SERPL-MCNC: 139 MG/DL
CO2 SERPL-SCNC: 24 MMOL/L (ref 21–32)
CO2 SERPL-SCNC: 27 MMOL/L (ref 21–32)
CREAT SERPL-MCNC: 0.97 MG/DL (ref 0.6–1.3)
CREAT SERPL-MCNC: 1.14 MG/DL (ref 0.6–1.3)
EOSINOPHIL # BLD AUTO: 0.45 THOUSAND/ΜL (ref 0–0.61)
EOSINOPHIL NFR BLD AUTO: 5 % (ref 0–6)
ERYTHROCYTE [DISTWIDTH] IN BLOOD BY AUTOMATED COUNT: 13.8 % (ref 11.6–15.1)
GFR SERPL CREATININE-BSD FRML MDRD: 62 ML/MIN/1.73SQ M
GFR SERPL CREATININE-BSD FRML MDRD: 76 ML/MIN/1.73SQ M
GLUCOSE SERPL-MCNC: 144 MG/DL (ref 65–140)
GLUCOSE SERPL-MCNC: 144 MG/DL (ref 65–140)
GLUCOSE SERPL-MCNC: 156 MG/DL (ref 65–140)
GLUCOSE SERPL-MCNC: 156 MG/DL (ref 65–140)
HCT VFR BLD AUTO: 43.7 % (ref 36.5–49.3)
HDLC SERPL-MCNC: 34 MG/DL
HGB BLD-MCNC: 14.5 G/DL (ref 12–17)
IMM GRANULOCYTES # BLD AUTO: 0.02 THOUSAND/UL (ref 0–0.2)
IMM GRANULOCYTES NFR BLD AUTO: 0 % (ref 0–2)
INR PPP: 0.97 (ref 0.84–1.19)
KCT BLD-ACNC: 194 SEC (ref 89–137)
LDLC SERPL CALC-MCNC: 62 MG/DL (ref 0–100)
LYMPHOCYTES # BLD AUTO: 3.13 THOUSANDS/ΜL (ref 0.6–4.47)
LYMPHOCYTES NFR BLD AUTO: 37 % (ref 14–44)
MAGNESIUM SERPL-MCNC: 1.8 MG/DL (ref 1.6–2.6)
MAGNESIUM SERPL-MCNC: 1.8 MG/DL (ref 1.6–2.6)
MCH RBC QN AUTO: 31.2 PG (ref 26.8–34.3)
MCHC RBC AUTO-ENTMCNC: 33.2 G/DL (ref 31.4–37.4)
MCV RBC AUTO: 94 FL (ref 82–98)
MONOCYTES # BLD AUTO: 0.72 THOUSAND/ΜL (ref 0.17–1.22)
MONOCYTES NFR BLD AUTO: 9 % (ref 4–12)
NEUTROPHILS # BLD AUTO: 4.1 THOUSANDS/ΜL (ref 1.85–7.62)
NEUTS SEG NFR BLD AUTO: 48 % (ref 43–75)
NONHDLC SERPL-MCNC: 105 MG/DL
NRBC BLD AUTO-RTO: 0 /100 WBCS
P AXIS: 44 DEGREES
P AXIS: 47 DEGREES
P AXIS: 57 DEGREES
PLATELET # BLD AUTO: 191 THOUSANDS/UL (ref 149–390)
PLATELET # BLD AUTO: 217 THOUSANDS/UL (ref 149–390)
PMV BLD AUTO: 10.4 FL (ref 8.9–12.7)
PMV BLD AUTO: 10.4 FL (ref 8.9–12.7)
POTASSIUM SERPL-SCNC: 3.4 MMOL/L (ref 3.5–5.3)
POTASSIUM SERPL-SCNC: 3.8 MMOL/L (ref 3.5–5.3)
PR INTERVAL: 204 MS
PR INTERVAL: 208 MS
PROTHROMBIN TIME: 12.7 SECONDS (ref 11.6–14.5)
QRS AXIS: -3 DEGREES
QRS AXIS: -5 DEGREES
QRS AXIS: 17 DEGREES
QRSD INTERVAL: 100 MS
QRSD INTERVAL: 104 MS
QRSD INTERVAL: 104 MS
QT INTERVAL: 442 MS
QT INTERVAL: 450 MS
QT INTERVAL: 466 MS
QTC INTERVAL: 419 MS
QTC INTERVAL: 419 MS
QTC INTERVAL: 424 MS
RBC # BLD AUTO: 4.65 MILLION/UL (ref 3.88–5.62)
SODIUM SERPL-SCNC: 133 MMOL/L (ref 136–145)
SODIUM SERPL-SCNC: 136 MMOL/L (ref 136–145)
SPECIMEN SOURCE: ABNORMAL
T WAVE AXIS: 38 DEGREES
T WAVE AXIS: 67 DEGREES
T WAVE AXIS: 9 DEGREES
TRIGL SERPL-MCNC: 217 MG/DL
VENTRICULAR RATE: 50 BPM
VENTRICULAR RATE: 52 BPM
VENTRICULAR RATE: 54 BPM
WBC # BLD AUTO: 8.48 THOUSAND/UL (ref 4.31–10.16)

## 2022-04-13 PROCEDURE — 83735 ASSAY OF MAGNESIUM: CPT | Performed by: EMERGENCY MEDICINE

## 2022-04-13 PROCEDURE — NC001 PR NO CHARGE: Performed by: INTERNAL MEDICINE

## 2022-04-13 PROCEDURE — C1725 CATH, TRANSLUMIN NON-LASER: HCPCS | Performed by: INTERNAL MEDICINE

## 2022-04-13 PROCEDURE — 93005 ELECTROCARDIOGRAM TRACING: CPT

## 2022-04-13 PROCEDURE — 83735 ASSAY OF MAGNESIUM: CPT | Performed by: NURSE PRACTITIONER

## 2022-04-13 PROCEDURE — 93010 ELECTROCARDIOGRAM REPORT: CPT | Performed by: INTERNAL MEDICINE

## 2022-04-13 PROCEDURE — C9606 PERC D-E COR REVASC W AMI S: HCPCS | Performed by: INTERNAL MEDICINE

## 2022-04-13 PROCEDURE — 99291 CRITICAL CARE FIRST HOUR: CPT | Performed by: EMERGENCY MEDICINE

## 2022-04-13 PROCEDURE — 85730 THROMBOPLASTIN TIME PARTIAL: CPT | Performed by: EMERGENCY MEDICINE

## 2022-04-13 PROCEDURE — 99152 MOD SED SAME PHYS/QHP 5/>YRS: CPT | Performed by: INTERNAL MEDICINE

## 2022-04-13 PROCEDURE — 84484 ASSAY OF TROPONIN QUANT: CPT

## 2022-04-13 PROCEDURE — C1894 INTRO/SHEATH, NON-LASER: HCPCS | Performed by: INTERNAL MEDICINE

## 2022-04-13 PROCEDURE — 36415 COLL VENOUS BLD VENIPUNCTURE: CPT | Performed by: EMERGENCY MEDICINE

## 2022-04-13 PROCEDURE — 93454 CORONARY ARTERY ANGIO S&I: CPT | Performed by: INTERNAL MEDICINE

## 2022-04-13 PROCEDURE — 99223 1ST HOSP IP/OBS HIGH 75: CPT | Performed by: INTERNAL MEDICINE

## 2022-04-13 PROCEDURE — 85025 COMPLETE CBC W/AUTO DIFF WBC: CPT | Performed by: EMERGENCY MEDICINE

## 2022-04-13 PROCEDURE — 80048 BASIC METABOLIC PNL TOTAL CA: CPT | Performed by: EMERGENCY MEDICINE

## 2022-04-13 PROCEDURE — C1769 GUIDE WIRE: HCPCS | Performed by: INTERNAL MEDICINE

## 2022-04-13 PROCEDURE — 027034Z DILATION OF CORONARY ARTERY, ONE ARTERY WITH DRUG-ELUTING INTRALUMINAL DEVICE, PERCUTANEOUS APPROACH: ICD-10-PCS | Performed by: INTERNAL MEDICINE

## 2022-04-13 PROCEDURE — 92928 PRQ TCAT PLMT NTRAC ST 1 LES: CPT | Performed by: INTERNAL MEDICINE

## 2022-04-13 PROCEDURE — 96374 THER/PROPH/DIAG INJ IV PUSH: CPT

## 2022-04-13 PROCEDURE — B2111ZZ FLUOROSCOPY OF MULTIPLE CORONARY ARTERIES USING LOW OSMOLAR CONTRAST: ICD-10-PCS | Performed by: FAMILY MEDICINE

## 2022-04-13 PROCEDURE — 99153 MOD SED SAME PHYS/QHP EA: CPT | Performed by: INTERNAL MEDICINE

## 2022-04-13 PROCEDURE — 80061 LIPID PANEL: CPT | Performed by: EMERGENCY MEDICINE

## 2022-04-13 PROCEDURE — C1887 CATHETER, GUIDING: HCPCS | Performed by: INTERNAL MEDICINE

## 2022-04-13 PROCEDURE — 85049 AUTOMATED PLATELET COUNT: CPT | Performed by: NURSE PRACTITIONER

## 2022-04-13 PROCEDURE — 71045 X-RAY EXAM CHEST 1 VIEW: CPT

## 2022-04-13 PROCEDURE — 99285 EMERGENCY DEPT VISIT HI MDM: CPT

## 2022-04-13 PROCEDURE — 82948 REAGENT STRIP/BLOOD GLUCOSE: CPT

## 2022-04-13 PROCEDURE — 85347 COAGULATION TIME ACTIVATED: CPT

## 2022-04-13 PROCEDURE — 84484 ASSAY OF TROPONIN QUANT: CPT | Performed by: EMERGENCY MEDICINE

## 2022-04-13 PROCEDURE — 85610 PROTHROMBIN TIME: CPT | Performed by: EMERGENCY MEDICINE

## 2022-04-13 PROCEDURE — 96375 TX/PRO/DX INJ NEW DRUG ADDON: CPT

## 2022-04-13 PROCEDURE — 80048 BASIC METABOLIC PNL TOTAL CA: CPT | Performed by: NURSE PRACTITIONER

## 2022-04-13 PROCEDURE — C1874 STENT, COATED/COV W/DEL SYS: HCPCS | Performed by: INTERNAL MEDICINE

## 2022-04-13 DEVICE — XIENCE SKYPOINT™ EVEROLIMUS ELUTING CORONARY STENT SYSTEM 2.75 MM X 18 MM / RAPID-EXCHANGE
Type: IMPLANTABLE DEVICE | Site: CORONARY | Status: FUNCTIONAL
Brand: XIENCE SKYPOINT™

## 2022-04-13 RX ORDER — LIDOCAINE HYDROCHLORIDE 10 MG/ML
INJECTION, SOLUTION EPIDURAL; INFILTRATION; INTRACAUDAL; PERINEURAL AS NEEDED
Status: DISCONTINUED | OUTPATIENT
Start: 2022-04-13 | End: 2022-04-13 | Stop reason: HOSPADM

## 2022-04-13 RX ORDER — FENTANYL CITRATE 50 UG/ML
INJECTION, SOLUTION INTRAMUSCULAR; INTRAVENOUS AS NEEDED
Status: DISCONTINUED | OUTPATIENT
Start: 2022-04-13 | End: 2022-04-13 | Stop reason: HOSPADM

## 2022-04-13 RX ORDER — VERAPAMIL HCL 2.5 MG/ML
AMPUL (ML) INTRAVENOUS AS NEEDED
Status: DISCONTINUED | OUTPATIENT
Start: 2022-04-13 | End: 2022-04-13 | Stop reason: HOSPADM

## 2022-04-13 RX ORDER — TRAZODONE HYDROCHLORIDE 100 MG/1
100 TABLET ORAL
Status: DISCONTINUED | OUTPATIENT
Start: 2022-04-13 | End: 2022-04-15 | Stop reason: HOSPADM

## 2022-04-13 RX ORDER — POTASSIUM CHLORIDE 14.9 MG/ML
20 INJECTION INTRAVENOUS ONCE
Status: COMPLETED | OUTPATIENT
Start: 2022-04-13 | End: 2022-04-13

## 2022-04-13 RX ORDER — ASPIRIN 81 MG/1
81 TABLET ORAL DAILY
Status: DISCONTINUED | OUTPATIENT
Start: 2022-04-14 | End: 2022-04-15 | Stop reason: HOSPADM

## 2022-04-13 RX ORDER — NITROGLYCERIN 20 MG/100ML
INJECTION INTRAVENOUS AS NEEDED
Status: DISCONTINUED | OUTPATIENT
Start: 2022-04-13 | End: 2022-04-13 | Stop reason: HOSPADM

## 2022-04-13 RX ORDER — LANOLIN ALCOHOL/MO/W.PET/CERES
6 CREAM (GRAM) TOPICAL
Status: DISCONTINUED | OUTPATIENT
Start: 2022-04-13 | End: 2022-04-15 | Stop reason: HOSPADM

## 2022-04-13 RX ORDER — HEPARIN SODIUM 5000 [USP'U]/ML
5000 INJECTION, SOLUTION INTRAVENOUS; SUBCUTANEOUS EVERY 8 HOURS SCHEDULED
Status: DISCONTINUED | OUTPATIENT
Start: 2022-04-13 | End: 2022-04-15 | Stop reason: HOSPADM

## 2022-04-13 RX ORDER — SODIUM CHLORIDE 9 MG/ML
75 INJECTION, SOLUTION INTRAVENOUS ONCE
Status: COMPLETED | OUTPATIENT
Start: 2022-04-13 | End: 2022-04-13

## 2022-04-13 RX ORDER — ATORVASTATIN CALCIUM 80 MG/1
80 TABLET, FILM COATED ORAL
Status: DISCONTINUED | OUTPATIENT
Start: 2022-04-13 | End: 2022-04-15 | Stop reason: HOSPADM

## 2022-04-13 RX ORDER — PRAVASTATIN SODIUM 80 MG/1
80 TABLET ORAL
Status: DISCONTINUED | OUTPATIENT
Start: 2022-04-13 | End: 2022-04-13

## 2022-04-13 RX ORDER — CALCIUM CARBONATE 200(500)MG
500 TABLET,CHEWABLE ORAL DAILY PRN
Status: DISCONTINUED | OUTPATIENT
Start: 2022-04-13 | End: 2022-04-15 | Stop reason: HOSPADM

## 2022-04-13 RX ORDER — POTASSIUM CHLORIDE 20 MEQ/1
40 TABLET, EXTENDED RELEASE ORAL ONCE
Status: COMPLETED | OUTPATIENT
Start: 2022-04-13 | End: 2022-04-13

## 2022-04-13 RX ORDER — SODIUM CHLORIDE 9 MG/ML
3 INJECTION INTRAVENOUS
Status: DISCONTINUED | OUTPATIENT
Start: 2022-04-13 | End: 2022-04-15 | Stop reason: HOSPADM

## 2022-04-13 RX ORDER — HEPARIN SODIUM 1000 [USP'U]/ML
4000 INJECTION, SOLUTION INTRAVENOUS; SUBCUTANEOUS ONCE
Status: COMPLETED | OUTPATIENT
Start: 2022-04-13 | End: 2022-04-13

## 2022-04-13 RX ORDER — MIDAZOLAM HYDROCHLORIDE 2 MG/2ML
INJECTION, SOLUTION INTRAMUSCULAR; INTRAVENOUS AS NEEDED
Status: DISCONTINUED | OUTPATIENT
Start: 2022-04-13 | End: 2022-04-13 | Stop reason: HOSPADM

## 2022-04-13 RX ORDER — MAGNESIUM SULFATE HEPTAHYDRATE 40 MG/ML
2 INJECTION, SOLUTION INTRAVENOUS ONCE
Status: COMPLETED | OUTPATIENT
Start: 2022-04-13 | End: 2022-04-13

## 2022-04-13 RX ORDER — MORPHINE SULFATE 4 MG/ML
4 INJECTION, SOLUTION INTRAMUSCULAR; INTRAVENOUS ONCE
Status: COMPLETED | OUTPATIENT
Start: 2022-04-13 | End: 2022-04-13

## 2022-04-13 RX ORDER — HEPARIN SODIUM 1000 [USP'U]/ML
INJECTION, SOLUTION INTRAVENOUS; SUBCUTANEOUS AS NEEDED
Status: DISCONTINUED | OUTPATIENT
Start: 2022-04-13 | End: 2022-04-13 | Stop reason: HOSPADM

## 2022-04-13 RX ADMIN — MORPHINE SULFATE 4 MG: 4 INJECTION INTRAVENOUS at 13:07

## 2022-04-13 RX ADMIN — HEPARIN SODIUM 5000 UNITS: 5000 INJECTION INTRAVENOUS; SUBCUTANEOUS at 21:32

## 2022-04-13 RX ADMIN — TICAGRELOR 180 MG: 90 TABLET ORAL at 13:08

## 2022-04-13 RX ADMIN — HEPARIN SODIUM 4000 UNITS: 1000 INJECTION INTRAVENOUS; SUBCUTANEOUS at 13:05

## 2022-04-13 RX ADMIN — SODIUM CHLORIDE 75 ML/HR: 0.9 INJECTION, SOLUTION INTRAVENOUS at 13:43

## 2022-04-13 RX ADMIN — POTASSIUM CHLORIDE 40 MEQ: 20 TABLET, EXTENDED RELEASE ORAL at 20:01

## 2022-04-13 RX ADMIN — POTASSIUM CHLORIDE 20 MEQ: 14.9 INJECTION, SOLUTION INTRAVENOUS at 20:01

## 2022-04-13 RX ADMIN — MELATONIN 6 MG: at 23:03

## 2022-04-13 RX ADMIN — TRAZODONE HYDROCHLORIDE 100 MG: 100 TABLET ORAL at 21:32

## 2022-04-13 RX ADMIN — CALCIUM CARBONATE (ANTACID) CHEW TAB 500 MG 500 MG: 500 CHEW TAB at 22:04

## 2022-04-13 RX ADMIN — MAGNESIUM SULFATE HEPTAHYDRATE 2 G: 40 INJECTION, SOLUTION INTRAVENOUS at 20:01

## 2022-04-13 NOTE — ASSESSMENT & PLAN NOTE
· C/O intermittent chest pain for the past few days  Today with increased mid-sternal chest pain   EMS activated and found to have ST elevation MI   · S/p SURJIT to D1   · Continue on Brilinta, statin, asa (starting tomorrow), and hold on beta blocker for now due to bradycardia  · Appreciate cardiology recommendations

## 2022-04-13 NOTE — H&P
[address] [place] [address]  H&P- Johana Ohara 1945, 77 y o  male MRN: [id number]  Unit/Bed#: ICU 03 Encounter: [id number]  Primary Care Provider: Gail Rosario DO   Date and time admitted to hospital: 4/13/2022 12:51 PM    * STEMI (ST elevation myocardial infarction) [facility])  Assessment & Plan  · C/O intermittent chest pain for the past few days  Today with increased mid-sternal chest pain  EMS activated and found to have ST elevation MI   · S/p SURJIT to D1   · Continue on Brilinta, statin, asa (starting tomorrow), and hold on beta blocker for now due to bradycardia  · Appreciate cardiology recommendations    DM2 (diabetes mellitus, type 2) ([facility] [address] )  Assessment & Plan  Lab Results   Component Value Date    HGBA1C 6 9 (H) 12/29/2021       No results for input(s): POCGLU in the last 72 hours  Blood Sugar Average: Last 72 hrs:  · Metformin on hold  · Start on SSI coverage    Mixed hyperlipidemia  Assessment & Plan  · Continue on statin     Insomnia  Assessment & Plan  · Continue on Trazadone      -------------------------------------------------------------------------------------------------------------  Chief Complaint: Chest pain    History of Present Illness   HX and PE limited by:    Johana Ohara is a 77 y o  male who presents with a history of renal cell carcinoma s/p nephrectomy 2007, hyperlipidemia, and diabetes mellitus  He reports that he had chest pain intermittent for the past few days that was relieved on it's own  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 and began with chest pain that did not improve  He reports that he had nausea during the one episode  He had no episodes of vomiting  EMS activated and found to have ST elevation in lateral leads  Taken to cath lab and found to have 90% stenosis of D1  SURJIT placed and transferred to the ICU  Of note, he is complaining of a cough for the past 2 months  Denies post nasal drip   He is also complaining of visual changes that appear to be similar to a kaleidescope that is intermittent  He was evaluate by ophthalmology last month and he did not find any abnormalities with his vision except the start of cataracts  History obtained from the patient   -------------------------------------------------------------------------------------------------------------  Dispo: Admit to Critical Care     Code Status: Level 1 - Full Code  --------------------------------------------------------------------------------------------------------------  Review of Systems   HENT:        C/o kaleidescope vision   Eyes: Negative  Respiratory: Positive for cough (started 2 months ago)  Cardiovascular:        (c/o mid sternal chest pain while at work) now pain resolved   Gastrointestinal:        Nausea during chest pain   Endocrine: Negative  Genitourinary: Negative  A 12-point, complete review of systems was reviewed and negative except as stated above     Physical Exam  Constitutional:       General: He is not in acute distress  Appearance: Normal appearance  He is normal weight  HENT:      Head: Normocephalic and atraumatic  Nose: Nose normal  No congestion  Mouth/Throat:      Mouth: Mucous membranes are moist       Pharynx: Oropharynx is clear  No oropharyngeal exudate  Eyes:      General: No scleral icterus  Extraocular Movements: Extraocular movements intact  Conjunctiva/sclera: Conjunctivae normal       Pupils: Pupils are equal, round, and reactive to light  Cardiovascular:      Rate and Rhythm: Bradycardia present  Pulses: Normal pulses  Heart sounds: Normal heart sounds  No murmur heard  Pulmonary:      Effort: Pulmonary effort is normal  No respiratory distress  Breath sounds: Normal breath sounds  Abdominal:      General: Bowel sounds are normal  There is no distension  Palpations: Abdomen is soft  Musculoskeletal:         General: No swelling  Normal range of motion  Cervical back: Normal range of motion and neck supple  Skin:     General: Skin is warm and dry  Capillary Refill: Capillary refill takes less than 2 seconds  Comments: Right wrist procedure site    Neurological:      General: No focal deficit present  Mental Status: He is alert and oriented to person, place, and time  Mental status is at baseline        --------------------------------------------------------------------------------------------------------------  Vitals:   Vitals:    04/13/22 1315 04/13/22 1320 04/13/22 1330 04/13/22 1353   BP: 158/75 163/77 152/71    BP Location: Right arm  Right arm    Pulse: 56 (!) 50 (!) 50    Resp: 16 19 16    Temp:       TempSrc:       SpO2: 100% 100% 100% 100%   Weight:       Height:         Temp  Min: 97 7 °F (36 5 °C)  Max: 97 7 °F (36 5 °C)  IBW (Ideal Body Weight): 75 3 kg  Height: 5' 11" (180 3 cm)  Body mass index is 27 15 kg/m²      Laboratory and Diagnostics:  Results from last 7 days   Lab Units 04/13/22  1300   WBC Thousand/uL 8 48   HEMOGLOBIN g/dL 14 5   HEMATOCRIT % 43 7   PLATELETS Thousands/uL 217   NEUTROS PCT % 48   MONOS PCT % 9     Results from last 7 days   Lab Units 04/13/22  1300   SODIUM mmol/L 136   POTASSIUM mmol/L 3 8   CHLORIDE mmol/L 100   CO2 mmol/L 27   ANION GAP mmol/L 9   BUN mg/dL 24   CREATININE mg/dL 1 14   CALCIUM mg/dL 9 5   GLUCOSE RANDOM mg/dL 156*     Results from last 7 days   Lab Units 04/13/22  1300   MAGNESIUM mg/dL 1 8      Results from last 7 days   Lab Units 04/13/22  1300   INR  0 97   PTT seconds 24              ABG:    VBG:          Micro:        EKG: Sinus bradycardia  Imaging: I have personally reviewed pertinent films in PACS      Historical Information   Past Medical History:   Diagnosis Date    Allergic reaction     last assessed: 5/26/2015    Arthritis     right knee    Bee sting allergy     last assessed: 6/30/2015    BPH (benign prostatic hypertrophy)     Cancer ([facility] [address] ) 2017    Right kidney    [facility]     Type II, NIDDM    Enlarged prostate with lower urinary tract symptoms (LUTS)     last assessed: 8/17/2015    Herpes zoster     last assessed: 11/21/2013    History of colon polyps     Hyperlipidemia     Leukocytosis     last assessed: 11/18/2014    Osteoarthrosis, localized, primary, knee     last assessed: 10/25/2016    Renal cancer [facility]     Seasonal allergies     Shoulder pain, right     Wears glasses     Wears partial dentures     Upper and Lower     Past Surgical History:   Procedure Laterality Date    CARPAL TUNNEL RELEASE Left     CHOLECYSTECTOMY      Open    COLONOSCOPY      FL GUIDED NEEDLE PLAC BX/ASP/INJ  6/1/2020    FL GUIDED NEEDLE PLAC BX/ASP/INJ  11/13/2020    GANGLION CYST EXCISION Left     Left wrist    JOINT REPLACEMENT Left 08/2015    Left TKR    JOINT REPLACEMENT Right 09/2017    Right TKR    NEPHRECTOMY  05/08/2017    Partial right nephrectomy    UT LAP,PARTIAL NEPHRECTOMY Right 5/8/2017    Procedure: LAPAROSCOPIC LYSIS OF ADHESIONS, ROBOTIC PARTIAL NEPHRECTOMY, WITH INTRA-OP LAPAROSCOPIC ULTRASOUND;  Surgeon: Jerry Richard MD;  Location[facility];  Service: Urology    UT REPAIR BICEPS LONG TENDON  2/4/2019    Procedure: TENODESIS BICEPS OPEN PROXIMAL;  Surgeon: Minerva Mcnamara MD;  [facility];  Service: Orthopedics    UT REPAIR INTERCARP/CARP-METACARP JT Left 12/5/2016    Procedure: ARTHROPLASTY THUMB [address] ;  Surgeon: Minerva Mcnamara MD;  [facility];  Service: Orthopedics    UT REVISE MEDIAN N/CARPAL TUNNEL SURG Left 12/5/2016    Procedure: RELEASE CARPAL TUNNEL;  Surgeon: Minerva Mcnamara MD;  [facility];  Service: Orthopedics    UT [address] ARTHROSCOP,SURG,W/ROTAT CUFF REPR Right 2/4/2019    Procedure: REPAIR ROTATOR CUFF ARTHROSCOPIC; SUBACROMIAL DECOMPRESSION; DISTAL CLAVICLE EXCISION;  Surgeon: Minerva Mncamara MD;  [facility];  Service: Orthopedics    UT TOTAL KNEE ARTHROPLASTY Right 9/11/2017 Procedure: ARTHROPLASTY KNEE TOTAL;  Surgeon: Erasmo Terry MD;  [facility];  Service: Orthopedics    TONSILLECTOMY AND ADENOIDECTOMY       Social History   Social History     Substance and Sexual Activity   Alcohol Use Yes    Comment: 1-2 x year     Social History     Substance and Sexual Activity   Drug Use No     Social History     Tobacco Use   Smoking Status Former Smoker    Packs/day: 0 25    Types: Cigarettes   Smokeless Tobacco Never Used   Tobacco Comment    Smoked from ages 16 to 25  Exercise History:    Family History:   Family History   Problem Relation Age of Onset    Cancer Mother     Stroke Father      I have reviewed this patient's family history and commented on sigificant items within the HPI      Medications:  Current Facility-Administered Medications   Medication Dose Route Frequency    [START ON 2022] aspirin (ECOTRIN LOW STRENGTH) EC tablet 81 mg  81 mg Oral Daily    heparin (porcine) subcutaneous injection 5,000 Units  5,000 Units Subcutaneous Q8H [address]    insulin lispro (HumaLOG) 100 units/mL subcutaneous injection 1-5 Units  1-5 Units Subcutaneous TID AC    insulin lispro (HumaLOG) 100 units/mL subcutaneous injection 1-5 Units  1-5 Units Subcutaneous HS    pravastatin (PRAVACHOL) tablet 80 mg  80 mg Oral Daily With Dinner    sodium chloride (PF) 0 9 % injection 3 mL  3 mL Intravenous Q1H PRN    [START ON 2022] ticagrelor (BRILINTA) tablet 90 mg  90 mg Oral Q12H [address]    traZODone (DESYREL) tablet 100 mg  100 mg Oral HS     Home medications:  Prior to Admission Medications   Prescriptions Last Dose Informant Patient Reported? Taking?    Cholecalciferol (VITAMIN D-3 PO)  Self Yes No   Sig: Take 2,000 Units by mouth daily   EPINEPHrine (EPIPEN 2-MOIZ) 0 3 mg/0 3 mL SOAJ  Self Yes No   Sig: Inject as directed   fluticasone (FLONASE) 50 mcg/act nasal spray  Self No No   Si spray into each nostril daily   glucose blood test strip   No No   Sig: Use 1 each daily as needed (once a day) One touch ultra  Test strips   metFORMIN (GLUCOPHAGE) 500 mg tablet 4/13/2022 at Unknown time  No Yes   Sig: TAKE 1 TABLET TWICE DAILY   simvastatin (ZOCOR) 40 mg tablet 4/13/2022 at Unknown time  No Yes   Sig: TAKE 1 TABLET (40 MG TOTAL) BY MOUTH DAILY   traZODone (DESYREL) 100 mg tablet 4/12/2022 at Unknown time  No Yes   Sig: TAKE 1 TABLET (100 MG TOTAL) BY MOUTH DAILY AT BEDTIME      Facility-Administered Medications: None     Allergies: Allergies   Allergen Reactions    Bee Venom Edema       ------------------------------------------------------------------------------------------------------------  Advance Directive and Living Will:      Power of :    POLST:    ------------------------------------------------------------------------------------------------------------  Anticipated Length of Stay is > 2 midnights    Care Time Delivered:   No Critical Care time spent       [facility]        Portions of the record may have been created with voice recognition software  Occasional wrong word or "sound a like" substitutions may have occurred due to the inherent limitations of voice recognition software    Read the chart carefully and recognize, using context, where substitutions have occurred

## 2022-04-13 NOTE — ASSESSMENT & PLAN NOTE
Lab Results   Component Value Date    HGBA1C 6 9 (H) 12/29/2021       No results for input(s): POCGLU in the last 72 hours      Blood Sugar Average: Last 72 hrs:  · Metformin on hold  · Start on SSI coverage

## 2022-04-13 NOTE — ED PROVIDER NOTES
Pt Name: Patito Torres  MRN: [id number]  [place] 1945  Age/Sex: 77 y o  male  Date of evaluation: 4/13/2022  PCP: Mell Cheung, [address] [place] [address]    Chief Complaint   Patient presents with    Chest Pain     Pt reports cp starting this morning that comes and goes  Pt has pain currently in center of chest, does not radiate  Pt reports dizziness and nausea this morning  given 3 nitro and 324 aspirin prehosp         HPI    Bina Diaz presents to the Emergency Department complaining of chest pain  This morning at 930 he had pain that waxed and waned  When the pain returned and was severe, 911 was called  He got 3 asa and 3 nitro prior to arrival   Pain not significantly improved with nitro  Patient reports that since arrival in ER the pain started to radiate to his back which it had not done prior          HPI      Past Medical and Surgical History    Past Medical History:   Diagnosis Date    Allergic reaction     last assessed: 5/26/2015    Arthritis     right knee    Bee sting allergy     last assessed: 6/30/2015    BPH (benign prostatic hypertrophy)     [facility]    Right [facility]    Diabetes mellitus ([facility] [address] )     Type II, NIDDM    Enlarged prostate with lower urinary tract symptoms (LUTS)     last assessed: 8/17/2015    Herpes zoster     last assessed: 11/21/2013    History of colon polyps     Hyperlipidemia     Leukocytosis     last assessed: 11/18/2014    Osteoarthrosis, localized, primary, knee     last assessed: 10/25/2016    [facility]     Seasonal allergies     Shoulder pain, right     Wears glasses     Wears partial dentures     Upper and Lower       Past Surgical History:   Procedure Laterality Date    CARPAL TUNNEL RELEASE Left     CHOLECYSTECTOMY      Open    COLONOSCOPY      FL GUIDED NEEDLE PLAC BX/ASP/INJ  6/1/2020    FL GUIDED NEEDLE PLAC BX/ASP/INJ  11/13/2020    GANGLION CYST EXCISION Left     Left wrist    JOINT REPLACEMENT Left 08/2015    Left TKR  JOINT REPLACEMENT Right 09/2017    Right TKR    NEPHRECTOMY  05/08/2017    Partial right nephrectomy    AL LAP,PARTIAL NEPHRECTOMY Right 5/8/2017    Procedure: LAPAROSCOPIC LYSIS OF ADHESIONS, ROBOTIC PARTIAL NEPHRECTOMY, WITH INTRA-OP LAPAROSCOPIC ULTRASOUND;  Surgeon: Edgard Guerin MD;  [facility];  Service: Urology    AL REPAIR BICEPS LONG TENDON  2/4/2019    Procedure: TENODESIS BICEPS OPEN PROXIMAL;  Surgeon: Delmer Kelly MD;  [facility];  Service: Orthopedics    AL REPAIR INTERCARP/CARP-METACARP JT Left 12/5/2016    Procedure: ARTHROPLASTY THUMB [facility] ;  Surgeon: Delmer Kelly MD;  [facility];  Service: Orthopedics    AL REVISE MEDIAN N/CARPAL TUNNEL SURG Left 12/5/2016    Procedure: RELEASE CARPAL TUNNEL;  Surgeon: Delmer Kelly MD;  [facility];  Service: Orthopedics    AL [address] ARTHROSCOP,SURG,W/ROTAT CUFF REPR Right 2/4/2019    Procedure: REPAIR ROTATOR CUFF ARTHROSCOPIC; SUBACROMIAL DECOMPRESSION; DISTAL CLAVICLE EXCISION;  Surgeon: Delmer Kelly MD;  [facility];  Service: Orthopedics    AL TOTAL KNEE ARTHROPLASTY Right 9/11/2017    Procedure: ARTHROPLASTY KNEE TOTAL;  Surgeon: Delmer Kelly MD;  [facility];  Service: Orthopedics    TONSILLECTOMY AND ADENOIDECTOMY         Family History   Problem Relation Age of Onset    Cancer Mother     Stroke Father        Social History     Tobacco Use    Smoking status: Former Smoker     Packs/day: 0 25     Types: Cigarettes    Smokeless tobacco: Never Used    Tobacco comment: Smoked from ages 16 to 25  Vaping Use    Vaping Use: Never used   Substance Use Topics    Alcohol use: Yes     Comment: 1-2 x year    Drug use: No              Allergies    Allergies   Allergen Reactions    Bee Venom Edema       Home Medications    Prior to Admission medications    Medication Sig Start Date End Date Taking?  Authorizing Provider   Cholecalciferol (VITAMIN D-3 PO) Take 2,000 Units by mouth daily    Historical Provider, MD   EPINEPHrine (EPIPEN 2-MOIZ) 0 3 mg/0 3 mL SOAJ Inject as directed 5/26/15   Historical Provider, MD   fluticasone (FLONASE) 50 mcg/act nasal spray 1 spray into each nostril daily 8/30/19   VIRGIE Herbert   glucose blood test strip Use 1 each daily as needed (once a day) One touch ultra  Test strips 7/26/21   Eleanor Goodell, DO   metFORMIN (GLUCOPHAGE) 500 mg tablet TAKE 1 TABLET TWICE DAILY 2/11/22   Eleanor Goodell, DO   simvastatin (ZOCOR) 40 mg tablet TAKE 1 TABLET (40 MG TOTAL) BY MOUTH DAILY 1/21/22   Eleanor Goodell, DO   traZODone (DESYREL) 100 mg tablet TAKE 1 TABLET (100 MG TOTAL) BY MOUTH DAILY AT BEDTIME 1/17/22   Eleanor Goodell, DO           Review of Systems    Review of Systems   Constitutional: Negative for chills and fever  HENT: Negative for ear pain and sore throat  Eyes: Negative for pain and visual disturbance  Respiratory: Positive for chest tightness  Negative for cough and shortness of breath  Cardiovascular: Positive for chest pain  Negative for palpitations  Gastrointestinal: Negative for abdominal pain and vomiting  Genitourinary: Negative for dysuria and hematuria  Musculoskeletal: Negative for arthralgias and back pain  Skin: Negative for color change and rash  Neurological: Negative for seizures and syncope  All other systems reviewed and are negative  Physical Exam      ED Triage Vitals   Temperature Pulse Respirations Blood Pressure SpO2   04/13/22 1258 04/13/22 1255 04/13/22 1255 04/13/22 1255 04/13/22 1255   97 7 °F (36 5 °C) (!) 49 17 (!) 172/81 97 %      Temp Source Heart Rate Source Patient Position - Orthostatic VS BP Location FiO2 (%)   04/13/22 1255 04/13/22 1255 04/13/22 1255 04/13/22 1255 --   Oral Monitor Sitting Right arm       Pain Score       04/13/22 1255       8               Physical Exam  Vitals and nursing note reviewed  Constitutional:       General: He is not in acute distress       Appearance: He is well-developed  He is ill-appearing and diaphoretic  HENT:      Head: Normocephalic and atraumatic  Nose: Nose normal    Eyes:      Conjunctiva/sclera: Conjunctivae normal       Pupils: Pupils are equal, round, and reactive to light  Cardiovascular:      Rate and Rhythm: Normal rate and regular rhythm  Heart sounds: Normal heart sounds  No murmur heard  No friction rub  No gallop  Pulmonary:      Effort: Pulmonary effort is normal  No respiratory distress  Breath sounds: Normal breath sounds  No wheezing or rales  Abdominal:      General: Bowel sounds are normal       Palpations: Abdomen is soft  Tenderness: There is no abdominal tenderness  There is no guarding or rebound  Musculoskeletal:         General: Normal range of motion  Cervical back: Normal range of motion and neck supple  Skin:     General: Skin is warm  Neurological:      Mental Status: He is alert and oriented to person, place, and time  Psychiatric:         Behavior: Behavior normal          Assessment and Plan    Jt Ching is a 77 y o  male who presents with severe chest pain  Physical examination remarkable for same  Differential diagnosis (not completely inclusive) includes AMI, cardiac vs noncardiac causes of chest pain  Plan will be to perform diagnostic testing and treat symptomatically  MDM    Diagnostic Results    EKG:  ST elevation in I, AVL with reciprocal changes    EKG INTERPRETATION  EKG Interpretation    Rhythm: sinus  Axis: normal  Intervals: Normal, no blocks,   ST segments: elevated in I, AVL with reciprocal changes inferiorly    Impression: abdnormal EKG  EKG for comparison: changes are new  STEMI**  EKG interpreted by me  Interpretation by Jose Carlson DO  EKG reviewed and interpreted independently      Labs:    Results for orders placed or performed during the hospital encounter of 04/13/22   CBC and differential   Result Value Ref Range    WBC 8 48 4 31 - 10 16 Thousand/uL    RBC 4 65 3 88 - 5 62 Million/uL    Hemoglobin 14 5 12 0 - 17 0 g/dL    Hematocrit 43 7 36 5 - 49 3 %    MCV 94 82 - 98 fL    MCH 31 2 26 8 - 34 3 pg    MCHC 33 2 31 4 - 37 4 g/dL    RDW 13 8 11 6 - 15 1 %    MPV 10 4 8 9 - 12 7 fL    Platelets 595 910 - 480 Thousands/uL    nRBC 0 /100 WBCs    Neutrophils Relative 48 43 - 75 %    Immat GRANS % 0 0 - 2 %    Lymphocytes Relative 37 14 - 44 %    Monocytes Relative 9 4 - 12 %    Eosinophils Relative 5 0 - 6 %    Basophils Relative 1 0 - 1 %    Neutrophils Absolute 4 10 1 85 - 7 62 Thousands/µL    Immature Grans Absolute 0 02 0 00 - 0 20 Thousand/uL    Lymphocytes Absolute 3 13 0 60 - 4 47 Thousands/µL    Monocytes Absolute 0 72 0 17 - 1 22 Thousand/µL    Eosinophils Absolute 0 45 0 00 - 0 61 Thousand/µL    Basophils Absolute 0 06 0 00 - 0 10 Thousands/µL   Basic metabolic panel   Result Value Ref Range    Sodium 136 136 - 145 mmol/L    Potassium 3 8 3 5 - 5 3 mmol/L    Chloride 100 100 - 108 mmol/L    CO2 27 21 - 32 mmol/L    ANION GAP 9 4 - 13 mmol/L    BUN 24 5 - 25 mg/dL    Creatinine 1 14 0 60 - 1 30 mg/dL    Glucose 156 (H) 65 - 140 mg/dL    Calcium 9 5 8 3 - 10 1 mg/dL    eGFR 62 ml/min/1 73sq m   Lipid panel   Result Value Ref Range    Cholesterol 139 See Comment mg/dL    Triglycerides 217 (H) See Comment mg/dL    HDL, Direct 34 (L) >=40 mg/dL    LDL Calculated 62 0 - 100 mg/dL    Non-HDL-Chol (CHOL-HDL) 105 mg/dl   Magnesium   Result Value Ref Range    Magnesium 1 8 1 6 - 2 6 mg/dL   Protime-INR   Result Value Ref Range    Protime 12 7 11 6 - 14 5 seconds    INR 0 97 0 84 - 1 19   APTT   Result Value Ref Range    PTT 24 23 - 37 seconds   HS Troponin 0hr (reflex protocol)   Result Value Ref Range    hs TnI 0hr 49 "Refer to ACS Flowchart"- see link ng/L       All labs reviewed and utilized in the medical decision making process    Radiology:    XR chest 1 view portable    (Results Pending)       All radiology studies independently viewed by me and interpreted by the radiologist     Procedure    CriticalCare Time  Performed by: Debbie Castillo DO  Authorized by: Debbie Castillo DO     Critical care provider statement:     Critical care time (minutes):  45    Critical care time was exclusive of:  Separately billable procedures and treating other patients and teaching time    Critical care was time spent personally by me on the following activities:  Blood draw for specimens, obtaining history from patient or surrogate, development of treatment plan with patient or surrogate, discussions with consultants, evaluation of patient's response to treatment, examination of patient, interpretation of cardiac output measurements, ordering and performing treatments and interventions, ordering and review of laboratory studies, ordering and review of radiographic studies, re-evaluation of patient's condition and review of old charts    I assumed direction of critical care for this patient from another provider in my specialty: no            ED Course of Care and Re-Assessments    PACs notified  I spoke with Dr Enrico Brown  EKG sent via tiger text but not received initally  Resent via cell phone  Dr Emre Wilcox came down to see patient            Medications   sodium chloride (PF) 0 9 % injection 3 mL (has no administration in time range)   ticagrelor (BRILINTA) tablet 180 mg (180 mg Oral Given 4/13/22 1308)     And   ticagrelor (BRILINTA) tablet 90 mg (has no administration in time range)   morphine (PF) 4 mg/mL injection 4 mg (4 mg Intravenous Given 4/13/22 1307)   heparin (porcine) injection 4,000 Units (4,000 Units Intravenous Given 4/13/22 1305)   sodium chloride 0 9 % infusion (75 mL/hr Intravenous New Bag 4/13/22 1343)           FINAL IMPRESSION    Final diagnoses:   STEMI (ST elevation myocardial infarction) ([facility] [address] )         DISPOSITION/PLAN    Time reflects when diagnosis was documented in both MDM as applicable and the Disposition within this note     Time User Action Codes Description Comment    4/13/2022  1:03 PM Corine Rosenthal Add [I21 3] STEMI (ST elevation myocardial infarction) ([facility] [address] )     4/13/2022  1:15 PM Micah Barros Modify [I21 3] STEMI (ST elevation myocardial infarction) [facility])       ED Disposition     ED Disposition Condition Date/Time Comment    Send to Cath Lab  Wed Apr 13, 2022  1:42 PM       Follow-up Information    None                DO Nikkie Hdz DO  04/13/22 1351

## 2022-04-13 NOTE — CONSULTS
Consult - Cardiology   Patito Torres 77 y o  male MRN: [id number]  Unit/Bed#: ED 32 Encounter: [id number]        Reason For Consult:  Chest pain-MI                 Assessment:  Acute ST-elevation MI  Diabetes mellitus  Dyslipidemia  Hx renal cell cancer with previous right nephrectomy-2007     Discussion / Plan       Given a full dose of aspirin and a loading dose of Brilinta   Initiated on heparin   Urgent catheterization   Patient will need ICU level bed following procedure        History Of Present Illness:  Patito Torres is 78-year-old patient of Dr Holly Cooley without prior care by cardiologist   Nicolas العلي medical problems include dyslipidemia, diabetes, and history of right renal cell carcinoma - s/p nephrectomy 5/2017  Today this gentleman's as baseline state of health when he had acute precordial chest discomfort  With some waxing waning throughout the morning he was brought to the hospital where initial ECG shows some modest ST elevation in the high lateral leads with depression of up to 3 mm in the inferior distribution with ongoing pain present on arrival prompting activation of MI alert  When seen in emergency department the patient is not in extremis but is having some ongoing discomfort following nitrate          Past Medical History:        Past Medical History:   Diagnosis Date    Allergic reaction     last assessed: 5/26/2015    Arthritis     right knee    Bee sting allergy     last assessed: 6/30/2015    BPH (benign prostatic hypertrophy)     Cancer ([facility] ) 2017    Right kidney    Diabetes mellitus ([facility] )     Type II, NIDDM    Enlarged prostate with lower urinary tract symptoms (LUTS)     last assessed: 8/17/2015    Herpes zoster     last assessed: 11/21/2013    History of colon polyps     Hyperlipidemia     Leukocytosis     last assessed: 11/18/2014    Osteoarthrosis, localized, primary, knee     last assessed: 10/25/2016    Renal cancer ([facility] )     Seasonal allergies     Shoulder pain, right     Wears glasses     Wears partial dentures     Upper and Lower      Past Surgical History:   Procedure Laterality Date    CARPAL TUNNEL RELEASE Left     CHOLECYSTECTOMY      Open    COLONOSCOPY      FL GUIDED NEEDLE PLAC BX/ASP/INJ  6/1/2020    FL GUIDED NEEDLE PLAC BX/ASP/INJ  11/13/2020    GANGLION CYST EXCISION Left     Left wrist    JOINT REPLACEMENT Left 08/2015    Left TKR    JOINT REPLACEMENT Right 09/2017    Right TKR    NEPHRECTOMY  05/08/2017    Partial right nephrectomy    TX LAP,PARTIAL NEPHRECTOMY Right 5/8/2017    Procedure: LAPAROSCOPIC LYSIS OF ADHESIONS, ROBOTIC PARTIAL NEPHRECTOMY, WITH INTRA-OP LAPAROSCOPIC ULTRASOUND;  Surgeon: Jame Roa MD;  [facility];  Service: Urology    TX REPAIR BICEPS LONG TENDON  2/4/2019    Procedure: TENODESIS BICEPS OPEN PROXIMAL;  Surgeon: Maulik Plasencia MD;  [facility];  Service: Orthopedics    TX REPAIR INTERCARP/CARP-METACARP JT Left 12/5/2016    Procedure: ARTHROPLASTY THUMB [address] ;  Surgeon: Maulik Plasencia MD;  [facility];  Service: Orthopedics    TX REVISE MEDIAN N/CARPAL TUNNEL SURG Left 12/5/2016    Procedure: RELEASE CARPAL TUNNEL;  Surgeon: Maulik Plasencia MD;  [facility];  Service: Orthopedics    [place] [address] ARTHROSCOP,SURG,W/ROTAT CUFF REPR Right 2/4/2019    Procedure: REPAIR ROTATOR CUFF ARTHROSCOPIC; SUBACROMIAL DECOMPRESSION; DISTAL CLAVICLE EXCISION;  Surgeon: Maulik Plasencia MD;  [facility];  Service: Orthopedics    TX TOTAL KNEE ARTHROPLASTY Right 9/11/2017    Procedure: ARTHROPLASTY KNEE TOTAL;  Surgeon: Maulik Plasencia MD;  [facility];  Service: Orthopedics    TONSILLECTOMY AND ADENOIDECTOMY          Allergy:        Allergies   Allergen Reactions    Bee Venom Edema       Medications:       Prior to Admission medications    Medication Sig Start Date End Date Taking?  Authorizing Provider   metFORMIN (GLUCOPHAGE) 500 mg tablet TAKE 1 TABLET TWICE DAILY 2/11/22  Yes Victor Manuel Candelaria DO   simvastatin (ZOCOR) 40 mg tablet TAKE 1 TABLET (40 MG TOTAL) BY MOUTH DAILY 1/21/22  Yes Victor Manuel Candelaria DO   traZODone (DESYREL) 100 mg tablet TAKE 1 TABLET (100 MG TOTAL) BY MOUTH DAILY AT BEDTIME 1/17/22  Yes Victor Manuel Candelaria DO   Cholecalciferol (VITAMIN D-3 PO) Take 2,000 Units by mouth daily    Historical Provider, MD   EPINEPHrine (EPIPEN 2-MOIZ) 0 3 mg/0 3 mL SOAJ Inject as directed 5/26/15   Historical Provider, MD   fluticasone (FLONASE) 50 mcg/act nasal spray 1 spray into each nostril daily 8/30/19   VIRGIE Herbert   glucose blood test strip Use 1 each daily as needed (once a day) One touch ultra  Test strips 7/26/21   Victor Manuel Candelaria DO       Family History:     Family History   Problem Relation Age of Onset    Cancer Mother     Stroke Father         Social History:       Social History     Socioeconomic History    Marital status: /Civil Union     Spouse name: None    Number of children: None    Years of education: None    Highest education level: None   Occupational History    None   Tobacco Use    Smoking status: Former Smoker     Packs/day: 0 25     Types: Cigarettes    Smokeless tobacco: Never Used    Tobacco comment: Smoked from ages 16 to 25     Vaping Use    Vaping Use: Never used   Substance and Sexual Activity    Alcohol use: Yes     Comment: 1-2 x year    Drug use: No    Sexual activity: Yes     Partners: Female   Other Topics Concern    None   Social History Narrative    None     Social Determinants of Health     Financial Resource Strain: Not on file   Food Insecurity: Not on file   Transportation Needs: Not on file   Physical Activity: Not on file   Stress: Not on file   Social Connections: Not on file   Intimate Partner Violence: Not on file   Housing Stability: Not on file       ROS:  Problem focused  As per HPI  No presyncope, syncope, or signs of blood loss    Exam:  General:  Alert, appropriately conversant and appears slightly uncomfortable but in no distress Head: Normocephalic, atraumatic  Eyes:  EOMI  Pupils - equal, round, reactive to accomodation  No icterus  Normal Conjunctiva  Oropharynx: Moist without lesion  Neck:  No gross bruit, JVD  Heart:  Regular with controlled rate  No rub nor pathologic murmur  Lungs:  Clear without rales/rhonchi/wheeze  Abdomen:  Soft and nontender with normal bowel sounds  No organomegaly or mass  Lower Limbs:  No edema  Pulses:  Carotid and distal lower extremity pulses symmetrical and 2+  Musculoskeletal: Independent movement of limbs observed, Formal ROM and strength eval not performed  Neurologic:    Oriented to: person, place, situation  Cranial Nerves: grossly intact - vision, smell, taste, and hearing were not tested  Motor function: grossly normal, symmetric   Sensation: Was not tested      Vitals:    04/13/22 1255 04/13/22 1258 04/13/22 1310 04/13/22 1315   BP: (!) 172/81  (!) 183/78 158/75   BP Location: Right arm   Right arm   Pulse: (!) 49  63 56   Resp: 17  16 16   Temp:  97 7 °F (36 5 °C)     TempSrc: Oral Oral     SpO2: 97%  99% 100%   Weight: 88 3 kg (194 lb 10 7 oz)      Height: 5' 11" (1 803 m)              DATA:      -----------    ECG:                      -----------------------------------------------------------------------------------------------------------------------------------------------  Weights:     Wt Readings from Last 20 Encounters:   04/13/22 88 3 kg (194 lb 10 7 oz)   01/26/22 89 4 kg (197 lb)   01/20/22 88 2 kg (194 lb 6 4 oz)   11/23/21 89 9 kg (198 lb 3 2 oz)   07/21/21 88 kg (194 lb)   06/09/21 86 6 kg (191 lb)   03/25/21 87 5 kg (193 lb)   01/18/21 88 5 kg (195 lb)   01/15/21 88 8 kg (195 lb 12 8 oz)   11/05/20 88 5 kg (195 lb)   10/07/20 88 5 kg (195 lb)   07/15/20 88 5 kg (195 lb)   05/14/20 88 5 kg (195 lb)   03/10/20 88 kg (194 lb)   02/18/20 88 kg (194 lb)   02/11/20 88 kg (194 lb)   01/29/20 87 5 kg (193 lb)   01/15/20 88 1 kg (194 lb 2 oz) 01/07/20 88 kg (194 lb)   11/13/19 88 kg (194 lb)   , Body mass index is 27 15 kg/m²           Lab Studies:               Results from last 7 days   Lab Units 04/13/22  1300   WBC Thousand/uL 8 48   HEMOGLOBIN g/dL 14 5   HEMATOCRIT % 43 7   PLATELETS Thousands/uL 217   ,       Invalid input(s): LABALBU

## 2022-04-14 ENCOUNTER — APPOINTMENT (INPATIENT)
Dept: NON INVASIVE DIAGNOSTICS | Facility: HOSPITAL | Age: 77
DRG: 247 | End: 2022-04-14
Payer: MEDICARE

## 2022-04-14 LAB
ANION GAP SERPL CALCULATED.3IONS-SCNC: 8 MMOL/L (ref 4–13)
AORTIC ROOT: 3.5 CM
AORTIC VALVE MEAN VELOCITY: 12.3 M/S
APICAL FOUR CHAMBER EJECTION FRACTION: 51 %
ASCENDING AORTA: 3.3 CM (ref 2.07–3.1)
AV AREA BY CONTINUOUS VTI: 2 CM2
AV AREA PEAK VELOCITY: 1.9 CM2
AV LVOT MEAN GRADIENT: 2 MMHG
AV LVOT PEAK GRADIENT: 3 MMHG
AV MEAN GRADIENT: 7 MMHG
AV PEAK GRADIENT: 13 MMHG
AV VALVE AREA: 2.02 CM2
AV VELOCITY RATIO: 0.51
BASOPHILS # BLD AUTO: 0.04 THOUSANDS/ΜL (ref 0–0.1)
BASOPHILS NFR BLD AUTO: 1 % (ref 0–1)
BUN SERPL-MCNC: 17 MG/DL (ref 5–25)
CALCIUM SERPL-MCNC: 8.9 MG/DL (ref 8.3–10.1)
CARDIAC TROPONIN I PNL SERPL HS: 7907 NG/L (ref 8–18)
CARDIAC TROPONIN I PNL SERPL HS: ABNORMAL NG/L (ref 8–18)
CHLORIDE SERPL-SCNC: 102 MMOL/L (ref 100–108)
CO2 SERPL-SCNC: 25 MMOL/L (ref 21–32)
CREAT SERPL-MCNC: 1.02 MG/DL (ref 0.6–1.3)
DOP CALC AO PEAK VEL: 1.79 M/S
DOP CALC AO VTI: 35.43 CM
DOP CALC LVOT AREA: 3.8 CM2
DOP CALC LVOT DIAMETER: 2.2 CM
DOP CALC LVOT PEAK VEL VTI: 18.81 CM
DOP CALC LVOT PEAK VEL: 0.91 M/S
DOP CALC LVOT STROKE INDEX: 34.3 ML/M2
DOP CALC LVOT STROKE VOLUME: 71.47 CM3
E WAVE DECELERATION TIME: 226 MS
EOSINOPHIL # BLD AUTO: 0.37 THOUSAND/ΜL (ref 0–0.61)
EOSINOPHIL NFR BLD AUTO: 4 % (ref 0–6)
ERYTHROCYTE [DISTWIDTH] IN BLOOD BY AUTOMATED COUNT: 13.9 % (ref 11.6–15.1)
FRACTIONAL SHORTENING: 27 % (ref 28–44)
GFR SERPL CREATININE-BSD FRML MDRD: 71 ML/MIN/1.73SQ M
GLUCOSE SERPL-MCNC: 126 MG/DL (ref 65–140)
GLUCOSE SERPL-MCNC: 130 MG/DL (ref 65–140)
GLUCOSE SERPL-MCNC: 139 MG/DL (ref 65–140)
GLUCOSE SERPL-MCNC: 141 MG/DL (ref 65–140)
GLUCOSE SERPL-MCNC: 156 MG/DL (ref 65–140)
HCT VFR BLD AUTO: 42.4 % (ref 36.5–49.3)
HGB BLD-MCNC: 13.9 G/DL (ref 12–17)
IMM GRANULOCYTES # BLD AUTO: 0.02 THOUSAND/UL (ref 0–0.2)
IMM GRANULOCYTES NFR BLD AUTO: 0 % (ref 0–2)
INTERVENTRICULAR SEPTUM IN DIASTOLE (PARASTERNAL SHORT AXIS VIEW): 1.2 CM
INTERVENTRICULAR SEPTUM: 1.2 CM (ref 0.54–1.02)
LAAS-AP2: 21.4 CM2
LAAS-AP4: 21.1 CM2
LEFT ATRIUM SIZE: 3.5 CM
LEFT INTERNAL DIMENSION IN SYSTOLE: 2.7 CM (ref 3.2–4.85)
LEFT VENTRICULAR INTERNAL DIMENSION IN DIASTOLE: 3.7 CM (ref 5.28–7.87)
LEFT VENTRICULAR POSTERIOR WALL IN END DIASTOLE: 1.1 CM (ref 0.53–1)
LEFT VENTRICULAR STROKE VOLUME: 31 ML
LVSV (TEICH): 31 ML
LYMPHOCYTES # BLD AUTO: 2.31 THOUSANDS/ΜL (ref 0.6–4.47)
LYMPHOCYTES NFR BLD AUTO: 27 % (ref 14–44)
MCH RBC QN AUTO: 29.5 PG (ref 26.8–34.3)
MCHC RBC AUTO-ENTMCNC: 32.8 G/DL (ref 31.4–37.4)
MCV RBC AUTO: 90 FL (ref 82–98)
MONOCYTES # BLD AUTO: 0.7 THOUSAND/ΜL (ref 0.17–1.22)
MONOCYTES NFR BLD AUTO: 8 % (ref 4–12)
MV E'TISSUE VEL-SEP: 9 CM/S
MV PEAK A VEL: 0.66 M/S
MV PEAK E VEL: 48 CM/S
MV STENOSIS PRESSURE HALF TIME: 66 MS
MV VALVE AREA P 1/2 METHOD: 3.33 CM2
NEUTROPHILS # BLD AUTO: 5.11 THOUSANDS/ΜL (ref 1.85–7.62)
NEUTS SEG NFR BLD AUTO: 60 % (ref 43–75)
NRBC BLD AUTO-RTO: 0 /100 WBCS
NT-PROBNP SERPL-MCNC: 967 PG/ML
PLATELET # BLD AUTO: 193 THOUSANDS/UL (ref 149–390)
PMV BLD AUTO: 10.1 FL (ref 8.9–12.7)
POTASSIUM SERPL-SCNC: 4.3 MMOL/L (ref 3.5–5.3)
RBC # BLD AUTO: 4.71 MILLION/UL (ref 3.88–5.62)
RIGHT ATRIAL 2D VOLUME: 29 ML
RIGHT ATRIUM AREA SYSTOLE A4C: 12.2 CM2
RIGHT VENTRICLE ID DIMENSION: 3.7 CM
SL CV LEFT ATRIUM LENGTH A2C: 5.4 CM
SL CV PED ECHO LEFT VENTRICLE DIASTOLIC VOLUME (MOD BIPLANE) 2D: 58 ML
SL CV PED ECHO LEFT VENTRICLE SYSTOLIC VOLUME (MOD BIPLANE) 2D: 27 ML
SODIUM SERPL-SCNC: 135 MMOL/L (ref 136–145)
WBC # BLD AUTO: 8.55 THOUSAND/UL (ref 4.31–10.16)
Z-SCORE OF ASCENDING AORTA: 2.74
Z-SCORE OF INTERVENTRICULAR SEPTUM IN END DIASTOLE: 3.48
Z-SCORE OF LEFT VENTRICULAR DIMENSION IN END DIASTOLE: -5.55
Z-SCORE OF LEFT VENTRICULAR DIMENSION IN END SYSTOLE: -2.99
Z-SCORE OF LEFT VENTRICULAR POSTERIOR WALL IN END DIASTOLE: 2.77

## 2022-04-14 PROCEDURE — 80048 BASIC METABOLIC PNL TOTAL CA: CPT | Performed by: NURSE PRACTITIONER

## 2022-04-14 PROCEDURE — 82948 REAGENT STRIP/BLOOD GLUCOSE: CPT

## 2022-04-14 PROCEDURE — 99232 SBSQ HOSP IP/OBS MODERATE 35: CPT | Performed by: INTERNAL MEDICINE

## 2022-04-14 PROCEDURE — 93306 TTE W/DOPPLER COMPLETE: CPT

## 2022-04-14 PROCEDURE — 84484 ASSAY OF TROPONIN QUANT: CPT | Performed by: INTERNAL MEDICINE

## 2022-04-14 PROCEDURE — 80048 BASIC METABOLIC PNL TOTAL CA: CPT | Performed by: INTERNAL MEDICINE

## 2022-04-14 PROCEDURE — 93306 TTE W/DOPPLER COMPLETE: CPT | Performed by: INTERNAL MEDICINE

## 2022-04-14 PROCEDURE — 85025 COMPLETE CBC W/AUTO DIFF WBC: CPT | Performed by: NURSE PRACTITIONER

## 2022-04-14 PROCEDURE — 83880 ASSAY OF NATRIURETIC PEPTIDE: CPT | Performed by: INTERNAL MEDICINE

## 2022-04-14 RX ADMIN — TICAGRELOR 90 MG: 90 TABLET ORAL at 21:28

## 2022-04-14 RX ADMIN — MELATONIN 6 MG: at 21:27

## 2022-04-14 RX ADMIN — ASPIRIN 81 MG: 81 TABLET, COATED ORAL at 10:00

## 2022-04-14 RX ADMIN — TICAGRELOR 90 MG: 90 TABLET ORAL at 10:00

## 2022-04-14 RX ADMIN — HEPARIN SODIUM 5000 UNITS: 5000 INJECTION INTRAVENOUS; SUBCUTANEOUS at 06:15

## 2022-04-14 RX ADMIN — TRAZODONE HYDROCHLORIDE 100 MG: 100 TABLET ORAL at 21:27

## 2022-04-14 RX ADMIN — HEPARIN SODIUM 5000 UNITS: 5000 INJECTION INTRAVENOUS; SUBCUTANEOUS at 21:28

## 2022-04-14 RX ADMIN — ATORVASTATIN CALCIUM 80 MG: 80 TABLET, FILM COATED ORAL at 17:00

## 2022-04-14 NOTE — PLAN OF CARE
Problem: Potential for Falls  Goal: Patient will remain free of falls  Description: INTERVENTIONS:  - Educate patient/family on patient safety including physical limitations  - Instruct patient to call for assistance with activity   - Consult OT/PT to assist with strengthening/mobility   - Keep Call bell within reach  - Keep bed low and locked with side rails adjusted as appropriate  - Keep care items and personal belongings within reach  - Initiate and maintain comfort rounds  - Make Fall Risk Sign visible to staff  - Apply yellow socks and bracelet for high fall risk patients  - Consider moving patient to room near nurses station  Outcome: Progressing     Problem: CARDIOVASCULAR - ADULT  Goal: Maintains optimal cardiac output and hemodynamic stability  Description: INTERVENTIONS:  - Monitor I/O, vital signs and rhythm  - Monitor for S/S and trends of decreased cardiac output  - Administer and titrate ordered vasoactive medications to optimize hemodynamic stability  - Assess quality of pulses, skin color and temperature  - Assess for signs of decreased coronary artery perfusion  - Instruct patient to report change in severity of symptoms  Outcome: Progressing  Goal: Absence of cardiac dysrhythmias or at baseline rhythm  Description: INTERVENTIONS:  - Continuous cardiac monitoring, vital signs, obtain 12 lead EKG if ordered  - Administer antiarrhythmic and heart rate control medications as ordered  - Monitor electrolytes and administer replacement therapy as ordered  Outcome: Progressing     Problem: RESPIRATORY - ADULT  Goal: Achieves optimal ventilation and oxygenation  Description: INTERVENTIONS:  - Assess for changes in respiratory status  - Assess for changes in mentation and behavior  - Position to facilitate oxygenation and minimize respiratory effort  - Oxygen administered by appropriate delivery if ordered  - Initiate smoking cessation education as indicated  - Encourage broncho-pulmonary hygiene including cough, deep breathe, Incentive Spirometry  - Assess the need for suctioning and aspirate as needed  - Assess and instruct to report SOB or any respiratory difficulty  - Respiratory Therapy support as indicated  Outcome: Progressing

## 2022-04-14 NOTE — ASSESSMENT & PLAN NOTE
Lab Results   Component Value Date    HGBA1C 6 9 (H) 12/29/2021       Recent Labs     04/13/22  1522 04/13/22  2131   POCGLU 144* 144*       Blood Sugar Average: Last 72 hrs:  · (P) 144Metformin on hold  · SS coverage  · ACHS BG checks

## 2022-04-14 NOTE — PROGRESS NOTES
CARDIOLOGY INPATIENT FOLLOW-UP PROGRESS NOTE  *-*-*-*-*-*-*-*-*-*-*-*-*-*-*-*-*-*-*-*-*-*-*-*-*-*-*-*-*-*-*-*-*-*-*-*-*-*-*-*-*-*-*-*-*-*-*-*-*-*-*-*-*-*-  Yanci Rivas DATE: 04/14/22 4:25 PM   AUTHOR: Idris Thompson MD  PATIENT: Jalyn Middleton   1945    [id number]   77 y o    male  INPATIENT HOSPITALIST PHYSICIAN: Santosh Hernandez*  DATE OF ADMISSION: 4/13/2022 12:51 PM; LENGTH OF STAY: 1 days  *-*-*-*-*-*-*-*-*-*-*-*-*-*-*-*-*-*-*-*-*-*-*-*-*-*-*-*-*-*-*-*-*-*-*-*-*-*-*-*-*-*-*-*-*-*-*-*-*-*-*-*-*-*-  CARDIOLOGY ASSESSMENT:  · Acute high lateral ST segment elevation MI s/p SURJIT-D1 w/ residual 10% ostial LCx and 20% mid RCA, April 13, 2022  · Transient AV dissociation with junctional rhythm on admission  · Type 2 diabetes mellitus  · Dyslipidemia  · History of renal cell carcinoma status post right nephrectomy, 2007    *-*-*-*-*-*-*-*-*-*-*-*-*-*-*-*-*-*-*-*-*-*-*-*-*-*-*-*-*-*-*-*-*-*-*-*-*-*-*-*-*-*-*-*-*-*-*-*-*-*-*-*-*-*-    CURRENT CARDIAC MEDICATIONS:  Aspirin 81 mg daily, atorvastatin 80 mg daily, ticagrelor 90 mg twice daily, potassium supplementation  PERTINENT LABS AND OTHER INVESTIGATIONS:  Sodium 135, potassium 4 3, creatinine 1 02, hemoglobin 13 9, hematocrit 42 4  High sensitivity troponin 22,602 at admission  ECHOCARDIOGRAM AND OTHER CARDIAC TESTS RESULTS:  Normal low-normal left ventricular systolic function, EF around 52%, mild left atrial cavity enlargement, aortic valve sclerosis with focal calcification, trace aortic valve regurgitation, mitral valve sclerosis, mild mitral valve and pulmonic valve and trace tricuspid valve regurgitation, no pulmonary hypertension and no pericardial effusion  *-*-*-*-*-*-*-*-*-*-*-*-*-*-*-*-*-*-*-*-*-*-*-*-*-*-*-*-*-*-*-*-*-*-*-*-*-*-*-*-*-*-*-*-*-*-*-*-*-*-*-*-*-*-  Patient hemodynamically stable and appears to be recovering well from his MI  Echo shows low-normal LV function with some regional wall motion abnormality      PLAN:  -- may be transferred to medical floor  -- request ambulation under supervised setting tomorrow morning   -- will check 1 more set of high sensitivity troponin and NT proBNP level to ensure that levels are coming down  -- will begin beta-blocker therapy with metoprolol succinate at 12 5 mg b i d  beginning tomorrow  Will add ARB therapy if blood pressure would allow prior to discharge  -- anticipated discharge tomorrow  -- follow-up will be arranged with Dr Oneyda Ferrara  Will arrange for cardiac rehab following discharge  Is      *-*-*-*-*-*-*-*-*-*-*-*-*-*-*-*-*-*-*-*-*-*-*-*-*-*-*-*-*-*-*-*-*-*-*-*-*-*-*-*-*-*-*-*-*-*-*-*-*-*-*-*-*-*-  INTERVAL CHANGES / HISTORY OF PRESENT ILLNESS:   No acute events overnight  Patient reports that following the cardiac catheterization he had some heaviness in the chest but this subsided last night and has not returned back  Denies any new symptoms including no chest pain shortness of breath dizziness or lightheadedness or palpitations  Has not been able to come out of the bed yet      *-*-*-*-*-*-*-*-*-*-*-*-*-*-*-*-*-*-*-*-*-*-*-*-*-*-*-*-*-*-*-*-*-*-*-*-*-*-*-*-*-*-*-*-*-*-*-*-*-*-*-*-*-*    PERTINENT REVIEW OF SYMPTOMS:  As do noted above    *-*-*-*-*-*-*-*-*-*-*-*-*-*-*-*-*-*-*-*-*-*-*-*-*-*-*-*-*-*-*-*-*-*-*-*-*-*-*-*-*-*-*-*-*-*-*-*-*-*-*-*-*-*-  VITAL SIGNS:  Vitals:    22 0800 22 1000 22 1100 22 1500   BP: 123/68 121/66     BP Location: Left arm      Pulse: (!) 54 58     Resp: 15 15     Temp:   98 °F (36 7 °C) 98 4 °F (36 9 °C)   TempSrc:   Temporal Temporal   SpO2: 96% 97%     Weight:  86 2 kg (190 lb)     Height:  5' 11" (1 803 m)        Temp (24hrs), Av 1 °F (36 7 °C), Min:97 4 °F (36 3 °C), Max:98 6 °F (37 °C)  Current: Temperature: 98 4 °F (36 9 °C)    Weight    22 1255 22 1451 22 0600 22 1000   Weight: 88 3 kg (194 lb 10 7 oz) 86 6 kg (191 lb) 86 6 kg (190 lb 14 7 oz) 86 2 kg (190 lb)      Body mass index is 26 5 kg/m²  Wt Readings from Last 3 Encounters:   04/14/22 86 2 kg (190 lb)   01/26/22 89 4 kg (197 lb)   01/20/22 88 2 kg (194 lb 6 4 oz)      Intake/Output Summary (Last 24 hours) at 4/14/2022 1625  Last data filed at 4/14/2022 1135  Gross per 24 hour   Intake --   Output 2795 ml   Net -2795 ml          *-*-*-*-*-*-*-*-*-*-*-*-*-*-*-*-*-*-*-*-*-*-*-*-*-*-*-*-*-*-*-*-*-*-*-*-*-*-*-*-*-*-*-*-*-*-*-*-*-*-*-*-*-*-  PHYSICAL EXAM:  General Appearance:    Alert, cooperative, in no respiratory distress, appears stated age, tall well-built   Head, Eyes, ENT:    No obvious abnormality, moist mucous mebranes  Neck:   Supple, no carotid bruit or JVD   Back:     Symmetric, no curvature  Lungs:     Respirations unlabored  Clear to auscultation bilaterally,    Chest wall:    No tenderness or deformity   Heart:    Regular rate and rhythm, S1 and S2 normal, no murmur, rub  or gallop  Abdomen:     Soft, non-tender, No obvious masses, or organomegaly   Extremities:   Extremities warm, no cyanosis or edema  Right radial access site is in surgical dressing  There is no hematoma around it  Good distal pulses with warm peripheries  Skin:   Skin color, texture, turgor normal, no rashes or lesions     *-*-*-*-*-*-*-*-*-*-*-*-*-*-*-*-*-*-*-*-*-*-*-*-*-*-*-*-*-*-*-*-*-*-*-*-*-*-*-*-*-*-*-*-*-*-*-*-*-*-*-*-*-*-  TELEMETRY, LAST ECG:  Telemetry reviewed    Sinus rhythm with sinus bradycardia at times  No significant ventricula ectopy, no heart block events   Results for orders placed or performed during the hospital encounter of 04/13/22   ECG 12 lead   Result Value    Ventricular Rate 52    Atrial Rate 52    MN Interval 208    QRSD Interval 100    QT Interval 450    QTC Interval 419    P Axis 47    QRS Axis 17    T Wave Axis 67    Narrative    Sinus bradycardia  Nonspecific T wave abnormality  Abnormal ECG  When compared with ECG of 13-APR-2022 14:38,  No significant change was found  Confirmed by Dora Thorne ([id number]) on 4/13/2022 11:21:15 PM      *-*-*-*-*-*-*-*-*-*-*-*-*-*-*-*-*-*-*-*-*-*-*-*-*-*-*-*-*-*-*-*-*-*-*-*-*-*-*-*-*-*-*-*-*-*-*-*-*-*-*-*-*-*-      CURRENT SCHEDULED MEDICATIONS:    Current Facility-Administered Medications:     aspirin (ECOTRIN LOW STRENGTH) EC tablet 81 mg, 81 mg, Oral, Daily, VIRGIE Osullivan, 81 mg at 04/14/22 1000    atorvastatin (LIPITOR) tablet 80 mg, 80 mg, Oral, Daily With Dinner, Oletha Cord, DO    calcium carbonate (TUMS) chewable tablet 500 mg, 500 mg, Oral, Daily PRN, VIRGIE Verdugo, 500 mg at 04/13/22 2204    heparin (porcine) subcutaneous injection 5,000 Units, 5,000 Units, Subcutaneous, Q8H [address], 5,000 Units at 04/14/22 0615 **AND** [COMPLETED] Platelet count, , , Once, [employer], CRNP    insulin lispro (HumaLOG) 100 units/mL subcutaneous injection 1-5 Units, 1-5 Units, Subcutaneous, TID AC **AND** Fingerstick Glucose (POCT), , , TID AC, VIRGIE Dominguez    insulin lispro (HumaLOG) 100 units/mL subcutaneous injection 1-5 Units, 1-5 Units, Subcutaneous, HS, VIRGIE Dominguez    melatonin tablet 6 mg, 6 mg, Oral, HS, Argenis Ko PA-C, 6 mg at 04/13/22 2303    morphine injection 2 mg, 2 mg, Intravenous, Q4H PRN, VIRGIE Verdugo    perflutren lipid microsphere (DEFINITY) injection, 0 4 mL/min, Intravenous, Once in miryam, Virgie Echevarria PA-C    Insert peripheral IV, , , Once **AND** sodium chloride (PF) 0 9 % injection 3 mL, 3 mL, Intravenous, Q1H PRN, Esteban Herrera DO    [COMPLETED] ticagrelor (BRILINTA) tablet 180 mg, 180 mg, Oral, Once, 180 mg at 04/13/22 1308 **AND** ticagrelor (BRILINTA) tablet 90 mg, 90 mg, Oral, Q12H [address], Tequila Hodges, DO, 90 mg at 04/14/22 1000    traZODone (DESYREL) tablet 100 mg, 100 mg, Oral, HS, Starla VIRGIE Johnson, 100 mg at 04/13/22 2132 ALLERGIES:  Allergies   Allergen Reactions    Bee Venom Edema *-*-*-*-*-*-*-*-*-*-*-*-*-*-*-*-*-*-*-*-*-*-*-*-*-*-*-*-*-*-*-*-*-*-*-*-*-*-*-*-*-*-*-*-*-*-*-*-*-*-*-*-*-*  LABORATORY DATA:  I have personally reviewed pertinent labs  CMP:  Results from last 7 days   Lab Units 04/14/22 0541 04/13/22 1825 04/13/22  1300   SODIUM mmol/L 135* 133* 136   POTASSIUM mmol/L 4 3 3 4* 3 8   CHLORIDE mmol/L 102 102 100   CO2 mmol/L 25 24 27   BUN mg/dL 17 21 24   CREATININE mg/dL 1 02 0 97 1 14   CALCIUM mg/dL 8 9 8 2* 9 5        Results from last 7 days   Lab Units 04/13/22 1825 04/13/22  1300   MAGNESIUM mg/dL 1 8 1 8        Cardiac Profile:       Invalid input(s): CK, CKMBP             CBC:   Results from last 7 days   Lab Units 04/14/22 0541 04/13/22 1825 04/13/22  1300   WBC Thousand/uL 8 55  --  8 48   HEMOGLOBIN g/dL 13 9  --  14 5   HEMATOCRIT % 42 4  --  43 7   PLATELETS Thousands/uL 193 191 217     PT/INR: No results found for: PT, INR, Microbiology:          *-*-*-*-*-*-*-*-*-*-*-*-*-*-*-*-*-*-*-*-*-*-*-*-*-*-*-*-*-*-*-*-*-*-*-*-*-*-*-*-*-*-*-*-*-*-*-*-*-*-*-*-*-*-  IMAGING STUDIES REPORTS: Imaging studies results reviewed    No valid procedures specified  XR chest pa & lateral    Result Date: 1/5/2022  Impression No acute cardiopulmonary disease  Workstation performed: [id number]       *-*-*-*-*-*-*-*-*-*-*-*-*-*-*-*-*-*-*-*-*-*-*-*-*-*-*-*-*-*-*-*-*-*-*-*-*-*-*-*-*-*-*-*-*-*-*-*-*-*-*-*-*-*-    No results found for this or any previous visit  No results found for this or any previous visit  No results found for this or any previous visit  No results found for this or any previous visit         *-*-*-*-*-*-*-*-*-*-*-*-*-*-*-*-*-*-*-*-*-*-*-*-*-*-*-*-*-*-*-*-*-*-*-*-*-*-*-*-*-*-*-*-*-*-*-*-*-*-*-*-*-*-  SIGNATURES:   [unfilled]   Suha Quinteros MD

## 2022-04-14 NOTE — CASE MANAGEMENT
Case Management Assessment & Discharge Planning Note    Patient name Rigoberto Herman  Location ICU 03/ICU 26 MRN [id number]  : 1945 Date 2022       Current Admission Date: 2022  Current Admission Diagnosis:STEMI (ST elevation myocardial infarction) [facility])   Patient Active Problem List    Diagnosis Date Noted    STEM[facility]ST [facility]) ([facility] ) 2022    Pain of right thigh 2021    Strain of lumbar region 2021    Seborrheic keratoses, inflamed 2021    Acute pain of right knee 10/07/2020    [facility] DJD(carpometacarpal degenerative joint disease), localized primary, left 2020    Acute pain of right shoulder 2020    Encounter for lower extremity comparison imaging study 2020    Vascular disorder of kidney 2019    Encounter for other orthopedic aftercare 2019    Status post arthroscopy of right shoulder 2019    Incomplete tear of right rotator cuff 01/15/2019    Ganglion of flexor tendon sheath of left thumb 2018    Cervical strain 2018    S/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, right 2018    Insomnia 2018    Renal cell carcinoma of right kidney ([facility] ) 2017    Primary osteoarthritis of right knee 2017    BPH (benign prostatic hyperplasia) 2015    Vitamin D deficiency 2015    DM2 (diabetes mellitus, type 2) ([facility] ) 10/05/2012    Mixed hyperlipidemia 10/05/2012      LOS (days): 1  Geometric Mean LOS (GMLOS) (days): 2 10  Days to GMLOS:1     OBJECTIVE:    Risk of Unplanned Readmission Score: 11         Current admission status: Inpatient  Referral Reason: Other (CM consulted for disposition planning)    Preferred Pharmacy:   [address] [facility] [address], [place] - [address] [place] [address]  Phone: [phone] Fax: [facility] - [place] [address] [address] [facility] [address]  Phone: [phone] Fax: [phone]    Primary Care Provider: Yamileth Burks,     Primary Insurance: MEDICARE  Secondary Insurance: COMMERCIAL MISCELLANEOUS    ASSESSMENT:  [address], [address] [facility] Representative - Spouse   Primary Phone: [phone] [facility]  Home Phone: [phone]               Advance Directives  Does patient have a [address]?: No  Was patient offered paperwork?: Yes (Declined)  Does patient currently have a Health Care decision maker?: Yes, please see Health Care Proxy section  Does patient have Advance Directives?: No  Was patient offered paperwork?: Yes (declined)  Primary Contact: Wife Leo Silva         Readmission Root Cause  30 Day Readmission: No    Patient Information  Admitted from[de-identified] Home  Mental Status: Alert  During Assessment patient was accompanied by: Daughter,Son  Assessment information provided by[de-identified] Patient,Daughter,Son  Primary Caregiver: Self  Support Systems: Self,Spouse/significant [address] [place] [address] of Residence: [address] [place] [address] do you live in?: [address] [facility] entry access options   Select all that apply : Stairs  Number of steps to enter home : 2  Do the steps have railings?: No  Type of Current Residence: [address] [place] [address] home  Upon entering residence, is there a bedroom on the main floor (no further steps)?: No  A bedroom is located on the following floor levels of residence (select all that apply):: 2nd Floor  Upon entering residence, is there a bathroom on the main floor (no further steps)?: Yes  Number of steps to 2nd floor from main floor: One Flight  In the last 12 months, was there a time when you were not able to pay the mortgage or rent on time?: No  In the last 12 months, how many places have you lived?: 1  In the last 12 months, was there a time when you did not have a steady place to sleep or slept in a shelter (including now)?: No  Homeless/housing insecurity resource given?: N/A  Living Arrangements: Lives w/ Spouse/significant other  Is patient a ?: No    Activities of Daily Living Prior to Admission  Functional Status: Independent  Completes ADLs independently?: Yes  Ambulates independently?: Yes  Does patient use assisted devices?: No  Does patient currently own DME?: Yes  What DME does the patient currently own?: Larissa Sewell (Comment) (Crutches)  Does patient have a history of Outpatient Therapy (PT/OT)?: Yes ([facility] Proud following knee surgery 2015 & 2017)  Does the patient have a history of Short-Term Rehab?: No  Does patient have a history of HHC?: Yes (Home PT for knee surgeries)  Does patient currently have [facility]?: No         Patient Information Continued  Income Source: Employed (Works part time and gets SSI)  Does patient have prescription coverage?: Yes  Within the past 12 months, you worried that your food would run out before you got the money to buy more : Never true  Within the past 12 months, the food you bought just didnt last and you didnt have money to get more : Never true  Food insecurity resource given?: N/A  Does patient receive dialysis treatments?: No  Does patient have a history of substance abuse?: No  Does patient have a history of Mental Health Diagnosis?: No         Means of Transportation  Means of Transport to Appts[de-identified] Drives Self  In the past 12 months, has lack of transportation kept you from medical appointments or from getting medications?: No  In the past 12 months, has lack of transportation kept you from meetings, work, or from getting things needed for daily living?: No  Was application for public transport provided?: N/A        DISCHARGE DETAILS:    Discharge planning discussed with[de-identified] Patient, daughter, and son-in-law  Freedom of Choice: Yes  Comments - Freedom of Choice: Patient would like to go home  CM contacted family/caregiver?: Yes             Contacts  Patient Contacts: Daughter and Son-in-law  Relationship to Patient[de-identified] Family  Contact Method:  In Person  Reason/Outcome: Other (Comment) (In room during assessment, patient approved for family to be present while speaking w/ CM)    [address]         Is the patient interested in [facility] at discharge?: No    DME Referral Provided  Referral made for DME?: No         Would you like to participate in our [address] service program?  : No - Declined    Treatment Team Recommendation: Home  Discharge Destination Plan[de-identified] Home  Transport at Discharge : Family           ETA of Transport (Date): 04/14/22           Accompanied by: Family member (Either children or wife)

## 2022-04-14 NOTE — ASSESSMENT & PLAN NOTE
· C/O intermittent chest pain for the past few days  Today with increased mid-sternal chest pain   EMS activated and found to have ST elevation MI   · S/p SURJIT to D1   · Continue on Brilinta, statin, asa (starting tomorrow), and hold on beta blocker for now due to bradycardia  · Cardiology following  · Morphine PRN for pain

## 2022-04-14 NOTE — PROGRESS NOTES
[address]  Progress Note - Johana Ohara [birth year], 77 y o  male MRN: [id number]  Unit/Bed#: ICU 03 Encounter: [id number]  Primary Care Provider: Gail Rosario DO   Date and time admitted to hospital: 4/13/2022 12:51 PM    * STEMI (ST elevation myocardial infarction) [facility])  Assessment & Plan  · C/O intermittent chest pain for the past few days  Today with increased mid-sternal chest pain  EMS activated and found to have ST elevation MI   · S/p SURJIT to D1   · Continue on Brilinta, statin, asa (starting tomorrow), and hold on beta blocker for now due to bradycardia  · Cardiology following  · Morphine PRN for pain    DM2 (diabetes mellitus, type 2) [facility])  Assessment & Plan  Lab Results   Component Value Date    HGBA1C 6 9 (H) 12/29/2021       Recent Labs     04/13/22  1522 04/13/22  2131   POCGLU 144* 144*       Blood Sugar Average: Last 72 hrs:  · (P) 144Metformin on hold  · SS coverage  · ACHS BG checks     Mixed hyperlipidemia  Assessment & Plan  · Continue on statin   ·     Insomnia  Assessment & Plan  · Continue on Trazadone  · Continue Melatonin HS      ----------------------------------------------------------------------------------------  HPI/24hr events: Pt reports chest discomfort/ upset stomach & gas overnight  EKG unchanged  Troponin sent  Morphine PRN for pain  Tums ordered  Melatonin added to sleep regimen  Patient appropriate for transfer out of the ICU today?: No  Disposition: Continue Critical Care      Code Status: Level 1 - Full Code  ---------------------------------------------------------------------------------------  SUBJECTIVE  Pt reported chest discomfort vs upset stomach and gas  Pt stated that he "was afraid to go to sleep because he though he may not wake up again " Pt denies SOB, Nausea, cough, headache  Review of Systems   Constitutional: Negative for appetite change, diaphoresis and fatigue  HENT: Negative      Respiratory: Negative for cough, choking, chest tightness, shortness of breath and wheezing  Cardiovascular: Positive for chest pain and palpitations  Negative for leg swelling  Gastrointestinal: Positive for abdominal pain  Negative for nausea and vomiting  Musculoskeletal: Negative  Neurological: Negative for dizziness, numbness and headaches  Psychiatric/Behavioral: Negative  Review of systems was reviewed and negative unless stated above in HPI/24-hour events   ---------------------------------------------------------------------------------------  OBJECTIVE    Vitals   Vitals:    22 0000 22 0100 22 0200 22 0300   BP: 132/73 119/88 121/63 121/71   BP Location: Right arm Right arm Right arm Right arm   Pulse: 56 60 (!) 50 (!) 46   Resp: (!) 30 (!) 39 (!) 11 14   Temp:       TempSrc:       SpO2: 98% 97% 96% 96%   Weight:       Height:         Temp (24hrs), Av 2 °F (36 8 °C), Min:97 7 °F (36 5 °C), Max:98 6 °F (37 °C)  Current: Temperature: 98 6 °F (37 °C)          Respiratory:  SpO2: SpO2: 96 %  Nasal Cannula O2 Flow Rate (L/min): 2 L/min    Invasive/non-invasive ventilation settings   Respiratory  Report   Lab Data (Last 4 hours)    None         O2/Vent Data (Last 4 hours)    None                Physical Exam  Vitals and nursing note reviewed  Constitutional:       General: He is not in acute distress  Appearance: Normal appearance  He is normal weight  HENT:      Head: Normocephalic  Mouth/Throat:      Mouth: Mucous membranes are moist       Pharynx: Oropharynx is clear  Eyes:      Pupils: Pupils are equal, round, and reactive to light  Cardiovascular:      Rate and Rhythm: Bradycardia present  Pulses: Normal pulses  Heart sounds: Normal heart sounds  No murmur heard  Pulmonary:      Effort: No respiratory distress  Breath sounds: Normal breath sounds  Abdominal:      General: Bowel sounds are normal  There is no distension        Palpations: Abdomen is soft       Tenderness: There is no abdominal tenderness  Musculoskeletal:         General: No swelling  Skin:     General: Skin is warm and dry  Capillary Refill: Capillary refill takes less than 2 seconds  Neurological:      Mental Status: He is alert and oriented to person, place, and time  Psychiatric:         Mood and Affect: Mood normal          Behavior: Behavior normal              Laboratory and Diagnostics:  Results from last 7 days   Lab Units 04/13/22 1825 04/13/22  1300   WBC Thousand/uL  --  8 48   HEMOGLOBIN g/dL  --  14 5   HEMATOCRIT %  --  43 7   PLATELETS Thousands/uL 191 217   NEUTROS PCT %  --  48   MONOS PCT %  --  9     Results from last 7 days   Lab Units 04/13/22  1825 04/13/22  1300   SODIUM mmol/L 133* 136   POTASSIUM mmol/L 3 4* 3 8   CHLORIDE mmol/L 102 100   CO2 mmol/L 24 27   ANION GAP mmol/L 7 9   BUN mg/dL 21 24   CREATININE mg/dL 0 97 1 14   CALCIUM mg/dL 8 2* 9 5   GLUCOSE RANDOM mg/dL 156* 156*     Results from last 7 days   Lab Units 04/13/22 1825 04/13/22  1300   MAGNESIUM mg/dL 1 8 1 8      Results from last 7 days   Lab Units 04/13/22  1300   INR  0 97   PTT seconds 24              ABG:    VBG:          Micro        EKG: reviewed  Imaging: I have personally reviewed pertinent reports  Intake and Output  I/O       04/12 0701  04/13 0700 04/13 0701 04/14 0700    Urine (mL/kg/hr)  1470    Total Output  1470    Net  -1470                Height and Weights   Height: 5' 11" (180 3 cm)  IBW (Ideal Body Weight): 75 3 kg  Body mass index is 26 64 kg/m²    Weight (last 2 days)     Date/Time Weight    04/13/22 1451 86 6 (191)    04/13/22 1255 88 3 (194 67)            Nutrition       Diet Orders   (From admission, onward)             Start     Ordered    04/13/22 1452  Diet Cardiovascular; Cardiac Cath; Consistent Carbohydrate Diet Level 2 (5 carb servings/75 grams CHO/meal)  Diet effective now        References:    Nutrtion Support Algorithm Enteral vs  Parenteral Question Answer Comment   Diet Type Cardiovascular    Cardiac Cardiac Cath    Other Restriction(s): Consistent Carbohydrate Diet Level 2 (5 carb servings/75 grams CHO/meal)    RD to adjust diet per protocol?  Yes        04/13/22 1451                  Active Medications  Scheduled Meds:  Current Facility-Administered Medications   Medication Dose Route Frequency Provider Last Rate    aspirin  81 mg Oral Daily VIRGIE Carmen      atorvastatin  80 mg Oral Daily With Brigette Phelps, DO      calcium carbonate  500 mg Oral Daily PRN VIRGIE Madden      heparin (porcine)  5,000 Units Subcutaneous Q8H [address] BlVIRGIE Brown      insulin lispro  1-5 Units Subcutaneous TID AC Starla VIRGIE Johnson      insulin lispro  1-5 Units Subcutaneous HS Aleja Dinh Grand haveVIRGIE gautam      melatonin  6 mg Oral HS Micha Adams PA-C      morphine injection  2 mg Intravenous Q4H PRN VIRGIE Madden      sodium chloride (PF)  3 mL Intravenous Q1H PRN Jennyfer Ary, DO      ticagrelor  90 mg Oral Q12H [address] Jennyfer Ary, DO      traZODone  100 mg Oral HS Starla VIRGIE Johnson       Continuous Infusions:     PRN Meds:   calcium carbonate, 500 mg, Daily PRN  morphine injection, 2 mg, Q4H PRN  sodium chloride (PF), 3 mL, Q1H PRN        Invasive Devices Review  Invasive Devices  Report    Peripheral Intravenous Line            Peripheral IV 04/13/22 Left Antecubital 1 day    Peripheral IV 04/13/22 Left Forearm 1 day                Rationale for remaining devices:   ---------------------------------------------------------------------------------------  Advance Directive and Living Will:      Power of :    POLST:    ---------------------------------------------------------------------------------------  Care Time Delivered:   Upon my evaluation, this patient had a high probability of imminent or life-threatening deterioration due to STEMI, which required my direct attention, intervention, and personal management  I have personally provided 10 minutes (1900 to 1915) of critical care time, exclusive of procedures, teaching, family meetings, and any prior time recorded by providers other than myself  VIRGIE Fisher      Portions of the record may have been created with voice recognition software  Occasional wrong word or "sound a like" substitutions may have occurred due to the inherent limitations of voice recognition software    Read the chart carefully and recognize, using context, where substitutions have occurred

## 2022-04-14 NOTE — ASSESSMENT & PLAN NOTE
Lab Results   Component Value Date    HGBA1C 6 9 (H) 12/29/2021       Recent Labs     04/13/22  2131 04/14/22  0705 04/14/22  1054 04/14/22  1627   POCGLU 144* 156* 139 126       Blood Sugar Average: Last 72 hrs:  (P) 141 8     · Metformin on hold  · SS coverage  · ACHS BG checks

## 2022-04-14 NOTE — ASSESSMENT & PLAN NOTE
· S/p SURJIT to D1 4/13/22  Per report:  1st Diag lesion is 90% stenosed  Ost Cx lesion is 10% stenosed  Mid RCA lesion is 20% stenosed  Single vessel branch disease involving D1; this was the culprit for the lateral ST elevation MI  Successful PCI, D1, with a reduction in stenosis from 90% to 0% following placement of a 2 [id number] SURJIT  Plan: DAPT for 1 year; continue statin; beta blocker; cardiac rehabilitation      · Continue on DAPT with ASA and Brilinta, high intensity statin, added on low dose BB and losartan at discharge   · Cardiology input appreciated, appropriate for discharge - referral to Cardiology for follow up, Cardiac rehab

## 2022-04-14 NOTE — PLAN OF CARE
Problem: CARDIOVASCULAR - ADULT  Goal: Maintains optimal cardiac output and hemodynamic stability  Description: INTERVENTIONS:  - Monitor I/O, vital signs and rhythm  - Monitor for S/S and trends of decreased cardiac output  - Administer and titrate ordered vasoactive medications to optimize hemodynamic stability  - Assess quality of pulses, skin color and temperature  - Assess for signs of decreased coronary artery perfusion  - Instruct patient to report change in severity of symptoms  4/14/2022 0807 by Tavo Catherine RN  Outcome: Progressing  4/14/2022 0806 by Tavo Catherine RN  Outcome: Progressing     Problem: CARDIOVASCULAR - ADULT  Goal: Absence of cardiac dysrhythmias or at baseline rhythm  Description: INTERVENTIONS:  - Continuous cardiac monitoring, vital signs, obtain 12 lead EKG if ordered  - Administer antiarrhythmic and heart rate control medications as ordered  - Monitor electrolytes and administer replacement therapy as ordered  4/14/2022 0807 by Tavo Catherine RN  Outcome: Progressing  4/14/2022 0806 by Tavo Catherine RN  Outcome: Progressing     Problem: PAIN - ADULT  Goal: Verbalizes/displays adequate comfort level or baseline comfort level  Description: Interventions:  - Encourage patient to monitor pain and request assistance  - Assess pain using appropriate pain scale  - Administer analgesics based on type and severity of pain and evaluate response  - Implement non-pharmacological measures as appropriate and evaluate response  - Consider cultural and social influences on pain and pain management  - Notify physician/advanced practitioner if interventions unsuccessful or patient reports new pain  Outcome: Progressing     Problem: INFECTION - ADULT  Goal: Absence or prevention of progression during hospitalization  Description: INTERVENTIONS:  - Assess and monitor for signs and symptoms of infection  - Monitor lab/diagnostic results  - Monitor all insertion sites, i e  indwelling lines, tubes, and drains  - Monitor endotracheal if appropriate and nasal secretions for changes in amount and color  - [place] appropriate cooling/warming therapies per order  - Administer medications as ordered  - Instruct and encourage patient and family to use good hand hygiene technique  - Identify and instruct in appropriate isolation precautions for identified infection/condition  Outcome: Progressing     Problem: INFECTION - ADULT  Goal: Absence of fever/infection during neutropenic period  Description: INTERVENTIONS:  - Monitor WBC    Outcome: Progressing     Problem: Knowledge Deficit  Goal: Patient/family/caregiver demonstrates understanding of disease process, treatment plan, medications, and discharge instructions  Description: Complete learning assessment and assess knowledge base    Interventions:  - Provide teaching at level of understanding  - Provide teaching via preferred learning methods  Outcome: Progressing

## 2022-04-15 VITALS
SYSTOLIC BLOOD PRESSURE: 137 MMHG | DIASTOLIC BLOOD PRESSURE: 65 MMHG | WEIGHT: 190.04 LBS | RESPIRATION RATE: 16 BRPM | OXYGEN SATURATION: 98 % | HEIGHT: 71 IN | HEART RATE: 79 BPM | TEMPERATURE: 97.5 F | BODY MASS INDEX: 26.6 KG/M2

## 2022-04-15 PROBLEM — Z85.528 HISTORY OF RENAL CELL CARCINOMA: Status: ACTIVE | Noted: 2022-04-15

## 2022-04-15 LAB
ANION GAP SERPL CALCULATED.3IONS-SCNC: 11 MMOL/L (ref 4–13)
BUN SERPL-MCNC: 18 MG/DL (ref 5–25)
CALCIUM SERPL-MCNC: 9.2 MG/DL (ref 8.3–10.1)
CHLORIDE SERPL-SCNC: 101 MMOL/L (ref 100–108)
CO2 SERPL-SCNC: 22 MMOL/L (ref 21–32)
CREAT SERPL-MCNC: 1.14 MG/DL (ref 0.6–1.3)
GFR SERPL CREATININE-BSD FRML MDRD: 62 ML/MIN/1.73SQ M
GLUCOSE SERPL-MCNC: 119 MG/DL (ref 65–140)
GLUCOSE SERPL-MCNC: 141 MG/DL (ref 65–140)
GLUCOSE SERPL-MCNC: 162 MG/DL (ref 65–140)
POTASSIUM SERPL-SCNC: 4 MMOL/L (ref 3.5–5.3)
SODIUM SERPL-SCNC: 134 MMOL/L (ref 136–145)

## 2022-04-15 PROCEDURE — 99232 SBSQ HOSP IP/OBS MODERATE 35: CPT | Performed by: INTERNAL MEDICINE

## 2022-04-15 PROCEDURE — 82948 REAGENT STRIP/BLOOD GLUCOSE: CPT

## 2022-04-15 PROCEDURE — 99239 HOSP IP/OBS DSCHRG MGMT >30: CPT | Performed by: FAMILY MEDICINE

## 2022-04-15 RX ORDER — EZETIMIBE 10 MG/1
10 TABLET ORAL
Status: DISCONTINUED | OUTPATIENT
Start: 2022-04-15 | End: 2022-04-15 | Stop reason: HOSPADM

## 2022-04-15 RX ORDER — ATORVASTATIN CALCIUM 80 MG/1
80 TABLET, FILM COATED ORAL
Qty: 30 TABLET | Refills: 0 | Status: SHIPPED | OUTPATIENT
Start: 2022-04-15 | End: 2022-04-29 | Stop reason: SDUPTHER

## 2022-04-15 RX ORDER — LISINOPRIL 2.5 MG/1
2.5 TABLET ORAL DAILY
Qty: 30 TABLET | Refills: 0 | Status: SHIPPED | OUTPATIENT
Start: 2022-04-16 | End: 2022-04-29 | Stop reason: SDUPTHER

## 2022-04-15 RX ORDER — EZETIMIBE 10 MG/1
10 TABLET ORAL
Qty: 30 TABLET | Refills: 0 | Status: SHIPPED | OUTPATIENT
Start: 2022-04-15 | End: 2022-04-29 | Stop reason: SDUPTHER

## 2022-04-15 RX ORDER — LISINOPRIL 5 MG/1
2.5 TABLET ORAL DAILY
Status: DISCONTINUED | OUTPATIENT
Start: 2022-04-15 | End: 2022-04-15 | Stop reason: HOSPADM

## 2022-04-15 RX ORDER — ASPIRIN 81 MG/1
81 TABLET ORAL DAILY
Qty: 30 TABLET | Refills: 0 | Status: SHIPPED | OUTPATIENT
Start: 2022-04-15

## 2022-04-15 RX ADMIN — Medication 12.5 MG: at 11:16

## 2022-04-15 RX ADMIN — HEPARIN SODIUM 5000 UNITS: 5000 INJECTION INTRAVENOUS; SUBCUTANEOUS at 05:31

## 2022-04-15 RX ADMIN — LISINOPRIL 2.5 MG: 5 TABLET ORAL at 11:16

## 2022-04-15 RX ADMIN — ASPIRIN 81 MG: 81 TABLET, COATED ORAL at 07:50

## 2022-04-15 RX ADMIN — TICAGRELOR 90 MG: 90 TABLET ORAL at 07:50

## 2022-04-15 NOTE — NURSING NOTE
Pt discharged from ICU after being seen by [facility] and Cardiology  Per cardiology, pt was ambulated prior to discharge and HR remained stable in the 70s-80s  IV removed, belongings gathered, and discharge instructions were reviewed with pt and pt's daughter   Pt was escorted off the unit with Nany ALLEN

## 2022-04-15 NOTE — ASSESSMENT & PLAN NOTE
Per record review, "History of right renal cell carcinoma status post robot assisted laparoscopic right partial nephrectomy" 2017  Creatinine stable

## 2022-04-15 NOTE — DISCHARGE SUMMARY
[address] [facility]  Discharge- Lani Gorman 1945, 77 y o  male MRN: [id number]  Unit/Bed#: ICU 14 Encounter: [id number]  Primary Care Provider: Gemini Silverio DO   Date and time admitted to hospital: 4/13/2022 12:51 PM    * STEMI (ST elevation myocardial infarction) [facility])  Assessment & Plan    · S/p SURJIT to D1 4/13/22  Per report:  1st Diag lesion is 90% stenosed  Ost Cx lesion is 10% stenosed  Mid RCA lesion is 20% stenosed  Single vessel branch disease involving D1; this was the culprit for the lateral ST elevation MI  Successful PCI, D1, with a reduction in stenosis from 90% to 0% following placement of a 2 [id number] SURJIT  Plan: DAPT for 1 year; continue statin; beta blocker; cardiac rehabilitation      · Continue on DAPT with ASA and Brilinta, high intensity statin, added on low dose BB and losartan at discharge   · Cardiology input appreciated, appropriate for discharge - referral to Cardiology for follow up, Cardiac rehab         History of renal cell carcinoma  Assessment & Plan  Per record review, "History of right renal cell carcinoma status post robot assisted laparoscopic right partial nephrectomy" 2017  Creatinine stable    Mixed hyperlipidemia  Assessment & Plan  · Continue on statin - increase from simvastatin to atorvastatin 80 mg daily  · Ezetimibe added to therapy  ·       Insomnia  Assessment & Plan  · Continue on Trazadone  · Continue Melatonin HS      DM2 (diabetes mellitus, type 2) ([facility])  Assessment & Plan  Lab Results   Component Value Date    HGBA1C 6 9 (H) 12/29/2021       Recent Labs     04/13/22  2131 04/14/22  0705 04/14/22  1054 04/14/22  1627   POCGLU 144* 156* 139 126       Blood Sugar Average: Last 72 hrs:  (P) 141 8     · Metformin on hold  · SS coverage  · ACHS BG checks          Medical Problems             Resolved Problems  Date Reviewed: 4/15/2022    None              Discharging Physician / Practitioner: Nelia Foley MD  PCP: Raquel Hussein Uday Hernandes DO  Admission Date:   Admission Orders (From admission, onward)     Ordered        04/13/22 1447  Inpatient Admission  Once                      Discharge Date: 04/15/22    Consultations During Hospital Stay:  · Cardiology     Procedures Performed:       Cardiac cath 4/13/22    · 1st Diag lesion is 90% stenosed  · Ost Cx lesion is 10% stenosed  · Mid RCA lesion is 20% stenosed  Single vessel branch disease involving D1; this was the culprit for the lateral ST elevation MI  Successful PCI, D1, with a reduction in stenosis from 90% to 0% following placement of a 2 [id number] SURJIT  Plan: DAPT for 1 year; continue statin; beta blocker; cardiac rehabilitation  XR chest 1 view portable   Final Result by Kee Burgess MD (04/13 150)      No focal consolidation, pleural effusion, or pneumothorax  Workstation performed: [id number]               Significant Findings / Test Results:   Results from last 7 days   Lab Units 04/14/22  0541   WBC Thousand/uL 8 55   HEMOGLOBIN g/dL 13 9   HEMATOCRIT % 42 4   PLATELETS Thousands/uL 193     Results from last 7 days   Lab Units 04/14/22  1712   SODIUM mmol/L 134*   POTASSIUM mmol/L 4 0   CHLORIDE mmol/L 101   CO2 mmol/L 22   BUN mg/dL 18   CREATININE mg/dL 1 14   CALCIUM mg/dL 9 2         Incidental Findings:   · None     Test Results Pending at Discharge (will require follow up): · None      Outpatient Tests Requested:  · None    Complications:  None    Reason for Admission: chest pain     Hospital Course:   Vandana Magallanes is a 77 y o  male patient who originally presented to the hospital on 4/13/2022 due to chest pain  STEMI alert was initiated  The patient subsequently underwent cardiac catheterization with an effusion noted in the 1 status post successful PCI on 04/13/2022  Following the procedure the patient reported significant overall improvement and remained hemodynamically stable  New medications were initiated as above    The patient was discharged in improved and stable condition with the referral to Cardiology and cardiac rehab  Return precautions and plan of care discussed  Please see above list of diagnoses and related plan for additional information  Condition at Discharge: stable    Discharge Day Visit / Exam:   /65 (BP Location: Left arm)   Pulse 79   Temp 97 5 °F (36 4 °C) (Temporal)   Resp 16   Ht 5' 11" (1 803 m)   Wt 86 2 kg (190 lb 0 6 oz)   SpO2 98%   BMI 26 50 kg/m²     Physical Exam  Constitutional:       Appearance: Normal appearance  HENT:      Head: Normocephalic and atraumatic  Nose: No congestion  Mouth/Throat:      Pharynx: Oropharynx is clear  Eyes:      Conjunctiva/sclera: Conjunctivae normal    Cardiovascular:      Rate and Rhythm: Normal rate and regular rhythm  Heart sounds: No murmur heard  Pulmonary:      Effort: No respiratory distress  Breath sounds: No wheezing or rales  Abdominal:      General: There is no distension  Tenderness: There is no abdominal tenderness  There is no guarding  Musculoskeletal:      Right lower leg: No edema  Left lower leg: No edema  Skin:     General: Skin is warm and dry  Neurological:      Mental Status: He is oriented to person, place, and time  Psychiatric:         Mood and Affect: Mood normal            Discharge instructions/Information to patient and family:   See after visit summary for information provided to patient and family  Provisions for Follow-Up Care:  See after visit summary for information related to follow-up care and any pertinent home health orders  Disposition:   Home    Planned Readmission: No     Discharge Statement:  I spent 35 minutes discharging the patient  This time was spent on the day of discharge  I had direct contact with the patient on the day of discharge   Greater than 50% of the total time was spent examining patient, answering all patient questions, arranging and discussing plan of care with patient as well as directly providing post-discharge instructions  Additional time then spent on discharge activities  Discharge Medications:  See after visit summary for reconciled discharge medications provided to patient and/or family        **Please Note: This note may have been constructed using a voice recognition system**

## 2022-04-15 NOTE — PROGRESS NOTES
CARDIOLOGY INPATIENT FOLLOW-UP PROGRESS NOTE  *-*-*-*-*-*-*-*-*-*-*-*-*-*-*-*-*-*-*-*-*-*-*-*-*-*-*-*-*-*-*-*-*-*-*-*-*-*-*-*-*-*-*-*-*-*-*-*-*-*-*-*-*-*-  GBQRIACKF DATE: 04/15/22 10:34 AM   AUTHOR: Francesco Joseph MD  PATIENT: Devora Rodriguez   1945    [id number]   77 y o    male  INPATIENT HOSPITALIST PHYSICIAN: Lamar Hernandez*  DATE OF ADMISSION: 4/13/2022 12:51 PM; LENGTH OF STAY: 2 days  *-*-*-*-*-*-*-*-*-*-*-*-*-*-*-*-*-*-*-*-*-*-*-*-*-*-*-*-*-*-*-*-*-*-*-*-*-*-*-*-*-*-*-*-*-*-*-*-*-*-*-*-*-*-    CARDIOLOGY ASSESSMENT:  1  Acute high lateral ST segment elevation MI s/p SURJIT-D1 w/ residual 10% ostial LCx and 20% mid RCA, April 13, 2022  2  Transient AV dissociation with junctional rhythm on admission  3  Type 2 diabetes mellitus  4  Dyslipidemia  5  History of renal cell carcinoma status post right nephrectomy, 2007    *-*-*-*-*-*-*-*-*-*-*-*-*-*-*-*-*-*-*-*-*-*-*-*-*-*-*-*-*-*-*-*-*-*-*-*-*-*-*-*-*-*-*-*-*-*-*-*-*-*-*-*-*-*-  CURRENT CARDIAC MEDICATIONS:  Aspirin 81 mg daily, atorvastatin 80 mg daily, ticagrelor 90 mg twice daily, potassium supplementation      PERTINENT LABS AND OTHER INVESTIGATIONS:  labs yesterday:  Sodium 135, potassium 4 3, creatinine 1 02, hemoglobin 13 9, hematocrit 42 4  High sensitivity troponin 22,602 at admission  High sensitivity troponin yesterday was 7907, NT proBNP was 967     ECHOCARDIOGRAM AND OTHER CARDIAC TESTS RESULTS:  Normal low-normal left ventricular systolic function, EF around 52%, mild left atrial cavity enlargement, aortic valve sclerosis with focal calcification, trace aortic valve regurgitation, mitral valve sclerosis, mild mitral valve and pulmonic valve and trace tricuspid valve regurgitation, no pulmonary hypertension and no pericardial effusion      *-*-*-*-*-*-*-*-*-*-*-*-*-*-*-*-*-*-*-*-*-*-*-*-*-*-*-*-*-*-*-*-*-*-*-*-*-*-*-*-*-*-*-*-*-*-*-*-*-*-*-*-*-*-  Patient remains hemodynamically stable with no angina and no symptoms of post MI complications  Echocardiogram yesterday showed low-normal left ventricular function  Has resting bradycardia but heart rate augments to 60s to 70s with ambulation around the unit  PLAN:  -- we are adding beta-blocker therapy with metoprolol tartrate at 12 5 mg twice daily with plan to further increase the dose at follow-up  -- will also add low-dose ACE-inhibitor therapy with lisinopril at 2 5 mg daily with plan to increase it in the outpatient setting   -- adding ezetimibe 10 mg daily to the regimen  -- will continue aspirin, atorvastatin and ticagrelor  -- patient can be discharged to home 1-2 hours after receiving additional started medications and he is ambulating without symptoms  -- Please provide patient instructions about post MI care  -- Will need BMI checked in a week to reassess renal function (Patient is s/p nephrectomy)  -- Followup will be arranged with Dr Darling Cooper and patient will be referred for cardiac rehab  Patient is advised to not to return to work until he has his cardiology followup and he is cleared to return to work  *-*-*-*-*-*-*-*-*-*-*-*-*-*-*-*-*-*-*-*-*-*-*-*-*-*-*-*-*-*-*-*-*-*-*-*-*-*-*-*-*-*-*-*-*-*-*-*-*-*-*-*-*-*-  INTERVAL CHANGES / HISTORY OF PRESENT ILLNESS:   No acute events overnight  Patient has had no chest pain shortness of breath dizziness or lightheadedness or palpitations  Is hoping to be discharged today      *-*-*-*-*-*-*-*-*-*-*-*-*-*-*-*-*-*-*-*-*-*-*-*-*-*-*-*-*-*-*-*-*-*-*-*-*-*-*-*-*-*-*-*-*-*-*-*-*-*-*-*-*-*    PERTINENT REVIEW OF SYMPTOMS:  As noted above in HPI    *-*-*-*-*-*-*-*-*-*-*-*-*-*-*-*-*-*-*-*-*-*-*-*-*-*-*-*-*-*-*-*-*-*-*-*-*-*-*-*-*-*-*-*-*-*-*-*-*-*-*-*-*-*-  VITAL SIGNS:  Vitals:    04/15/22 0700 04/15/22 0800 04/15/22 0900 04/15/22 1000   BP: 130/68 149/77 142/67 133/66   Pulse: (!) 54 62 66 64   Resp: 19 (!) 31 17 15   Temp: 97 6 °F (36 4 °C)      TempSrc: Temporal      SpO2: 96% 97% 96% 98%   Weight:       Height: Temp (24hrs), Av 9 °F (36 6 °C), Min:97 6 °F (36 4 °C), Max:98 4 °F (36 9 °C)  Current: Temperature: 97 6 °F (36 4 °C)    Weight    22 1255 22 1451 22 0600 22 1000   Weight: 88 3 kg (194 lb 10 7 oz) 86 6 kg (191 lb) 86 6 kg (190 lb 14 7 oz) 86 2 kg (190 lb)    04/15/22 0537   Weight: 86 2 kg (190 lb 0 6 oz)      Body mass index is 26 5 kg/m²  Wt Readings from Last 3 Encounters:   04/15/22 86 2 kg (190 lb 0 6 oz)   22 89 4 kg (197 lb)   22 88 2 kg (194 lb 6 4 oz)      Intake/Output Summary (Last 24 hours) at 4/15/2022 1034  Last data filed at 4/15/2022 0601  Gross per 24 hour   Intake 240 ml   Output 800 ml   Net -560 ml          *-*-*-*-*-*-*-*-*-*-*-*-*-*-*-*-*-*-*-*-*-*-*-*-*-*-*-*-*-*-*-*-*-*-*-*-*-*-*-*-*-*-*-*-*-*-*-*-*-*-*-*-*-*-  PHYSICAL EXAM:  General Appearance:    Alert, cooperative, in no respiratory distress, appears stated age   Head, Eyes, ENT:    No obvious abnormality, moist mucous mebranes  Neck:   Supple, no carotid bruit or JVD   Back:     Symmetric, no curvature  Lungs:     Respirations unlabored  Clear to auscultation bilaterally,    Chest wall:    No tenderness or deformity   Heart:    Regular rate and rhythm, S1 and S2 normal, no murmur, rub  or gallop  Abdomen:     Soft, non-tender, No obvious masses, or organomegaly   Extremities:   Extremities warm, no cyanosis or edema    Skin:   Skin color, texture, turgor normal, no rashes or lesions     *-*-*-*-*-*-*-*-*-*-*-*-*-*-*-*-*-*-*-*-*-*-*-*-*-*-*-*-*-*-*-*-*-*-*-*-*-*-*-*-*-*-*-*-*-*-*-*-*-*-*-*-*-*-  TELEMETRY, LAST ECG:  Telemetry reviewed    Sinus rhythm with sinus bradycardia  No heart block  No ventricular ectopy   Results for orders placed or performed during the hospital encounter of 22   ECG 12 lead   Result Value    Ventricular Rate 52    Atrial Rate 52    SC Interval 208    QRSD Interval 100    QT Interval 450    QTC Interval 419    P Axis 47    QRS Axis 17    T Wave Axis 67 Narrative    Sinus bradycardia  Nonspecific T wave abnormality  Abnormal ECG  When compared with ECG of 13-APR-2022 14:38,  No significant change was found  Confirmed by Dora Thorne ([id number]) on 4/13/2022 11:21:15 PM      *-*-*-*-*-*-*-*-*-*-*-*-*-*-*-*-*-*-*-*-*-*-*-*-*-*-*-*-*-*-*-*-*-*-*-*-*-*-*-*-*-*-*-*-*-*-*-*-*-*-*-*-*-*-      CURRENT SCHEDULED MEDICATIONS:    Current Facility-Administered Medications:     aspirin (ECOTRIN LOW STRENGTH) EC tablet 81 mg, 81 mg, Oral, Daily, [employer] VIRGIE Trujillo, 81 mg at 04/15/22 0750    atorvastatin (LIPITOR) tablet 80 mg, 80 mg, Oral, Daily With Dinner, Uriah Caba DO, 80 mg at 04/14/22 1700    calcium carbonate (TUMS) chewable tablet 500 mg, 500 mg, Oral, Daily PRN, VIRGIE Justice, 500 mg at 04/13/22 2204    heparin (porcine) subcutaneous injection 5,000 Units, 5,000 Units, Subcutaneous, Q8H [facility], 5,000 Units at 04/15/22 0531 **AND** [COMPLETED] Platelet count, , , Once, [employer]    insulin lispro (HumaLOG) 100 units/mL subcutaneous injection 1-5 Units, 1-5 Units, Subcutaneous, TID AC **AND** Fingerstick Glucose (POCT), , , TID AC, VIRGIE Dominguez    insulin lispro (HumaLOG) 100 units/mL subcutaneous injection 1-5 Units, 1-5 Units, Subcutaneous, HS, VIRGIE Dominguez    melatonin tablet 6 mg, 6 mg, Oral, HS, SYLVAIN Buck-C, 6 mg at 04/14/22 2127    morphine injection 2 mg, 2 mg, Intravenous, Q4H PRN, Robbie Edy, CRNP    perflutren lipid microsphere (DEFINITY) injection, 0 4 mL/min, Intravenous, Once in imaging, Cristina Pena PA-C    Insert peripheral IV, , , Once **AND** sodium chloride (PF) 0 9 % injection 3 mL, 3 mL, Intravenous, Q1H PRN, Akil Rangel DO    [COMPLETED] ticagrelor (BRILINTA) tablet 180 mg, 180 mg, Oral, Once, 180 mg at 04/13/22 1308 **AND** ticagrelor (BRILINTA) tablet 90 mg, 90 mg, Oral, Q12H ACOSTA, Tequila Hodges DO, 90 mg at 04/15/22 0750    traZODone (DESYREL) tablet 100 mg, 100 mg, Oral, HS, Charla Commons Bilofsky, CRNP, 100 mg at 04/14/22 2127 ALLERGIES:  Allergies   Allergen Reactions    Bee Venom Edema        *-*-*-*-*-*-*-*-*-*-*-*-*-*-*-*-*-*-*-*-*-*-*-*-*-*-*-*-*-*-*-*-*-*-*-*-*-*-*-*-*-*-*-*-*-*-*-*-*-*-*-*-*-*  LABORATORY DATA:  I have personally reviewed pertinent labs  CMP:  Results from last 7 days   Lab Units 04/14/22  0541 04/13/22  1825 04/13/22  1300   SODIUM mmol/L 135* 133* 136   POTASSIUM mmol/L 4 3 3 4* 3 8   CHLORIDE mmol/L 102 102 100   CO2 mmol/L 25 24 27   BUN mg/dL 17 21 24   CREATININE mg/dL 1 02 0 97 1 14   CALCIUM mg/dL 8 9 8 2* 9 5        Results from last 7 days   Lab Units 04/13/22  1825 04/13/22  1300   MAGNESIUM mg/dL 1 8 1 8        Cardiac Profile:   Results from last 7 days   Lab Units 04/14/22  1712   NT-PRO BNP pg/mL 967*                CBC:   Results from last 7 days   Lab Units 04/14/22  0541 04/13/22  1825 04/13/22  1300   WBC Thousand/uL 8 55  --  8 48   HEMOGLOBIN g/dL 13 9  --  14 5   HEMATOCRIT % 42 4  --  43 7   PLATELETS Thousands/uL 193 191 217     PT/INR: No results found for: PT, INR, Microbiology:          *-*-*-*-*-*-*-*-*-*-*-*-*-*-*-*-*-*-*-*-*-*-*-*-*-*-*-*-*-*-*-*-*-*-*-*-*-*-*-*-*-*-*-*-*-*-*-*-*-*-*-*-*-*-  IMAGING STUDIES REPORTS: Imaging studies results reviewed    No valid procedures specified  XR chest pa & lateral    Result Date: 1/5/2022  Impression No acute cardiopulmonary disease  Workstation performed: [id number]       *-*-*-*-*-*-*-*-*-*-*-*-*-*-*-*-*-*-*-*-*-*-*-*-*-*-*-*-*-*-*-*-*-*-*-*-*-*-*-*-*-*-*-*-*-*-*-*-*-*-*-*-*-*-    No results found for this or any previous visit  No results found for this or any previous visit  No results found for this or any previous visit  No results found for this or any previous visit         *-*-*-*-*-*-*-*-*-*-*-*-*-*-*-*-*-*-*-*-*-*-*-*-*-*-*-*-*-*-*-*-*-*-*-*-*-*-*-*-*-*-*-*-*-*-*-*-*-*-*-*-*-*-  SIGNATURES:   @ERX@ Tamie Dyer MD

## 2022-04-15 NOTE — DISCHARGE INSTRUCTIONS
Heart Attack   WHAT YOU NEED TO KNOW:   A heart attack happens when the blood vessels that supply blood to your heart are blocked  This can damage your heart or lead to an abnormal heart rhythm or heart failure  A heart attack is also called a myocardial infarction  DISCHARGE INSTRUCTIONS:   Call your local emergency number (911 in the [address] [facility]) for any of the following:   · You have any of the following signs of a heart attack:      ? Squeezing, pressure, or pain in your chest    ? You may  also have any of the following:     § Discomfort or pain in your back, neck, jaw, stomach, or arm    § Shortness of breath    § Nausea or vomiting    § Lightheadedness or a sudden cold sweat      Seek care immediately if:   · You are tired and cannot think clearly  · Your heart is beating faster than usual     · You are bleeding from your gums or nose  · You see blood in your urine or bowel movements  · You urinate less than usual or not at all  · You have new or increased swelling in your feet or ankles  Call your doctor or cardiologist if:   · You have trouble taking your heart medicine  · You have questions or concerns about your condition or care  Medicines: You may  need any of the following:  · Heart medicines  help decrease blood pressure, control your heart rate, and help your heart function better  · Nitroglycerin  opens the arteries to your heart, increases oxygen levels, and can decrease chest pain  You may get your nitroglycerin as a pill, a patch, or a paste  Ask your healthcare provider or cardiologist how to safely take this medicine  · Aspirin  helps prevent clots from forming and causing blood flow problems  If healthcare providers want you to take aspirin daily, do not take acetaminophen or ibuprofen instead  Do not take more or less aspirin than healthcare providers say to take   If you are on another blood thinner medicine, ask your healthcare provider or cardiologist before you take aspirin for any reason  · Antiplatelets , such as aspirin, help prevent blood clots  Take your antiplatelet medicine exactly as directed  These medicines make it more likely for you to bleed or bruise  If you are told to take aspirin, do not take acetaminophen or ibuprofen instead  · Blood thinners  help prevent blood clots  Clots can cause strokes, heart attacks, and death  The following are general safety guidelines to follow while you are taking a blood thinner:    ? Watch for bleeding and bruising while you take blood thinners  Watch for bleeding from your gums or nose  Watch for blood in your urine and bowel movements  Use a soft washcloth on your skin, and a soft toothbrush to brush your teeth  This can keep your skin and gums from bleeding  If you shave, use an electric shaver  Do not play contact sports  ? Tell your dentist and other healthcare providers that you take a blood thinner  Wear a bracelet or necklace that says you take this medicine  ? Do not start or stop any other medicines unless your healthcare provider tells you to  Many medicines cannot be used with blood thinners  ? Take your blood thinner exactly as prescribed by your healthcare provider  Do not skip does or take less than prescribed  Tell your provider right away if you forget to take your blood thinner, or if you take too much  ? Warfarin  is a blood thinner that you may need to take  The following are things you should be aware of if you take warfarin:     § Foods and medicines can affect the amount of warfarin in your blood  Do not make major changes to your diet while you take warfarin  Warfarin works best when you eat about the same amount of vitamin K every day  Vitamin K is found in green leafy vegetables and certain other foods  Ask for more information about what to eat when you are taking warfarin  § You will need to see your healthcare provider for follow-up visits when you are on warfarin   You will need regular blood tests  These tests are used to decide how much medicine you need  · Cholesterol medicine  decreases cholesterol and the amount of plaque in your blood  · Do not take certain medicines without asking your healthcare provider first   These include NSAIDs, herbal or vitamin supplements, or hormones (estrogen or progestin)  · Take your medicine as directed  Contact your healthcare provider if you think your medicine is not helping or if you have side effects  Tell him or her if you are allergic to any medicine  Keep a list of the medicines, vitamins, and herbs you take  Include the amounts, and when and why you take them  Bring the list or the pill bottles to follow-up visits  Carry your medicine list with you in case of an emergency  Cardiac rehabilitation (rehab)  is a program run by specialists who will help you safely strengthen your heart and prevent more heart disease  The plan includes exercise, relaxation, stress management, and heart-healthy nutrition  Healthcare providers will also check to make sure any medicines you take are working  The plan may also include instructions for when you can drive, return to work, and do other normal daily activities  Manage other health conditions:  Diabetes and high cholesterol increases your risk for another heart attack and stroke  Talk to your healthcare provider about your management plan  He or she will make a plan that helps you manage your conditions  Check your blood pressure at home:  High blood pressure can increase your risk for another heart attack  Sit and rest for 5 minutes before you take your blood pressure  Extend your arm and support it on a flat surface  Your arm should be at the same level as your heart  Follow the directions that came with your monitor  If possible, take at least 2 readings each time  Take your blood pressure at least 2 times each day at the same times, such as mornings and evenings   Keep a record of your readings and bring it to your follow-up visits  Ask your healthcare provider what your blood pressure should be  Get a flu vaccine every year as soon as it is available: The vaccine will help prevent the flu  A heart attack will make it harder for you to fight off the flu virus on your own  The flu may also be worse for you than for a person who has not had a heart attack  Ask about other vaccinations you may need  Lifestyle changes you may need to make after a heart attack:   · Follow a heart-healthy diet  A heart-healthy diet is an eating plan low in total fat, unhealthy fats, and sodium (salt)  A heart-healthy diet helps decrease your risk for heart disease and stroke  Limit the amount of fat you eat to 25% to 35% of your total daily calories  Your healthcare provider may recommend the DASH (Dietary Approaches to Stop Hypertension) Eating Plan to help lower high blood pressure and LDL (bad) cholesterol  The plan is low in sodium, sugar, unhealthy fats, and total fat  It is high in potassium, calcium, magnesium, and fiber  Ask for more information about this plan  · Limit sodium (salt) as directed  Too much sodium can affect your fluid balance  Check labels to find low-sodium or no-salt-added foods  Some low-sodium foods use potassium salts for flavor  Too much potassium can also cause health problems  Your healthcare provider will tell you how much sodium and potassium are safe for you to have in a day  He or she may recommend that you limit sodium to 2,300 mg a day  · Do not smoke  Nicotine and other chemicals in cigarettes and cigars can cause lung and heart damage  Ask your healthcare provider for information if you currently smoke and need help to quit  E-cigarettes or smokeless tobacco still contain nicotine  Talk to your healthcare provider before you use these products  · Exercise as directed  Ask your healthcare provider about the best exercise plan for you  Exercise makes your heart stronger, lowers blood pressure, and helps prevent a heart attack  The goal is 30 to 60 minutes a day, 5 to 7 days a week  You may have to work up to this goal  Healthcare providers can help you reach this goal, starting in cardiac rehab sessions  · Maintain a healthy weight  Ask your healthcare provider how much you should weigh  He or she can help you create a safe weight loss plan if you are overweight  · Manage stress  Stress may increase your risk for another heart attack  Learn ways to control stress, such as relaxation, deep breathing, and music  Talk to someone about things that upset you  Follow up with your healthcare provider or cardiologist within 14 days or as directed:  Write down your questions so you remember to ask them during your visits  © Copyright [address] 2022 Information is for End User's use only and may not be sold, redistributed or otherwise used for commercial purposes  All illustrations and images included in CareNotes® are the copyrighted property of [employer]  or [employer]   The above information is an  only  It is not intended as medical advice for individual conditions or treatments  Talk to your doctor, nurse or pharmacist before following any medical regimen to see if it is safe and effective for you  Metoprolol (By mouth)   Metoprolol (met-oh-PROE-lol)  Treats high blood pressure, angina (chest pain), and heart failure  May lower the risk of death after a heart attack  This medicine is a beta-blocker  Brand Name(s): Kapspargo Sprinkle, Lopressor, Metoprolol Succinate AvPak, Toprol XL   There may be other brand names for this medicine  When This Medicine Should Not Be Used: This medicine is not right for everyone  Do not use if you had an allergic reaction to metoprolol or similar medicines, or if you have certain blood circulation or heart problems  Ask your doctor about these problems    How to Use This Medicine:   Long Acting Capsule, Tablet, Long Acting Tablet  · Take your medicine as directed  Your dose may need to be changed several times to find what works best for you  · Take this medicine with a meal or right after a meal  Take this medicine the same way every day, at the same time  · Swallow the extended-release capsule whole  Do not crush, break, or chew it  If you cannot swallow the extended-release capsule:   ? You may open it and sprinkle the contents over a small amount (teaspoonful) of soft food (including applesauce, pudding, or yogurt)  Swallow the mixture within 60 minutes  Do not store for later use   ? You may also use a nasogastric tube to give the medicine  Mix the contents of the opened capsule with water into a syringe  Gently shake the mixture for about 10 seconds, then flush it through the tube  Rinse the tube with water until all of the medicine is washed out  · Swallow the tablet whole with a glass of water  You may break the extended-release tablet in half, but do not chew or crush it  · Missed dose: Take a dose as soon as you remember  If it is almost time for your next dose, wait until then and take a regular dose  Do not take extra medicine to make up for a missed dose  · Store the medicine in a closed container at room temperature, away from heat, moisture, and direct light  Drugs and Foods to Avoid:   Ask your doctor or pharmacist before using any other medicine, including over-the-counter medicines, vitamins, and herbal products  · Some medicines can affect how metoprolol works  Tell your doctor if you are using any of the following:   ? Clonidine, digoxin, dipyridamole, guanethidine, hydralazine, hydroxychloroquine, methyldopa, prazosin, quinidine, reserpine  ? Calcium channel blocker (including amlodipine, diltiazem, verapamil)  ? Ergot medicine  ?  MAO inhibitor or medicine to treat depression (including bupropion, clomipramine, desipramine, fluoxetine, fluvoxamine, paroxetine, sertraline)  ? Medicine for heart rhythm problems (including propafenone)  ? Medicine to treat HIV/AIDS (including ritonavir)  ? Medicine to treat infection (including terbinafine)  ? Medicine to treat mental illness (including chlorpromazine, fluphenazine, haloperidol, thioridazine)  · Do not drink alcohol while you are using this medicine  Warnings While Using This Medicine:   · Tell your doctor if you are pregnant or breastfeeding, or if you have blood vessel, heart, or circulation problems (including heart failure, rhythm problems, or a slow heartbeat)  Tell your doctor if you have kidney disease, liver disease, diabetes, lung disease (including asthma), thyroid problems, pheochromocytoma (adrenal gland tumor), or a history of allergies  · This medicine may worsen the symptoms of heart failure while the dose is being adjusted  · Do not stop using this medicine suddenly  Your doctor will need to slowly decrease your dose before you stop it completely  · Tell any doctor or dentist who treats you that you are using this medicine  You may need to stop using this medicine several days before you have surgery or medical tests  · This medicine could lower your blood pressure too much, especially when you first use it or if you are dehydrated  Stand or sit up slowly if you feel lightheaded or dizzy  · This medicine may make you dizzy or drowsy  Do not drive or do anything else that could be dangerous until you know how this medicine affects you  · Your doctor will do lab tests at regular visits to check on the effects of this medicine  Keep all appointments  · Keep all medicine out of the reach of children  Never share your medicine with anyone    Possible Side Effects While Using This Medicine:   Call your doctor right away if you notice any of these side effects:  · Allergic reaction: Itching or hives, swelling in your face or hands, swelling or tingling in your mouth or throat, chest tightness, trouble breathing  · Lightheadedness, dizziness, or fainting  · Slow heartbeat  · Swelling in your hands, ankles, or feet, trouble breathing  · Worsening chest pain  If you notice these less serious side effects, talk with your doctor:   · Diarrhea  · Mild dizziness, tiredness  If you notice other side effects that you think are caused by this medicine, tell your doctor  Call your doctor for medical advice about side effects  You may report side effects to FDA at 4-358-PAV-6469    © Copyright [employer] 2022 Information is for End User's use only and may not be sold, redistributed or otherwise used for commercial purposes  The above information is an  only  It is not intended as medical advice for individual conditions or treatments  Talk to your doctor, nurse or pharmacist before following any medical regimen to see if it is safe and effective for you

## 2022-04-15 NOTE — PLAN OF CARE
Problem: Potential for Falls  Goal: Patient will remain free of falls  Description: INTERVENTIONS:  - Educate patient/family on patient safety including physical limitations  - Instruct patient to call for assistance with activity   - Consult OT/PT to assist with strengthening/mobility   - Keep Call bell within reach  - Keep bed low and locked with side rails adjusted as appropriate  - Keep care items and personal belongings within reach  - Initiate and maintain comfort rounds  - Make Fall Risk Sign visible to staff  - Offer Toileting every 2 Hours, in advance of need  - Apply yellow socks and bracelet for high fall risk patients  - Consider moving patient to room near nurses station  Outcome: Progressing     Problem: SAFETY,RESTRAINT: NV/NON-SELF DESTRUCTIVE BEHAVIOR  Goal: Remains free of harm/injury (restraint for non violent/non self-detsructive behavior)  Description: INTERVENTIONS:  - Instruct patient/family regarding restraint use   - Assess and monitor physiologic and psychological status   - Provide interventions and comfort measures to meet assessed patient needs   - Identify and implement measures to help patient regain control  - Assess readiness for release of restraint   Outcome: Progressing  Goal: Returns to optimal restraint-free functioning  Description: INTERVENTIONS:  - Assess the patient's behavior and symptoms that indicate continued need for restraint  - Identify and implement measures to help patient regain control  - Assess readiness for release of restraint   Outcome: Progressing     Problem: CARDIOVASCULAR - ADULT  Goal: Maintains optimal cardiac output and hemodynamic stability  Description: INTERVENTIONS:  - Monitor I/O, vital signs and rhythm  - Monitor for S/S and trends of decreased cardiac output  - Administer and titrate ordered vasoactive medications to optimize hemodynamic stability  - Assess quality of pulses, skin color and temperature  - Assess for signs of decreased coronary artery perfusion  - Instruct patient to report change in severity of symptoms  Outcome: Progressing  Goal: Absence of cardiac dysrhythmias or at baseline rhythm  Description: INTERVENTIONS:  - Continuous cardiac monitoring, vital signs, obtain 12 lead EKG if ordered  - Administer antiarrhythmic and heart rate control medications as ordered  - Monitor electrolytes and administer replacement therapy as ordered  Outcome: Progressing     Problem: RESPIRATORY - ADULT  Goal: Achieves optimal ventilation and oxygenation  Description: INTERVENTIONS:  - Assess for changes in respiratory status  - Assess for changes in mentation and behavior  - Position to facilitate oxygenation and minimize respiratory effort  - Oxygen administered by appropriate delivery if ordered  - Initiate smoking cessation education as indicated  - Encourage broncho-pulmonary hygiene including cough, deep breathe, Incentive Spirometry  - Assess the need for suctioning and aspirate as needed  - Assess and instruct to report SOB or any respiratory difficulty  - Respiratory Therapy support as indicated  Outcome: Progressing     Problem: MOBILITY - ADULT  Goal: Maintain or return to baseline ADL function  Description: INTERVENTIONS:  -  Assess patient's ability to carry out ADLs; assess patient's baseline for ADL function and identify physical deficits which impact ability to perform ADLs (bathing, care of mouth/teeth, toileting, grooming, dressing, etc )  - Assess/evaluate cause of self-care deficits   - Assess range of motion  - Assess patient's mobility; develop plan if impaired  - Assess patient's need for assistive devices and provide as appropriate  - Encourage maximum independence but intervene and supervise when necessary  - Involve family in performance of ADLs  - Assess for home care needs following discharge   - Consider OT consult to assist with ADL evaluation and planning for discharge  - Provide patient education as appropriate  Outcome: Progressing  Goal: Maintains/Returns to pre admission functional level  Description: INTERVENTIONS:  - Perform BMAT or MOVE assessment daily    - Set and communicate daily mobility goal to care team and patient/family/caregiver  - Collaborate with rehabilitation services on mobility goals if consulted  - Perform Range of Motion 3 times a day  - Reposition patient every 2 hours    - Ambulate patient 3 times a day  - Out of bed to chair 3 times a day   - Out of bed for meals 3 times a day  - Out of bed for toileting  - Record patient progress and toleration of activity level   Outcome: Progressing     Problem: PAIN - ADULT  Goal: Verbalizes/displays adequate comfort level or baseline comfort level  Description: Interventions:  - Encourage patient to monitor pain and request assistance  - Assess pain using appropriate pain scale  - Administer analgesics based on type and severity of pain and evaluate response  - Implement non-pharmacological measures as appropriate and evaluate response  - Consider cultural and social influences on pain and pain management  - Notify physician/advanced practitioner if interventions unsuccessful or patient reports new pain  Outcome: Progressing     Problem: INFECTION - ADULT  Goal: Absence or prevention of progression during hospitalization  Description: INTERVENTIONS:  - Assess and monitor for signs and symptoms of infection  - Monitor lab/diagnostic results  - Monitor all insertion sites, i e  indwelling lines, tubes, and drains  - Monitor endotracheal if appropriate and nasal secretions for changes in amount and color  - Edison appropriate cooling/warming therapies per order  - Administer medications as ordered  - Instruct and encourage patient and family to use good hand hygiene technique  - Identify and instruct in appropriate isolation precautions for identified infection/condition  Outcome: Progressing  Goal: Absence of fever/infection during neutropenic period  Description: INTERVENTIONS:  - Monitor WBC    Outcome: Progressing     Problem: SAFETY ADULT  Goal: Patient will remain free of falls  Description: INTERVENTIONS:  - Educate patient/family on patient safety including physical limitations  - Instruct patient to call for assistance with activity   - Consult OT/PT to assist with strengthening/mobility   - Keep Call bell within reach  - Keep bed low and locked with side rails adjusted as appropriate  - Keep care items and personal belongings within reach  - Initiate and maintain comfort rounds  - Make Fall Risk Sign visible to staff  - Offer Toileting every 2 Hours, in advance of need  - Apply yellow socks and bracelet for high fall risk patients  - Consider moving patient to room near nurses station  Outcome: Progressing  Goal: Maintain or return to baseline ADL function  Description: INTERVENTIONS:  -  Assess patient's ability to carry out ADLs; assess patient's baseline for ADL function and identify physical deficits which impact ability to perform ADLs (bathing, care of mouth/teeth, toileting, grooming, dressing, etc )  - Assess/evaluate cause of self-care deficits   - Assess range of motion  - Assess patient's mobility; develop plan if impaired  - Assess patient's need for assistive devices and provide as appropriate  - Encourage maximum independence but intervene and supervise when necessary  - Involve family in performance of ADLs  - Assess for home care needs following discharge   - Consider OT consult to assist with ADL evaluation and planning for discharge  - Provide patient education as appropriate  Outcome: Progressing  Goal: Maintains/Returns to pre admission functional level  Description: INTERVENTIONS:  - Perform BMAT or MOVE assessment daily    - Set and communicate daily mobility goal to care team and patient/family/caregiver  - Collaborate with rehabilitation services on mobility goals if consulted  - Perform Range of Motion 3 times a day    - Reposition patient every 2 hours  - Ambulate patient 3 times a day  - Out of bed to chair 3 times a day   - Out of bed for meals 3 times a day  - Out of bed for toileting  - Record patient progress and toleration of activity level   Outcome: Progressing     Problem: DISCHARGE PLANNING  Goal: Discharge to home or other facility with appropriate resources  Description: INTERVENTIONS:  - Identify barriers to discharge w/patient and caregiver  - Arrange for needed discharge resources and transportation as appropriate  - Identify discharge learning needs (meds, wound care, etc )  - Arrange for interpretive services to assist at discharge as needed  - Refer to Case Management Department for coordinating discharge planning if the patient needs post-hospital services based on physician/advanced practitioner order or complex needs related to functional status, cognitive ability, or social support system  Outcome: Progressing     Problem: Knowledge Deficit  Goal: Patient/family/caregiver demonstrates understanding of disease process, treatment plan, medications, and discharge instructions  Description: Complete learning assessment and assess knowledge base    Interventions:  - Provide teaching at level of understanding  - Provide teaching via preferred learning methods  Outcome: Progressing

## 2022-04-15 NOTE — PLAN OF CARE
Problem: Potential for Falls  Goal: Patient will remain free of falls  Description: INTERVENTIONS:  - Educate patient/family on patient safety including physical limitations  - Instruct patient to call for assistance with activity   - Consult OT/PT to assist with strengthening/mobility   - Keep Call bell within reach  - Keep bed low and locked with side rails adjusted as appropriate  - Keep care items and personal belongings within reach  - Initiate and maintain comfort rounds  - Make Fall Risk Sign visible to staff  - Apply yellow socks and bracelet for high fall risk patients  - Consider moving patient to room near nurses station  Outcome: Adequate for Discharge     Problem: SAFETY,RESTRAINT: NV/NON-SELF DESTRUCTIVE BEHAVIOR  Goal: Remains free of harm/injury (restraint for non violent/non self-detsructive behavior)  Description: INTERVENTIONS:  - Instruct patient/family regarding restraint use   - Assess and monitor physiologic and psychological status   - Provide interventions and comfort measures to meet assessed patient needs   - Identify and implement measures to help patient regain control  - Assess readiness for release of restraint   Outcome: Adequate for Discharge  Goal: Returns to optimal restraint-free functioning  Description: INTERVENTIONS:  - Assess the patient's behavior and symptoms that indicate continued need for restraint  - Identify and implement measures to help patient regain control  - Assess readiness for release of restraint   Outcome: Adequate for Discharge     Problem: CARDIOVASCULAR - ADULT  Goal: Maintains optimal cardiac output and hemodynamic stability  Description: INTERVENTIONS:  - Monitor I/O, vital signs and rhythm  - Monitor for S/S and trends of decreased cardiac output  - Administer and titrate ordered vasoactive medications to optimize hemodynamic stability  - Assess quality of pulses, skin color and temperature  - Assess for signs of decreased coronary artery perfusion  - Instruct patient to report change in severity of symptoms  Outcome: Adequate for Discharge  Goal: Absence of cardiac dysrhythmias or at baseline rhythm  Description: INTERVENTIONS:  - Continuous cardiac monitoring, vital signs, obtain 12 lead EKG if ordered  - Administer antiarrhythmic and heart rate control medications as ordered  - Monitor electrolytes and administer replacement therapy as ordered  Outcome: Adequate for Discharge     Problem: RESPIRATORY - ADULT  Goal: Achieves optimal ventilation and oxygenation  Description: INTERVENTIONS:  - Assess for changes in respiratory status  - Assess for changes in mentation and behavior  - Position to facilitate oxygenation and minimize respiratory effort  - Oxygen administered by appropriate delivery if ordered  - Initiate smoking cessation education as indicated  - Encourage broncho-pulmonary hygiene including cough, deep breathe, Incentive Spirometry  - Assess the need for suctioning and aspirate as needed  - Assess and instruct to report SOB or any respiratory difficulty  - Respiratory Therapy support as indicated  Outcome: Adequate for Discharge     Problem: MOBILITY - ADULT  Goal: Maintain or return to baseline ADL function  Description: INTERVENTIONS:  -  Assess patient's ability to carry out ADLs; assess patient's baseline for ADL function and identify physical deficits which impact ability to perform ADLs (bathing, care of mouth/teeth, toileting, grooming, dressing, etc )  - Assess/evaluate cause of self-care deficits   - Assess range of motion  - Assess patient's mobility; develop plan if impaired  - Assess patient's need for assistive devices and provide as appropriate  - Encourage maximum independence but intervene and supervise when necessary  - Involve family in performance of ADLs  - Assess for home care needs following discharge   - Consider OT consult to assist with ADL evaluation and planning for discharge  - Provide patient education as appropriate  Outcome: Adequate for Discharge  Goal: Maintains/Returns to pre admission functional level  Description: INTERVENTIONS:  - Perform BMAT or MOVE assessment daily    - Set and communicate daily mobility goal to care team and patient/family/caregiver     - Collaborate with rehabilitation services on mobility goals if consulted  - Out of bed for toileting  - Record patient progress and toleration of activity level   Outcome: Adequate for Discharge     Problem: PAIN - ADULT  Goal: Verbalizes/displays adequate comfort level or baseline comfort level  Description: Interventions:  - Encourage patient to monitor pain and request assistance  - Assess pain using appropriate pain scale  - Administer analgesics based on type and severity of pain and evaluate response  - Implement non-pharmacological measures as appropriate and evaluate response  - Consider cultural and social influences on pain and pain management  - Notify physician/advanced practitioner if interventions unsuccessful or patient reports new pain  Outcome: Adequate for Discharge     Problem: INFECTION - ADULT  Goal: Absence or prevention of progression during hospitalization  Description: INTERVENTIONS:  - Assess and monitor for signs and symptoms of infection  - Monitor lab/diagnostic results  - Monitor all insertion sites, i e  indwelling lines, tubes, and drains  - Monitor endotracheal if appropriate and nasal secretions for changes in amount and color  - [place] appropriate cooling/warming therapies per order  - Administer medications as ordered  - Instruct and encourage patient and family to use good hand hygiene technique  - Identify and instruct in appropriate isolation precautions for identified infection/condition  Outcome: Adequate for Discharge  Goal: Absence of fever/infection during neutropenic period  Description: INTERVENTIONS:  - Monitor WBC    Outcome: Adequate for Discharge     Problem: SAFETY ADULT  Goal: Patient will remain free of falls  Description: INTERVENTIONS:  - Educate patient/family on patient safety including physical limitations  - Instruct patient to call for assistance with activity   - Consult OT/PT to assist with strengthening/mobility   - Keep Call bell within reach  - Keep bed low and locked with side rails adjusted as appropriate  - Keep care items and personal belongings within reach  - Initiate and maintain comfort rounds  - Make Fall Risk Sign visible to staff  - Apply yellow socks and bracelet for high fall risk patients  - Consider moving patient to room near nurses station  Outcome: Adequate for Discharge  Goal: Maintain or return to baseline ADL function  Description: INTERVENTIONS:  -  Assess patient's ability to carry out ADLs; assess patient's baseline for ADL function and identify physical deficits which impact ability to perform ADLs (bathing, care of mouth/teeth, toileting, grooming, dressing, etc )  - Assess/evaluate cause of self-care deficits   - Assess range of motion  - Assess patient's mobility; develop plan if impaired  - Assess patient's need for assistive devices and provide as appropriate  - Encourage maximum independence but intervene and supervise when necessary  - Involve family in performance of ADLs  - Assess for home care needs following discharge   - Consider OT consult to assist with ADL evaluation and planning for discharge  - Provide patient education as appropriate  Outcome: Adequate for Discharge  Goal: Maintains/Returns to pre admission functional level  Description: INTERVENTIONS:  - Perform BMAT or MOVE assessment daily    - Set and communicate daily mobility goal to care team and patient/family/caregiver     - Collaborate with rehabilitation services on mobility goals if consulted  - Out of bed for toileting  - Record patient progress and toleration of activity level   Outcome: Adequate for Discharge     Problem: DISCHARGE PLANNING  Goal: Discharge to home or other facility with appropriate resources  Description: INTERVENTIONS:  - Identify barriers to discharge w/patient and caregiver  - Arrange for needed discharge resources and transportation as appropriate  - Identify discharge learning needs (meds, wound care, etc )  - Arrange for interpretive services to assist at discharge as needed  - Refer to Case Management Department for coordinating discharge planning if the patient needs post-hospital services based on physician/advanced practitioner order or complex needs related to functional status, cognitive ability, or social support system  Outcome: Adequate for Discharge     Problem: Knowledge Deficit  Goal: Patient/family/caregiver demonstrates understanding of disease process, treatment plan, medications, and discharge instructions  Description: Complete learning assessment and assess knowledge base    Interventions:  - Provide teaching at level of understanding  - Provide teaching via preferred learning methods  Outcome: Adequate for Discharge

## 2022-04-18 ENCOUNTER — TRANSITIONAL CARE MANAGEMENT (OUTPATIENT)
Dept: FAMILY MEDICINE CLINIC | Facility: CLINIC | Age: 77
End: 2022-04-18

## 2022-04-18 NOTE — PROGRESS NOTES
Cardiology Office Note  MD Nav Haq MD Signa Lichtenstein, DO, [facility] MD Georgette Thorne DO, Jesus Hauser DO, [facility] - [place]  ----------------------------------------------------------------  [address]  [address], [address]    Vandana Magallanes 77 y o  male MRN: [id number]  Unit/Bed#:  Encounter: [id number]      History of Present Illness: It was a pleasure to see Vandana Magallanes in the office today for follow-up CV evaluation  He has a past medical history of CAD with high lateral STEMI and 1st diagonal stent placement in April 2022, dyslipidemia, type 2 diabetes mellitus and renal cell carcinoma status post right nephrectomy in May 2017  He established care with us in April 2022  The patient was in his usual state of health when he began to experienced acute chest discomfort  His symptoms began to wax and wane over the course of 24-48 hours prior to his presentation to the emergency department  His symptoms then worsened and he came to [facility] for evaluation  In the emergency department, he was found to have ST segment elevation in leads 1 and aVL with reciprocal changes in the inferior leads  Additionally, there was AV dissociation noted on the ECG  MI alert was called and he was taken to the cath lab  He was found have occlusion of the 1st diagonal and underwent stent placement  Echocardiogram demonstrated low-normal systolic function with regional wall motion abnormalities of the lateral wall  AV dissociation resolved and the patient was placed on beta-blocker as well as ACE-inhibitor  Ezetimibe was added to his regimen  He was recommended for cardiac rehabilitation and discharged home  Since being discharged home, he has been feeling well overall  He has no chest pain, pressure, tightness or squeezing  Denies lightheadedness, dizziness or palpitations    Denies lower extremity swelling, orthopnea or paroxysmal nocturnal dyspnea  He is feeling much improved  Review of Systems:  Review of Systems   Constitutional: Negative for decreased appetite, fever, weight gain and weight loss  HENT: Negative for congestion and sore throat  Eyes: Negative for visual disturbance  Cardiovascular: Negative for chest pain, dyspnea on exertion, leg swelling, near-syncope and palpitations  Respiratory: Negative for cough and shortness of breath  Hematologic/Lymphatic: Negative for bleeding problem  Skin: Negative for rash  Musculoskeletal: Negative for myalgias and neck pain  Gastrointestinal: Negative for abdominal pain and nausea  Neurological: Negative for light-headedness and weakness  Psychiatric/Behavioral: Negative for depression  Past Medical History:   Diagnosis Date    Allergic reaction     last assessed: 5/26/2015    Arthritis     right knee    Bee sting allergy     last assessed: 6/30/2015    BPH (benign prostatic hypertrophy)     Cancer ([place] [address] ) 2017    Right kidney    Diabetes mellitus ([place] [address] )     Type II, NIDDM    Enlarged prostate with lower urinary tract symptoms (LUTS)     last assessed: 8/17/2015    Herpes zoster     last assessed: 11/21/2013    History of colon polyps     Hyperlipidemia     Leukocytosis     last assessed: 11/18/2014    Osteoarthrosis, localized, primary, knee     last assessed: 10/25/2016    Renal cancer ([place] [address] )     Seasonal allergies     Shoulder pain, right     Wears glasses     Wears partial dentures     Upper and Lower       Past Surgical History:   Procedure Laterality Date    CARDIAC CATHETERIZATION N/A 4/13/2022    Procedure: Cardiac pci;  Surgeon: Av Reynolds MD;  [facility];   Service: Cardiology    CARPAL TUNNEL RELEASE Left     CHOLECYSTECTOMY      Open    COLONOSCOPY      FL GUIDED NEEDLE PLAC BX/ASP/INJ  6/1/2020    FL GUIDED NEEDLE PLAC BX/ASP/INJ  11/13/2020    GANGLION CYST EXCISION Left     Left wrist    JOINT REPLACEMENT Left 08/2015    Left TKR    JOINT REPLACEMENT Right 09/2017    Right TKR    NEPHRECTOMY  05/08/2017    Partial right nephrectomy    AK LAP,PARTIAL NEPHRECTOMY Right 5/8/2017    Procedure: LAPAROSCOPIC LYSIS OF ADHESIONS, ROBOTIC PARTIAL NEPHRECTOMY, WITH INTRA-OP LAPAROSCOPIC ULTRASOUND;  Surgeon: Oscar Goetz MD;  [facility];  Service: Urology    AK REPAIR BICEPS LONG TENDON  2/4/2019    Procedure: TENODESIS BICEPS OPEN PROXIMAL;  Surgeon: Jessica Chirinos MD;  [facility];  Service: Orthopedics    AK REPAIR INTERCARP/CARP-METACARP JT Left 12/5/2016    Procedure: ARTHROPLASTY THUMB [facility] ;  Surgeon: Jessica Chirinos MD;  [facility];  Service: Orthopedics    AK REVISE MEDIAN N/CARPAL TUNNEL SURG Left 12/5/2016    Procedure: RELEASE CARPAL TUNNEL;  Surgeon: Jessica Chirinos MD;  [facility];  Service: Orthopedics    AK [address] ARTHROSCOP,SURG,W/ROTAT CUFF REPR Right 2/4/2019    Procedure: REPAIR ROTATOR CUFF ARTHROSCOPIC; SUBACROMIAL DECOMPRESSION; DISTAL CLAVICLE EXCISION;  Surgeon: Jessica Chirinos MD;  [facility];  Service: Orthopedics    AK TOTAL KNEE ARTHROPLASTY Right 9/11/2017    Procedure: ARTHROPLASTY KNEE TOTAL;  Surgeon: Jessica Chirinos MD;  [facility];  Service: Orthopedics    TONSILLECTOMY AND ADENOIDECTOMY         Social History     Socioeconomic History    Marital status: /Civil Union     Spouse name: None    Number of children: None    Years of education: None    Highest education level: None   Occupational History    None   Tobacco Use    Smoking status: Former Smoker     Packs/day: 0 25     Types: Cigarettes    Smokeless tobacco: Never Used    Tobacco comment: Smoked from ages 16 to 25     Vaping Use    Vaping Use: Never used   Substance and Sexual Activity    Alcohol use: Yes     Comment: 1-2 x year    Drug use: No    Sexual activity: Yes     Partners: Female   Other Topics Concern    None   Social History Narrative    None     Social Determinants of Health     Financial Resource Strain: Not on file   Food Insecurity: No Food Insecurity    Worried About Running Out of Food in the Last Year: Never true    Shayy of Food in the Last Year: Never true   Transportation Needs: No Transportation Needs    Lack of Transportation (Medical): No    Lack of Transportation (Non-Medical):  No   Physical Activity: Not on file   Stress: Not on file   Social Connections: Not on file   Intimate Partner Violence: Not on file   Housing Stability: Low Risk     Unable to Pay for Housing in the Last Year: No    Number of Places Lived in the Last Year: 1    Unstable Housing in the Last Year: No       Family History   Problem Relation Age of Onset    Cancer Mother     Stroke Father        Allergies   Allergen Reactions    Bee Venom Edema         Current Outpatient Medications:     aspirin (ECOTRIN LOW STRENGTH) 81 mg EC tablet, Take 1 tablet (81 mg total) by mouth daily, Disp: 30 tablet, Rfl: 0    atorvastatin (LIPITOR) 80 mg tablet, Take 1 tablet (80 mg total) by mouth daily with dinner, Disp: 30 tablet, Rfl: 0    Cholecalciferol (VITAMIN D-3 PO), Take 2,000 Units by mouth daily, Disp: , Rfl:     ezetimibe (ZETIA) 10 mg tablet, Take 1 tablet (10 mg total) by mouth daily at bedtime, Disp: 30 tablet, Rfl: 0    fluticasone (FLONASE) 50 mcg/act nasal spray, 1 spray into each nostril daily, Disp: 1 Bottle, Rfl: 2    lisinopril (ZESTRIL) 2 5 mg tablet, Take 1 tablet (2 5 mg total) by mouth daily, Disp: 30 tablet, Rfl: 0    metFORMIN (GLUCOPHAGE) 500 mg tablet, TAKE 1 TABLET TWICE DAILY, Disp: 180 tablet, Rfl: 1    metoprolol tartrate (LOPRESSOR) 25 mg tablet, Take 0 5 tablets (12 5 mg total) by mouth 2 (two) times a day, Disp: 60 tablet, Rfl: 0    ticagrelor (BRILINTA) 90 MG, Take 1 tablet (90 mg total) by mouth every 12 (twelve) hours, Disp: 60 tablet, Rfl: 2    traZODone (DESYREL) 100 mg tablet, TAKE 1 TABLET (100 MG TOTAL) BY MOUTH DAILY AT BEDTIME, Disp: 90 tablet, Rfl: 1    EPINEPHrine (EPIPEN 2-MOIZ) 0 3 mg/0 3 mL SOAJ, Inject as directed, Disp: , Rfl:     glucose blood test strip, Use 1 each daily as needed (once a day) One touch ultra  Test strips, Disp: 100 strip, Rfl: 3    Vitals:    04/21/22 1251   BP: 122/80   BP Location: Right arm   Patient Position: Sitting   Cuff Size: Large   Pulse: 63   Weight: 85 5 kg (188 lb 6 4 oz)     Body mass index is 26 38 kg/m²  PHYSICAL EXAMINATION:  Gen: Awake, Alert, NAD   Head/eyes: AT/NC, pupils equal and round, Anicteric  ENT: mmm  Neck: Supple, No elevated JVP, trachea midline  Resp: CTA bilaterally no w/r/r  CV: RRR +S1, S2, No m/r/g  Abd: Soft, NT/ND + BS  Ext: no LE edema bilaterally; r radial site c/d/i  Neuro: Follows commands, moves all extermities  Psych: Appropriate affect, normal mood, pleasant attitude, non-combative  Skin: warm; no rash, erythema or venous stasis changes on exposed skin    --------------------------------------------------------------------------------  TREADMILL STRESS  No results found for this or any previous visit      --------------------------------------------------------------------------------  NUCLEAR STRESS TEST: No results found for this or any previous visit  No results found for this or any previous visit       --------------------------------------------------------------------------------  CATH:  No results found for this or any previous visit     --------------------------------------------------------------------------------  ECHO:   No results found for this or any previous visit  No results found for this or any previous visit     --------------------------------------------------------------------------------  HOLTER  No results found for this or any previous visit      No results found for this or any previous visit     --------------------------------------------------------------------------------  CAROTIDS  No results found for this or any previous visit      --------------------------------------------------------------------------------  ECGs:  Results for orders placed or performed in visit on 04/21/22   POCT ECG    Impression    Sinus rhythm with 1st degree AV block, indeterminate axis        Lab Results   Component Value Date    WBC 8 55 04/14/2022    HGB 13 9 04/14/2022    HCT 42 4 04/14/2022    MCV 90 04/14/2022     04/14/2022      Lab Results   Component Value Date    SODIUM 134 (L) 04/14/2022    K 4 0 04/14/2022     04/14/2022    CO2 22 04/14/2022    BUN 18 04/14/2022    CREATININE 1 14 04/14/2022    GLUC 141 (H) 04/14/2022    CALCIUM 9 2 04/14/2022      Lab Results   Component Value Date    HGBA1C 6 9 (H) 12/29/2021      Lab Results   Component Value Date    CHOL 149 12/27/2017    CHOL 129 06/21/2017    CHOL 140 12/12/2016     Lab Results   Component Value Date    HDL 34 (L) 04/13/2022    HDL 39 (L) 07/20/2021    HDL 39 (L) 07/08/2020     Lab Results   Component Value Date    LDLCALC 62 04/13/2022    LDLCALC 52 07/20/2021    LDLCALC 71 07/08/2020     Lab Results   Component Value Date    TRIG 217 (H) 04/13/2022    TRIG 107 07/20/2021    TRIG 136 07/08/2020     No results found for: CHOLHDL   Lab Results   Component Value Date    INR 0 97 04/13/2022    INR 0 97 08/18/2017    INR 0 97 11/21/2016    PROTIME 12 7 04/13/2022    PROTIME 12 9 08/18/2017    PROTIME 12 9 11/21/2016        1  ST elevation myocardial infarction (STEMI) involving other coronary artery of anterior wall ([place] [address] )    2  Mixed hyperlipidemia    3  Type 2 diabetes mellitus with other specified complication, without long-term current use of insulin ([address] )    4   [facility] )  -     Ambulatory referral to Cardiology  -     POCT ECG        IMPRESSION:  · Acute high lateral ST segment elevation MI s/p SURJIT-D1 w/ residual 10% ostial LCx and 20% mid RCA, April 13, 2022  · Transient AV dissociation with junctional rhythm during STEMI - resolved  · Type 2 diabetes mellitus  · Dyslipidemia with LDL 62 mg/dL, HDL 34 mg/dL,  mg/dL, April 2022  · LVEF 52% inferolateral and anterolateral hypokinesis from base to apex mild LA dilatation, AV sclerosis, trace AR, MV sclerosis, mild MR, trace TR, April 2022  · History of renal cell carcinoma status post right nephrectomy, 2007    PLAN:  It was a pleasure to see James Kim in the office today for follow-up CV evaluation  He is here today after his ST-elevation MI in April 2022  Since discharge, he has been feeling back to his usual state of health  He has no symptoms concerning for angina and no signs or symptoms of heart failure  He examines to be euvolemic in the office today  Blood pressure and heart rate are currently stable  ECG is nonischemic  He has been able to perform greater than 4 Mets without significant exertional symptoms  He also has been tolerating his current medications without any reported adverse effects  Denies any bleeding on dual anti-platelet therapy  Based on his clinical presentation, I have the following recommendations:    1  Recommend aggressive risk factor and lifestyle modifications  2  Would encourage cardiac rehabilitation to build up cardiovascular endurance  3  Recommend a heart healthy diet low in sodium and carbohydrate  We have discussed both the Mediterranean and Vegan diets for lipid lowering  4  Minimum 1 year dual anti-platelet therapy with a combination of aspirin and Brilinta  5  Continue high-intensity statin with Lipitor 80 mg daily and the addition of Zetia 10 mg daily  6  Continue metoprolol and lisinopril for antihypertensive control  7  Follow-up with primary care regarding blood glucose management  8  No additional CV testing at this time  9  We will follow up with him in 1 month to reassess his progress  As always, please do not hesitate to call with any questions        Portions of the record may have been created with voice recognition software  Occasional wrong word or "sound a like" substitutions may have occurred due to the inherent limitations of voice recognition software  Read the chart carefully and recognize, using context, where substitutions have occurred        Signed: Kirsten Pfeiffer DO, Rise Snook

## 2022-04-20 ENCOUNTER — OFFICE VISIT (OUTPATIENT)
Dept: FAMILY MEDICINE CLINIC | Facility: CLINIC | Age: 77
End: 2022-04-20
Payer: MEDICARE

## 2022-04-20 VITALS
TEMPERATURE: 97.9 F | WEIGHT: 189 LBS | RESPIRATION RATE: 16 BRPM | HEIGHT: 71 IN | SYSTOLIC BLOOD PRESSURE: 130 MMHG | OXYGEN SATURATION: 98 % | DIASTOLIC BLOOD PRESSURE: 66 MMHG | HEART RATE: 80 BPM | BODY MASS INDEX: 26.46 KG/M2

## 2022-04-20 DIAGNOSIS — I25.10 CORONARY ARTERY DISEASE INVOLVING NATIVE CORONARY ARTERY OF NATIVE HEART WITHOUT ANGINA PECTORIS: Primary | ICD-10-CM

## 2022-04-20 PROCEDURE — 99495 TRANSJ CARE MGMT MOD F2F 14D: CPT | Performed by: FAMILY MEDICINE

## 2022-04-20 NOTE — PROGRESS NOTES
[facility]      NAME: Laxmi Walter  AGE: 77 y o  SEX: male  : 1945   MRN: [id number]    DATE: 2022  TIME: 8:54 AM  TCM Call (since 3/21/2022)     Date and time call was made  2022 10:42 AM    Hospital care reviewed  Records reviewed        Patient was hospitialized at  [address]        Date of Admission  22    Date of discharge  04/15/22    Diagnosis  STEMI    Disposition  Home    Were the patients medications reviewed and updated  No  Advised patient to bring current med list to appointment    Current Symptoms  None      TCM Call (since 3/21/2022)     Post hospital issues  None    Should patient be enrolled in anticoag monitoring? No    Scheduled for follow up? Yes    Patients specialists  Cardiologist    Cardiologist name  Dr Logan Richardson    Did you obtain your prescribed medications  Yes    Do you need help managing your prescriptions or medications  No    Is transportation to your appointment needed  No    I have advised the patient to call PCP with any new or worsening symptoms  Peter Huber MA    Living Arrangements  Spouse or Significiant other    Are you recieving any outpatient services  No    Are you recieving home care services  No    Are you using any community resources  No    Current waiver services  No    Have you fallen in the last 12 months  No    Interperter language line needed  No        Assessment and Plan     Problem List Items Addressed This Visit     Coronary artery disease involving native coronary artery of native heart without angina pectoris - Primary     This is a transition of care visit for patient was admitted to [facility] from  to  due to chest pain  Initial troponins were negative  2 hour troponin showed spike with ST-elevation lateral leads    Patient subsequently had catheterization which showed 90% lesion in 1st diagonal   He was successfully stented and discharged on  in stable condition on metoprolol 12 5 b i d , atorvastatin 80 mg daily, aspirin 81 mg daily and Brilinta 90 mg b i d  Overall he is feeling well  Denies any chest pain or shortness of breath  He was following up with his cardiologist tomorrow  Is also scheduled for cardiac rehab consult in near future  Exam today is unremarkable  Recommend continue current meds  Return to office in:  Keep next regularly scheduled 6 month appointment (July 2022)    Chief Complaint     Chief Complaint   Patient presents with    Transition of Care Management       History of Present Illness     This is a transition of care visit for patient was admitted to [facility] from April 13th to 15th due to chest pain  Initial troponins were negative  2 hour troponin showed spike with ST-elevation lateral leads  Patient subsequently had catheterization which showed 90% lesion in 1st diagonal   He was successfully stented and discharged on April 15th in stable condition on metoprolol 12 5 b i d , atorvastatin 80 mg daily, aspirin 81 mg daily and Brilinta 90 mg b i d  Overall he is feeling well  Denies any chest pain or shortness of breath  He was following up with his cardiologist tomorrow  He will also be scheduling cardiac rehab in the near future  The following portions of the patient's history were reviewed and updated as appropriate: allergies, current medications, past family history, past medical history, past social history, past surgical history and problem list     Review of Systems   Review of Systems   Respiratory: Negative  Cardiovascular: Negative  Gastrointestinal: Negative  Genitourinary: Negative          Active Problem List     Patient Active Problem List   Diagnosis    Primary osteoarthritis of right knee    BPH (benign prostatic hyperplasia)    DM[facility], [facility]    Insomnia    Mixed hyperlipidemia    Renal cell carcinoma of right kidney ([facility] )    Vitamin D deficiency    S/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, right    Cervical strain    Ganglion of flexor tendon sheath of left thumb    Incomplete tear of right rotator cuff    Status post arthroscopy of right shoulder    Encounter for other orthopedic aftercare    Vascular disorder of kidney    Encounter for lower extremity comparison imaging study    Acute pain of right shoulder    [facility] DJD(carpometacarpal degenerative joint disease), localized primary, left    Acute pain of right knee    Seborrheic keratoses, inflamed    Strain of lumbar region    Pain of right thigh    STEMI (ST elevation myocardial infarction) ([address] )    History of renal cell carcinoma    Coronary artery disease involving native coronary artery of native heart without angina pectoris       Objective   /66 (BP Location: Left arm, Patient Position: Sitting, Cuff Size: Adult)   Pulse 80   Temp 97 9 °F (36 6 °C) (Temporal)   Resp 16   Ht 5' 10 87" (1 8 m)   Wt 85 7 kg (189 lb)   SpO2 98%   BMI 26 46 kg/m²   Patient's shoes and socks removed  Right Foot/Ankle   Right Foot Inspection  Skin Exam: skin normal and skin intact  No dry skin, no warmth, no callus, no erythema, no maceration, no abnormal color, no pre-ulcer, no ulcer and no callus  Toe Exam: ROM and strength within normal limits  Sensory   Monofilament testing: intact    Vascular  Capillary refills: < 3 seconds  The right DP pulse is 2+  The right PT pulse is 2+  Left Foot/Ankle  Left Foot Inspection  Skin Exam: skin normal and skin intact  No dry skin, no warmth, no erythema, no maceration, normal color, no pre-ulcer, no ulcer and no callus  Toe Exam: ROM and strength within normal limits  Sensory   Monofilament testing: intact    Vascular  Capillary refills: < 3 seconds  The left DP pulse is 2+  The left PT pulse is 2+       Assign Risk Category  No deformity present  No loss of protective sensation  No weak pulses  Risk: 0    Physical Exam  Cardiovascular:      Rate and Rhythm: Normal rate and regular rhythm  Pulses: no weak pulses          Dorsalis pedis pulses are 2+ on the right side and 2+ on the left side  Posterior tibial pulses are 2+ on the right side and 2+ on the left side  Heart sounds: Normal heart sounds  Comments: Carotids: no bruits  Ext: no edema  Pulmonary:      Effort: Pulmonary effort is normal  No respiratory distress  Breath sounds: No wheezing or rales  Feet:      Right foot:      Skin integrity: No ulcer, skin breakdown, erythema, warmth, callus or dry skin  Left foot:      Skin integrity: No ulcer, skin breakdown, erythema, warmth, callus or dry skin  Psychiatric:         Behavior: Behavior normal          Thought Content:  Thought content normal          Pertinent Laboratory/Diagnostic Studies:  Hospital records reviewed    Current Medications     Current Outpatient Medications:     aspirin (ECOTRIN LOW STRENGTH) 81 mg EC tablet, Take 1 tablet (81 mg total) by mouth daily, Disp: 30 tablet, Rfl: 0    atorvastatin (LIPITOR) 80 mg tablet, Take 1 tablet (80 mg total) by mouth daily with dinner, Disp: 30 tablet, Rfl: 0    Cholecalciferol (VITAMIN D-3 PO), Take 2,000 Units by mouth daily, Disp: , Rfl:     EPINEPHrine (EPIPEN 2-MOIZ) 0 3 mg/0 3 mL SOAJ, Inject as directed, Disp: , Rfl:     ezetimibe (ZETIA) 10 mg tablet, Take 1 tablet (10 mg total) by mouth daily at bedtime, Disp: 30 tablet, Rfl: 0    fluticasone (FLONASE) 50 mcg/act nasal spray, 1 spray into each nostril daily, Disp: 1 Bottle, Rfl: 2    glucose blood test strip, Use 1 each daily as needed (once a day) One touch ultra  Test strips, Disp: 100 strip, Rfl: 3    lisinopril (ZESTRIL) 2 5 mg tablet, Take 1 tablet (2 5 mg total) by mouth daily, Disp: 30 tablet, Rfl: 0    metFORMIN (GLUCOPHAGE) 500 mg tablet, TAKE 1 TABLET TWICE DAILY, Disp: 180 tablet, Rfl: 1    metoprolol tartrate (LOPRESSOR) 25 mg tablet, Take 0 5 tablets (12 5 mg total) by mouth 2 (two) times a day, Disp: 60 tablet, Rfl: 0    ticagrelor (BRILINTA) 90 MG, Take 1 tablet (90 mg total) by mouth every 12 (twelve) hours, Disp: 60 tablet, Rfl: 2    traZODone (DESYREL) 100 mg tablet, TAKE 1 TABLET (100 MG TOTAL) BY MOUTH DAILY AT BEDTIME, Disp: 90 tablet, Rfl: 1    Health Maintenance     Health Maintenance   Topic Date Due    Hepatitis C Screening  Never done    BMI: Followup Plan  01/15/2021    Diabetic Foot Exam  07/15/2021    DM Eye Exam  02/12/2022    HEMOGLOBIN A1C  06/29/2022    Fall Risk  07/21/2022    Depression Screening  07/21/2022    Medicare Annual Wellness Visit (AWV)  07/21/2022    BMI: Adult  04/20/2023    Colorectal Cancer Screening  06/05/2024    DTaP,Tdap,and Td Vaccines (3 - Td or Tdap) 10/16/2027    Pneumococcal Vaccine: 65+ Years  Completed    Influenza Vaccine  Completed    COVID-19 Vaccine  Completed    HIB Vaccine  Aged Out    Hepatitis B Vaccine  Aged Out    IPV Vaccine  Aged Out    Hepatitis A Vaccine  Aged Out    Meningococcal ACWY Vaccine  Aged Out    HPV Vaccine  Aged Out     Immunization History   Administered Date(s) Administered    COVID-19 MODERNA VACC 0 5 ML IM 03/12/2021, 04/09/2021, 11/09/2021    INFLUENZA 11/18/2014, 10/05/2015, 11/28/2016, 01/04/2018, 10/15/2018, 10/18/2021    Influenza Split High Dose Preservative Free IM 11/18/2014, 10/05/2015, 11/28/2016, 01/04/2018    Influenza, high dose seasonal 0 7 mL 10/15/2018, 10/03/2019, 09/18/2020    Influenza, seasonal, injectable 10/05/2012, 10/09/2013    Pneumococcal Conjugate 13-Valent 12/08/2015, 12/09/2015    Pneumococcal Polysaccharide PPV23 10/09/2013    Tdap 11/15/2004, 10/16/2017    Zoster 10/05/2012    Zoster Vaccine Recombinant 10/30/2020, 01/25/2021       DO Manuel Blakely  [place]

## 2022-04-21 ENCOUNTER — OFFICE VISIT (OUTPATIENT)
Dept: CARDIOLOGY CLINIC | Facility: CLINIC | Age: 77
End: 2022-04-21
Payer: MEDICARE

## 2022-04-21 VITALS
BODY MASS INDEX: 26.38 KG/M2 | WEIGHT: 188.4 LBS | DIASTOLIC BLOOD PRESSURE: 80 MMHG | HEART RATE: 63 BPM | SYSTOLIC BLOOD PRESSURE: 122 MMHG

## 2022-04-21 DIAGNOSIS — I21.3 STEMI (ST ELEVATION MYOCARDIAL INFARCTION) (HCC): ICD-10-CM

## 2022-04-21 DIAGNOSIS — I21.09 ST ELEVATION MYOCARDIAL INFARCTION (STEMI) INVOLVING OTHER CORONARY ARTERY OF ANTERIOR WALL (HCC): Primary | ICD-10-CM

## 2022-04-21 DIAGNOSIS — E78.2 MIXED HYPERLIPIDEMIA: ICD-10-CM

## 2022-04-21 DIAGNOSIS — E11.69 TYPE 2 DIABETES MELLITUS WITH OTHER SPECIFIED COMPLICATION, WITHOUT LONG-TERM CURRENT USE OF INSULIN (HCC): ICD-10-CM

## 2022-04-21 DIAGNOSIS — E11.69 TYPE 2 DIABETES MELLITUS WITH OTHER SPECIFIED COMPLICATION, WITHOUT LONG-TERM CURRENT USE OF INSULIN (HCC): Primary | ICD-10-CM

## 2022-04-21 PROCEDURE — 99214 OFFICE O/P EST MOD 30 MIN: CPT | Performed by: INTERNAL MEDICINE

## 2022-04-21 PROCEDURE — 93000 ELECTROCARDIOGRAM COMPLETE: CPT | Performed by: INTERNAL MEDICINE

## 2022-04-21 RX ORDER — LANCETS
EACH MISCELLANEOUS
Qty: 100 EACH | Refills: 5 | Status: SHIPPED | OUTPATIENT
Start: 2022-04-21 | End: 2022-05-09 | Stop reason: SDUPTHER

## 2022-04-21 RX ORDER — BLOOD-GLUCOSE METER
EACH MISCELLANEOUS 2 TIMES DAILY
Qty: 1 KIT | Refills: 0 | Status: SHIPPED | OUTPATIENT
Start: 2022-04-21

## 2022-04-21 RX ORDER — BLOOD SUGAR DIAGNOSTIC
STRIP MISCELLANEOUS
Qty: 100 STRIP | Refills: 5 | Status: SHIPPED | OUTPATIENT
Start: 2022-04-21

## 2022-04-21 NOTE — LETTER
April 21, 2022     Patient: Dixie No  YOB: 1945  Date of Visit: 4/21/2022      To Whom it May Concern:    Bertha Cotter is under my professional care  Caleb Cogan was seen in my office on 4/21/2022  Caleb Cogan may return to work on April 22, 2022  Work restriction, avoid lifting greater than 20 lb for 3 months and then no restrictions  If you have any questions or concerns, please don't hesitate to call         Sincerely,        Signed: Freddie Pimentel DO, [facility], FACP      CC: No Recipients

## 2022-04-25 ENCOUNTER — TELEPHONE (OUTPATIENT)
Dept: CARDIAC REHAB | Facility: CLINIC | Age: 77
End: 2022-04-25

## 2022-04-25 DIAGNOSIS — E11.69 TYPE 2 DIABETES MELLITUS WITH OTHER SPECIFIED COMPLICATION, WITHOUT LONG-TERM CURRENT USE OF INSULIN (HCC): Primary | ICD-10-CM

## 2022-04-25 RX ORDER — LANCETS
EACH MISCELLANEOUS
Qty: 100 EACH | Refills: 5 | Status: SHIPPED | OUTPATIENT
Start: 2022-04-25

## 2022-04-26 ENCOUNTER — CLINICAL SUPPORT (OUTPATIENT)
Dept: CARDIAC REHAB | Facility: CLINIC | Age: 77
End: 2022-04-26
Payer: MEDICARE

## 2022-04-26 DIAGNOSIS — I21.3 STEMI (ST ELEVATION MYOCARDIAL INFARCTION) (HCC): ICD-10-CM

## 2022-04-26 DIAGNOSIS — I21.3 ST ELEVATION MYOCARDIAL INFARCTION (STEMI), UNSPECIFIED ARTERY (HCC): ICD-10-CM

## 2022-04-26 PROCEDURE — 93797 PHYS/QHP OP CAR RHAB WO ECG: CPT

## 2022-04-26 NOTE — PROGRESS NOTES
Cardiac Rehabilitation Plan of Care   Initial Care Plan          Today's date: 2022   # of Exercise Sessions Completed: 1 - initial evaluation  Patient name: Mauro Lee      : 1945  Age: 77 y o  MRN: [id number]  Referring Physician: Gretchen Parmar MD  Cardiologist: Jadiel Galaviz DO  Provider: [facility]  Clinician: Francesca Hurd MS, CEP    Dx:   Encounter Diagnosis   Name Primary?  STEMI (ST elevation myocardial infarction) [facility])      Date of onset: 22      SUMMARY OF PROGRESS:  Today is Que's initial evaluation to begin Cardiac Rehab now 2 wks post STEMI/stent to 90% stenosis of 1st diagonal branch of LAD  He has remaining 10% stenosis at [facility] and 20% stenosis at [facility]  Samuel Garcia reports having felt 2 separate bouts of 'heartburn' at work followed by a constant severe chest pressure a few days later that did not resolve  He has not had any chest pressure since his stent  Samuel Garcia has a history of HLD, DM II, bilateral knee replacements and renal cell carcinoma 2017  He  does not currently follow a formal exercise program at home and has been taking it very easy since his stent  Prior to his MI, Samuel Garcia was weight training 2-3 days/week with weights from  lb and he would like to return to this  Today, Samuel Garcia completed an initial submaximal TM ETT  He completed 2:30 minutes of stage 3 (4 3METs) with test termination of RHR +30  Resting  BP of 140/78 with appropriate hemodynamic response to exercise; BP reaching 140/70 - 164/70 and peak HR of 89 bpm  Samuel Garcia denied symptoms during exercise  Telemetry revealed NSR  Samuel Garcia was counseled on exercise guidelines and the goal to achieve a minimum of 150 mins/wk of moderate intensity (RPE 4-6) exercise per [facility] - [place] guidelines  We also discussed current dietary habits and goals of heart healthy eating for lipid management ; including cutting back red meat to 1x/week  Samuel Garcia monitors home BG daily reporting average -140   He had not been checkin gregularly prior to his stent and is not sure if his glucometer works properly but states that his PCP placed an order for a new one  Depression and anxiety were assessed with PHQ-9 and SALINA-7 questionnaires with scores of 2 and 3 respectively, suggesting  1-4 = Minimal Depression and  0-4  = Not anxious  When addressed, Manuela Angel denies having depression/anxiety symptoms and reports very good social/emotional support  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 with not specific stress management tools used at this time  Information to begin using [employer] [address] was provided  His goals for cardiac rehab include: getting back to normal and reduced stress  Manuela Angel will attend group education classes on heart healthy eating, reading food labels, stress management, risk factor reduction, understanding heart disease and common heart medications  He will attend 35 monitored exercise sessions, 3x/wk for 12-18 weeks beginning Monday, May 2  Medication compliance: Yes   Comments: Pt reports to be compliant with medications  Fall Risk: Low   Comments: Ambulates with a steady gait with no assist device    EKG Interpretation: NSR      EXERCISE ASSESSMENT and PLAN    Current Exercise Program in Rehab:       Frequency: 3 days/week Supplement with home exercise 2+ days/wk as tolerated       Minutes: 30-40        METS: 3 5-5 0            HR:  bpm   RPE: 3-5         Modalities: Treadmill, UBE and Lifecycle      Exercise Progression 30 Day Goals :    Frequency: 3 days/week of cardiac rehab     Supplement with home exercise 2+ days/wk as tolerated    Minutes: 40-45                            >150 mins/wk of moderate intensity exercise   METS: 3 5-5 5   HR:  bpm    RPE: 4-6   Modalities: Treadmill, UBE, Lifecycle, Elliptical and Rower    Strength training:   Will be added following 2-3 weeks of monitored exercise sessions   Modalities: Leg Press, Chest Press, Pull Downs, Lateral Raise, Arm Extension, Arm Curl, Upright Rows, Front Raises, Shoulder Shrugs and Calf Raises    Home Exercise: none, pt was lifting 2-3 days/week prior to his STEMI/stent and would liek to resume asap    Goals: 10% improvement in functional capacity - based on max METs achieved in fitness assessment, improved DASI score by 10%, Increase in exercise capacity by 40% - based on peak METs tolerated in cardiac rehab exercise session, Attend Rehab regularly and resume home exercise program    Progression Toward Goals:  Reviewed Pt goals and determined plan of care    Education: benefit of exercise for CAD risk factors, AHA guidelines to achieve >150 mins/wk of moderate exercise and RPE scale   Plan:education on home exercise guidelines and Education class: Risk Factors for Heart Disease  Readiness to change: Preparation:  (Getting ready to change)       NUTRITION ASSESSMENT AND PLAN    Weight control:    Starting weight: 189 4 lb   Current weight:     Waist circumference:    Starting:    Current:      Diabetes: T2D, Patient monitors BS 1 times/day, Patient reported fasting -140  A1c: 6 9%    last measured: 12/29/21    Lipid management: Discussed diet and lipid management and Last lipid profile 4/13/22  Chol 139    HDL 34  LDL 62    Goals:HDL >40, TRG <150, fasting BG , Improved Rate Your Plate score  >27, choose lean meat (93-95%), reduce portion sizes of meat to 3oz or less, increase intake of meatless meals, Eat 4-5 cups of fruits and vegetables daily, choose low sodium processed foods and seldom eat out or choose lower fat menu items    Progression Toward Goals: Reviewed Pt goals and determined plan of care    Education: heart healthy eating  low sodium diet  nutrition for  lipid management  exercise and diabetes management   healthy choices while dining out  Plan: Education class: Reading Food Labels, Education Class: Heart Healthy Eating, reduce red meat 1x/wk, remove salt shaker from table, use salt substitute like Mrs  Dash, increase utilization of fresh or dried herbs and monitor home blood glucose  Readiness to change: Preparation:  (Getting ready to change)       PSYCHOSOCIAL ASSESSMENT AND PLAN    Emotional:  Depression assessment:  PHQ-9 = 2 1-4 = Minimal Depression            Anxiety measure:  SALINA-7 = 3 0-4  = Not anxious  Self-reported stress level:  7  Social support: Very Good    Goals:  Reduce perceived stress to 1-3/10, increased energy and reduced stress    Progression Toward Goals: Reviewed Pt goals and determined plan of care    Education: signs/sxs of depression and stress management techniques  Plan: Class: Stress and Your Health, Refer to [employer], Exercise and Enjoy a hobby  Readiness to change: Preparation:  (Getting ready to change)       OTHER CORE COMPONENTS     Tobacco:   Social History     Tobacco Use   Smoking Status Former Smoker    Packs/day: 0 25    Types: Cigarettes   Smokeless Tobacco Never Used   Tobacco Comment    Smoked from ages 16 to 25         Tobacco Use Intervention:   N/A: Pt has a remote history of smoking    Anginal Symptoms:  None - pt reports severe chest pressure during STEMI; none since   NTG use: No prescription    Blood pressure:    Restin/78 - pt reports this is higher than his BP at home; checking daily   Exercise: 140/70 - 164/70    Goals: consistent BP < 130/80, reduced dietary sodium <2300mg, moderate intensity exercise >150 mins/wk and medication compliance    Progression Toward Goals: Reviewed Pt goals and determined plan of care    Education:  low sodium diet and HTN  Plan: Class: Understanding Heart Disease, Class: Common Heart Medications, engage in regular exercise, eliminate salt shaker at the table, use salt substitutes and monitor home BP  Readiness to change: Preparation:  (Getting ready to change)

## 2022-04-26 NOTE — PROGRESS NOTES
CARDIAC REHAB ASSESSMENT    Today's date: 2022  Patient name: Denise Castorena     :        MRN: [id number]  PCP: Yamileth Burks DO  Referring Physician: Kalani De La Torre MD  Cardiologist: Jose Cabrera DO    Dx:   Encounter Diagnosis   Name Primary?     STEMI (ST elevation myocardial infarction) ([place] [address] )        Date of onset: 2022  Cultural needs: None    Weight    Wt Readings from Last 1 Encounters:   22 85 5 kg (188 lb 6 4 oz)      Height:   Ht Readings from Last 1 Encounters:   22 5' 10 87" (1 8 m)     Medical History:   Past Medical History:   Diagnosis Date    Allergic reaction     last assessed: 2015    Arthritis     right knee    Bee sting allergy     last assessed: 2015    BPH (benign prostatic hypertrophy)     Cancer ([place] [address] ) 2017    Right kidney    Diabetes mellitus ([place] [address] )     Type II, NIDDM    Enlarged prostate with lower urinary tract symptoms (LUTS)     last assessed: 2015    Herpes zoster     last assessed: 2013    History of colon polyps     Hyperlipidemia     Leukocytosis     last assessed: 2014    Osteoarthrosis, localized, primary, knee     last assessed: 10/25/2016    Renal cancer ([place] [address] )     Seasonal allergies     Shoulder pain, right     Wears glasses     Wears partial dentures     Upper and Lower         Physical Limitations: Bilateral knee replacements 2 yr ago    Fall Risk: Low   Comments: Ambulates with a steady gait with no assist device    Anginal Equivalent: None/denies angina - heart burn/chest pressure prior to stent; none since   NTG use: No prescription    Risk Factors   Cholesterol: Yes  Smoking: Former user - quit at 22 yr old  HTN: No  DM: Type 2   average -140  Obesity: No   Inactivity: Yes  Stress:  perceived  stress: 7/10   Stressors:running a business;bills and financing   Goals for Stress Management:Practice Relaxation Techniques, Exercise and Enjoy a hobby    Family History:  Family History Problem Relation Age of Onset    Cancer Mother     Stroke Father        Allergies: Bee venom  ETOH:   Social History     Substance and Sexual Activity   Alcohol Use Yes    Comment: 1-2 x year         Current Medications:   Current Outpatient Medications   Medication Sig Dispense Refill    aspirin (ECOTRIN LOW STRENGTH) 81 mg EC tablet Take 1 tablet (81 mg total) by mouth daily 30 tablet 0    atorvastatin (LIPITOR) 80 mg tablet Take 1 tablet (80 mg total) by mouth daily with dinner 30 tablet 0    Blood Glucose Monitoring Suppl (ONE TOUCH ULTRA 2) w/Device KIT Use 2 (two) times a day 1 kit 0    Cholecalciferol (VITAMIN D-3 PO) Take 2,000 Units by mouth daily      EPINEPHrine (EPIPEN 2-MOIZ) 0 3 mg/0 3 mL SOAJ Inject as directed      ezetimibe (ZETIA) 10 mg tablet Take 1 tablet (10 mg total) by mouth daily at bedtime 30 tablet 0    fluticasone (FLONASE) 50 mcg/act nasal spray 1 spray into each nostril daily 1 Bottle 2    glucose blood (OneTouch Ultra) test strip Use as instructed/one touch ultra 2 100 strip 5    glucose blood test strip Use 1 each daily as needed (once a day) One touch ultra  Test strips 100 strip 3    Lancets (onetouch ultrasoft) lancets One touch ultra 2 / each 5    Lancets (onetouch ultrasoft) lancets Lancets for Delica/Once a day 189 each 5    lisinopril (ZESTRIL) 2 5 mg tablet Take 1 tablet (2 5 mg total) by mouth daily 30 tablet 0    metFORMIN (GLUCOPHAGE) 500 mg tablet TAKE 1 TABLET TWICE DAILY 180 tablet 1    metoprolol tartrate (LOPRESSOR) 25 mg tablet Take 0 5 tablets (12 5 mg total) by mouth 2 (two) times a day 60 tablet 0    ticagrelor (BRILINTA) 90 MG Take 1 tablet (90 mg total) by mouth every 12 (twelve) hours 60 tablet 2    traZODone (DESYREL) 100 mg tablet TAKE 1 TABLET (100 MG TOTAL) BY MOUTH DAILY AT BEDTIME 90 tablet 1     No current facility-administered medications for this visit           Functional Status Prior to Diagnosis for Treatment   Occupation: part time job (x2) & owns business  Recreation: fishing; spending time with grandson  ADLs: No limitations  [place]: No limitations  Exercise: weight lifting  lb 2-3 days a week    Current Functional Status  Occupation: part time job (x2) and owns own business   Recreation: fishing and travel  ADLs: resumed all ADLs  [place]: resumed all ADLs  Exercise: None    Patient Specific Goals:  Get back to normal; reduced stress    Short Term Program Goals: exercise 120-150 mins/wk improved BG control return to work    Long Term Goals: Improved [facility] Activity Status score  Improved functional capacity  Improved lipid profile  Reduced stress    Ability to reach goals/rehabilitation potential:  Very Good     Projected return to function: 12 weeks  Objective tests: sub-max TM ETT      Nutritional   Reviewed details of Rate your Plate  Discussed key elements of heart healthy eating  Reviewed patient goals for dietary modifications and their clinical implications  Reviewed most recent lipid profile       Wife is vegetarian  Goals for dietary modification: choose lean cuts of meat  low fat ground meat and poultry  reduce portions of meat to 3 oz  increase whole grains  increase fruits and vegetables  low sodium (has cut back on added sodium)  heathier choices while dining out [email or web address]      Emotional/Social  Patient reports he is coping well with good social support and denies depression or anxiety    Marital status:     Domestic Violence Screening: No

## 2022-04-28 ENCOUNTER — TELEPHONE (OUTPATIENT)
Dept: FAMILY MEDICINE CLINIC | Facility: CLINIC | Age: 77
End: 2022-04-28

## 2022-04-28 NOTE — TELEPHONE ENCOUNTER
MD notified of pt's blood pressure 70/37 with a MAP of 50. Patient asymptomatic. 250 cc bolus ordered. Continuing to monitor patient.    PA started for One Touch Ultra 2 w/Device Kit

## 2022-04-29 DIAGNOSIS — I21.3 STEMI (ST ELEVATION MYOCARDIAL INFARCTION) (HCC): ICD-10-CM

## 2022-04-29 RX ORDER — LISINOPRIL 2.5 MG/1
2.5 TABLET ORAL DAILY
Qty: 30 TABLET | Refills: 2 | Status: SHIPPED | OUTPATIENT
Start: 2022-04-29 | End: 2022-05-12 | Stop reason: SDUPTHER

## 2022-04-29 RX ORDER — EZETIMIBE 10 MG/1
10 TABLET ORAL
Qty: 90 TABLET | Refills: 1 | Status: SHIPPED | OUTPATIENT
Start: 2022-04-29

## 2022-04-29 RX ORDER — ATORVASTATIN CALCIUM 80 MG/1
80 TABLET, FILM COATED ORAL
Qty: 90 TABLET | Refills: 1 | Status: SHIPPED | OUTPATIENT
Start: 2022-04-29

## 2022-05-02 ENCOUNTER — CLINICAL SUPPORT (OUTPATIENT)
Dept: CARDIAC REHAB | Facility: CLINIC | Age: 77
End: 2022-05-02
Payer: MEDICARE

## 2022-05-02 DIAGNOSIS — I21.3 ST ELEVATION MYOCARDIAL INFARCTION (STEMI), UNSPECIFIED ARTERY (HCC): Primary | ICD-10-CM

## 2022-05-02 PROCEDURE — 93798 PHYS/QHP OP CAR RHAB W/ECG: CPT

## 2022-05-02 NOTE — TELEPHONE ENCOUNTER
Rey Rudolph,    What glucometer/test strips will is insurance cover?   Patient is diabetic and needs to monitor his sugars    Thanks,  Reece Gabriel

## 2022-05-04 ENCOUNTER — CLINICAL SUPPORT (OUTPATIENT)
Dept: CARDIAC REHAB | Facility: CLINIC | Age: 77
End: 2022-05-04
Payer: MEDICARE

## 2022-05-04 DIAGNOSIS — I21.3 ST ELEVATION MYOCARDIAL INFARCTION (STEMI), UNSPECIFIED ARTERY (HCC): Primary | ICD-10-CM

## 2022-05-04 PROCEDURE — 93798 PHYS/QHP OP CAR RHAB W/ECG: CPT

## 2022-05-04 NOTE — TELEPHONE ENCOUNTER
Spoke with [employer] and Karisma Kidz pt picked up glucometer 5/3/22 just paid out of pocket $30 22 medicare doesn't pay for kits with everything   But insurance will cover Accu Chek or True Metrix for future

## 2022-05-06 ENCOUNTER — CLINICAL SUPPORT (OUTPATIENT)
Dept: CARDIAC REHAB | Facility: CLINIC | Age: 77
End: 2022-05-06
Payer: MEDICARE

## 2022-05-06 DIAGNOSIS — I21.3 ST ELEVATION MYOCARDIAL INFARCTION (STEMI), UNSPECIFIED ARTERY (HCC): Primary | ICD-10-CM

## 2022-05-06 PROCEDURE — 93798 PHYS/QHP OP CAR RHAB W/ECG: CPT

## 2022-05-08 ENCOUNTER — OFFICE VISIT (OUTPATIENT)
Dept: URGENT CARE | Facility: MEDICAL CENTER | Age: 77
End: 2022-05-08
Payer: MEDICARE

## 2022-05-08 VITALS
RESPIRATION RATE: 20 BRPM | BODY MASS INDEX: 26.6 KG/M2 | SYSTOLIC BLOOD PRESSURE: 131 MMHG | HEART RATE: 71 BPM | OXYGEN SATURATION: 97 % | TEMPERATURE: 96.9 F | HEIGHT: 71 IN | DIASTOLIC BLOOD PRESSURE: 76 MMHG | WEIGHT: 190 LBS

## 2022-05-08 DIAGNOSIS — T63.481A INSECT STINGS, ACCIDENTAL OR UNINTENTIONAL, INITIAL ENCOUNTER: Primary | ICD-10-CM

## 2022-05-08 PROCEDURE — G0463 HOSPITAL OUTPT CLINIC VISIT: HCPCS | Performed by: EMERGENCY MEDICINE

## 2022-05-08 PROCEDURE — 99213 OFFICE O/P EST LOW 20 MIN: CPT | Performed by: EMERGENCY MEDICINE

## 2022-05-08 NOTE — PROGRESS NOTES
[facility]        NAME: Devora Rodriguez is a 77 y o  male  : 1945    MRN: [id number]  DATE: May 8, 2022  TIME: 4:20 PM    Assessment and Plan   Insect stings, accidental or unintentional, initial encounter [T63 [id number]]  1  Insect stings, accidental or unintentional, initial encounter       4:32 PM  Expected local reaction  Monitor for signs and symptoms of infection  Cool compresses and antiinflammatories  Antihistamines for itching  F/u with PCP as outpatient  Patient Instructions     Insect Bite or Sting   WHAT YOU NEED TO KNOW:   Most insect bites and stings are not dangerous and go away without treatment  Your symptoms may be mild, or you may develop anaphylaxis  Anaphylaxis is a sudden, life-threatening reaction that needs immediate treatment  Common examples of insects that bite or sting are bees, ticks, mosquitoes, spiders, and ants  Insect bites or stings can lead to diseases such as malaria, West Nile virus, Lyme disease, or [place] Spotted Fever  DISCHARGE INSTRUCTIONS:   Call your local emergency number (911 in the [address] [place] [address],[address]) for signs or symptoms of anaphylaxis,  such as trouble breathing, swelling in your mouth or throat, or wheezing  You may also have itching, a rash, hives, or feel like you are going to faint  Return to the emergency department if:   · You are stung on your tongue or in your throat  · A white area forms around the bite  · You are sweating badly or have body pain  · You think you were bitten or stung by a poisonous insect  Call your doctor if:   · You have a fever  · The area becomes red, warm, tender, and swollen beyond the area of the bite or sting  · You have questions or concerns about your condition or care  Medicines: You may need any of the following:  · Antihistamines  decrease itching and rash  · Epinephrine  is used to treat severe allergic reactions such as anaphylaxis  · Take your medicine as directed    Contact your healthcare provider if you think your medicine is not helping or if you have side effects  Tell him of her if you are allergic to any medicine  Keep a list of the medicines, vitamins, and herbs you take  Include the amounts, and when and why you take them  Bring the list or the pill bottles to follow-up visits  Carry your medicine list with you in case of an emergency  Steps to take for signs or symptoms of anaphylaxis:   · Immediately  give 1 shot of epinephrine only into the outer thigh muscle  · Leave the shot in place  as directed  Your healthcare provider may recommend you leave it in place for up to 10 seconds before you remove it  This helps make sure all of the epinephrine is delivered  · Call 911 and go to the emergency department,  even if the shot improved symptoms  Do not drive yourself  Bring the used epinephrine shot with you  Safety precautions to take if you are at risk for anaphylaxis:   · Keep 2 shots of epinephrine with you at all times  You may need a second shot, because epinephrine only works for about 20 minutes and symptoms may return  Your healthcare provider can show you and family members how to give the shot  Check the expiration date every month and replace it before it expires  · Create an action plan  Your healthcare provider can help you create a written plan that explains the allergy and an emergency plan to treat a reaction  The plan explains when to give a second epinephrine shot if symptoms return or do not improve after the first  Give copies of the action plan and emergency instructions to family members, work and school staff, and  providers  Show them how to give a shot of epinephrine  · Carry medical alert identification  Wear medical alert jewelry or carry a card that says you have an insect allergy  Ask your healthcare provider where to get these items  If an insect bites or stings you:   · Remove the stinger    Scrape the stinger out with your fingernail, edge of a credit card, or a knife blade  Do not squeeze the wound  Gently wash the area with soap and water  · Remove the tick  Ticks must be removed as soon as possible so you do not get diseases passed through tick bites  Ask your healthcare provider for more information on tick bites and how to remove ticks  Care for a bite or sting wound:   · Elevate (raise) the area above the level of your heart, if possible  Prop the area on pillows to keep it raised comfortably  Elevate the area for 10 to 20 minutes each hour or as directed by your healthcare provider  · Use compresses  Soak a clean washcloth in cold water, wring it out, and put it on the bite or sting  Use the compress for 10 to 20 minutes each hour or as directed by your healthcare provider  After 24 to 48 hours, change to warm compresses  · Apply a paste  Add water to baking soda to make a thick paste  Put the paste on the area for 5 minutes  Rinse gently to remove the paste  Prevent another insect bite or sting:   · Do not wear bright-colored or flower-print clothing when you plan to spend time outdoors  Do not use hairspray, perfumes, or aftershave  · Do not leave food out  · Empty any standing water and wash container with soap and water every 2 days  · Put screens on all open windows and doors  · Put insect repellent that contains DEET on skin that is showing when you go outside  Put insect repellent at the top of your boots, bottom of pant legs, and sleeve cuffs  Wear long sleeves, pants, and shoes  · Use citronella candles outdoors to help keep mosquitoes away  Put a tick and flea collar on pets  Follow up with your doctor as directed:  Write down your questions so you remember to ask them during your visits  © Copyright [employer] 2022 Information is for End User's use only and may not be sold, redistributed or otherwise used for commercial purposes   All illustrations and images included in CareNotes® are the copyrighted property of [employer]  or Krystin Efrenkitty Latanya   The above information is an  only  It is not intended as medical advice for individual conditions or treatments  Talk to your doctor, nurse or pharmacist before following any medical regimen to see if it is safe and effective for you  Follow up with PCP in 3-5 days  Proceed to  ER if symptoms worsen  Chief Complaint     Chief Complaint   Patient presents with   Lyndon hSaw 84     Patient  states he was stung by a hornet earlier today  His R hand is swollen and warm  H edid take a zyrtec today         History of Present Illness       76 yo male presents today for an evaluation of an insect sting on his right hand  He states he was stung by a wasp today around 9am   Hand is swollen  Denies difficulty breathing or facial swelling  Took a zyrtec without relief  He states he just wanted to be checked because he had a heart attack about a month ago  Denies chest pain or shortness of breath  Review of Systems   Review of Systems   Constitutional: Negative for chills, fatigue and fever  HENT: Negative for postnasal drip, sore throat and trouble swallowing  Respiratory: Negative for chest tightness and shortness of breath  Gastrointestinal: Negative for abdominal pain  Genitourinary: Negative for dysuria  Skin: Positive for rash and wound  Allergic/Immunologic: Negative for immunocompromised state  Neurological: Negative for dizziness, light-headedness and headaches           Current Medications       Current Outpatient Medications:     aspirin (ECOTRIN LOW STRENGTH) 81 mg EC tablet, Take 1 tablet (81 mg total) by mouth daily, Disp: 30 tablet, Rfl: 0    atorvastatin (LIPITOR) 80 mg tablet, Take 1 tablet (80 mg total) by mouth daily with dinner, Disp: 90 tablet, Rfl: 1    Blood Glucose Monitoring Suppl (ONE TOUCH ULTRA 2) w/Device KIT, Use 2 (two) times a day, Disp: 1 kit, Rfl: 0    Cholecalciferol (VITAMIN D-3 PO), Take 2,000 Units by mouth daily, Disp: , Rfl:     EPINEPHrine (EPIPEN 2-MOIZ) 0 3 mg/0 3 mL SOAJ, Inject as directed, Disp: , Rfl:     ezetimibe (ZETIA) 10 mg tablet, Take 1 tablet (10 mg total) by mouth daily at bedtime, Disp: 90 tablet, Rfl: 1    fluticasone (FLONASE) 50 mcg/act nasal spray, 1 spray into each nostril daily, Disp: 1 Bottle, Rfl: 2    glucose blood (OneTouch Ultra) test strip, Use as instructed/one touch ultra 2, Disp: 100 strip, Rfl: 5    glucose blood test strip, Use 1 each daily as needed (once a day) One touch ultra  Test strips, Disp: 100 strip, Rfl: 3    Lancets (onetouch ultrasoft) lancets, One touch ultra 2 /BID, Disp: 100 each, Rfl: 5    Lancets (onetouch ultrasoft) lancets, Lancets for Delica/Once a day, Disp: 100 each, Rfl: 5    lisinopril (ZESTRIL) 2 5 mg tablet, Take 1 tablet (2 5 mg total) by mouth daily, Disp: 30 tablet, Rfl: 2    metFORMIN (GLUCOPHAGE) 500 mg tablet, TAKE 1 TABLET TWICE DAILY, Disp: 180 tablet, Rfl: 1    metoprolol tartrate (LOPRESSOR) 25 mg tablet, Take 0 5 tablets (12 5 mg total) by mouth 2 (two) times a day, Disp: 60 tablet, Rfl: 2    ticagrelor (BRILINTA) 90 MG, Take 1 tablet (90 mg total) by mouth every 12 (twelve) hours, Disp: 180 tablet, Rfl: 1    traZODone (DESYREL) 100 mg tablet, TAKE 1 TABLET (100 MG TOTAL) BY MOUTH DAILY AT BEDTIME, Disp: 90 tablet, Rfl: 1    Current Allergies     Allergies as of 05/08/2022 - Reviewed 05/08/2022   Allergen Reaction Noted    Bee venom Edema 02/04/2019            The following portions of the patient's history were reviewed and updated as appropriate: allergies, current medications, past family history, past medical history, past social history, past surgical history and problem list      Past Medical History:   Diagnosis Date    Allergic reaction     last assessed: 5/26/2015    Arthritis     right knee    Bee sting allergy     last assessed: 6/30/2015    BPH (benign prostatic hypertrophy)     [facility] 2017    Right kidney    Diabetes mellitus ([facility] )     Type II, NIDDM    Enlarged prostate with lower urinary tract symptoms (LUTS)     last assessed: 8/17/2015    Herpes zoster     last assessed: 11/21/2013    History of colon polyps     Hyperlipidemia     Leukocytosis     last assessed: 11/18/2014    Osteoarthrosis, localized, primary, knee     last assessed: 10/25/2016    Ren[facility]     Seasonal allergies     Shoulder pain, right     Wears glasses     Wears partial dentures     Upper and Lower       Past Surgical History:   Procedure Laterality Date    CARDIAC CATHETERIZATION N/A 4/13/2022    Procedure: Cardiac pci;  Surgeon: Keenan Gunderson MD;  [facility];   Service: Cardiology    CARPAL TUNNEL RELEASE Left     CHOLECYSTECTOMY      Open    COLONOSCOPY      FL GUIDED NEEDLE PLAC BX/ASP/INJ  6/1/2020    FL GUIDED NEEDLE PLAC BX/ASP/INJ  11/13/2020    GANGLION CYST EXCISION Left     Left wrist    JOINT REPLACEMENT Left 08/2015    Left TKR    JOINT REPLACEMENT Right 09/2017    Right TKR    NEPHRECTOMY  05/08/2017    Partial right nephrectomy    NY LAP,PARTIAL NEPHRECTOMY Right 5/8/2017    Procedure: LAPAROSCOPIC LYSIS OF ADHESIONS, ROBOTIC PARTIAL NEPHRECTOMY, WITH INTRA-OP LAPAROSCOPIC ULTRASOUND;  Surgeon: Lupillo Venegas MD;  [facility];  Service: Urology    [place] REPAIR BICEPS LONG TENDON  2/4/2019    Procedure: TENODESIS BICEPS OPEN PROXIMAL;  Surgeon: Lizbeth Griffiths MD;  [facility];  Service: Orthopedics    [place] REPAIR INTERCARP/CARP-METACARP JT Left 12/5/2016    Procedure: ARTHROPLASTY THUMB [facility] ;  Surgeon: Lizbeth Griffiths MD;  [facility];  Service: Orthopedics    NY REVISE MEDIAN N/CARPAL TUNNEL SURG Left 12/5/2016    Procedure: RELEASE CARPAL TUNNEL;  Surgeon: Lizbeth Griffiths MD;  [facility];  Service: Orthopedics    [place] SHLDR ARTHROSCOP,SURG,W/ROTAT CUFF REPR Right 2/4/2019 Procedure: REPAIR ROTATOR CUFF ARTHROSCOPIC; SUBACROMIAL DECOMPRESSION; DISTAL CLAVICLE EXCISION;  Surgeon: Latasha Huerta MD;  [facility];  Service: Orthopedics    PA TOTAL KNEE ARTHROPLASTY Right 9/11/2017    Procedure: ARTHROPLASTY KNEE TOTAL;  Surgeon: Latasha Huerta MD;  [facility];  Service: Orthopedics    TONSILLECTOMY AND ADENOIDECTOMY         Family History   Problem Relation Age of Onset    Cancer Mother     Stroke Father          Medications have been verified  Objective   /76   Pulse 71   Temp (!) 96 9 °F (36 1 °C)   Resp 20   Ht 5' 11" (1 803 m)   Wt 86 2 kg (190 lb)   SpO2 97%   BMI 26 50 kg/m²        Physical Exam     Physical Exam  Vitals and nursing note reviewed  Constitutional:       Appearance: Normal appearance  He is not ill-appearing  Cardiovascular:      Rate and Rhythm: Normal rate and regular rhythm  Pulses: Normal pulses  Pulmonary:      Effort: Pulmonary effort is normal       Breath sounds: Normal breath sounds  Skin:     Comments: Insect sting on the dorsal surface of the right hand with surrounding edema and erythema  Swelling goes up to the wrist   No lymphangitic streaking  ROM of the hand is normal     Neurological:      Mental Status: He is alert

## 2022-05-08 NOTE — PATIENT INSTRUCTIONS
Insect Bite or Sting   WHAT YOU NEED TO KNOW:   Most insect bites and stings are not dangerous and go away without treatment  Your symptoms may be mild, or you may develop anaphylaxis  Anaphylaxis is a sudden, life-threatening reaction that needs immediate treatment  Common examples of insects that bite or sting are bees, ticks, mosquitoes, spiders, and ants  Insect bites or stings can lead to diseases such as malaria, West Nile virus, Lyme disease, or [place] Spotted Fever  DISCHARGE INSTRUCTIONS:   Call your local emergency number (911 in the [facility],[address]) for signs or symptoms of anaphylaxis,  such as trouble breathing, swelling in your mouth or throat, or wheezing  You may also have itching, a rash, hives, or feel like you are going to faint  Return to the emergency department if:   · You are stung on your tongue or in your throat  · A white area forms around the bite  · You are sweating badly or have body pain  · You think you were bitten or stung by a poisonous insect  Call your doctor if:   · You have a fever  · The area becomes red, warm, tender, and swollen beyond the area of the bite or sting  · You have questions or concerns about your condition or care  Medicines: You may need any of the following:  · Antihistamines  decrease itching and rash  · Epinephrine  is used to treat severe allergic reactions such as anaphylaxis  · Take your medicine as directed  Contact your healthcare provider if you think your medicine is not helping or if you have side effects  Tell him of her if you are allergic to any medicine  Keep a list of the medicines, vitamins, and herbs you take  Include the amounts, and when and why you take them  Bring the list or the pill bottles to follow-up visits  Carry your medicine list with you in case of an emergency  Steps to take for signs or symptoms of anaphylaxis:   · Immediately  give 1 shot of epinephrine only into the outer thigh muscle  · Leave the shot in place  as directed  Your healthcare provider may recommend you leave it in place for up to 10 seconds before you remove it  This helps make sure all of the epinephrine is delivered  · Call 911 and go to the emergency department,  even if the shot improved symptoms  Do not drive yourself  Bring the used epinephrine shot with you  Safety precautions to take if you are at risk for anaphylaxis:   · Keep 2 shots of epinephrine with you at all times  You may need a second shot, because epinephrine only works for about 20 minutes and symptoms may return  Your healthcare provider can show you and family members how to give the shot  Check the expiration date every month and replace it before it expires  · Create an action plan  Your healthcare provider can help you create a written plan that explains the allergy and an emergency plan to treat a reaction  The plan explains when to give a second epinephrine shot if symptoms return or do not improve after the first  Give copies of the action plan and emergency instructions to family members, work and school staff, and  providers  Show them how to give a shot of epinephrine  · Carry medical alert identification  Wear medical alert jewelry or carry a card that says you have an insect allergy  Ask your healthcare provider where to get these items  If an insect bites or stings you:   · Remove the stinger  Scrape the stinger out with your fingernail, edge of a credit card, or a knife blade  Do not squeeze the wound  Gently wash the area with soap and water  · Remove the tick  Ticks must be removed as soon as possible so you do not get diseases passed through tick bites  Ask your healthcare provider for more information on tick bites and how to remove ticks  Care for a bite or sting wound:   · Elevate (raise) the area above the level of your heart, if possible  Prop the area on pillows to keep it raised comfortably  Elevate the area for 10 to 20 minutes each hour or as directed by your healthcare provider  · Use compresses  Soak a clean washcloth in cold water, wring it out, and put it on the bite or sting  Use the compress for 10 to 20 minutes each hour or as directed by your healthcare provider  After 24 to 48 hours, change to warm compresses  · Apply a paste  Add water to baking soda to make a thick paste  Put the paste on the area for 5 minutes  Rinse gently to remove the paste  Prevent another insect bite or sting:   · Do not wear bright-colored or flower-print clothing when you plan to spend time outdoors  Do not use hairspray, perfumes, or aftershave  · Do not leave food out  · Empty any standing water and wash container with soap and water every 2 days  · Put screens on all open windows and doors  · Put insect repellent that contains DEET on skin that is showing when you go outside  Put insect repellent at the top of your boots, bottom of pant legs, and sleeve cuffs  Wear long sleeves, pants, and shoes  · Use citronella candles outdoors to help keep mosquitoes away  Put a tick and flea collar on pets  Follow up with your doctor as directed:  Write down your questions so you remember to ask them during your visits  © Copyright [employer] 2022 Information is for End User's use only and may not be sold, redistributed or otherwise used for commercial purposes  All illustrations and images included in CareNotes® are the copyrighted property of [employer]  or [facility] Vinay Pelaez   The above information is an  only  It is not intended as medical advice for individual conditions or treatments  Talk to your doctor, nurse or pharmacist before following any medical regimen to see if it is safe and effective for you

## 2022-05-09 ENCOUNTER — OFFICE VISIT (OUTPATIENT)
Dept: FAMILY MEDICINE CLINIC | Facility: CLINIC | Age: 77
End: 2022-05-09
Payer: MEDICARE

## 2022-05-09 ENCOUNTER — TELEPHONE (OUTPATIENT)
Dept: CARDIOLOGY CLINIC | Facility: CLINIC | Age: 77
End: 2022-05-09

## 2022-05-09 ENCOUNTER — APPOINTMENT (OUTPATIENT)
Dept: CARDIAC REHAB | Facility: CLINIC | Age: 77
End: 2022-05-09
Payer: MEDICARE

## 2022-05-09 VITALS
OXYGEN SATURATION: 96 % | TEMPERATURE: 98.2 F | SYSTOLIC BLOOD PRESSURE: 136 MMHG | HEIGHT: 71 IN | DIASTOLIC BLOOD PRESSURE: 74 MMHG | WEIGHT: 190 LBS | HEART RATE: 57 BPM | BODY MASS INDEX: 26.6 KG/M2 | RESPIRATION RATE: 18 BRPM

## 2022-05-09 DIAGNOSIS — R22.31 LOCALIZED SWELLING ON RIGHT HAND: ICD-10-CM

## 2022-05-09 DIAGNOSIS — T63.461A WASP STING, ACCIDENTAL OR UNINTENTIONAL, INITIAL ENCOUNTER: Primary | ICD-10-CM

## 2022-05-09 DIAGNOSIS — E11.69 TYPE 2 DIABETES MELLITUS WITH OTHER SPECIFIED COMPLICATION, WITHOUT LONG-TERM CURRENT USE OF INSULIN (HCC): ICD-10-CM

## 2022-05-09 PROCEDURE — 99214 OFFICE O/P EST MOD 30 MIN: CPT | Performed by: FAMILY MEDICINE

## 2022-05-09 RX ORDER — LANCETS
EACH MISCELLANEOUS
Qty: 100 EACH | Refills: 5 | Status: SHIPPED | OUTPATIENT
Start: 2022-05-09

## 2022-05-09 RX ORDER — PREDNISONE 10 MG/1
TABLET ORAL
Qty: 21 TABLET | Refills: 0 | Status: SHIPPED | OUTPATIENT
Start: 2022-05-09

## 2022-05-09 NOTE — TELEPHONE ENCOUNTER
Pt called having reaction to wasp sting from Saturday, sounds like local reactions is asking what med he cannot have prescribed due to heart history   seeing PCP today  Told him that PCP will treat and if he has any questions will consult cardiology but PCP is use to dealing with these situations  He understood

## 2022-05-09 NOTE — PROGRESS NOTES
Assessment/Plan:   1  Wasp sting, accidental or unintentional, initial encounter/Localized swelling on right hand    Reviewed patient's symptoms today  At this time, symptoms appear secondary to a hypersensitivity reaction from his wasp sting  He is advised to continue with use of his antihistamine  He may apply ice to this area for 10-15 minutes multiple times a day  Discussed this case with Cardiology given his recent NSTEMI with a stent placement  Will start treatment at this time with  A short prednisone taper  If any symptoms should worsen, he must go directly to the ED  - predniSONE 10 mg tablet; Take 6 tablets By mouth  In the morning Qd  Decrease by  By 1 tablet daily until completed  Dispense: 21 tablet; Refill: 0                 Diagnoses and all orders for this visit:    Wasp sting, accidental or unintentional, initial encounter  -     predniSONE 10 mg tablet; Take 6 tablets By mouth  In the morning Qd  Decrease by  By 1 tablet daily until completed  Type 2 diabetes mellitus with other specified complication, without long-term current use of insulin (HCC)  -     Lancets (onetouch ultrasoft) lancets; One touch ultra for [facility] 2 /BID    Localized swelling on right hand        Subjective:       Chief Complaint   Patient presents with    Insect Bite     wasp r hand       Patient ID: Mily Sepulveda is a 77 y o  male presents today with a CC of right hand swelling and pain  He states that he developed this pain yesterday after he was stung by a wasp over his right hand  He did have pain initially and did notice swelling as well  He did present the urgent care was advised to Flonase intake antihistamine medications  He has noticed progressive swelling over his forearm  He denies any chest pain, shortness of breath, difficulty swallowing, fevers or chills  He states that he did take Zyrtec history however did not notice any improvement      HPI    Review of Systems   Constitutional: Negative for activity change, chills, fatigue and fever  HENT: Negative for congestion, ear pain, sinus pressure and sore throat  Eyes: Negative for redness, itching and visual disturbance  Respiratory: Negative for cough and shortness of breath  Cardiovascular: Negative for chest pain and palpitations  Gastrointestinal: Negative for abdominal pain, diarrhea and nausea  Endocrine: Negative for cold intolerance and heat intolerance  Genitourinary: Negative for dysuria, flank pain and frequency  Musculoskeletal: Negative for arthralgias, back pain, gait problem and myalgias  Skin: Positive for rash  Negative for color change  Allergic/Immunologic: Negative for environmental allergies  Neurological: Negative for dizziness, numbness and headaches  Psychiatric/Behavioral: Negative for behavioral problems and sleep disturbance  The following portions of the patient's history were reviewed and updated as appropriate : past family history, past medical history, past social history and past surgical history      Current Outpatient Medications:     aspirin (ECOTRIN LOW STRENGTH) 81 mg EC tablet, Take 1 tablet (81 mg total) by mouth daily, Disp: 30 tablet, Rfl: 0    atorvastatin (LIPITOR) 80 mg tablet, Take 1 tablet (80 mg total) by mouth daily with dinner, Disp: 90 tablet, Rfl: 1    Blood Glucose Monitoring Suppl (ONE TOUCH ULTRA 2) w/Device KIT, Use 2 (two) times a day, Disp: 1 kit, Rfl: 0    Cholecalciferol (VITAMIN D-3 PO), Take 2,000 Units by mouth daily, Disp: , Rfl:     EPINEPHrine (EPIPEN 2-MOIZ) 0 3 mg/0 3 mL SOAJ, Inject as directed, Disp: , Rfl:     ezetimibe (ZETIA) 10 mg tablet, Take 1 tablet (10 mg total) by mouth daily at bedtime, Disp: 90 tablet, Rfl: 1    fluticasone (FLONASE) 50 mcg/act nasal spray, 1 spray into each nostril daily, Disp: 1 Bottle, Rfl: 2    glucose blood (OneTouch Ultra) test strip, Use as instructed/one touch ultra 2, Disp: 100 strip, Rfl: 5    glucose blood test strip, Use 1 each daily as needed (once a day) One touch ultra  Test strips, Disp: 100 strip, Rfl: 3    Lancets (onetouch ultrasoft) lancets, Lancets for Delica/Once a day, Disp: 100 each, Rfl: 5    Lancets (onetouch ultrasoft) lancets, One touch ultra for DELICA 2 /BID, Disp: 188 each, Rfl: 5    lisinopril (ZESTRIL) 2 5 mg tablet, Take 1 tablet (2 5 mg total) by mouth daily, Disp: 30 tablet, Rfl: 2    metFORMIN (GLUCOPHAGE) 500 mg tablet, TAKE 1 TABLET TWICE DAILY, Disp: 180 tablet, Rfl: 1    metoprolol tartrate (LOPRESSOR) 25 mg tablet, Take 0 5 tablets (12 5 mg total) by mouth 2 (two) times a day, Disp: 60 tablet, Rfl: 2    ticagrelor (BRILINTA) 90 MG, Take 1 tablet (90 mg total) by mouth every 12 (twelve) hours, Disp: 180 tablet, Rfl: 1    traZODone (DESYREL) 100 mg tablet, TAKE 1 TABLET (100 MG TOTAL) BY MOUTH DAILY AT BEDTIME, Disp: 90 tablet, Rfl: 1    predniSONE 10 mg tablet, Take 6 tablets By mouth  In the morning Qd  Decrease by  By 1 tablet daily until completed  , Disp: 21 tablet, Rfl: 0         Objective:         Vitals:    05/09/22 1154   BP: 136/74   BP Location: Left arm   Patient Position: Sitting   Cuff Size: Adult   Pulse: 57   Resp: 18   Temp: 98 2 °F (36 8 °C)   TempSrc: Tympanic   SpO2: 96%   Weight: 86 2 kg (190 lb)   Height: 5' 11" (1 803 m)     Physical Exam  Vitals reviewed  Constitutional:       Appearance: Normal appearance  He is well-developed  HENT:      Head: Normocephalic and atraumatic  Right Ear: Hearing normal       Left Ear: Hearing normal       Nose: Nose normal  No septal deviation  Eyes:      General: Lids are normal       Pupils: Pupils are equal, round, and reactive to light  Neck:      Thyroid: No thyroid mass or thyromegaly  Trachea: Trachea normal    Cardiovascular:      Rate and Rhythm: Normal rate  Pulmonary:      Effort: Pulmonary effort is normal  No respiratory distress  Breath sounds: No decreased breath sounds  Abdominal:      Tenderness: There is no guarding  Musculoskeletal:         General: Normal range of motion  Cervical back: Normal range of motion  Skin:     General: Skin is warm and dry  Neurological:      Mental Status: He is alert and oriented to person, place, and time  Cranial Nerves: No cranial nerve deficit  Sensory: No sensory deficit  Psychiatric:         Speech: Speech normal          Behavior: Behavior normal          Thought Content:  Thought content normal          Judgment: Judgment normal

## 2022-05-11 ENCOUNTER — CLINICAL SUPPORT (OUTPATIENT)
Dept: CARDIAC REHAB | Facility: CLINIC | Age: 77
End: 2022-05-11
Payer: MEDICARE

## 2022-05-11 DIAGNOSIS — I21.3 ST ELEVATION MYOCARDIAL INFARCTION (STEMI), UNSPECIFIED ARTERY (HCC): Primary | ICD-10-CM

## 2022-05-11 PROCEDURE — 93798 PHYS/QHP OP CAR RHAB W/ECG: CPT

## 2022-05-12 DIAGNOSIS — I21.3 STEMI (ST ELEVATION MYOCARDIAL INFARCTION) (HCC): ICD-10-CM

## 2022-05-12 RX ORDER — LISINOPRIL 2.5 MG/1
2.5 TABLET ORAL DAILY
Qty: 90 TABLET | Refills: 1 | Status: SHIPPED | OUTPATIENT
Start: 2022-05-12

## 2022-05-12 NOTE — TELEPHONE ENCOUNTER
Both patient and [facility] pharmacy left vm on refill line  Pharmacy requesting 90 days for medications

## 2022-05-13 ENCOUNTER — CLINICAL SUPPORT (OUTPATIENT)
Dept: CARDIAC REHAB | Facility: CLINIC | Age: 77
End: 2022-05-13
Payer: MEDICARE

## 2022-05-13 DIAGNOSIS — I21.3 ST ELEVATION MYOCARDIAL INFARCTION (STEMI), UNSPECIFIED ARTERY (HCC): Primary | ICD-10-CM

## 2022-05-13 PROCEDURE — 93798 PHYS/QHP OP CAR RHAB W/ECG: CPT

## 2022-05-16 ENCOUNTER — CLINICAL SUPPORT (OUTPATIENT)
Dept: CARDIAC REHAB | Facility: CLINIC | Age: 77
End: 2022-05-16
Payer: MEDICARE

## 2022-05-16 DIAGNOSIS — I21.3 ST ELEVATION MYOCARDIAL INFARCTION (STEMI), UNSPECIFIED ARTERY (HCC): Primary | ICD-10-CM

## 2022-05-16 PROCEDURE — 93798 PHYS/QHP OP CAR RHAB W/ECG: CPT

## 2022-05-18 ENCOUNTER — CLINICAL SUPPORT (OUTPATIENT)
Dept: CARDIAC REHAB | Facility: CLINIC | Age: 77
End: 2022-05-18
Payer: MEDICARE

## 2022-05-18 DIAGNOSIS — I21.3 ST ELEVATION MYOCARDIAL INFARCTION (STEMI), UNSPECIFIED ARTERY (HCC): Primary | ICD-10-CM

## 2022-05-18 PROCEDURE — 93798 PHYS/QHP OP CAR RHAB W/ECG: CPT

## 2022-05-20 ENCOUNTER — CLINICAL SUPPORT (OUTPATIENT)
Dept: CARDIAC REHAB | Facility: CLINIC | Age: 77
End: 2022-05-20
Payer: MEDICARE

## 2022-05-20 DIAGNOSIS — I21.3 ST ELEVATION MYOCARDIAL INFARCTION (STEMI), UNSPECIFIED ARTERY (HCC): Primary | ICD-10-CM

## 2022-05-20 PROCEDURE — 93798 PHYS/QHP OP CAR RHAB W/ECG: CPT

## 2022-05-23 ENCOUNTER — OFFICE VISIT (OUTPATIENT)
Dept: CARDIOLOGY CLINIC | Facility: CLINIC | Age: 77
End: 2022-05-23
Payer: MEDICARE

## 2022-05-23 ENCOUNTER — APPOINTMENT (OUTPATIENT)
Dept: CARDIAC REHAB | Facility: CLINIC | Age: 77
End: 2022-05-23
Payer: MEDICARE

## 2022-05-23 VITALS
SYSTOLIC BLOOD PRESSURE: 114 MMHG | HEART RATE: 64 BPM | WEIGHT: 183.4 LBS | DIASTOLIC BLOOD PRESSURE: 66 MMHG | BODY MASS INDEX: 25.68 KG/M2 | HEIGHT: 71 IN

## 2022-05-23 DIAGNOSIS — E78.2 MIXED HYPERLIPIDEMIA: ICD-10-CM

## 2022-05-23 DIAGNOSIS — I21.09 ST ELEVATION MYOCARDIAL INFARCTION (STEMI) INVOLVING OTHER CORONARY ARTERY OF ANTERIOR WALL (HCC): Primary | ICD-10-CM

## 2022-05-23 DIAGNOSIS — E11.69 TYPE 2 DIABETES MELLITUS WITH OTHER SPECIFIED COMPLICATION, WITHOUT LONG-TERM CURRENT USE OF INSULIN (HCC): ICD-10-CM

## 2022-05-23 PROCEDURE — 99214 OFFICE O/P EST MOD 30 MIN: CPT | Performed by: INTERNAL MEDICINE

## 2022-05-23 NOTE — PROGRESS NOTES
Cardiology Office Note  MD Ashlyn Aceves MD Kathryne Ferris, DO, MD Boni Samuels DO, Blanche Odonnell DO, [facility] [place]  ----------------------------------------------------------------  [address]  [address] [facility], [address]    Jalyn Middleton 77 y o  male MRN: [id number]  Unit/Bed#:  Encounter: [id number]      History of Present Illness: It was a pleasure to see Jalyn Middleton in the office today for follow-up CV evaluation  He has a past medical history of CAD with high lateral STEMI and 1st diagonal stent placement in April 2022, dyslipidemia, type 2 diabetes mellitus and renal cell carcinoma status post right nephrectomy in May 2017  He established care with us in April 2022  The patient was in his usual state of health when he began to experienced acute chest discomfort  His symptoms began to wax and wane over the course of 24-48 hours prior to his presentation to the emergency department  His symptoms then worsened and he came to [facility] for evaluation  In the emergency department, he was found to have ST segment elevation in leads 1 and aVL with reciprocal changes in the inferior leads  Additionally, there was AV dissociation noted on the ECG  MI alert was called and he was taken to the cath lab  He was found have occlusion of the 1st diagonal and underwent stent placement  Echocardiogram demonstrated low-normal systolic function with regional wall motion abnormalities of the lateral wall  AV dissociation resolved and the patient was placed on beta-blocker as well as ACE-inhibitor  Ezetimibe was added to his regimen  He was recommended for cardiac rehabilitation and discharged home  Following discharge, the patient has  felt back to his baseline  He has had no exertional shortness of breath or chest discomfort    He has undergone cardiac rehabilitation and is currently pushing up his cardiovascular activity  He has plan to start an aerobic exercise tomorrow  Denies lower extremity swelling, orthopnea or paroxysmal nocturnal dyspnea  Denies chest pain, pressure, tightness or squeezing  Denies lightheadedness, dizziness or palpitations  Review of Systems:  Review of Systems   Constitutional: Negative for decreased appetite, fever, weight gain and weight loss  HENT: Negative for congestion and sore throat  Eyes: Negative for visual disturbance  Cardiovascular: Negative for chest pain, dyspnea on exertion, leg swelling, near-syncope and palpitations  Respiratory: Negative for cough and shortness of breath  Hematologic/Lymphatic: Negative for bleeding problem  Skin: Negative for rash  Musculoskeletal: Negative for myalgias and neck pain  Gastrointestinal: Negative for abdominal pain and nausea  Neurological: Negative for light-headedness and weakness  Psychiatric/Behavioral: Negative for depression  Past Medical History:   Diagnosis Date    Allergic reaction     last assessed: 5/26/2015    Arthritis     right knee    Bee sting allergy     last assessed: 6/30/2015    BPH (benign prostatic hypertrophy)     Cancer ([place] [address] ) 2017    Right kidney    Diabetes mellitus ([place] [address] )     Type II, NIDDM    Enlarged prostate with lower urinary tract symptoms (LUTS)     last assessed: 8/17/2015    Herpes zoster     last assessed: 11/21/2013    History of colon polyps     Hyperlipidemia     Leukocytosis     last assessed: 11/18/2014    Osteoarthrosis, localized, primary, knee     last assessed: 10/25/2016    Renal cancer ([place] [address] )     Seasonal allergies     Shoulder pain, right     Wears glasses     Wears partial dentures     Upper and Lower       Past Surgical History:   Procedure Laterality Date    CARDIAC CATHETERIZATION N/A 4/13/2022    Procedure: Cardiac pci;  Surgeon: Matthew Adam MD;  [facility];   Service: Cardiology    CARPAL TUNNEL RELEASE Left  CHOLECYSTECTOMY      Open    COLONOSCOPY      FL GUIDED NEEDLE PLAC BX/ASP/INJ  6/1/2020    FL GUIDED NEEDLE PLAC BX/ASP/INJ  11/13/2020    GANGLION CYST EXCISION Left     Left wrist    JOINT REPLACEMENT Left 08/2015    Left TKR    JOINT REPLACEMENT Right 09/2017    Right TKR    NEPHRECTOMY  05/08/2017    Partial right nephrectomy    MT LAP,PARTIAL NEPHRECTOMY Right 5/8/2017    Procedure: LAPAROSCOPIC LYSIS OF ADHESIONS, ROBOTIC PARTIAL NEPHRECTOMY, WITH INTRA-OP LAPAROSCOPIC ULTRASOUND;  Surgeon: Edgard Guerin MD;  Location: [facility];  Service: Urology    MT REPAIR BICEPS LONG TENDON  2/4/2019    Procedure: TENODESIS BICEPS OPEN PROXIMAL;  Surgeon: Delmer Kelly MD;  Location: [facility];  Service: Orthopedics    MT REPAIR INTERCARP/CARP-METACARP JT Left 12/5/2016    Procedure: ARTHROPLASTY THUMB [facility] ;  Surgeon: Delmer Kelly MD;  Location[facility];  Service: Orthopedics    MT REVISE MEDIAN N/CARPAL TUNNEL SURG Left 12/5/2016    Procedure: RELEASE CARPAL TUNNEL;  Surgeon: Delmer Kelly MD;  [facility];  Service: Orthopedics    MT [address] ARTHROSCOP,SURG,W/ROTAT CUFF REPR Right 2/4/2019    Procedure: REPAIR ROTATOR CUFF ARTHROSCOPIC; SUBACROMIAL DECOMPRESSION; DISTAL CLAVICLE EXCISION;  Surgeon: Delmer Kelly MD;  [facility];  Service: Orthopedics    MT TOTAL KNEE ARTHROPLASTY Right 9/11/2017    Procedure: ARTHROPLASTY KNEE TOTAL;  Surgeon: Delmer Kelly MD;  [facility];  Service: Orthopedics    TONSILLECTOMY AND ADENOIDECTOMY         Social History     Socioeconomic History    Marital status: /Civil Union     Spouse name: Not on file    Number of children: Not on file    Years of education: Not on file    Highest education level: Not on file   Occupational History    Not on file   Tobacco Use    Smoking status: Former Smoker     Packs/day: 0 25     Types: Cigarettes    Smokeless tobacco: Never Used    Tobacco comment: Smoked from ages 16 to 22    Vaping Use    Vaping Use: Never used   Substance and Sexual Activity    Alcohol use: Yes     Comment: 1-2 x year    Drug use: No    Sexual activity: Yes     Partners: Female   Other Topics Concern    Not on file   Social History Narrative    Not on file     Social Determinants of Health     Financial Resource Strain: Not on file   Food Insecurity: No Food Insecurity    Worried About Running Out of Food in the Last Year: Never true    Shayy of Food in the Last Year: Never true   Transportation Needs: No Transportation Needs    Lack of Transportation (Medical): No    Lack of Transportation (Non-Medical):  No   Physical Activity: Not on file   Stress: Not on file   Social Connections: Not on file   Intimate Partner Violence: Not on file   Housing Stability: Low Risk     Unable to Pay for Housing in the Last Year: No    Number of Places Lived in the Last Year: 1    Unstable Housing in the Last Year: No       Family History   Problem Relation Age of Onset    Cancer Mother     Stroke Father        Allergies   Allergen Reactions    Bee Venom Edema         Current Outpatient Medications:     aspirin (ECOTRIN LOW STRENGTH) 81 mg EC tablet, Take 1 tablet (81 mg total) by mouth daily, Disp: 30 tablet, Rfl: 0    atorvastatin (LIPITOR) 80 mg tablet, Take 1 tablet (80 mg total) by mouth daily with dinner, Disp: 90 tablet, Rfl: 1    Blood Glucose Monitoring Suppl (ONE TOUCH ULTRA 2) w/Device KIT, Use 2 (two) times a day, Disp: 1 kit, Rfl: 0    Cholecalciferol (VITAMIN D-3 PO), Take 2,000 Units by mouth daily, Disp: , Rfl:     EPINEPHrine (EPIPEN) 0 3 mg/0 3 mL SOAJ, Inject as directed, Disp: , Rfl:     ezetimibe (ZETIA) 10 mg tablet, Take 1 tablet (10 mg total) by mouth daily at bedtime, Disp: 90 tablet, Rfl: 1    fluticasone (FLONASE) 50 mcg/act nasal spray, 1 spray into each nostril daily, Disp: 1 Bottle, Rfl: 2    glucose blood (OneTouch Ultra) test strip, Use as instructed/one touch ultra 2, Disp: 100 strip, Rfl: 5    glucose blood test strip, Use 1 each daily as needed (once a day) One touch ultra  Test strips, Disp: 100 strip, Rfl: 3    Lancets (onetouch ultrasoft) lancets, Lancets for Delica/Once a day, Disp: 100 each, Rfl: 5    Lancets (onetouch ultrasoft) lancets, One touch ultra for DELICA 2 /BID, Disp: 307 each, Rfl: 5    lisinopril (ZESTRIL) 2 5 mg tablet, Take 1 tablet (2 5 mg total) by mouth in the morning , Disp: 90 tablet, Rfl: 1    metFORMIN (GLUCOPHAGE) 500 mg tablet, TAKE 1 TABLET TWICE DAILY, Disp: 180 tablet, Rfl: 1    metoprolol tartrate (LOPRESSOR) 25 mg tablet, Take 0 5 tablets (12 5 mg total) by mouth in the morning and 0 5 tablets (12 5 mg total) before bedtime  , Disp: 90 tablet, Rfl: 1    predniSONE 10 mg tablet, Take 6 tablets By mouth  In the morning Qd  Decrease by  By 1 tablet daily until completed  , Disp: 21 tablet, Rfl: 0    ticagrelor (BRILINTA) 90 MG, Take 1 tablet (90 mg total) by mouth every 12 (twelve) hours, Disp: 180 tablet, Rfl: 1    traZODone (DESYREL) 100 mg tablet, TAKE 1 TABLET (100 MG TOTAL) BY MOUTH DAILY AT BEDTIME, Disp: 90 tablet, Rfl: 1    Vitals:    05/23/22 1329   BP: 114/66   BP Location: Left arm   Patient Position: Sitting   Cuff Size: Large   Pulse: 64   Weight: 83 2 kg (183 lb 6 4 oz)   Height: 5' 11" (1 803 m)     Body mass index is 25 58 kg/m²  PHYSICAL EXAMINATION:  Gen: Awake, Alert, NAD   Head/eyes: AT/NC, pupils equal and round, Anicteric  ENT: mmm  Neck: Supple, No elevated JVP, trachea midline  Resp: CTA bilaterally no w/r/r  CV: RRR +S1, S2, No m/r/g  Abd: Soft, NT/ND + BS  Ext: no LE edema bilaterally; r radial site c/d/i  Neuro:  Follows commands, moves all extermities  Psych: Appropriate affect, pleasant mood, pleasant attitude, non-combative  Skin: warm; no rash, erythema or venous stasis changes on exposed skin    --------------------------------------------------------------------------------  TREADMILL STRESS  No results found for this or any previous visit      --------------------------------------------------------------------------------  NUCLEAR STRESS TEST: No results found for this or any previous visit  No results found for this or any previous visit       --------------------------------------------------------------------------------  CATH:  No results found for this or any previous visit     --------------------------------------------------------------------------------  ECHO:   No results found for this or any previous visit  No results found for this or any previous visit     --------------------------------------------------------------------------------  HOLTER  No results found for this or any previous visit  No results found for this or any previous visit     --------------------------------------------------------------------------------  CAROTIDS  No results found for this or any previous visit      --------------------------------------------------------------------------------  ECGs:  No results found for this visit on 05/23/22       Lab Results   Component Value Date    WBC 8 55 04/14/2022    HGB 13 9 04/14/2022    HCT 42 4 04/14/2022    MCV 90 04/14/2022     04/14/2022      Lab Results   Component Value Date    SODIUM 134 (L) 04/14/2022    K 4 0 04/14/2022     04/14/2022    CO2 22 04/14/2022    BUN 18 04/14/2022    CREATININE 1 14 04/14/2022    GLUC 141 (H) 04/14/2022    CALCIUM 9 2 04/14/2022      Lab Results   Component Value Date    HGBA1C 6 9 (H) 12/29/2021      Lab Results   Component Value Date    CHOL 149 12/27/2017    CHOL 129 06/21/2017    CHOL 140 12/12/2016     Lab Results   Component Value Date    HDL 34 (L) 04/13/2022    HDL 39 (L) 07/20/2021    HDL 39 (L) 07/08/2020     Lab Results   Component Value Date    LDLCALC 62 04/13/2022    LDLCALC 52 07/20/2021    LDLCALC 71 07/08/2020     Lab Results   Component Value Date    TRIG 217 (H) 04/13/2022    TRIG 107 07/20/2021    TRIG 136 07/08/2020     No results found for: [place], [place]   Lab Results   Component Value Date    INR 0 97 04/13/2022    INR 0 97 08/18/2017    INR 0 97 11/21/2016    PROTIME 12 7 04/13/2022    PROTIME 12 9 08/18/2017    PROTIME 12 9 11/21/2016        1  ST elevation myocardial infarction (STEMI) involving other coronary artery of anterior wall ([address] )    2  Mixed hyperlipidemia    3  Type 2 diabetes mellitus with other specified complication, without long-term current use of insulin ([facility])        IMPRESSION:  · Acute high lateral ST segment elevation MI s/p SURJIT-D1 w/ residual 10% ostial LCx and 20% mid RCA, April 13, 2022  · Transient AV dissociation with junctional rhythm during STEMI - resolved  · Type 2 diabetes mellitus  · Dyslipidemia with LDL 62 mg/dL, HDL 34 mg/dL,  mg/dL, April 2022  · LVEF 52% inferolateral and anterolateral hypokinesis from base to apex mild LA dilatation, AV sclerosis, trace AR, MV sclerosis, mild MR, trace TR, April 2022  · History of renal cell carcinoma status post right nephrectomy, 2007    PLAN:  It was a pleasure to see Yaquelin Thacker in the office today for follow-up CV evaluation  The patient is currently undergoing cardiovascular rehabilitation and feels back to his baseline  He has no symptoms concerning for angina and no signs or symptoms of heart failure  He examines to be euvolemic in the office today  Blood pressure and heart rate are currently stable  He has been tolerating his current anti-platelet agents without any reported bleeding  Denies any adverse effects to his current medications  He can perform greater than 4 Mets on a daily basis without any exertional symptoms  Admitted to some symptomatic PVCs while at rehab  Based on his clinical presentation, I have the following recommendations:    1  Recommend aggressive risk factor and lifestyle modifications  2  Continue with cardiovascular rehabilitation to build cardiovascular endurance    Push up physical activity level as tolerated  3  The patient wishes to be able to mow the lawn  Would continue see how he does at rehabilitation and if patient is exceeding the metabolic workload of lowering the lawn, may be reasonable to have him go back to doing some household activities  4  Minimum 1 year dual anti-platelet therapy with aspirin and Brilinta  At 1 year, would consider either discontinuing the medication or trying low-dose Brilinta or Xarelto  5  High-intensity statin and Zetia  Goal LDL is less than 70 mg/dL, but preferably less than 50  The patient is following up with primary care and will have a repeat lipid panel in July  6  Continue metoprolol and lisinopril  Goal blood pressure is less than 140/90  7  Follow-up with primary care for blood glucose control  8  No additional CV testing at this time  9  We will follow up with him in 3 months to reassess his progress  As always, please do not hesitate to call with any questions  Portions of the record may have been created with voice recognition software  Occasional wrong word or "sound a like" substitutions may have occurred due to the inherent limitations of voice recognition software  Read the chart carefully and recognize, using context, where substitutions have occurred        Signed: Moon Luis DO, Norma Slovak

## 2022-05-24 ENCOUNTER — CLINICAL SUPPORT (OUTPATIENT)
Dept: CARDIAC REHAB | Facility: CLINIC | Age: 77
End: 2022-05-24
Payer: MEDICARE

## 2022-05-24 DIAGNOSIS — I21.3 ST ELEVATION MYOCARDIAL INFARCTION (STEMI), UNSPECIFIED ARTERY (HCC): Primary | ICD-10-CM

## 2022-05-24 PROCEDURE — 93798 PHYS/QHP OP CAR RHAB W/ECG: CPT

## 2022-05-24 NOTE — PROGRESS NOTES
Cardiac Rehabilitation Plan of Care   30 Day Reassessment          Today's date: 2022   # of Exercise Sessions Completed: 10  Patient name: Charla Asif      : 1945  Age: 77 y o  MRN: [id number]  Referring Physician: Corazon Hylton MD  Cardiologist: Lashaun Rubin DO  Provider: [facility]  Clinician: Fanny Lozano MS, CEP    Dx:   Encounter Diagnosis   Name Primary?  ST elevation myocardial infarction (STEMI), unspecified artery ([facility] [address] ) Yes     Date of onset: 22      SUMMARY OF PROGRESS: This is Que's 30 day note in cardiac rehab  He is doing well so far, completing 40 minutes of cardiovascular exercise at 3 1-4 0 METs plus weight training without cardiac complaints with remaining 10% stenosis at [facility] and 20% stenosis at [facility]  He would like to start doing more at home, including mowing his lawn (5 5 METs) and weight lifting  Today, he started weight lifting and we will plan to add home lifting 1-2 sets of 15-20 reps next week as tolerated  Proper form and breathing technique was encouraged today while lifting  NSR noted on telemetry with occasional PVCs  Resting BP slightly elevated (130/70 - 140/70) with normal BP response to exercise and hypertensive BP response to the ellipitical reaching 146/70 - 190-72  Faisal Gunter has continued with dietary changes, including reduced red meat consumption, increased vegetables and lean meats  His FBG is occasionally elevated to the 160's and Faisal Gunter was encouraged to reduce his white potato consumption at dinner and consider smaller portions and switching to sweet potatoes  He reports having lost some weight, although the scale in cardiac rehab continues to read 189 4 lbs  Faisal Gunter denies depression/anxiety at this time and reports less stress recently  He does have a good social support system of family and friends  Emotional health will continue to be monitored and repeat PHQ-9 will be administered   Exercise duration and intensity will continue to be progressed as tolerated to maintain RPE 4-6  His goals for cardiac rehab include: getting back to normal and reduced stress  Medication compliance: Yes   Comments: Pt reports to be compliant with medications  Fall Risk: Low   Comments: Ambulates with a steady gait with no assist device    EKG Interpretation: NSR, occasional PVCs      EXERCISE ASSESSMENT and PLAN    Current Exercise Program in Rehab:       Frequency: 3 days/week Supplement with home exercise 2+ days/wk as tolerated      Minutes: 40       METS: 3 1-4 0            HR:  bpm   RPE: 3-5         Modalities: Treadmill, UBE, Lifecycle, Elliptical and Rower      Exercise Progression 30 Day Goals :    Frequency: 3 days/week of cardiac rehab     Supplement with home exercise 2+ days/wk as tolerated    Minutes: 40-45                            >150 mins/wk of moderate intensity exercise   METS: 3 5-5 5   HR:  bpm    RPE: 4-6   Modalities: Treadmill, UBE, Lifecycle, Elliptical and Rower    Strength trainin-3 days / week  12-15 repetitions  1-2 sets per modality   started today and tolerated well   Modalities: Leg Press, Chest Press, Pull Downs, Lateral Raise, Arm Extension, Arm Curl, Upright Rows, Front Raises, Shoulder Shrugs and Calf Raises    Home Exercise: none, pt was lifting 2-3 days/week prior to his STEMI/stent and plans to resume next week after a foundation of weight lifting in cardiac rehab    Goals: 10% improvement in functional capacity - based on max METs achieved in fitness assessment, improved DASI score by 10%, Increase in exercise capacity by 40% - based on peak METs tolerated in cardiac rehab exercise session, Attend Rehab regularly and resume home exercise program    Progression Toward Goals:  Pt is progressing and showing improvement  toward the following goals:  attends cardiac rehab regularly    , Patient will increase exercise METs to 5 5 and add more home exercise in the next 30 days, Will continue to educate and progress as tolerated  Education: benefit of exercise for CAD risk factors, AHA guidelines to achieve >150 mins/wk of moderate exercise and RPE scale   Plan:education on home exercise guidelines and Education class: Risk Factors for Heart Disease  Readiness to change: Action:  (Changing behavior)      NUTRITION ASSESSMENT AND PLAN    Weight control:    Starting weight: 189 4 lb   Current weight:   189 4  Waist circumference:    Starting:    Current:      Diabetes: T2D, Patient monitors BS 1-2 times/day, Patient reported fasting -167  A1c: 6 9%    last measured: 12/29/21    Lipid management: Discussed diet and lipid management and Last lipid profile 4/13/22  Chol 139    HDL 34  LDL 62    Goals:HDL >40, TRG <150, fasting BG , Improved Rate Your Plate score  >09, choose lean meat (93-95%), reduce portion sizes of meat to 3oz or less, increase intake of meatless meals, Eat 4-5 cups of fruits and vegetables daily, choose low sodium processed foods and seldom eat out or choose lower fat menu items    Progression Toward Goals: Pt is progressing and showing improvement  toward the following goals:  patient continues to follow healthy diet and watching portion sizes  , Patient will reduce white potato intake in the next 30 days, Will continue to educate and progress as tolerated  Education: heart healthy eating  low sodium diet  nutrition for  lipid management  exercise and diabetes management   healthy choices while dining out  education class:  Label Reading  Plan: Education Class: Heart Healthy Eating, reduce red meat 1x/wk, remove salt shaker from table, use salt substitute like Mrs   Dash, increase utilization of fresh or dried herbs and monitor home blood glucose  Readiness to change: Action:  (Changing behavior)      PSYCHOSOCIAL ASSESSMENT AND PLAN    Emotional:  Depression assessment:  PHQ-9 = 2 1-4 = Minimal Depression; not reassessed today            Anxiety measure:  SALINA-7 = 3 0-4  = Not anxious ; not reassessed today  Self-reported stress level:  5  Social support: Very Good    Goals:  Reduce perceived stress to 1-3/10, increased energy and reduced stress    Progression Toward Goals: Pt is progressing and showing improvement  toward the following goals:  patient denies depression/anxiety and reports reduced stress since his initial visit   , Will continue to educate and progress as tolerated  Education: signs/sxs of depression and stress management techniques  Plan: Class: Stress and Your Health, Refer to [employer], Exercise and Enjoy a hobby  Readiness to change: Action:  (Changing behavior)      OTHER CORE COMPONENTS     Tobacco:   Social History     Tobacco Use   Smoking Status Former Smoker    Packs/day: 0 25    Types: Cigarettes   Smokeless Tobacco Never Used   Tobacco Comment    Smoked from ages 16 to 25  Tobacco Use Intervention:   N/A: Pt has a remote history of smoking    Anginal Symptoms:  None - pt reports severe chest pressure during STEMI; none since   NTG use: No prescription    Blood pressure:    Restin/70 - 140/70   Exercise: 152/70 - 190/76    Goals: consistent BP < 130/80, reduced dietary sodium <2300mg, moderate intensity exercise >150 mins/wk and medication compliance    Progression Toward Goals: Pt is progressing and showing improvement  toward the following goals:  complaint with exercise and plans to add more home exercise   , Pt has not made progress toward the following goals: consistent slightly elevated resting BP with hypertensive BP response to ellipitical  , Will continue to educate and progress as tolerated      Education:  low sodium diet and HTN  Plan: Class: Understanding Heart Disease, Class: Common Heart Medications, engage in regular exercise, eliminate salt shaker at the table, use salt substitutes and monitor home BP  Readiness to change: Action:  (Changing behavior)

## 2022-05-25 ENCOUNTER — CLINICAL SUPPORT (OUTPATIENT)
Dept: CARDIAC REHAB | Facility: CLINIC | Age: 77
End: 2022-05-25
Payer: MEDICARE

## 2022-05-25 DIAGNOSIS — I21.3 ST ELEVATION MYOCARDIAL INFARCTION (STEMI), UNSPECIFIED ARTERY (HCC): Primary | ICD-10-CM

## 2022-05-25 PROCEDURE — 93798 PHYS/QHP OP CAR RHAB W/ECG: CPT

## 2022-05-27 ENCOUNTER — CLINICAL SUPPORT (OUTPATIENT)
Dept: CARDIAC REHAB | Facility: CLINIC | Age: 77
End: 2022-05-27
Payer: MEDICARE

## 2022-05-27 DIAGNOSIS — I21.3 ST ELEVATION MYOCARDIAL INFARCTION (STEMI), UNSPECIFIED ARTERY (HCC): Primary | ICD-10-CM

## 2022-05-27 PROCEDURE — 93798 PHYS/QHP OP CAR RHAB W/ECG: CPT

## 2022-05-31 ENCOUNTER — CLINICAL SUPPORT (OUTPATIENT)
Dept: CARDIAC REHAB | Facility: CLINIC | Age: 77
End: 2022-05-31
Payer: MEDICARE

## 2022-05-31 DIAGNOSIS — I21.3 ST ELEVATION MYOCARDIAL INFARCTION (STEMI), UNSPECIFIED ARTERY (HCC): Primary | ICD-10-CM

## 2022-05-31 PROCEDURE — 93798 PHYS/QHP OP CAR RHAB W/ECG: CPT

## 2022-06-01 ENCOUNTER — CLINICAL SUPPORT (OUTPATIENT)
Dept: CARDIAC REHAB | Facility: CLINIC | Age: 77
End: 2022-06-01
Payer: MEDICARE

## 2022-06-01 DIAGNOSIS — I21.3 ST ELEVATION MYOCARDIAL INFARCTION (STEMI), UNSPECIFIED ARTERY (HCC): Primary | ICD-10-CM

## 2022-06-01 PROCEDURE — 93798 PHYS/QHP OP CAR RHAB W/ECG: CPT

## 2022-06-02 ENCOUNTER — CLINICAL SUPPORT (OUTPATIENT)
Dept: CARDIAC REHAB | Facility: CLINIC | Age: 77
End: 2022-06-02
Payer: MEDICARE

## 2022-06-02 DIAGNOSIS — I21.3 ST ELEVATION MYOCARDIAL INFARCTION (STEMI), UNSPECIFIED ARTERY (HCC): Primary | ICD-10-CM

## 2022-06-02 PROCEDURE — 93798 PHYS/QHP OP CAR RHAB W/ECG: CPT

## 2022-06-02 NOTE — PROGRESS NOTES
Exercise Session Detail Pt presents for routine visit. She saw [facility] this week and u/s showed normal growth.   An irregular fetal rhythm was noted by [facility] and she has an echo scheduled there next week  She denies regular ctx, vag bleeding or leaking fluid. She did go to L&D for evaluation of low back pain. She reports she was told she was dilated 2 cm but not from MD does not note dilation. Pt concerned regarding nurse's report  O: cervix closed/30/-3  FHT's with doppler:  baseline FHR in 140's with occasional irregular beats noted consistent with PAC's noted by M, no bradycardia or tachycardia noted    A/p: hx PE: pt continues on lovenox 80 mg SC twice daily. She will be changed to heparin by her hematologist at approx 35 weeks. She notes she did see anesthesia MD last week when she went to [facility] appt. She is advised to hold anticoagulant dose if she feels she is in labor. Ordered BPP today and plan weekly through delivery    Previous c/s X 3: pt plans repeat c/s at 38 weeks with BTI, pt has signed papers    Irregular fetal heart rate: noted again today. Overall baseline FHR normal in 140's. Advised daily fmc's and pt will return to [facility] for echo next week    rtc 1 week for BPP

## 2022-06-03 ENCOUNTER — APPOINTMENT (OUTPATIENT)
Dept: CARDIAC REHAB | Facility: CLINIC | Age: 77
End: 2022-06-03
Payer: MEDICARE

## 2022-06-06 ENCOUNTER — CLINICAL SUPPORT (OUTPATIENT)
Dept: CARDIAC REHAB | Facility: CLINIC | Age: 77
End: 2022-06-06
Payer: MEDICARE

## 2022-06-06 DIAGNOSIS — I21.3 ST ELEVATION MYOCARDIAL INFARCTION (STEMI), UNSPECIFIED ARTERY (HCC): Primary | ICD-10-CM

## 2022-06-06 PROCEDURE — 93798 PHYS/QHP OP CAR RHAB W/ECG: CPT

## 2022-06-08 ENCOUNTER — CLINICAL SUPPORT (OUTPATIENT)
Dept: CARDIAC REHAB | Facility: CLINIC | Age: 77
End: 2022-06-08
Payer: MEDICARE

## 2022-06-08 ENCOUNTER — DOCUMENTATION (OUTPATIENT)
Dept: CARDIOLOGY CLINIC | Facility: CLINIC | Age: 77
End: 2022-06-08

## 2022-06-08 DIAGNOSIS — I21.3 ST ELEVATION MYOCARDIAL INFARCTION (STEMI), UNSPECIFIED ARTERY (HCC): Primary | ICD-10-CM

## 2022-06-08 PROCEDURE — 93798 PHYS/QHP OP CAR RHAB W/ECG: CPT

## 2022-06-10 ENCOUNTER — CLINICAL SUPPORT (OUTPATIENT)
Dept: CARDIAC REHAB | Facility: CLINIC | Age: 77
End: 2022-06-10
Payer: MEDICARE

## 2022-06-10 DIAGNOSIS — I21.3 ST ELEVATION MYOCARDIAL INFARCTION (STEMI), UNSPECIFIED ARTERY (HCC): Primary | ICD-10-CM

## 2022-06-10 PROCEDURE — 93798 PHYS/QHP OP CAR RHAB W/ECG: CPT

## 2022-06-13 ENCOUNTER — CLINICAL SUPPORT (OUTPATIENT)
Dept: CARDIAC REHAB | Facility: CLINIC | Age: 77
End: 2022-06-13
Payer: MEDICARE

## 2022-06-13 DIAGNOSIS — I21.3 ST ELEVATION MYOCARDIAL INFARCTION (STEMI), UNSPECIFIED ARTERY (HCC): Primary | ICD-10-CM

## 2022-06-13 PROCEDURE — 93798 PHYS/QHP OP CAR RHAB W/ECG: CPT

## 2022-06-15 ENCOUNTER — CLINICAL SUPPORT (OUTPATIENT)
Dept: CARDIAC REHAB | Facility: CLINIC | Age: 77
End: 2022-06-15
Payer: MEDICARE

## 2022-06-15 DIAGNOSIS — I21.3 ST ELEVATION MYOCARDIAL INFARCTION (STEMI), UNSPECIFIED ARTERY (HCC): Primary | ICD-10-CM

## 2022-06-15 PROCEDURE — 93798 PHYS/QHP OP CAR RHAB W/ECG: CPT

## 2022-06-17 ENCOUNTER — APPOINTMENT (OUTPATIENT)
Dept: CARDIAC REHAB | Facility: CLINIC | Age: 77
End: 2022-06-17
Payer: MEDICARE

## 2022-06-17 NOTE — PROGRESS NOTES
Cardiac Rehabilitation Plan of Care   60 Day Reassessment          Today's date: 2022   # of Exercise Sessions Completed: 20  Patient name: Mauro Lee      : 1945  Age: 69 y o  MRN: [id number]  Referring Physician: Gretchen Parmar MD  Cardiologist: Jadiel Galaviz DO  Provider: [facility]  Clinician: Dorothea Swanson MS, CEP    Dx:   Encounter Diagnosis   Name Primary?  ST elevation myocardial infarction (STEMI), unspecified artery ([address] ) Yes     Date of onset: 22      SUMMARY OF PROGRESS: This is Que's 60 day note in cardiac rehab  He has completed 20 sessions of rehab and will be out for the next week due to traveling on a fishing trip  He is doing well so far, completing 40 minutes of cardiovascular exercise at 3 3-4 5METs plus weight training without cardiac complaints  His wife continues to mow the lawn at home, but he would like to start doing that again soon, once his MET level at rehab reaches the same as mowing of 5 5 METs  NSR noted on telemetry with rare PVCs  Resting BP improvement 112//74 with normal BP response to exercise 144//64 and hypertensive BP response to the ellipitical reaching 194/78  Samuel Garcia has continued with dietary changes, including reduced red meat consumption, increased vegetables and lean meats  His FBG has imrpoved 108-130 and on occasion reaching 150  aSmuel Garcia has not lost any weight, with a last weight recorded at 190lbs  Samuel Garcia denies depression/anxiety at this time and reports less stress recently  He does have a good social support system of family and friends  Emotional health will continue to be monitored and repeat PHQ-9 will be administered  Exercise duration and intensity will continue to be progressed as tolerated to maintain RPE 4-6  His goals for cardiac rehab include: getting back to normal and reduced stress      Medication compliance: Yes   Comments: Pt reports to be compliant with medications  Fall Risk: Low   Comments: Ambulates The patient is a 56y Male complaining of bite, animal. with a steady gait with no assist device    EKG Interpretation: NSR, rare PVCs      EXERCISE ASSESSMENT and PLAN    Current Exercise Program in Rehab:       Frequency: 3 days/week Supplement with home exercise 2+ days/wk as tolerated      Minutes: 40       METS: 3 3-4 5            HR:  bpm   RPE: 3-5         Modalities: Treadmill, Lifecycle, Elliptical and Rower      Exercise Progression 30 Day Goals :    Frequency: 3 days/week of cardiac rehab     Supplement with home exercise 2+ days/wk as tolerated    Minutes: 40-45                            >150 mins/wk of moderate intensity exercise   METS: 3 5-5 5   HR:  bpm    RPE: 4-6   Modalities: Treadmill, Lifecycle, Elliptical and Rower    Strength trainin-3 days / week  12-15 repetitions  1-2 sets per modality    Modalities: Leg Press, Chest Press, Pull Downs, Lateral Raise, Arm Extension, Arm Curl, Upright Rows, Front Raises, Shoulder Shrugs and Calf Raises    Home Exercise: began light weight training at home, similiar to those exercise done in rehab    Goals: 10% improvement in functional capacity - based on max METs achieved in fitness assessment, improved DASI score by 10%, Increase in exercise capacity by 40% - based on peak METs tolerated in cardiac rehab exercise session, Attend Rehab regularly and resume home exercise program    Progression Toward Goals:  Pt is progressing and showing improvement  toward the following goals:  attends cardiac rehab regularly; increased MET level   , Will continue to educate and progress as tolerated      Education: benefit of exercise for CAD risk factors, AHA guidelines to achieve >150 mins/wk of moderate exercise, RPE scale and physical activity/exercise in extreme weather conditions   Plan:education on home exercise guidelines and Education class: Risk Factors for Heart Disease  Readiness to change: Action:  (Changing behavior)      NUTRITION ASSESSMENT AND PLAN    Weight control:    Starting weight: 189 4 lb   Current weight:   190  Waist circumference:    Starting:    Current:      Diabetes: T2D, Patient monitors BS 1-2 times/day, Patient reported fasting -150  A1c: 6 9%    last measured: 12/29/21    Lipid management: Discussed diet and lipid management and Last lipid profile 4/13/22  Chol 139    HDL 34  LDL 62    Goals:HDL >40, TRG <150, fasting BG , Improved Rate Your Plate score  >58, choose lean meat (93-95%), reduce portion sizes of meat to 3oz or less, increase intake of meatless meals, Eat 4-5 cups of fruits and vegetables daily, choose low sodium processed foods and seldom eat out or choose lower fat menu items    Progression Toward Goals: Pt is progressing and showing improvement  toward the following goals:  patient continues to follow healthy diet and watching portion sizes  , Will continue to educate and progress as tolerated  Education: heart healthy eating  low sodium diet  nutrition for  lipid management  exercise and diabetes management   healthy choices while dining out  education class:  Label Reading  Plan: Education Class: Heart Healthy Eating, reduce red meat 1x/wk, remove salt shaker from table, use salt substitute like Mrs  Dash, increase utilization of fresh or dried herbs and monitor home blood glucose  Readiness to change: Action:  (Changing behavior)      PSYCHOSOCIAL ASSESSMENT AND PLAN    Emotional:  Depression assessment:  PHQ-9 = 2 1-4 = Minimal Depression; not reassessed today            Anxiety measure:  SALINA-7 = 3 0-4  = Not anxious ; not reassessed today  Self-reported stress level:  5  Social support: Very Good    Goals:  Reduce perceived stress to 1-3/10, increased energy and reduced stress    Progression Toward Goals: Pt is progressing and showing improvement  toward the following goals:  patient denies depression/anxiety and reports reduced stress since his initial visit   , Will continue to educate and progress as tolerated      Education: signs/sxs of depression and stress management techniques  Plan: Class: Stress and Your Health, Refer to Julianne Diaz, Exercise and Enjoy a hobby  Readiness to change: Action:  (Changing behavior)      OTHER CORE COMPONENTS     Tobacco:   Social History     Tobacco Use   Smoking Status Former Smoker    Packs/day: 0 25    Types: Cigarettes   Smokeless Tobacco Never Used   Tobacco Comment    Smoked from ages 16 to 25  Tobacco Use Intervention:   N/A: Pt has a remote history of smoking    Anginal Symptoms:  None - pt reports severe chest pressure during STEMI; none since   NTG use: No prescription    Blood pressure:    Restin//70   Exercise: 144//78    Goals: consistent BP < 130/80, reduced dietary sodium <2300mg, moderate intensity exercise >150 mins/wk and medication compliance    Progression Toward Goals: Pt is progressing and showing improvement  toward the following goals:  improved resting BP   , Will continue to educate and progress as tolerated      Education:  low sodium diet and HTN, Education class:  Common Heart Medications and Education class: Understanding Heart Disease  Plan: engage in regular exercise, eliminate salt shaker at the table, use salt substitutes and monitor home BP  Readiness to change: Action:  (Changing behavior)

## 2022-06-20 ENCOUNTER — APPOINTMENT (OUTPATIENT)
Dept: CARDIAC REHAB | Facility: CLINIC | Age: 77
End: 2022-06-20
Payer: MEDICARE

## 2022-06-22 ENCOUNTER — APPOINTMENT (OUTPATIENT)
Dept: CARDIAC REHAB | Facility: CLINIC | Age: 77
End: 2022-06-22
Payer: MEDICARE

## 2022-06-24 ENCOUNTER — APPOINTMENT (OUTPATIENT)
Dept: CARDIAC REHAB | Facility: CLINIC | Age: 77
End: 2022-06-24
Payer: MEDICARE

## 2022-06-27 ENCOUNTER — CLINICAL SUPPORT (OUTPATIENT)
Dept: CARDIAC REHAB | Facility: CLINIC | Age: 77
End: 2022-06-27
Payer: MEDICARE

## 2022-06-27 DIAGNOSIS — I21.3 ST ELEVATION MYOCARDIAL INFARCTION (STEMI), UNSPECIFIED ARTERY (HCC): Primary | ICD-10-CM

## 2022-06-27 PROCEDURE — 93798 PHYS/QHP OP CAR RHAB W/ECG: CPT

## 2022-06-28 ENCOUNTER — TELEPHONE (OUTPATIENT)
Dept: OTHER | Facility: OTHER | Age: 77
End: 2022-06-28

## 2022-06-29 ENCOUNTER — CLINICAL SUPPORT (OUTPATIENT)
Dept: CARDIAC REHAB | Facility: CLINIC | Age: 77
End: 2022-06-29
Payer: MEDICARE

## 2022-06-29 DIAGNOSIS — I21.3 ST ELEVATION MYOCARDIAL INFARCTION (STEMI), UNSPECIFIED ARTERY (HCC): Primary | ICD-10-CM

## 2022-06-29 PROCEDURE — 93798 PHYS/QHP OP CAR RHAB W/ECG: CPT

## 2022-06-30 ENCOUNTER — CLINICAL SUPPORT (OUTPATIENT)
Dept: CARDIAC REHAB | Facility: CLINIC | Age: 77
End: 2022-06-30
Payer: MEDICARE

## 2022-06-30 DIAGNOSIS — I21.3 ST ELEVATION MYOCARDIAL INFARCTION (STEMI), UNSPECIFIED ARTERY (HCC): Primary | ICD-10-CM

## 2022-06-30 PROCEDURE — 93798 PHYS/QHP OP CAR RHAB W/ECG: CPT

## 2022-07-01 ENCOUNTER — APPOINTMENT (OUTPATIENT)
Dept: CARDIAC REHAB | Facility: CLINIC | Age: 77
End: 2022-07-01
Payer: MEDICARE

## 2022-07-05 ENCOUNTER — CLINICAL SUPPORT (OUTPATIENT)
Dept: CARDIAC REHAB | Facility: CLINIC | Age: 77
End: 2022-07-05
Payer: MEDICARE

## 2022-07-05 DIAGNOSIS — I21.3 ST ELEVATION MYOCARDIAL INFARCTION (STEMI), UNSPECIFIED ARTERY (HCC): Primary | ICD-10-CM

## 2022-07-05 PROCEDURE — 93798 PHYS/QHP OP CAR RHAB W/ECG: CPT

## 2022-07-06 ENCOUNTER — CLINICAL SUPPORT (OUTPATIENT)
Dept: CARDIAC REHAB | Facility: CLINIC | Age: 77
End: 2022-07-06
Payer: MEDICARE

## 2022-07-06 DIAGNOSIS — I21.3 ST ELEVATION MYOCARDIAL INFARCTION (STEMI), UNSPECIFIED ARTERY (HCC): Primary | ICD-10-CM

## 2022-07-06 PROCEDURE — 93798 PHYS/QHP OP CAR RHAB W/ECG: CPT

## 2022-07-07 ENCOUNTER — TELEPHONE (OUTPATIENT)
Dept: CARDIOLOGY CLINIC | Facility: CLINIC | Age: 77
End: 2022-07-07

## 2022-07-07 DIAGNOSIS — E11.8 TYPE 2 DIABETES MELLITUS WITH COMPLICATION, WITHOUT LONG-TERM CURRENT USE OF INSULIN (HCC): ICD-10-CM

## 2022-07-07 NOTE — TELEPHONE ENCOUNTER
Pt is leaving for vacation on Saturday 7/9 and is calling today to see if it is okay for him to fly and go on a vacation like this with his cardiac hx  Please advise

## 2022-07-08 ENCOUNTER — TELEPHONE (OUTPATIENT)
Dept: CARDIOLOGY CLINIC | Facility: CLINIC | Age: 77
End: 2022-07-08

## 2022-07-08 ENCOUNTER — APPOINTMENT (OUTPATIENT)
Dept: CARDIAC REHAB | Facility: CLINIC | Age: 77
End: 2022-07-08
Payer: MEDICARE

## 2022-07-08 DIAGNOSIS — G47.00 INSOMNIA, UNSPECIFIED TYPE: ICD-10-CM

## 2022-07-08 NOTE — TELEPHONE ENCOUNTER
Pt calling and states that he has been experiencing palpitations   he told them at rehab and they stated that they send all information to dr Melonie Espinoza   the patient concerned because he states they are uncomfortable and he is leaving for [facility] tomorrow (he states may be anxious) Ask to review situation with Dr Melonie Espinoza   he said they were not concerned at rehab  Please advise  PVC's  Noted in rehab notes

## 2022-07-08 NOTE — PROGRESS NOTES
Cardiac Rehabilitation Plan of Care   90 Day Reassessment          Today's date: 2022   # of Exercise Sessions Completed: 25  Patient name: Isabella West      : 1945  Age: 69 y o  MRN: [id number]  Referring Physician: Krystyna Perez MD  Cardiologist: Mylene Nissen, DO  Provider: [facility]  Clinician: Justen Whitman MS, CEP    Dx:   Encounter Diagnosis   Name Primary?  ST elevation myocardial infarction (STEMI), unspecified artery ([facility] [address] ) Yes     Date of onset: 22      SUMMARY OF PROGRESS: Michael Onofre is typically compliant with exercise 3 days/week in cardiac rehab with some vacation in between  In cardiac rehab, he tolerates 40 min of exercise at 3 5-4 5 METs plus weight training  NSR noted on telemetry with occasional PVCs  Since 22, Michael Onofre has had more frequent PVCs, bigeminy PVCs and multifocal couplets at the end of his exercise session and in recovery  He typically feels palpitations that correspond with the PVCs  Michael Onofre reports mowing his lawn last week with some fatigue but no palpitations reported  Resting HR 54-61 bpm with increased HR to  bpm with exercise  Resting 112/64 - 146/80 with increased BP response to exercise reaching 144/70 - 180/70  Michael Onofre has continued with dietary changes, including reduced red meat consumption, increased vegetables and lean meats  His FBG has remained 108-137  Michael Onofre denies any depression but admits he may be anxious about his palpitations  Emotional health will continue to be monitored and repeat PHQ-9 will be administered  Exercise duration and intensity will continue to be progressed as tolerated to maintain RPE 4-6  His goals for cardiac rehab include: getting back to normal and reduced stress      Medication compliance: Yes   Comments: Pt reports to be compliant with medications  Fall Risk: Low   Comments: Ambulates with a steady gait with no assist device    EKG Interpretation: NSR, more frequent PVCs since  (bigeminy PVCs, couplets)      EXERCISE ASSESSMENT and PLAN    Current Exercise Program in Rehab:       Frequency: 3 days/week Supplement with home exercise 2+ days/wk as tolerated      Minutes: 40       METS: 3 5-4 5            HR:  bpm   RPE: 3-5         Modalities: Treadmill, UBE, Lifecycle, Elliptical and Rower      Exercise Progression 30 Day Goals :    Frequency: 3 days/week of cardiac rehab     Supplement with home exercise 2+ days/wk as tolerated    Minutes: 40-45                            >150 mins/wk of moderate intensity exercise   METS: 3 5-5 5   HR:  bpm    RPE: 4-6   Modalities: Treadmill, UBE, Lifecycle, Elliptical and Rower    Strength trainin-3 days / week  12-15 repetitions  1-2 sets per modality    Modalities: Leg Press, Chest Press, Pull Downs, Lateral Raise, Arm Extension, Arm Curl, Upright Rows, Front Raises, Shoulder Shrugs and Calf Raises    Home Exercise: began light weight training at home, similiar to those exercise done in rehab    Goals: 10% improvement in functional capacity - based on max METs achieved in fitness assessment, improved DASI score by 10%, Increase in exercise capacity by 40% - based on peak METs tolerated in cardiac rehab exercise session, Attend Rehab regularly and resume home exercise program    Progression Toward Goals:  Pt is progressing and showing improvement  toward the following goals:  attends cardiac rehab regularly; increased MET level   , Will continue to educate and progress as tolerated      Education: benefit of exercise for CAD risk factors, AHA guidelines to achieve >150 mins/wk of moderate exercise, RPE scale and physical activity/exercise in extreme weather conditions   Plan:education on home exercise guidelines and Education class: Risk Factors for Heart Disease  Readiness to change: Action:  (Changing behavior)      NUTRITION ASSESSMENT AND PLAN    Weight control:    Starting weight: 189 4 lb   Current weight:   190 lb  Waist circumference: Starting:    Current:      Diabetes: T2D, Patient monitors BS 1-2 times/day, Patient reported fasting -150  A1c: 6 9%    last measured: 12/29/21    Lipid management: Discussed diet and lipid management and Last lipid profile 4/13/22  Chol 139    HDL 34  LDL 62    Goals:HDL >40, TRG <150, fasting BG , Improved Rate Your Plate score  >41, choose lean meat (93-95%), reduce portion sizes of meat to 3oz or less, increase intake of meatless meals, Eat 4-5 cups of fruits and vegetables daily, choose low sodium processed foods and seldom eat out or choose lower fat menu items    Progression Toward Goals: Pt is progressing and showing improvement  toward the following goals:  patient continues to follow healthy diet and watching portion sizes; maintained weight   , Will continue to educate and progress as tolerated  Education: heart healthy eating  low sodium diet  nutrition for  lipid management  exercise and diabetes management   healthy choices while dining out  education class:  Label Reading  Plan: Education Class: Heart Healthy Eating, reduce red meat 1x/wk, remove salt shaker from table, use salt substitute like Mrs  Dash, increase utilization of fresh or dried herbs and monitor home blood glucose  Readiness to change: Action:  (Changing behavior)      PSYCHOSOCIAL ASSESSMENT AND PLAN    Emotional:  Depression assessment:  PHQ-9 = 2 1-4 = Minimal Depression; not reassessed today            Anxiety measure:  SALINA-7 = 3 0-4  = Not anxious ; not reassessed today  Self-reported stress level:  5  Social support: Very Good    Goals:  Reduce perceived stress to 1-3/10, increased energy and reduced stress    Progression Toward Goals: Pt is progressing and showing improvement  toward the following goals:  denies depression but admits to possibly anxiety  , Patient will complete repeat SALINA-7 questionnaire in the next 30 days, Will continue to educate and progress as tolerated      Education: signs/sxs of depression and stress management techniques  Plan: Class: Stress and Your Health, Refer to [employer], Exercise and Enjoy a hobby  Readiness to change: Action:  (Changing behavior)      OTHER CORE COMPONENTS     Tobacco:   Social History     Tobacco Use   Smoking Status Former Smoker    Packs/day: 0 25    Types: Cigarettes   Smokeless Tobacco Never Used   Tobacco Comment    Smoked from ages 16 to 25  Tobacco Use Intervention:   N/A: Pt has a remote history of smoking    Anginal Symptoms:  None - pt reports severe chest pressure during STEMI; none since   NTG use: No prescription    Blood pressure:    Restin/64 - 146/80   Exercise: 144/70 - 180/70    Goals: consistent BP < 130/80, reduced dietary sodium <2300mg, moderate intensity exercise >150 mins/wk and medication compliance    Progression Toward Goals: Pt is progressing and showing improvement  toward the following goals:  normal resting BP with normal BP response to exercise   , Will continue to educate and progress as tolerated      Education:  low sodium diet and HTN, Education class:  Common Heart Medications and Education class: Understanding Heart Disease  Plan: engage in regular exercise, eliminate salt shaker at the table, use salt substitutes and monitor home BP  Readiness to change: Action:  (Changing behavior)

## 2022-07-08 NOTE — TELEPHONE ENCOUNTER
Pt called with response from Dr Clarence Page word to word  The patient understands instructions and will call once back from vacation and if has any change in symptoms and will keep scheduled OV 8/11

## 2022-07-11 ENCOUNTER — APPOINTMENT (OUTPATIENT)
Dept: CARDIAC REHAB | Facility: CLINIC | Age: 77
End: 2022-07-11
Payer: MEDICARE

## 2022-07-11 RX ORDER — TRAZODONE HYDROCHLORIDE 100 MG/1
100 TABLET ORAL
Qty: 90 TABLET | Refills: 1 | Status: SHIPPED | OUTPATIENT
Start: 2022-07-11

## 2022-07-13 ENCOUNTER — APPOINTMENT (OUTPATIENT)
Dept: CARDIAC REHAB | Facility: CLINIC | Age: 77
End: 2022-07-13
Payer: MEDICARE

## 2022-07-15 ENCOUNTER — APPOINTMENT (OUTPATIENT)
Dept: CARDIAC REHAB | Facility: CLINIC | Age: 77
End: 2022-07-15
Payer: MEDICARE

## 2022-07-18 ENCOUNTER — CLINICAL SUPPORT (OUTPATIENT)
Dept: CARDIAC REHAB | Facility: CLINIC | Age: 77
End: 2022-07-18
Payer: MEDICARE

## 2022-07-18 DIAGNOSIS — I21.3 ST ELEVATION MYOCARDIAL INFARCTION (STEMI), UNSPECIFIED ARTERY (HCC): Primary | ICD-10-CM

## 2022-07-18 PROCEDURE — 93798 PHYS/QHP OP CAR RHAB W/ECG: CPT

## 2022-07-19 ENCOUNTER — APPOINTMENT (OUTPATIENT)
Dept: LAB | Facility: CLINIC | Age: 77
End: 2022-07-19
Payer: MEDICARE

## 2022-07-19 DIAGNOSIS — Z13.29 SCREENING FOR THYROID DISORDER: ICD-10-CM

## 2022-07-19 DIAGNOSIS — E78.2 MIXED HYPERLIPIDEMIA: ICD-10-CM

## 2022-07-19 DIAGNOSIS — Z13.0 SCREENING FOR DEFICIENCY ANEMIA: ICD-10-CM

## 2022-07-19 DIAGNOSIS — E11.69 TYPE 2 DIABETES MELLITUS WITH OTHER SPECIFIED COMPLICATION, WITHOUT LONG-TERM CURRENT USE OF INSULIN (HCC): ICD-10-CM

## 2022-07-19 LAB
ALBUMIN SERPL BCP-MCNC: 3.2 G/DL (ref 3.5–5)
ALP SERPL-CCNC: 46 U/L (ref 46–116)
ALT SERPL W P-5'-P-CCNC: 59 U/L (ref 12–78)
ANION GAP SERPL CALCULATED.3IONS-SCNC: 5 MMOL/L (ref 4–13)
AST SERPL W P-5'-P-CCNC: 34 U/L (ref 5–45)
BASOPHILS # BLD AUTO: 0.03 THOUSANDS/ΜL (ref 0–0.1)
BASOPHILS NFR BLD AUTO: 0 % (ref 0–1)
BILIRUB SERPL-MCNC: 0.52 MG/DL (ref 0.2–1)
BUN SERPL-MCNC: 31 MG/DL (ref 5–25)
CALCIUM ALBUM COR SERPL-MCNC: 9.6 MG/DL (ref 8.3–10.1)
CALCIUM SERPL-MCNC: 9 MG/DL (ref 8.3–10.1)
CHLORIDE SERPL-SCNC: 109 MMOL/L (ref 96–108)
CHOLEST SERPL-MCNC: 62 MG/DL
CO2 SERPL-SCNC: 25 MMOL/L (ref 21–32)
CREAT SERPL-MCNC: 1.09 MG/DL (ref 0.6–1.3)
EOSINOPHIL # BLD AUTO: 0.43 THOUSAND/ΜL (ref 0–0.61)
EOSINOPHIL NFR BLD AUTO: 6 % (ref 0–6)
ERYTHROCYTE [DISTWIDTH] IN BLOOD BY AUTOMATED COUNT: 14.3 % (ref 11.6–15.1)
EST. AVERAGE GLUCOSE BLD GHB EST-MCNC: 151 MG/DL
GFR SERPL CREATININE-BSD FRML MDRD: 65 ML/MIN/1.73SQ M
GLUCOSE P FAST SERPL-MCNC: 131 MG/DL (ref 65–99)
HBA1C MFR BLD: 6.9 %
HCT VFR BLD AUTO: 41.1 % (ref 36.5–49.3)
HDLC SERPL-MCNC: 26 MG/DL
HGB BLD-MCNC: 13.5 G/DL (ref 12–17)
IMM GRANULOCYTES # BLD AUTO: 0.02 THOUSAND/UL (ref 0–0.2)
IMM GRANULOCYTES NFR BLD AUTO: 0 % (ref 0–2)
LDLC SERPL CALC-MCNC: 24 MG/DL (ref 0–100)
LYMPHOCYTES # BLD AUTO: 2.8 THOUSANDS/ΜL (ref 0.6–4.47)
LYMPHOCYTES NFR BLD AUTO: 41 % (ref 14–44)
MCH RBC QN AUTO: 30.9 PG (ref 26.8–34.3)
MCHC RBC AUTO-ENTMCNC: 32.8 G/DL (ref 31.4–37.4)
MCV RBC AUTO: 94 FL (ref 82–98)
MONOCYTES # BLD AUTO: 0.7 THOUSAND/ΜL (ref 0.17–1.22)
MONOCYTES NFR BLD AUTO: 10 % (ref 4–12)
NEUTROPHILS # BLD AUTO: 2.93 THOUSANDS/ΜL (ref 1.85–7.62)
NEUTS SEG NFR BLD AUTO: 43 % (ref 43–75)
NRBC BLD AUTO-RTO: 0 /100 WBCS
PLATELET # BLD AUTO: 206 THOUSANDS/UL (ref 149–390)
PMV BLD AUTO: 10.7 FL (ref 8.9–12.7)
POTASSIUM SERPL-SCNC: 3.8 MMOL/L (ref 3.5–5.3)
PROT SERPL-MCNC: 6.9 G/DL (ref 6.4–8.4)
RBC # BLD AUTO: 4.37 MILLION/UL (ref 3.88–5.62)
SODIUM SERPL-SCNC: 139 MMOL/L (ref 135–147)
TRIGL SERPL-MCNC: 60 MG/DL
TSH SERPL DL<=0.05 MIU/L-ACNC: 1.47 UIU/ML (ref 0.45–4.5)
WBC # BLD AUTO: 6.91 THOUSAND/UL (ref 4.31–10.16)

## 2022-07-19 PROCEDURE — 80061 LIPID PANEL: CPT

## 2022-07-19 PROCEDURE — 83036 HEMOGLOBIN GLYCOSYLATED A1C: CPT

## 2022-07-19 PROCEDURE — 84443 ASSAY THYROID STIM HORMONE: CPT

## 2022-07-19 PROCEDURE — 80053 COMPREHEN METABOLIC PANEL: CPT

## 2022-07-19 PROCEDURE — 36415 COLL VENOUS BLD VENIPUNCTURE: CPT

## 2022-07-19 PROCEDURE — 85025 COMPLETE CBC W/AUTO DIFF WBC: CPT

## 2022-07-20 ENCOUNTER — CLINICAL SUPPORT (OUTPATIENT)
Dept: CARDIAC REHAB | Facility: CLINIC | Age: 77
End: 2022-07-20
Payer: MEDICARE

## 2022-07-20 DIAGNOSIS — I21.3 ST ELEVATION MYOCARDIAL INFARCTION (STEMI), UNSPECIFIED ARTERY (HCC): Primary | ICD-10-CM

## 2022-07-20 PROCEDURE — 93798 PHYS/QHP OP CAR RHAB W/ECG: CPT

## 2022-07-22 ENCOUNTER — CLINICAL SUPPORT (OUTPATIENT)
Dept: CARDIAC REHAB | Facility: CLINIC | Age: 77
End: 2022-07-22
Payer: MEDICARE

## 2022-07-22 DIAGNOSIS — I21.3 ST ELEVATION MYOCARDIAL INFARCTION (STEMI), UNSPECIFIED ARTERY (HCC): Primary | ICD-10-CM

## 2022-07-22 PROCEDURE — 93798 PHYS/QHP OP CAR RHAB W/ECG: CPT

## 2022-07-25 ENCOUNTER — APPOINTMENT (OUTPATIENT)
Dept: CARDIAC REHAB | Facility: CLINIC | Age: 77
End: 2022-07-25
Payer: MEDICARE

## 2022-07-26 ENCOUNTER — CLINICAL SUPPORT (OUTPATIENT)
Dept: CARDIAC REHAB | Facility: CLINIC | Age: 77
End: 2022-07-26
Payer: MEDICARE

## 2022-07-26 DIAGNOSIS — I21.3 ST ELEVATION MYOCARDIAL INFARCTION (STEMI), UNSPECIFIED ARTERY (HCC): Primary | ICD-10-CM

## 2022-07-26 PROCEDURE — 93798 PHYS/QHP OP CAR RHAB W/ECG: CPT

## 2022-07-27 ENCOUNTER — CLINICAL SUPPORT (OUTPATIENT)
Dept: CARDIAC REHAB | Facility: CLINIC | Age: 77
End: 2022-07-27
Payer: MEDICARE

## 2022-07-27 DIAGNOSIS — I21.3 ST ELEVATION MYOCARDIAL INFARCTION (STEMI), UNSPECIFIED ARTERY (HCC): Primary | ICD-10-CM

## 2022-07-27 PROCEDURE — 93798 PHYS/QHP OP CAR RHAB W/ECG: CPT

## 2022-07-28 ENCOUNTER — OFFICE VISIT (OUTPATIENT)
Dept: FAMILY MEDICINE CLINIC | Facility: CLINIC | Age: 77
End: 2022-07-28
Payer: MEDICARE

## 2022-07-28 VITALS
WEIGHT: 185 LBS | OXYGEN SATURATION: 98 % | HEIGHT: 71 IN | BODY MASS INDEX: 25.9 KG/M2 | DIASTOLIC BLOOD PRESSURE: 70 MMHG | RESPIRATION RATE: 16 BRPM | HEART RATE: 63 BPM | TEMPERATURE: 97.9 F | SYSTOLIC BLOOD PRESSURE: 120 MMHG

## 2022-07-28 DIAGNOSIS — G47.00 INSOMNIA, UNSPECIFIED TYPE: ICD-10-CM

## 2022-07-28 DIAGNOSIS — N40.1 BENIGN PROSTATIC HYPERPLASIA WITH URINARY FREQUENCY: ICD-10-CM

## 2022-07-28 DIAGNOSIS — R35.0 BENIGN PROSTATIC HYPERPLASIA WITH URINARY FREQUENCY: ICD-10-CM

## 2022-07-28 DIAGNOSIS — Z12.5 SCREENING FOR PROSTATE CANCER: ICD-10-CM

## 2022-07-28 DIAGNOSIS — Z00.00 ENCOUNTER FOR ANNUAL WELLNESS EXAM IN MEDICARE PATIENT: Primary | ICD-10-CM

## 2022-07-28 DIAGNOSIS — C64.1 RENAL CELL CARCINOMA OF RIGHT KIDNEY (HCC): ICD-10-CM

## 2022-07-28 DIAGNOSIS — I25.10 CORONARY ARTERY DISEASE INVOLVING NATIVE CORONARY ARTERY OF NATIVE HEART WITHOUT ANGINA PECTORIS: ICD-10-CM

## 2022-07-28 DIAGNOSIS — E11.69 TYPE 2 DIABETES MELLITUS WITH OTHER SPECIFIED COMPLICATION, WITHOUT LONG-TERM CURRENT USE OF INSULIN (HCC): ICD-10-CM

## 2022-07-28 DIAGNOSIS — E78.2 MIXED HYPERLIPIDEMIA: ICD-10-CM

## 2022-07-28 PROCEDURE — 99214 OFFICE O/P EST MOD 30 MIN: CPT | Performed by: FAMILY MEDICINE

## 2022-07-28 PROCEDURE — G0439 PPPS, SUBSEQ VISIT: HCPCS | Performed by: FAMILY MEDICINE

## 2022-07-28 NOTE — PROGRESS NOTES
[facility]      NAME: Valeria Lopez  AGE: 69 y o  SEX: male  : 1945   MRN: [id number]    DATE: 2022  TIME: 9:47 AM    Assessment and Plan     Problem List Items Addressed This Visit     BPH (benign prostatic hyperplasia)     No longer on tamsulosin  Continue follow-up with Urology         DM2 (diabetes mellitus, type 2) ([facility] )       Lab Results   Component Value Date    HGBA1C 6 9 (H) 2022   A1c from  was 6 9%  Patient states his diet is good he is exercising regularly  Doing well on metformin 500 b i d     Will continue to monitor         Relevant Orders    Comprehensive metabolic panel    Hemoglobin A1C    Microalbumin / creatinine urine ratio    Insomnia     Doing well on trazodone 100 mg nightly         Mixed hyperlipidemia     Cholesterol from  was 62, LDL 24  HDL was 29  After STEMI, patient was switched from simvastatin 40 to atorvastatin 80 (and Zetia 10)  Continue diet exercise  Continue follow-up with cardiology           Relevant Orders    Lipid Panel with Direct LDL reflex    Renal cell carcinoma of right kidney ([facility] )     History of renal cell carcinoma  Status post nephrectomy May 2017  Continue follow-up with Urology as directed         Coronary artery disease involving native coronary artery of native heart without angina pectoris     Status post STEMI 2022  Patient was successfully stented  Currently doing well  Watching diet and exercising regularly  He is still going to cardiac rehab  Continue daily aspirin, metoprolol 12 5 b i d , lisinopril 2 5, atorvastatin 80, and Zetia 10   Continue follow-up with cardiology as directed             Other Visit Diagnoses     Encounter for annual wellness exam in Medicare patient    -  Primary    Screening for prostate cancer        Relevant Orders    PSA, Total Screen      Patient COVID vaccination  Patient Pneumovax  Tetanus   Patient Shingrix    Current with colonoscopy    Return to office in:  6 months, fasting blood work prior     Chief Complaint     Chief Complaint   Patient presents with    Follow-up     3 months       History of Present Illness     Patient presents recheck chronic medical problems today  Overall is feeling well  He is watching his diet exercising regularly  He is compliant with prescribed medications  Still being followed by Cardiology  Patient had labs done July 19th      The following portions of the patient's history were reviewed and updated as appropriate: allergies, current medications, past family history, past medical history, past social history, past surgical history and problem list     Review of Systems   Review of Systems   Respiratory: Negative  Cardiovascular: Negative  Gastrointestinal: Negative  Genitourinary: Negative          Active Problem List     Patient Active Problem List   Diagnosis    Primary osteoarthritis of right knee    BPH (benign prostatic hyperplasia)    DM2 (diabetes mellitus, type 2) ([facility] [address] )    Insomnia    Mixed hyperlipidemia    Renal cell carcinoma of right kidney (HCC)    Vitamin D deficiency    S/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, right    Cervical strain    Ganglion of flexor tendon sheath of left thumb    Incomplete tear of right rotator cuff    Status post arthroscopy of right shoulder    Encounter for other orthopedic aftercare    Vascular disorder of kidney    Encounter for lower extremity comparison imaging study    Acute pain of right shoulder    [facility] DJD(carpometacarpal degenerative joint disease), localized primary, left    Acute pain of right knee    Seborrheic keratoses, inflamed    Strain of lumbar region    Pain of right thigh    STEMI (ST elevation myocardial infarction) ([facility] [address] )    History of renal cell carcinoma    Coronary artery disease involving native coronary artery of native heart without angina pectoris       Objective   /70 (BP Location: Left arm, Patient Position: Sitting, Cuff Size: Adult)   Pulse 63   Temp 97 9 °F (36 6 °C) (Temporal)   Resp 16   Ht 5' 10 87" (1 8 m)   Wt 83 9 kg (185 lb)   SpO2 98%   BMI 25 90 kg/m²     Physical Exam  Cardiovascular:      Rate and Rhythm: Normal rate and regular rhythm  Heart sounds: Normal heart sounds  Comments: Carotids: no bruits  Ext: no edema  Pulmonary:      Effort: Pulmonary effort is normal  No respiratory distress  Breath sounds: No wheezing or rales  Psychiatric:         Behavior: Behavior normal          Thought Content:  Thought content normal          Pertinent Laboratory/Diagnostic Studies:  labs 7/19    Current Medications     Current Outpatient Medications:     aspirin (ECOTRIN LOW STRENGTH) 81 mg EC tablet, Take 1 tablet (81 mg total) by mouth daily, Disp: 30 tablet, Rfl: 0    atorvastatin (LIPITOR) 80 mg tablet, Take 1 tablet (80 mg total) by mouth daily with dinner, Disp: 90 tablet, Rfl: 1    Blood Glucose Monitoring Suppl (ONE TOUCH ULTRA 2) w/Device KIT, Use 2 (two) times a day, Disp: 1 kit, Rfl: 0    Cholecalciferol (VITAMIN D-3 PO), Take 2,000 Units by mouth daily, Disp: , Rfl:     EPINEPHrine (EPIPEN) 0 3 mg/0 3 mL SOAJ, Inject as directed, Disp: , Rfl:     ezetimibe (ZETIA) 10 mg tablet, Take 1 tablet (10 mg total) by mouth daily at bedtime, Disp: 90 tablet, Rfl: 1    fluticasone (FLONASE) 50 mcg/act nasal spray, 1 spray into each nostril daily, Disp: 1 Bottle, Rfl: 2    glucose blood (OneTouch Ultra) test strip, Use as instructed/one touch ultra 2, Disp: 100 strip, Rfl: 5    glucose blood test strip, Use 1 each daily as needed (once a day) One touch ultra  Test strips, Disp: 100 strip, Rfl: 3    Lancets (onetouch ultrasoft) lancets, Lancets for Delica/Once a day, Disp: 100 each, Rfl: 5    Lancets (onetouch ultrasoft) lancets, One touch ultra for DELICA 2 /BID, Disp: 982 each, Rfl: 5    lisinopril (ZESTRIL) 2 5 mg tablet, Take 1 tablet (2 5 mg total) by mouth in the morning , Disp: 90 tablet, Rfl: 1    metFORMIN (GLUCOPHAGE) 500 mg tablet, TAKE 1 TABLET TWICE DAILY, Disp: 180 tablet, Rfl: 1    metoprolol tartrate (LOPRESSOR) 25 mg tablet, Take 0 5 tablets (12 5 mg total) by mouth in the morning and 0 5 tablets (12 5 mg total) before bedtime  , Disp: 90 tablet, Rfl: 1    ticagrelor (BRILINTA) 90 MG, Take 1 tablet (90 mg total) by mouth every 12 (twelve) hours, Disp: 180 tablet, Rfl: 1    traZODone (DESYREL) 100 mg tablet, TAKE 1 TABLET (100 MG TOTAL) BY MOUTH DAILY AT BEDTIME, Disp: 90 tablet, Rfl: 1    predniSONE 10 mg tablet, Take 6 tablets By mouth  In the morning Qd  Decrease by  By 1 tablet daily until completed   (Patient not taking: Reported on 7/28/2022), Disp: 21 tablet, Rfl: 0    Health Maintenance     Health Maintenance   Topic Date Due    Hepatitis C Screening  Never done    BMI: Followup Plan  01/15/2021    DM Eye Exam  02/12/2022    COVID-19 Vaccine (4 - Booster for Moderna series) 03/09/2022    Influenza Vaccine (1) 09/01/2022    HEMOGLOBIN A1C  01/19/2023    Diabetic Foot Exam  04/21/2023    Fall Risk  07/28/2023    Depression Screening  07/28/2023    Medicare Annual Wellness Visit (AWV)  07/28/2023    BMI: Adult  07/28/2023    Pneumococcal Vaccine: 65+ Years  Completed    HIB Vaccine  Aged Out    Hepatitis B Vaccine  Aged Out    IPV Vaccine  Aged Out    Hepatitis A Vaccine  Aged Out    Meningococcal ACWY Vaccine  Aged Out    HPV Vaccine  Aged Out     Immunization History   Administered Date(s) Administered    COVID-19 MODERNA VACC 0 5 ML IM 03/12/2021, 04/09/2021, 11/09/2021    INFLUENZA 11/18/2014, 10/05/2015, 11/28/2016, 01/04/2018, 10/15/2018, 10/18/2021    Influenza Split High Dose Preservative Free IM 11/18/2014, 10/05/2015, 11/28/2016, 01/04/2018    Influenza, high dose seasonal 0 7 mL 10/15/2018, 10/03/2019, 09/18/2020    Influenza, seasonal, injectable 10/05/2012, 10/09/2013    Pneumococcal Conjugate 13-Valent 12/08/2015, 12/09/2015    Pneumococcal Polysaccharide PPV23 10/09/2013    Tdap 11/15/2004, 10/16/2017    Zoster 10/05/2012    Zoster Vaccine Recombinant 10/30/2020, 01/25/2021       Natty Osorio DO  [facility]

## 2022-07-28 NOTE — ASSESSMENT & PLAN NOTE
Cholesterol from July 19th was 62, LDL 24  HDL was 29  After STEMI, patient was switched from simvastatin 40 to atorvastatin 80 (and Zetia 10)  Continue diet exercise    Continue follow-up with cardiology

## 2022-07-28 NOTE — ASSESSMENT & PLAN NOTE
History of renal cell carcinoma  Status post nephrectomy May 2017   Continue follow-up with Urology as directed

## 2022-07-28 NOTE — ASSESSMENT & PLAN NOTE
Status post STEMI April 2022  Patient was successfully stented  Currently doing well  Watching diet and exercising regularly  He is still going to cardiac rehab  Continue daily aspirin, metoprolol 12 5 b i d , lisinopril 2 5, atorvastatin 80, and Zetia 10   Continue follow-up with cardiology as directed

## 2022-07-28 NOTE — PROGRESS NOTES
Assessment and Plan:   MEDICARE WELLNESS VISIT    Problem List Items Addressed This Visit     BPH (benign prostatic hyperplasia)    DM2 (diabetes mellitus, type 2) ([facility] )    Insomnia    Mixed hyperlipidemia    Renal cell carcinoma of right kidney ([facility] )      Other Visit Diagnoses     Encounter for annual wellness exam in Medicare patient    -  Primary    Screening for prostate cancer               Preventive health issues were discussed with patient, and age appropriate screening tests were ordered as noted in patient's After Visit Summary  Personalized health advice and appropriate referrals for health education or preventive services given if needed, as noted in patient's After Visit Summary       History of Present Illness:     Patient presents for a Medicare Wellness Visit    HPI   Patient Care Team:  Doron Ariza DO as PCP - General  MD Kandice Hanks MD     Review of Systems:     Review of Systems     Problem List:     Patient Active Problem List   Diagnosis    Primary osteoarthritis of right knee    BPH (benign prostatic hyperplasia)    DM2 (diabetes mellitus, type 2) ([facility] )    Insomnia    Mixed hyperlipidemia    Renal cell carcinoma of right kidney ([facility] )    Vitamin D deficiency    S/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, right    Cervical strain    Ganglion of flexor tendon sheath of left thumb    Incomplete tear of right rotator cuff    Status post arthroscopy of right shoulder    Encounter for other orthopedic aftercare    Vascular disorder of kidney    Encounter for lower extremity comparison imaging study    Acute pain of right shoulder    [facility](carpometacarpal degenerative joint disease), localized primary, left    Acute pain of right knee    Seborrheic keratoses, inflamed    Strain of lumbar region    Pain of right thigh    STEMI (ST elevation myocardial infarction) ([place] [id number] )    History of renal cell carcinoma    Coronary artery disease involving native coronary artery of native heart without angina pectoris      Past Medical and Surgical History:     Past Medical History:   Diagnosis Date    Allergic reaction     last assessed: 5/26/2015    Arthritis     right knee    Bee sting allergy     last assessed: 6/30/2015    BPH (benign prostatic hypertrophy)     Cancer ([facility] ) 2017    Right kidney    Diabetes mellitus ([facility] )     Type II, NIDDM    Enlarged prostate with lower urinary tract symptoms (LUTS)     last assessed: 8/17/2015    Herpes zoster     last assessed: 11/21/2013    History of colon polyps     Hyperlipidemia     Leukocytosis     last assessed: 11/18/2014    Osteoarthrosis, localized, primary, knee     last assessed: 10/25/2016    Renal cancer ([facility] )     Seasonal allergies     Shoulder pain, right     Wears glasses     Wears partial dentures     Upper and Lower     Past Surgical History:   Procedure Laterality Date    CARDIAC CATHETERIZATION N/A 4/13/2022    Procedure: Cardiac pci;  Surgeon: Cheko Araujo MD;  [facility];   Service: Cardiology    CARPAL TUNNEL RELEASE Left     CHOLECYSTECTOMY      Open    COLONOSCOPY      FL GUIDED NEEDLE PLAC BX/ASP/INJ  6/1/2020    FL GUIDED NEEDLE PLAC BX/ASP/INJ  11/13/2020    GANGLION CYST EXCISION Left     Left wrist    JOINT REPLACEMENT Left 08/2015    Left TKR    JOINT REPLACEMENT Right 09/2017    Right TKR    NEPHRECTOMY  05/08/2017    Partial right nephrectomy    HI LAP,PARTIAL NEPHRECTOMY Right 5/8/2017    Procedure: LAPAROSCOPIC LYSIS OF ADHESIONS, ROBOTIC PARTIAL NEPHRECTOMY, WITH INTRA-OP LAPAROSCOPIC ULTRASOUND;  Surgeon: Brandyn Mcclain MD;  [facility];  Service: Urology    HI REPAIR BICEPS LONG TENDON  2/4/2019    Procedure: TENODESIS BICEPS OPEN PROXIMAL;  Surgeon: Kaye Ba MD;  [facility];  Service: Orthopedics    HI REPAIR INTERCARP/CARP-METACARP JT Left 12/5/2016    Procedure: ARTHROPLASTY THUMB [facility] ;  Surgeon: Js Lowry MD;  [facility];  Service: Orthopedics    MA REVISE MEDIAN N/CARPAL TUNNEL SURG Left 12/5/2016    Procedure: RELEASE CARPAL TUNNEL;  Surgeon: Js Lowry MD;  [facility];  Service: Orthopedics    MA [address] ARTHROSCOP,SURG,W/ROTAT CUFF REPR Right 2/4/2019    Procedure: REPAIR ROTATOR CUFF ARTHROSCOPIC; SUBACROMIAL DECOMPRESSION; DISTAL CLAVICLE EXCISION;  Surgeon: Js Lowry MD;  [facility];  Service: Orthopedics    MA TOTAL KNEE ARTHROPLASTY Right 9/11/2017    Procedure: ARTHROPLASTY KNEE TOTAL;  Surgeon: Js Lowry MD;  [facility];  Service: Orthopedics    TONSILLECTOMY AND ADENOIDECTOMY        Family History:     Family History   Problem Relation Age of Onset    Cancer Mother     Stroke Father       Social History:     Social History     Socioeconomic History    Marital status: /Civil Union     Spouse name: None    Number of children: None    Years of education: None    Highest education level: None   Occupational History    None   Tobacco Use    Smoking status: Former Smoker     Packs/day: 0 25     Types: Cigarettes    Smokeless tobacco: Never Used    Tobacco comment: Smoked from ages 16 to 25  Vaping Use    Vaping Use: Never used   Substance and Sexual Activity    Alcohol use: Yes     Comment: 1-2 x year    Drug use: No    Sexual activity: Yes     Partners: Female   Other Topics Concern    None   Social History Narrative    None     Social Determinants of Health     Financial Resource Strain: Not on file   Food Insecurity: No Food Insecurity    Worried About Running Out of Food in the Last Year: Never true    Shayy of Food in the Last Year: Never true   Transportation Needs: No Transportation Needs    Lack of Transportation (Medical): No    Lack of Transportation (Non-Medical):  No   Physical Activity: Not on file   Stress: Not on file   Social Connections: Not on file   Intimate Partner Violence: Not on file Housing Stability: Low Risk     Unable to Pay for Housing in the Last Year: No    Number of Places Lived in the Last Year: 1    Unstable Housing in the Last Year: No      Medications and Allergies:     Current Outpatient Medications   Medication Sig Dispense Refill    aspirin (ECOTRIN LOW STRENGTH) 81 mg EC tablet Take 1 tablet (81 mg total) by mouth daily 30 tablet 0    atorvastatin (LIPITOR) 80 mg tablet Take 1 tablet (80 mg total) by mouth daily with dinner 90 tablet 1    Blood Glucose Monitoring Suppl (ONE TOUCH ULTRA 2) w/Device KIT Use 2 (two) times a day 1 kit 0    Cholecalciferol (VITAMIN D-3 PO) Take 2,000 Units by mouth daily      EPINEPHrine (EPIPEN) 0 3 mg/0 3 mL SOAJ Inject as directed      ezetimibe (ZETIA) 10 mg tablet Take 1 tablet (10 mg total) by mouth daily at bedtime 90 tablet 1    fluticasone (FLONASE) 50 mcg/act nasal spray 1 spray into each nostril daily 1 Bottle 2    glucose blood (OneTouch Ultra) test strip Use as instructed/one touch ultra 2 100 strip 5    glucose blood test strip Use 1 each daily as needed (once a day) One touch ultra  Test strips 100 strip 3    Lancets (onetouch ultrasoft) lancets Lancets for Delica/Once a day 423 each 5    Lancets (onetouch ultrasoft) lancets One touch ultra for DELICA 2 / each 5    lisinopril (ZESTRIL) 2 5 mg tablet Take 1 tablet (2 5 mg total) by mouth in the morning  90 tablet 1    metFORMIN (GLUCOPHAGE) 500 mg tablet TAKE 1 TABLET TWICE DAILY 180 tablet 1    metoprolol tartrate (LOPRESSOR) 25 mg tablet Take 0 5 tablets (12 5 mg total) by mouth in the morning and 0 5 tablets (12 5 mg total) before bedtime  90 tablet 1    ticagrelor (BRILINTA) 90 MG Take 1 tablet (90 mg total) by mouth every 12 (twelve) hours 180 tablet 1    traZODone (DESYREL) 100 mg tablet TAKE 1 TABLET (100 MG TOTAL) BY MOUTH DAILY AT BEDTIME 90 tablet 1    predniSONE 10 mg tablet Take 6 tablets By mouth  In the morning Qd     Decrease by  By 1 tablet daily until completed  (Patient not taking: Reported on 7/28/2022) 21 tablet 0     No current facility-administered medications for this visit  Allergies   Allergen Reactions    Bee Venom Edema      Immunizations:     Immunization History   Administered Date(s) Administered    COVID-19 MODERNA VACC 0 5 ML IM 03/12/2021, 04/09/2021, 11/09/2021    INFLUENZA 11/18/2014, 10/05/2015, 11/28/2016, 01/04/2018, 10/15/2018, 10/18/2021    Influenza Split High Dose Preservative Free IM 11/18/2014, 10/05/2015, 11/28/2016, 01/04/2018    Influenza, high dose seasonal 0 7 mL 10/15/2018, 10/03/2019, 09/18/2020    Influenza, seasonal, injectable 10/05/2012, 10/09/2013    Pneumococcal Conjugate 13-Valent 12/08/2015, 12/09/2015    Pneumococcal Polysaccharide PPV23 10/09/2013    Tdap 11/15/2004, 10/16/2017    Zoster 10/05/2012    Zoster Vaccine Recombinant 10/30/2020, 01/25/2021      Health Maintenance:         Topic Date Due    Hepatitis C Screening  Never done         Topic Date Due    COVID-19 Vaccine (4 - Booster for [address] series) 03/09/2022    Influenza Vaccine (1) 09/01/2022      Medicare Screening Tests and Risk Assessments:     Kasie Matias is here for his Subsequent Wellness visit  Last Medicare Wellness visit information reviewed, patient interviewed and updates made to the record today  Health Risk Assessment:   Patient rates overall health as good  Patient feels that their physical health rating is same  Patient is satisfied with their life  Eyesight was rated as same  Hearing was rated as same  Patient feels that their emotional and mental health rating is same  Patients states they are never, rarely angry  Patient states they are never, rarely unusually tired/fatigued  Pain experienced in the last 7 days has been none  Patient states that he has experienced no weight loss or gain in last 6 months  Depression Screening:   PHQ-2 Score: 0      Fall Risk Screening:    In the past year, patient has experienced: no history of falling in past year      Home Safety:  Patient does not have trouble with stairs inside or outside of their home  Patient has working smoke alarms and has working carbon monoxide detector  Home safety hazards include: none  Nutrition:   Current diet is Regular  Medications:   Patient is currently taking over-the-counter supplements  OTC medications include: see medication list  Patient is able to manage medications  Activities of Daily Living (ADLs)/Instrumental Activities of Daily Living (IADLs):   Walk and transfer into and out of bed and chair?: Yes  Dress and groom yourself?: Yes    Bathe or shower yourself?: Yes    Feed yourself? Yes  Do your laundry/housekeeping?: Yes  Manage your money, pay your bills and track your expenses?: Yes  Make your own meals?: Yes    Do your own shopping?: Yes    Previous Hospitalizations:   Any hospitalizations or ED visits within the last 12 months?: Yes    How many hospitalizations have you had in the last year?: 1-2    Advance Care Planning:   Living will: No    Durable POA for healthcare:  Yes    Advanced directive: No    Advanced directive counseling given: Yes    Five wishes given: Yes    End of Life Decisions reviewed with patient: Yes    Provider agrees with end of life decisions: Yes      Cognitive Screening:   Provider or family/friend/caregiver concerned regarding cognition?: No    PREVENTIVE SCREENINGS      Cardiovascular Screening:    General: Screening Not Indicated and History Lipid Disorder      Diabetes Screening:     General: Screening Not Indicated and History Diabetes      Colorectal Cancer Screening:     General: Risks and Benefits Discussed      Prostate Cancer Screening:    General: Screening Not Indicated      Osteoporosis Screening:    General: Risks and Benefits Discussed      Abdominal Aortic Aneurysm (AAA) Screening:    Risk factors include: tobacco use        General: Risks and Benefits Discussed      Lung Cancer Screening:     General: Screening Not Indicated      Hepatitis C Screening:    General: Risks and Benefits Discussed    Screening, Brief Intervention, and Referral to Treatment (SBIRT)    Screening    Typical number of drinks in a week: 0    Single Item Drug Screening:  How often have you used an illegal drug (including marijuana) or a prescription medication for non-medical reasons in the past year? never    Single Item Drug Screen Score: 0  Interpretation: Negative screen for possible drug use disorder    No exam data present     Physical Exam:     /70 (BP Location: Left arm, Patient Position: Sitting, Cuff Size: Adult)   Pulse 63   Temp 97 9 °F (36 6 °C) (Temporal)   Resp 16   Ht 5' 10 87" (1 8 m)   Wt 83 9 kg (185 lb)   SpO2 98%   BMI 25 90 kg/m²     Physical Exam     Tahira Alicia, DO

## 2022-07-28 NOTE — ASSESSMENT & PLAN NOTE
Lab Results   Component Value Date    HGBA1C 6 9 (H) 07/19/2022   A1c from July 19th was 6 9%  Patient states his diet is good he is exercising regularly  Doing well on metformin 500 b i d     Will continue to monitor

## 2022-07-28 NOTE — PATIENT INSTRUCTIONS

## 2022-07-29 ENCOUNTER — CLINICAL SUPPORT (OUTPATIENT)
Dept: CARDIAC REHAB | Facility: CLINIC | Age: 77
End: 2022-07-29
Payer: MEDICARE

## 2022-07-29 DIAGNOSIS — I21.3 ST ELEVATION MYOCARDIAL INFARCTION (STEMI), UNSPECIFIED ARTERY (HCC): Primary | ICD-10-CM

## 2022-07-29 PROCEDURE — 93798 PHYS/QHP OP CAR RHAB W/ECG: CPT

## 2022-08-01 ENCOUNTER — CLINICAL SUPPORT (OUTPATIENT)
Dept: CARDIAC REHAB | Facility: CLINIC | Age: 77
End: 2022-08-01
Payer: MEDICARE

## 2022-08-01 DIAGNOSIS — I21.3 ST ELEVATION MYOCARDIAL INFARCTION (STEMI), UNSPECIFIED ARTERY (HCC): Primary | ICD-10-CM

## 2022-08-01 PROCEDURE — 93798 PHYS/QHP OP CAR RHAB W/ECG: CPT

## 2022-08-03 ENCOUNTER — CLINICAL SUPPORT (OUTPATIENT)
Dept: CARDIAC REHAB | Facility: CLINIC | Age: 77
End: 2022-08-03
Payer: MEDICARE

## 2022-08-03 DIAGNOSIS — I21.3 ST ELEVATION MYOCARDIAL INFARCTION (STEMI), UNSPECIFIED ARTERY (HCC): Primary | ICD-10-CM

## 2022-08-03 PROCEDURE — 93798 PHYS/QHP OP CAR RHAB W/ECG: CPT

## 2022-08-05 ENCOUNTER — CLINICAL SUPPORT (OUTPATIENT)
Dept: CARDIAC REHAB | Facility: CLINIC | Age: 77
End: 2022-08-05
Payer: MEDICARE

## 2022-08-05 DIAGNOSIS — I21.3 ST ELEVATION MYOCARDIAL INFARCTION (STEMI), UNSPECIFIED ARTERY (HCC): Primary | ICD-10-CM

## 2022-08-05 PROCEDURE — 93798 PHYS/QHP OP CAR RHAB W/ECG: CPT

## 2022-08-08 ENCOUNTER — CLINICAL SUPPORT (OUTPATIENT)
Dept: CARDIAC REHAB | Facility: CLINIC | Age: 77
End: 2022-08-08
Payer: MEDICARE

## 2022-08-08 DIAGNOSIS — I21.3 ST ELEVATION MYOCARDIAL INFARCTION (STEMI), UNSPECIFIED ARTERY (HCC): Primary | ICD-10-CM

## 2022-08-08 PROCEDURE — 93798 PHYS/QHP OP CAR RHAB W/ECG: CPT

## 2022-08-08 NOTE — PROGRESS NOTES
Cardiology Office Note  MD Livan Matthew MD Mayford Martinez, DO, [facility] MD Danae Thorne DO, Johana Alvarado DO, [facility] [place]  ----------------------------------------------------------------  [address]  [address]  [place], [address]    Rachel Cochran 69 y o  male MRN: [id number]  Unit/Bed#:  Encounter: [id number]      History of Present Illness: It was a pleasure to see Rachel Cochran in the office today for follow-up CV evaluation  He has a past medical history of CAD with high lateral STEMI and 1st diagonal stent placement in April 2022, dyslipidemia, type 2 diabetes mellitus and renal cell carcinoma status post right nephrectomy in May 2017  He established care with us in April 2022  The patient was in his usual state of health when he began to experienced acute chest discomfort  His symptoms began to wax and wane over the course of 24-48 hours prior to his presentation to the emergency department  His symptoms then worsened and he came to [facility] for evaluation  In the emergency department, he was found to have ST segment elevation in leads 1 and aVL with reciprocal changes in the inferior leads  Additionally, there was AV dissociation noted on the ECG  MI alert was called and he was taken to the cath lab  He was found have occlusion of the 1st diagonal and underwent stent placement  Echocardiogram demonstrated low-normal systolic function with regional wall motion abnormalities of the lateral wall  AV dissociation resolved and the patient was placed on beta-blocker as well as ACE-inhibitor  Ezetimibe was added to his regimen  He was recommended for cardiac rehabilitation and discharged home  Following discharge, he had undergone cardiac rehabilitation and has now completed rehabilitation as of August 2022  He feels the best he has in some time  He has lost weight and feels to be in very good shape    He denies any chest pain, pressure, tightness or squeezing  Denies lightheadedness, dizziness or palpitations  Denies significant lower extremity swelling, orthopnea or paroxysmal nocturnal dyspnea  Review of Systems:  Review of Systems   Constitutional: Negative for decreased appetite, fever, weight gain and weight loss  HENT: Negative for congestion and sore throat  Eyes: Negative for visual disturbance  Cardiovascular: Negative for chest pain, dyspnea on exertion, leg swelling, near-syncope and palpitations  Respiratory: Negative for cough and shortness of breath  Hematologic/Lymphatic: Negative for bleeding problem  Skin: Negative for rash  Musculoskeletal: Negative for myalgias and neck pain  Gastrointestinal: Negative for abdominal pain and nausea  Neurological: Negative for light-headedness and weakness  Psychiatric/Behavioral: Negative for depression  Past Medical History:   Diagnosis Date    Allergic reaction     last assessed: 5/26/2015    Arthritis     right knee    Bee sting allergy     last assessed: 6/30/2015    BPH (benign prostatic hypertrophy)     Cancer ([place] [address] ) 2017    Right kidney    Diabetes mellitus ([place] [address] )     Type II, NIDDM    Enlarged prostate with lower urinary tract symptoms (LUTS)     last assessed: 8/17/2015    Herpes zoster     last assessed: 11/21/2013    History of colon polyps     Hyperlipidemia     Leukocytosis     last assessed: 11/18/2014    Osteoarthrosis, localized, primary, knee     last assessed: 10/25/2016    Renal cancer ([place] [address] )     Seasonal allergies     Shoulder pain, right     Wears glasses     Wears partial dentures     Upper and Lower       Past Surgical History:   Procedure Laterality Date    CARDIAC CATHETERIZATION N/A 4/13/2022    Procedure: Cardiac pci;  Surgeon: Jennifer Martell MD;  [facility];   Service: Cardiology    CARPAL TUNNEL RELEASE Left     CHOLECYSTECTOMY      Open    COLONOSCOPY      FL GUIDED NEEDLE PLAC BX/ASP/INJ  6/1/2020    FL GUIDED NEEDLE PLAC BX/ASP/INJ  11/13/2020    GANGLION CYST EXCISION Left     Left wrist    JOINT REPLACEMENT Left 08/2015    Left TKR    JOINT REPLACEMENT Right 09/2017    Right TKR    NEPHRECTOMY  05/08/2017    Partial right nephrectomy    WA LAP,PARTIAL NEPHRECTOMY Right 5/8/2017    Procedure: LAPAROSCOPIC LYSIS OF ADHESIONS, ROBOTIC PARTIAL NEPHRECTOMY, WITH INTRA-OP LAPAROSCOPIC ULTRASOUND;  Surgeon: Marshall No MD;  Location: [facility];  Service: Urology    WA REPAIR BICEPS LONG TENDON  2/4/2019    Procedure: TENODESIS BICEPS OPEN PROXIMAL;  Surgeon: Jono Lemus MD;  Location: [facility];  Service: Orthopedics    WA REPAIR INTERCARP/CARP-METACARP JT Left 12/5/2016    Procedure: ARTHROPLASTY THUMB [facility] ;  Surgeon: Jono Lemus MD;  Location: [facility];  Service: Orthopedics    WA REVISE MEDIAN N/CARPAL TUNNEL SURG Left 12/5/2016    Procedure: RELEASE CARPAL TUNNEL;  Surgeon: Jono Lemus MD;  Location: [facility];  Service: Orthopedics    WA [facility] ARTHROSCOP,SURG,W/ROTAT CUFF REPR Right 2/4/2019    Procedure: REPAIR ROTATOR CUFF ARTHROSCOPIC; SUBACROMIAL DECOMPRESSION; DISTAL CLAVICLE EXCISION;  Surgeon: Jono Lemus MD;  Location: [facility];  Service: Orthopedics    WA TOTAL KNEE ARTHROPLASTY Right 9/11/2017    Procedure: ARTHROPLASTY KNEE TOTAL;  Surgeon: Jono Lemus MD;  [facility];  Service: Orthopedics    TONSILLECTOMY AND ADENOIDECTOMY         Social History     Socioeconomic History    Marital status: /Civil Union     Spouse name: None    Number of children: None    Years of education: None    Highest education level: None   Occupational History    None   Tobacco Use    Smoking status: Former Smoker     Packs/day: 0 25     Types: Cigarettes    Smokeless tobacco: Never Used    Tobacco comment: Smoked from ages 16 to 25     Vaping Use    Vaping Use: Never used   Substance and Sexual Activity    Alcohol use: Yes     Comment: 1-2 x year    Drug use: No    Sexual activity: Yes     Partners: Female   Other Topics Concern    None   Social History Narrative    None     Social Determinants of Health     Financial Resource Strain: Not on file   Food Insecurity: No Food Insecurity    Worried About Running Out of Food in the Last Year: Never true    Shayy of Food in the Last Year: Never true   Transportation Needs: No Transportation Needs    Lack of Transportation (Medical): No    Lack of Transportation (Non-Medical):  No   Physical Activity: Not on file   Stress: Not on file   Social Connections: Not on file   Intimate Partner Violence: Not on file   Housing Stability: Low Risk     Unable to Pay for Housing in the Last Year: No    Number of Places Lived in the Last Year: 1    Unstable Housing in the Last Year: No       Family History   Problem Relation Age of Onset    Cancer Mother     Stroke Father        Allergies   Allergen Reactions    Bee Venom Edema         Current Outpatient Medications:     aspirin (ECOTRIN LOW STRENGTH) 81 mg EC tablet, Take 1 tablet (81 mg total) by mouth daily, Disp: 30 tablet, Rfl: 0    atorvastatin (LIPITOR) 80 mg tablet, Take 1 tablet (80 mg total) by mouth daily with dinner, Disp: 90 tablet, Rfl: 1    Blood Glucose Monitoring Suppl (ONE TOUCH ULTRA 2) w/Device KIT, Use 2 (two) times a day, Disp: 1 kit, Rfl: 0    Cholecalciferol (VITAMIN D-3 PO), Take 2,000 Units by mouth daily, Disp: , Rfl:     EPINEPHrine (EPIPEN) 0 3 mg/0 3 mL SOAJ, Inject as directed, Disp: , Rfl:     ezetimibe (ZETIA) 10 mg tablet, Take 1 tablet (10 mg total) by mouth daily at bedtime, Disp: 90 tablet, Rfl: 1    fluticasone (FLONASE) 50 mcg/act nasal spray, 1 spray into each nostril daily, Disp: 1 Bottle, Rfl: 2    glucose blood (OneTouch Ultra) test strip, Use as instructed/one touch ultra 2, Disp: 100 strip, Rfl: 5    glucose blood test strip, Use 1 each daily as needed (once a day) One touch ultra Test strips, Disp: 100 strip, Rfl: 3    Lancets (onetouch ultrasoft) lancets, Lancets for Delica/Once a day, Disp: 100 each, Rfl: 5    Lancets (onetouch ultrasoft) lancets, One touch ultra for DELICA 2 /BID, Disp: 634 each, Rfl: 5    lisinopril (ZESTRIL) 2 5 mg tablet, Take 1 tablet (2 5 mg total) by mouth in the morning , Disp: 90 tablet, Rfl: 1    metFORMIN (GLUCOPHAGE) 500 mg tablet, TAKE 1 TABLET TWICE DAILY, Disp: 180 tablet, Rfl: 1    metoprolol tartrate (LOPRESSOR) 25 mg tablet, Take 1 tablet (25 mg total) by mouth 2 (two) times a day, Disp: 180 tablet, Rfl: 1    ticagrelor (BRILINTA) 90 MG, Take 1 tablet (90 mg total) by mouth every 12 (twelve) hours, Disp: 180 tablet, Rfl: 1    traZODone (DESYREL) 100 mg tablet, TAKE 1 TABLET (100 MG TOTAL) BY MOUTH DAILY AT BEDTIME, Disp: 90 tablet, Rfl: 1    predniSONE 10 mg tablet, Take 6 tablets By mouth  In the morning Qd  Decrease by  By 1 tablet daily until completed  (Patient not taking: No sig reported), Disp: 21 tablet, Rfl: 0    Vitals:    08/11/22 0836   BP: 120/62   Pulse: 67   SpO2: 96%   Weight: 83 3 kg (183 lb 9 6 oz)   Height: 5' 10" (1 778 m)     Body mass index is 26 34 kg/m²  PHYSICAL EXAMINATION:  Gen: Awake, Alert, NAD   Head/eyes: AT/NC, pupils equal and round, Anicteric  ENT: mmm  Neck: Supple, No elevated JVP, trachea midline  Resp: CTA bilaterally no w/r/r  CV: RRR +S1, S2, No m/r/g  Abd: Soft, NT/ND + BS  Ext: no LE edema bilaterally  Neuro: Follows commands, moves all extermities  Psych: Appropriate affect, normal mood, cooperative attitude, non-combative  Skin: warm; no rash, erythema or venous stasis changes on exposed skin    --------------------------------------------------------------------------------  TREADMILL STRESS  No results found for this or any previous visit      --------------------------------------------------------------------------------  NUCLEAR STRESS TEST: No results found for this or any previous visit      No results found for this or any previous visit       --------------------------------------------------------------------------------  CATH:  No results found for this or any previous visit     --------------------------------------------------------------------------------  ECHO:   No results found for this or any previous visit  No results found for this or any previous visit     --------------------------------------------------------------------------------  HOLTER  No results found for this or any previous visit  No results found for this or any previous visit     --------------------------------------------------------------------------------  CAROTIDS  No results found for this or any previous visit      --------------------------------------------------------------------------------  ECGs:  No results found for this visit on 08/11/22       Lab Results   Component Value Date    WBC 6 91 07/19/2022    HGB 13 5 07/19/2022    HCT 41 1 07/19/2022    MCV 94 07/19/2022     07/19/2022      Lab Results   Component Value Date    SODIUM 139 07/19/2022    K 3 8 07/19/2022     (H) 07/19/2022    CO2 25 07/19/2022    BUN 31 (H) 07/19/2022    CREATININE 1 09 07/19/2022    GLUC 141 (H) 04/14/2022    CALCIUM 9 0 07/19/2022      Lab Results   Component Value Date    HGBA1C 6 9 (H) 07/19/2022      Lab Results   Component Value Date    CHOL 149 12/27/2017    CHOL 129 06/21/2017    CHOL 140 12/12/2016     Lab Results   Component Value Date    HDL 26 (L) 07/19/2022    HDL 34 (L) 04/13/2022    HDL 39 (L) 07/20/2021     Lab Results   Component Value Date    LDLCALC 24 07/19/2022    LDLCALC 62 04/13/2022    LDLCALC 52 07/20/2021     Lab Results   Component Value Date    TRIG 60 07/19/2022    TRIG 217 (H) 04/13/2022    TRIG 107 07/20/2021     No results found for: CHOLHDL   Lab Results   Component Value Date    INR 0 97 04/13/2022    INR 0 97 08/18/2017    INR 0 97 11/21/2016    PROTIME 12 7 04/13/2022    PROTIME 12 9 08/18/2017    PROTIME 12 9 11/21/2016        1  ST elevation [facility] involving [facility]    2  Nonrheumatic mitral valve regurgitation    3  Mixed hyperlipidemia    4  Type 2 diabetes mellitus with other specified complication, without long-term current use of insulin ([facility] )    5  STEMI (ST elevation myocardial infarction) (HCC)  -     metoprolol tartrate (LOPRESSOR) 25 mg tablet; Take 1 tablet (25 mg total) by mouth 2 (two) times a day    6  Symptomatic PVCs  -     AMB extended holter monitor; Future; Expected date: 08/11/2022        IMPRESSION:  · Acute high lateral ST segment elevation MI s/p SURJIT-D1 w/ residual 10% ostial LCx and 20% mid RCA, April 13, 2022  · LVEF 52% inferolateral and anterolateral hypokinesis from base to apex mild LA dilatation, AV sclerosis, trace AR, MV sclerosis, mild MR, trace TR, April 2022  · History of renal cell carcinoma status post right nephrectomy, 2007  · Transient AV dissociation with junctional rhythm during STEMI likely vagal - resolved  · Type 2 diabetes mellitus  · Dyslipidemia with LDL 24 mg/dL, TG 60 mg/dL, HDL 26 mg/dL July 2022    PLAN:  It was a pleasure to see Steven Callaway in the office today for follow-up CV evaluation  He is here today for routine CV follow-up  Since our last encounter, he has now completed cardiac rehabilitation  He has no symptoms concerning for angina and no signs or symptoms of heart failure  He examines to be euvolemic in the office today  Blood pressure and heart rate are currently stable  He has been tolerating his current medications without any reported adverse effects  Patient also feels the best he has from a cardiovascular standpoint and from a weight standpoint in many years  He can perform greater than 4 Mets on a daily basis without any exertional symptoms  While it cardiac rehabilitation, he was noted to have an increased PVC burden in recovery    Based on his clinical presentation, I have the following recommendations:    1  Recommend checking a 1 week event recorder to assess his PVC burden  2  Will increase his metoprolol to 25 mg twice daily for PVC burden and cardio protective affect  3  Continue aspirin and high-intensity statin  Goal LDL is less than 70 mg/dL and preferably less than 50  He is currently to goal   4  Recommend a heart healthy diet low in sodium carbohydrate  5  He will continue to stay active consistent with what he was doing a cardiac rehabilitation and pushing himself slightly further with his lifting activities  6  Follow-up with primary care for blood glucose management  7  Minimum 1 year dual anti-platelet therapy with combination of aspirin and Brilinta  At 1 year, may consider either decreasing Brilinta to 60 mg twice daily, Xarelto 2 5 mg twice daily or discontinuing the medication  8  Continue lisinopril for antihypertensive control  9  We will follow up with him after the monitor to review the results and then in 6 months  As always, please do not hesitate to call with any questions  Portions of the record may have been created with voice recognition software  Occasional wrong word or "sound a like" substitutions may have occurred due to the inherent limitations of voice recognition software  Read the chart carefully and recognize, using context, where substitutions have occurred        Signed: Mylene Nissen, DO, [facility] [place], [place], [place]

## 2022-08-10 ENCOUNTER — CLINICAL SUPPORT (OUTPATIENT)
Dept: CARDIAC REHAB | Facility: CLINIC | Age: 77
End: 2022-08-10
Payer: MEDICARE

## 2022-08-10 DIAGNOSIS — I21.3 ST ELEVATION MYOCARDIAL INFARCTION (STEMI), UNSPECIFIED ARTERY (HCC): Primary | ICD-10-CM

## 2022-08-10 PROCEDURE — 93798 PHYS/QHP OP CAR RHAB W/ECG: CPT

## 2022-08-10 NOTE — PROGRESS NOTES
Cardiac Rehabilitation Plan of Care   Discharge          Today's date: 8/10/2022   # of Exercise Sessions Completed: 36  Patient name: Diya Fields      : 1945  Age: 69 y o  MRN: [id number]  Referring Physician: Sunshine Finley, *  Cardiologist: Gavino Rudolph DO  Provider: [facility]  Clinician: Natividad Vidal MS, CEP    Dx:   Encounter Diagnosis   Name Primary?  ST elevation myocardial infarction (STEMI), unspecified artery ([facility] ) Yes     Date of onset: 22      SUMMARY OF PROGRESS: Discharge note for Adriana Ruelas  He had improvement in functional capacity but did not increase his max METs (4 3) in the Submax ETT with test termination of RHR +30  His exercise tolerance (max METs in tolerated in cardiac rehab) increased by 17 9%  He had a 80 7% improvement in the DUKE activity estimated MET level with ADLs and physical activity  His [facility] QOL increased from 12 to 13  PHQ-9 score decreased from 2 to 1  SALINA-7 decreased from 3 to 1  His weight decreased by 4 pounds  Waist circumference was not measured initially, but was 35 75 inches upon discharge  Rate Your Plate score improved from 53 to 61  Adriana Ruelas has not been exercising on his own on days opposite of rehab but has his own home gym that he will utilize for exercise  His home gym has similar equipment such as treadmill, bike, elliptical, rower and multiple weight machines  His wife is a   He reports increased stamina, strength and reduced SOB with activity  Que tolerates 40 mins at 3 6 - 4 8 METs plus wt training  NSR on telemetry with occ PVCs and occ bigeminy PVCs during exercise  Adriana Ruelas seems to have more PVCs during weights or recovery in rehab  Prior to his trip to [facility] on , Adriana Ruelas had increased PVCs during exercise and stated he felt palpitations associated with these  Dr Tena Emerson was consulted and he was cleared to travel and told to go to ER if worsened   Adriana Ruelas traveled safely and upon his return to rehab, less PVCs were observed  Today, Pao Hernadez had questionable PVCs (5 beat)   Message was sent to Dr Ti Glynn for confirmation  Pao Hernadez sees Dr Ti Glynn in the office tomorrow   RHR 52 - 62, ExHR 80 - 110  Resting /60 - 128/70 with appropriate response to exercise reaching 148/60 - 160/64  All group education classes on cardiac risk factor modification were attended by the patient  Discharge plans include continuing exercise routine on his own  Encouraged Pt to continue exercise  Frequency: 4-6 days/wk, Intensity: RPE 4-5, Time: 40-50 mins daily, 150-200 mins/wk  Pt was encouraged to continue eating heart healthy  Pt was encouraged to remain compliant with medications and f/u with cardiologist with any cardiac symptoms, medication management and updated lipid profile         Medication compliance: Yes   Comments: Pt reports to be compliant with medications  Fall Risk: Low   Comments: Ambulates with a steady gait with no assist device    EKG Interpretation: NSR, occ PVCs, occ bigeminy PVCs      EXERCISE ASSESSMENT and PLAN    Current Exercise Program in Rehab:       Frequency: 3 days/week Supplement with home exercise 2+ days/wk as tolerated      Minutes: 40       METS: 3 6-4 8           HR:  bpm   RPE: 3-5         Modalities: Treadmill, UBE, Lifecycle, Elliptical and Rower      Exercise Progression 30 Day Goals :    Frequency: 3 days/week of cardiac rehab     Supplement with home exercise 2+ days/wk as tolerated    Minutes: 40-45                            >150 mins/wk of moderate intensity exercise   METS: 3 5-5 5   HR:  bpm    RPE: 4-6   Modalities: Treadmill, UBE, Lifecycle, Elliptical and Rower    Strength trainin-3 days / week  12-15 repetitions  1-2 sets per modality    Modalities: Leg Press, Chest Press, Pull Downs, Lateral Raise, Arm Extension, Arm Curl, Front Raises and Calf Raises    Home Exercise: began light weight training at home, similiar to those exercise done in rehab    Goals: 10% improvement in functional capacity - based on max METs achieved in fitness assessment, improved DASI score by 10%, Increase in exercise capacity by 40% - based on peak METs tolerated in cardiac rehab exercise session, Attend Rehab regularly and resume home exercise program    Progression Toward Goals:  Goals met: completed cardiac rehab; increased max METs tolerated in rehab , Patient will be encouraged to focus on lifestyle modifications following discharge  Education: benefit of exercise for CAD risk factors, home exercise guidelines, AHA guidelines to achieve >150 mins/wk of moderate exercise, RPE scale, class: Risk Factors for Heart Disease, exercise instructions/guidelines for discharge  and physical activity/exercise in extreme weather conditions   Plan:continue home exercise  Readiness to change: Maintenance: (Maintaining the behavior change)      NUTRITION ASSESSMENT AND PLAN    Weight control:    Starting weight: 189 4 lb   Current weight:   185 4 lb  Waist circumference:    Starting:    Current: 35 75 in    Diabetes: T2D, Patient monitors BS 1-2 times/day, Patient reported fasting -150  A1c: 6 9%    last measured: 12/29/21    Lipid management: Discussed diet and lipid management and Last lipid profile 7/19/2022  Chol 62  TRG 60  HDL 26  LDL 24    Goals:HDL >40, TRG <150, fasting BG , Improved Rate Your Plate score  >97, choose lean meat (93-95%), reduce portion sizes of meat to 3oz or less, increase intake of meatless meals, Eat 4-5 cups of fruits and vegetables daily, choose low sodium processed foods and seldom eat out or choose lower fat menu items    Progression Toward Goals: Goals met: lost 4lbs since initial evaluation; improved RYP score; Improved Lipid Panel , Patient will be encouraged to focus on lifestyle modifications following discharge      Education: heart healthy eating  low sodium diet  nutrition for  lipid management  exercise and diabetes management   healthy choices while dining out  education class: Heart Healthy Eating  education class:  Label Reading  Plan: continue following heart healthy diet and checking home BG  Readiness to change: Maintenance: (Maintaining the behavior change)      PSYCHOSOCIAL ASSESSMENT AND PLAN    Emotional:  Depression assessment:  PHQ-9 = 1 1-4 = Minimal Depression            Anxiety measure:  SALINA-7 = 1 0-4  = Not anxious   Self-reported stress level:  5  Social support: Very Good    Goals:  Reduce perceived stress to 1-3/10, increased energy and reduced stress    Progression Toward Goals: Goals met: decreased PHQ and GAD7 scores; reports increased energy levels  , Patient will be encouraged to focus on lifestyle modifications following discharge  Education: signs/sxs of depression, stress management techniques and class:  Stress and Your Health   Plan: Exercise and Enjoy a hobby  Readiness to change: Maintenance: (Maintaining the behavior change)      OTHER CORE COMPONENTS     Tobacco:   Social History     Tobacco Use   Smoking Status Former Smoker    Packs/day: 0 25    Types: Cigarettes   Smokeless Tobacco Never Used   Tobacco Comment    Smoked from ages 16 to 25  Tobacco Use Intervention:   N/A: Pt has a remote history of smoking    Anginal Symptoms:  None - pt reports severe chest pressure during STEMI; none since   NTG use: No prescription    Blood pressure:    Restin//70   Exercise: 148//68    Goals: consistent BP < 130/80, reduced dietary sodium <2300mg, moderate intensity exercise >150 mins/wk and medication compliance    Progression Toward Goals: Goals met: consistent resting BP with appropriate response to exercise; will continue exercise on his own to meet 150 mins/week guidelines  , Patient will be encouraged to focus on lifestyle modifications following discharge      Education:  low sodium diet and HTN, Education class:  Common Heart Medications and Education class: Understanding Heart Disease  Plan: eliminate salt shaker at the table, use salt substitutes and monitor home BP  Readiness to change: Maintenance: (Maintaining the behavior change)

## 2022-08-11 ENCOUNTER — OFFICE VISIT (OUTPATIENT)
Dept: CARDIOLOGY CLINIC | Facility: CLINIC | Age: 77
End: 2022-08-11
Payer: MEDICARE

## 2022-08-11 VITALS
HEART RATE: 67 BPM | WEIGHT: 183.6 LBS | OXYGEN SATURATION: 96 % | BODY MASS INDEX: 26.28 KG/M2 | DIASTOLIC BLOOD PRESSURE: 62 MMHG | SYSTOLIC BLOOD PRESSURE: 120 MMHG | HEIGHT: 70 IN

## 2022-08-11 DIAGNOSIS — I34.0 NONRHEUMATIC MITRAL VALVE REGURGITATION: ICD-10-CM

## 2022-08-11 DIAGNOSIS — I21.3 STEMI (ST ELEVATION MYOCARDIAL INFARCTION) (HCC): ICD-10-CM

## 2022-08-11 DIAGNOSIS — E78.2 MIXED HYPERLIPIDEMIA: ICD-10-CM

## 2022-08-11 DIAGNOSIS — E11.69 TYPE 2 DIABETES MELLITUS WITH OTHER SPECIFIED COMPLICATION, WITHOUT LONG-TERM CURRENT USE OF INSULIN (HCC): ICD-10-CM

## 2022-08-11 DIAGNOSIS — I49.3 SYMPTOMATIC PVCS: ICD-10-CM

## 2022-08-11 DIAGNOSIS — I21.02 ST ELEVATION MYOCARDIAL INFARCTION INVOLVING LEFT ANTERIOR DESCENDING (LAD) CORONARY ARTERY (HCC): Primary | ICD-10-CM

## 2022-08-11 PROCEDURE — 99214 OFFICE O/P EST MOD 30 MIN: CPT | Performed by: INTERNAL MEDICINE

## 2022-08-15 DIAGNOSIS — H69.83 DYSFUNCTION OF BOTH EUSTACHIAN TUBES: ICD-10-CM

## 2022-08-15 RX ORDER — FLUTICASONE PROPIONATE 50 MCG
1 SPRAY, SUSPENSION (ML) NASAL DAILY
Qty: 16 G | Refills: 3 | Status: SHIPPED | OUTPATIENT
Start: 2022-08-15

## 2022-08-25 ENCOUNTER — CLINICAL SUPPORT (OUTPATIENT)
Dept: CARDIOLOGY CLINIC | Facility: CLINIC | Age: 77
End: 2022-08-25
Payer: MEDICARE

## 2022-08-25 DIAGNOSIS — I49.3 SYMPTOMATIC PVCS: ICD-10-CM

## 2022-08-25 PROCEDURE — 93244 EXT ECG>48HR<7D REV&INTERPJ: CPT | Performed by: INTERNAL MEDICINE

## 2022-09-20 NOTE — PROGRESS NOTES
Lesion Destruction    Date/Time: 3/25/2021 9:22 AM  Performed by: Lahcelle Rosado DO  Authorized by: Lachelle Rosado DO   Universal Protocol:  Consent: Verbal consent obtained  Consent given by: patient  Patient identity confirmed: verbally with patient      Procedure Details - Lesion Destruction:     Number of Lesions:  4  Lesion 1:     Body area:  Trunk    Trunk location:  Back    Initial size (mm):  4    Final defect size (mm):  4    Malignancy: pre-malignant lesion      Destruction method: electrodesiccation and curettage    Lesion 2:     Body area:  Head/neck    Head/neck location:  Neck    Initial size (mm):  3    Final defect size (mm):  3    Malignancy: pre-malignant lesion      Destruction method: electrodesiccation and curettage    Lesion 3:     Body area:  Head/neck    Head/neck location:  Neck    Initial size (mm):  4    Final defect size (mm):  4    Malignancy: pre-malignant lesion      Destruction method: electrodesiccation and curettage    Lesion 4:     Body area:  Trunk    Trunk location:  L flank    Initial size (mm):  6    Final defect size (mm):  6    Malignancy: pre-malignant lesion      Destruction method: electrodesiccation and curettage        Lesions removed from right upper back, neck, and left flank today  Patient tolerated procedure well  Wound care instructions given  Sun precautions    Call further problems Sarecycline Counseling: Patient advised regarding possible photosensitivity and discoloration of the teeth, skin, lips, tongue and gums.  Patient instructed to avoid sunlight, if possible.  When exposed to sunlight, patients should wear protective clothing, sunglasses, and sunscreen.  The patient was instructed to call the office immediately if the following severe adverse effects occur:  hearing changes, easy bruising/bleeding, severe headache, or vision changes.  The patient verbalized understanding of the proper use and possible adverse effects of sarecycline.  All of the patient's questions and concerns were addressed.

## 2022-09-22 ENCOUNTER — OFFICE VISIT (OUTPATIENT)
Dept: CARDIOLOGY CLINIC | Facility: CLINIC | Age: 77
End: 2022-09-22
Payer: MEDICARE

## 2022-09-22 VITALS
WEIGHT: 185.2 LBS | DIASTOLIC BLOOD PRESSURE: 68 MMHG | HEART RATE: 51 BPM | BODY MASS INDEX: 26.57 KG/M2 | SYSTOLIC BLOOD PRESSURE: 118 MMHG

## 2022-09-22 DIAGNOSIS — I25.10 CORONARY ARTERY DISEASE INVOLVING NATIVE CORONARY ARTERY OF NATIVE HEART WITHOUT ANGINA PECTORIS: Primary | ICD-10-CM

## 2022-09-22 DIAGNOSIS — I34.0 NONRHEUMATIC MITRAL VALVE REGURGITATION: ICD-10-CM

## 2022-09-22 DIAGNOSIS — E11.69 TYPE 2 DIABETES MELLITUS WITH OTHER SPECIFIED COMPLICATION, WITHOUT LONG-TERM CURRENT USE OF INSULIN (HCC): ICD-10-CM

## 2022-09-22 DIAGNOSIS — E78.2 MIXED HYPERLIPIDEMIA: ICD-10-CM

## 2022-09-22 DIAGNOSIS — I49.3 SYMPTOMATIC PVCS: ICD-10-CM

## 2022-09-22 PROCEDURE — 99214 OFFICE O/P EST MOD 30 MIN: CPT | Performed by: INTERNAL MEDICINE

## 2022-09-22 NOTE — PROGRESS NOTES
Cardiology Office Note  MD Doris Borden MD Monika Rist, DO, [facility] MD Bradley Thorne DO, Andrea Pang DO, [facility] [place]  ----------------------------------------------------------------  [address]  [address], [address]    Percilla Files 69 y o  male MRN: [id number]  Unit/Bed#:  Encounter: [id number]      History of Present Illness: It was a pleasure to see Percilla Files in the office today for follow-up CV evaluation  He has a past medical history of CAD with high lateral STEMI and 1st diagonal stent placement in April 2022, dyslipidemia, type 2 diabetes mellitus and renal cell carcinoma status post right nephrectomy in May 2017  He established care with us in April 2022  The patient was in his usual state of health when he began to experienced acute chest discomfort  His symptoms began to wax and wane over the course of 24-48 hours prior to his presentation to the emergency department  His symptoms then worsened and he came to [facility] for evaluation  In the emergency department, he was found to have ST segment elevation in leads 1 and aVL with reciprocal changes in the inferior leads  Additionally, there was AV dissociation noted on the ECG  MI alert was called and he was taken to the cath lab  He was found have occlusion of the 1st diagonal and underwent stent placement  Echocardiogram demonstrated low-normal systolic function with regional wall motion abnormalities of the lateral wall  AV dissociation resolved and the patient was placed on beta-blocker as well as ACE-inhibitor  Ezetimibe was added to his regimen  He was recommended for cardiac rehabilitation and discharged home  Following discharge, he had undergone cardiac rehabilitation and has now completed rehabilitation as of August 2022  Due to it PVCs he was experiencing, he was increased on his metoprolol in August 2022    He underwent event recorder and is here today discuss the results  He has been increasing his physical activity level doing 120 minutes per week of cardio followed by weight training  Since the increase in metoprolol, he has been feeling much better overall  He denies any chest pain, pressure, tightness or squeezing  Denies lightheadedness, dizziness or palpitations  Denies significant lower extremity swelling, orthopnea or paroxysmal nocturnal dyspnea  Review of Systems:  Review of Systems   Constitutional: Negative for decreased appetite, fever, weight gain and weight loss  HENT: Negative for congestion and sore throat  Eyes: Negative for visual disturbance  Cardiovascular: Negative for chest pain, dyspnea on exertion, leg swelling, near-syncope and palpitations  Respiratory: Negative for cough and shortness of breath  Hematologic/Lymphatic: Negative for bleeding problem  Skin: Negative for rash  Musculoskeletal: Negative for myalgias and neck pain  Gastrointestinal: Negative for abdominal pain and nausea  Neurological: Negative for light-headedness and weakness  Psychiatric/Behavioral: Negative for depression         Past Medical History:   Diagnosis Date    Allergic reaction     last assessed: 5/26/2015    Arthritis     right knee    Bee sting allergy     last assessed: 6/30/2015    BPH (benign prostatic hypertrophy)     Cancer ([facility] ) 2017    Right kidney    Diabetes mellitus ([place] [address] )     Type II, NIDDM    Enlarged prostate with lower urinary tract symptoms (LUTS)     last assessed: 8/17/2015    Herpes zoster     last assessed: 11/21/2013    History of colon polyps     Hyperlipidemia     Leukocytosis     last assessed: 11/18/2014    Osteoarthrosis, localized, primary, knee     last assessed: 10/25/2016    Renal cancer ([facility] )     Seasonal allergies     Shoulder pain, right     Wears glasses     Wears partial dentures     Upper and Lower       Past Surgical History: Procedure Laterality Date    CARDIAC CATHETERIZATION N/A 4/13/2022    Procedure: Cardiac pci;  Surgeon: Roger Tom MD;  [facility];   Service: Cardiology    CARPAL TUNNEL RELEASE Left     CHOLECYSTECTOMY      Open    COLONOSCOPY      FL GUIDED NEEDLE PLAC BX/ASP/INJ  6/1/2020    FL GUIDED NEEDLE PLAC BX/ASP/INJ  11/13/2020    GANGLION CYST EXCISION Left     Left wrist    JOINT REPLACEMENT Left 08/2015    Left TKR    JOINT REPLACEMENT Right 09/2017    Right TKR    NEPHRECTOMY  05/08/2017    Partial right nephrectomy    DE LAP,PARTIAL NEPHRECTOMY Right 5/8/2017    Procedure: LAPAROSCOPIC LYSIS OF ADHESIONS, ROBOTIC PARTIAL NEPHRECTOMY, WITH INTRA-OP LAPAROSCOPIC ULTRASOUND;  Surgeon: Russell Calvo MD;  Location[facility];  Service: Urology    DE REPAIR BICEPS LONG TENDON  2/4/2019    Procedure: TENODESIS BICEPS OPEN PROXIMAL;  Surgeon: Leola Qiu MD;  [facility];  Service: Orthopedics    DE REPAIR INTERCARP/CARP-METACARP JT Left 12/5/2016    Procedure: ARTHROPLASTY THUMB [facility] ;  Surgeon: Leola Qiu MD;  Location[facility];  Service: Orthopedics    DE REVISE MEDIAN N/CARPAL TUNNEL SURG Left 12/5/2016    Procedure: RELEASE CARPAL TUNNEL;  Surgeon: Leola Qiu MD;  [facility];  Service: Orthopedics    DE SHLDR ARTHROSCOP,SURG,W/ROTAT CUFF REPR Right 2/4/2019    Procedure: REPAIR ROTATOR CUFF ARTHROSCOPIC; SUBACROMIAL DECOMPRESSION; DISTAL CLAVICLE EXCISION;  Surgeon: Leola Qiu MD;  [facility];  Service: Orthopedics    DE TOTAL KNEE ARTHROPLASTY Right 9/11/2017    Procedure: ARTHROPLASTY KNEE TOTAL;  Surgeon: Leola Qiu MD;  [facility];  Service: Orthopedics    TONSILLECTOMY AND ADENOIDECTOMY         Social History     Socioeconomic History    Marital status: /Civil Union     Spouse name: Not on file    Number of children: Not on file    Years of education: Not on file    Highest education level: Not on file   Occupational History    Not on file   Tobacco Use    Smoking status: Former Smoker     Packs/day: 0 25     Types: Cigarettes    Smokeless tobacco: Never Used    Tobacco comment: Smoked from ages 16 to 25  Vaping Use    Vaping Use: Never used   Substance and Sexual Activity    Alcohol use: Yes     Comment: 1-2 x year    Drug use: No    Sexual activity: Yes     Partners: Female   Other Topics Concern    Not on file   Social History Narrative    Not on file     Social Determinants of Health     Financial Resource Strain: Not on file   Food Insecurity: No Food Insecurity    Worried About Running Out of Food in the Last Year: Never true    Shayy of Food in the Last Year: Never true   Transportation Needs: No Transportation Needs    Lack of Transportation (Medical): No    Lack of Transportation (Non-Medical):  No   Physical Activity: Not on file   Stress: Not on file   Social Connections: Not on file   Intimate Partner Violence: Not on file   Housing Stability: Low Risk     Unable to Pay for Housing in the Last Year: No    Number of Places Lived in the Last Year: 1    Unstable Housing in the Last Year: No       Family History   Problem Relation Age of Onset    Cancer Mother     Stroke Father        Allergies   Allergen Reactions    Bee Venom Edema         Current Outpatient Medications:     aspirin (ECOTRIN LOW STRENGTH) 81 mg EC tablet, Take 1 tablet (81 mg total) by mouth daily, Disp: 30 tablet, Rfl: 0    atorvastatin (LIPITOR) 80 mg tablet, Take 1 tablet (80 mg total) by mouth daily with dinner, Disp: 90 tablet, Rfl: 1    Blood Glucose Monitoring Suppl (ONE TOUCH ULTRA 2) w/Device KIT, Use 2 (two) times a day, Disp: 1 kit, Rfl: 0    Cholecalciferol (VITAMIN D-3 PO), Take 2,000 Units by mouth daily, Disp: , Rfl:     EPINEPHrine (EPIPEN) 0 3 mg/0 3 mL SOAJ, Inject as directed, Disp: , Rfl:     ezetimibe (ZETIA) 10 mg tablet, Take 1 tablet (10 mg total) by mouth daily at bedtime, Disp: 90 tablet, Rfl: 1    fluticasone (FLONASE) 50 mcg/act nasal spray, 1 spray into each nostril daily, Disp: 16 g, Rfl: 3    glucose blood (OneTouch Ultra) test strip, Use as instructed/one touch ultra 2, Disp: 100 strip, Rfl: 5    glucose blood test strip, Use 1 each daily as needed (once a day) One touch ultra  Test strips, Disp: 100 strip, Rfl: 3    Lancets (onetouch ultrasoft) lancets, Lancets for Delica/Once a day, Disp: 100 each, Rfl: 5    Lancets (onetouch ultrasoft) lancets, One touch ultra for DELICA 2 /BID, Disp: 676 each, Rfl: 5    lisinopril (ZESTRIL) 2 5 mg tablet, Take 1 tablet (2 5 mg total) by mouth in the morning , Disp: 90 tablet, Rfl: 1    metFORMIN (GLUCOPHAGE) 500 mg tablet, TAKE 1 TABLET TWICE DAILY, Disp: 180 tablet, Rfl: 1    metoprolol tartrate (LOPRESSOR) 25 mg tablet, Take 1 tablet (25 mg total) by mouth 2 (two) times a day, Disp: 180 tablet, Rfl: 1    ticagrelor (BRILINTA) 90 MG, Take 1 tablet (90 mg total) by mouth every 12 (twelve) hours, Disp: 180 tablet, Rfl: 1    predniSONE 10 mg tablet, Take 6 tablets By mouth  In the morning Qd  Decrease by  By 1 tablet daily until completed  (Patient not taking: No sig reported), Disp: 21 tablet, Rfl: 0    traZODone (DESYREL) 100 mg tablet, TAKE 1 TABLET (100 MG TOTAL) BY MOUTH DAILY AT BEDTIME, Disp: 90 tablet, Rfl: 1    Vitals:    09/22/22 0801   BP: 118/68   BP Location: Right arm   Patient Position: Sitting   Cuff Size: Large   Pulse: (!) 51   Weight: 84 kg (185 lb 3 2 oz)     Body mass index is 26 57 kg/m²  PHYSICAL EXAMINATION:  Gen: Awake, Alert, NAD   Head/eyes: AT/NC, pupils equal and round, Anicteric  ENT: mmm  Neck: Supple, No elevated JVP, trachea midline  Resp: CTA bilaterally no w/r/r  CV: RRR +S1, S2, No m/r/g  Abd: Soft, NT/ND + BS  Ext: no LE edema bilaterally  Neuro:  Follows commands, moves all extermities  Psych: Appropriate affect, happy mood, cooperative attitude, non-combative  Skin: warm; no rash, erythema or venous stasis changes on exposed skin    --------------------------------------------------------------------------------  TREADMILL STRESS  No results found for this or any previous visit      --------------------------------------------------------------------------------  NUCLEAR STRESS TEST: No results found for this or any previous visit  No results found for this or any previous visit       --------------------------------------------------------------------------------  CATH:  No results found for this or any previous visit     --------------------------------------------------------------------------------  ECHO:   No results found for this or any previous visit  No results found for this or any previous visit     --------------------------------------------------------------------------------  HOLTER  No results found for this or any previous visit  No results found for this or any previous visit     --------------------------------------------------------------------------------  CAROTIDS  No results found for this or any previous visit      --------------------------------------------------------------------------------  ECGs:  No results found for this visit on 09/22/22       Lab Results   Component Value Date    WBC 6 91 07/19/2022    HGB 13 5 07/19/2022    HCT 41 1 07/19/2022    MCV 94 07/19/2022     07/19/2022      Lab Results   Component Value Date    SODIUM 139 07/19/2022    K 3 8 07/19/2022     (H) 07/19/2022    CO2 25 07/19/2022    BUN 31 (H) 07/19/2022    CREATININE 1 09 07/19/2022    GLUC 141 (H) 04/14/2022    CALCIUM 9 0 07/19/2022      Lab Results   Component Value Date    HGBA1C 6 9 (H) 07/19/2022      Lab Results   Component Value Date    CHOL 149 12/27/2017    CHOL 129 06/21/2017    CHOL 140 12/12/2016     Lab Results   Component Value Date    HDL 26 (L) 07/19/2022    HDL 34 (L) 04/13/2022    HDL 39 (L) 07/20/2021     Lab Results   Component Value Date    LDLCALC 24 07/19/2022    LDLCALC 62 04/13/2022    LDLCALC 52 07/20/2021     Lab Results   Component Value Date    TRIG 60 07/19/2022    TRIG 217 (H) 04/13/2022    TRIG 107 07/20/2021     No results found for: CHOLHDL   Lab Results   Component Value Date    INR 0 97 04/13/2022    INR 0 97 08/18/2017    INR 0 97 11/21/2016    PROTIME 12 7 04/13/2022    PROTIME 12 9 08/18/2017    PROTIME 12 9 11/21/2016        1  Coronary artery disease involving native coronary artery of native heart without angina pectoris    2  Symptomatic PVCs    3  Nonrheumatic mitral valve regurgitation    4  Mixed hyperlipidemia    5  Type 2 diabetes mellitus with other specified complication, without long-term current use of insulin ([facility])        IMPRESSION:  · Acute high lateral ST segment elevation MI s/p SURJIT-D1 w/ residual 10% ostial LCx and 20% mid RCA, April 13, 2022  · LVEF 52% inferolateral and anterolateral hypokinesis from base to apex mild LA dilatation, AV sclerosis, trace AR, MV sclerosis, mild MR, trace TR, April 2022  · History of renal cell carcinoma status post right nephrectomy, 2007  · Symptomatic PVCs  · Event recorder w/ SR avg HR 60 bpm, rare PACs, rare atrial couplets/triplets, rare PVCs, rare ventricular couplets, nonsustained PSVT (x1) 9 beat 128 bpm, triggered events correlated with PVCs, August 2022  · Transient AV dissociation with junctional rhythm during STEMI likely vagal - resolved  · Type 2 diabetes mellitus  · Dyslipidemia with LDL 24 mg/dL, TG 60 mg/dL, HDL 26 mg/dL July 2022    PLAN:  It was a pleasure to see Neema Call in the office today for follow-up CV evaluation  He is here today for follow-up regarding his event recorder and increasing the metoprolol  The event recorder showed sinus rhythm with an average heart rate in the 60s  His PVCs are very rare and her burden and triggered events correlated with PVCs  However, since the increase in metoprolol, his symptoms of symptomatic PVCs have markedly improved    He has no symptoms concerning for angina and no signs or symptoms of heart failure  He examines to be euvolemic in the office today  Blood pressure and heart rate are currently stable  Blood pressures in the 50s and has been in the 50s on several readings at home, but average heart rate was in the 60s based on the monitor  He can perform greater than 4 Mets on a daily basis without any exertional symptoms and has been very active at home with both cardiovascular aerobic and weight training exercises  Based on his clinical presentation, I have the following recommendations:    1  Recommend aggressive risk factor and lifestyle modifications  2  Would encourage 30 minutes a day, 5 days a week of moderate intensity activity to build cardiovascular endurance  He is trying to work up to this and is already at 120 minutes per week  3  Recommend heart healthy diet low in sodium and carbohydrate  4  Continue aspirin and Brilinta for minimum of 1 year  At 1 year rl, we will discuss discontinuation versus changing to lower dose Brilinta or Xarelto 2 5 mg b i d   5  Continue high-intensity statin  Goal LDL less than 70 mg/dL  Repeat lipid panel in 3-6 months  6  Continue Zetia  7  Diabetic management as per primary care  8  Continue current dose of metoprolol  Symptomatic PVCs have improved and blood pressure stable  Continue lisinopril  9  We will follow up with him in 6 months to reassess his progress  As always, please do not hesitate to call with any questions  Portions of the record may have been created with voice recognition software  Occasional wrong word or "sound a like" substitutions may have occurred due to the inherent limitations of voice recognition software  Read the chart carefully and recognize, using context, where substitutions have occurred        Signed: Karla Jarquin DO, [facility] [place], [place], [place]

## 2022-09-27 DIAGNOSIS — I21.3 STEMI (ST ELEVATION MYOCARDIAL INFARCTION) (HCC): ICD-10-CM

## 2022-09-27 RX ORDER — EZETIMIBE 10 MG/1
TABLET ORAL
Qty: 90 TABLET | Refills: 1 | Status: SHIPPED | OUTPATIENT
Start: 2022-09-27

## 2022-09-27 RX ORDER — TICAGRELOR 90 MG/1
TABLET ORAL
Qty: 180 TABLET | Refills: 1 | Status: SHIPPED | OUTPATIENT
Start: 2022-09-27

## 2022-09-27 RX ORDER — ATORVASTATIN CALCIUM 80 MG/1
TABLET, FILM COATED ORAL
Qty: 90 TABLET | Refills: 1 | Status: SHIPPED | OUTPATIENT
Start: 2022-09-27

## 2022-09-30 ENCOUNTER — APPOINTMENT (OUTPATIENT)
Dept: RADIOLOGY | Facility: CLINIC | Age: 77
End: 2022-09-30
Payer: MEDICARE

## 2022-09-30 ENCOUNTER — OFFICE VISIT (OUTPATIENT)
Dept: OBGYN CLINIC | Facility: CLINIC | Age: 77
End: 2022-09-30
Payer: MEDICARE

## 2022-09-30 VITALS
HEIGHT: 70 IN | SYSTOLIC BLOOD PRESSURE: 120 MMHG | OXYGEN SATURATION: 100 % | DIASTOLIC BLOOD PRESSURE: 72 MMHG | WEIGHT: 185 LBS | HEART RATE: 50 BPM | BODY MASS INDEX: 26.48 KG/M2

## 2022-09-30 DIAGNOSIS — M19.072 OSTEOARTHRITIS OF MIDFOOT, LEFT: ICD-10-CM

## 2022-09-30 DIAGNOSIS — M19.072 OSTEOARTHRITIS OF MIDFOOT, LEFT: Primary | ICD-10-CM

## 2022-09-30 PROCEDURE — 99213 OFFICE O/P EST LOW 20 MIN: CPT | Performed by: ORTHOPAEDIC SURGERY

## 2022-09-30 PROCEDURE — 73630 X-RAY EXAM OF FOOT: CPT

## 2022-09-30 RX ORDER — CHLORHEXIDINE GLUCONATE 4 G/100ML
SOLUTION TOPICAL DAILY PRN
OUTPATIENT
Start: 2022-09-30

## 2022-09-30 RX ORDER — CHLORHEXIDINE GLUCONATE 0.12 MG/ML
15 RINSE ORAL ONCE
OUTPATIENT
Start: 2022-09-30 | End: 2022-09-30

## 2022-09-30 NOTE — PROGRESS NOTES
Davis Carrel, M D  Attending, Orthopaedic Surgery  [facility] [address] [facility] [address] [facility]      ORTHOPAEDIC FOOT AND ANKLE CLINIC VISIT     Assessment:     Encounter Diagnosis   Name Primary?  Osteoarthritis of midfoot, left Yes            Plan:   · The patient verbalized understanding of exam findings and treatment plan  We engaged in the shared decision-making process and treatment options were discussed at length with the patient  Surgical and conservative management discussed today along with risks and benefits  · Aditi Castro has left naviculocuneiform arthritis that is amendable to surgical fixation  · He had two injections into the medial naviculocuneiform joint, the most recent of which was 11/13/2020  These injections were effective but the most recent only lasted around 1 month  · He needs cardiac clearance as he had an recent MI in April  When he is cleared by his cardiologist, he will be scheduled for naviculocuneiform arthrodesis  He will be NWB for 6 weeks postop  · Consent signed today  He agreed to the plan  · Return in about 3 weeks (around 10/21/2022)  CONSENT FOR BONY PROCEDURES:   Patient understands that there is no guarantee that the surgery will relieve all of His pain and also understands that there may be a prolonged course of protected weight-bearing status required which will restrict them from driving and other activities as discussed at today's visit  Patient recognizes that there are risks with surgery including bleeding, numbness, nerve irritation, wound complications, infection, continued pain, joint stiffness, malunion, nonunion, anesthetic complications, death, failure of procedure and possible need for further surgery  The patient understands that there is no guarantee that this surgery will relieve all of His pain and symptoms  Patient understands that there is no guarantee that they will return to full function after the procedure   Patient has provided informed consent for the procedure  History of Present Illness:   Chief Complaint:   Chief Complaint   Patient presents with   [address] is a 69 y o  male who is being seen in follow-up for left foot pain  Patient has a history of midfoot arthritis that we have treated with injections in the past that have relieved his pain  As of recent, his pain is uncontrolled and will occur sporadically throughout the day  He states his pain is sharp and severe and does not radiate  He points to pain over dorsal midfoot  Pain/symptom timing:  Worse during the day when active  Pain/symptom context:  Worse with activites and work  Pain/symptom modifying factors:  Rest makes better, activities make worse  Pain/symptom associated signs/symptoms: none    Prior treatment   · NSAIDsYes   · Injections Yes   · Bracing/Orthotics No    · Physical Therapy No     Orthopedic Surgical History:   See below    Past Medical, Surgical and Social History:  Past Medical History:  has a past medical history of Allergic reaction, Arthritis, Bee sting allergy, BPH (benign prostatic hypertrophy), Cancer ([facility] [address] ) (2017), Diabetes mellitus ([facility] [address] ), Enlarged prostate with lower urinary tract symptoms (LUTS), Herpes zoster, History of colon polyps, Hyperlipidemia, Leukocytosis, Osteoarthrosis, localized, primary, knee, Renal cancer ([facility] [address] ), Seasonal allergies, Shoulder pain, right, Wears glasses, and Wears partial dentures  Problem List: does not have any pertinent problems on file  Past Surgical History:  has a past surgical history that includes Ganglion cyst excision (Left); Tonsillectomy and adenoidectomy; Colonoscopy; pr revise median n/carpal tunnel surg (Left, 12/5/2016); pr repair intercarp/carp-metacarp jt (Left, 12/5/2016); pr lap,partial nephrectomy (Right, 5/8/2017); pr total knee arthroplasty (Right, 9/11/2017); Nephrectomy (05/08/2017); Cholecystectomy; Joint replacement (Left, 08/2015);  Joint replacement (Right, 09/2017); Carpal tunnel release (Left); pr shldr arthroscop,surg,w/rotat cuff repr (Right, 2/4/2019); pr repair biceps long tendon (2/4/2019); FL guided needle plac bx/asp/inj (6/1/2020); FL guided needle plac bx/asp/inj (11/13/2020); and Cardiac catheterization (N/A, 4/13/2022)  Family History: family history includes Cancer in his mother; Stroke in his father  Social History:  reports that he has quit smoking  His smoking use included cigarettes  He smoked 0 25 packs per day  He has never used smokeless tobacco  He reports current alcohol use  He reports that he does not use drugs  Current Medications: has a current medication list which includes the following prescription(s): aspirin, atorvastatin, one touch ultra 2, brilinta, cholecalciferol, epinephrine, ezetimibe, fluticasone, onetouch ultra, glucose blood, onetouch ultrasoft, onetouch ultrasoft, lisinopril, metformin, metoprolol tartrate, trazodone, and prednisone  Allergies: is allergic to bee venom  Review of Systems:  General- denies fever/chills  HEENT- denies hearing loss or sore throat  Eyes- denies eye pain or visual disturbances, denies red eyes  Respiratory- denies cough or SOB  Cardio- denies chest pain or palpitations  GI- denies abdominal pain  Endocrine- denies urinary frequency  Urinary- denies pain with urination  Musculoskeletal- Negative except noted above  Skin- denies rashes or wounds  Neurological- denies dizziness or headache  Psychiatric- denies anxiety or difficulty concentrating    Physical Exam:   /72 (BP Location: Left arm, Patient Position: Sitting, Cuff Size: Adult)   Pulse (!) 50   Ht 5' 10" (1 778 m)   Wt 83 9 kg (185 lb)   SpO2 100%   BMI 26 54 kg/m²   General/Constitutional: No apparent distress: well-nourished and well developed    Eyes: normal ocular motion  Lymphatic: No appreciable lymphadenopathy  Respiratory: Non-labored breathing  Vascular: No edema, swelling or tenderness, except as noted in detailed exam   Integumentary: No impressive skin lesions present, except as noted in detailed exam   Neuro: No ataxia or tremors noted  Psych: Normal mood and affect, oriented to person, place and time  Appropriate affect  Musculoskeletal: Normal, except as noted in detailed exam and in HPI  Examination    Left    Gait Normal and Antalgic   Musculoskeletal Tender to palpation at dorsal midfoot    Skin Normal       Nails Normal    Range of Motion  20 degrees dorsiflexion, 40 degrees plantarflexion  Subtalar motion: normal    Stability Stable    Muscle Strength 5/5 tibialis anterior  5/5 gastrocnemius-soleus  5/5 posterior tibialis  5/5 peroneal/eversion strength  5/5 EHL  5/5 FHL    Neurologic Normal    Sensation  Intact to light touch throughout sural, saphenous, superficial peroneal, deep peroneal and medial/lateral plantar nerve distributions  [place]-Hyun 5 07 filament (10g) testing  deferred  Cardiovascular Brisk capillary refill < 2 seconds,intact DP and PT pulses    Special Tests None      Imaging Studies:     3 views of the Left foot were obtained, reviewed and interpreted independently which demonstrate osteoarthritis of the TMT joint, talonavicular joint and naviculocuneiform joint  Reviewed by me personally  Scribe Attestation    I,:  Rachel Duque PA-C am acting as a scribe while in the presence of the attending physician :       I,:  Rosa Garcia MD personally performed the services described in this documentation    as scribed in my presence :               Isiah Hoyer Lachman, MD  Foot & Ankle Surgery   Department [address]      I personally performed the service  Isiah Hoyer Lachman, MD

## 2022-09-30 NOTE — PROGRESS NOTES
WILLIAM Baumann  Attending, Orthopaedic Surgery  [facility] [address] [facility] [address] [facility]      ORTHOPAEDIC FOOT AND ANKLE CLINIC VISIT     Assessment:     Encounter Diagnosis   Name Primary?  Osteoarthritis of midfoot, left Yes            Plan:   · The patient verbalized understanding of exam findings and treatment plan  We engaged in the shared decision-making process and treatment options were discussed at length with the patient  Surgical and conservative management discussed today along with risks and benefits  · ***  · No follow-ups on file  History of Present Illness:   Chief Complaint: No chief complaint on file  Valeria Lopez is a 69 y o  male who is being seen in follow-up for left ***  When we last saw he we recommended ***  Pain has *** improved  Residual pain is localized at *** with minimal radiating and described as sharp and severe  Pain/symptom timing:  Worse during the day when active  Pain/symptom context:  Worse with activites and work  Pain/symptom modifying factors:  Rest makes better, activities make worse  Pain/symptom associated signs/symptoms: none    Prior treatment   · NSAIDs{ :65177::"Yes"}   · Injections { :17466::"Yes"}   · Bracing/Orthotics { :60441::"Yes"}    · Physical Therapy { :70161::"Yes"}     Orthopedic Surgical History:   ***    Past Medical, Surgical and Social History:  Past Medical History:  has a past medical history of Allergic reaction, Arthritis, Bee sting allergy, BPH (benign prostatic hypertrophy), Cancer ([address] ) (2017), Diabetes mellitus ([facility] [address] ), Enlarged prostate with lower urinary tract symptoms (LUTS), Herpes zoster, History of colon polyps, Hyperlipidemia, Leukocytosis, Osteoarthrosis, localized, primary, knee, Renal cancer ([facility] [address] ), Seasonal allergies, Shoulder pain, right, Wears glasses, and Wears partial dentures  Problem List: does not have any pertinent problems on file    Past Surgical History:  has a past surgical history that includes Ganglion cyst excision (Left); Tonsillectomy and adenoidectomy; Colonoscopy; pr revise median n/carpal tunnel surg (Left, 12/5/2016); pr repair intercarp/carp-metacarp jt (Left, 12/5/2016); pr lap,partial nephrectomy (Right, 5/8/2017); pr total knee arthroplasty (Right, 9/11/2017); Nephrectomy (05/08/2017); Cholecystectomy; Joint replacement (Left, 08/2015); Joint replacement (Right, 09/2017); Carpal tunnel release (Left); pr shldr arthroscop,surg,w/rotat cuff repr (Right, 2/4/2019); pr repair biceps long tendon (2/4/2019); FL guided needle plac bx/asp/inj (6/1/2020); FL guided needle plac bx/asp/inj (11/13/2020); and Cardiac catheterization (N/A, 4/13/2022)  Family History: family history includes Cancer in his mother; Stroke in his father  Social History:  reports that he has quit smoking  His smoking use included cigarettes  He smoked 0 25 packs per day  He has never used smokeless tobacco  He reports current alcohol use  He reports that he does not use drugs  Current Medications: has a current medication list which includes the following prescription(s): aspirin, atorvastatin, one touch ultra 2, brilinta, cholecalciferol, epinephrine, ezetimibe, fluticasone, onetouch ultra, glucose blood, onetouch ultrasoft, onetouch ultrasoft, lisinopril, metformin, metoprolol tartrate, prednisone, and trazodone  Allergies: is allergic to bee venom       Review of Systems:  General- denies fever/chills  HEENT- denies hearing loss or sore throat  Eyes- denies eye pain or visual disturbances, denies red eyes  Respiratory- denies cough or SOB  Cardio- denies chest pain or palpitations  GI- denies abdominal pain  Endocrine- denies urinary frequency  Urinary- denies pain with urination  Musculoskeletal- Negative except noted above  Skin- denies rashes or wounds  Neurological- denies dizziness or headache  Psychiatric- denies anxiety or difficulty concentrating    Physical Exam:   There were no vitals taken for this visit  General/Constitutional: No apparent distress: well-nourished and well developed  Eyes: normal ocular motion  Lymphatic: No appreciable lymphadenopathy  Respiratory: Non-labored breathing  Vascular: No edema, swelling or tenderness, except as noted in detailed exam   Integumentary: No impressive skin lesions present, except as noted in detailed exam   Neuro: No ataxia or tremors noted  Psych: Normal mood and affect, oriented to person, place and time  Appropriate affect  Musculoskeletal: Normal, except as noted in detailed exam and in HPI  Examination    Left    Gait { :54307::"Normal"}   Musculoskeletal Tender to palpation at ***    Skin Normal   ***Well-healed incisions  Nails Normal    Range of Motion  *** degrees dorsiflexion, *** degrees plantarflexion  Subtalar motion: ***    Stability Stable    Muscle Strength 5/5 tibialis anterior  5/5 gastrocnemius-soleus  5/5 posterior tibialis  5/5 peroneal/eversion strength  5/5 EHL  5/5 FHL    Neurologic Normal    Sensation *** Intact to light touch throughout sural, saphenous, superficial peroneal, deep peroneal and medial/lateral plantar nerve distributions  [place]-Hyun 5 07 filament (10g) testing *** deferred  Cardiovascular Brisk capillary refill < 2 seconds,intact DP and PT pulses    Special Tests None      Imaging Studies:   No new imaging    3 views of the Left *** were obtained, reviewed and interpreted independently which demonstrate ***  Reviewed by me personally  MRI available for review of left *** which demonstrates ***  Reviewed by me personally  CT available for review of Left *** which shows ***  Reviewed by me personally  Karron Flatness Lachman, MD  Foot & Ankle Surgery   Department of [address] [place] [address] personally performed the service  Karron Flatness Lachman, MD

## 2022-09-30 NOTE — PATIENT INSTRUCTIONS
WILLIAM Quinonez  Attending, [address] [place] [address] Office Phone: [phone] ? Fax: [phone]  [facility] Office Phone: [phone] ? CLP:[phone]    : Favian Brooks) [place], [place]     Surgery Coordinators Tatum Morley: Robles Gravesa, [phone]  Veronica Ghosh, [phone]  Surgery Coordinator Simran:  Jalyn Vargas, [address] [facility], [phone]  www [email or web address]   PRE-OPERATIVE AND POST-OPERATIVE INSTRUCTIONS    General Information:  Your surgery is with Dr Osito Prasad  Dates can change (although rare) depending on emergencies  Typical post operative visits are at the following intervals:  3 weeks post surgery(except 1 week for bunions and wound monitoring), 6 weeks post surgery, 3 months post surgery, 6 months post surgery, and then on a yearly basis  However, this may change based on Dr Isauro Guzman recommendation  #1 post-operative rule for foot/ankle surgery:  ONCE YOU ARE OUT OF YOUR CAST AND/OR REMOVABLE BOOT, SWELLING MAY PERSIST FOR MANY MONTHS  YOU MIGHT ALSO EXPERIENCE A BLUISH DISCOLORATION OF YOUR LEG  THIS IS NORMAL AND PART OF THE USUAL POSTOPERATIVE EXPERIENCE  SMOKING:  Smoking results in incomplete healing of fractures (broken bones) and joints that my have been fused  Smoking and nicotine also prevents the growth of bone into ankle replacements and bone healing  It also slows the healing of muscles and skin (soft tissue)  Therefore, please do not have surgery if you continue to smoke  We reserve the right to cancel your surgery if we suspect that you are smoking  DO NOT use nicorette gum or other patches  Please find an alternative method to quit smoking before your surgery  Pre-Operative Information:  Surgery date and preoperative visits:   If you have medical problems, such as an abnormal EKG, history of BLOOD CLOT, ANEURYSM, and any other heart condition, please inform us so that we can get your medical clearance several weeks before the surgery  Please bring any important medical information, such as an EKG, chest x-ray, or echocardiogram, with you to ensure that your surgery will not be delayed  If needed, you will receive your preoperative appointments in the mail or by phone from our scheduling office  The location of the preoperative appointment will be given to you also  You may not eat after midnight the night before surgery  If you do, your surgery will be cancelled  You will receive a phone call from your surgery center the day before your surgery (if your surgery is on a Monday, you will get a call the Friday before)  If you do not hear from someone by 4pm the day before your surgery, please call the Surgical coordinator (number above) to notify us  Start taking Vitamin D3 4000 units per day and Calcium 1200mg per day immediately  You will continue this until your 3 month post-op visit  These are over the counter and available at all pharmacies and supermarkets  FOR THOSE HAVING SURGERY AT [address] [place] [address] Avenue WILL NEED CRUTCHES OR A ROLLING WALKER AFTER SURGERY, ASK FOR A PRESCRIPTION FOR THIS FROM OUR OFFICE TODAY  THIS CANNOT BE HANDLED THE DAY OF SURGERY AS [facility] DOES NOT STOCK THESE  Because bacterial can often enter any defect in the skin, it is important to avoid any cuts before surgery  Any breaks in the skin on the leg will often result in your surgery being postponed  Please avoid going on a very long walk the day prior to surgery, or doing other activities that could lead to irritation of the skin, including yard work, extra athletic activity, or shaving  This could result in surgery cancellation  You MUST be fasting the day of your surgery  Therefore, please do not consume any foot or beverage after midnight the night before surgery  The morning of surgery you may take your usual medications with a sip of water  It is important not to take anti-inflammatory medication like Ibuprofen, Motrin, Naproxen (Aleve), or Aspirin 7-10 days before surgery because they will make you bleed more than usual   Vitamin, E, Plavix and Coumadin also have the same effect  Stop Aspirin and Vitamin E two weeks before surgery  YOUR MEDICAL DOCTOR SHOULD TELL YOU WHEN TO STOP COUMADIN OR PLAVIX  If your surgery involves any bone healing, please do not take anti-inflammatories for at least 6 weeks after surgery  This can impede bone healing (ibuprofen, Aleve, Relafen, iodine)  Tylenol is fine to take  PREOPERATIVE BATHING INSTRUCTIONS:    Before your surgery, bathe with Hibiclens (4% Chlorhexidene) as instructed below  This skin cleanser will help reduce the bacteria on your skin before surgery  To avoid irritating your eyes, do not apply Hibiclens above the level of your neck  On the evening before AND the morning of surgery, bathe your entire body except the face and scalp, then rinse freely  DO NOT apply to your face or scalp, as Hibiclens can irritate your eyes  Purchasing information:   Hibiclens is available without a prescription at [facility]  ADDITIONAL INSTRUCTIONS:  PATIENTS HAVING FOOT/ANKLE SURGERY     In preparation for your upcoming surgery, we kindly request and advise the following:  Notify our office if you are taking any of the following:  Coumadin (warfarin):  Persantine (dipyridamole); Pletal (cilostazol); Plavix (clopidogrel); Ticlid (ticlopidine); Agrylin (anagrelide); Aggrenox (dipyridamole and aspirin) or other blood thinners,  In addition, stop taking Vitamin E and herbal supplements  Do not schedule any elective dental work for at least 6 months after surgery  If you had an ankle replacement, you will need to take antibiotics before any future dental procedures   Your dentist or our office can prescribe these for you  1000mg of Amoxicillin 1 hour prior to any dental procedure is the recommended dosing  THREE RULES:    After surgery you will most likely be given the instructions KEEP YOUR TOES ABOVE YOUR NOSE    This means that you MUST have your feet elevated higher than your heart  Keeping your toes above your nose helps to heal the muscles and skin (soft tissues) by reducing swelling in your leg  This position also helps to prevent infection, and is very important in avoiding deep venous thrombosis (blood clots)  In order to keep the blood circulating in your legs and in order to avoid deep vein   thrombosis (blood clots), we ask patients to GET UP ONCE AN HOUR during the day  This means you should at least cross the room and come back  It does not mean you have to be up for long periods of time  In most cases we will not have people immediately put any weight on their operated part  This is important to prevent loosening of metal or other devices holding the bones together  It also prevents irritation of the soft tissues which can lead to prolonged healing  When we say get up once an hour, please walk, hop or move with an assisted device  This is important! Do not do any excessive walking during the first few days after surgery  Recovering from surgery is a full-time task for the patient  Postoperative care is important to avoid irritating the skin incision, which can lead to infection  Please do not plan activities or go out of town for several weeks after surgery  If you are unsure about your future activities, please schedule surgery only when you know it is acceptable for you  Scheduling surgery and then canceling the date, prevents other people from having surgery on that date as it takes time to line everything up effectively  If you cancel your surgery the week of your planned surgery, we reserve the right to cancel all future surgical procedures      THE DAY OF SURGERY:    Arrival to the hospital or outpatient surgical center on time is imperative  If you arrive late, then your surgery will be cancelled  You MUST have a family member/friend bring you, stay with you throughout the DURATION of your surgery, and drive you home  You MUST be fasting the day of your surgery  Therefore, do not consume any food or beverage after midnight the night before surgery  At your pre-operative visit with the anesthesia staff, or during your phone screen, a nurse will instruct you what medications you will need to take the day of surgery  MAKE SURE THAT THE PHARMACY LISTED IN THE ELECTRONIC MEDICAL RECORD (EPIC) IS YOUR PREFERRED PHARMACY  For example, if you are staying with family or a friend, and will not be near your preferred pharmacy, YOU MUST, tell the nurses checking you in the day of surgery so that this can be changed in the system  If your prescriptions are sent to a pharmacy, this cannot be changed  AFTER YOUR SURGERY:  Bleeding through the bandage almost always occurs  Do not let this alarm you  Simply add more gauze or a towel, call us, and come in for a dressing change  If you think it is excessive, contact us immediately or go to the local emergency room  Do not get the bandage wet  Showering is possible with plastic protectors  Be very careful, as the bathroom can be wet and slippery  If you do get your dressing wet, it should be changed immediately  Please contact us  ONCE YOUR ARE OUT OF YOUR CAST AND/OR REMOVABLE BOOT, SWELLING MAY PERSIST FOR MANY MONTHS  YOU MIGHT ALSO EXPERIENCE A BLUISH DISCOLORATION OF YOUR LEG  THIS IS NORMAL AND PART OF THE USUAL POSTOPERATIVE EXPERIENCE  WEARING COMPRESSION HOSE (ELASTIC STOCKINGS) CAN HELP AVOID SOME OF THIS SWELLING  DRESSING:   The purpose of the surgical dressing is to keep your wound and the surgical site protected from the environment    Most dressings contain splints, which help to hold your foot and ankle in a corrected position, and also allow the surgical site to heal properly  Dressings will remain in place and undisturbed until the first postop visit  If you have a drain in place, this will need to be removed in 1-3 days after surgery  The time for the drain to be pulled will be written on your discharge instruction sheet  CAST  INSTRUCTIONS:  You may or may not get a cast following surgery  If you do, pay close attention to the following:    After application of a splint or cast, it is very important to elevate your leg for 24 to 72 hours  The injured area should be elevated well above the heart  Remember Toes above your Nose  Rest and elevation greatly reduce pain and speed the healing process by minimizing early swelling  CALL YOUR DOCTORS OFFICE OR VISIT LOCATION EMERGENCY ROOM IF YOU HAVE ANY OF THE FOLLOWING:    Significant increased pain, which may be caused by swelling, and the feeling that the splint or cast is too tight  Numbness and tingling in your hand or foot, which may be caused by too much pressure on the nerves  Burning and stinging, which may be caused by too much pressure on the skin  Excessive swelling below the cast, which may mean the cast is slowing your blood circulation  Loss of active movement of toes, which request an urgent evaluation  Loss of capillary refill  Pinch the tip of toes and da the skin  Release pressure and if the skin does not return pink then call the office immediately  DO NOT GET YOUR CAST WET  Bacteria thrive in moist dark areas  We do not want this  If your cast becomes wet, return to the office and we will apply another one  PAIN AFTER SURGERY:  Narcotic pain medication can and will depress your respiratory system if taken in excess  The goal of pain management with narcotics is to be comfortable not pain free  If you take enough narcotics to be pain free then you run the risk of stopping breathing    If this happens, call 91 immediately! Pain in the heel is often  caused by pressure from the weight of your foot on the bed  Make sure your heel is suspended off the bed by keeping a pillow underneath your calf not your knee  Medications: You will be given narcotic pain medication  Do NOT drive while taking narcotic medications  Medications such as Darvocet, Percocet, Vicoden or Tylenol #3, also contain acetaminophen (Tylenol)  Do not take acetaminophen or Tylenol from home when taking theses medications  When you fill your prescription, you may ask the pharmacist if your pain medication has acetaminophen/Tylenol in it  It is okay to take Tylenol with Oxycontin/Oxycodone  Should you have pain after taking your prescription medication, ibuprophen (Motrin, Advil, and Alleve) is a common over the counter preparation and may often be taken with the prescription pain medication as long as you take them with food  These medications can irritate the stomach lining  Unless you are allergic to aspirin or currently taking a blood thinner, Dr Pawel Tatum patients are requested to take one 325 mg aspirin every 12 hours until you are back to walking normally after surgery (This can be up to 6 weeks)  Narcotic medications commonly cause nausea  Taking them with food will decrease this side effect  If you are having extreme nausea, please contact us for an alternative medication or for something that can be taken with this medication to decrease the nausea  Also, narcotic medications frequently cause constipation  An increase of fiber, fruits and vegetables in your diet may alleviate this problem, or if necessary, you may use an over-the-counter medication such as senekot, colace, or Fibercon for constipation problems  You should resume all medications you were taking prior to the surgery unless otherwise specified  Activity:   Because of your recent foot surgery, your activity level will decrease   You will need to elevate your foot ABOVE the level of your heart for a minimum of four days  The length of time necessary for the swelling to go down, and for your wounds to heal properly depends greatly on your efforts here  Elevation is extremely important to avoid compromising the blood supply to your foot  Remember when your foot is down it will swell, which will increase pain and slow healing  Wiggle your toes frequently if possible  If you go home with a regional block, (a type of anesthesia) the foot and leg will be numb  Think of ways to get into your house and around the house until the block wears off  Keep in mind that it may be a legal issue if you drive while in a cast or splint, especially when the splint is on the right foot  You may call the Department of Motor Vehicles to schedule a road test if you have adaptive equipment applied to your car  The amount of weight you are allowed to bear on your foot will be written on your discharge sheet filled out at the time of surgery  The following is an explanation of the possibilities:       [id number] has the following policies when it comes to ELECTIVE surgery  No elective surgery requiring anesthesia until 7 weeks after a patient tested positive for COVID-19   No elective surgery requiring anesthesia until 3 months after a patient was hospitalized for COVID-19      Non-weight bearing: You are to put NO weight whatsoever on your foot  When using crutches or a walker, your foot should not touch the ground, except when you are standing  Then, it may rest on the ground  If you are to be non-weight bearing, and you are not compliant, you could compromise the surgery  Some of our patients have been requesting prescriptions for a roll-a-bout knee scooter  [employer] and other insurances have been denying these claims, and you may either have to rent one or pay out of pocket to purchase one    THIS SHOULD BE PURCHASED PRIOR TO THE SURGERY AND YOU SHOULD BRING IT WITH YOU THE DAY OF THE SURGERY TO AIDE IN GETTING FROM THE CAR INTO THE HOUSE AFTER SURGERY

## 2022-10-03 ENCOUNTER — TELEPHONE (OUTPATIENT)
Dept: OBGYN CLINIC | Facility: HOSPITAL | Age: 77
End: 2022-10-03

## 2022-10-04 ENCOUNTER — TELEPHONE (OUTPATIENT)
Dept: OBGYN CLINIC | Facility: CLINIC | Age: 77
End: 2022-10-04

## 2022-10-13 DIAGNOSIS — R32 URINARY INCONTINENCE, UNSPECIFIED TYPE: Primary | ICD-10-CM

## 2022-11-03 ENCOUNTER — TELEPHONE (OUTPATIENT)
Dept: CARDIOLOGY CLINIC | Facility: CLINIC | Age: 77
End: 2022-11-03

## 2022-11-03 NOTE — TELEPHONE ENCOUNTER
Pt called states having pain in back area questions what he could take for pain   said tylenol is recommended   He was recommended to call his PCP/or he thought Dr Demetrio Alcaraz because hes had kidney CA in the past

## 2022-11-16 ENCOUNTER — OFFICE VISIT (OUTPATIENT)
Dept: FAMILY MEDICINE CLINIC | Facility: CLINIC | Age: 77
End: 2022-11-16

## 2022-11-16 VITALS
TEMPERATURE: 97.8 F | DIASTOLIC BLOOD PRESSURE: 68 MMHG | WEIGHT: 189.8 LBS | BODY MASS INDEX: 27.23 KG/M2 | SYSTOLIC BLOOD PRESSURE: 138 MMHG | OXYGEN SATURATION: 98 % | HEART RATE: 59 BPM

## 2022-11-16 DIAGNOSIS — M54.6 ACUTE RIGHT-SIDED THORACIC BACK PAIN: Primary | ICD-10-CM

## 2022-11-16 NOTE — PROGRESS NOTES
Name: Denise Tarango      :       MRN: [id number]  Encounter Provider: Marcela King DO  Encounter Date: 2022   Encounter department: [address] [place] [address]  Acute right-sided thoracic back pain  Reviewed patient's most recent CT scan done 10 months ago which was completely normal   Based on symptoms and timing it is most likely that this is a musculoskeletal issue  Recommend moist heat gentle stretching and Tylenol for pain control  Patient will be having an ultrasound of the kidneys approximately 2 months from now but should his symptoms persist, ultrasound or CT scan of the abdomen can be done to conclusively rule out pathology related to his prior renal carcinoma  Subjective      Patient presents with a chief complaint of right mid to upper back pain for the past 2-3 weeks  Patient has some concern related to prior history of partial nephrectomy due to renal cancer on that side that was done 5 years ago  There has been no evidence of any recurrence and CT scan done in  months ago was normal   Patient does have follow-up scheduled with Urology 2 months from now with ultrasound proceeding that visit  Notes that he has been doing some physical work with raking leaves recently  Review of Systems   Constitutional: Negative  Respiratory: Negative  Cardiovascular: Negative  Gastrointestinal: Negative  Genitourinary: Negative  Musculoskeletal: Positive for back pain  Psychiatric/Behavioral: Negative          Current Outpatient Medications on File Prior to Visit   Medication Sig   • aspirin (ECOTRIN LOW STRENGTH) 81 mg EC tablet Take 1 tablet (81 mg total) by mouth daily   • atorvastatin (LIPITOR) 80 mg tablet TAKE 1 TABLET EVERY DAY WITH DINNER   • Blood Glucose Monitoring Suppl (ONE TOUCH ULTRA 2) w/Device KIT Use 2 (two) times a day   • Brilinta 90 MG TAKE 1 TABLET EVERY 12 HOURS   • Cholecalciferol (VITAMIN D-3 PO) Take 2,000 Units by mouth daily   • EPINEPHrine (EPIPEN) 0 3 mg/0 3 mL SOAJ Inject as directed   • ezetimibe (ZETIA) 10 mg tablet TAKE 1 TABLET AT BEDTIME   • fluticasone (FLONASE) 50 mcg/act nasal spray 1 spray into each nostril daily   • glucose blood (OneTouch Ultra) test strip Use as instructed/one touch ultra 2   • glucose blood test strip Use 1 each daily as needed (once a day) One touch ultra  Test strips   • Lancets (onetouch ultrasoft) lancets Lancets for Delica/Once a day   • lisinopril (ZESTRIL) 2 5 mg tablet Take 1 tablet (2 5 mg total) by mouth in the morning  • metFORMIN (GLUCOPHAGE) 500 mg tablet TAKE 1 TABLET TWICE DAILY   • metoprolol tartrate (LOPRESSOR) 25 mg tablet Take 1 tablet (25 mg total) by mouth 2 (two) times a day   • Mirabegron ER 50 MG TB24 Take 1 tablet (50 mg total) by mouth in the morning   • traZODone (DESYREL) 100 mg tablet TAKE 1 TABLET (100 MG TOTAL) BY MOUTH DAILY AT BEDTIME   • Lancets (onetouch ultrasoft) lancets One touch ultra for DELICA 2 /BID (Patient not taking: Reported on 11/16/2022)   • predniSONE 10 mg tablet Take 6 tablets By mouth  In the morning Qd  Decrease by  By 1 tablet daily until completed  (Patient not taking: Reported on 8/11/2022)       Objective     /68 (BP Location: Right arm, Patient Position: Sitting, Cuff Size: Adult)   Pulse 59   Temp 97 8 °F (36 6 °C)   Wt 86 1 kg (189 lb 12 8 oz)   SpO2 98%   BMI 27 23 kg/m²     Physical Exam  Musculoskeletal:      Comments: Right paravertebral/parascapular discomfort on right         Chad Thao DO

## 2022-11-17 ENCOUNTER — TELEPHONE (OUTPATIENT)
Dept: ADMINISTRATIVE | Facility: OTHER | Age: 77
End: 2022-11-17

## 2022-11-17 NOTE — TELEPHONE ENCOUNTER
Upon review of the In Basket request and the patient's chart, initial outreach has been made via fax to facility  , please see Contacts section for details       Thank you  Elizabeth Oliver MA

## 2022-11-17 NOTE — TELEPHONE ENCOUNTER
Upon review of the In Basket request we were able to locate, review, and update the patient chart as requested for Diabetic Eye Exam     Any additional questions or concerns should be emailed to the Practice Liaisons via the appropriate education email address, please do not reply via In Basket      Thank you  Leticia Hurd MA

## 2022-11-17 NOTE — TELEPHONE ENCOUNTER
----- Message from Alena Kilpatrick MA sent at 11/16/2022  2:00 PM EST -----  Regarding: Care Gap Request  11/16/22 2:00 PM    Hello, our patient Rick Nuñez has had Diabetic Eye Exam completed/performed  Please assist in updating the patient chart by making an External outreach to [place] facility located in [facility]  The date of service is approx 8/2022      Thank you,  Alena Kilpatrick PG [facility]

## 2022-11-17 NOTE — LETTER
Diabetic Eye Exam Form    Date Requested: 22  Patient: Denise Mcbride  Patient : [id number]   Referring Provider: Haider Macias DO      DIABETIC Eye Exam Date _______________________________      Type of Exam MUST be documented for Diabetic Eye Exams  Please CHECK ONE  Retinal Exam       Dilated Retinal Exam       OCT       Optomap-Iris Exam      Fundus Photography       Left Eye - Please check Retinopathy or No Retinopathy        Exam did show retinopathy    Exam did not show retinopathy       Right Eye - Please check Retinopathy or No Retinopathy       Exam did show retinopathy    Exam did not show retinopathy       Comments __________________________________________________________    Practice Providing Exam ______________________________________________    Exam Performed By (print name) _______________________________________      Provider Signature ___________________________________________________      These reports are needed for  compliance  Please fax this completed form and a copy of the Diabetic Eye Exam report to our office located at [place] [address] as soon as possible via [phone] [facility] Ana M Ring: Phone [phone]  We thank you for your assistance in treating our mutual patient

## 2023-01-20 ENCOUNTER — APPOINTMENT (OUTPATIENT)
Dept: LAB | Facility: CLINIC | Age: 78
End: 2023-01-20

## 2023-01-20 ENCOUNTER — HOSPITAL ENCOUNTER (OUTPATIENT)
Dept: ULTRASOUND IMAGING | Facility: MEDICAL CENTER | Age: 78
Discharge: HOME | End: 2023-01-20

## 2023-01-20 DIAGNOSIS — Z12.5 SCREENING FOR PROSTATE CANCER: ICD-10-CM

## 2023-01-20 DIAGNOSIS — E11.69 TYPE 2 DIABETES MELLITUS WITH OTHER SPECIFIED COMPLICATION, WITHOUT LONG-TERM CURRENT USE OF INSULIN (HCC): ICD-10-CM

## 2023-01-20 DIAGNOSIS — E78.2 MIXED HYPERLIPIDEMIA: ICD-10-CM

## 2023-01-20 DIAGNOSIS — C64.1 MALIGNANT NEOPLASM OF RIGHT KIDNEY (HCC): ICD-10-CM

## 2023-01-20 LAB
ALBUMIN SERPL BCP-MCNC: 3.8 G/DL (ref 3.5–5)
ALP SERPL-CCNC: 46 U/L (ref 46–116)
ALT SERPL W P-5'-P-CCNC: 48 U/L (ref 12–78)
ANION GAP SERPL CALCULATED.3IONS-SCNC: 4 MMOL/L (ref 4–13)
AST SERPL W P-5'-P-CCNC: 27 U/L (ref 5–45)
BILIRUB SERPL-MCNC: 1.07 MG/DL (ref 0.2–1)
BUN SERPL-MCNC: 25 MG/DL (ref 5–25)
CALCIUM SERPL-MCNC: 9.8 MG/DL (ref 8.3–10.1)
CHLORIDE SERPL-SCNC: 105 MMOL/L (ref 96–108)
CHOLEST SERPL-MCNC: 72 MG/DL
CO2 SERPL-SCNC: 27 MMOL/L (ref 21–32)
CREAT SERPL-MCNC: 1.19 MG/DL (ref 0.6–1.3)
CREAT UR-MCNC: 27.2 MG/DL
EST. AVERAGE GLUCOSE BLD GHB EST-MCNC: 151 MG/DL
GFR SERPL CREATININE-BSD FRML MDRD: 58 ML/MIN/1.73SQ M
GLUCOSE P FAST SERPL-MCNC: 130 MG/DL (ref 65–99)
HBA1C MFR BLD: 6.9 %
HDLC SERPL-MCNC: 35 MG/DL
LDLC SERPL CALC-MCNC: 23 MG/DL (ref 0–100)
MICROALBUMIN UR-MCNC: <5 MG/L (ref 0–20)
MICROALBUMIN/CREAT 24H UR: <18 MG/G CREATININE (ref 0–30)
POTASSIUM SERPL-SCNC: 5.5 MMOL/L (ref 3.5–5.3)
PROT SERPL-MCNC: 7.9 G/DL (ref 6.4–8.4)
PSA SERPL-MCNC: 3.1 NG/ML (ref 0–4)
SODIUM SERPL-SCNC: 136 MMOL/L (ref 135–147)
TRIGL SERPL-MCNC: 69 MG/DL

## 2023-01-23 ENCOUNTER — RA CDI HCC (OUTPATIENT)
Dept: OTHER | Facility: HOSPITAL | Age: 78
End: 2023-01-23

## 2023-01-23 NOTE — PROGRESS NOTES
[id number] 22  [facility]  coding opportunities          Chart Reviewed number of suggestions sent to Provider: 1     Patients Insurance     Medicare Insurance: [facility]

## 2023-01-26 ENCOUNTER — TELEPHONE (OUTPATIENT)
Dept: UROLOGY | Facility: CLINIC | Age: 78
End: 2023-01-26

## 2023-01-30 ENCOUNTER — OFFICE VISIT (OUTPATIENT)
Dept: FAMILY MEDICINE CLINIC | Facility: CLINIC | Age: 78
End: 2023-01-30

## 2023-01-30 VITALS
SYSTOLIC BLOOD PRESSURE: 120 MMHG | RESPIRATION RATE: 16 BRPM | HEART RATE: 60 BPM | BODY MASS INDEX: 27.2 KG/M2 | WEIGHT: 190 LBS | DIASTOLIC BLOOD PRESSURE: 70 MMHG | TEMPERATURE: 97.7 F | OXYGEN SATURATION: 98 % | HEIGHT: 70 IN

## 2023-01-30 DIAGNOSIS — C64.1 RENAL CELL CARCINOMA OF RIGHT KIDNEY (HCC): ICD-10-CM

## 2023-01-30 DIAGNOSIS — E11.69 TYPE 2 DIABETES MELLITUS WITH OTHER SPECIFIED COMPLICATION, WITHOUT LONG-TERM CURRENT USE OF INSULIN (HCC): Primary | ICD-10-CM

## 2023-01-30 DIAGNOSIS — I25.10 CORONARY ARTERY DISEASE INVOLVING NATIVE CORONARY ARTERY OF NATIVE HEART WITHOUT ANGINA PECTORIS: ICD-10-CM

## 2023-01-30 DIAGNOSIS — Z13.0 SCREENING FOR DEFICIENCY ANEMIA: ICD-10-CM

## 2023-01-30 DIAGNOSIS — R35.0 BENIGN PROSTATIC HYPERPLASIA WITH URINARY FREQUENCY: ICD-10-CM

## 2023-01-30 DIAGNOSIS — Z96.653 STATUS POST TOTAL BILATERAL KNEE REPLACEMENT: ICD-10-CM

## 2023-01-30 DIAGNOSIS — I25.119 ATHEROSCLEROSIS OF NATIVE CORONARY ARTERY OF NATIVE HEART WITH ANGINA PECTORIS (HCC): ICD-10-CM

## 2023-01-30 DIAGNOSIS — E78.2 MIXED HYPERLIPIDEMIA: ICD-10-CM

## 2023-01-30 DIAGNOSIS — N40.1 BENIGN PROSTATIC HYPERPLASIA WITH URINARY FREQUENCY: ICD-10-CM

## 2023-01-30 DIAGNOSIS — G47.00 INSOMNIA, UNSPECIFIED TYPE: ICD-10-CM

## 2023-01-30 NOTE — ASSESSMENT & PLAN NOTE
A1c stable at 6 9%  Doing well on metformin 500 mg twice daily  Patient has also been watching his diet carefully and exercising regularly    We will continue to follow  Lab Results   Component Value Date    HGBA1C 6 9 (H) 01/20/2023

## 2023-01-30 NOTE — ASSESSMENT & PLAN NOTE
History of right renal cell carcinoma  Status post nephrectomy May 2017  Patient has been experiencing right flank pain lately  Recent ultrasound did not show any evidence of recurrence    Pain is most likely musculoskeletal   Continue follow-up with urology as directed

## 2023-01-30 NOTE — ASSESSMENT & PLAN NOTE
History of bilateral knee replacements by Dr Lala Mckeon  Patient has been having increased pain in left knee    Will refer back to Ortho

## 2023-01-30 NOTE — PROGRESS NOTES
Name: Matheus Peace      :       MRN: [id number]  Encounter Provider: Tavo Key DO  Encounter Date: 2023   Encounter department: [address] [place] [address]  Type 2 diabetes mellitus with other specified complication, without long-term current use of insulin ([facility])  Assessment & Plan:  A1c stable at 6 9%  Doing well on metformin 500 mg twice daily  Patient has also been watching his diet carefully and exercising regularly  We will continue to follow  Lab Results   Component Value Date    HGBA1C 6 9 (H) 2023       Orders:  -     Comprehensive metabolic panel; Future; Expected date: 2023  -     Hemoglobin A1C; Future; Expected date: 2023  -     TSH, 3rd generation with Free T4 reflex; Future; Expected date: 2023    2  Mixed hyperlipidemia  Assessment & Plan:  Cholesterol under excellent control 72, LDL 23  Doing well on atorvastatin 80 and Zetia 10    Orders:  -     Lipid Panel with Direct LDL reflex; Future; Expected date: 2023    3  Insomnia, unspecified type  Assessment & Plan:  Patient sleeping well from 9 PM to 3 AM   He then wakes up at that time and has problems falling back asleep  He is taking trazodone 50 mg nightly  I recommended that he split his dose to 50 mg at bedtime, and then take an additional 50 mg first time he awakens to urinate  Call further problems      4  Benign prostatic hyperplasia with urinary frequency  Assessment & Plan:  No longer on tamsulosin  Patient still with urinary frequency and hesitancy  Patient will be seeing his urologist next week  5  Renal cell carcinoma of right kidney [facility])  Assessment & Plan:  History of right renal cell carcinoma  Status post nephrectomy May 2017  Patient has been experiencing right flank pain lately  Recent ultrasound did not show any evidence of recurrence  Pain is most likely musculoskeletal   Continue follow-up with urology as directed      6  Status post total bilateral knee replacement  Assessment & Plan:  History of bilateral knee replacements by Dr Sánchez Thacker  Patient has been having increased pain in left knee  Will refer back to Ortho    Orders:  -     Ambulatory Referral to Orthopedic Surgery; Future    7  Coronary artery disease involving native coronary artery of native heart without angina pectoris  Assessment & Plan:  Status post STEMI April 2022  Successfully stented  Currently doing well without chest pain  Patient does admit to occasional shortness of breath  He is exercising regularly and compliant on prescribed medications  Recommend follow-up with cardiology as directed      8  Screening for deficiency anemia  -     CBC and differential; Future; Expected date: 07/01/2023    9  Atherosclerosis of native coronary artery of native heart with angina pectoris [facility])  Assessment & Plan:  Status post STEMI April 2022  Successfully stented  Currently doing well without chest pain  Patient does admit to occasional shortness of breath  He is exercising regularly and compliant on prescribed medications  Recommend follow-up with cardiology as directed      Patient current with colon cancer screening    Patient had [employer] vaccination  Patient had flu shot this season  Patient had Shingrix  Patient had Pneumovax  Last tetanus booster 2017    6 months, fasting blood work prior       Subjective     Patient presents for recheck chronic medical problems today  Compliant with prescribed medications  Still with right flank pain  This was previously diagnosed as musculoskeletal     Review of Systems   Respiratory: Negative  Cardiovascular: Negative  Gastrointestinal: Negative  Genitourinary: Negative  Musculoskeletal: Positive for back pain         Past Medical History:   Diagnosis Date   • Allergic reaction     last assessed: 5/26/2015   • Arthritis     right knee   • Bee sting allergy     last assessed: 6/30/2015   • BPH (benign prostatic hypertrophy)    • Cancer [facility]) 2017    Right kidney   • Diabetes mellitus ([facility] [address] )     Type II, NIDDM   • Enlarged prostate with lower urinary tract symptoms (LUTS)     last assessed: 8/17/2015   • Herpes zoster     last assessed: 11/21/2013   • History of colon polyps    • Hyperlipidemia    • Leukocytosis     last assessed: 11/18/2014   • Osteoarthrosis, localized, primary, knee     last assessed: 10/25/2016   • Ren[facility] cancer ([facility] )    • Seasonal allergies    • Shoulder pain, right    • Wears glasses    • Wears partial dentures     Upper and Lower     Past Surgical History:   Procedure Laterality Date   • CARDIAC CATHETERIZATION N/A 4/13/2022    Procedure: Cardiac pci;  Surgeon: Chasity Robb MD;  Location: [facility] C[facility]AC [facility];   Service: Cardiology   • CARPAL TUNNEL RELEASE Left    • CHOLECYSTECTOMY      Open   • COLONOSCOPY     • FL GUIDED NEEDLE PLAC BX/ASP/INJ  6/1/2020   • FL GUIDED NEEDLE PLAC BX/ASP/INJ  11/13/2020   • GANGLION CYST EXCISION Left     Left wrist   • JOINT REPLACEMENT Left 08/2015    Left TKR   • JOINT REPLACEMENT Right 09/2017    Right TKR   • NEPHRECTOMY  05/08/2017    Partial right nephrectomy   • KY ARTHRP INTERPOS INTERCARPAL/METACARPAL JOINTS Left 12/5/2016    Procedure: ARTHROPLASTY THUMB CMC ;  Surgeon: Nikki Porras MD;  Location: [facility];  Service: Orthopedics   • KY ARTHRP KNE CONDYLE&PLATU MEDIAL&LAT COMPARTMENTS Right 9/11/2017    Procedure: ARTHROPLASTY KNEE TOTAL;  Surgeon: Nikki Porras MD;  Location: [facility];  Service: Orthopedics   • KY LAPAROSCOPY SURG PARTIAL NEPHRECTOMY Right 5/8/2017    Procedure: LAPAROSCOPIC LYSIS OF ADHESIONS, ROBOTIC PARTIAL NEPHRECTOMY, WITH INTRA-OP LAPAROSCOPIC ULTRASOUND;  Surgeon: Marcelo Das MD;  Location: [facility] MAIN [facility];  Service: Urology   • KY NEUROPLASTY &/TRANSPOS MEDIAN NRV CARPAL Joan Lien Left 12/5/2016    Procedure: RELEASE CARPAL TUNNEL;  Surgeon: Nikki Porras MD;  Location: [facility];  Service: Orthopedics   • KY SURGICAL ARTHROSCOPY SHOULDER W/ROTATOR CUFF RPR Right 2/4/2019    Procedure: REPAIR ROTATOR CUFF ARTHROSCOPIC; SUBACROMIAL DECOMPRESSION; DISTAL CLAVICLE EXCISION;  Surgeon: Scarlet Teixeira MD;  Location: [facility];  Service: Orthopedics   • AR TENODESIS LONG TENDON BICEPS  2/4/2019    Procedure: TENODESIS BICEPS OPEN PROXIMAL;  Surgeon: Scarlet Teixeira MD;  Location: [facility];  Service: Orthopedics   • TONSILLECTOMY AND ADENOIDECTOMY       Family History   Problem Relation Age of Onset   • Cancer Mother    • Stroke Father      Social History     Socioeconomic History   • Marital status: /Civil Union     Spouse name: None   • Number of children: None   • Years of education: None   • Highest education level: None   Occupational History   • None   Tobacco Use   • Smoking status: Former     Packs/day: 0 25     Types: Cigarettes   • Smokeless tobacco: Never   • Tobacco comments:     Smoked from ages 16 to 25  Vaping Use   • Vaping Use: Never used   Substance and Sexual Activity   • Alcohol use: Yes     Comment: 1-2 x year   • Drug use: No   • Sexual activity: Yes     Partners: Female   Other Topics Concern   • None   Social History Narrative   • None     Social Determinants of Health     Financial Resource Strain: Not on file   Food Insecurity: No Food Insecurity   • Worried About Running Out of Food in the Last Year: Never true   • Ran Out of Food in the Last Year: Never true   Transportation Needs: No Transportation Needs   • Lack of Transportation (Medical): No   • Lack of Transportation (Non-Medical):  No   Physical Activity: Not on file   Stress: Not on file   Social Connections: Not on file   Intimate Partner Violence: Not on file   Housing Stability: Low Risk    • Unable to Pay for Housing in the Last Year: No   • Number of Places Lived in the Last Year: 1   • Unstable Housing in the Last Year: No     Current Outpatient Medications on File Prior to Visit   Medication Sig   • aspirin ([address]) 18 mg EC tablet Take 1 tablet (81 mg total) by mouth daily   • atorvastatin (LIPITOR) 80 mg tablet TAKE 1 TABLET EVERY DAY WITH DINNER   • Blood Glucose Monitoring Suppl (ONE TOUCH ULTRA 2) w/Device KIT Use 2 (two) times a day   • Brilinta 90 MG TAKE 1 TABLET EVERY 12 HOURS   • Cholecalciferol (VITAMIN D-3 PO) Take 2,000 Units by mouth daily   • EPINEPHrine (EPIPEN) 0 3 mg/0 3 mL SOAJ Inject as directed   • ezetimibe (ZETIA) 10 mg tablet TAKE 1 TABLET AT BEDTIME   • fluticasone (FLONASE) 50 mcg/act nasal spray 1 spray into each nostril daily   • glucose blood (OneTouch Ultra) test strip Use as instructed/one touch ultra 2   • glucose blood test strip Use 1 each daily as needed (once a day) One touch ultra  Test strips   • Lancets (onetouch ultrasoft) lancets Lancets for Delica/Once a day   • lisinopril (ZESTRIL) 2 5 mg tablet Take 1 tablet (2 5 mg total) by mouth in the morning  • metFORMIN (GLUCOPHAGE) 500 mg tablet TAKE 1 TABLET TWICE DAILY   • Mirabegron ER 50 MG TB24 Take 1 tablet (50 mg total) by mouth in the morning   • traZODone (DESYREL) 100 mg tablet TAKE 1 TABLET (100 MG TOTAL) BY MOUTH DAILY AT BEDTIME   • Lancets (onetouch ultrasoft) lancets One touch ultra for DELICA 2 /BID (Patient not taking: Reported on 11/16/2022)   • metoprolol tartrate (LOPRESSOR) 25 mg tablet Take 1 tablet (25 mg total) by mouth 2 (two) times a day   • predniSONE 10 mg tablet Take 6 tablets By mouth  In the morning Qd  Decrease by  By 1 tablet daily until completed   (Patient not taking: Reported on 8/11/2022)     Allergies   Allergen Reactions   • Bee Venom Edema     Immunization History   Administered Date(s) Administered   • COVID-19 MODERNA VACC 0 5 ML IM 03/12/2021, 04/09/2021, 11/09/2021   • INFLUENZA 11/18/2014, 10/05/2015, 11/28/2016, 01/04/2018, 10/15/2018, 10/18/2021, 10/25/2022   • Influenza Split High Dose Preservative Free IM 11/18/2014, 10/05/2015, 11/28/2016, 01/04/2018   • Influenza, high dose seasonal 0 7 mL 10/15/2018, 10/03/2019, 09/18/2020   • Influenza, seasonal, injectable 10/05/2012, 10/09/2013   • Pneumococcal Conjugate 13-Valent 12/08/2015, 12/09/2015   • Pneumococcal Polysaccharide PPV23 10/09/2013   • Tdap 11/15/2004, 10/16/2017   • Zoster 10/05/2012   • Zoster Vaccine Recombinant 10/30/2020, 01/25/2021       Objective     /70 (BP Location: Left arm, Patient Position: Sitting, Cuff Size: Adult)   Pulse 60   Temp 97 7 °F (36 5 °C) (Temporal)   Resp 16   Ht 5' 9 69" (1 77 m)   Wt 86 2 kg (190 lb)   SpO2 98%   BMI 27 51 kg/m²     Physical Exam  Cardiovascular:      Rate and Rhythm: Normal rate and regular rhythm  Heart sounds: Normal heart sounds  Comments: Carotids: no bruits  Ext: no edema  Pulmonary:      Effort: Pulmonary effort is normal  No respiratory distress  Breath sounds: No wheezing or rales  Psychiatric:         Behavior: Behavior normal          Thought Content:  Thought content normal        Kathleen Lim,

## 2023-01-30 NOTE — ASSESSMENT & PLAN NOTE
Status post STEMI April 2022  Successfully stented  Currently doing well without chest pain  Patient does admit to occasional shortness of breath  He is exercising regularly and compliant on prescribed medications    Recommend follow-up with cardiology as directed

## 2023-01-30 NOTE — ASSESSMENT & PLAN NOTE
Patient sleeping well from 9 PM to 3 AM   He then wakes up at that time and has problems falling back asleep  He is taking trazodone 50 mg nightly  I recommended that he split his dose to 50 mg at bedtime, and then take an additional 50 mg first time he awakens to urinate    Call further problems

## 2023-01-30 NOTE — ASSESSMENT & PLAN NOTE
No longer on tamsulosin  Patient still with urinary frequency and hesitancy  Patient will be seeing his urologist next week

## 2023-02-02 DIAGNOSIS — I21.3 STEMI (ST ELEVATION MYOCARDIAL INFARCTION) (HCC): ICD-10-CM

## 2023-02-02 RX ORDER — LISINOPRIL 2.5 MG/1
2.5 TABLET ORAL DAILY
Qty: 90 TABLET | Refills: 1 | Status: SHIPPED | OUTPATIENT
Start: 2023-02-02

## 2023-02-03 ENCOUNTER — TELEPHONE (OUTPATIENT)
Dept: OTHER | Facility: OTHER | Age: 78
End: 2023-02-03

## 2023-02-03 ENCOUNTER — OFFICE VISIT (OUTPATIENT)
Dept: UROLOGY | Facility: CLINIC | Age: 78
End: 2023-02-03

## 2023-02-03 VITALS
OXYGEN SATURATION: 98 % | HEART RATE: 60 BPM | WEIGHT: 188 LBS | SYSTOLIC BLOOD PRESSURE: 126 MMHG | BODY MASS INDEX: 26.32 KG/M2 | HEIGHT: 71 IN | DIASTOLIC BLOOD PRESSURE: 68 MMHG

## 2023-02-03 DIAGNOSIS — N40.1 BENIGN PROSTATIC HYPERPLASIA WITH URINARY FREQUENCY: ICD-10-CM

## 2023-02-03 DIAGNOSIS — R35.0 BENIGN PROSTATIC HYPERPLASIA WITH URINARY FREQUENCY: ICD-10-CM

## 2023-02-03 DIAGNOSIS — C64.1 MALIGNANT NEOPLASM OF RIGHT KIDNEY (HCC): Primary | ICD-10-CM

## 2023-02-03 LAB
SL AMB  POCT GLUCOSE, UA: ABNORMAL
SL AMB LEUKOCYTE ESTERASE,UA: ABNORMAL
SL AMB POCT BILIRUBIN,UA: ABNORMAL
SL AMB POCT BLOOD,UA: ABNORMAL
SL AMB POCT CLARITY,UA: CLEAR
SL AMB POCT COLOR,UA: YELLOW
SL AMB POCT KETONES,UA: ABNORMAL
SL AMB POCT NITRITE,UA: ABNORMAL
SL AMB POCT PH,UA: 5
SL AMB POCT SPECIFIC GRAVITY,UA: 1.02
SL AMB POCT URINE PROTEIN: ABNORMAL
SL AMB POCT UROBILINOGEN: 0.2

## 2023-02-03 RX ORDER — TAMSULOSIN HYDROCHLORIDE 0.4 MG/1
0.4 CAPSULE ORAL
Qty: 30 CAPSULE | Refills: 3 | Status: SHIPPED | OUTPATIENT
Start: 2023-02-03

## 2023-02-03 NOTE — TELEPHONE ENCOUNTER
Oh shoot! Yes I meant to have him do an AUA today at checkout as a before/after comparison repeat when he returns in 3 mos

## 2023-02-03 NOTE — PROGRESS NOTES
2/3/2023      Chief Complaint   Patient presents with   • Follow-up     Annual visit/ PSA screen 3 1, Patient had ultrasound/ nocturia 3-4 times         Assessment and Plan    69 y o  male managed by Dr Chaz Casas    1  pT1a RIGHT RCC  - s/p partial nephrectomy May 2017  - no evidence of residual/recurrent/metastatic disease    2  Right renal cyst  - US 1 year    3  BPH with LUTS  - start flomax 0 4mg qhs  - return in 3 months with aua/pvr    4  Prostate cancer screening  - PSA stable 3 range, reports BITA at pcp visits    Return in 3months for symptom reassessment on flomax for BPH  Current AUA SS 19  Dosing/use/side effects and safety as well as alternatives reviewed today  Will start flomax 0 4mg qhs now  Today we discussed the normal anatomy of the bladder, prostate, bladder neck, and urethra, as well physiology as it relates to storage and emptying of the bladder utilizing drawn pictures or diagrams  We discussed the incidence of BPH as it relates to obstructive and irritative voiding symptoms as well as diagnosis, and treatment as below  We also discussed risks of delayed diagnosis and treatment including recurrent UTI, stone formation, hematuria, and renal dysfunction that may prompt specific type of treatment  Treatment options discussed include behavior modifications/avoidance of bladder irritants, medications such as Flomax, Proscar or Cialis, and surgery such as Urolift, TURP, prostatectomy  Alternative or concurrent diagnoses of OAB or JOY were also reviewed requiring multimodal treatments  History of Present Illness  Edwin Del Angel is a 69 y o  male here for evaluation of annual visit right renal cell carcinoma, bph, and prostate cancer screening    pT1a right renal cell carcinoma 2 6cm mass negative margins partial nephrectomy May 2017 by Dr Chaz Casas  All nccn survieillance complete with chest and abdominal imaging through 5+ years with no evidence of disease  Current CXR is clear   Current abdominal US small right simple renal cyst is present no other new findings  Prostatomegaly again noted 122g  Gradual and increasingly bothersome urinary frequency hesitant stream nocturia 3-4x  Has not used otc or rx for BPH prior  Interested in today  Sexually active- yes      Review of Systems   Constitutional: Negative for activity change, appetite change, chills, fever and unexpected weight change  HENT: Negative  Respiratory: Negative  Negative for shortness of breath  Cardiovascular: Negative  Negative for chest pain  Gastrointestinal: Negative for abdominal pain, diarrhea, nausea and vomiting  Endocrine: Negative  Genitourinary: Positive for difficulty urinating, frequency and urgency  Negative for decreased urine volume, dysuria, flank pain, hematuria, scrotal swelling and testicular pain  Musculoskeletal: Negative for back pain and gait problem  Skin: Negative  Allergic/Immunologic: Negative  Neurological: Negative  Hematological: Negative for adenopathy  Does not bruise/bleed easily             AUA SYMPTOM SCORE    Flowsheet Row Most Recent Value   AUA SYMPTOM SCORE    How often have you had a sensation of not emptying your bladder completely after you finished urinating? 3 (P)     How often have you had to urinate again less than two hours after you finished urinating? 3 (P)     How often have you found you stopped and started again several times when you urinate? 3 (P)     How often have you found it difficult to postpone urination? 2 (P)     How often have you had a weak urinary stream? 2 (P)     How often have you had to push or strain to begin urination? 1 (P)     How many times did you most typically get up to urinate from the time you went to bed at night until the time you got up in the morning? 5 (P)     Quality of Life: If you were to spend the rest of your life with your urinary condition just the way it is now, how would you feel about that? 2 (P)     AUA SYMPTOM SCORE 19 (P)            Vitals  Vitals:    02/03/23 0820   BP: 126/68   BP Location: Left arm   Patient Position: Sitting   Pulse: 60   SpO2: 98%   Weight: 85 3 kg (188 lb)   Height: 5' 11" (1 803 m)       Physical Exam  Vitals and nursing note reviewed  Constitutional:       General: He is not in acute distress  Appearance: He is well-developed  He is not diaphoretic  HENT:      Head: Normocephalic and atraumatic  Pulmonary:      Effort: Pulmonary effort is normal    Abdominal:      Tenderness: There is no abdominal tenderness  Hernia: No hernia is present  Skin:     General: Skin is warm and dry  Neurological:      Mental Status: He is alert and oriented to person, place, and time        Gait: Gait normal    Psychiatric:         Speech: Speech normal          Behavior: Behavior normal            Past History  Past Medical History:   Diagnosis Date   • Allergic reaction     last assessed: 5/26/2015   • Arthritis     right knee   • Bee sting allergy     last assessed: 6/30/2015   • BPH (benign prostatic hypertrophy)    • Cancer ([place] [address] ) 2017    Right kidney   • Diabetes mellitus ([place] [address] )     Type II, NIDDM   • Enlarged prostate with lower urinary tract symptoms (LUTS)     last assessed: 8/17/2015   • Herpes zoster     last assessed: 11/21/2013   • History of colon polyps    • Hyperlipidemia    • Leukocytosis     last assessed: 11/18/2014   • Osteoarthrosis, localized, primary, knee     last assessed: 10/25/2016   • Renal cancer ([facility] )    • Seasonal allergies    • Shoulder pain, right    • Wears glasses    • Wears partial dentures     Upper and Lower     Social History     Socioeconomic History   • Marital status: /Civil Union     Spouse name: Not on file   • Number of children: Not on file   • Years of education: Not on file   • Highest education level: Not on file   Occupational History   • Not on file   Tobacco Use   • Smoking status: Former     Packs/day: 0 25     Types: Cigarettes   • Smokeless tobacco: Never   • Tobacco comments:     Smoked from ages 16 to 25  Vaping Use   • Vaping Use: Never used   Substance and Sexual Activity   • Alcohol use: Yes     Comment: 1-2 x year   • Drug use: No   • Sexual activity: Yes     Partners: Female   Other Topics Concern   • Not on file   Social History Narrative   • Not on file     Social Determinants of Health     Financial Resource Strain: Not on file   Food Insecurity: No Food Insecurity   • Worried About Running Out of Food in the Last Year: Never true   • Ran Out of Food in the Last Year: Never true   Transportation Needs: No Transportation Needs   • Lack of Transportation (Medical): No   • Lack of Transportation (Non-Medical): No   Physical Activity: Not on file   Stress: Not on file   Social Connections: Not on file   Intimate Partner Violence: Not on file   Housing Stability: Low Risk    • Unable to Pay for Housing in the Last Year: No   • Number of Places Lived in the Last Year: 1   • Unstable Housing in the Last Year: No     Social History     Tobacco Use   Smoking Status Former   • Packs/day: 0 25   • Types: Cigarettes   Smokeless Tobacco Never   Tobacco Comments    Smoked from ages 16 to 25       Family History   Problem Relation Age of Onset   • Cancer Mother    • Stroke Father        The following portions of the patient's history were reviewed and updated as appropriate: allergies, current medications, past medical history, past social history, past surgical history and problem list     Results  No results found for this or any previous visit (from the past 1 hour(s)) ]  Lab Results   Component Value Date    PSA 3 1 01/20/2023    PSA 3 1 01/26/2022    PSA 3 0 12/29/2020    PSA 2 8 01/08/2020     Lab Results   Component Value Date    GLUCOSE 195 (H) 05/08/2017    CALCIUM 9 8 01/20/2023     12/27/2017    K 5 5 (H) 01/20/2023    CO2 27 01/20/2023     01/20/2023    BUN 25 01/20/2023    CREATININE 1 19 01/20/2023     Lab Results Component Value Date    WBC 6 91 07/19/2022    HGB 13 5 07/19/2022    HCT 41 1 07/19/2022    MCV 94 07/19/2022     07/19/2022

## 2023-02-03 NOTE — TELEPHONE ENCOUNTER
Patient called stating that he needs a questionnaire that have to fill out for his next appointment in three months  Please contact patient

## 2023-02-09 DIAGNOSIS — I21.3 STEMI (ST ELEVATION MYOCARDIAL INFARCTION) (HCC): ICD-10-CM

## 2023-02-09 NOTE — TELEPHONE ENCOUNTER
Patient left  on rx line for a refill of Metoprolol to go to [facility] pharmacy  90 day supply

## 2023-02-13 ENCOUNTER — APPOINTMENT (OUTPATIENT)
Dept: RADIOLOGY | Facility: MEDICAL CENTER | Age: 78
End: 2023-02-13

## 2023-02-13 ENCOUNTER — OFFICE VISIT (OUTPATIENT)
Dept: OBGYN CLINIC | Facility: MEDICAL CENTER | Age: 78
End: 2023-02-13

## 2023-02-13 VITALS
DIASTOLIC BLOOD PRESSURE: 66 MMHG | HEIGHT: 71 IN | HEART RATE: 52 BPM | WEIGHT: 188.2 LBS | BODY MASS INDEX: 26.35 KG/M2 | SYSTOLIC BLOOD PRESSURE: 112 MMHG

## 2023-02-13 DIAGNOSIS — Z01.89 ENCOUNTER FOR LOWER EXTREMITY COMPARISON IMAGING STUDY: ICD-10-CM

## 2023-02-13 DIAGNOSIS — M25.562 LEFT KNEE PAIN, UNSPECIFIED CHRONICITY: ICD-10-CM

## 2023-02-13 DIAGNOSIS — Z96.653 STATUS POST TOTAL BILATERAL KNEE REPLACEMENT: ICD-10-CM

## 2023-02-13 DIAGNOSIS — S83.412A SPRAIN OF MEDIAL COLLATERAL LIGAMENT OF LEFT KNEE, INITIAL ENCOUNTER: Primary | ICD-10-CM

## 2023-02-13 NOTE — PROGRESS NOTES
Orthopaedic Surgery - Office Note  Beryle Knock (4 y o  male)   : 1945   MRN: [id number]  Encounter Date: 2023    Chief Complaint   Patient presents with   • Left Knee - Pain       Assessment / Plan  L knee MCL sprain    · Activity as tolerated  · Home exercise program reviewed  · Tylenol prn pain  Return if symptoms worsen or fail to improve  History of Present Illness  Beryle Knock is a 69 y o  male who presents for evaluation of left knee pain that has been ongoing for 2-3 weeks that is not associated with any trauma  Pain is worse with pivoting activity that stresses the inside of his knee  He has not had any formal treatment for this injury, he notes the pain is well localized to the inside of the knee without radiation  He has a history of L TKA performed in , he denies any fevers or chills, denies any swelling to the knee  Review of Systems  Pertinent items are noted in HPI  All other systems were reviewed and are negative  Physical Exam  /66   Pulse (!) 52   Ht 5' 11" (1 803 m)   Wt 85 4 kg (188 lb 3 2 oz)   BMI 26 25 kg/m²   Cons: Appears well  No apparent distress  Psych: Alert  Oriented x3  Mood and affect normal   Eyes: PERRLA, EOMI  Resp: Normal effort  No audible wheezing or stridor  CV: Palpable pulse  No discernable arrhythmia  No LE edema  Lymph:  No palpable cervical, axillary, or inguinal lymphadenopathy  Skin: Warm  No palpable masses  No visible lesions  Neuro: Normal muscle tone  Normal and symmetric DTR's  Left Knee Exam  Alignment:  Normal knee alignment  Inspection:  No swelling  No edema  No erythema  Incision healed  Palpation:  medial  tenderness  along the MCL  ROM:  Normal knee ROM  Strength:  5/5 quadriceps and hamstrings  Stability:  pain with valgus stress but firm endpoint and no laxity  Tests:  No pertinent positive or negative tests    Patella:  Patella tracks centrally  Neurovascular:  Sensation intact in DP/SP/Martin/Sa/T nerve distributions  Toes warm and perfused  Gait:  Normal     Studies Reviewed  I have personally reviewed pertinent films in PACS  XR of bilateral knee - shows stable appearance of his implants without evidence of loosening, malposition, or other signs of hardware failure    Procedures  No procedures today  Medical, Surgical, Family, and Social History  The patient's medical history, family history, and social history, were reviewed and updated as appropriate  Past Medical History:   Diagnosis Date   • Allergic reaction     last assessed: 5/26/2015   • Arthritis     right knee   • Bee sting allergy     last assessed: 6/30/2015   • BPH (benign prostatic hypertrophy)    • Cancer ([facility] ) 2017    Right kidney   • Diabetes mellitus ([place] [address] )     Type II, NIDDM   • Enlarged prostate with lower urinary tract symptoms (LUTS)     last assessed: 8/17/2015   • Herpes zoster     last assessed: 11/21/2013   • History of colon polyps    • Hyperlipidemia    • Leukocytosis     last assessed: 11/18/2014   • Osteoarthrosis, localized, primary, knee     last assessed: 10/25/2016   • Renal cancer ([place] [address] )    • Seasonal allergies    • Shoulder pain, right    • Wears glasses    • Wears partial dentures     Upper and Lower       Past Surgical History:   Procedure Laterality Date   • CARDIAC CATHETERIZATION N/A 4/13/2022    Procedure: Cardiac pci;  Surgeon: Boy Tello MD;  [facility];   Service: Cardiology   • CARPAL TUNNEL RELEASE Left    • CHOLECYSTECTOMY      Open   • COLONOSCOPY     • FL GUIDED NEEDLE PLAC BX/ASP/INJ  6/1/2020   • FL GUIDED NEEDLE PLAC BX/ASP/INJ  11/13/2020   • GANGLION CYST EXCISION Left     Left wrist   • JOINT REPLACEMENT Left 08/2015    Left TKR   • JOINT REPLACEMENT Right 09/2017    Right TKR   • NEPHRECTOMY  05/08/2017    Partial right nephrectomy   • [place] ARTHRP INTERPOS INTERCARPAL/METACARPAL JOINTS Left 12/5/2016    Procedure: ARTHROPLASTY THUMB CMC ;  Surgeon: Ana Sultana Humera Crain MD;  Location: [facility] Main OR;  Service: Orthopedics   • WA ARTHRP KNE CONDYLE&PLATU MEDIAL&LAT COMPARTMENTS Right 9/11/2017    Procedure: ARTHROPLASTY KNEE TOTAL;  Surgeon: Henny Henderson MD;  [facility]: [facility];  Service: Orthopedics   • WA LAPAROSCOPY SURG PARTIAL NEPHRECTOMY Right 5/8/2017    Procedure: LAPAROSCOPIC LYSIS OF ADHESIONS, ROBOTIC PARTIAL NEPHRECTOMY, WITH INTRA-OP LAPAROSCOPIC ULTRASOUND;  Surgeon: Santiago Navas MD;  Location: [facility] MA[facility];  Service: Urology   • WA NEUROPLASTY &/TRANSPOS MEDIAN NRV CARPAL Chela Reagan Left 12/5/2016    Procedure: RELEASE CARPAL TUNNEL;  Surgeon: Henny Henderson MD;  Location: [facility];  Service: Orthopedics   • WA SURGICAL ARTHROSCOPY SHOULDER W/ROTATOR CUFF RPR Right 2/4/2019    Procedure: REPAIR ROTATOR CUFF ARTHROSCOPIC; SUBACROMIAL DECOMPRESSION; DISTAL CLAVICLE EXCISION;  Surgeon: Henny Henderson MD;  Location: [facility] OR;  Service: Orthopedics   • WA TENODESIS LONG TENDON BICEPS  2/4/2019    Procedure: TENODESIS BICEPS OPEN PROXIMAL;  Surgeon: Henny Henderson MD;  [facility];  Service: Orthopedics   • TONSILLECTOMY AND ADENOIDECTOMY         Family History   Problem Relation Age of Onset   • Cancer Mother    • Stroke Father        Social History     Occupational History   • Not on file   Tobacco Use   • Smoking status: Former     Packs/day: 0 25     Types: Cigarettes   • Smokeless tobacco: Never   • Tobacco comments:     Smoked from ages 16 to 25     Vaping Use   • Vaping Use: Never used   Substance and Sexual Activity   • Alcohol use: Yes     Comment: 1-2 x year   • Drug use: No   • Sexual activity: Yes     Partners: Female       Allergies   Allergen Reactions   • Bee Venom Edema         Current Outpatient Medications:   •  aspirin (ECOTRIN LOW STRENGTH) 81 mg EC tablet, Take 1 tablet (81 mg total) by mouth daily, Disp: 30 tablet, Rfl: 0  •  atorvastatin (LIPITOR) 80 mg tablet, TAKE 1 TABLET EVERY DAY WITH DINNER, Disp: 90 tablet, Rfl: 1  •  Blood Glucose Monitoring Suppl (ONE TOUCH ULTRA 2) w/Device KIT, Use 2 (two) times a day, Disp: 1 kit, Rfl: 0  •  Brilinta 90 MG, TAKE 1 TABLET EVERY 12 HOURS, Disp: 180 tablet, Rfl: 1  •  Cholecalciferol (VITAMIN D-3 PO), Take 2,000 Units by mouth daily, Disp: , Rfl:   •  EPINEPHrine (EPIPEN) 0 3 mg/0 3 mL SOAJ, Inject as directed, Disp: , Rfl:   •  ezetimibe (ZETIA) 10 mg tablet, TAKE 1 TABLET AT BEDTIME, Disp: 90 tablet, Rfl: 1  •  fluticasone (FLONASE) 50 mcg/act nasal spray, 1 spray into each nostril daily, Disp: 16 g, Rfl: 3  •  glucose blood (OneTouch Ultra) test strip, Use as instructed/one touch ultra 2, Disp: 100 strip, Rfl: 5  •  glucose blood test strip, Use 1 each daily as needed (once a day) One touch ultra  Test strips, Disp: 100 strip, Rfl: 3  •  Lancets (onetouch ultrasoft) lancets, Lancets for Delica/Once a day, Disp: 100 each, Rfl: 5  •  Lancets (onetouch ultrasoft) lancets, One touch ultra for DELICA 2 /BID, Disp: 907 each, Rfl: 5  •  lisinopril (ZESTRIL) 2 5 mg tablet, Take 1 tablet (2 5 mg total) by mouth daily, Disp: 90 tablet, Rfl: 1  •  metFORMIN (GLUCOPHAGE) 500 mg tablet, TAKE 1 TABLET TWICE DAILY, Disp: 180 tablet, Rfl: 1  •  metoprolol tartrate (LOPRESSOR) 25 mg tablet, Take 1 tablet (25 mg total) by mouth 2 (two) times a day, Disp: 180 tablet, Rfl: 1  •  predniSONE 10 mg tablet, Take 6 tablets By mouth  In the morning Qd  Decrease by  By 1 tablet daily until completed  , Disp: 21 tablet, Rfl: 0  •  tamsulosin (FLOMAX) 0 4 mg, Take 1 capsule (0 4 mg total) by mouth daily at bedtime, Disp: 30 capsule, Rfl: 3  •  traZODone (DESYREL) 100 mg tablet, TAKE 1 TABLET (100 MG TOTAL) BY MOUTH DAILY AT BEDTIME, Disp: 90 tablet, Rfl: 1      Rufino Lozada MD

## 2023-03-01 ENCOUNTER — OFFICE VISIT (OUTPATIENT)
Dept: CARDIOLOGY CLINIC | Facility: CLINIC | Age: 78
End: 2023-03-01

## 2023-03-01 VITALS
DIASTOLIC BLOOD PRESSURE: 60 MMHG | HEIGHT: 71 IN | OXYGEN SATURATION: 97 % | SYSTOLIC BLOOD PRESSURE: 126 MMHG | WEIGHT: 186 LBS | HEART RATE: 61 BPM | BODY MASS INDEX: 26.04 KG/M2

## 2023-03-01 DIAGNOSIS — I21.3 STEMI (ST ELEVATION MYOCARDIAL INFARCTION) (HCC): ICD-10-CM

## 2023-03-01 DIAGNOSIS — E11.69 TYPE 2 DIABETES MELLITUS WITH OTHER SPECIFIED COMPLICATION, WITHOUT LONG-TERM CURRENT USE OF INSULIN (HCC): ICD-10-CM

## 2023-03-01 DIAGNOSIS — E78.2 MIXED HYPERLIPIDEMIA: ICD-10-CM

## 2023-03-01 DIAGNOSIS — I49.3 SYMPTOMATIC PVCS: ICD-10-CM

## 2023-03-01 DIAGNOSIS — I34.0 NONRHEUMATIC MITRAL VALVE REGURGITATION: ICD-10-CM

## 2023-03-01 DIAGNOSIS — I25.10 CORONARY ARTERY DISEASE INVOLVING NATIVE CORONARY ARTERY OF NATIVE HEART WITHOUT ANGINA PECTORIS: Primary | ICD-10-CM

## 2023-03-01 RX ORDER — METOPROLOL SUCCINATE 25 MG/1
25 TABLET, EXTENDED RELEASE ORAL DAILY
Qty: 90 TABLET | Refills: 1 | Status: SHIPPED | OUTPATIENT
Start: 2023-03-01 | End: 2023-05-30

## 2023-03-01 NOTE — PROGRESS NOTES
Cardiology Office Note  Fernanda Pilgrim, MD Lucila Boeck, MD Leveda Heart, DO, [facility] MD Anali Thorne DO, Arcahna Alicea DO, [facility] - [place]  ----------------------------------------------------------------  [address]  [address], [address]    Irma Zarco 69 y o  male MRN: [id number]  Unit/Bed#:  Encounter: [id number]      History of Present Illness: It was a pleasure to see Irma Zarco in the office today for follow-up CV evaluation  He has a past medical history of CAD with high lateral STEMI and 1st diagonal stent placement in April 2022, dyslipidemia, type 2 diabetes mellitus and renal cell carcinoma status post right nephrectomy in May 2017  He established care with us in April 2022  The patient was in his usual state of health when he began to experienced acute chest discomfort  His symptoms began to wax and wane over the course of 24-48 hours prior to his presentation to the emergency department  His symptoms then worsened and he came to [facility] for evaluation  In the emergency department, he was found to have ST segment elevation in leads 1 and aVL with reciprocal changes in the inferior leads  Additionally, there was AV dissociation noted on the ECG  MI alert was called and he was taken to the cath lab  He was found have occlusion of the 1st diagonal and underwent stent placement  Echocardiogram demonstrated low-normal systolic function with regional wall motion abnormalities of the lateral wall  AV dissociation resolved and the patient was placed on beta-blocker as well as ACE-inhibitor  Ezetimibe was added to his regimen  He was recommended for cardiac rehabilitation and discharged home  Following discharge, he had undergone cardiac rehabilitation and has now completed rehabilitation as of August 2022  Due to it PVCs he was experiencing, he was increased on his metoprolol in August 2022    Event recorder demonstrated sinus rhythm with rare atrial and ventricular ectopy  With no evidence of bradycardia arrhythmia  Triggered events correlated with PVCs  He has been pushing up his physical activity level to 30 minutes, 5 days a week and on the increased dose of metoprolol, he had been feeling much better  However, he did admit to some fatigue on the increased dose  Denies chest pain, pressure, tightness or squeezing  Denies lightheadedness, dizziness or palpitations  Denies lower extremity swelling, orthopnea or paroxysmal nocturnal dyspnea  Review of Systems:  Review of Systems   Constitutional: Negative for decreased appetite, fever, weight gain and weight loss  HENT: Negative for congestion and sore throat  Eyes: Negative for visual disturbance  Cardiovascular: Negative for chest pain, dyspnea on exertion, leg swelling, near-syncope and palpitations  Respiratory: Negative for cough and shortness of breath  Hematologic/Lymphatic: Negative for bleeding problem  Skin: Negative for rash  Musculoskeletal: Negative for myalgias and neck pain  Gastrointestinal: Negative for abdominal pain and nausea  Neurological: Negative for light-headedness and weakness  Psychiatric/Behavioral: Negative for depression         Past Medical History:   Diagnosis Date   • Allergic reaction     last assessed: 5/26/2015   • Arthritis     right knee   • Bee sting allergy     last assessed: 6/30/2015   • BPH (benign prostatic hypertrophy)    • Cancer ([facility] ) 2017    Right kidney   • Diabetes mellitus ([place] [address] )     Type II, NIDDM   • Enlarged prostate with lower urinary tract symptoms (LUTS)     last assessed: 8/17/2015   • Herpes zoster     last assessed: 11/21/2013   • History of colon polyps    • Hyperlipidemia    • Leukocytosis     last assessed: 11/18/2014   • Osteoarthrosis, localized, primary, knee     last assessed: 10/25/2016   • Renal cancer ([facility] )    • Seasonal allergies    • Shoulder pain, right    • Wears glasses    • Wears partial dentures     Upper and Lower       Past Surgical History:   Procedure Laterality Date   • CARDIAC CATHETERIZATION N/A 4/13/2022    Procedure: Cardiac pci;  Surgeon: Urbano Curtis MD;  [facility];   Service: Cardiology   • CARPAL TUNNEL RELEASE Left    • CHOLECYSTECTOMY      Open   • COLONOSCOPY     • FL GUIDED NEEDLE PLAC BX/ASP/INJ  6/1/2020   • FL GUIDED NEEDLE PLAC BX/ASP/INJ  11/13/2020   • GANGLION CYST EXCISION Left     Left wrist   • JOINT REPLACEMENT Left 08/2015    Left TKR   • JOINT REPLACEMENT Right 09/2017    Right TKR   • NEPHRECTOMY  05/08/2017    Partial right nephrectomy   • DC ARTHRP INTERPOS INTERCARPAL/METACARPAL JOINTS Left 12/5/2016    Procedure: ARTHROPLASTY THUMB CMC ;  Surgeon: Thania Alonso MD;  [facility];  Service: Orthopedics   • DC ARTHRP KNE CONDYLE&PLATU MEDIAL&LAT COMPARTMENTS Right 9/11/2017    Procedure: ARTHROPLASTY KNEE TOTAL;  Surgeon: Thania Alonso MD;  [facility];  Service: Orthopedics   • DC LAPAROSCOPY SURG PARTIAL NEPHRECTOMY Right 5/8/2017    Procedure: LAPAROSCOPIC LYSIS OF ADHESIONS, ROBOTIC PARTIAL NEPHRECTOMY, WITH INTRA-OP LAPAROSCOPIC ULTRASOUND;  Surgeon: Audrey Carrion MD;  Location[facility];  Service: Urology   • DC NEUROPLASTY &/TRANSPOS MEDIAN NRV CARPAL Justin Sousa Left 12/5/2016    Procedure: RELEASE CARPAL TUNNEL;  Surgeon: Thania Alonso MD;  [facility];  Service: Orthopedics   • DC SURGICAL ARTHROSCOPY SHOULDER W/ROTATOR CUFF RPR Right 2/4/2019    Procedure: REPAIR ROTATOR CUFF ARTHROSCOPIC; SUBACROMIAL DECOMPRESSION; DISTAL CLAVICLE EXCISION;  Surgeon: Thania Alonso MD;  [facility];  Service: Orthopedics   • DC TENODESIS LONG TENDON BICEPS  2/4/2019    Procedure: TENODESIS BICEPS OPEN PROXIMAL;  Surgeon: Thania Alonso MD;  [facility];  Service: Orthopedics   • TONSILLECTOMY AND ADENOIDECTOMY         Social History     Socioeconomic History   • Marital status: /Civil Union     Spouse name: None   • Number of children: None   • Years of education: None   • Highest education level: None   Occupational History   • None   Tobacco Use   • Smoking status: Former     Packs/day: 0 25     Types: Cigarettes   • Smokeless tobacco: Never   • Tobacco comments:     Smoked from ages 16 to 25  Vaping Use   • Vaping Use: Never used   Substance and Sexual Activity   • Alcohol use: Yes     Comment: 1-2 x year   • Drug use: No   • Sexual activity: Yes     Partners: Female   Other Topics Concern   • None   Social History Narrative   • None     Social Determinants of Health     Financial Resource Strain: Not on file   Food Insecurity: No Food Insecurity   • Worried About Running Out of Food in the Last Year: Never true   • Ran Out of Food in the Last Year: Never true   Transportation Needs: No Transportation Needs   • Lack of Transportation (Medical): No   • Lack of Transportation (Non-Medical):  No   Physical Activity: Not on file   Stress: Not on file   Social Connections: Not on file   Intimate Partner Violence: Not on file   Housing Stability: Low Risk    • Unable to Pay for Housing in the Last Year: No   • Number of Places Lived in the Last Year: 1   • Unstable Housing in the Last Year: No       Family History   Problem Relation Age of Onset   • Cancer Mother    • Stroke Father        Allergies   Allergen Reactions   • Bee Venom Edema         Current Outpatient Medications:   •  aspirin (ECOTRIN LOW STRENGTH) 81 mg EC tablet, Take 1 tablet (81 mg total) by mouth daily, Disp: 30 tablet, Rfl: 0  •  atorvastatin (LIPITOR) 80 mg tablet, TAKE 1 TABLET EVERY DAY WITH DINNER, Disp: 90 tablet, Rfl: 1  •  Blood Glucose Monitoring Suppl (ONE TOUCH ULTRA 2) w/Device KIT, Use 2 (two) times a day, Disp: 1 kit, Rfl: 0  •  Cholecalciferol (VITAMIN D-3 PO), Take 2,000 Units by mouth daily, Disp: , Rfl:   •  EPINEPHrine (EPIPEN) 0 3 mg/0 3 mL SOAJ, Inject as directed, Disp: , Rfl:   • ezetimibe (ZETIA) 10 mg tablet, TAKE 1 TABLET AT BEDTIME, Disp: 90 tablet, Rfl: 1  •  fluticasone (FLONASE) 50 mcg/act nasal spray, 1 spray into each nostril daily, Disp: 16 g, Rfl: 3  •  glucose blood (OneTouch Ultra) test strip, Use as instructed/one touch ultra 2, Disp: 100 strip, Rfl: 5  •  glucose blood test strip, Use 1 each daily as needed (once a day) One touch ultra  Test strips, Disp: 100 strip, Rfl: 3  •  Lancets (onetouch ultrasoft) lancets, Lancets for Delica/Once a day, Disp: 100 each, Rfl: 5  •  Lancets (onetouch ultrasoft) lancets, One touch ultra for DELICA 2 /BID, Disp: 833 each, Rfl: 5  •  lisinopril (ZESTRIL) 2 5 mg tablet, Take 1 tablet (2 5 mg total) by mouth daily, Disp: 90 tablet, Rfl: 1  •  metFORMIN (GLUCOPHAGE) 500 mg tablet, TAKE 1 TABLET TWICE DAILY, Disp: 180 tablet, Rfl: 1  •  metoprolol succinate (TOPROL-XL) 25 mg 24 hr tablet, Take 1 tablet (25 mg total) by mouth daily, Disp: 90 tablet, Rfl: 1  •  tamsulosin (FLOMAX) 0 4 mg, Take 1 capsule (0 4 mg total) by mouth daily at bedtime, Disp: 30 capsule, Rfl: 3  •  ticagrelor (Brilinta) 60 MG, Take 1 tablet (60 mg total) by mouth every 12 (twelve) hours for 90 doses, Disp: 180 tablet, Rfl: 3  •  traZODone (DESYREL) 100 mg tablet, TAKE 1 TABLET (100 MG TOTAL) BY MOUTH DAILY AT BEDTIME, Disp: 90 tablet, Rfl: 1  •  predniSONE 10 mg tablet, Take 6 tablets By mouth  In the morning Qd  Decrease by  By 1 tablet daily until completed  (Patient not taking: Reported on 3/1/2023), Disp: 21 tablet, Rfl: 0    Vitals:    03/01/23 1347   BP: 126/60   Pulse: 61   SpO2: 97%   Weight: 84 4 kg (186 lb)   Height: 5' 11" (1 803 m)     Body mass index is 25 94 kg/m²  PHYSICAL EXAMINATION:  Gen: Awake, Alert, NAD   Head/eyes: AT/NC, pupils equal and round, Anicteric  ENT: mmm  Neck: Supple, No elevated JVP, trachea midline  Resp: CTA bilaterally no w/r/r  CV: RRR +S1, S2, No m/r/g  Abd: Soft, NT/ND + BS  Ext: no LE edema bilaterally  Neuro:  Follows commands, moves all extermities  Psych: Appropriate affect, pleasant mood, cooperative attitude, non-combative  Skin: warm; no rash, erythema or venous stasis changes on exposed skin    --------------------------------------------------------------------------------  TREADMILL STRESS  No results found for this or any previous visit      --------------------------------------------------------------------------------  NUCLEAR STRESS TEST: No results found for this or any previous visit  No results found for this or any previous visit       --------------------------------------------------------------------------------  CATH:  No results found for this or any previous visit     --------------------------------------------------------------------------------  ECHO:   No results found for this or any previous visit  No results found for this or any previous visit     --------------------------------------------------------------------------------  HOLTER  No results found for this or any previous visit      No results found for this or any previous visit     --------------------------------------------------------------------------------  CAROTIDS  No results found for this or any previous visit      --------------------------------------------------------------------------------  ECGs:  Results for orders placed or performed in visit on 03/01/23   POCT ECG    Impression    Sinus rhythm first-degree AV block 52 bpm, otherwise normal ECG        Lab Results   Component Value Date    WBC 6 91 07/19/2022    HGB 13 5 07/19/2022    HCT 41 1 07/19/2022    MCV 94 07/19/2022     07/19/2022      Lab Results   Component Value Date    SODIUM 136 01/20/2023    K 5 5 (H) 01/20/2023     01/20/2023    CO2 27 01/20/2023    BUN 25 01/20/2023    CREATININE 1 19 01/20/2023    GLUC 141 (H) 04/14/2022    CALCIUM 9 8 01/20/2023      Lab Results   Component Value Date    HGBA1C 6 9 (H) 01/20/2023      Lab Results   Component Value Date    CHOL 149 12/27/2017    CHOL 129 06/21/2017    CHOL 140 12/12/2016     Lab Results   Component Value Date    HDL 35 (L) 01/20/2023    HDL 26 (L) 07/19/2022    HDL 34 (L) 04/13/2022     Lab Results   Component Value Date    LDLCALC 23 01/20/2023    LDLCALC 24 07/19/2022    LDLCALC 62 04/13/2022     Lab Results   Component Value Date    TRIG 69 01/20/2023    TRIG 60 07/19/2022    TRIG 217 (H) 04/13/2022     No results found for: CHOLHDL   Lab Results   Component Value Date    INR 0 97 04/13/2022    INR 0 97 08/18/2017    INR 0 97 11/21/2016    PROTIME 12 7 04/13/2022    PROTIME 12 9 08/18/2017    PROTIME 12 9 11/21/2016        1  Coronary artery disease involving native coronary artery of native heart without angina pectoris  -     metoprolol succinate (TOPROL-XL) 25 mg 24 hr tablet; Take 1 tablet (25 mg total) by mouth daily  -     POCT ECG    2  Symptomatic PVCs  -     metoprolol succinate (TOPROL-XL) 25 mg 24 hr tablet; Take 1 tablet (25 mg total) by mouth daily  -     POCT ECG    3  Nonrheumatic mitral valve regurgitation    4  Type 2 diabetes mellitus with other specified complication, without long-term current use of insulin ([facility] )    5  Mixed hyperlipidemia    6  STEMI (ST elevation myocardial infarction) ([facility] )  -     ticagrelor (Brilinta) 60 MG;  Take 1 tablet (60 mg total) by mouth every 12 (twelve) hours for 90 doses        IMPRESSION:  · CAD  · Lateral STEMI s/p SURJIT-D1 w/ residual 10% ostial LCx and 20% mid RCA, April 2022  · LVEF 52% inferolateral and anterolateral hypokinesis from base to apex mild LA dilatation, AV sclerosis, trace AR, MV sclerosis, mild MR, trace TR, April 2022  · History of renal cell carcinoma status post right nephrectomy, 2007  · Symptomatic PVCs  · Event recorder w/ SR avg HR 60 bpm, rare PACs, rare atrial couplets/triplets, rare PVCs, rare ventricular couplets, nonsustained PSVT (x1) 9 beat 128 bpm, triggered events correlated with PVCs, August 2022  · Transient AV dissociation with junctional rhythm during STEMI likely vagal - resolved  · Type 2 diabetes mellitus  · Dyslipidemia with LDL 23 mg/dL, HDL 35 mg/dL, TG 69 mg/dL, January 2023    PLAN:  It was a pleasure to see Chris Riggs in the office today for follow-up CV evaluation  He is here today for routine CV follow-up  Since her last encounter, aside from some fatigue, he has been feeling well overall  He has no chest pain concerning for angina and no signs or symptoms of heart failure  Clinically he examines to be euvolemic  He did admit to some symptoms of muscle ache with a twinge in the chest that was worse with lifting heavy objects, but not climbing stairs  He feels as though it is similar to a prior muscle ache and nothing like his prior anginal equivalent  Blood pressure and heart rate are currently stable  He has been tolerating his current medications without any reported adverse effects  ECG is nonischemic  Denies any bleeding on his current regimen  Based on his clinical presentation, I have the following recommendations:    1  We have discussed the use of Xarelto versus Brilinta or discontinuation of second antiplatelet agent  At this time, we have agreed to decrease his Brilinta to 60 mg twice daily after April 2023   2   Lifelong aspirin  3  As he experiences some fatigue with heart rates in the 50s, we will decrease his metoprolol to Toprol-XL 25 mg daily  4   Continue lisinopril as prescribed  May consider discontinuation of lisinopril if blood pressures are borderline or patient has significant lightheadedness along with fatigue  5   Continue high intensity statin and Zetia  Goal LDL is less than 70 mg/dL  Patient is currently to goal   Repeat lipid panel in 6 months  6   Blood glucose management as per primary care  7  We will follow-up with him in 6 months to reassess his progress  As always, please do not hesitate to call with any questions        Portions of the record may have been created with voice recognition software  Occasional wrong word or "sound a like" substitutions may have occurred due to the inherent limitations of voice recognition software  Read the chart carefully and recognize, using context, where substitutions have occurred        Signed: Katie Carrion DO, [facility] [place], [place], [place]

## 2023-03-06 LAB
LEFT EYE DIABETIC RETINOPATHY: NORMAL
RIGHT EYE DIABETIC RETINOPATHY: NORMAL

## 2023-03-14 DIAGNOSIS — G47.00 INSOMNIA, UNSPECIFIED TYPE: ICD-10-CM

## 2023-03-14 RX ORDER — TRAZODONE HYDROCHLORIDE 100 MG/1
TABLET ORAL
Qty: 90 TABLET | Refills: 1 | Status: SHIPPED | OUTPATIENT
Start: 2023-03-14

## 2023-04-26 DIAGNOSIS — E11.8 TYPE 2 DIABETES MELLITUS WITH COMPLICATION, WITHOUT LONG-TERM CURRENT USE OF INSULIN (HCC): ICD-10-CM

## 2023-05-12 ENCOUNTER — OFFICE VISIT (OUTPATIENT)
Dept: UROLOGY | Facility: CLINIC | Age: 78
End: 2023-05-12

## 2023-05-12 VITALS
BODY MASS INDEX: 26.46 KG/M2 | HEIGHT: 71 IN | WEIGHT: 189 LBS | DIASTOLIC BLOOD PRESSURE: 68 MMHG | HEART RATE: 55 BPM | SYSTOLIC BLOOD PRESSURE: 124 MMHG | OXYGEN SATURATION: 96 %

## 2023-05-12 DIAGNOSIS — R35.0 BENIGN PROSTATIC HYPERPLASIA WITH URINARY FREQUENCY: Primary | ICD-10-CM

## 2023-05-12 DIAGNOSIS — N40.1 BENIGN PROSTATIC HYPERPLASIA WITH URINARY FREQUENCY: Primary | ICD-10-CM

## 2023-05-12 LAB
POST-VOID RESIDUAL VOLUME, ML POC: 48 ML
SL AMB  POCT GLUCOSE, UA: 500
SL AMB LEUKOCYTE ESTERASE,UA: NORMAL
SL AMB POCT BILIRUBIN,UA: NORMAL
SL AMB POCT BLOOD,UA: NORMAL
SL AMB POCT CLARITY,UA: CLEAR
SL AMB POCT COLOR,UA: YELLOW
SL AMB POCT KETONES,UA: NORMAL
SL AMB POCT NITRITE,UA: NORMAL
SL AMB POCT PH,UA: 5
SL AMB POCT SPECIFIC GRAVITY,UA: 1.01
SL AMB POCT URINE PROTEIN: NORMAL
SL AMB POCT UROBILINOGEN: 0.2

## 2023-05-12 RX ORDER — TAMSULOSIN HYDROCHLORIDE 0.4 MG/1
0.4 CAPSULE ORAL
Qty: 90 CAPSULE | Refills: 3 | Status: SHIPPED | OUTPATIENT
Start: 2023-05-12

## 2023-05-12 NOTE — PROGRESS NOTES
5/12/2023      Chief Complaint   Patient presents with   • Follow-up     3 month follow up [facility]         Assessment and Plan    69 y o  male managed by Dr Brisa Fitzpatrick    1  BPH with LUTS    Dramatic symptom improvement since starting on Flomax 0 4 mg nightly  AUA symptom score 4, QOL 1  PVR 40ml  He wishes to continue current pharmacotherapy in lieu of surgical outlet options  Return for annual follow-up next spring as planned for renal ultrasound, routine surveillance of right RCC in remission since 2017, right renal cyst, prostate cancer screening  History of Present Illness  Rachel Flowers is a 69 y o  male here for evaluation of 3-month follow-up BPH has had gradual and increasingly bothersome urinary frequency hesitant stream nocturia over the past few years  Has not used pharmacotherapy prior  In February he started Flomax 0 4 mg  He returns today with dramatic symptom improvement  Flow is stronger and steady  Urgency is much better  Still has nocturia particularly when drinks coffee/water/beer in the evenings  AUA symptom score is down from 19-4  He is very pleased with the results  Has not noted any side effects or issues of cost         Review of Systems   Constitutional: Negative  Respiratory: Negative  Cardiovascular: Negative  Genitourinary: Negative for decreased urine volume, difficulty urinating, dysuria, flank pain, frequency, hematuria, scrotal swelling, testicular pain and urgency  Musculoskeletal: Negative              AUA SYMPTOM SCORE    Flowsheet Row Most Recent Value   AUA SYMPTOM SCORE    How often have you had a sensation of not emptying your bladder completely after you finished urinating? 1 (P)     How often have you had to urinate again less than two hours after you finished urinating? 1 (P)     How often have you found you stopped and started again several times when you urinate? 0 (P)     How often have you found it difficult to postpone urination? 1 (P)     How "often have you had a weak urinary stream? 0 (P)     How often have you had to push or strain to begin urination? 0 (P)     How many times did you most typically get up to urinate from the time you went to bed at night until the time you got up in the morning? 1 (P)     Quality of Life: If you were to spend the rest of your life with your urinary condition just the way it is now, how would you feel about that? 1 (P)     AUA SYMPTOM SCORE 4 (P)            Vitals  Vitals:    05/12/23 0854   BP: 124/68   BP Location: Left arm   Patient Position: Sitting   Cuff Size: Adult   Pulse: 55   SpO2: 96%   Weight: 85 7 kg (189 lb)   Height: 5' 11\" (1 803 m)       Physical Exam      Past History  Past Medical History:   Diagnosis Date   • Allergic reaction     last assessed: 5/26/2015   • Arthritis     right knee   • Bee sting allergy     last assessed: 6/30/2015   • BPH (benign prostatic hypertrophy)    • Cancer ([place] [address] ) 2017    Right kidney   • Diabetes mellitus ([place] [address] )     Type II, NIDDM   • Enlarged prostate with lower urinary tract symptoms (LUTS)     last assessed: 8/17/2015   • Herpes zoster     last assessed: 11/21/2013   • History of colon polyps    • Hyperlipidemia    • Leukocytosis     last assessed: 11/18/2014   • Osteoarthrosis, localized, primary, knee     last assessed: 10/25/2016   • Renal cancer ([place] [address] )    • Seasonal allergies    • Shoulder pain, right    • Wears glasses    • Wears partial dentures     Upper and Lower     Social History     Socioeconomic History   • Marital status: /Civil Union     Spouse name: None   • Number of children: None   • Years of education: None   • Highest education level: None   Occupational History   • None   Tobacco Use   • Smoking status: Former     Packs/day: 0 25     Types: Cigarettes   • Smokeless tobacco: Never   • Tobacco comments:     Smoked from ages 16 to 25     Vaping Use   • Vaping Use: Never used   Substance and Sexual Activity   • Alcohol use: Yes     Comment: 1-2 x " year   • Drug use: No   • Sexual activity: Yes     Partners: Female   Other Topics Concern   • None   Social History Narrative   • None     Social Determinants of Health     Financial Resource Strain: Not on file   Food Insecurity: Not on file   Transportation Needs: Not on file   Physical Activity: Not on file   Stress: Not on file   Social Connections: Not on file   Intimate Partner Violence: Not on file   Housing Stability: Not on file     Social History     Tobacco Use   Smoking Status Former   • Packs/day: 0 25   • Types: Cigarettes   Smokeless Tobacco Never   Tobacco Comments    Smoked from ages 16 to 25       Family History   Problem Relation Age of Onset   • Cancer Mother    • Stroke Father        The following portions of the patient's history were reviewed and updated as appropriate: allergies, current medications, past medical history, past social history, past surgical history and problem list     Results  No results found for this or any previous visit (from the past 1 hour(s)) ]  Lab Results   Component Value Date    PSA 3 1 01/20/2023    PSA 3 1 01/26/2022    PSA 3 0 12/29/2020    PSA 2 8 01/08/2020     Lab Results   Component Value Date    GLUCOSE 195 (H) 05/08/2017    CALCIUM 9 8 01/20/2023     12/27/2017    K 5 5 (H) 01/20/2023    CO2 27 01/20/2023     01/20/2023    BUN 25 01/20/2023    CREATININE 1 19 01/20/2023     Lab Results   Component Value Date    WBC 6 91 07/19/2022    HGB 13 5 07/19/2022    HCT 41 1 07/19/2022    MCV 94 07/19/2022     07/19/2022

## 2023-05-27 DIAGNOSIS — I21.3 STEMI (ST ELEVATION MYOCARDIAL INFARCTION) (HCC): ICD-10-CM

## 2023-05-30 RX ORDER — EZETIMIBE 10 MG/1
TABLET ORAL
Qty: 90 TABLET | Refills: 1 | Status: SHIPPED | OUTPATIENT
Start: 2023-05-30

## 2023-06-18 NOTE — Clinical Note
Catheter inserted over guidewire 
Exchanged over wire
Inserted under fluoro 
Prepped: right groin and right radial  Prepped with: ChloraPrep  The site was clipped  The patient was draped 
Removed intact
The left coronary artery was injected and visualized  Multiple views of the injected vessel were taken 
The left coronary artery was injected and visualized  Multiple views of the injected vessel were taken 
The right coronary artery was injected and visualized  Multiple views of the injected vessel were taken 
2-3x/week

## 2023-07-04 DIAGNOSIS — I21.3 STEMI (ST ELEVATION MYOCARDIAL INFARCTION) (HCC): ICD-10-CM

## 2023-07-05 RX ORDER — ATORVASTATIN CALCIUM 80 MG/1
TABLET, FILM COATED ORAL
Qty: 90 TABLET | Refills: 1 | Status: SHIPPED | OUTPATIENT
Start: 2023-07-05

## 2023-07-23 ENCOUNTER — RA CDI HCC (OUTPATIENT)
Dept: OTHER | Facility: HOSPITAL | Age: 78
End: 2023-07-23

## 2023-07-23 DIAGNOSIS — I21.3 STEMI (ST ELEVATION MYOCARDIAL INFARCTION) (HCC): ICD-10-CM

## 2023-07-24 RX ORDER — LISINOPRIL 2.5 MG/1
2.5 TABLET ORAL DAILY
Qty: 90 TABLET | Refills: 2 | Status: SHIPPED | OUTPATIENT
Start: 2023-07-24

## 2023-07-24 NOTE — PROGRESS NOTES
e11.22  [address] [facility] coding opportunities          Chart Reviewed number of suggestions sent to Provider: 1     Patients Insurance     Medicare Insurance: [employer]

## 2023-07-26 ENCOUNTER — APPOINTMENT (OUTPATIENT)
Dept: LAB | Facility: CLINIC | Age: 78
End: 2023-07-26
Payer: MEDICARE

## 2023-07-26 DIAGNOSIS — Z13.0 SCREENING FOR DEFICIENCY ANEMIA: ICD-10-CM

## 2023-07-26 DIAGNOSIS — E78.2 MIXED HYPERLIPIDEMIA: ICD-10-CM

## 2023-07-26 DIAGNOSIS — E11.69 TYPE 2 DIABETES MELLITUS WITH OTHER SPECIFIED COMPLICATION, WITHOUT LONG-TERM CURRENT USE OF INSULIN (HCC): ICD-10-CM

## 2023-07-26 LAB
ALBUMIN SERPL BCP-MCNC: 3.4 G/DL (ref 3.5–5)
ALP SERPL-CCNC: 41 U/L (ref 46–116)
ALT SERPL W P-5'-P-CCNC: 34 U/L (ref 12–78)
ANION GAP SERPL CALCULATED.3IONS-SCNC: 4 MMOL/L
AST SERPL W P-5'-P-CCNC: 20 U/L (ref 5–45)
BASOPHILS # BLD AUTO: 0.07 THOUSANDS/ÂΜL (ref 0–0.1)
BASOPHILS NFR BLD AUTO: 1 % (ref 0–1)
BILIRUB SERPL-MCNC: 0.63 MG/DL (ref 0.2–1)
BUN SERPL-MCNC: 23 MG/DL (ref 5–25)
CALCIUM ALBUM COR SERPL-MCNC: 9.7 MG/DL (ref 8.3–10.1)
CALCIUM SERPL-MCNC: 9.2 MG/DL (ref 8.3–10.1)
CHLORIDE SERPL-SCNC: 109 MMOL/L (ref 96–108)
CHOLEST SERPL-MCNC: 64 MG/DL
CO2 SERPL-SCNC: 28 MMOL/L (ref 21–32)
CREAT SERPL-MCNC: 1.08 MG/DL (ref 0.6–1.3)
EOSINOPHIL # BLD AUTO: 0.76 THOUSAND/ÂΜL (ref 0–0.61)
EOSINOPHIL NFR BLD AUTO: 10 % (ref 0–6)
ERYTHROCYTE [DISTWIDTH] IN BLOOD BY AUTOMATED COUNT: 13.7 % (ref 11.6–15.1)
EST. AVERAGE GLUCOSE BLD GHB EST-MCNC: 171 MG/DL
GFR SERPL CREATININE-BSD FRML MDRD: 65 ML/MIN/1.73SQ M
GLUCOSE P FAST SERPL-MCNC: 156 MG/DL (ref 65–99)
HBA1C MFR BLD: 7.6 %
HCT VFR BLD AUTO: 41.7 % (ref 36.5–49.3)
HDLC SERPL-MCNC: 32 MG/DL
HGB BLD-MCNC: 13.6 G/DL (ref 12–17)
IMM GRANULOCYTES # BLD AUTO: 0.02 THOUSAND/UL (ref 0–0.2)
IMM GRANULOCYTES NFR BLD AUTO: 0 % (ref 0–2)
LDLC SERPL CALC-MCNC: 21 MG/DL (ref 0–100)
LYMPHOCYTES # BLD AUTO: 2.29 THOUSANDS/ÂΜL (ref 0.6–4.47)
LYMPHOCYTES NFR BLD AUTO: 31 % (ref 14–44)
MCH RBC QN AUTO: 30.5 PG (ref 26.8–34.3)
MCHC RBC AUTO-ENTMCNC: 32.6 G/DL (ref 31.4–37.4)
MCV RBC AUTO: 94 FL (ref 82–98)
MONOCYTES # BLD AUTO: 0.62 THOUSAND/ÂΜL (ref 0.17–1.22)
MONOCYTES NFR BLD AUTO: 8 % (ref 4–12)
NEUTROPHILS # BLD AUTO: 3.64 THOUSANDS/ÂΜL (ref 1.85–7.62)
NEUTS SEG NFR BLD AUTO: 50 % (ref 43–75)
NRBC BLD AUTO-RTO: 0 /100 WBCS
PLATELET # BLD AUTO: 214 THOUSANDS/UL (ref 149–390)
PMV BLD AUTO: 11.1 FL (ref 8.9–12.7)
POTASSIUM SERPL-SCNC: 4.1 MMOL/L (ref 3.5–5.3)
PROT SERPL-MCNC: 7 G/DL (ref 6.4–8.4)
RBC # BLD AUTO: 4.46 MILLION/UL (ref 3.88–5.62)
SODIUM SERPL-SCNC: 141 MMOL/L (ref 135–147)
TRIGL SERPL-MCNC: 53 MG/DL
TSH SERPL DL<=0.05 MIU/L-ACNC: 2.28 UIU/ML (ref 0.45–4.5)
WBC # BLD AUTO: 7.4 THOUSAND/UL (ref 4.31–10.16)

## 2023-07-26 PROCEDURE — 83036 HEMOGLOBIN GLYCOSYLATED A1C: CPT

## 2023-07-26 PROCEDURE — 80053 COMPREHEN METABOLIC PANEL: CPT

## 2023-07-26 PROCEDURE — 85025 COMPLETE CBC W/AUTO DIFF WBC: CPT

## 2023-07-26 PROCEDURE — 80061 LIPID PANEL: CPT

## 2023-07-26 PROCEDURE — 36415 COLL VENOUS BLD VENIPUNCTURE: CPT

## 2023-07-26 PROCEDURE — 84443 ASSAY THYROID STIM HORMONE: CPT

## 2023-07-31 ENCOUNTER — OFFICE VISIT (OUTPATIENT)
Dept: FAMILY MEDICINE CLINIC | Facility: CLINIC | Age: 78
End: 2023-07-31
Payer: MEDICARE

## 2023-07-31 VITALS
SYSTOLIC BLOOD PRESSURE: 120 MMHG | HEIGHT: 71 IN | DIASTOLIC BLOOD PRESSURE: 70 MMHG | OXYGEN SATURATION: 96 % | RESPIRATION RATE: 16 BRPM | BODY MASS INDEX: 26.46 KG/M2 | WEIGHT: 189 LBS | TEMPERATURE: 98.1 F | HEART RATE: 70 BPM

## 2023-07-31 DIAGNOSIS — Z96.653 STATUS POST TOTAL BILATERAL KNEE REPLACEMENT: ICD-10-CM

## 2023-07-31 DIAGNOSIS — Z00.00 ENCOUNTER FOR ANNUAL WELLNESS EXAM IN MEDICARE PATIENT: Primary | ICD-10-CM

## 2023-07-31 DIAGNOSIS — I21.3 STEMI (ST ELEVATION MYOCARDIAL INFARCTION) (HCC): ICD-10-CM

## 2023-07-31 DIAGNOSIS — N40.1 BENIGN PROSTATIC HYPERPLASIA WITH URINARY FREQUENCY: ICD-10-CM

## 2023-07-31 DIAGNOSIS — I49.3 SYMPTOMATIC PVCS: ICD-10-CM

## 2023-07-31 DIAGNOSIS — B35.1 TOENAIL FUNGUS: ICD-10-CM

## 2023-07-31 DIAGNOSIS — I25.10 CORONARY ARTERY DISEASE INVOLVING NATIVE CORONARY ARTERY OF NATIVE HEART WITHOUT ANGINA PECTORIS: ICD-10-CM

## 2023-07-31 DIAGNOSIS — G47.00 INSOMNIA, UNSPECIFIED TYPE: ICD-10-CM

## 2023-07-31 DIAGNOSIS — E11.69 TYPE 2 DIABETES MELLITUS WITH OTHER SPECIFIED COMPLICATION, WITHOUT LONG-TERM CURRENT USE OF INSULIN (HCC): ICD-10-CM

## 2023-07-31 DIAGNOSIS — C64.1 RENAL CELL CARCINOMA OF RIGHT KIDNEY (HCC): ICD-10-CM

## 2023-07-31 DIAGNOSIS — I25.119 ATHEROSCLEROSIS OF NATIVE CORONARY ARTERY OF NATIVE HEART WITH ANGINA PECTORIS (HCC): ICD-10-CM

## 2023-07-31 DIAGNOSIS — R35.0 BENIGN PROSTATIC HYPERPLASIA WITH URINARY FREQUENCY: ICD-10-CM

## 2023-07-31 DIAGNOSIS — E78.2 MIXED HYPERLIPIDEMIA: ICD-10-CM

## 2023-07-31 PROCEDURE — G0439 PPPS, SUBSEQ VISIT: HCPCS | Performed by: FAMILY MEDICINE

## 2023-07-31 PROCEDURE — 99214 OFFICE O/P EST MOD 30 MIN: CPT | Performed by: FAMILY MEDICINE

## 2023-07-31 NOTE — ASSESSMENT & PLAN NOTE
Cholesterol 64, LDL 21. Compliant on atorvastatin 80 and Zetia 10. Recommend reviewed by cardiology for possible dose reduction.

## 2023-07-31 NOTE — PATIENT INSTRUCTIONS
Medicare Preventive Visit Patient Instructions  Thank you for completing your Welcome to Medicare Visit or Medicare Annual Wellness Visit today. Your next wellness visit will be due in one year (7/31/2024). The screening/preventive services that you may require over the next 5-10 years are detailed below. Some tests may not apply to you based off risk factors and/or age. Screening tests ordered at today's visit but not completed yet may show as past due. Also, please note that scanned in results may not display below. Preventive Screenings:  Service Recommendations Previous Testing/Comments   Colorectal Cancer Screening  · Colonoscopy    · Fecal Occult Blood Test (FOBT)/Fecal Immunochemical Test (FIT)  · Fecal DNA/Cologuard Test  · Flexible Sigmoidoscopy Age: 43-73 years old   Colonoscopy: every 10 years (May be performed more frequently if at higher risk)  OR  FOBT/FIT: every 1 year  OR  Cologuard: every 3 years  OR  Sigmoidoscopy: every 5 years  Screening may be recommended earlier than age 39 if at higher risk for colorectal cancer. Also, an individualized decision between you and your healthcare provider will decide whether screening between the ages of 77-80 would be appropriate.  Colonoscopy: 06/05/2014  FOBT/FIT: Not on file  Cologuard: Not on file  Sigmoidoscopy: Not on file          Prostate Cancer Screening Individualized decision between patient and health care provider in men between ages of 53-66   Medicare will cover every 12 months beginning on the day after your 50th birthday PSA: 3.1 ng/mL     Screening Not Indicated     Hepatitis C Screening Once for adults born between 1945 and 1965  More frequently in patients at high risk for Hepatitis C Hep C Antibody: Not on file        Diabetes Screening 1-2 times per year if you're at risk for diabetes or have pre-diabetes Fasting glucose: 156 mg/dL (7/26/2023)  A1C: 7.6 % (7/26/2023)  Screening Not Indicated  History Diabetes   Cholesterol Screening Once every 5 years if you don't have a lipid disorder. May order more often based on risk factors. Lipid panel: 07/26/2023  Screening Not Indicated  History Lipid Disorder      Other Preventive Screenings Covered by Medicare:  1. Abdominal Aortic Aneurysm (AAA) Screening: covered once if your at risk. You're considered to be at risk if you have a family history of AAA or a male between the age of 70-76 who smoking at least 100 cigarettes in your lifetime. 2. Lung Cancer Screening: covers low dose CT scan once per year if you meet all of the following conditions: (1) Age 48-67; (2) No signs or symptoms of lung cancer; (3) Current smoker or have quit smoking within the last 15 years; (4) You have a tobacco smoking history of at least 20 pack years (packs per day x number of years you smoked); (5) You get a written order from a healthcare provider. 3. Glaucoma Screening: covered annually if you're considered high risk: (1) You have diabetes OR (2) Family history of glaucoma OR (3)  aged 48 and older OR (3)  American aged 72 and older  3. Osteoporosis Screening: covered every 2 years if you meet one of the following conditions: (1) Have a vertebral abnormality; (2) On glucocorticoid therapy for more than 3 months; (3) Have primary hyperparathyroidism; (4) On osteoporosis medications and need to assess response to drug therapy. 5. HIV Screening: covered annually if you're between the age of 14-79. Also covered annually if you are younger than 13 and older than 72 with risk factors for HIV infection. For pregnant patients, it is covered up to 3 times per pregnancy.     Immunizations:  Immunization Recommendations   Influenza Vaccine Annual influenza vaccination during flu season is recommended for all persons aged >= 6 months who do not have contraindications   Pneumococcal Vaccine   * Pneumococcal conjugate vaccine = PCV13 (Prevnar 13), PCV15 (Vaxneuvance), PCV20 (Prevnar 20)  * Pneumococcal polysaccharide vaccine = PPSV23 (Pneumovax) Adults 2364 years old: 1-3 doses may be recommended based on certain risk factors  Adults 72 years old: 1-2 doses may be recommended based off what pneumonia vaccine you previously received   Hepatitis B Vaccine 3 dose series if at intermediate or high risk (ex: diabetes, end stage renal disease, liver disease)   Tetanus (Td) Vaccine - COST NOT COVERED BY MEDICARE PART B Following completion of primary series, a booster dose should be given every 10 years to maintain immunity against tetanus. Td may also be given as tetanus wound prophylaxis. Tdap Vaccine - COST NOT COVERED BY MEDICARE PART B Recommended at least once for all adults. For pregnant patients, recommended with each pregnancy. Shingles Vaccine (Shingrix) - COST NOT COVERED BY MEDICARE PART B  2 shot series recommended in those aged 48 and above     Health Maintenance Due:      Topic Date Due   • Hepatitis C Screening  Never done   • Colorectal Cancer Screening  Discontinued     Immunizations Due:      Topic Date Due   • COVID-19 Vaccine (4 - Moderna series) 01/04/2022   • Influenza Vaccine (1) 09/01/2023     Advance Directives   What are advance directives? Advance directives are legal documents that state your wishes and plans for medical care. These plans are made ahead of time in case you lose your ability to make decisions for yourself. Advance directives can apply to any medical decision, such as the treatments you want, and if you want to donate organs. What are the types of advance directives? There are many types of advance directives, and each state has rules about how to use them. You may choose a combination of any of the following:  · Living will: This is a written record of the treatment you want. You can also choose which treatments you do not want, which to limit, and which to stop at a certain time. This includes surgery, medicine, IV fluid, and tube feedings.    · Durable power of  for [facility]): This is a written record that states who you want to make healthcare choices for you when you are unable to make them for yourself. This person, called a proxy, is usually a family member or a friend. You may choose more than 1 proxy. · Do not resuscitate (DNR) order:  A DNR order is used in case your heart stops beating or you stop breathing. It is a request not to have certain forms of treatment, such as CPR. A DNR order may be included in other types of advance directives. · Medical directive: This covers the care that you want if you are in a coma, near death, or unable to make decisions for yourself. You can list the treatments you want for each condition. Treatment may include pain medicine, surgery, blood transfusions, dialysis, IV or tube feedings, and a ventilator (breathing machine). · Values history: This document has questions about your views, beliefs, and how you feel and think about life. This information can help others choose the care that you would choose. Why are advance directives important? An advance directive helps you control your care. Although spoken wishes may be used, it is better to have your wishes written down. Spoken wishes can be misunderstood, or not followed. Treatments may be given even if you do not want them. An advance directive may make it easier for your family to make difficult choices about your care. Weight Management   Why it is important to manage your weight:  Being overweight increases your risk of health conditions such as heart disease, high blood pressure, type 2 diabetes, and certain types of cancer. It can also increase your risk for osteoarthritis, sleep apnea, and other respiratory problems. Aim for a slow, steady weight loss. Even a small amount of weight loss can lower your risk of health problems. How to lose weight safely:  A safe and healthy way to lose weight is to eat fewer calories and get regular exercise.  You can lose up about 1 pound a week by decreasing the number of calories you eat by 500 calories each day. Healthy meal plan for weight management:  A healthy meal plan includes a variety of foods, contains fewer calories, and helps you stay healthy. A healthy meal plan includes the following:  · Eat whole-grain foods more often. A healthy meal plan should contain fiber. Fiber is the part of grains, fruits, and vegetables that is not broken down by your body. Whole-grain foods are healthy and provide extra fiber in your diet. Some examples of whole-grain foods are whole-wheat breads and pastas, oatmeal, brown rice, and bulgur. · Eat a variety of vegetables every day. Include dark, leafy greens such as spinach, kale, pramod greens, and mustard greens. Eat yellow and orange vegetables such as carrots, sweet potatoes, and winter squash. · Eat a variety of fruits every day. Choose fresh or canned fruit (canned in its own juice or light syrup) instead of juice. Fruit juice has very little or no fiber. · Eat low-fat dairy foods. Drink fat-free (skim) milk or 1% milk. Eat fat-free yogurt and low-fat cottage cheese. Try low-fat cheeses such as mozzarella and other reduced-fat cheeses. · Choose meat and other protein foods that are low in fat. Choose beans or other legumes such as split peas or lentils. Choose fish, skinless poultry (chicken or turkey), or lean cuts of red meat (beef or pork). Before you cook meat or poultry, cut off any visible fat. · Use less fat and oil. Try baking foods instead of frying them. Add less fat, such as margarine, sour cream, regular salad dressing and mayonnaise to foods. Eat fewer high-fat foods. Some examples of high-fat foods include french fries, doughnuts, ice cream, and cakes. · Eat fewer sweets. Limit foods and drinks that are high in sugar. This includes candy, cookies, regular soda, and sweetened drinks.   Exercise:  Exercise at least 30 minutes per day on most days of the week. Some examples of exercise include walking, biking, dancing, and swimming. You can also fit in more physical activity by taking the stairs instead of the elevator or parking farther away from stores. Ask your healthcare provider about the best exercise plan for you. © Copyright [employer] 2018 Information is for End User's use only and may not be sold, redistributed or otherwise used for commercial purposes.  All illustrations and images included in CareNotes® are the copyrighted property of [employer] Inc[employer] or  [address]

## 2023-07-31 NOTE — PROGRESS NOTES
Name: Aminah Aguilar      : [birth year]      MRN: [id number]  Encounter Provider: Gayle Tee DO  Encounter Date: 2023   Encounter department: [address] [place] [address]     1. Encounter for annual wellness exam in Medicare patient    2. Coronary artery disease involving native coronary artery of native heart without angina pectoris    3. Symptomatic PVCs    4. STEMI (ST elevation myocardial infarction) ([address])    5. Type 2 diabetes mellitus with other specified complication, without long-term current use of insulin [facility])  Assessment & Plan:    Lab Results   Component Value Date    HGBA1C 7.6 (H) 2023   A1c from  7.6%, was 6.9%. Patient compliant with metformin 500 twice daily. He states his diet has not been very good lately. He still exercises regularly. We had a long discussion about improving diet. Patient would prefer not to change medication at this time. We will recheck labs prior to next appointment. If still greater than 70, consider adding DPP 4 or SGLT2    Orders:  -     Comprehensive metabolic panel; Future; Expected date: 2024  -     Hemoglobin A1C; Future; Expected date: 2024  -     Albumin / creatinine urine ratio; Future; Expected date: 2024    6. Mixed hyperlipidemia  Assessment & Plan:  Cholesterol 64, LDL 21. Compliant on atorvastatin 80 and Zetia 10. Recommend reviewed by cardiology for possible dose reduction. 7. Insomnia, unspecified type  Assessment & Plan:  Stable on trazodone 100      8. Benign prostatic hyperplasia with urinary frequency  Assessment & Plan:  Continue regular follow-up with urology      9. Renal cell carcinoma of right kidney [facility])  Assessment & Plan:  History of right renal cell carcinoma. Status post nephrectomy May 2017. Continue follow-up with urology as directed      10.  Atherosclerosis of native coronary artery of native heart with angina pectoris [facility])  Assessment & Plan:  Status post STEMI in April 2022. Status post stent. Doing well without chest pain or shortness of breath. Continue regular follow-up with cardiology as directed      11. Status post total bilateral knee replacement  Assessment & Plan:  History of bilateral knee replacements by Dr. Alexey Singh. 12. Toenail fungus  Assessment & Plan:  Seen by podiatry. I would not advise that patient use oral antifungals. Recommend trial of topical Penlac. Call further problems    Orders:  -     ciclopirox (PENLAC) 8 % solution; Apply topically daily at bedtime         Labs from July 26 reviewed with patient     6 months, fasting blood work prior     SUBJECTIVE:  Patient presents for recheck of chronic medical problems today. Overall he is feeling well. He states his diet has not been very good lately. He had labs done on July 26. Review of Systems   Respiratory: Negative. Cardiovascular: Negative. Gastrointestinal: Negative. Genitourinary: Negative. Past Medical History:   Diagnosis Date   • Allergic reaction     last assessed: 5/26/2015   • Arthritis     right knee   • Bee sting allergy     last assessed: 6/30/2015   • BPH (benign prostatic hypertrophy)    • Cancer ([address]) 2017    Right kidney   • Diabetes mellitus ([address])     Type II, NIDDM   • Enlarged prostate with lower urinary tract symptoms (LUTS)     last assessed: 8/17/2015   • Herpes zoster     last assessed: 11/21/2013   • History of colon polyps    • Hyperlipidemia    • Leukocytosis     last assessed: 11/18/2014   • Osteoarthrosis, localized, primary, knee     last assessed: 10/25/2016   • Renal cancer ([address])    • Seasonal allergies    • Shoulder pain, right    • Wears glasses    • Wears partial dentures     Upper and Lower     Past Surgical History:   Procedure Laterality Date   • CARDIAC CATHETERIZATION N/A 4/13/2022    Procedure: Cardiac pci;  Surgeon: Manny Mora MD;  [facility];   Service: Cardiology   • CARPAL TUNNEL RELEASE Left • CHOLECYSTECTOMY      Open   • COLONOSCOPY     • FL GUIDED NEEDLE PLAC BX/ASP/INJ  6/1/2020   • FL GUIDED NEEDLE PLAC BX/ASP/INJ  11/13/2020   • GANGLION CYST EXCISION Left     Left wrist   • JOINT REPLACEMENT Left 08/2015    Left TKR   • JOINT REPLACEMENT Right 09/2017    Right TKR   • NEPHRECTOMY  05/08/2017    Partial right nephrectomy   • MT ARTHRP INTERPOS INTERCARPAL/METACARPAL JOINTS Left 12/5/2016    Procedure: ARTHROPLASTY THUMB [facility] ;  Surgeon: Perri Fitzgerald MD;  Location: [facility];  Service: Orthopedics   • MT ARTHRP KNE CONDYLE&PLATU MEDIAL&LAT COMPARTMENTS Right 9/11/2017    Procedure: ARTHROPLASTY KNEE TOTAL;  Surgeon: Perri Fitzgerald MD;  Location[facility];  Service: Orthopedics   • MT LAPAROSCOPY SURG PARTIAL NEPHRECTOMY Right 5/8/2017    Procedure: LAPAROSCOPIC LYSIS OF ADHESIONS, ROBOTIC PARTIAL NEPHRECTOMY, WITH INTRA-OP LAPAROSCOPIC ULTRASOUND;  Surgeon: Stanford Boas, MD;  Location: [facility];  Service: Urology   • MT NEUROPLASTY &/TRANSPOS MEDIAN NRV CARPAL Wyona Redder Left 12/5/2016    Procedure: RELEASE CARPAL TUNNEL;  Surgeon: Perri Fitzgerald MD;  [facility];  Service: Orthopedics   • MT SURGICAL ARTHROSCOPY SHOULDER W/ROTATOR CUFF RPR Right 2/4/2019    Procedure: REPAIR ROTATOR CUFF ARTHROSCOPIC; SUBACROMIAL DECOMPRESSION; DISTAL CLAVICLE EXCISION;  Surgeon: Perri Fitzgerald MD;  [facility];  Service: Orthopedics   • MT TENODESIS LONG TENDON BICEPS  2/4/2019    Procedure: TENODESIS BICEPS OPEN PROXIMAL;  Surgeon: Perri Fitzgerald MD;  [facility];  Service: Orthopedics   • TONSILLECTOMY AND ADENOIDECTOMY       Family History   Problem Relation Age of Onset   • Cancer Mother    • Stroke Father      Social History     Socioeconomic History   • Marital status: /Civil Union     Spouse name: None   • Number of children: None   • Years of education: None   • Highest education level: None   Occupational History   • None   Tobacco Use   • Smoking status: Former     Packs/day: 0.25     Types: Cigarettes   • Smokeless tobacco: Never   • Tobacco comments:     Smoked from ages 16 to 25. Vaping Use   • Vaping Use: Never used   Substance and Sexual Activity   • Alcohol use: Yes     Comment: 1-2 x year   • Drug use: No   • Sexual activity: Yes     Partners: Female   Other Topics Concern   • None   Social History Narrative   • None     Social Determinants of Health     Financial Resource Strain: Low Risk  (7/24/2023)    Overall Financial Resource Strain (CARDIA)    • Difficulty of Paying Living Expenses: Not hard at all   Food Insecurity: No Food Insecurity (4/14/2022)    Hunger Vital Sign    • Worried About Running Out of Food in the Last Year: Never true    • Ran Out of Food in the Last Year: Never true   Transportation Needs: No Transportation Needs (7/24/2023)    PRAPARE - Transportation    • Lack of Transportation (Medical): No    • Lack of Transportation (Non-Medical):  No   Physical Activity: Not on file   Stress: Not on file   Social Connections: Not on file   Intimate Partner Violence: Not on file   Housing Stability: Low Risk  (4/14/2022)    Housing Stability Vital Sign    • Unable to Pay for Housing in the Last Year: No    • Number of Places Lived in the Last Year: 1    • Unstable Housing in the Last Year: No     Current Outpatient Medications on File Prior to Visit   Medication Sig   • aspirin (ECOTRIN LOW STRENGTH) 81 mg EC tablet Take 1 tablet (81 mg total) by mouth daily   • atorvastatin (LIPITOR) 80 mg tablet TAKE 1 TABLET EVERY DAY WITH DINNER   • Blood Glucose Monitoring Suppl (ONE TOUCH ULTRA 2) w/Device KIT Use 2 (two) times a day   • Cholecalciferol (VITAMIN D-3 PO) Take 2,000 Units by mouth daily   • EPINEPHrine (EPIPEN) 0.3 mg/0.3 mL SOAJ Inject as directed   • ezetimibe (ZETIA) 10 mg tablet TAKE 1 TABLET AT BEDTIME   • fluticasone (FLONASE) 50 mcg/act nasal spray 1 spray into each nostril daily   • glucose blood (OneTouch Ultra) test strip Use as instructed/one touch ultra 2   • glucose blood test strip Use 1 each daily as needed (once a day) One touch ultra  Test strips   • Lancets (onetouch ultrasoft) lancets Lancets for Delica/Once a day   • Lancets (onetouch ultrasoft) lancets One touch ultra for DELICA 2 /BID   • lisinopril (ZESTRIL) 2.5 mg tablet Take 1 tablet by mouth once daily   • metFORMIN (GLUCOPHAGE) 500 mg tablet TAKE 1 TABLET TWICE DAILY   • tamsulosin (FLOMAX) 0.4 mg Take 1 capsule (0.4 mg total) by mouth daily at bedtime   • traZODone (DESYREL) 100 mg tablet TAKE 1 TABLET AT BEDTIME   • metoprolol succinate (TOPROL-XL) 25 mg 24 hr tablet Take 1 tablet (25 mg total) by mouth daily   • predniSONE 10 mg tablet Take 6 tablets By mouth  In the morning Qd. Decrease by  By 1 tablet daily until completed.  (Patient not taking: Reported on 3/1/2023)   • ticagrelor (Brilinta) 60 MG Take 1 tablet (60 mg total) by mouth every 12 (twelve) hours for 90 doses     Allergies   Allergen Reactions   • Bee Venom Edema     Immunization History   Administered Date(s) Administered   • COVID-19 MODERNA VACC 0.5 ML IM 03/12/2021, 04/09/2021, 11/09/2021   • INFLUENZA 11/18/2014, 10/05/2015, 11/28/2016, 01/04/2018, 10/15/2018, 10/18/2021, 10/25/2022   • Influenza Split High Dose Preservative Free IM 11/18/2014, 10/05/2015, 11/28/2016, 01/04/2018   • Influenza, high dose seasonal 0.7 mL 10/15/2018, 10/03/2019, 09/18/2020   • Influenza, seasonal, injectable 10/05/2012, 10/09/2013   • Pneumococcal Conjugate 13-Valent 12/08/2015, 12/09/2015   • Pneumococcal Polysaccharide PPV23 10/09/2013   • Tdap 11/15/2004, 10/16/2017   • Zoster 10/05/2012   • Zoster Vaccine Recombinant 10/30/2020, 01/25/2021       Objective     /70 (BP Location: Left arm, Patient Position: Sitting, Cuff Size: Adult)   Pulse 70   Temp 98.1 °F (36.7 °C) (Temporal)   Resp 16   Ht 5' 10.87" (1.8 m)   Wt 85.7 kg (189 lb)   SpO2 96%   BMI 26.46 kg/m²     Physical Exam  Cardiovascular:      Rate and Rhythm: Normal rate and regular rhythm. Heart sounds: Normal heart sounds. Comments: Carotids: no bruits  Ext: no edema  Pulmonary:      Effort: Pulmonary effort is normal. No respiratory distress. Breath sounds: No wheezing or rales. Psychiatric:         Behavior: Behavior normal.         Thought Content: Thought content normal.       Joe Amado DO  Answers for HPI/ROS submitted by the patient on 7/24/2023  How would you rate your overall health?: very good  Compared to last year, how is your physical health?: much better  In general, how satisfied are you with your life?: very satisfied  Compared to last year, how is your eyesight?: same  Compared to last year, how is your hearing?: same  Compared to last year, how is your emotional/mental health?: same  How often is anger a problem for you?: never, rarely  How often do you feel unusually tired/fatigued?: sometimes  In the past 7 days, how much pain have you experienced?: some  If you answered "some" or "a lot", please rate the severity of your pain on a scale of 1 to 10 (1 being the least severe pain and 10 being the most intense pain). : 1/10  In the past 6 months, have you lost or gained 10 pounds without trying?: No  One or more falls in the last year: No  Do you have trouble with the stairs inside or outside your home?: No  Does your home have working smoke alarms?: Yes  Does your home have a carbon monoxide monitor?: Yes  Which safety hazards (if any) have you experienced in your home? Please select all that apply.: none  How would you describe your current diet?  Please select all that apply.: Low Cholesterol, Low Carb  In addition to prescription medications, are you taking any over-the-counter supplements?: No  Can you manage your medications?: Yes  Are you currently taking any opioid medications?: No  Can you walk and transfer into and out of your bed and chair?: Yes  Can you dress and groom yourself?: Yes  Can you bathe or shower yourself?: Yes  Can you feed yourself?: Yes  Can you do your laundry/ housekeeping?: Yes  Can you manage your money, pay your bills, and track your expenses?: Yes  Can you make your own meals?: Yes  Can you do your own shopping?: Yes  Within the last 12 months, have you had any hospitalizations or Emergency Department visits?: No  Do you have a living will?: Yes  Do you have a Durable POA (Power of ) for healthcare decisions?: No  Do you have an Advanced Directive for end of life decisions?: Yes  How often have you used an illegal drug (including marijuana) or a prescription medication for non-medical reasons in the past year?: never  What is the typical number of drinks you consume in a day?: 0  What is the typical number of drinks you consume in a week?: 0  How often did you have a drink containing alcohol in the past year?: never  How many drinks did you have on a typical day  when you were drinking in the past year?: 1 to 2  How often did you have 6 or more drinks on one occasion in the past year?: never

## 2023-07-31 NOTE — PROGRESS NOTES
Assessment and Plan:   Medicare wellness visit  Problem List Items Addressed This Visit     BPH (benign prostatic hyperplasia)    DM2 (diabetes mellitus, type 2) ([address])    Insomnia    Mixed hyperlipidemia    Renal cell carcinoma of right kidney ([address])    Status post total bilateral knee replacement    STEMI (ST elevation myocardial infarction) ([address])    Atherosclerosis of native coronary artery of native heart with angina pectoris ([address])   Other Visit Diagnoses     Encounter for annual wellness exam in Medicare patient    -  Primary    Coronary artery disease involving native coronary artery of native heart without angina pectoris        Symptomatic PVCs               Preventive health issues were discussed with patient, and age appropriate screening tests were ordered as noted in patient's After Visit Summary. Personalized health advice and appropriate referrals for health education or preventive services given if needed, as noted in patient's After Visit Summary.      History of Present Illness:     Patient presents for a Medicare Wellness Visit    HPI   Patient Care Team:  Gayle Tee DO as PCP - General  MD Ly Haider MD     Review of Systems:     Review of Systems     Problem List:     Patient Active Problem List   Diagnosis   • Primary osteoarthritis of right knee   • BPH (benign prostatic hyperplasia)   • DM2 (diabetes mellitus, type 2) ([address])   • Insomnia   • Mixed hyperlipidemia   • Renal cell carcinoma of right kidney ([address])   • Vitamin D deficiency   • Status post total bilateral knee replacement   • Cervical strain   • Ganglion of flexor tendon sheath of left thumb   • Incomplete tear of right rotator cuff   • Status post arthroscopy of right shoulder   • Encounter for other orthopedic aftercare   • Vascular disorder of kidney   • Encounter for lower extremity comparison imaging study   • Acute pain of right shoulder   • [facility](carpometacarpal degenerative joint disease), localized primary, left   • Acute pain of right knee   • Seborrheic keratoses, inflamed   • Strain of lumbar region   • Pain of right thigh   • STEMI (ST elevation myocardial infarction) ([address])   • History of renal cell carcinoma   • Atherosclerosis of native coronary artery of native heart with angina pectoris [facility])      Past Medical and Surgical History:     Past Medical History:   Diagnosis Date   • Allergic reaction     last assessed: 5/26/2015   • Arthritis     right knee   • Bee sting allergy     last assessed: 6/30/2015   • BPH (benign prostatic hypertrophy)    • Cancer ([address]) 2017    Right kidney   • Diabetes mellitus ([address])     Type II, NIDDM   • Enlarged prostate with lower urinary tract symptoms (LUTS)     last assessed: 8/17/2015   • Herpes zoster     last assessed: 11/21/2013   • History of colon polyps    • Hyperlipidemia    • Leukocytosis     last assessed: 11/18/2014   • Osteoarthrosis, localized, primary, knee     last assessed: 10/25/2016   • Renal cancer ([address])    • Seasonal allergies    • Shoulder pain, right    • Wears glasses    • Wears partial dentures     Upper and Lower     Past Surgical History:   Procedure Laterality Date   • CARDIAC CATHETERIZATION N/A 4/13/2022    Procedure: Cardiac pci;  Surgeon: Adria Mendieta MD;  [facility]: [facility];   Service: Cardiology   • CARPAL TUNNEL RELEASE Left    • CHOLECYSTECTOMY      Open   • COLONOSCOPY     • FL GUIDED NEEDLE PLAC BX/ASP/INJ  6/1/2020   • FL GUIDED NEEDLE PLAC BX/ASP/INJ  11/13/2020   • GANGLION CYST EXCISION Left     Left wrist   • JOINT REPLACEMENT Left 08/2015    Left TKR   • JOINT REPLACEMENT Right 09/2017    Right TKR   • NEPHRECTOMY  05/08/2017    Partial right nephrectomy   • OR ARTHRP INTERPOS INTERCARPAL/METACARPAL JOINTS Left 12/5/2016    Procedure: ARTHROPLASTY THUMB [facility] ;  Surgeon: Linda Sterling MD;  Location: [facility];  Service: Orthopedics   • OR ARTHRP KNE CONDYLE&PLATU MEDIAL&LAT COMPARTMENTS Right 9/11/2017 Procedure: ARTHROPLASTY KNEE TOTAL;  Surgeon: Chito Shirley MD;  Location: [facility];  Service: Orthopedics   • TN LAPAROSCOPY SURG PARTIAL NEPHRECTOMY Right 5/8/2017    Procedure: LAPAROSCOPIC LYSIS OF ADHESIONS, ROBOTIC PARTIAL NEPHRECTOMY, WITH INTRA-OP LAPAROSCOPIC ULTRASOUND;  Surgeon: Pedro Correia MD;  Location: [facility];  Service: Urology   • TN NEUROPLASTY &/TRANSPOS MEDIAN NRV CARPAL Diya Cary Left 12/5/2016    Procedure: RELEASE CARPAL TUNNEL;  Surgeon: Chito Shirley MD;  [facility];  Service: Orthopedics   • TN SURGICAL ARTHROSCOPY SHOULDER W/ROTATOR CUFF RPR Right 2/4/2019    Procedure: REPAIR ROTATOR CUFF ARTHROSCOPIC; SUBACROMIAL DECOMPRESSION; DISTAL CLAVICLE EXCISION;  Surgeon: Chito Shirley MD;  [facility];  Service: Orthopedics   • TN TENODESIS LONG TENDON BICEPS  2/4/2019    Procedure: TENODESIS BICEPS OPEN PROXIMAL;  Surgeon: Chito Shirley MD;  [facility];  Service: Orthopedics   • TONSILLECTOMY AND ADENOIDECTOMY        Family History:     Family History   Problem Relation Age of Onset   • Cancer Mother    • Stroke Father       Social History:     Social History     Socioeconomic History   • Marital status: /Civil Union     Spouse name: None   • Number of children: None   • Years of education: None   • Highest education level: None   Occupational History   • None   Tobacco Use   • Smoking status: Former     Packs/day: 0.25     Types: Cigarettes   • Smokeless tobacco: Never   • Tobacco comments:     Smoked from ages 16 to 25.    Vaping Use   • Vaping Use: Never used   Substance and Sexual Activity   • Alcohol use: Yes     Comment: 1-2 x year   • Drug use: No   • Sexual activity: Yes     Partners: Female   Other Topics Concern   • None   Social History Narrative   • None     Social Determinants of Health     Financial Resource Strain: Low Risk  (7/24/2023)    Overall Financial Resource Strain (CARDIA)    • Difficulty of Paying Living Expenses: Not hard at all   Food Insecurity: No Food Insecurity (4/14/2022)    Hunger Vital Sign    • Worried About Running Out of Food in the Last Year: Never true    • Ran Out of Food in the Last Year: Never true   Transportation Needs: No Transportation Needs (7/24/2023)    PRAPARE - Transportation    • Lack of Transportation (Medical): No    • Lack of Transportation (Non-Medical):  No   Physical Activity: Not on file   Stress: Not on file   Social Connections: Not on file   Intimate Partner Violence: Not on file   Housing Stability: Low Risk  (4/14/2022)    Housing Stability Vital Sign    • Unable to Pay for Housing in the Last Year: No    • Number of Places Lived in the Last Year: 1    • Unstable Housing in the Last Year: No      Medications and Allergies:     Current Outpatient Medications   Medication Sig Dispense Refill   • aspirin (ECOTRIN LOW STRENGTH) 81 mg EC tablet Take 1 tablet (81 mg total) by mouth daily 90 tablet 2   • atorvastatin (LIPITOR) 80 mg tablet TAKE 1 TABLET EVERY DAY WITH DINNER 90 tablet 1   • Blood Glucose Monitoring Suppl (ONE TOUCH ULTRA 2) w/Device KIT Use 2 (two) times a day 1 kit 0   • Cholecalciferol (VITAMIN D-3 PO) Take 2,000 Units by mouth daily     • EPINEPHrine (EPIPEN) 0.3 mg/0.3 mL SOAJ Inject as directed     • ezetimibe (ZETIA) 10 mg tablet TAKE 1 TABLET AT BEDTIME 90 tablet 1   • fluticasone (FLONASE) 50 mcg/act nasal spray 1 spray into each nostril daily 16 g 3   • glucose blood (OneTouch Ultra) test strip Use as instructed/one touch ultra 2 100 strip 5   • glucose blood test strip Use 1 each daily as needed (once a day) One touch ultra  Test strips 100 strip 3   • Lancets (onetouch ultrasoft) lancets Lancets for Delica/Once a day 862 each 5   • Lancets (onetouch ultrasoft) lancets One touch ultra for DELICA 2 / each 5   • lisinopril (ZESTRIL) 2.5 mg tablet Take 1 tablet by mouth once daily 90 tablet 2   • metFORMIN (GLUCOPHAGE) 500 mg tablet TAKE 1 TABLET TWICE DAILY 180 tablet 1 • tamsulosin (FLOMAX) 0.4 mg Take 1 capsule (0.4 mg total) by mouth daily at bedtime 90 capsule 3   • traZODone (DESYREL) 100 mg tablet TAKE 1 TABLET AT BEDTIME 90 tablet 1   • metoprolol succinate (TOPROL-XL) 25 mg 24 hr tablet Take 1 tablet (25 mg total) by mouth daily 90 tablet 1   • predniSONE 10 mg tablet Take 6 tablets By mouth  In the morning Qd. Decrease by  By 1 tablet daily until completed. (Patient not taking: Reported on 3/1/2023) 21 tablet 0   • ticagrelor (Brilinta) 60 MG Take 1 tablet (60 mg total) by mouth every 12 (twelve) hours for 90 doses 180 tablet 3     No current facility-administered medications for this visit. Allergies   Allergen Reactions   • Bee Venom Edema      Immunizations:     Immunization History   Administered Date(s) Administered   • COVID-19 MODERNA VACC 0.5 ML IM 03/12/2021, 04/09/2021, 11/09/2021   • INFLUENZA 11/18/2014, 10/05/2015, 11/28/2016, 01/04/2018, 10/15/2018, 10/18/2021, 10/25/2022   • Influenza Split High Dose Preservative Free IM 11/18/2014, 10/05/2015, 11/28/2016, 01/04/2018   • Influenza, high dose seasonal 0.7 mL 10/15/2018, 10/03/2019, 09/18/2020   • Influenza, seasonal, injectable 10/05/2012, 10/09/2013   • Pneumococcal Conjugate 13-Valent 12/08/2015, 12/09/2015   • Pneumococcal Polysaccharide PPV23 10/09/2013   • Tdap 11/15/2004, 10/16/2017   • Zoster 10/05/2012   • Zoster Vaccine Recombinant 10/30/2020, 01/25/2021      Health Maintenance:         Topic Date Due   • Hepatitis C Screening  Never done   • Colorectal Cancer Screening  Discontinued         Topic Date Due   • COVID-19 Vaccine (4 - Moderna series) 01/04/2022   • Influenza Vaccine (1) 09/01/2023      Medicare Screening Tests and Risk Assessments:     Makayla Callahan is here for his Subsequent Wellness visit. Last Medicare Wellness visit information reviewed, patient interviewed and updates made to the record today. Health Risk Assessment:   Patient rates overall health as very good.  Patient feels that their physical health rating is much better. Patient is very satisfied with their life. Eyesight was rated as same. Hearing was rated as same. Patient feels that their emotional and mental health rating is same. Patients states they are never, rarely angry. Patient states they are sometimes unusually tired/fatigued. Pain experienced in the last 7 days has been some. Patient's pain rating has been 1/10. Patient states that he has experienced no weight loss or gain in last 6 months. Depression Screening:   PHQ-2 Score: 0      Fall Risk Screening: In the past year, patient has experienced: no history of falling in past year      Home Safety:  Patient does not have trouble with stairs inside or outside of their home. Patient has working smoke alarms and has working carbon monoxide detector. Home safety hazards include: none. Nutrition:   Current diet is Low Cholesterol and Low Carb. Medications:   Patient is not currently taking any over-the-counter supplements. Patient is able to manage medications. Activities of Daily Living (ADLs)/Instrumental Activities of Daily Living (IADLs):   Walk and transfer into and out of bed and chair?: Yes  Dress and groom yourself?: Yes    Bathe or shower yourself?: Yes    Feed yourself? Yes  Do your laundry/housekeeping?: Yes  Manage your money, pay your bills and track your expenses?: Yes  Make your own meals?: Yes    Do your own shopping?: Yes    Previous Hospitalizations:   Any hospitalizations or ED visits within the last 12 months?: No      Advance Care Planning:   Living will: No    Durable POA for healthcare:  Yes    Advanced directive: No    Advanced directive counseling given: Yes    Five wishes given: Yes    End of Life Decisions reviewed with patient: Yes    Provider agrees with end of life decisions: Yes      Cognitive Screening:   Provider or family/friend/caregiver concerned regarding cognition?: No    PREVENTIVE SCREENINGS      Cardiovascular Screening:    General: Screening Not Indicated and History Lipid Disorder      Diabetes Screening:     General: Screening Not Indicated and History Diabetes      Colorectal Cancer Screening:     General: Risks and Benefits Discussed      Prostate Cancer Screening:    General: Screening Not Indicated      Osteoporosis Screening:    General: Risks and Benefits Discussed      Abdominal Aortic Aneurysm (AAA) Screening:    Risk factors include: tobacco use        General: Risks and Benefits Discussed      Lung Cancer Screening:     General: Screening Not Indicated      Hepatitis C Screening:    General: Risks and Benefits Discussed    Screening, Brief Intervention, and Referral to Treatment (SBIRT)    Screening  Typical number of drinks in a day: 0  Typical number of drinks in a week: 0  Interpretation: Low risk drinking behavior. AUDIT-C Screenin) How often did you have a drink containing alcohol in the past year? never  2) How many drinks did you have on a typical day when you were drinking in the past year? 1 to 2  3) How often did you have 6 or more drinks on one occasion in the past year? never    AUDIT-C Score: 0  Interpretation: Score 0-3 (male): Negative screen for alcohol misuse    Single Item Drug Screening:  How often have you used an illegal drug (including marijuana) or a prescription medication for non-medical reasons in the past year? never    Single Item Drug Screen Score: 0  Interpretation: Negative screen for possible drug use disorder    No results found.      Physical Exam:     /70 (BP Location: Left arm, Patient Position: Sitting, Cuff Size: Adult)   Pulse 70   Temp 98.1 °F (36.7 °C) (Temporal)   Resp 16   Ht 5' 10.87" (1.8 m)   Wt 85.7 kg (189 lb)   SpO2 96%   BMI 26.46 kg/m²     Physical Exam     Joe Amado DO

## 2023-07-31 NOTE — PROGRESS NOTES
Diabetic Foot Exam    Patient's shoes and socks removed. Right Foot/Ankle   Right Foot Inspection  Skin Exam: skin normal and skin intact. No dry skin, no warmth, no callus, no erythema, no maceration, no abnormal color, no pre-ulcer, no ulcer and no callus. Toe Exam: ROM and strength within normal limits. Sensory   Monofilament testing: intact    Vascular  Capillary refills: < 3 seconds  The right DP pulse is 2+. The right PT pulse is 2+. Left Foot/Ankle  Left Foot Inspection  Skin Exam: skin normal and skin intact. No dry skin, no warmth, no erythema, no maceration, normal color, no pre-ulcer, no ulcer and no callus. Toe Exam: ROM and strength within normal limits. Sensory   Monofilament testing: intact    Vascular  Capillary refills: < 3 seconds  The left DP pulse is 2+. The left PT pulse is 2+. Assign Risk Category  No deformity present  No loss of protective sensation  No weak pulses  Risk: 0    Answers for HPI/ROS submitted by the patient on 7/24/2023  How would you rate your overall health?: very good  Compared to last year, how is your physical health?: much better  In general, how satisfied are you with your life?: very satisfied  Compared to last year, how is your eyesight?: same  Compared to last year, how is your hearing?: same  Compared to last year, how is your emotional/mental health?: same  How often is anger a problem for you?: never, rarely  How often do you feel unusually tired/fatigued?: sometimes  In the past 7 days, how much pain have you experienced?: some  If you answered "some" or "a lot", please rate the severity of your pain on a scale of 1 to 10 (1 being the least severe pain and 10 being the most intense pain). : 1/10  In the past 6 months, have you lost or gained 10 pounds without trying?: No  One or more falls in the last year: No  Do you have trouble with the stairs inside or outside your home?: No  Does your home have working smoke alarms?: Yes  Does your home have a carbon monoxide monitor?: Yes  Which safety hazards (if any) have you experienced in your home? Please select all that apply.: none  How would you describe your current diet?  Please select all that apply.: Low Cholesterol, Low Carb  In addition to prescription medications, are you taking any over-the-counter supplements?: No  Can you manage your medications?: Yes  Are you currently taking any opioid medications?: No  Can you walk and transfer into and out of your bed and chair?: Yes  Can you dress and groom yourself?: Yes  Can you bathe or shower yourself?: Yes  Can you feed yourself?: Yes  Can you do your laundry/ housekeeping?: Yes  Can you manage your money, pay your bills, and track your expenses?: Yes  Can you make your own meals?: Yes  Can you do your own shopping?: Yes  Within the last 12 months, have you had any hospitalizations or Emergency Department visits?: No  Do you have a living will?: Yes  Do you have a Durable POA (Power of ) for healthcare decisions?: No  Do you have an Advanced Directive for end of life decisions?: Yes  How often have you used an illegal drug (including marijuana) or a prescription medication for non-medical reasons in the past year?: never  What is the typical number of drinks you consume in a day?: 0  What is the typical number of drinks you consume in a week?: 0  How often did you have a drink containing alcohol in the past year?: never  How many drinks did you have on a typical day  when you were drinking in the past year?: 1 to 2  How often did you have 6 or more drinks on one occasion in the past year?: never

## 2023-07-31 NOTE — ASSESSMENT & PLAN NOTE
Status post STEMI in April 2022. Status post stent. Doing well without chest pain or shortness of breath.   Continue regular follow-up with cardiology as directed

## 2023-07-31 NOTE — ASSESSMENT & PLAN NOTE
Lab Results   Component Value Date    HGBA1C 7.6 (H) 07/26/2023   A1c from July 26 7.6%, was 6.9%. Patient compliant with metformin 500 twice daily. He states his diet has not been very good lately. He still exercises regularly. We had a long discussion about improving diet. Patient would prefer not to change medication at this time. We will recheck labs prior to next appointment.   If still greater than 70, consider adding DPP 4 or SGLT2

## 2023-07-31 NOTE — ASSESSMENT & PLAN NOTE
History of right renal cell carcinoma. Status post nephrectomy May 2017.   Continue follow-up with urology as directed

## 2023-07-31 NOTE — ASSESSMENT & PLAN NOTE
Seen by podiatry. I would not advise that patient use oral antifungals. Recommend trial of topical Penlac.   Call further problems

## 2023-08-14 DIAGNOSIS — I49.3 SYMPTOMATIC PVCS: ICD-10-CM

## 2023-08-14 DIAGNOSIS — I25.10 CORONARY ARTERY DISEASE INVOLVING NATIVE CORONARY ARTERY OF NATIVE HEART WITHOUT ANGINA PECTORIS: ICD-10-CM

## 2023-08-14 RX ORDER — METOPROLOL SUCCINATE 25 MG/1
25 TABLET, EXTENDED RELEASE ORAL DAILY
Qty: 90 TABLET | Refills: 1 | Status: SHIPPED | OUTPATIENT
Start: 2023-08-14

## 2023-09-13 DIAGNOSIS — E11.69 TYPE 2 DIABETES MELLITUS WITH OTHER SPECIFIED COMPLICATION, WITHOUT LONG-TERM CURRENT USE OF INSULIN (HCC): ICD-10-CM

## 2023-09-13 DIAGNOSIS — B35.1 TOENAIL FUNGUS: ICD-10-CM

## 2023-09-13 RX ORDER — BLOOD SUGAR DIAGNOSTIC
STRIP MISCELLANEOUS
Qty: 100 STRIP | Refills: 5 | Status: SHIPPED | OUTPATIENT
Start: 2023-09-13 | End: 2023-09-15 | Stop reason: SDUPTHER

## 2023-09-14 ENCOUNTER — TELEPHONE (OUTPATIENT)
Dept: FAMILY MEDICINE CLINIC | Facility: CLINIC | Age: 78
End: 2023-09-14

## 2023-09-14 NOTE — TELEPHONE ENCOUNTER
Received fax thru [employer] for clarification on directions for One Touch Ultra In Vitro Strips. Needs testing frequency to calculate day supply for Medicare billing. needs new rx for specific testing for Medicare.  [facility] [place]

## 2023-09-15 DIAGNOSIS — E11.69 TYPE 2 DIABETES MELLITUS WITH OTHER SPECIFIED COMPLICATION, WITHOUT LONG-TERM CURRENT USE OF INSULIN (HCC): ICD-10-CM

## 2023-09-15 RX ORDER — BLOOD SUGAR DIAGNOSTIC
1 STRIP MISCELLANEOUS DAILY
Qty: 100 STRIP | Refills: 3 | Status: SHIPPED | OUTPATIENT
Start: 2023-09-15

## 2023-09-15 NOTE — TELEPHONE ENCOUNTER
Pt not on insulin, medicare covers once daily testing.  Sent Rx for once daily testing, 1 year supply

## 2023-09-19 ENCOUNTER — TELEPHONE (OUTPATIENT)
Dept: FAMILY MEDICINE CLINIC | Facility: CLINIC | Age: 78
End: 2023-09-19

## 2023-10-17 ENCOUNTER — OFFICE VISIT (OUTPATIENT)
Dept: CARDIOLOGY CLINIC | Facility: CLINIC | Age: 78
End: 2023-10-17
Payer: MEDICARE

## 2023-10-17 VITALS
BODY MASS INDEX: 26.6 KG/M2 | DIASTOLIC BLOOD PRESSURE: 60 MMHG | SYSTOLIC BLOOD PRESSURE: 120 MMHG | HEART RATE: 56 BPM | HEIGHT: 71 IN | WEIGHT: 190 LBS

## 2023-10-17 DIAGNOSIS — I34.0 NONRHEUMATIC MITRAL VALVE REGURGITATION: ICD-10-CM

## 2023-10-17 DIAGNOSIS — I49.3 SYMPTOMATIC PVCS: ICD-10-CM

## 2023-10-17 DIAGNOSIS — I25.10 CORONARY ARTERY DISEASE INVOLVING NATIVE CORONARY ARTERY OF NATIVE HEART WITHOUT ANGINA PECTORIS: Primary | ICD-10-CM

## 2023-10-17 DIAGNOSIS — E78.2 MIXED HYPERLIPIDEMIA: ICD-10-CM

## 2023-10-17 DIAGNOSIS — E11.69 TYPE 2 DIABETES MELLITUS WITH OTHER SPECIFIED COMPLICATION, WITHOUT LONG-TERM CURRENT USE OF INSULIN (HCC): ICD-10-CM

## 2023-10-17 PROCEDURE — 99214 OFFICE O/P EST MOD 30 MIN: CPT | Performed by: INTERNAL MEDICINE

## 2023-10-17 NOTE — PROGRESS NOTES
Cardiology Office Note  MD Julito Escobedo MD Irving Ode, DO, [address] MD Savanah Yousif DO, Karlee Anderson DO, [facility]  ----------------------------------------------------------------  [address] [facility]  [address] [facility], [address] [facility]    Mercedes Bridges 69 y.o. male MRN: [id number]  Unit/Bed#:  Encounter: [id number]      History of Present Illness: It was a pleasure to see Mercedes Bridges in the office today for follow-up CV evaluation. He has a past medical history of CAD with high lateral STEMI and 1st diagonal stent placement in April 2022, dyslipidemia, type 2 diabetes mellitus and renal cell carcinoma status post right nephrectomy in May 2017. He established care with us in April 2022. The patient was in his usual state of health when he began to experienced acute chest discomfort. His symptoms began to wax and wane over the course of 24-48 hours prior to his presentation to the emergency department. His symptoms then worsened and he came to [facility] for evaluation. In the emergency department, he was found to have ST segment elevation in leads 1 and aVL with reciprocal changes in the inferior leads. Additionally, there was AV dissociation noted on the ECG. MI alert was called and he was taken to the cath lab. He was found have occlusion of the 1st diagonal and underwent stent placement. Echocardiogram demonstrated low-normal systolic function with regional wall motion abnormalities of the lateral wall. AV dissociation resolved and the patient was placed on beta-blocker as well as ACE-inhibitor. Ezetimibe was added to his regimen. He was recommended for cardiac rehabilitation and discharged home. Following discharge, he had undergone cardiac rehabilitation and has now completed rehabilitation as of August 2022. Due to it PVCs he was experiencing, he was increased on his metoprolol in August 2022.   Event recorder demonstrated sinus rhythm with rare atrial and ventricular ectopy. With no evidence of bradycardia arrhythmia. Triggered events correlated with PVCs. The patient has been extremely active and feels to be in his usual state of health. He does a combination of weight training and cardiovascular activity without any symptoms. Denies chest pain, pressure, tightness or squeezing. Denies lightheadedness, dizziness or palpitations. Denies lower extremity swelling, orthopnea or paroxysmal nocturnal dyspnea. Review of Systems:  Review of Systems   Constitutional: Negative for decreased appetite, fever, weight gain and weight loss. HENT:  Negative for congestion and sore throat. Eyes:  Negative for visual disturbance. Cardiovascular:  Negative for chest pain, dyspnea on exertion, leg swelling, near-syncope and palpitations. Respiratory:  Negative for cough and shortness of breath. Hematologic/Lymphatic: Negative for bleeding problem. Skin:  Negative for rash. Musculoskeletal:  Negative for myalgias and neck pain. Gastrointestinal:  Negative for abdominal pain and nausea. Neurological:  Negative for light-headedness and weakness. Psychiatric/Behavioral:  Negative for depression.         Past Medical History:   Diagnosis Date    Allergic reaction     last assessed: 5/26/2015    Arthritis     right knee    Bee sting allergy     last assessed: 6/30/2015    BPH (benign prostatic hypertrophy)     Cancer ([address]) 2017    Right kidney    Diabetes mellitus ([address])     Type II, NIDDM    Enlarged prostate with lower urinary tract symptoms (LUTS)     last assessed: 8/17/2015    Herpes zoster     last assessed: 11/21/2013    History of colon polyps     Hyperlipidemia     Leukocytosis     last assessed: 11/18/2014    Osteoarthrosis, localized, primary, knee     last assessed: 10/25/2016    Renal cancer ([address])     Seasonal allergies     Shoulder pain, right     Wears glasses     Wears partial dentures Upper and Lower       Past Surgical History:   Procedure Laterality Date    CARDIAC CATHETERIZATION N/A 4/13/2022    Procedure: Cardiac pci;  Surgeon: Laura Davenport MD;  Location: [facility];   Service: Cardiology    CARPAL TUNNEL RELEASE Left     CHOLECYSTECTOMY      Open    COLONOSCOPY      FL GUIDED NEEDLE PLAC BX/ASP/INJ  6/1/2020    FL GUIDED NEEDLE PLAC BX/ASP/INJ  11/13/2020    GANGLION CYST EXCISION Left     Left wrist    JOINT REPLACEMENT Left 08/2015    Left TKR    JOINT REPLACEMENT Right 09/2017    Right TKR    NEPHRECTOMY  05/08/2017    Partial right nephrectomy    ID ARTHRP INTERPOS INTERCARPAL/METACARPAL JOINTS Left 12/5/2016    Procedure: ARTHROPLASTY THUMB [address] [place] [address] ;  Surgeon: Evan Baker MD;  Location: [facility];  Service: Orthopedics    ID ARTHRP KNE CONDYLE&PLATU MEDIAL&LAT COMPARTMENTS Right 9/11/2017    Procedure: ARTHROPLASTY KNEE TOTAL;  Surgeon: Evan Baker MD;  Location: [facility];  Service: Orthopedics    ID LAPAROSCOPY SURG PARTIAL NEPHRECTOMY Right 5/8/2017    Procedure: LAPAROSCOPIC LYSIS OF ADHESIONS, ROBOTIC PARTIAL NEPHRECTOMY, WITH INTRA-OP LAPAROSCOPIC ULTRASOUND;  Surgeon: Radha Ling MD;  Location: [facility];  Service: Urology    ID NEUROPLASTY &/TRANSPOS MEDIAN NRV CARPAL Danny Filbert Left 12/5/2016    Procedure: RELEASE CARPAL TUNNEL;  Surgeon: Evan Baker MD;  [facility];  Service: Orthopedics    ID SURGICAL ARTHROSCOPY SHOULDER W/ROTATOR CUFF RPR Right 2/4/2019    Procedure: REPAIR ROTATOR CUFF ARTHROSCOPIC; SUBACROMIAL DECOMPRESSION; DISTAL CLAVICLE EXCISION;  Surgeon: Evan Baker MD;  [facility];  Service: Orthopedics    ID TENODESIS LONG TENDON BICEPS  2/4/2019    Procedure: TENODESIS BICEPS OPEN PROXIMAL;  Surgeon: Evan Baker MD;  [facility];  Service: Orthopedics    TONSILLECTOMY AND ADENOIDECTOMY         Social History     Socioeconomic History    Marital status: /Civil Union     Spouse name: None    Number of children: None    Years of education: None    Highest education level: None   Occupational History    None   Tobacco Use    Smoking status: Former     Packs/day: 0.25     Types: Cigarettes    Smokeless tobacco: Never    Tobacco comments:     Smoked from ages 16 to 25. Vaping Use    Vaping Use: Never used   Substance and Sexual Activity    Alcohol use: Yes     Comment: 1-2 x year    Drug use: No    Sexual activity: Yes     Partners: Female   Other Topics Concern    None   Social History Narrative    None     Social Determinants of Health     Financial Resource Strain: Low Risk  (7/24/2023)    Overall Financial Resource Strain (CARDIA)     Difficulty of Paying Living Expenses: Not hard at all   Food Insecurity: No Food Insecurity (4/14/2022)    Hunger Vital Sign     Worried About Running Out of Food in the Last Year: Never true     Ran Out of Food in the Last Year: Never true   Transportation Needs: No Transportation Needs (7/24/2023)    PRAPARE - Transportation     Lack of Transportation (Medical): No     Lack of Transportation (Non-Medical):  No   Physical Activity: Not on file   Stress: Not on file   Social Connections: Not on file   Intimate Partner Violence: Not on file   Housing Stability: Low Risk  (4/14/2022)    Housing Stability Vital Sign     Unable to Pay for Housing in the Last Year: No     Number of Places Lived in the Last Year: 1     Unstable Housing in the Last Year: No       Family History   Problem Relation Age of Onset    Cancer Mother     Stroke Father        Allergies   Allergen Reactions    Bee Venom Edema         Current Outpatient Medications:     aspirin (ECOTRIN LOW STRENGTH) 81 mg EC tablet, Take 1 tablet (81 mg total) by mouth daily, Disp: 90 tablet, Rfl: 2    atorvastatin (LIPITOR) 80 mg tablet, TAKE 1 TABLET EVERY DAY WITH DINNER, Disp: 90 tablet, Rfl: 1    Blood Glucose Monitoring Suppl (ONE TOUCH ULTRA 2) w/Device KIT, Use 2 (two) times a day, Disp: 1 kit, Rfl: 0    Cholecalciferol (VITAMIN D-3 PO), Take 2,000 Units by mouth daily, Disp: , Rfl:     ciclopirox (PENLAC) 8 % solution, APPLY TOPICALLY ONCE DAILY AT BEDTIME, Disp: 7 mL, Rfl: 0    EPINEPHrine (EPIPEN) 0.3 mg/0.3 mL SOAJ, Inject as directed, Disp: , Rfl:     ezetimibe (ZETIA) 10 mg tablet, TAKE 1 TABLET AT BEDTIME, Disp: 90 tablet, Rfl: 1    fluticasone (FLONASE) 50 mcg/act nasal spray, 1 spray into each nostril daily, Disp: 16 g, Rfl: 3    glucose blood (OneTouch Ultra) test strip, Use 1 each in the morning, Disp: 100 strip, Rfl: 3    glucose blood test strip, Use 1 each daily as needed (once a day) One touch ultra  Test strips, Disp: 100 strip, Rfl: 3    Lancets (onetouch ultrasoft) lancets, Lancets for Delica/Once a day, Disp: 100 each, Rfl: 5    Lancets (onetouch ultrasoft) lancets, One touch ultra for DELICA 2 /BID, Disp: 123 each, Rfl: 5    metFORMIN (GLUCOPHAGE) 500 mg tablet, TAKE 1 TABLET TWICE DAILY, Disp: 180 tablet, Rfl: 1    metoprolol succinate (TOPROL-XL) 25 mg 24 hr tablet, TAKE 1 TABLET EVERY DAY, Disp: 90 tablet, Rfl: 1    tamsulosin (FLOMAX) 0.4 mg, Take 1 capsule (0.4 mg total) by mouth daily at bedtime, Disp: 90 capsule, Rfl: 3    traZODone (DESYREL) 100 mg tablet, TAKE 1 TABLET AT BEDTIME, Disp: 90 tablet, Rfl: 1    predniSONE 10 mg tablet, Take 6 tablets By mouth  In the morning Qd. Decrease by  By 1 tablet daily until completed. (Patient not taking: Reported on 3/1/2023), Disp: 21 tablet, Rfl: 0    ticagrelor (Brilinta) 60 MG, Take 1 tablet (60 mg total) by mouth every 12 (twelve) hours for 90 doses (Patient not taking: Reported on 10/17/2023), Disp: 180 tablet, Rfl: 3    Vitals:    10/17/23 1306   BP: 120/60   BP Location: Left arm   Patient Position: Sitting   Cuff Size: Large   Pulse: 56   Weight: 86.2 kg (190 lb)   Height: 5' 10.87" (1.8 m)     Body mass index is 26.6 kg/m².     PHYSICAL EXAMINATION:  Gen: Awake, Alert, NAD   Head/eyes: AT/NC, pupils equal and round, Anicteric  ENT: mmm  Neck: Supple, No elevated JVP, trachea midline  Resp: CTA bilaterally no w/r/r  CV: RRR +S1, S2, No m/r/g  Abd: Soft, NT/ND + BS  Ext: no LE edema bilaterally  Neuro: Follows commands, moves all extermities  Psych: Appropriate affect, normal mood, pleasant attitude, non-combative  Skin: warm; no rash, erythema or venous stasis changes on exposed skin    --------------------------------------------------------------------------------  TREADMILL STRESS  No results found for this or any previous visit.     --------------------------------------------------------------------------------  NUCLEAR STRESS TEST: No results found for this or any previous visit. No results found for this or any previous visit.      --------------------------------------------------------------------------------  CATH:  No results found for this or any previous visit.    --------------------------------------------------------------------------------  ECHO:   No results found for this or any previous visit. No results found for this or any previous visit.    --------------------------------------------------------------------------------  HOLTER  No results found for this or any previous visit. No results found for this or any previous visit.    --------------------------------------------------------------------------------  CAROTIDS  No results found for this or any previous visit.     --------------------------------------------------------------------------------  ECGs:  No results found for this visit on 10/17/23.      Lab Results   Component Value Date    WBC 7.40 07/26/2023    HGB 13.6 07/26/2023    HCT 41.7 07/26/2023    MCV 94 07/26/2023     07/26/2023      Lab Results   Component Value Date    SODIUM 141 07/26/2023    K 4.1 07/26/2023     (H) 07/26/2023    CO2 28 07/26/2023    BUN 23 07/26/2023    CREATININE 1.08 07/26/2023    GLUC 141 (H) 04/14/2022    CALCIUM 9.2 07/26/2023      Lab Results   Component Value Date    HGBA1C 7.6 (H) 07/26/2023      Lab Results   Component Value Date    CHOL 149 12/27/2017    CHOL 129 06/21/2017    CHOL 140 12/12/2016     Lab Results   Component Value Date    HDL 32 (L) 07/26/2023    HDL 35 (L) 01/20/2023    HDL 26 (L) 07/19/2022     Lab Results   Component Value Date    LDLCALC 21 07/26/2023    LDLCALC 23 01/20/2023    LDLCALC 24 07/19/2022     Lab Results   Component Value Date    TRIG 53 07/26/2023    TRIG 69 01/20/2023    TRIG 60 07/19/2022     No results found for: "CHOLHDL"   Lab Results   Component Value Date    INR 0.97 04/13/2022    INR 0.97 08/18/2017    INR 0.97 11/21/2016    PROTIME 12.7 04/13/2022    PROTIME 12.9 08/18/2017    PROTIME 12.9 11/21/2016        1. Coronary artery disease involving native coronary artery of native heart without angina pectoris    2. Symptomatic PVCs    3. Type 2 diabetes mellitus with other specified complication, without long-term current use of insulin ([address])    4. Mixed hyperlipidemia    5. Nonrheumatic mitral valve regurgitation        IMPRESSION:  CAD  Lateral STEMI s/p SURJIT-D1 w/ residual 10% ostial LCx and 20% mid RCA, April 2022  LVEF 52% inferolateral and anterolateral hypokinesis from base to apex mild LA dilatation, AV sclerosis, trace AR, MV sclerosis, mild MR, trace TR, April 2022  Symptomatic PVCs  Event recorder w/ SR avg HR 60 bpm, rare PACs, rare atrial couplets/triplets, rare PVCs, rare ventricular couplets, nonsustained PSVT (x1) 9 beat 128 bpm, triggered events correlated with PVCs, August 2022  Transient AV dissociation with junctional rhythm during STEMI likely vagal - resolved  Type 2 diabetes mellitus  Dyslipidemia w/ LDL 21 mg/dL, HDL 32 mg/dL, TG 53 mg/dL, July 2023  History of renal cell carcinoma status post right nephrectomy, 2007    PLAN:  It was a pleasure to see Erin Carmen in the office today for follow-up CV evaluation. He is here today for routine CV follow-up. He is here today for routine CV follow-up.   He has no chest pain concerning for angina and no signs or symptoms of heart failure. Clinically he examines to be euvolemic. Blood pressure and heart rate are currently stable. The patient is tolerating his current medications without any reported adverse effects. He can perform greater than 4 METS on a daily basis without significant exertional symptoms. Unfortunately, patient had nosebleed on lower dose of Brilinta and antiplatelet was discontinued. We will plan to avoid further Brilinta or Xarelto for now. Based on his clinical presentation, I have the following recommendations:    1. Due to his blood pressures dropping into the 374L systolic, would discontinue his lisinopril 2.5 mg daily. Continue Toprol-XL 25 mg daily as prescribed. 2.  Would encourage 30 minutes a day, 5 days a week of moderate intensity activity to build cardiovascular endurance. 3.  Recommend heart healthy diet low in sodium and carbohydrate  4. Continue high intensity statin and Zetia. Goal LDL is less than 70 mg/dL. Repeat lipid panel in 3 to 6 months. Patient's LDL is currently to goal.  5.  Lifelong aspirin  6. Follow-up with primary care for blood glucose management  7. No additional CV testing at this time  8. We will follow-up with him in 6 months to reassess his progress. As always, please not hesitate to call with any questions. Portions of the record may have been created with voice recognition software. Occasional wrong word or "sound a like" substitutions may have occurred due to the inherent limitations of voice recognition software. Read the chart carefully and recognize, using context, where substitutions have occurred.       Signed: [place], [id number] [facility], [facility], [facility]

## 2023-11-22 ENCOUNTER — OFFICE VISIT (OUTPATIENT)
Dept: FAMILY MEDICINE CLINIC | Facility: CLINIC | Age: 78
End: 2023-11-22
Payer: MEDICARE

## 2023-11-22 VITALS
HEART RATE: 59 BPM | BODY MASS INDEX: 26.88 KG/M2 | OXYGEN SATURATION: 99 % | DIASTOLIC BLOOD PRESSURE: 74 MMHG | WEIGHT: 192 LBS | TEMPERATURE: 97.1 F | SYSTOLIC BLOOD PRESSURE: 134 MMHG | RESPIRATION RATE: 18 BRPM

## 2023-11-22 DIAGNOSIS — L03.011 PARONYCHIA OF FINGER OF RIGHT HAND: Primary | ICD-10-CM

## 2023-11-22 PROCEDURE — 99213 OFFICE O/P EST LOW 20 MIN: CPT | Performed by: FAMILY MEDICINE

## 2023-11-22 PROCEDURE — 26010 DRAINAGE OF FINGER ABSCESS: CPT | Performed by: FAMILY MEDICINE

## 2023-11-22 RX ORDER — CEPHALEXIN 500 MG/1
500 CAPSULE ORAL 3 TIMES DAILY
Qty: 21 CAPSULE | Refills: 0 | Status: SHIPPED | OUTPATIENT
Start: 2023-11-22 | End: 2023-11-29

## 2023-11-22 NOTE — PROGRESS NOTES
Name: Yvonne Hall      :       MRN: [id number]  Encounter Provider: Gumaro Donato DO  Encounter Date: 2023   Encounter department: [address] [place] [address]     1. Paronychia of finger of right hand  -     cephalexin (KEFLEX) 500 mg capsule; Take 1 capsule (500 mg total) by mouth 3 (three) times a day for 7 days  -     Incision and Drainage    Patient presents with paronychia right thumb. On examination, patient exhibits considerable redness and induration along lateral border of thumb. Incision and drainage was performed today (see procedure note). We will give antibiotic, cephalexin 500 3 times daily for 7 days. Call if further problems       Subjective     Patient presents with paronychia right thumb. Review of Systems   Skin:  Positive for wound. Past Medical History:   Diagnosis Date   • Allergic reaction     last assessed: 2015   • Arthritis     right knee   • Bee sting allergy     last assessed: 2015   • BPH (benign prostatic hypertrophy)    • Cancer ([address]) 2017    Right kidney   • Diabetes mellitus ([address])     Type II, NIDDM   • Enlarged prostate with lower urinary tract symptoms (LUTS)     last assessed: 2015   • Herpes zoster     last assessed: 2013   • History of colon polyps    • Hyperlipidemia    • Leukocytosis     last assessed: 2014   • Osteoarthrosis, localized, primary, knee     last assessed: 10/25/2016   • Renal cancer ([address])    • Seasonal allergies    • Shoulder pain, right    • Wears glasses    • Wears partial dentures     Upper and Lower     Past Surgical History:   Procedure Laterality Date   • CARDIAC CATHETERIZATION N/A 2022    Procedure: Cardiac pci;  Surgeon: Francesca Alexander MD;  [facility];   Service: Cardiology   • CARPAL TUNNEL RELEASE Left    • CHOLECYSTECTOMY      Open   • COLONOSCOPY     • FL GUIDED NEEDLE PLAC BX/ASP/INJ  2020   • FL GUIDED NEEDLE PLAC BX/ASP/INJ 11/13/2020   • GANGLION CYST EXCISION Left     Left wrist   • JOINT REPLACEMENT Left 08/2015    Left TKR   • JOINT REPLACEMENT Right 09/2017    Right TKR   • NEPHRECTOMY  05/08/2017    Partial right nephrectomy   • WI ARTHRP INTERPOS INTERCARPAL/METACARPAL JOINTS Left 12/5/2016    Procedure: ARTHROPLASTY THUMB [facility] ;  Surgeon: Alirio Hastings MD;  [facility];  Service: Orthopedics   • WI ARTHRP KNE CONDYLE&PLATU MEDIAL&LAT COMPARTMENTS Right 9/11/2017    Procedure: ARTHROPLASTY KNEE TOTAL;  Surgeon: Alirio Hastings MD;  [facility];  Service: Orthopedics   • WI LAPAROSCOPY SURG PARTIAL NEPHRECTOMY Right 5/8/2017    Procedure: LAPAROSCOPIC LYSIS OF ADHESIONS, ROBOTIC PARTIAL NEPHRECTOMY, WITH INTRA-OP LAPAROSCOPIC ULTRASOUND;  Surgeon: Alex Maurer MD;  [facility];  Service: Urology   • WI NEUROPLASTY &/TRANSPOS MEDIAN NRV CARPAL Lyndal Govern Left 12/5/2016    Procedure: RELEASE CARPAL TUNNEL;  Surgeon: Alirio Hastings MD;  [facility];  Service: Orthopedics   • WI SURGICAL ARTHROSCOPY SHOULDER W/ROTATOR CUFF RPR Right 2/4/2019    Procedure: REPAIR ROTATOR CUFF ARTHROSCOPIC; SUBACROMIAL DECOMPRESSION; DISTAL CLAVICLE EXCISION;  Surgeon: Alirio Hastings MD;  [facility];  Service: Orthopedics   • WI TENODESIS LONG TENDON BICEPS  2/4/2019    Procedure: TENODESIS BICEPS OPEN PROXIMAL;  Surgeon: Alirio Hastings MD;  [facility];  Service: Orthopedics   • TONSILLECTOMY AND ADENOIDECTOMY       Family History   Problem Relation Age of Onset   • Cancer Mother    • Stroke Father      Social History     Socioeconomic History   • Marital status: /Civil Union     Spouse name: None   • Number of children: None   • Years of education: None   • Highest education level: None   Occupational History   • None   Tobacco Use   • Smoking status: Former     Packs/day: 0.25     Types: Cigarettes   • Smokeless tobacco: Never   • Tobacco comments:     Smoked from ages 16 to 25. Vaping Use   • Vaping Use: Never used   Substance and Sexual Activity   • Alcohol use: Yes     Comment: 1-2 x year   • Drug use: No   • Sexual activity: Yes     Partners: Female   Other Topics Concern   • None   Social History Narrative   • None     Social Determinants of Health     Financial Resource Strain: Low Risk  (7/24/2023)    Overall Financial Resource Strain (CARDIA)    • Difficulty of Paying Living Expenses: Not hard at all   Food Insecurity: No Food Insecurity (4/14/2022)    Hunger Vital Sign    • Worried About Running Out of Food in the Last Year: Never true    • Ran Out of Food in the Last Year: Never true   Transportation Needs: No Transportation Needs (7/24/2023)    PRAPARE - Transportation    • Lack of Transportation (Medical): No    • Lack of Transportation (Non-Medical):  No   Physical Activity: Not on file   Stress: Not on file   Social Connections: Not on file   Intimate Partner Violence: Not on file   Housing Stability: Low Risk  (4/14/2022)    Housing Stability Vital Sign    • Unable to Pay for Housing in the Last Year: No    • Number of Places Lived in the Last Year: 1    • Unstable Housing in the Last Year: No     Current Outpatient Medications on File Prior to Visit   Medication Sig   • aspirin (ECOTRIN LOW STRENGTH) 81 mg EC tablet Take 1 tablet (81 mg total) by mouth daily   • atorvastatin (LIPITOR) 80 mg tablet TAKE 1 TABLET EVERY DAY WITH DINNER   • Blood Glucose Monitoring Suppl (ONE TOUCH ULTRA 2) w/Device KIT Use 2 (two) times a day   • Cholecalciferol (VITAMIN D-3 PO) Take 2,000 Units by mouth daily   • ciclopirox (PENLAC) 8 % solution APPLY TOPICALLY ONCE DAILY AT BEDTIME   • EPINEPHrine (EPIPEN) 0.3 mg/0.3 mL SOAJ Inject as directed   • ezetimibe (ZETIA) 10 mg tablet TAKE 1 TABLET AT BEDTIME   • fluticasone (FLONASE) 50 mcg/act nasal spray 1 spray into each nostril daily   • glucose blood (OneTouch Ultra) test strip Use 1 each in the morning   • glucose blood test strip Use 1 each daily as needed (once a day) One touch ultra  Test strips   • Lancets (onetouch ultrasoft) lancets Lancets for Delica/Once a day   • Lancets (onetouch ultrasoft) lancets One touch ultra for DELICA 2 /BID   • metFORMIN (GLUCOPHAGE) 500 mg tablet TAKE 1 TABLET TWICE DAILY   • metoprolol succinate (TOPROL-XL) 25 mg 24 hr tablet TAKE 1 TABLET EVERY DAY   • tamsulosin (FLOMAX) 0.4 mg Take 1 capsule (0.4 mg total) by mouth daily at bedtime   • traZODone (DESYREL) 100 mg tablet TAKE 1 TABLET AT BEDTIME   • predniSONE 10 mg tablet Take 6 tablets By mouth  In the morning Qd. Decrease by  By 1 tablet daily until completed. (Patient not taking: Reported on 3/1/2023)   • ticagrelor (Brilinta) 60 MG Take 1 tablet (60 mg total) by mouth every 12 (twelve) hours for 90 doses (Patient not taking: Reported on 10/17/2023)     Allergies   Allergen Reactions   • Bee Venom Edema     Immunization History   Administered Date(s) Administered   • COVID-19 MODERNA VACC 0.5 ML IM 03/12/2021, 04/09/2021, 11/09/2021   • INFLUENZA 11/18/2014, 10/05/2015, 11/28/2016, 01/04/2018, 10/15/2018, 10/18/2021, 10/25/2022, 10/18/2023   • Influenza Split High Dose Preservative Free IM 11/18/2014, 10/05/2015, 11/28/2016, 01/04/2018   • Influenza, high dose seasonal 0.7 mL 10/15/2018, 10/03/2019, 09/18/2020   • Influenza, seasonal, injectable 10/05/2012, 10/09/2013   • Pneumococcal Conjugate 13-Valent 12/08/2015, 12/09/2015   • Pneumococcal Polysaccharide PPV23 10/09/2013   • Tdap 11/15/2004, 10/16/2017   • Zoster 10/05/2012   • Zoster Vaccine Recombinant 10/30/2020, 01/25/2021       Objective   Incision and Drainage    Date/Time: 11/22/2023 2:00 PM    Performed by: Allen Barnhart DO  Authorized by: Allen Barnhart DO  Universal Protocol:  Consent: Verbal consent obtained.   Consent given by: patient  Patient understanding: patient states understanding of the procedure being performed  Patient identity confirmed: verbally with patient    Patient location: Clinic  Location:     Type:  Abscess    Location:  Upper extremity    Upper extremity location:  R thumb  Pre-procedure details:     Skin preparation:  Antiseptic wash  Anesthesia (see MAR for exact dosages): Anesthesia method:  Topical application    Topical anesthetic:  Tetracaine gel  Procedure details:     Complexity:  Simple    Needle aspiration: no      Incision types:  Stab incision    Scalpel blade:  11    Approach:  Puncture    Incision depth:  Subungual    Drainage:  Purulent    Drainage amount: Moderate    Wound treatment:  Wound left open    Packing materials:  None  Post-procedure details:     Patient tolerance of procedure:   Tolerated well, no immediate complications     /78 (BP Location: Left arm, Patient Position: Sitting, Cuff Size: Standard)   Pulse 59   Temp (!) 97.1 °F (36.2 °C) (Temporal)   Resp 18   Wt 87.1 kg (192 lb)   SpO2 99%   BMI 26.88 kg/m²     Physical Exam  Skin:     Comments: Paronychia right thumb       Sulema Foot, DO

## 2024-01-10 DIAGNOSIS — I21.3 STEMI (ST ELEVATION MYOCARDIAL INFARCTION) (HCC): ICD-10-CM

## 2024-01-10 DIAGNOSIS — E11.8 TYPE 2 DIABETES MELLITUS WITH COMPLICATION, WITHOUT LONG-TERM CURRENT USE OF INSULIN (HCC): ICD-10-CM

## 2024-01-10 RX ORDER — ASPIRIN 81 MG/1
81 TABLET, COATED ORAL DAILY
Qty: 90 TABLET | Refills: 3 | Status: SHIPPED | OUTPATIENT
Start: 2024-01-10

## 2024-01-15 ENCOUNTER — APPOINTMENT (OUTPATIENT)
Dept: LAB | Facility: CLINIC | Age: 79
End: 2024-01-15
Payer: MEDICARE

## 2024-01-15 DIAGNOSIS — E11.69 TYPE 2 DIABETES MELLITUS WITH OTHER SPECIFIED COMPLICATION, WITHOUT LONG-TERM CURRENT USE OF INSULIN (HCC): ICD-10-CM

## 2024-01-15 LAB
ALBUMIN SERPL BCP-MCNC: 4.1 G/DL (ref 3.5–5)
ALP SERPL-CCNC: 38 U/L (ref 34–104)
ALT SERPL W P-5'-P-CCNC: 33 U/L (ref 7–52)
ANION GAP SERPL CALCULATED.3IONS-SCNC: 4 MMOL/L
AST SERPL W P-5'-P-CCNC: 26 U/L (ref 13–39)
BILIRUB SERPL-MCNC: 0.68 MG/DL (ref 0.2–1)
BUN SERPL-MCNC: 17 MG/DL (ref 5–25)
CALCIUM SERPL-MCNC: 9.6 MG/DL (ref 8.4–10.2)
CHLORIDE SERPL-SCNC: 104 MMOL/L (ref 96–108)
CO2 SERPL-SCNC: 28 MMOL/L (ref 21–32)
CREAT SERPL-MCNC: 0.98 MG/DL (ref 0.6–1.3)
CREAT UR-MCNC: 94.2 MG/DL
EST. AVERAGE GLUCOSE BLD GHB EST-MCNC: 166 MG/DL
GFR SERPL CREATININE-BSD FRML MDRD: 74 ML/MIN/1.73SQ M
GLUCOSE P FAST SERPL-MCNC: 151 MG/DL (ref 65–99)
HBA1C MFR BLD: 7.4 %
MICROALBUMIN UR-MCNC: <7 MG/L
MICROALBUMIN/CREAT 24H UR: <7 MG/G CREATININE (ref 0–30)
POTASSIUM SERPL-SCNC: 4.1 MMOL/L (ref 3.5–5.3)
PROT SERPL-MCNC: 7.1 G/DL (ref 6.4–8.4)
SODIUM SERPL-SCNC: 136 MMOL/L (ref 135–147)

## 2024-01-15 PROCEDURE — 83036 HEMOGLOBIN GLYCOSYLATED A1C: CPT

## 2024-01-15 PROCEDURE — 36415 COLL VENOUS BLD VENIPUNCTURE: CPT

## 2024-01-15 PROCEDURE — 82570 ASSAY OF URINE CREATININE: CPT

## 2024-01-15 PROCEDURE — 80053 COMPREHEN METABOLIC PANEL: CPT

## 2024-01-15 PROCEDURE — 82043 UR ALBUMIN QUANTITATIVE: CPT

## 2024-01-24 ENCOUNTER — RA CDI HCC (OUTPATIENT)
Dept: OTHER | Facility: HOSPITAL | Age: 79
End: 2024-01-24

## 2024-01-24 DIAGNOSIS — I21.3 STEMI (ST ELEVATION MYOCARDIAL INFARCTION) (HCC): ICD-10-CM

## 2024-01-24 RX ORDER — EZETIMIBE 10 MG/1
TABLET ORAL
Qty: 90 TABLET | Refills: 3 | Status: SHIPPED | OUTPATIENT
Start: 2024-01-24

## 2024-01-25 DIAGNOSIS — Z00.6 ENCOUNTER FOR EXAMINATION FOR NORMAL COMPARISON OR CONTROL IN CLINICAL RESEARCH PROGRAM: ICD-10-CM

## 2024-01-26 ENCOUNTER — APPOINTMENT (OUTPATIENT)
Dept: LAB | Facility: CLINIC | Age: 79
End: 2024-01-26

## 2024-01-26 DIAGNOSIS — Z00.6 ENCOUNTER FOR EXAMINATION FOR NORMAL COMPARISON OR CONTROL IN CLINICAL RESEARCH PROGRAM: ICD-10-CM

## 2024-01-26 PROCEDURE — 36415 COLL VENOUS BLD VENIPUNCTURE: CPT

## 2024-01-29 ENCOUNTER — TELEPHONE (OUTPATIENT)
Dept: UROLOGY | Facility: CLINIC | Age: 79
End: 2024-01-29

## 2024-01-29 NOTE — TELEPHONE ENCOUNTER
Spoke to patient 1/29/24 to inform patient of the cancellation of his 2/20/24 appointment with kiran donovan and letting him know of the new appointment at 4:12  at 2 pm patient is good with the change

## 2024-01-31 ENCOUNTER — OFFICE VISIT (OUTPATIENT)
Dept: FAMILY MEDICINE CLINIC | Facility: CLINIC | Age: 79
End: 2024-01-31
Payer: MEDICARE

## 2024-01-31 VITALS
HEART RATE: 64 BPM | BODY MASS INDEX: 26.6 KG/M2 | OXYGEN SATURATION: 97 % | SYSTOLIC BLOOD PRESSURE: 124 MMHG | DIASTOLIC BLOOD PRESSURE: 72 MMHG | RESPIRATION RATE: 18 BRPM | TEMPERATURE: 97.3 F | WEIGHT: 190 LBS

## 2024-01-31 DIAGNOSIS — Z96.653 STATUS POST TOTAL BILATERAL KNEE REPLACEMENT: ICD-10-CM

## 2024-01-31 DIAGNOSIS — Z13.29 SCREENING FOR THYROID DISORDER: ICD-10-CM

## 2024-01-31 DIAGNOSIS — C64.1 RENAL CELL CARCINOMA OF RIGHT KIDNEY (HCC): ICD-10-CM

## 2024-01-31 DIAGNOSIS — E11.69 TYPE 2 DIABETES MELLITUS WITH OTHER SPECIFIED COMPLICATION, WITHOUT LONG-TERM CURRENT USE OF INSULIN (HCC): ICD-10-CM

## 2024-01-31 DIAGNOSIS — I25.119 ATHEROSCLEROSIS OF NATIVE CORONARY ARTERY OF NATIVE HEART WITH ANGINA PECTORIS (HCC): Primary | ICD-10-CM

## 2024-01-31 DIAGNOSIS — Z13.0 SCREENING FOR DEFICIENCY ANEMIA: ICD-10-CM

## 2024-01-31 DIAGNOSIS — R35.0 BENIGN PROSTATIC HYPERPLASIA WITH URINARY FREQUENCY: ICD-10-CM

## 2024-01-31 DIAGNOSIS — N40.1 BENIGN PROSTATIC HYPERPLASIA WITH URINARY FREQUENCY: ICD-10-CM

## 2024-01-31 DIAGNOSIS — G47.00 INSOMNIA, UNSPECIFIED TYPE: ICD-10-CM

## 2024-01-31 DIAGNOSIS — E78.2 MIXED HYPERLIPIDEMIA: ICD-10-CM

## 2024-01-31 DIAGNOSIS — Z12.5 SCREENING FOR PROSTATE CANCER: ICD-10-CM

## 2024-01-31 DIAGNOSIS — H66.90 ACUTE OTITIS MEDIA, UNSPECIFIED OTITIS MEDIA TYPE: ICD-10-CM

## 2024-01-31 PROBLEM — B35.1 TOENAIL FUNGUS: Status: RESOLVED | Noted: 2023-07-31 | Resolved: 2024-01-31

## 2024-01-31 PROBLEM — S39.012A STRAIN OF LUMBAR REGION: Status: RESOLVED | Noted: 2021-06-09 | Resolved: 2024-01-31

## 2024-01-31 PROCEDURE — 99214 OFFICE O/P EST MOD 30 MIN: CPT | Performed by: FAMILY MEDICINE

## 2024-01-31 RX ORDER — AMOXICILLIN 875 MG/1
875 TABLET, COATED ORAL 2 TIMES DAILY
Qty: 14 TABLET | Refills: 0 | Status: SHIPPED | OUTPATIENT
Start: 2024-01-31 | End: 2024-02-07

## 2024-01-31 RX ORDER — LANCETS
EACH MISCELLANEOUS
Qty: 100 EACH | Refills: 11 | Status: SHIPPED | OUTPATIENT
Start: 2024-01-31 | End: 2024-02-01

## 2024-01-31 NOTE — PROGRESS NOTES
Name: Chuck Borja      : 1945      MRN: [id number]  Encounter Provider: Niranjan Kline DO  Encounter Date: 2024   Encounter department: [facility]    Assessment & Plan     1. Atherosclerosis of native coronary artery of native heart with angina pectoris (HCC)  Assessment & Plan:  Status post STEMI 2022.  Status post SURJIT.  Patient continues to do well without chest pain or shortness of breath.  He has excellent exercise tolerance.  Continue follow-up with cardiologist as directed      2. Type 2 diabetes mellitus with other specified complication, without long-term current use of insulin (HCC)  Assessment & Plan:    Lab Results   Component Value Date    HGBA1C 7.4 (H) 01/15/2024   A1c from January 15 was 7.4%, was .6%.  Patient compliant with metformin 500 mg twice daily.  He is trying to watch his diet and continues to exercise on a regular basis.  Will add Jardiance 10 mg daily.  Recheck labs prior to next appointment    Orders:  -     Comprehensive metabolic panel; Future; Expected date: 2024  -     Hemoglobin A1C; Future; Expected date: 2024  -     Empagliflozin (JARDIANCE) 10 MG TABS tablet; Take 1 tablet (10 mg total) by mouth daily  -     Lancets (onetouch ultrasoft) lancets; Lancets for Delica/Once a day    3. Renal cell carcinoma of right kidney (HCC)  Assessment & Plan:  History of right renal cell carcinoma.  Status post nephrectomy May 2017.  Doing well.  Continue follow-up with urologist as directed      4. Mixed hyperlipidemia  Assessment & Plan:  Doing well on atorvastatin 80 and Zetia 10.  Will check lipids prior to next appointment    Orders:  -     Lipid Panel with Direct LDL reflex; Future; Expected date: 2024    5. Insomnia, unspecified type  Assessment & Plan:  Stable on trazodone 100      6. Benign prostatic hyperplasia with urinary frequency  Assessment & Plan:  Stable.  Continue follow-up with urologist as directed      7.  Screening for deficiency anemia  -     CBC and differential; Future; Expected date: 07/01/2024    8. Screening for thyroid disorder  -     TSH, 3rd generation with Free T4 reflex; Future; Expected date: 07/01/2024    9. Acute otitis media, unspecified otitis media type  -     amoxicillin (AMOXIL) 875 mg tablet; Take 1 tablet (875 mg total) by mouth 2 (two) times a day for 7 days    10. Screening for prostate cancer  -     PSA, Total Screen; Future; Expected date: 07/01/2024    11. Status post total bilateral knee replacement  Assessment & Plan:  History of bilateral knee replacements by Dr. Velazco           Last colonoscopy 2014    Patient had flu shot  Patient had pneumonia vaccination  Last tetanus booster 2017  Patient Shingrix vaccination    Lab results from January 15 reviewed with patient    6 months, fasting blood work prior (including PSA)  Subjective     Patient presents for recheck of chronic medical problems today.  Compliant with prescribed medications.  Patient labs done January 15      Review of Systems   Respiratory: Negative.     Cardiovascular: Negative.    Gastrointestinal: Negative.    Genitourinary: Negative.        Past Medical History:   Diagnosis Date   • Allergic reaction     last assessed: 5/26/2015   • Arthritis     right knee   • Bee sting allergy     last assessed: 6/30/2015   • BPH (benign prostatic hypertrophy)    • Cancer (HCC) 2017    Right kidney   • Diabetes mellitus (HCC)     Type II, NIDDM   • Enlarged prostate with lower urinary tract symptoms (LUTS)     last assessed: 8/17/2015   • Herpes zoster     last assessed: 11/21/2013   • History of colon polyps    • Hyperlipidemia    • Leukocytosis     last assessed: 11/18/2014   • Osteoarthrosis, localized, primary, knee     last assessed: 10/25/2016   • Renal cancer (HCC)    • Seasonal allergies    • Shoulder pain, right    • Wears glasses    • Wears partial dentures     Upper and Lower     Past Surgical History:   Procedure  Laterality Date   • CARDIAC CATHETERIZATION N/A 4/13/2022    Procedure: Cardiac pci;  Surgeon: José Cameron MD;  [facility];  Service: Cardiology   • CARPAL TUNNEL RELEASE Left    • CHOLECYSTECTOMY      Open   • COLONOSCOPY     • FL GUIDED NEEDLE PLAC BX/ASP/INJ  6/1/2020   • FL GUIDED NEEDLE PLAC BX/ASP/INJ  11/13/2020   • GANGLION CYST EXCISION Left     Left wrist   • JOINT REPLACEMENT Left 08/2015    Left TKR   • JOINT REPLACEMENT Right 09/2017    Right TKR   • NEPHRECTOMY  05/08/2017    Partial right nephrectomy   • CO ARTHRP INTERPOS INTERCARPAL/METACARPAL JOINTS Left 12/5/2016    Procedure: ARTHROPLASTY THUMB CMC ;  Surgeon: Dominguez Velazco MD;  [facility];  Service: Orthopedics   • CO ARTHRP KNE CONDYLE&PLATU MEDIAL&LAT COMPARTMENTS Right 9/11/2017    Procedure: ARTHROPLASTY KNEE TOTAL;  Surgeon: Dominguez Velazco MD;  [facility];  Service: Orthopedics   • CO LAPAROSCOPY SURG PARTIAL NEPHRECTOMY Right 5/8/2017    Procedure: LAPAROSCOPIC LYSIS OF ADHESIONS, ROBOTIC PARTIAL NEPHRECTOMY, WITH INTRA-OP LAPAROSCOPIC ULTRASOUND;  Surgeon: Jose Hidalgo MD;  [facility];  Service: Urology   • CO NEUROPLASTY &/TRANSPOS MEDIAN NRV CARPAL TUNNE Left 12/5/2016    Procedure: RELEASE CARPAL TUNNEL;  Surgeon: Dominguez Velazco MD;  [facility];  Service: Orthopedics   • CO SURGICAL ARTHROSCOPY SHOULDER W/ROTATOR CUFF RPR Right 2/4/2019    Procedure: REPAIR ROTATOR CUFF ARTHROSCOPIC; SUBACROMIAL DECOMPRESSION; DISTAL CLAVICLE EXCISION;  Surgeon: Dominguez Velazco MD;  [facility];  Service: Orthopedics   • CO TENODESIS LONG TENDON BICEPS  2/4/2019    Procedure: TENODESIS BICEPS OPEN PROXIMAL;  Surgeon: Dominguez Velazco MD;  [facility];  Service: Orthopedics   • TONSILLECTOMY AND ADENOIDECTOMY       Family History   Problem Relation Age of Onset   • Cancer Mother    • Stroke Father      Social History     Socioeconomic History   • Marital status:  /Civil Union     Spouse name: None   • Number of children: None   • Years of education: None   • Highest education level: None   Occupational History   • None   Tobacco Use   • Smoking status: Former     Current packs/day: 0.25     Types: Cigarettes   • Smokeless tobacco: Never   • Tobacco comments:     Smoked from ages 13 to 22.   Vaping Use   • Vaping status: Never Used   Substance and Sexual Activity   • Alcohol use: Yes   • Drug use: No   • Sexual activity: Yes     Partners: Female   Other Topics Concern   • None   Social History Narrative   • None     Social Determinants of Health     Financial Resource Strain: Low Risk  (7/24/2023)    Overall Financial Resource Strain (CARDIA)    • Difficulty of Paying Living Expenses: Not hard at all   Food Insecurity: No Food Insecurity (4/14/2022)    Hunger Vital Sign    • Worried About Running Out of Food in the Last Year: Never true    • Ran Out of Food in the Last Year: Never true   Transportation Needs: No Transportation Needs (7/24/2023)    PRAPARE - Transportation    • Lack of Transportation (Medical): No    • Lack of Transportation (Non-Medical): No   Physical Activity: Not on file   Stress: Not on file   Social Connections: Not on file   Intimate Partner Violence: Not on file   Housing Stability: Low Risk  (4/14/2022)    Housing Stability Vital Sign    • Unable to Pay for Housing in the Last Year: No    • Number of Places Lived in the Last Year: 1    • Unstable Housing in the Last Year: No     Current Outpatient Medications on File Prior to Visit   Medication Sig   • Aspirin Low Dose 81 MG EC tablet TAKE 1 TABLET EVERY DAY   • atorvastatin (LIPITOR) 80 mg tablet TAKE 1 TABLET EVERY DAY WITH DINNER   • Blood Glucose Monitoring Suppl (ONE TOUCH ULTRA 2) w/Device KIT Use 2 (two) times a day   • Cholecalciferol (VITAMIN D-3 PO) Take 2,000 Units by mouth daily   • ciclopirox (PENLAC) 8 % solution APPLY TOPICALLY ONCE DAILY AT BEDTIME   • EPINEPHrine (EPIPEN) 0.3  mg/0.3 mL SOAJ Inject as directed   • ezetimibe (ZETIA) 10 mg tablet TAKE 1 TABLET AT BEDTIME   • fluticasone (FLONASE) 50 mcg/act nasal spray 1 spray into each nostril daily   • glucose blood (OneTouch Ultra) test strip Use 1 each in the morning   • glucose blood test strip Use 1 each daily as needed (once a day) One touch ultra  Test strips   • metFORMIN (GLUCOPHAGE) 500 mg tablet TAKE 1 TABLET TWICE DAILY   • metoprolol succinate (TOPROL-XL) 25 mg 24 hr tablet TAKE 1 TABLET EVERY DAY   • tamsulosin (FLOMAX) 0.4 mg Take 1 capsule (0.4 mg total) by mouth daily at bedtime   • traZODone (DESYREL) 100 mg tablet TAKE 1 TABLET AT BEDTIME   • [DISCONTINUED] Lancets (onetouch ultrasoft) lancets Lancets for Delica/Once a day   • [DISCONTINUED] Lancets (onetouch ultrasoft) lancets One touch ultra for DELICA 2 /BID   • [DISCONTINUED] predniSONE 10 mg tablet Take 6 tablets By mouth  In the morning Qd.   Decrease by  By 1 tablet daily until completed. (Patient not taking: Reported on 3/1/2023)   • [DISCONTINUED] ticagrelor (Brilinta) 60 MG Take 1 tablet (60 mg total) by mouth every 12 (twelve) hours for 90 doses (Patient not taking: Reported on 10/17/2023)     Allergies   Allergen Reactions   • Bee Venom Edema     Immunization History   Administered Date(s) Administered   • COVID-19 MODERNA VACC 0.5 ML IM 03/12/2021, 04/09/2021, 11/09/2021   • INFLUENZA 11/18/2014, 10/05/2015, 11/28/2016, 01/04/2018, 10/15/2018, 10/18/2021, 10/25/2022, 10/18/2023   • Influenza Split High Dose Preservative Free IM 11/18/2014, 10/05/2015, 11/28/2016, 01/04/2018   • Influenza, high dose seasonal 0.7 mL 10/15/2018, 10/03/2019, 09/18/2020   • Influenza, seasonal, injectable 10/05/2012, 10/09/2013   • Pneumococcal Conjugate 13-Valent 12/08/2015, 12/09/2015   • Pneumococcal Polysaccharide PPV23 10/09/2013   • Tdap 11/15/2004, 10/16/2017   • Zoster 10/05/2012   • Zoster Vaccine Recombinant 10/30/2020, 01/25/2021       Objective     /72 (BP  Location: Left arm, Patient Position: Sitting, Cuff Size: Standard)   Pulse 64   Temp (!) 97.3 °F (36.3 °C) (Temporal)   Resp 18   Wt 86.2 kg (190 lb)   SpO2 97%   BMI 26.60 kg/m²     Physical Exam  Cardiovascular:      Rate and Rhythm: Normal rate and regular rhythm.      Heart sounds: Normal heart sounds.      Comments: Carotids: no bruits  Ext: no edema  Pulmonary:      Effort: Pulmonary effort is normal. No respiratory distress.      Breath sounds: No wheezing or rales.   Psychiatric:         Behavior: Behavior normal.         Thought Content: Thought content normal.       Niranjan Kline, DO

## 2024-01-31 NOTE — ASSESSMENT & PLAN NOTE
History of right renal cell carcinoma.  Status post nephrectomy May 2017.  Doing well.  Continue follow-up with urologist as directed

## 2024-01-31 NOTE — ASSESSMENT & PLAN NOTE
Lab Results   Component Value Date    HGBA1C 7.4 (H) 01/15/2024   A1c from January 15 was 7.4%, was .6%.  Patient compliant with metformin 500 mg twice daily.  He is trying to watch his diet and continues to exercise on a regular basis.  Will add Jardiance 10 mg daily.  Recheck labs prior to next appointment

## 2024-02-01 DIAGNOSIS — E11.69 TYPE 2 DIABETES MELLITUS WITH OTHER SPECIFIED COMPLICATION, WITHOUT LONG-TERM CURRENT USE OF INSULIN (HCC): ICD-10-CM

## 2024-02-01 RX ORDER — LANCETS 33 GAUGE
EACH MISCELLANEOUS DAILY
Qty: 100 EACH | Refills: 3 | Status: SHIPPED | OUTPATIENT
Start: 2024-02-01

## 2024-02-05 ENCOUNTER — HOSPITAL ENCOUNTER (OUTPATIENT)
Dept: ULTRASOUND IMAGING | Facility: MEDICAL CENTER | Age: 79
Discharge: HOME | End: 2024-02-05
Payer: MEDICARE

## 2024-02-05 DIAGNOSIS — C64.1 MALIGNANT NEOPLASM OF RIGHT KIDNEY (HCC): ICD-10-CM

## 2024-02-05 PROCEDURE — 76775 US EXAM ABDO BACK WALL LIM: CPT

## 2024-02-08 DIAGNOSIS — G47.00 INSOMNIA, UNSPECIFIED TYPE: ICD-10-CM

## 2024-02-08 RX ORDER — TRAZODONE HYDROCHLORIDE 100 MG/1
TABLET ORAL
Qty: 90 TABLET | Refills: 1 | Status: SHIPPED | OUTPATIENT
Start: 2024-02-08

## 2024-02-14 NOTE — ASSESSMENT & PLAN NOTE
Surgical clearance completed.  She did not have a chest x-ray EKG done yet.  Please let me know if any abnormalities · Continue on statin - increase from simvastatin to atorvastatin 80 mg daily  · Ezetimibe added to therapy  ·

## 2024-02-21 DIAGNOSIS — M54.9 BACK PAIN, UNSPECIFIED BACK LOCATION, UNSPECIFIED BACK PAIN LATERALITY, UNSPECIFIED CHRONICITY: Primary | ICD-10-CM

## 2024-02-21 RX ORDER — BACLOFEN 20 MG/1
TABLET ORAL
Qty: 20 TABLET | Refills: 1 | Status: SHIPPED | OUTPATIENT
Start: 2024-02-21

## 2024-02-25 LAB
APOB+LDLR+PCSK9 GENE MUT ANL BLD/T: NOT DETECTED
BRCA1+BRCA2 DEL+DUP + FULL MUT ANL BLD/T: NOT DETECTED
MLH1+MSH2+MSH6+PMS2 GN DEL+DUP+FUL M: NOT DETECTED

## 2024-02-26 ENCOUNTER — TELEPHONE (OUTPATIENT)
Age: 79
End: 2024-02-26

## 2024-02-26 NOTE — TELEPHONE ENCOUNTER
Patient called and stated pcp had prescribed him medication for back pain. Patient stated it has not gotten any better and would like to be seen. Patient would like to seen as soon as possible. Unfortunately I did not see anything available in pcp schedule. Patient would only like to see his pcp. Please return patient call to schedule.

## 2024-02-28 ENCOUNTER — OFFICE VISIT (OUTPATIENT)
Dept: FAMILY MEDICINE CLINIC | Facility: CLINIC | Age: 79
End: 2024-02-28
Payer: MEDICARE

## 2024-02-28 VITALS
WEIGHT: 192.6 LBS | SYSTOLIC BLOOD PRESSURE: 140 MMHG | HEART RATE: 62 BPM | TEMPERATURE: 98.2 F | DIASTOLIC BLOOD PRESSURE: 70 MMHG | OXYGEN SATURATION: 96 % | BODY MASS INDEX: 26.96 KG/M2

## 2024-02-28 DIAGNOSIS — Z85.528 HISTORY OF RENAL CELL CARCINOMA: ICD-10-CM

## 2024-02-28 DIAGNOSIS — R10.9 LEFT FLANK PAIN: Primary | ICD-10-CM

## 2024-02-28 PROCEDURE — 99213 OFFICE O/P EST LOW 20 MIN: CPT | Performed by: FAMILY MEDICINE

## 2024-02-28 NOTE — PROGRESS NOTES
Name: Chuck Borja      : 1945      MRN: [id number]  Encounter Provider: Niranjan Kline DO  Encounter Date: 2024   Encounter department: [facility]    Assessment & Plan     1. Left flank pain  -     CT abdomen pelvis w contrast; Future; Expected date: 2024    2. History of renal cell carcinoma  -     CT abdomen pelvis w contrast; Future; Expected date: 2024    Patient complains of left sided flank and abdominal pain for the past few weeks.  He has tried baclofen without benefit.  Patient states his appetite is pretty good, but has been more constipated lately.  No blood in stools.  He had an ultrasound of his kidneys  (ordered by urology), which was negative.  He has history of clear-cell renal carcinoma right kidney.  He does workout regularly.  Exam today reveals tenderness in left flank/left-sided abdomen region.  With history of clear-cell carcinoma, I would like to send him for CT abdomen and pelvis with contrast will call with results.       Subjective     Patient complains of left sided flank and abdominal pain for the past few weeks.  Patient states his appetite is pretty good, but has been more constipated lately.  No blood in stools.  He had an ultrasound of his kidneys  (ordered by urology), which was negative.  He has history of clear-cell renal carcinoma right kidney.  He does workout regularly.      Review of Systems    Past Medical History:   Diagnosis Date   • Allergic    • Allergic reaction     last assessed: 2015   • Arthritis     right knee   • Bee sting allergy     last assessed: 2015   • BPH (benign prostatic hypertrophy)    • Cancer (HCC) 2017    Right kidney   • Diabetes mellitus (HCC)     Type II, NIDDM   • Enlarged prostate with lower urinary tract symptoms (LUTS)     last assessed: 2015   • Herpes zoster     last assessed: 2013   • History of colon polyps    • Hyperlipidemia    • Leukocytosis      last assessed: 11/18/2014   • Myocardial infarction (HCC) 4/13/22   • Osteoarthrosis, localized, primary, knee     last assessed: 10/25/2016   • Renal cancer (HCC)    • Seasonal allergies    • Shoulder pain, right    • Wears glasses    • Wears partial dentures     Upper and Lower     Past Surgical History:   Procedure Laterality Date   • CARDIAC CATHETERIZATION N/A 04/13/2022    Procedure: Cardiac pci;  Surgeon: José Cameron MD;  Location: [facility];  Service: Cardiology   • CARPAL TUNNEL RELEASE Left    • CHOLECYSTECTOMY      Open   • COLONOSCOPY     • FL GUIDED NEEDLE PLAC BX/ASP/INJ  06/01/2020   • FL GUIDED NEEDLE PLAC BX/ASP/INJ  11/13/2020   • GANGLION CYST EXCISION Left     Left wrist   • JOINT REPLACEMENT Left 08/2015    Left TKR   • JOINT REPLACEMENT Right 09/2017    Right TKR   • KNEE SURGERY  9/2017    Knee replacement   • NEPHRECTOMY  05/08/2017    Partial right nephrectomy   • RI ARTHRP INTERPOS INTERCARPAL/METACARPAL JOINTS Left 12/05/2016    Procedure: ARTHROPLASTY THUMB C[facility] ;  Surgeon: Dominguez Velazco MD;  Location: [facility];  Service: Orthopedics   • RI ARTHRP KNE CONDYLE&PLATU MEDIAL&LAT COMPARTMENTS Right 09/11/2017    Procedure: ARTHROPLASTY KNEE TOTAL;  Surgeon: Dominguez Velazco MD;  Location: [facility];  Service: Orthopedics   • RI LAPAROSCOPY SURG PARTIAL NEPHRECTOMY Right 05/08/2017    Procedure: LAPAROSCOPIC LYSIS OF ADHESIONS, ROBOTIC PARTIAL NEPHRECTOMY, WITH INTRA-OP LAPAROSCOPIC ULTRASOUND;  Surgeon: Jose Hidalgo MD;  Location: [facility];  Service: Urology   • RI NEUROPLASTY &/TRANSPOS MEDIAN NRV CARPAL TUNNE Left 12/05/2016    Procedure: RELEASE CARPAL TUNNEL;  Surgeon: Dominguez Velazco MD;  Location[facility];  Service: Orthopedics   • RI SURGICAL ARTHROSCOPY SHOULDER W/ROTATOR CUFF RPR Right 02/04/2019    Procedure: REPAIR ROTATOR CUFF ARTHROSCOPIC; SUBACROMIAL DECOMPRESSION; DISTAL CLAVICLE EXCISION;  Surgeon: Dominguez Velazco MD;  Location: [facility];  Service:  Orthopedics   • OH TENODESIS LONG TENDON BICEPS  2019    Procedure: TENODESIS BICEPS OPEN PROXIMAL;  Surgeon: Dominguez Velazco MD;  Location: [facility];  Service: Orthopedics   • TONSILLECTOMY AND ADENOIDECTOMY       Family History   Problem Relation Age of Onset   • Cancer Mother    • Stroke Father      Social History     Socioeconomic History   • Marital status: /Civil Union     Spouse name: None   • Number of children: None   • Years of education: None   • Highest education level: None   Occupational History   • None   Tobacco Use   • Smoking status: Former     Current packs/day: 0.00     Average packs/day: 0.3 packs/day for 12.0 years (3.0 ttl pk-yrs)     Types: Cigarettes     Start date: 1959     Quit date: 1971     Years since quittin.1     Passive exposure: Never   • Smokeless tobacco: Never   • Tobacco comments:     Smoked from ages 13 to 22.   Vaping Use   • Vaping status: Never Used   Substance and Sexual Activity   • Alcohol use: Not Currently   • Drug use: No   • Sexual activity: Not Currently     Partners: Female     Birth control/protection: None   Other Topics Concern   • None   Social History Narrative   • None     Social Determinants of Health     Financial Resource Strain: Low Risk  (2023)    Overall Financial Resource Strain (CARDIA)    • Difficulty of Paying Living Expenses: Not hard at all   Food Insecurity: No Food Insecurity (2022)    Hunger Vital Sign    • Worried About Running Out of Food in the Last Year: Never true    • Ran Out of Food in the Last Year: Never true   Transportation Needs: No Transportation Needs (2023)    PRAPARE - Transportation    • Lack of Transportation (Medical): No    • Lack of Transportation (Non-Medical): No   Physical Activity: Not on file   Stress: Not on file   Social Connections: Not on file   Intimate Partner Violence: Not on file   Housing Stability: Low Risk  (2022)    Housing Stability Vital Sign    • Unable to  Pay for Housing in the Last Year: No    • Number of Places Lived in the Last Year: 1    • Unstable Housing in the Last Year: No     Current Outpatient Medications on File Prior to Visit   Medication Sig   • Aspirin Low Dose 81 MG EC tablet TAKE 1 TABLET EVERY DAY   • atorvastatin (LIPITOR) 80 mg tablet TAKE 1 TABLET EVERY DAY WITH DINNER   • baclofen 20 mg tablet 1 tab BID PRN   • Blood Glucose Monitoring Suppl (ONE TOUCH ULTRA 2) w/Device KIT Use 2 (two) times a day   • Cholecalciferol (VITAMIN D-3 PO) Take 2,000 Units by mouth daily   • ciclopirox (PENLAC) 8 % solution APPLY TOPICALLY ONCE DAILY AT BEDTIME   • Empagliflozin (JARDIANCE) 10 MG TABS tablet Take 1 tablet (10 mg total) by mouth daily   • EPINEPHrine (EPIPEN) 0.3 mg/0.3 mL SOAJ Inject as directed   • ezetimibe (ZETIA) 10 mg tablet TAKE 1 TABLET AT BEDTIME   • fluticasone (FLONASE) 50 mcg/act nasal spray 1 spray into each nostril daily   • glucose blood (OneTouch Ultra) test strip Use 1 each in the morning   • glucose blood test strip Use 1 each daily as needed (once a day) One touch ultra  Test strips   • metFORMIN (GLUCOPHAGE) 500 mg tablet TAKE 1 TABLET TWICE DAILY   • metoprolol succinate (TOPROL-XL) 25 mg 24 hr tablet TAKE 1 TABLET EVERY DAY   • OneTouch Delica Lancets 33G MISC Use in the morning   • tamsulosin (FLOMAX) 0.4 mg Take 1 capsule (0.4 mg total) by mouth daily at bedtime   • traZODone (DESYREL) 100 mg tablet TAKE 1 TABLET AT BEDTIME     Allergies   Allergen Reactions   • Bee Venom Edema     Immunization History   Administered Date(s) Administered   • COVID-19 MODERNA VACC 0.5 ML IM 03/12/2021, 04/09/2021, 11/09/2021   • INFLUENZA 11/18/2014, 10/05/2015, 11/28/2016, 01/04/2018, 10/15/2018, 10/18/2021, 10/25/2022, 10/18/2023   • Influenza Split High Dose Preservative Free IM 11/18/2014, 10/05/2015, 11/28/2016, 01/04/2018   • Influenza, high dose seasonal 0.7 mL 10/15/2018, 10/03/2019, 09/18/2020   • Influenza, seasonal, injectable  10/05/2012, 10/09/2013   • Pneumococcal Conjugate 13-Valent 12/08/2015, 12/09/2015   • Pneumococcal Polysaccharide PPV23 10/09/2013   • Tdap 11/15/2004, 10/16/2017   • Zoster 10/05/2012   • Zoster Vaccine Recombinant 10/30/2020, 01/25/2021       Objective     /70 (BP Location: Left arm, Patient Position: Sitting, Cuff Size: Adult)   Pulse 62   Temp 98.2 °F (36.8 °C)   Wt 87.4 kg (192 lb 9.6 oz)   SpO2 96%   BMI 26.96 kg/m²     Physical Exam  Niranjan Kline DO

## 2024-02-29 ENCOUNTER — HOSPITAL ENCOUNTER (OUTPATIENT)
Dept: CT IMAGING | Facility: HOSPITAL | Age: 79
Discharge: HOME | End: 2024-02-29
Payer: MEDICARE

## 2024-02-29 DIAGNOSIS — R10.9 LEFT FLANK PAIN: ICD-10-CM

## 2024-02-29 DIAGNOSIS — Z85.528 HISTORY OF RENAL CELL CARCINOMA: ICD-10-CM

## 2024-02-29 PROCEDURE — 74177 CT ABD & PELVIS W/CONTRAST: CPT

## 2024-02-29 RX ADMIN — IOHEXOL 100 ML: 350 INJECTION, SOLUTION INTRAVENOUS at 11:48

## 2024-03-01 DIAGNOSIS — M54.6 ACUTE THORACIC BACK PAIN, UNSPECIFIED BACK PAIN LATERALITY: Primary | ICD-10-CM

## 2024-03-01 RX ORDER — TRAMADOL HYDROCHLORIDE 50 MG/1
TABLET ORAL
Qty: 10 TABLET | Refills: 0 | Status: SHIPPED | OUTPATIENT
Start: 2024-03-01

## 2024-03-06 ENCOUNTER — TELEPHONE (OUTPATIENT)
Dept: UROLOGY | Facility: CLINIC | Age: 79
End: 2024-03-06

## 2024-03-06 NOTE — TELEPHONE ENCOUNTER
Spoke to the patient 3/6/24 to inform him of the appointment cancellation for his 4/12/24 with kiran donovan the new date and time is 5/3/24 with Thiago at 11:20 am patient ok with the change

## 2024-03-09 ENCOUNTER — PATIENT MESSAGE (OUTPATIENT)
Dept: FAMILY MEDICINE CLINIC | Facility: CLINIC | Age: 79
End: 2024-03-09

## 2024-03-15 DIAGNOSIS — G47.00 INSOMNIA, UNSPECIFIED TYPE: Primary | ICD-10-CM

## 2024-03-15 RX ORDER — DOXEPIN HYDROCHLORIDE 10 MG/1
10 CAPSULE ORAL
Qty: 30 CAPSULE | Refills: 1 | Status: SHIPPED | OUTPATIENT
Start: 2024-03-15

## 2024-03-15 NOTE — PATIENT COMMUNICATION
Patient called would like to try new medication instead of Trazodone. Please send prescription to   [facility]  [address] [place] [address],[place]   [phone]

## 2024-04-11 DIAGNOSIS — I49.3 SYMPTOMATIC PVCS: ICD-10-CM

## 2024-04-11 DIAGNOSIS — I25.10 CORONARY ARTERY DISEASE INVOLVING NATIVE CORONARY ARTERY OF NATIVE HEART WITHOUT ANGINA PECTORIS: ICD-10-CM

## 2024-04-12 RX ORDER — METOPROLOL SUCCINATE 25 MG/1
25 TABLET, EXTENDED RELEASE ORAL DAILY
Qty: 90 TABLET | Refills: 1 | Status: SHIPPED | OUTPATIENT
Start: 2024-04-12

## 2024-04-16 NOTE — PROGRESS NOTES
Cardiology Office Note  MD Conor Jimenez MD Jason Kaplan, DO, [facility]  MD Abdi Bee DO, [facility]  Asad Patel DO, [facility]  ----------------------------------------------------------------  [address] [address]    Chuck Borja 78 y.o. male MRN: [id number]  Unit/Bed#:  Encounter: [id number]      History of Present Illness:  It was a pleasure to see Chuck Borja in the office today for follow-up CV evaluation.  He has a past medical history of CAD with high lateral STEMI and 1st diagonal stent placement in April 2022, dyslipidemia, type 2 diabetes mellitus and renal cell carcinoma status post right nephrectomy in May 2017.  He established care with us in April 2022. The patient was in his usual state of health when he began to experienced acute chest discomfort.  His symptoms began to wax and wane over the course of 24-48 hours prior to his presentation to the emergency department.  His symptoms then worsened and he came to [facility] for evaluation.  In the emergency department, he was found to have ST segment elevation in leads 1 and aVL with reciprocal changes in the inferior leads.  Additionally, there was AV dissociation noted on the ECG.  MI alert was called and he was taken to the cath lab.  He was found have occlusion of the 1st diagonal and underwent stent placement.  Echocardiogram demonstrated low-normal systolic function with regional wall motion abnormalities of the lateral wall.  AV dissociation resolved and the patient was placed on beta-blocker as well as ACE-inhibitor.  Ezetimibe was added to his regimen.  He was recommended for cardiac rehabilitation and discharged home.    Following discharge, he had undergone cardiac rehabilitation and has now completed rehabilitation as of August 2022. Due to it PVCs he was experiencing, he was increased on his metoprolol in August 2022.  Event  recorder demonstrated sinus rhythm with rare atrial and ventricular ectopy.  With no evidence of bradycardia arrhythmia.  Triggered events correlated with PVCs.  Overall, the patient had continued to feel well in his usual state of health.  He had been very active with continued weight training and cardiovascular/aerobic activity.  When he would push himself too far up the stairs into rapid rate, he experienced mild shortness of breath, but this was chronic since the heart attack.  He denies any chest pain, pressure, tightness or squeezing. Denies lightheadedness, dizziness or palpitations.  Denies lower extremity swelling, orthopnea or paroxysmal nocturnal dyspnea.    Review of Systems:  Review of Systems   Constitutional: Negative for decreased appetite, fever, weight gain and weight loss.   HENT:  Negative for congestion and sore throat.    Eyes:  Negative for visual disturbance.   Cardiovascular:  Negative for chest pain, dyspnea on exertion, leg swelling, near-syncope and palpitations.   Respiratory:  Negative for cough and shortness of breath.    Hematologic/Lymphatic: Negative for bleeding problem.   Skin:  Negative for rash.   Musculoskeletal:  Negative for myalgias and neck pain.   Gastrointestinal:  Negative for abdominal pain and nausea.   Neurological:  Negative for light-headedness and weakness.   Psychiatric/Behavioral:  Negative for depression.        Past Medical History:   Diagnosis Date    Allergic     Allergic reaction     last assessed: 5/26/2015    Arthritis     right knee    Bee sting allergy     last assessed: 6/30/2015    BPH (benign prostatic hypertrophy)     Cancer (HCC) 2017    Right kidney    Diabetes mellitus (HCC)     Type II, NIDDM    Enlarged prostate with lower urinary tract symptoms (LUTS)     last assessed: 8/17/2015    Herpes zoster     last assessed: 11/21/2013    History of colon polyps     Hyperlipidemia     Leukocytosis     last assessed: 11/18/2014    Myocardial infarction  (HCC) 4/13/22    Osteoarthrosis, localized, primary, knee     last assessed: 10/25/2016    Renal cancer (HCC)     Seasonal allergies     Shoulder pain, right     Wears glasses     Wears partial dentures     Upper and Lower       Past Surgical History:   Procedure Laterality Date    CARDIAC CATHETERIZATION N/A 04/13/2022    Procedure: Cardiac pci;  Surgeon: José Cameron MD;  Location: [facility];  Service: Cardiology    CARPAL TUNNEL RELEASE Left     CHOLECYSTECTOMY      Open    COLONOSCOPY      FL GUIDED NEEDLE PLAC BX/ASP/INJ  06/01/2020    FL GUIDED NEEDLE PLAC BX/ASP/INJ  11/13/2020    GANGLION CYST EXCISION Left     Left wrist    JOINT REPLACEMENT Left 08/2015    Left TKR    JOINT REPLACEMENT Right 09/2017    Right TKR    KNEE SURGERY  9/2017    Knee replacement    NEPHRECTOMY  05/08/2017    Partial right nephrectomy    KS ARTHRP INTERPOS INTERCARPAL/METACARPAL JOINTS Left 12/05/2016    Procedure: ARTHROPLASTY THUMB CMC ;  Surgeon: Dominguez Velazco MD;  Location: [facility];  Service: Orthopedics    KS ARTHRP KNE CONDYLE&PLATU MEDIAL&LAT COMPARTMENTS Right 09/11/2017    Procedure: ARTHROPLASTY KNEE TOTAL;  Surgeon: Dominguez Velazco MD;  Location: [facility];  Service: Orthopedics    KS LAPAROSCOPY SURG PARTIAL NEPHRECTOMY Right 05/08/2017    Procedure: LAPAROSCOPIC LYSIS OF ADHESIONS, ROBOTIC PARTIAL NEPHRECTOMY, WITH INTRA-OP LAPAROSCOPIC ULTRASOUND;  Surgeon: Jose Hidalgo MD;  Location: [facility];  Service: Urology    KS NEUROPLASTY &/TRANSPOS MEDIAN NRV CARPAL TUNNE Left 12/05/2016    Procedure: RELEASE CARPAL TUNNEL;  Surgeon: Dominguez Velazco MD;  Location: [facility];  Service: Orthopedics    KS SURGICAL ARTHROSCOPY SHOULDER W/ROTATOR CUFF RPR Right 02/04/2019    Procedure: REPAIR ROTATOR CUFF ARTHROSCOPIC; SUBACROMIAL DECOMPRESSION; DISTAL CLAVICLE EXCISION;  Surgeon: Dominguez Velazco MD;  Location: [facility];  Service: Orthopedics    KS TENODESIS LONG TENDON BICEPS  02/04/2019    Procedure:  TENODESIS BICEPS OPEN PROXIMAL;  Surgeon: Dominguez Velazco MD;  Location: [facility];  Service: Orthopedics    TONSILLECTOMY AND ADENOIDECTOMY         Social History     Socioeconomic History    Marital status: /Civil Union     Spouse name: None    Number of children: None    Years of education: None    Highest education level: None   Occupational History    None   Tobacco Use    Smoking status: Former     Current packs/day: 0.00     Average packs/day: 0.3 packs/day for 12.0 years (3.0 ttl pk-yrs)     Types: Cigarettes     Start date: 1959     Quit date: 1971     Years since quittin.3     Passive exposure: Never    Smokeless tobacco: Never    Tobacco comments:     Smoked from ages 13 to 22.   Vaping Use    Vaping status: Never Used   Substance and Sexual Activity    Alcohol use: Not Currently    Drug use: No    Sexual activity: Not Currently     Partners: Female     Birth control/protection: None   Other Topics Concern    None   Social History Narrative    None     Social Determinants of Health     Financial Resource Strain: Low Risk  (2023)    Overall Financial Resource Strain (CARDIA)     Difficulty of Paying Living Expenses: Not hard at all   Food Insecurity: No Food Insecurity (2022)    Hunger Vital Sign     Worried About Running Out of Food in the Last Year: Never true     Ran Out of Food in the Last Year: Never true   Transportation Needs: No Transportation Needs (2023)    PRAPARE - Transportation     Lack of Transportation (Medical): No     Lack of Transportation (Non-Medical): No   Physical Activity: Not on file   Stress: Not on file   Social Connections: Not on file   Intimate Partner Violence: Not on file   Housing Stability: Low Risk  (2022)    Housing Stability Vital Sign     Unable to Pay for Housing in the Last Year: No     Number of Places Lived in the Last Year: 1     Unstable Housing in the Last Year: No       Family History   Problem Relation Age of Onset     Cancer Mother     Stroke Father        Allergies   Allergen Reactions    Bee Venom Edema         Current Outpatient Medications:     Aspirin Low Dose 81 MG EC tablet, TAKE 1 TABLET EVERY DAY, Disp: 90 tablet, Rfl: 3    atorvastatin (LIPITOR) 80 mg tablet, TAKE 1 TABLET EVERY DAY WITH DINNER, Disp: 90 tablet, Rfl: 1    baclofen 20 mg tablet, 1 tab BID PRN, Disp: 20 tablet, Rfl: 1    Cholecalciferol (VITAMIN D-3 PO), Take 2,000 Units by mouth daily, Disp: , Rfl:     Empagliflozin (JARDIANCE) 10 MG TABS tablet, Take 1 tablet (10 mg total) by mouth daily, Disp: 90 tablet, Rfl: 1    ezetimibe (ZETIA) 10 mg tablet, TAKE 1 TABLET AT BEDTIME, Disp: 90 tablet, Rfl: 3    fluticasone (FLONASE) 50 mcg/act nasal spray, 1 spray into each nostril daily, Disp: 16 g, Rfl: 3    metFORMIN (GLUCOPHAGE) 500 mg tablet, TAKE 1 TABLET TWICE DAILY, Disp: 180 tablet, Rfl: 1    metoprolol succinate (TOPROL-XL) 25 mg 24 hr tablet, TAKE 1 TABLET EVERY DAY, Disp: 90 tablet, Rfl: 1    tamsulosin (FLOMAX) 0.4 mg, Take 1 capsule (0.4 mg total) by mouth daily at bedtime, Disp: 90 capsule, Rfl: 3    traMADol (Ultram) 50 mg tablet, 1 tab BID PRN mod-severe pain, Disp: 10 tablet, Rfl: 0    Blood Glucose Monitoring Suppl (ONE TOUCH ULTRA 2) w/Device KIT, Use 2 (two) times a day, Disp: 1 kit, Rfl: 0    ciclopirox (PENLAC) 8 % solution, APPLY TOPICALLY ONCE DAILY AT BEDTIME (Patient not taking: Reported on 4/18/2024), Disp: 7 mL, Rfl: 0    doxepin (SINEquan) 10 mg capsule, Take 1 capsule (10 mg total) by mouth daily at bedtime prn (Patient not taking: Reported on 4/18/2024), Disp: 30 capsule, Rfl: 1    EPINEPHrine (EPIPEN) 0.3 mg/0.3 mL SOAJ, Inject as directed, Disp: , Rfl:     glucose blood (OneTouch Ultra) test strip, Use 1 each in the morning, Disp: 100 strip, Rfl: 3    glucose blood test strip, Use 1 each daily as needed (once a day) One touch ultra  Test strips, Disp: 100 strip, Rfl: 3    OneTouch Delica Lancets 33G MISC, Use in the morning, Disp:  "100 each, Rfl: 3    Vitals:    04/18/24 0844   BP: 115/60   Pulse: (!) 53   Weight: 84.4 kg (186 lb)   Height: 5' 11\" (1.803 m)       Body mass index is 25.94 kg/m².    PHYSICAL EXAMINATION:  Gen: Awake, Alert, NAD   Head/eyes: AT/NC, pupils equal and round, Anicteric  ENT: mmm  Neck: Supple, No elevated JVP, trachea midline  Resp: CTA bilaterally no w/r/r  CV: RRR +S1, S2, No m/r/g  Abd: Soft, NT/ND + BS  Ext: no LE edema bilaterally  Neuro: Follows commands, moves all extermities  Psych: Appropriate affect, pleasant mood, pleasant attitude, non-combative  Skin: warm; no rash, erythema or venous stasis changes on exposed skin    --------------------------------------------------------------------------------  TREADMILL STRESS  No results found for this or any previous visit.     --------------------------------------------------------------------------------  NUCLEAR STRESS TEST: No results found for this or any previous visit.    No results found for this or any previous visit.      --------------------------------------------------------------------------------  CATH:  No results found for this or any previous visit.    --------------------------------------------------------------------------------  ECHO:   No results found for this or any previous visit.    No results found for this or any previous visit.    --------------------------------------------------------------------------------  HOLTER  No results found for this or any previous visit.    No results found for this or any previous visit.    --------------------------------------------------------------------------------  CAROTIDS  No results found for this or any previous visit.     --------------------------------------------------------------------------------  ECGs:  Results for orders placed or performed in visit on 04/18/24   POCT ECG    Impression    Sinus rhythm w/ 1st degree AV block w/ occasional PVCs 53 bpm, LAD        Lab Results   Component " "Value Date    WBC 7.40 07/26/2023    HGB 13.6 07/26/2023    HCT 41.7 07/26/2023    MCV 94 07/26/2023     07/26/2023      Lab Results   Component Value Date    SODIUM 136 01/15/2024    K 4.1 01/15/2024     01/15/2024    CO2 28 01/15/2024    BUN 17 01/15/2024    CREATININE 0.98 01/15/2024    GLUC 141 (H) 04/14/2022    CALCIUM 9.6 01/15/2024      Lab Results   Component Value Date    HGBA1C 7.4 (H) 01/15/2024      Lab Results   Component Value Date    CHOL 149 12/27/2017    CHOL 129 06/21/2017    CHOL 140 12/12/2016     Lab Results   Component Value Date    HDL 32 (L) 07/26/2023    HDL 35 (L) 01/20/2023    HDL 26 (L) 07/19/2022     Lab Results   Component Value Date    LDLCALC 21 07/26/2023    LDLCALC 23 01/20/2023    LDLCALC 24 07/19/2022     Lab Results   Component Value Date    TRIG 53 07/26/2023    TRIG 69 01/20/2023    TRIG 60 07/19/2022     No results found for: \"CHOLHDL\"   Lab Results   Component Value Date    INR 0.97 04/13/2022    INR 0.97 08/18/2017    INR 0.97 11/21/2016    PROTIME 12.7 04/13/2022    PROTIME 12.9 08/18/2017    PROTIME 12.9 11/21/2016        1. Coronary artery disease involving native coronary artery of native heart without angina pectoris  -     POCT ECG    2. Symptomatic PVCs  -     POCT ECG    3. Type 2 diabetes mellitus with other specified complication, without long-term current use of insulin (HCC)    4. Mixed hyperlipidemia    5. Nonrheumatic mitral valve regurgitation          IMPRESSION:  CAD  Lateral STEMI s/p SURJIT-D1 w/ residual 10% ostial LCx and 20% mid RCA, April 2022  LVEF 52% inferolateral and anterolateral hypokinesis from base to apex mild LA dilatation, AV sclerosis, trace AR, MV sclerosis, mild MR, trace TR, April 2022  Symptomatic PVCs  Event recorder w/ SR avg HR 60 bpm, rare PACs, rare atrial couplets/triplets, rare PVCs, rare ventricular couplets, nonsustained PSVT (x1) 9 beat 128 bpm, triggered events correlated with PVCs, August 2022  Transient AV " dissociation with junctional rhythm during STEMI likely vagal - resolved  Type 2 diabetes mellitus  Dyslipidemia w/ LDL 21 mg/dL, HDL 32 mg/dL, TG 53 mg/dL, July 2023  History of renal cell carcinoma status post right nephrectomy, 2007    PLAN:  It was a pleasure to see Que in the office today for follow-up CV evaluation.  He is here today for routine CV follow-up.  Since her last encounter, he has been feeling well overall.  He has some occasional shortness of breath with significant activity, but this is unchanged since the myocardial infarction in April 2022.  He has no chest pain concerning for angina and no signs or symptoms of heart failure.  Clinically he examines to be euvolemic.  Blood pressure and heart rate are currently stable.  ECG shows no acute ischemic changes.  It does however show occasional PVCs.  He is not feeling the PVCs as he had in the past.  He is currently tolerating his medications without any reported adverse effects.  The patient can perform greater than 4 METS on daily basis mostly without any exertional symptoms.  Based on his clinical presentation, I have the following recommendations:    1.  Would check 2D echocardiogram to reassess cardiac structure and function with mild valvular regurgitation as well as increase in PVC burden.  2.  Will check a 24-hour Holter monitor to assess his burden of PVCs based on the ECG findings.  3.  Continue Toprol-XL at current dosing.  Based on the results of the monitor, may consider up titration of the medication.  4.  Lifelong aspirin  5.  Continue high intensity statin and Zetia.  Goal LDL is less than 70 mg/dL.  Patient is currently at goal.  6.  Follow-up with primary care for blood glucose management.  Continue Jardiance  7.  Recommend heart healthy diet low in sodium and carbohydrate.  8.  30 minutes a day, 5 days a week of moderate intensity activity to build cardiovascular endurance.  9.  We will follow-up with him after testing to review  "the results.    As always, please do not hesitate to call with any questions.    Portions of the record may have been created with voice recognition software. Occasional wrong word or \"sound a like\" substitutions may have occurred due to the inherent limitations of voice recognition software. Read the chart carefully and recognize, using context, where substitutions have occurred.      Signed: David Gaitan DO, FACC, [place], FACP  "

## 2024-04-18 ENCOUNTER — OFFICE VISIT (OUTPATIENT)
Dept: CARDIOLOGY CLINIC | Facility: CLINIC | Age: 79
End: 2024-04-18
Payer: MEDICARE

## 2024-04-18 VITALS
HEIGHT: 71 IN | WEIGHT: 186 LBS | HEART RATE: 53 BPM | BODY MASS INDEX: 26.04 KG/M2 | DIASTOLIC BLOOD PRESSURE: 60 MMHG | SYSTOLIC BLOOD PRESSURE: 115 MMHG

## 2024-04-18 DIAGNOSIS — I49.3 SYMPTOMATIC PVCS: ICD-10-CM

## 2024-04-18 DIAGNOSIS — E11.69 TYPE 2 DIABETES MELLITUS WITH OTHER SPECIFIED COMPLICATION, WITHOUT LONG-TERM CURRENT USE OF INSULIN (HCC): ICD-10-CM

## 2024-04-18 DIAGNOSIS — E78.2 MIXED HYPERLIPIDEMIA: ICD-10-CM

## 2024-04-18 DIAGNOSIS — I34.0 NONRHEUMATIC MITRAL VALVE REGURGITATION: ICD-10-CM

## 2024-04-18 DIAGNOSIS — I25.10 CORONARY ARTERY DISEASE INVOLVING NATIVE CORONARY ARTERY OF NATIVE HEART WITHOUT ANGINA PECTORIS: Primary | ICD-10-CM

## 2024-04-18 PROCEDURE — 99214 OFFICE O/P EST MOD 30 MIN: CPT | Performed by: INTERNAL MEDICINE

## 2024-04-18 PROCEDURE — 93000 ELECTROCARDIOGRAM COMPLETE: CPT | Performed by: INTERNAL MEDICINE

## 2024-05-02 DIAGNOSIS — R35.0 BENIGN PROSTATIC HYPERPLASIA WITH URINARY FREQUENCY: ICD-10-CM

## 2024-05-02 DIAGNOSIS — N40.1 BENIGN PROSTATIC HYPERPLASIA WITH URINARY FREQUENCY: ICD-10-CM

## 2024-05-02 DIAGNOSIS — I21.3 STEMI (ST ELEVATION MYOCARDIAL INFARCTION) (HCC): ICD-10-CM

## 2024-05-03 ENCOUNTER — OFFICE VISIT (OUTPATIENT)
Dept: UROLOGY | Facility: CLINIC | Age: 79
End: 2024-05-03
Payer: MEDICARE

## 2024-05-03 VITALS
OXYGEN SATURATION: 96 % | HEART RATE: 58 BPM | HEIGHT: 71 IN | SYSTOLIC BLOOD PRESSURE: 130 MMHG | BODY MASS INDEX: 25.9 KG/M2 | WEIGHT: 185 LBS | DIASTOLIC BLOOD PRESSURE: 68 MMHG

## 2024-05-03 DIAGNOSIS — C64.1 MALIGNANT NEOPLASM OF RIGHT KIDNEY (HCC): Primary | ICD-10-CM

## 2024-05-03 PROCEDURE — 99213 OFFICE O/P EST LOW 20 MIN: CPT

## 2024-05-03 RX ORDER — ATORVASTATIN CALCIUM 80 MG/1
TABLET, FILM COATED ORAL
Qty: 90 TABLET | Refills: 1 | Status: SHIPPED | OUTPATIENT
Start: 2024-05-03

## 2024-05-03 RX ORDER — TAMSULOSIN HYDROCHLORIDE 0.4 MG/1
0.4 CAPSULE ORAL
Qty: 90 CAPSULE | Refills: 1 | Status: SHIPPED | OUTPATIENT
Start: 2024-05-03

## 2024-05-03 NOTE — PROGRESS NOTES
Office Visit- Urology  Chuck Borja 1945 MRN: [id number]      Assessment/Discussion/Plan    78 y.o. male managed by     BPH with lower tract symptoms  -On Flomax 0.4 mg  -Happy with current urinary pattern    2.  Right RCC  -S/p robot-assisted laparoscopic right partial nephrectomy  -Pathology with pT1a disease  -No evidence of recurrence on ultrasound the kidneys and bladder obtained on 2/5/2024  -Patient had CAT scan in February 2024 due to abdominal pain with stable post partial nephrectomy changes of the right kidney with no discrete mass or hydronephrosis  -Creatinine 0.98 in January 2024.  Stable  -Will plan for continued surveillance with ultrasound in 1 year    3.  Prostate cancer screening  -Last PSA was 3.1 in January 2023.  Has PSA ordered by PCP  -Patient reports BITA by PCP        Chief Complaint:   Chuck is a 78 y.o. male presenting to the office for a follow up visit regarding BPH with lower tract symptoms        Subjective    Patient is a 78-year-old male with a history of BPH, right renal cell carcinoma s/p partial nephrectomy, routine prostate cancer screening presents the office today for follow-up.  He was last in the office in May 2023.  He is maintained on Flomax 2.4 mg for control of his urinary symptoms and he is happy with his current urinary pattern on Flomax.  He underwent a robot-assisted laparoscopic right partial nephrectomy in May 2017.  Pathology with pT1a disease.  Subsequent imaging did not reveal any evidence of recurrence.  He had a CT scan in February 2024 due to abdominal pain and at that point in time there is stable post partial nephrectomy changes the right kidney with no discrete mass or hydronephrosis.  No dysuria, gross hematuria, flank pain      AUA SYMPTOM SCORE      Flowsheet Row Most Recent Value   AUA SYMPTOM SCORE    How often have you had a sensation of not emptying your bladder completely after you finished urinating? 0 (P)    How often have you had to  urinate again less than two hours after you finished urinating? 2 (P)    How often have you found you stopped and started again several times when you urinate? 0 (P)    How often have you found it difficult to postpone urination? 1 (P)    How often have you had a weak urinary stream? 0 (P)    How often have you had to push or strain to begin urination? 0 (P)    How many times did you most typically get up to urinate from the time you went to bed at night until the time you got up in the morning? 2 (P)    Quality of Life: If you were to spend the rest of your life with your urinary condition just the way it is now, how would you feel about that? 1 (P)    AUA SYMPTOM SCORE 5 (P)             ROS:   Review of Systems   Constitutional: Negative.  Negative for chills, fatigue and fever.   HENT: Negative.     Respiratory:  Negative for shortness of breath.    Cardiovascular:  Negative for chest pain.   Gastrointestinal: Negative.  Negative for abdominal pain.   Endocrine: Negative.    Musculoskeletal: Negative.    Skin: Negative.    Neurological: Negative.  Negative for dizziness and light-headedness.   Hematological: Negative.    Psychiatric/Behavioral: Negative.           Past Medical History  Past Medical History:   Diagnosis Date    Allergic     Allergic reaction     last assessed: 5/26/2015    Arthritis     right knee    Bee sting allergy     last assessed: 6/30/2015    BPH (benign prostatic hypertrophy)     Cancer ([facility]) 2017    Right kidney    Diabetes mellitus (HCC)     Type II, NIDDM    Enlarged prostate with lower urinary tract symptoms (LUTS)     last assessed: 8/17/2015    Herpes zoster     last assessed: 11/21/2013    History of colon polyps     Hyperlipidemia     Leukocytosis     last assessed: 11/18/2014    Myocardial infarction (HCC) 4/13/22    Osteoarthrosis, localized, primary, knee     last assessed: 10/25/2016    Renal cancer ([facility])     Seasonal allergies     Shoulder pain, right     Wears glasses      Wears partial dentures     Upper and Lower       Past Surgical History  Past Surgical History:   Procedure Laterality Date    CARDIAC CATHETERIZATION N/A 04/13/2022    Procedure: Cardiac pci;  Surgeon: José Cameron MD;  [facility];  Service: Cardiology    CARPAL TUNNEL RELEASE Left     CHOLECYSTECTOMY      Open    COLONOSCOPY      FL GUIDED NEEDLE PLAC BX/ASP/INJ  06/01/2020    FL GUIDED NEEDLE PLAC BX/ASP/INJ  11/13/2020    GANGLION CYST EXCISION Left     Left wrist    JOINT REPLACEMENT Left 08/2015    Left TKR    JOINT REPLACEMENT Right 09/2017    Right TKR    KNEE SURGERY  9/2017    Knee replacement    NEPHRECTOMY  05/08/2017    Partial right nephrectomy    SD ARTHRP INTERPOS INTERCARPAL/METACARPAL JOINTS Left 12/05/2016    Procedure: ARTHROPLASTY THUMB CMC ;  Surgeon: Dominguez Velazco MD;  [facility];  Service: Orthopedics    SD ARTHRP KNE CONDYLE&PLATU MEDIAL&LAT COMPARTMENTS Right 09/11/2017    Procedure: ARTHROPLASTY KNEE TOTAL;  Surgeon: Dominguez Velazco MD;  [facility];  Service: Orthopedics    SD LAPAROSCOPY SURG PARTIAL NEPHRECTOMY Right 05/08/2017    Procedure: LAPAROSCOPIC LYSIS OF ADHESIONS, ROBOTIC PARTIAL NEPHRECTOMY, WITH INTRA-OP LAPAROSCOPIC ULTRASOUND;  Surgeon: Jose Hidalgo MD;  [facility];  Service: Urology    SD NEUROPLASTY &/TRANSPOS MEDIAN NRV CARPAL TUNNE Left 12/05/2016    Procedure: RELEASE CARPAL TUNNEL;  Surgeon: Dominguez Velazco MD;  [facility];  Service: Orthopedics    SD SURGICAL ARTHROSCOPY SHOULDER W/ROTATOR CUFF RPR Right 02/04/2019    Procedure: REPAIR ROTATOR CUFF ARTHROSCOPIC; SUBACROMIAL DECOMPRESSION; DISTAL CLAVICLE EXCISION;  Surgeon: Dominguez Velazco MD;  [facility];  Service: Orthopedics    SD TENODESIS LONG TENDON BICEPS  02/04/2019    Procedure: TENODESIS BICEPS OPEN PROXIMAL;  Surgeon: Dominguez Velazco MD;  [facility];  Service: Orthopedics    TONSILLECTOMY AND ADENOIDECTOMY         Past  Family History  Family History   Problem Relation Age of Onset    Cancer Mother     Stroke Father        Past Social history  Social History     Socioeconomic History    Marital status: /Civil Union     Spouse name: Not on file    Number of children: Not on file    Years of education: Not on file    Highest education level: Not on file   Occupational History    Not on file   Tobacco Use    Smoking status: Former     Current packs/day: 0.00     Average packs/day: 0.3 packs/day for 12.0 years (3.0 ttl pk-yrs)     Types: Cigarettes     Start date: 1959     Quit date: 1971     Years since quittin.3     Passive exposure: Never    Smokeless tobacco: Never    Tobacco comments:     Smoked from ages 13 to 22.   Vaping Use    Vaping status: Never Used   Substance and Sexual Activity    Alcohol use: Not Currently    Drug use: No    Sexual activity: Not Currently     Partners: Female     Birth control/protection: None   Other Topics Concern    Not on file   Social History Narrative    Not on file     Social Determinants of Health     Financial Resource Strain: Low Risk  (2023)    Overall Financial Resource Strain (CARDIA)     Difficulty of Paying Living Expenses: Not hard at all   Food Insecurity: No Food Insecurity (2022)    Hunger Vital Sign     Worried About Running Out of Food in the Last Year: Never true     Ran Out of Food in the Last Year: Never true   Transportation Needs: No Transportation Needs (2023)    PRAPARE - Transportation     Lack of Transportation (Medical): No     Lack of Transportation (Non-Medical): No   Physical Activity: Not on file   Stress: Not on file   Social Connections: Not on file   Intimate Partner Violence: Not on file   Housing Stability: Low Risk  (2022)    Housing Stability Vital Sign     Unable to Pay for Housing in the Last Year: No     Number of Places Lived in the Last Year: 1     Unstable Housing in the Last Year: No       Current  Medications  Current Outpatient Medications   Medication Sig Dispense Refill    Aspirin Low Dose 81 MG EC tablet TAKE 1 TABLET EVERY DAY 90 tablet 3    atorvastatin (LIPITOR) 80 mg tablet TAKE 1 TABLET EVERY DAY WITH DINNER 90 tablet 1    baclofen 20 mg tablet 1 tab BID PRN 20 tablet 1    Blood Glucose Monitoring Suppl (ONE TOUCH ULTRA 2) w/Device KIT Use 2 (two) times a day 1 kit 0    Cholecalciferol (VITAMIN D-3 PO) Take 2,000 Units by mouth daily      Empagliflozin (JARDIANCE) 10 MG TABS tablet Take 1 tablet (10 mg total) by mouth daily 90 tablet 1    EPINEPHrine (EPIPEN) 0.3 mg/0.3 mL SOAJ Inject as directed      ezetimibe (ZETIA) 10 mg tablet TAKE 1 TABLET AT BEDTIME 90 tablet 3    fluticasone (FLONASE) 50 mcg/act nasal spray 1 spray into each nostril daily 16 g 3    glucose blood (OneTouch Ultra) test strip Use 1 each in the morning 100 strip 3    glucose blood test strip Use 1 each daily as needed (once a day) One touch ultra  Test strips 100 strip 3    metFORMIN (GLUCOPHAGE) 500 mg tablet TAKE 1 TABLET TWICE DAILY 180 tablet 1    metoprolol succinate (TOPROL-XL) 25 mg 24 hr tablet TAKE 1 TABLET EVERY DAY 90 tablet 1    OneTouch Delica Lancets 33G MISC Use in the morning 100 each 3    tamsulosin (FLOMAX) 0.4 mg TAKE 1 CAPSULE AT BEDTIME 90 capsule 1    traMADol (Ultram) 50 mg tablet 1 tab BID PRN mod-severe pain 10 tablet 0    ciclopirox (PENLAC) 8 % solution APPLY TOPICALLY ONCE DAILY AT BEDTIME (Patient not taking: Reported on 4/18/2024) 7 mL 0    doxepin (SINEquan) 10 mg capsule Take 1 capsule (10 mg total) by mouth daily at bedtime prn (Patient not taking: Reported on 4/18/2024) 30 capsule 1     No current facility-administered medications for this visit.       Allergies  Allergies   Allergen Reactions    Bee Venom Edema       OBJECTIVE    Vitals   Vitals:    05/03/24 1130   BP: 130/68   BP Location: Left arm   Patient Position: Sitting   Cuff Size: Adult   Pulse: 58   SpO2: 96%   Weight: 83.9 kg (185 lb)  "  Height: 5' 11\" (1.803 m)       PVR:    Physical Exam  Constitutional:       General: He is not in acute distress.     Appearance: Normal appearance. He is normal weight. He is not ill-appearing or toxic-appearing.   HENT:      Head: Normocephalic and atraumatic.   Eyes:      Conjunctiva/sclera: Conjunctivae normal.   Cardiovascular:      Rate and Rhythm: Normal rate.   Pulmonary:      Effort: Pulmonary effort is normal. No respiratory distress.   Skin:     General: Skin is warm and dry.   Neurological:      General: No focal deficit present.      Mental Status: He is alert and oriented to person, place, and time.      Cranial Nerves: No cranial nerve deficit.   Psychiatric:         Mood and Affect: Mood normal.         Behavior: Behavior normal.         Thought Content: Thought content normal.          Labs:     Lab Results   Component Value Date    PSA 3.1 01/20/2023    PSA 3.1 01/26/2022    PSA 3.0 12/29/2020    PSA 2.8 01/08/2020     Lab Results   Component Value Date    CREATININE 0.98 01/15/2024      Lab Results   Component Value Date    HGBA1C 7.4 (H) 01/15/2024     Lab Results   Component Value Date    GLUCOSE 195 (H) 05/08/2017    CALCIUM 9.6 01/15/2024     12/27/2017    K 4.1 01/15/2024    CO2 28 01/15/2024     01/15/2024    BUN 17 01/15/2024    CREATININE 0.98 01/15/2024       I have personally reviewed all pertinent lab results and reviewed with patient    Imaging   CT ABDOMEN AND PELVIS WITH IV CONTRAST     INDICATION:   Unspecified abdominal pain. Personal history of other malignant neoplasm of kidney.       COMPARISON:  Comparison made with previous examination(s) dated (CT) 03-Jan-2022,(CT) 04-Jan-2021,(CT) 16-Dec-2019.     TECHNIQUE:  CT examination of the abdomen and pelvis was performed. Multiplanar 2D reformatted images were created from the source data.     This examination, like all CT scans performed in the [facility], was performed utilizing techniques to " minimize radiation dose exposure, including the use of iterative reconstruction and automated exposure control. Radiation dose length   product (DLP) for this visit: 537.71     IV Contrast:  iohexol (OMNIPAQUE) 350 MG/ML injection (MULTI-DOSE) 100 mL -   Enteric Contrast:  Enteric contrast was not administered.     FINDINGS:     ABDOMEN     LOWER CHEST: Bibasilar atelectasis. Trace pericardial effusion.     LIVER/BILIARY TREE: Subcentimeter hypoattenuating lesion(s) too small to characterize, but statistically likely benign findings which do not require follow-up (ACR White Paper 2017). No suspicious mass. Hepatic contours are normal. No biliary dilation.     GALLBLADDER: Post cholecystectomy.     SPLEEN: Unremarkable.     PANCREAS: Unremarkable.      ADRENAL GLANDS: Unremarkable.     KIDNEYS/URETERS: The right kidney demonstrates post partial nephrectomy changes which appear stable. No discrete mass is identified. No stones or hydronephrosis is identified. The left kidney demonstrates prompt enhancement. No masses, stones or   hydronephrosis is identified. The ureters are normal in caliber. No stones or masses are seen along the course of the ureters.     STOMACH AND BOWEL: There is a small hiatal hernia, unchanged. There are findings suggestive of mild bowel wall thickening involving the transverse and proximal descending colon though the colon is not well distended. No discrete mass or inflammatory   change is identified.     APPENDIX: No findings to suggest appendicitis.     ABDOMINOPELVIC CAVITY: No ascites. No pneumoperitoneum. No lymphadenopathy.     VESSELS: The aorta and IVC are normal in caliber. Vascular calcifications are noted.     PELVIS     REPRODUCTIVE ORGANS: The prostate is enlarged with intravesical protrusion.     URINARY BLADDER: The bladder is moderately distended. There is diffuse urinary bladder wall thickening. No discrete mass is identified.     ABDOMINAL WALL/INGUINAL REGIONS: Unchanged  small fat-containing umbilical hernia. There are small fat-containing bilateral inguinal hernias.     BONES: No acute fracture or suspicious osseous lesion. There is stable sclerotic lesion in the left ilium likely representing a bone island.     IMPRESSION:  1. Stable post partial nephrectomy changes of the right kidney. No discrete mass or hydronephrosis is identified.  2. Enlargement of the prostate gland with intravesical protrusion. There is diffuse urinary bladder wall thickening likely related to hypertrophy but should be correlated clinically.  3. Suggestion of possible mild bowel wall thickening involving the transverse and proximal descending colon though the colon is not well distended and there is no adjacent inflammatory change. Correlate for possible colitis though this could be related   to poor distention.  4. Please see above text for additional details        Electronically signed: 02/29/2024 04:14 PM MD Thiago Betancourt PA-C  Date: 5/3/2024 Time: 11:44 AM  [facility]    This note was written using fluency dictation software. Please excuse any resulting minor grammatical errors.

## 2024-06-06 DIAGNOSIS — F51.01 PRIMARY INSOMNIA: Primary | ICD-10-CM

## 2024-06-06 RX ORDER — TRAZODONE HYDROCHLORIDE 100 MG/1
100 TABLET ORAL
Qty: 90 TABLET | Refills: 1 | Status: SHIPPED | OUTPATIENT
Start: 2024-06-06

## 2024-06-14 DIAGNOSIS — M54.50 ACUTE LOW BACK PAIN, UNSPECIFIED BACK PAIN LATERALITY, UNSPECIFIED WHETHER SCIATICA PRESENT: Primary | ICD-10-CM

## 2024-06-14 DIAGNOSIS — M54.9 BACK PAIN, UNSPECIFIED BACK LOCATION, UNSPECIFIED BACK PAIN LATERALITY, UNSPECIFIED CHRONICITY: ICD-10-CM

## 2024-06-14 DIAGNOSIS — M54.6 ACUTE THORACIC BACK PAIN, UNSPECIFIED BACK PAIN LATERALITY: ICD-10-CM

## 2024-06-14 RX ORDER — BACLOFEN 20 MG/1
TABLET ORAL
Qty: 20 TABLET | Refills: 1 | Status: SHIPPED | OUTPATIENT
Start: 2024-06-14

## 2024-06-14 RX ORDER — TRAMADOL HYDROCHLORIDE 50 MG/1
TABLET ORAL
Qty: 14 TABLET | Refills: 0 | Status: SHIPPED | OUTPATIENT
Start: 2024-06-14

## 2024-06-20 ENCOUNTER — OFFICE VISIT (OUTPATIENT)
Dept: FAMILY MEDICINE CLINIC | Facility: CLINIC | Age: 79
End: 2024-06-20
Payer: MEDICARE

## 2024-06-20 VITALS
OXYGEN SATURATION: 99 % | WEIGHT: 183.6 LBS | HEART RATE: 55 BPM | BODY MASS INDEX: 25.7 KG/M2 | SYSTOLIC BLOOD PRESSURE: 120 MMHG | DIASTOLIC BLOOD PRESSURE: 72 MMHG | HEIGHT: 71 IN

## 2024-06-20 DIAGNOSIS — H60.501 ACUTE OTITIS EXTERNA OF RIGHT EAR, UNSPECIFIED TYPE: Primary | ICD-10-CM

## 2024-06-20 PROCEDURE — 99213 OFFICE O/P EST LOW 20 MIN: CPT | Performed by: FAMILY MEDICINE

## 2024-06-20 PROCEDURE — G2211 COMPLEX E/M VISIT ADD ON: HCPCS | Performed by: FAMILY MEDICINE

## 2024-06-20 RX ORDER — CEPHALEXIN 500 MG/1
500 CAPSULE ORAL 3 TIMES DAILY
Qty: 21 CAPSULE | Refills: 0 | Status: SHIPPED | OUTPATIENT
Start: 2024-06-20 | End: 2024-06-27

## 2024-06-20 NOTE — PROGRESS NOTES
Ambulatory Visit  Name: Chuck Borja      : 1945      MRN: [id number]  Encounter Provider: Niranjan Kline DO  Encounter Date: 2024   Encounter department: [facility]    Assessment & Plan   1. Acute otitis externa of right ear, unspecified type  -     neomycin-polymyxin-hydrocortisone (CORTISPORIN) otic solution; 4 drops in right ear 3 times daily x 7 days  -     cephalexin (KEFLEX) 500 mg capsule; Take 1 capsule (500 mg total) by mouth 3 (three) times a day for 7 days  Patient presents with 2-day history of right ear pain.  Otherwise he feels well.  On examination, patient has cerumen impaction as well as pain with tragal manipulation.  Wax was irrigated/curetted today with resolution of impaction.  External auditory canal is erythematous and somewhat macerated.  Prescription for Cortisporin otic and cephalexin oral antibiotic given for 7 days.  Call if further problems       History of Present Illness     Patient presents with 2-day history of right ear pain.  Otherwise patient feels well    Earache       Review of Systems   HENT:  Positive for ear pain.      Past Medical History:   Diagnosis Date   • Allergic    • Allergic reaction     last assessed: 2015   • Arthritis     right knee   • Bee sting allergy     last assessed: 2015   • BPH (benign prostatic hypertrophy)    • Cancer ([facility]) 2017    Right kidney   • Diabetes mellitus ([facility])     Type II, NIDDM   • Enlarged prostate with lower urinary tract symptoms (LUTS)     last assessed: 2015   • Herpes zoster     last assessed: 2013   • History of colon polyps    • Hyperlipidemia    • Leukocytosis     last assessed: 2014   • Myocardial infarction (HCC) 22   • Osteoarthrosis, localized, primary, knee     last assessed: 10/25/2016   • Renal cancer ([facility])    • Seasonal allergies    • Shoulder pain, right    • Wears glasses    • Wears partial dentures     Upper and Lower     Past Surgical History:    Procedure Laterality Date   • CARDIAC CATHETERIZATION N/A 04/13/2022    Procedure: Cardiac pci;  Surgeon: José Cameron MD;  [facility];  Service: Cardiology   • CARPAL TUNNEL RELEASE Left    • CHOLECYSTECTOMY      Open   • COLONOSCOPY     • FL GUIDED NEEDLE PLAC BX/ASP/INJ  06/01/2020   • FL GUIDED NEEDLE PLAC BX/ASP/INJ  11/13/2020   • GANGLION CYST EXCISION Left     Left wrist   • JOINT REPLACEMENT Left 08/2015    Left TKR   • JOINT REPLACEMENT Right 09/2017    Right TKR   • KNEE SURGERY  9/2017    Knee replacement   • NEPHRECTOMY  05/08/2017    Partial right nephrectomy   • PA ARTHRP INTERPOS INTERCARPAL/METACARPAL JOINTS Left 12/05/2016    Procedure: ARTHROPLASTY THUMB CMC ;  Surgeon: Dominguez Velazco MD;  [facility];  Service: Orthopedics   • PA ARTHRP KNE CONDYLE&PLATU MEDIAL&LAT COMPARTMENTS Right 09/11/2017    Procedure: ARTHROPLASTY KNEE TOTAL;  Surgeon: Dominguez Velazco MD;  [facility];  Service: Orthopedics   • PA LAPAROSCOPY SURG PARTIAL NEPHRECTOMY Right 05/08/2017    Procedure: LAPAROSCOPIC LYSIS OF ADHESIONS, ROBOTIC PARTIAL NEPHRECTOMY, WITH INTRA-OP LAPAROSCOPIC ULTRASOUND;  Surgeon: Jose Hidalgo MD;  [facility];  Service: Urology   • PA NEUROPLASTY &/TRANSPOS MEDIAN NRV CARPAL TUNNE Left 12/05/2016    Procedure: RELEASE CARPAL TUNNEL;  Surgeon: Dominguez Velazco MD;  [facility];  Service: Orthopedics   • PA SURGICAL ARTHROSCOPY SHOULDER W/ROTATOR CUFF RPR Right 02/04/2019    Procedure: REPAIR ROTATOR CUFF ARTHROSCOPIC; SUBACROMIAL DECOMPRESSION; DISTAL CLAVICLE EXCISION;  Surgeon: Dominguez Velazco MD;  [facility];  Service: Orthopedics   • PA TENODESIS LONG TENDON BICEPS  02/04/2019    Procedure: TENODESIS BICEPS OPEN PROXIMAL;  Surgeon: Dominguez Velazco MD;  [facility];  Service: Orthopedics   • TONSILLECTOMY AND ADENOIDECTOMY       Family History   Problem Relation Age of Onset   • Cancer Mother    • Stroke Father       Social History     Tobacco Use   • Smoking status: Former     Current packs/day: 0.00     Average packs/day: 0.3 packs/day for 12.0 years (3.0 ttl pk-yrs)     Types: Cigarettes     Start date: 1959     Quit date: 1971     Years since quittin.4     Passive exposure: Never   • Smokeless tobacco: Never   • Tobacco comments:     Smoked from ages 13 to 22.   Vaping Use   • Vaping status: Never Used   Substance and Sexual Activity   • Alcohol use: Not Currently   • Drug use: No   • Sexual activity: Not Currently     Partners: Female     Birth control/protection: None     Current Outpatient Medications on File Prior to Visit   Medication Sig   • Aspirin Low Dose 81 MG EC tablet TAKE 1 TABLET EVERY DAY   • atorvastatin (LIPITOR) 80 mg tablet TAKE 1 TABLET EVERY DAY WITH DINNER   • baclofen 20 mg tablet 1 tab BID PRN   • Blood Glucose Monitoring Suppl (ONE TOUCH ULTRA 2) w/Device KIT Use 2 (two) times a day   • Cholecalciferol (VITAMIN D-3 PO) Take 2,000 Units by mouth daily   • Empagliflozin (JARDIANCE) 10 MG TABS tablet Take 1 tablet (10 mg total) by mouth daily   • EPINEPHrine (EPIPEN) 0.3 mg/0.3 mL SOAJ Inject as directed   • ezetimibe (ZETIA) 10 mg tablet TAKE 1 TABLET AT BEDTIME   • fluticasone (FLONASE) 50 mcg/act nasal spray 1 spray into each nostril daily   • glucose blood (OneTouch Ultra) test strip Use 1 each in the morning   • glucose blood test strip Use 1 each daily as needed (once a day) One touch ultra  Test strips   • metFORMIN (GLUCOPHAGE) 500 mg tablet TAKE 1 TABLET TWICE DAILY   • metoprolol succinate (TOPROL-XL) 25 mg 24 hr tablet TAKE 1 TABLET EVERY DAY   • OneTouch Delica Lancets 33G MISC Use in the morning   • tamsulosin (FLOMAX) 0.4 mg TAKE 1 CAPSULE AT BEDTIME   • traMADol (Ultram) 50 mg tablet 1 tab BID PRN mod-severe pain   • traZODone (DESYREL) 100 mg tablet Take 1 tablet (100 mg total) by mouth daily at bedtime   • ciclopirox (PENLAC) 8 % solution APPLY TOPICALLY ONCE DAILY AT  "BEDTIME (Patient not taking: Reported on 4/18/2024)     Allergies   Allergen Reactions   • Bee Venom Edema     Immunization History   Administered Date(s) Administered   • COVID-19 MODERNA VACC 0.5 ML IM 03/12/2021, 04/09/2021, 11/09/2021   • INFLUENZA 11/18/2014, 10/05/2015, 11/28/2016, 01/04/2018, 10/15/2018, 10/18/2021, 10/25/2022, 10/18/2023   • Influenza Split High Dose Preservative Free IM 11/18/2014, 10/05/2015, 11/28/2016, 01/04/2018   • Influenza, high dose seasonal 0.7 mL 10/15/2018, 10/03/2019, 09/18/2020   • Influenza, seasonal, injectable 10/05/2012, 10/09/2013   • Pneumococcal Conjugate 13-Valent 12/08/2015, 12/09/2015   • Pneumococcal Polysaccharide PPV23 10/09/2013   • Tdap 11/15/2004, 10/16/2017   • Zoster 10/05/2012   • Zoster Vaccine Recombinant 10/30/2020, 01/25/2021     Objective     /72 (BP Location: Left arm, Patient Position: Sitting, Cuff Size: Adult)   Pulse 55   Ht 5' 11\" (1.803 m)   Wt 83.3 kg (183 lb 9.6 oz)   SpO2 99%   BMI 25.61 kg/m²     Physical Exam  Administrative Statements         "

## 2024-06-24 ENCOUNTER — HOSPITAL ENCOUNTER (OUTPATIENT)
Dept: NON INVASIVE DIAGNOSTICS | Facility: HOSPITAL | Age: 79
Discharge: HOME | End: 2024-06-24
Attending: INTERNAL MEDICINE
Payer: MEDICARE

## 2024-06-24 VITALS
BODY MASS INDEX: 25.62 KG/M2 | WEIGHT: 183 LBS | HEART RATE: 45 BPM | HEIGHT: 71 IN | SYSTOLIC BLOOD PRESSURE: 118 MMHG | DIASTOLIC BLOOD PRESSURE: 59 MMHG

## 2024-06-24 DIAGNOSIS — I34.0 NONRHEUMATIC MITRAL VALVE REGURGITATION: ICD-10-CM

## 2024-06-24 DIAGNOSIS — I49.3 SYMPTOMATIC PVCS: ICD-10-CM

## 2024-06-24 LAB
AORTIC ROOT: 3.8 CM
AORTIC VALVE MEAN VELOCITY: 11.7 M/S
APICAL FOUR CHAMBER EJECTION FRACTION: 71 %
ASCENDING AORTA: 3.3 CM
AV AREA BY CONTINUOUS VTI: 2.5 CM2
AV AREA PEAK VELOCITY: 2.2 CM2
AV LVOT MEAN GRADIENT: 2 MMHG
AV LVOT PEAK GRADIENT: 4 MMHG
AV MEAN GRADIENT: 6 MMHG
AV PEAK GRADIENT: 13 MMHG
AV VALVE AREA: 2.53 CM2
AV VELOCITY RATIO: 0.54
BSA FOR ECHO PROCEDURE: 2.03 M2
DOP CALC AO PEAK VEL: 1.8 M/S
DOP CALC AO VTI: 42.55 CM
DOP CALC LVOT AREA: 4.15 CM2
DOP CALC LVOT CARDIAC INDEX: 2.41 L/MIN/M2
DOP CALC LVOT CARDIAC OUTPUT: 4.89 L/MIN
DOP CALC LVOT DIAMETER: 2.3 CM
DOP CALC LVOT PEAK VEL VTI: 25.94 CM
DOP CALC LVOT PEAK VEL: 0.97 M/S
DOP CALC LVOT STROKE INDEX: 51.7 ML/M2
DOP CALC LVOT STROKE VOLUME: 107.72
E WAVE DECELERATION TIME: 258 MS
E/A RATIO: 0.77
FRACTIONAL SHORTENING: 38 (ref 28–44)
INTERVENTRICULAR SEPTUM IN DIASTOLE (PARASTERNAL SHORT AXIS VIEW): 1 CM
INTERVENTRICULAR SEPTUM: 1 CM (ref 0.6–1.1)
LAAS-AP2: 21.5 CM2
LAAS-AP4: 20.2 CM2
LEFT ATRIUM SIZE: 4.3 CM
LEFT ATRIUM VOLUME (MOD BIPLANE): 64 ML
LEFT ATRIUM VOLUME INDEX (MOD BIPLANE): 31.5 ML/M2
LEFT INTERNAL DIMENSION IN SYSTOLE: 3 CM (ref 2.1–4)
LEFT VENTRICULAR INTERNAL DIMENSION IN DIASTOLE: 4.8 CM (ref 3.5–6)
LEFT VENTRICULAR POSTERIOR WALL IN END DIASTOLE: 0.8 CM
LEFT VENTRICULAR STROKE VOLUME: 69 ML
LVSV (TEICH): 69 ML
MV E'TISSUE VEL-LAT: 9 CM/S
MV E'TISSUE VEL-SEP: 10 CM/S
MV PEAK A VEL: 0.81 M/S
MV PEAK E VEL: 62 CM/S
RIGHT ATRIAL 2D VOLUME: 52 ML
RIGHT ATRIUM AREA SYSTOLE A4C: 17.8 CM2
RIGHT VENTRICLE ID DIMENSION: 3.9 CM
SL CV LEFT ATRIUM LENGTH A2C: 5.4 CM
SL CV PED ECHO LEFT VENTRICLE DIASTOLIC VOLUME (MOD BIPLANE) 2D: 105 ML
SL CV PED ECHO LEFT VENTRICLE SYSTOLIC VOLUME (MOD BIPLANE) 2D: 36 ML
TRICUSPID ANNULAR PLANE SYSTOLIC EXCURSION: 2.1 CM

## 2024-06-24 PROCEDURE — 93306 TTE W/DOPPLER COMPLETE: CPT | Performed by: INTERNAL MEDICINE

## 2024-06-24 PROCEDURE — 93306 TTE W/DOPPLER COMPLETE: CPT

## 2024-06-24 PROCEDURE — 93225 XTRNL ECG REC<48 HRS REC: CPT

## 2024-06-24 PROCEDURE — 93226 XTRNL ECG REC<48 HR SCAN A/R: CPT

## 2024-07-01 PROCEDURE — 93227 XTRNL ECG REC<48 HR R&I: CPT | Performed by: INTERNAL MEDICINE

## 2024-07-03 ENCOUNTER — OFFICE VISIT (OUTPATIENT)
Dept: CARDIOLOGY CLINIC | Facility: CLINIC | Age: 79
End: 2024-07-03
Payer: MEDICARE

## 2024-07-03 VITALS
WEIGHT: 181 LBS | SYSTOLIC BLOOD PRESSURE: 120 MMHG | BODY MASS INDEX: 25.34 KG/M2 | HEIGHT: 71 IN | HEART RATE: 57 BPM | DIASTOLIC BLOOD PRESSURE: 64 MMHG | OXYGEN SATURATION: 98 %

## 2024-07-03 DIAGNOSIS — I49.3 SYMPTOMATIC PVCS: ICD-10-CM

## 2024-07-03 DIAGNOSIS — E78.2 MIXED HYPERLIPIDEMIA: ICD-10-CM

## 2024-07-03 DIAGNOSIS — I34.0 NONRHEUMATIC MITRAL VALVE REGURGITATION: ICD-10-CM

## 2024-07-03 DIAGNOSIS — E11.69 TYPE 2 DIABETES MELLITUS WITH OTHER SPECIFIED COMPLICATION, WITHOUT LONG-TERM CURRENT USE OF INSULIN (HCC): ICD-10-CM

## 2024-07-03 DIAGNOSIS — I25.10 CORONARY ARTERY DISEASE INVOLVING NATIVE CORONARY ARTERY OF NATIVE HEART WITHOUT ANGINA PECTORIS: Primary | ICD-10-CM

## 2024-07-03 PROCEDURE — 99214 OFFICE O/P EST MOD 30 MIN: CPT | Performed by: INTERNAL MEDICINE

## 2024-07-03 NOTE — PROGRESS NOTES
Cardiology Office Note  MD Conor Jimenez MD Jason Kaplan, DO, [facility]  MD Abdi Bee DO, [facility]  Asad Patel DO, [facility]  ----------------------------------------------------------------  [address] [address]    Chuck Borja 79 y.o. male MRN: [id number]  Unit/Bed#:  Encounter: [id number]      History of Present Illness:  It was a pleasure to see Chuck Borja in the office today for follow-up CV evaluation.  He has a past medical history of CAD with high lateral STEMI and 1st diagonal stent placement in April 2022, dyslipidemia, type 2 diabetes mellitus and renal cell carcinoma status post right nephrectomy in May 2017.  He established care with us in April 2022. The patient was in his usual state of health when he began to experienced acute chest discomfort.  His symptoms began to wax and wane over the course of 24-48 hours prior to his presentation to the emergency department.  His symptoms then worsened and he came to [facility] for evaluation.  In the emergency department, he was found to have ST segment elevation in leads 1 and aVL with reciprocal changes in the inferior leads.  Additionally, there was AV dissociation noted on the ECG.  MI alert was called and he was taken to the cath lab.  He was found have occlusion of the 1st diagonal and underwent stent placement.  Echocardiogram demonstrated low-normal systolic function with regional wall motion abnormalities of the lateral wall.  AV dissociation resolved and the patient was placed on beta-blocker as well as ACE-inhibitor.  Ezetimibe was added to his regimen.  He was recommended for cardiac rehabilitation and discharged home.    Following discharge, he had undergone cardiac rehabilitation and has now completed rehabilitation as of August 2022. Due to it PVCs he was experiencing, he was increased on his metoprolol in August 2022.  Event  recorder demonstrated sinus rhythm with rare atrial and ventricular ectopy.  With no evidence of bradycardia arrhythmia.  Triggered events correlated with PVCs.   Due to the patient's PVCs and history of mitral valve regurgitation, the patient was sent for echocardiogram and Holter monitor in June 2023.  He is here today to review the results.   Overall, the patient had continued to feel well in his usual state of health.  He had been very active with continued weight training and cardiovascular/aerobic activity.  When he would push himself too far up the stairs into rapid rate, he experienced mild shortness of breath, but this was chronic since the heart attack.  He denies any chest pain, pressure, tightness or squeezing. Denies lightheadedness, dizziness or palpitations.  Denies lower extremity swelling, orthopnea or paroxysmal nocturnal dyspnea.    Review of Systems:  Review of Systems   Constitutional: Negative for decreased appetite, fever, weight gain and weight loss.   HENT:  Negative for congestion and sore throat.    Eyes:  Negative for visual disturbance.   Cardiovascular:  Negative for chest pain, dyspnea on exertion, leg swelling, near-syncope and palpitations.   Respiratory:  Negative for cough and shortness of breath.    Hematologic/Lymphatic: Negative for bleeding problem.   Skin:  Negative for rash.   Musculoskeletal:  Negative for myalgias and neck pain.   Gastrointestinal:  Negative for abdominal pain and nausea.   Neurological:  Negative for light-headedness and weakness.   Psychiatric/Behavioral:  Negative for depression.        Past Medical History:   Diagnosis Date    Allergic     Allergic reaction     last assessed: 5/26/2015    Arthritis     right knee    Bee sting allergy     last assessed: 6/30/2015    BPH (benign prostatic hypertrophy)     Cancer (HCC) 2017    Right kidney    Diabetes mellitus (HCC)     Type II, NIDDM    Enlarged prostate with lower urinary tract symptoms (LUTS)     last  assessed: 8/17/2015    Herpes zoster     last assessed: 11/21/2013    History of colon polyps     Hyperlipidemia     Leukocytosis     last assessed: 11/18/2014    Myocardial infarction (HCC) 4/13/22    Osteoarthrosis, localized, primary, knee     last assessed: 10/25/2016    Renal cancer (HCC)     Seasonal allergies     Shoulder pain, right     Wears glasses     Wears partial dentures     Upper and Lower       Past Surgical History:   Procedure Laterality Date    CARDIAC CATHETERIZATION N/A 04/13/2022    Procedure: Cardiac pci;  Surgeon: José Cameron MD;  Location: [facility];  Service: Cardiology    CARPAL TUNNEL RELEASE Left     CHOLECYSTECTOMY      Open    COLONOSCOPY      FL GUIDED NEEDLE PLAC BX/ASP/INJ  06/01/2020    FL GUIDED NEEDLE PLAC BX/ASP/INJ  11/13/2020    GANGLION CYST EXCISION Left     Left wrist    JOINT REPLACEMENT Left 08/2015    Left TKR    JOINT REPLACEMENT Right 09/2017    Right TKR    KNEE SURGERY  9/2017    Knee replacement    NEPHRECTOMY  05/08/2017    Partial right nephrectomy    NM ARTHRP INTERPOS INTERCARPAL/METACARPAL JOINTS Left 12/05/2016    Procedure: ARTHROPLASTY THUMB CMC ;  Surgeon: Dominguez Velazco MD;  Location: [facility];  Service: Orthopedics    NM ARTHRP KNE CONDYLE&PLATU MEDIAL&LAT COMPARTMENTS Right 09/11/2017    Procedure: ARTHROPLASTY KNEE TOTAL;  Surgeon: Dominguez Velazco MD;  Location: [facility];  Service: Orthopedics    NM LAPAROSCOPY SURG PARTIAL NEPHRECTOMY Right 05/08/2017    Procedure: LAPAROSCOPIC LYSIS OF ADHESIONS, ROBOTIC PARTIAL NEPHRECTOMY, WITH INTRA-OP LAPAROSCOPIC ULTRASOUND;  Surgeon: Jose Hidalgo MD;  Location: [facility];  Service: Urology    NM NEUROPLASTY &/TRANSPOS MEDIAN NRV CARPAL TUNNE Left 12/05/2016    Procedure: RELEASE CARPAL TUNNEL;  Surgeon: Dominguez Velazco MD;  Location[facility];  Service: Orthopedics    NM SURGICAL ARTHROSCOPY SHOULDER W/ROTATOR CUFF RPR Right 02/04/2019    Procedure: REPAIR ROTATOR CUFF ARTHROSCOPIC;  SUBACROMIAL DECOMPRESSION; DISTAL CLAVICLE EXCISION;  Surgeon: Dominguez Velazco MD;  Location: [facility];  Service: Orthopedics    TX TENODESIS LONG TENDON BICEPS  2019    Procedure: TENODESIS BICEPS OPEN PROXIMAL;  Surgeon: Dominguez Velazco MD;  Location: [facility];  Service: Orthopedics    TONSILLECTOMY AND ADENOIDECTOMY         Social History     Socioeconomic History    Marital status: /Civil Union     Spouse name: Not on file    Number of children: Not on file    Years of education: Not on file    Highest education level: Not on file   Occupational History    Not on file   Tobacco Use    Smoking status: Former     Current packs/day: 0.00     Average packs/day: 0.3 packs/day for 12.0 years (3.0 ttl pk-yrs)     Types: Cigarettes     Start date: 1959     Quit date: 1971     Years since quittin.5     Passive exposure: Never    Smokeless tobacco: Never    Tobacco comments:     Smoked from ages 13 to 22.   Vaping Use    Vaping status: Never Used   Substance and Sexual Activity    Alcohol use: Not Currently    Drug use: No    Sexual activity: Not Currently     Partners: Female     Birth control/protection: None   Other Topics Concern    Not on file   Social History Narrative    Not on file     Social Determinants of Health     Financial Resource Strain: Low Risk  (2023)    Overall Financial Resource Strain (CARDIA)     Difficulty of Paying Living Expenses: Not hard at all   Food Insecurity: No Food Insecurity (2022)    Hunger Vital Sign     Worried About Running Out of Food in the Last Year: Never true     Ran Out of Food in the Last Year: Never true   Transportation Needs: No Transportation Needs (2023)    PRAPARE - Transportation     Lack of Transportation (Medical): No     Lack of Transportation (Non-Medical): No   Physical Activity: Not on file   Stress: Not on file   Social Connections: Not on file   Intimate Partner Violence: Not on file   Housing Stability: Low Risk   (4/14/2022)    Housing Stability Vital Sign     Unable to Pay for Housing in the Last Year: No     Number of Places Lived in the Last Year: 1     Unstable Housing in the Last Year: No       Family History   Problem Relation Age of Onset    Cancer Mother     Stroke Father        Allergies   Allergen Reactions    Bee Venom Edema         Current Outpatient Medications:     Aspirin Low Dose 81 MG EC tablet, TAKE 1 TABLET EVERY DAY, Disp: 90 tablet, Rfl: 3    atorvastatin (LIPITOR) 80 mg tablet, TAKE 1 TABLET EVERY DAY WITH DINNER, Disp: 90 tablet, Rfl: 1    baclofen 20 mg tablet, 1 tab BID PRN, Disp: 20 tablet, Rfl: 1    Blood Glucose Monitoring Suppl (ONE TOUCH ULTRA 2) w/Device KIT, Use 2 (two) times a day, Disp: 1 kit, Rfl: 0    Cholecalciferol (VITAMIN D-3 PO), Take 2,000 Units by mouth daily, Disp: , Rfl:     Empagliflozin (JARDIANCE) 10 MG TABS tablet, Take 1 tablet (10 mg total) by mouth daily, Disp: 90 tablet, Rfl: 1    EPINEPHrine (EPIPEN) 0.3 mg/0.3 mL SOAJ, Inject as directed, Disp: , Rfl:     ezetimibe (ZETIA) 10 mg tablet, TAKE 1 TABLET AT BEDTIME, Disp: 90 tablet, Rfl: 3    fluticasone (FLONASE) 50 mcg/act nasal spray, 1 spray into each nostril daily, Disp: 16 g, Rfl: 3    glucose blood (OneTouch Ultra) test strip, Use 1 each in the morning, Disp: 100 strip, Rfl: 3    glucose blood test strip, Use 1 each daily as needed (once a day) One touch ultra  Test strips, Disp: 100 strip, Rfl: 3    metFORMIN (GLUCOPHAGE) 500 mg tablet, TAKE 1 TABLET TWICE DAILY, Disp: 180 tablet, Rfl: 1    metoprolol succinate (TOPROL-XL) 25 mg 24 hr tablet, TAKE 1 TABLET EVERY DAY, Disp: 90 tablet, Rfl: 1    OneTouch Delica Lancets 33G MISC, Use in the morning, Disp: 100 each, Rfl: 3    tamsulosin (FLOMAX) 0.4 mg, TAKE 1 CAPSULE AT BEDTIME, Disp: 90 capsule, Rfl: 1    traMADol (Ultram) 50 mg tablet, 1 tab BID PRN mod-severe pain, Disp: 14 tablet, Rfl: 0    traZODone (DESYREL) 100 mg tablet, Take 1 tablet (100 mg total) by mouth  "daily at bedtime, Disp: 90 tablet, Rfl: 1    ciclopirox (PENLAC) 8 % solution, APPLY TOPICALLY ONCE DAILY AT BEDTIME, Disp: 7 mL, Rfl: 0    neomycin-polymyxin-hydrocortisone (CORTISPORIN) otic solution, 4 drops in right ear 3 times daily x 7 days, Disp: 10 mL, Rfl: 1    Vitals:    07/03/24 0910   BP: 120/64   Pulse: 57   SpO2: 98%   Weight: 82.1 kg (181 lb)   Height: 5' 11\" (1.803 m)         Body mass index is 25.24 kg/m².    PHYSICAL EXAMINATION:  Gen: Awake, Alert, NAD   Head/eyes: AT/NC, pupils equal and round, Anicteric  ENT: mmm  Neck: Supple, No elevated JVP, trachea midline  Resp: CTA bilaterally no w/r/r  CV: RRR +S1, S2, I/VI KRYSTAL @ RUSB  Abd: Soft, NT/ND + BS  Ext: no LE edema bilaterally  Neuro: Follows commands, moves all extermities  Psych: Appropriate affect, normal mood, cooperative attitude, non-combative  Skin: warm; no rash, erythema or venous stasis changes on exposed skin    --------------------------------------------------------------------------------  TREADMILL STRESS  No results found for this or any previous visit.     --------------------------------------------------------------------------------  NUCLEAR STRESS TEST: No results found for this or any previous visit.    No results found for this or any previous visit.      --------------------------------------------------------------------------------  CATH:  No results found for this or any previous visit.    --------------------------------------------------------------------------------  ECHO:   LVEF 52% inferolateral and anterolateral hypokinesis from base to apex mild LA dilatation, AV sclerosis, trace AR, MV sclerosis, mild MR, trace TR, April 2022  --------------------------------------------------------------------------------  HOLTER  No results found for this or any previous visit.    No results found for this or any previous visit.    --------------------------------------------------------------------------------  CHAKA Perry " "results found for this or any previous visit.     --------------------------------------------------------------------------------  ECGs:  No results found for this visit on 07/03/24.       Lab Results   Component Value Date    WBC 7.40 07/26/2023    HGB 13.6 07/26/2023    HCT 41.7 07/26/2023    MCV 94 07/26/2023     07/26/2023      Lab Results   Component Value Date    SODIUM 136 01/15/2024    K 4.1 01/15/2024     01/15/2024    CO2 28 01/15/2024    BUN 17 01/15/2024    CREATININE 0.98 01/15/2024    GLUC 141 (H) 04/14/2022    CALCIUM 9.6 01/15/2024      Lab Results   Component Value Date    HGBA1C 7.4 (H) 01/15/2024      Lab Results   Component Value Date    CHOL 149 12/27/2017    CHOL 129 06/21/2017    CHOL 140 12/12/2016     Lab Results   Component Value Date    HDL 32 (L) 07/26/2023    HDL 35 (L) 01/20/2023    HDL 26 (L) 07/19/2022     Lab Results   Component Value Date    LDLCALC 21 07/26/2023    LDLCALC 23 01/20/2023    LDLCALC 24 07/19/2022     Lab Results   Component Value Date    TRIG 53 07/26/2023    TRIG 69 01/20/2023    TRIG 60 07/19/2022     No results found for: \"CHOLHDL\"   Lab Results   Component Value Date    INR 0.97 04/13/2022    INR 0.97 08/18/2017    INR 0.97 11/21/2016    PROTIME 12.7 04/13/2022    PROTIME 12.9 08/18/2017    PROTIME 12.9 11/21/2016        1. Coronary artery disease involving native coronary artery of native heart without angina pectoris  2. Symptomatic PVCs  3. Mixed hyperlipidemia  4. Type 2 diabetes mellitus with other specified complication, without long-term current use of insulin (HCC)  5. Nonrheumatic mitral valve regurgitation          IMPRESSION:  CAD  Lateral STEMI s/p SURJIT-D1 w/ residual 10% ostial LCx and 20% mid RCA, April 2022  LVEF 65%, grade 1 diastolic dysfunction, AV sclerosis, mild AR, MV sclerosis, mild MR, trace TR, June 2024  Symptomatic PVCs  Holter w/ SR avg 55 bpm, occasional PVCs (burden 3.7%), no VT, rare PACs, rare atrial couplets, June " 2024  Event recorder w/ SR avg HR 60 bpm, rare PACs, rare atrial couplets/triplets, rare PVCs, rare ventricular couplets, nonsustained PSVT (x1) 9 beat 128 bpm, triggered events correlated with PVCs, August 2022  Transient AV dissociation with junctional rhythm during STEMI likely vagal - resolved  Type 2 diabetes mellitus  Dyslipidemia w/ LDL 21 mg/dL, HDL 32 mg/dL, TG 53 mg/dL, July 2023  History of renal cell carcinoma status post right nephrectomy, 2007    PLAN:  It was a pleasure to see Que in the office today for follow-up CV evaluation.  He is here today to review the results of his echocardiogram and Holter monitor.  Echocardiogram demonstrated normal left ventricular systolic function with only mild valvular regurgitation.  He does have aortic valve sclerosis which is appreciated on auscultation.  Holter monitor showed average heart rate of 55 bpm without any significant bradycardia arrhythmia.  He is otherwise tolerating his current medications without any reported adverse effects.  He can perform greater than 4 METS on daily basis without significant exertional symptoms.  Patient continues to purposely lose weight and is doing well overall.  Based on his clinical presentation, I have the following recommendations:    1.  Recommend 30 minutes a day, 5 days a week of moderate intensity activity to build cardiovascular endurance.  2.  Would encourage a heart healthy diet low in sodium and carbohydrate.  3.  Continue high intensity statin and Zetia.  Goal LDL is less than 70 mg/dL.  Patient is currently to goal.  4.  Continue current antihypertensive regimen including Toprol-XL.  5.  Lifelong aspirin  6.  Follow-up with primary care for blood glucose management.  Continue Jardiance.  7.  We will follow-up with him in 6 months to reassess his progress.    As always, please do not hesitate to call with any questions.    Portions of the record may have been created with voice recognition software. Occasional  "wrong word or \"sound a like\" substitutions may have occurred due to the inherent limitations of voice recognition software. Read the chart carefully and recognize, using context, where substitutions have occurred.      Signed: David Gatian DO, FACC, [place], LUZ MARIA  "

## 2024-07-11 ENCOUNTER — TELEPHONE (OUTPATIENT)
Dept: FAMILY MEDICINE CLINIC | Facility: CLINIC | Age: 79
End: 2024-07-11

## 2024-07-11 DIAGNOSIS — E11.69 TYPE 2 DIABETES MELLITUS WITH OTHER SPECIFIED COMPLICATION, WITHOUT LONG-TERM CURRENT USE OF INSULIN (HCC): Primary | ICD-10-CM

## 2024-07-11 RX ORDER — EMPAGLIFLOZIN AND METFORMIN HYDROCHLORIDE 5; 500 MG/1; MG/1
TABLET ORAL
Qty: 180 TABLET | Refills: 1 | Status: SHIPPED | OUTPATIENT
Start: 2024-07-11

## 2024-07-11 NOTE — TELEPHONE ENCOUNTER
Please notify patient that new prescription was sent to [facility] (this will combine his 2 other medications into 1 pill).

## 2024-07-11 NOTE — TELEPHONE ENCOUNTER
Patient called in because he is due for a refill of both his Jardiance and metformin. He states dr Kline told him that there is a medication that  has jardiance and metformin all in one. He is hoping that since he is needing a refill on both that he can have the combo medication.    Medication: Jardiance/metformin combo medication    Quantity: 90 day supply    Pharmacy: [facility] - [place] [phone] [phone]     Office:   [x] PCP/Provider - Niranjan Kline  [] Speciality/Provider -     Does the patient have enough for 3 days?   [] Yes   [] No - Send as HP to POD

## 2024-07-23 ENCOUNTER — APPOINTMENT (OUTPATIENT)
Dept: LAB | Facility: CLINIC | Age: 79
End: 2024-07-23
Payer: MEDICARE

## 2024-07-23 DIAGNOSIS — Z13.29 SCREENING FOR THYROID DISORDER: ICD-10-CM

## 2024-07-23 DIAGNOSIS — Z12.5 SCREENING FOR PROSTATE CANCER: ICD-10-CM

## 2024-07-23 DIAGNOSIS — E78.2 MIXED HYPERLIPIDEMIA: ICD-10-CM

## 2024-07-23 DIAGNOSIS — E11.69 TYPE 2 DIABETES MELLITUS WITH OTHER SPECIFIED COMPLICATION, WITHOUT LONG-TERM CURRENT USE OF INSULIN (HCC): ICD-10-CM

## 2024-07-23 DIAGNOSIS — Z13.0 SCREENING FOR DEFICIENCY ANEMIA: ICD-10-CM

## 2024-07-23 LAB
ALBUMIN SERPL BCG-MCNC: 3.7 G/DL (ref 3.5–5)
ALP SERPL-CCNC: 44 U/L (ref 34–104)
ALT SERPL W P-5'-P-CCNC: 19 U/L (ref 7–52)
ANION GAP SERPL CALCULATED.3IONS-SCNC: 9 MMOL/L (ref 4–13)
AST SERPL W P-5'-P-CCNC: 18 U/L (ref 13–39)
BASOPHILS # BLD AUTO: 0.04 THOUSANDS/ÂΜL (ref 0–0.1)
BASOPHILS NFR BLD AUTO: 1 % (ref 0–1)
BILIRUB SERPL-MCNC: 0.65 MG/DL (ref 0.2–1)
BUN SERPL-MCNC: 30 MG/DL (ref 5–25)
CALCIUM SERPL-MCNC: 9.2 MG/DL (ref 8.4–10.2)
CHLORIDE SERPL-SCNC: 103 MMOL/L (ref 96–108)
CHOLEST SERPL-MCNC: 69 MG/DL
CO2 SERPL-SCNC: 26 MMOL/L (ref 21–32)
CREAT SERPL-MCNC: 0.92 MG/DL (ref 0.6–1.3)
EOSINOPHIL # BLD AUTO: 0.4 THOUSAND/ÂΜL (ref 0–0.61)
EOSINOPHIL NFR BLD AUTO: 6 % (ref 0–6)
ERYTHROCYTE [DISTWIDTH] IN BLOOD BY AUTOMATED COUNT: 14.6 % (ref 11.6–15.1)
EST. AVERAGE GLUCOSE BLD GHB EST-MCNC: 163 MG/DL
GFR SERPL CREATININE-BSD FRML MDRD: 79 ML/MIN/1.73SQ M
GLUCOSE P FAST SERPL-MCNC: 124 MG/DL (ref 65–99)
HBA1C MFR BLD: 7.3 %
HCT VFR BLD AUTO: 44.4 % (ref 36.5–49.3)
HDLC SERPL-MCNC: 31 MG/DL
HGB BLD-MCNC: 14.5 G/DL (ref 12–17)
IMM GRANULOCYTES # BLD AUTO: 0.01 THOUSAND/UL (ref 0–0.2)
IMM GRANULOCYTES NFR BLD AUTO: 0 % (ref 0–2)
LDLC SERPL CALC-MCNC: 23 MG/DL (ref 0–100)
LYMPHOCYTES # BLD AUTO: 1.96 THOUSANDS/ÂΜL (ref 0.6–4.47)
LYMPHOCYTES NFR BLD AUTO: 28 % (ref 14–44)
MCH RBC QN AUTO: 30.8 PG (ref 26.8–34.3)
MCHC RBC AUTO-ENTMCNC: 32.7 G/DL (ref 31.4–37.4)
MCV RBC AUTO: 94 FL (ref 82–98)
MONOCYTES # BLD AUTO: 0.59 THOUSAND/ÂΜL (ref 0.17–1.22)
MONOCYTES NFR BLD AUTO: 9 % (ref 4–12)
NEUTROPHILS # BLD AUTO: 3.92 THOUSANDS/ÂΜL (ref 1.85–7.62)
NEUTS SEG NFR BLD AUTO: 56 % (ref 43–75)
NRBC BLD AUTO-RTO: 0 /100 WBCS
PLATELET # BLD AUTO: 153 THOUSANDS/UL (ref 149–390)
PMV BLD AUTO: 11.3 FL (ref 8.9–12.7)
POTASSIUM SERPL-SCNC: 3.9 MMOL/L (ref 3.5–5.3)
PROT SERPL-MCNC: 6.9 G/DL (ref 6.4–8.4)
PSA SERPL-MCNC: 4.31 NG/ML (ref 0–4)
RBC # BLD AUTO: 4.71 MILLION/UL (ref 3.88–5.62)
SODIUM SERPL-SCNC: 138 MMOL/L (ref 135–147)
TRIGL SERPL-MCNC: 76 MG/DL
TSH SERPL DL<=0.05 MIU/L-ACNC: 2.32 UIU/ML (ref 0.45–4.5)
WBC # BLD AUTO: 6.92 THOUSAND/UL (ref 4.31–10.16)

## 2024-07-23 PROCEDURE — 84443 ASSAY THYROID STIM HORMONE: CPT

## 2024-07-23 PROCEDURE — 80061 LIPID PANEL: CPT

## 2024-07-23 PROCEDURE — 80053 COMPREHEN METABOLIC PANEL: CPT

## 2024-07-23 PROCEDURE — G0103 PSA SCREENING: HCPCS

## 2024-07-23 PROCEDURE — 85025 COMPLETE CBC W/AUTO DIFF WBC: CPT

## 2024-07-23 PROCEDURE — 36415 COLL VENOUS BLD VENIPUNCTURE: CPT

## 2024-07-23 PROCEDURE — 83036 HEMOGLOBIN GLYCOSYLATED A1C: CPT

## 2024-07-24 ENCOUNTER — TELEPHONE (OUTPATIENT)
Dept: UROLOGY | Facility: CLINIC | Age: 79
End: 2024-07-24

## 2024-07-24 ENCOUNTER — TELEPHONE (OUTPATIENT)
Age: 79
End: 2024-07-24

## 2024-07-24 DIAGNOSIS — R97.20 ELEVATED PSA: Primary | ICD-10-CM

## 2024-07-24 NOTE — TELEPHONE ENCOUNTER
----- Message from Thiago Lama PA-C sent at 7/24/2024  9:33 AM EDT -----  Please call patient to inform her that his PSA came back elevated up to 4.3 from his usual baseline of low threes.  I would like patient to obtain repeat PSA in the next 1 to 2 weeks.  He should avoid sexual activity, bike riding, riding a motorcycle before obtainment of PSA.  If PSA is still elevated we will plan for multiparametric MR of the prostate for further evaluation

## 2024-07-24 NOTE — TELEPHONE ENCOUNTER
Contacted and spoke with the patient and advised him to avoid bike riding, riding motorcycle, riding  and ejaculation 72 hours prior to PSA draw.  Patient verbalized understanding.

## 2024-07-24 NOTE — TELEPHONE ENCOUNTER
Pt called, sts he received message through [employer] regarding PSA and activities to avoid.  Pt has additional questions and is requesting a call back.  Pt questioning how long he would need to avoid activities prior to having PSA done.  Pt also has questions regarding other activities that were not mentioned in the message.  Please review and advise.    Pt call back: [phone]

## 2024-07-24 NOTE — TELEPHONE ENCOUNTER
"Attempted to contact pt via phone. Pt hung up immediatly without giving a chance to speak stating he is \"not interested in any sales\"   "

## 2024-07-24 NOTE — TELEPHONE ENCOUNTER
Patient called to ask about the message he received from urologist. I was able to report the message     Indra Melendez MA   to Que Borja         7/24/24 10:36 AM  ----- Message from Thiago Lama PA-C sent at 7/24/2024  9:33 AM EDT -----  Please call patient to inform her that his PSA came back elevated up to 4.3 from his usual baseline of low threes.  I would like patient to obtain repeat PSA in the next 1 to 2 weeks.  He should avoid sexual activity, bike riding, riding a motorcycle before obtainment of PSA.  If PSA is still elevated we will plan for multiparametric MR of the prostate for further evaluation        Patient had no further questions. Patient will obtain the lab work next week.

## 2024-07-31 ENCOUNTER — OFFICE VISIT (OUTPATIENT)
Dept: FAMILY MEDICINE CLINIC | Facility: CLINIC | Age: 79
End: 2024-07-31
Payer: MEDICARE

## 2024-07-31 ENCOUNTER — APPOINTMENT (OUTPATIENT)
Dept: RADIOLOGY | Facility: CLINIC | Age: 79
End: 2024-07-31
Payer: MEDICARE

## 2024-07-31 VITALS
OXYGEN SATURATION: 96 % | DIASTOLIC BLOOD PRESSURE: 70 MMHG | BODY MASS INDEX: 25.79 KG/M2 | HEIGHT: 71 IN | WEIGHT: 184.2 LBS | TEMPERATURE: 97.7 F | HEART RATE: 68 BPM | SYSTOLIC BLOOD PRESSURE: 132 MMHG

## 2024-07-31 DIAGNOSIS — I25.119 ATHEROSCLEROSIS OF NATIVE CORONARY ARTERY OF NATIVE HEART WITH ANGINA PECTORIS (HCC): ICD-10-CM

## 2024-07-31 DIAGNOSIS — S16.1XXD STRAIN OF NECK MUSCLE, SUBSEQUENT ENCOUNTER: ICD-10-CM

## 2024-07-31 DIAGNOSIS — E78.2 MIXED HYPERLIPIDEMIA: ICD-10-CM

## 2024-07-31 DIAGNOSIS — N40.1 BENIGN PROSTATIC HYPERPLASIA WITH URINARY FREQUENCY: ICD-10-CM

## 2024-07-31 DIAGNOSIS — G47.00 INSOMNIA, UNSPECIFIED TYPE: ICD-10-CM

## 2024-07-31 DIAGNOSIS — R97.20 ELEVATED PSA: Primary | ICD-10-CM

## 2024-07-31 DIAGNOSIS — R35.0 BENIGN PROSTATIC HYPERPLASIA WITH URINARY FREQUENCY: ICD-10-CM

## 2024-07-31 DIAGNOSIS — Z85.528 HISTORY OF RENAL CELL CARCINOMA: ICD-10-CM

## 2024-07-31 DIAGNOSIS — Z00.00 MEDICARE ANNUAL WELLNESS VISIT, SUBSEQUENT: ICD-10-CM

## 2024-07-31 DIAGNOSIS — E11.69 TYPE 2 DIABETES MELLITUS WITH OTHER SPECIFIED COMPLICATION, WITHOUT LONG-TERM CURRENT USE OF INSULIN (HCC): ICD-10-CM

## 2024-07-31 PROCEDURE — G0439 PPPS, SUBSEQ VISIT: HCPCS | Performed by: FAMILY MEDICINE

## 2024-07-31 PROCEDURE — 72050 X-RAY EXAM NECK SPINE 4/5VWS: CPT

## 2024-07-31 PROCEDURE — 99214 OFFICE O/P EST MOD 30 MIN: CPT | Performed by: FAMILY MEDICINE

## 2024-07-31 NOTE — PROGRESS NOTES
Diabetic Foot Exam    Medicare wellness visit    Patient's shoes and socks removed.    Right Foot/Ankle   Right Foot Inspection  Skin Exam: skin normal and skin intact. No dry skin, no warmth, no callus, no erythema, no maceration, no abnormal color, no pre-ulcer, no ulcer and no callus.     Toe Exam: ROM and strength within normal limits.     Sensory   Monofilament testing: intact    Vascular  Capillary refills: < 3 seconds  The right DP pulse is 2+. The right PT pulse is 2+.     Left Foot/Ankle  Left Foot Inspection  Skin Exam: skin normal and skin intact. No dry skin, no warmth, no erythema, no maceration, normal color, no pre-ulcer, no ulcer and no callus.     Toe Exam: ROM and strength within normal limits.     Sensory   Monofilament testing: intact    Vascular  Capillary refills: < 3 seconds  The left DP pulse is 2+. The left PT pulse is 2+.     Assign Risk Category  No deformity present  No loss of protective sensation  No weak pulses  Risk: 0  Ambulatory Visit  Name: Chuck Borja      : 1945      MRN: [id number]  Encounter Provider: Niranjan Kline DO  Encounter Date: 2024   Encounter department: [facility]    Assessment & Plan   1. Elevated PSA  2. Atherosclerosis of native coronary artery of native heart with angina pectoris (HCC)  3. Type 2 diabetes mellitus with other specified complication, without long-term current use of insulin (HCC)  4. History of renal cell carcinoma  5. Mixed hyperlipidemia  6. Insomnia, unspecified type  7. Benign prostatic hyperplasia with urinary frequency  8. Medicare annual wellness visit, subsequent       Preventive health issues were discussed with patient, and age appropriate screening tests were ordered as noted in patient's After Visit Summary. Personalized health advice and appropriate referrals for health education or preventive services given if needed, as noted in patient's After Visit Summary.    History of Present Illness      HPI   Patient Care Team:  Niranjan Kline DO as PCP - General  MD Jose Espinosa MD    Review of Systems  Medical History Reviewed by provider this encounter:  Tobacco  Allergies  Meds  Problems  Med Hx  Surg Hx  Fam Hx       Annual Wellness Visit Questionnaire   Chuck is here for his Subsequent Wellness visit. Last Medicare Wellness visit information reviewed, patient interviewed and updates made to the record today.      Health Risk Assessment:   Patient rates overall health as excellent. Patient feels that their physical health rating is much better. Patient is very satisfied with their life. Eyesight was rated as same. Hearing was rated as same. Patient feels that their emotional and mental health rating is same. Patients states they are never, rarely angry. Patient states they are never, rarely unusually tired/fatigued. Pain experienced in the last 7 days has been some. Patient's pain rating has been 3/10. Patient states that he has experienced no weight loss or gain in last 6 months.     Depression Screening:   PHQ-2 Score: 0      Fall Risk Screening:   In the past year, patient has experienced: no history of falling in past year      Home Safety:  Patient does not have trouble with stairs inside or outside of their home. Patient has working smoke alarms and has working carbon monoxide detector. Home safety hazards include: none.     Nutrition:   Current diet is Diabetic.     Medications:   Patient is not currently taking any over-the-counter supplements. Patient is able to manage medications.     Activities of Daily Living (ADLs)/Instrumental Activities of Daily Living (IADLs):   Walk and transfer into and out of bed and chair?: Yes  Dress and groom yourself?: Yes    Bathe or shower yourself?: Yes    Feed yourself? Yes  Do your laundry/housekeeping?: Yes  Manage your money, pay your bills and track your expenses?: Yes  Make your own meals?: Yes    Do your own  shopping?: Yes    Previous Hospitalizations:   Any hospitalizations or ED visits within the last 12 months?: No      Advance Care Planning:   Living will: No    Durable POA for healthcare: Yes    Advanced directive: No    ACP document given: Yes      Cognitive Screening:   Provider or family/friend/caregiver concerned regarding cognition?: No    PREVENTIVE SCREENINGS      Cardiovascular Screening:    General: Screening Not Indicated and History Lipid Disorder      Diabetes Screening:     General: Screening Not Indicated and History Diabetes      Colorectal Cancer Screening:     General: Risks and Benefits Discussed      Prostate Cancer Screening:    General: Screening Not Indicated      Osteoporosis Screening:    General: Risks and Benefits Discussed      Abdominal Aortic Aneurysm (AAA) Screening:    Risk factors include: tobacco use        General: Risks and Benefits Discussed      Lung Cancer Screening:     General: Screening Not Indicated      Hepatitis C Screening:    General: Risks and Benefits Discussed    Screening, Brief Intervention, and Referral to Treatment (SBIRT)    Screening  Typical number of drinks in a day: 0  Typical number of drinks in a week: 0  Interpretation: Low risk drinking behavior.    AUDIT-C Screenin) How often did you have a drink containing alcohol in the past year? never  2) How many drinks did you have on a typical day when you were drinking in the past year? 0  3) How often did you have 6 or more drinks on one occasion in the past year? never    AUDIT-C Score: 0  Interpretation: Score 0-3 (male): Negative screen for alcohol misuse    Single Item Drug Screening:  How often have you used an illegal drug (including marijuana) or a prescription medication for non-medical reasons in the past year? never    Single Item Drug Screen Score: 0  Interpretation: Negative screen for possible drug use disorder    Social Determinants of Health     Financial Resource Strain: Low Risk   "(7/24/2023)    Overall Financial Resource Strain (CARDIA)    • Difficulty of Paying Living Expenses: Not hard at all   Food Insecurity: No Food Insecurity (7/31/2024)    Hunger Vital Sign    • Worried About Running Out of Food in the Last Year: Never true    • Ran Out of Food in the Last Year: Never true   Transportation Needs: No Transportation Needs (7/31/2024)    PRAPARE - Transportation    • Lack of Transportation (Medical): No    • Lack of Transportation (Non-Medical): No   Housing Stability: Low Risk  (7/31/2024)    Housing Stability Vital Sign    • Unable to Pay for Housing in the Last Year: No    • Number of Times Moved in the Last Year: 0    • Homeless in the Last Year: No   Utilities: Not At Risk (7/31/2024)    [facility] Utilities    • Threatened with loss of utilities: No     No results found.    Objective     /70 (BP Location: Left arm, Patient Position: Sitting, Cuff Size: Adult)   Pulse 68   Temp 97.7 °F (36.5 °C)   Ht 5' 11\" (1.803 m)   Wt 83.6 kg (184 lb 3.2 oz)   SpO2 96%   BMI 25.69 kg/m²     Physical Exam  Cardiovascular:      Pulses: no weak pulses.           Dorsalis pedis pulses are 2+ on the right side and 2+ on the left side.        Posterior tibial pulses are 2+ on the right side and 2+ on the left side.   Feet:      Right foot:      Skin integrity: No ulcer, skin breakdown, erythema, warmth, callus or dry skin.      Left foot:      Skin integrity: No ulcer, skin breakdown, erythema, warmth, callus or dry skin.           "

## 2024-07-31 NOTE — ASSESSMENT & PLAN NOTE
Status post STEMI April 2022.  Status post drug-eluting stent.  Patient continues to do well without chest pain or shortness of breath.  Continue regular follow-up with cardiologist as directed

## 2024-07-31 NOTE — PROGRESS NOTES
Ambulatory Visit  Name: Chuck Borja      : 1945      MRN: [id number]  Encounter Provider: Niranjan Kline DO  Encounter Date: 2024   Encounter department: [facility]    Assessment & Plan   1. Elevated PSA  Assessment & Plan:  Most recent PSA from  was slightly elevated at 4.313.  This is higher than patient's baseline of 3's.  Urologist has advised patient to repeat in near future.  2. Atherosclerosis of native coronary artery of native heart with angina pectoris (HCC)  Assessment & Plan:  Status post STEMI 2022.  Status post drug-eluting stent.  Patient continues to do well without chest pain or shortness of breath.  Continue regular follow-up with cardiologist as directed  3. Type 2 diabetes mellitus with other specified complication, without long-term current use of insulin (HCC)  Assessment & Plan:    Lab Results   Component Value Date    HGBA1C 7.3 (H) 2024   A1c from  continues to slowly improve at 7.3% (was 7.4%).  Patient compliant on Synjardy 5/500 twice daily, but med is much more expensive since switching to combination.  Will switch back to individual components (Jardiance 10 mg daily along with metformin 500 twice daily).  Continue to work on diet and exercise.  Consider increasing Jardiance to 25 mg daily if A1c does not continue to improve.  Orders:  -     Comprehensive metabolic panel; Future; Expected date: 2025  -     Hemoglobin A1C; Future; Expected date: 2025  -     Albumin / creatinine urine ratio; Future; Expected date: 2025  -     Empagliflozin (JARDIANCE) 10 MG TABS tablet; Take 1 tablet (10 mg total) by mouth daily  -     metFORMIN (GLUCOPHAGE) 500 mg tablet; Take 1 tablet (500 mg total) by mouth 2 (two) times a day with meals  4. History of renal cell carcinoma  Assessment & Plan:  History of right renal cell carcinoma status post robotic partial nephrectomy in .  Stable.  Continue follow-up with  urology as directed  5. Mixed hyperlipidemia  Assessment & Plan:  Cholesterol from July 23, 1969, LDL 23.  Patient currently taking atorvastatin 80 and Zetia 10.  Being managed by cardiology  6. Insomnia, unspecified type  Assessment & Plan:  Stable on trazodone 100 at bedtime  7. Benign prostatic hyperplasia with urinary frequency  Assessment & Plan:  Being followed by urology  8. Medicare annual wellness visit, subsequent  9. Strain of neck muscle, subsequent encounter  Assessment & Plan:  Waning of flareup of chronic neck pain.  Now on left side.  Denies any radiculopathy.  On examination, patient exhibits reduced range of motion, especially with left rotation and flexion.  Will send for x-ray cervical spine.  Possible referral to comprehensive spine PT.  Orders:  -     XR spine cervical complete 4 or 5 vw non injury; Future; Expected date: 07/31/2024       Current with pneumonia vaccination  Last tetanus booster 2017  Patient Shingrix vaccination    Lab results from July 23rd reviewed    6 months, CMP/A1c/microalbumin prior      History of Present Illness     Patient presents for recheck of chronic medical problems.  He has been complaining of ongoing left-sided neck pain which has worsened recently.  Denies any radiculopathy.  Otherwise doing well.  He is compliant on all prescribed medications.  He had labs drawn on July 23      Review of Systems   Respiratory: Negative.     Cardiovascular: Negative.    Gastrointestinal: Negative.    Genitourinary: Negative.      Past Medical History:   Diagnosis Date   • Allergic    • Allergic reaction     last assessed: 5/26/2015   • Arthritis     right knee   • Bee sting allergy     last assessed: 6/30/2015   • BPH (benign prostatic hypertrophy)    • Cancer (HCC) 2017    Right kidney   • Diabetes mellitus (HCC)     Type II, NIDDM   • Enlarged prostate with lower urinary tract symptoms (LUTS)     last assessed: 8/17/2015   • Herpes zoster     last assessed: 11/21/2013   •  History of colon polyps    • Hyperlipidemia    • Leukocytosis     last assessed: 11/18/2014   • Myocardial infarction (HCC) 4/13/22   • Osteoarthrosis, localized, primary, knee     last assessed: 10/25/2016   • Renal cancer (HCC)    • Seasonal allergies    • Shoulder pain, right    • Wears glasses    • Wears partial dentures     Upper and Lower     Past Surgical History:   Procedure Laterality Date   • CARDIAC CATHETERIZATION N/A 04/13/2022    Procedure: Cardiac pci;  Surgeon: José Cameron MD;  Location: [facility];  Service: Cardiology   • CARPAL TUNNEL RELEASE Left    • CHOLECYSTECTOMY      Open   • COLONOSCOPY     • FL GUIDED NEEDLE PLAC BX/ASP/INJ  06/01/2020   • FL GUIDED NEEDLE PLAC BX/ASP/INJ  11/13/2020   • GANGLION CYST EXCISION Left     Left wrist   • JOINT REPLACEMENT Left 08/2015    Left TKR   • JOINT REPLACEMENT Right 09/2017    Right TKR   • KNEE SURGERY  9/2017    Knee replacement   • NEPHRECTOMY  05/08/2017    Partial right nephrectomy   • IA ARTHRP INTERPOS INTERCARPAL/METACARPAL JOINTS Left 12/05/2016    Procedure: ARTHROPLASTY THUMB [facility] ;  Surgeon: Dominguez Velazco MD;  Location: [facility];  Service: Orthopedics   • IA ARTHRP KNE CONDYLE&PLATU MEDIAL&LAT COMPARTMENTS Right 09/11/2017    Procedure: ARTHROPLASTY KNEE TOTAL;  Surgeon: Dominguez Velazco MD;  Location: [facility];  Service: Orthopedics   • IA LAPAROSCOPY SURG PARTIAL NEPHRECTOMY Right 05/08/2017    Procedure: LAPAROSCOPIC LYSIS OF ADHESIONS, ROBOTIC PARTIAL NEPHRECTOMY, WITH INTRA-OP LAPAROSCOPIC ULTRASOUND;  Surgeon: Jose Hidalgo MD;  Location: [facility];  Service: Urology   • IA NEUROPLASTY &/TRANSPOS MEDIAN NRV CARPAL TUNNE Left 12/05/2016    Procedure: RELEASE CARPAL TUNNEL;  Surgeon: Dominguez Velazco MD;  Location: [facility];  Service: Orthopedics   • IA SURGICAL ARTHROSCOPY SHOULDER W/ROTATOR CUFF RPR Right 02/04/2019    Procedure: REPAIR ROTATOR CUFF ARTHROSCOPIC; SUBACROMIAL DECOMPRESSION; DISTAL CLAVICLE EXCISION;   Surgeon: Dominguez Velazco MD;  Location: [facility];  Service: Orthopedics   • SC TENODESIS LONG TENDON BICEPS  2019    Procedure: TENODESIS BICEPS OPEN PROXIMAL;  Surgeon: Dominguez Velazco MD;  Location: [facility];  Service: Orthopedics   • TONSILLECTOMY AND ADENOIDECTOMY       Family History   Problem Relation Age of Onset   • Cancer Mother    • Stroke Father      Social History     Tobacco Use   • Smoking status: Former     Current packs/day: 0.00     Average packs/day: 0.3 packs/day for 12.0 years (3.0 ttl pk-yrs)     Types: Cigarettes     Start date: 1959     Quit date: 1971     Years since quittin.5     Passive exposure: Never   • Smokeless tobacco: Never   • Tobacco comments:     Smoked from ages 13 to 22.   Vaping Use   • Vaping status: Never Used   Substance and Sexual Activity   • Alcohol use: Not Currently   • Drug use: No   • Sexual activity: Not Currently     Partners: Female     Birth control/protection: None     Current Outpatient Medications on File Prior to Visit   Medication Sig   • Aspirin Low Dose 81 MG EC tablet TAKE 1 TABLET EVERY DAY   • atorvastatin (LIPITOR) 80 mg tablet TAKE 1 TABLET EVERY DAY WITH DINNER   • Blood Glucose Monitoring Suppl (ONE TOUCH ULTRA 2) w/Device KIT Use 2 (two) times a day   • Cholecalciferol (VITAMIN D-3 PO) Take 2,000 Units by mouth daily   • ciclopirox (PENLAC) 8 % solution APPLY TOPICALLY ONCE DAILY AT BEDTIME   • EPINEPHrine (EPIPEN) 0.3 mg/0.3 mL SOAJ Inject as directed   • ezetimibe (ZETIA) 10 mg tablet TAKE 1 TABLET AT BEDTIME   • fluticasone (FLONASE) 50 mcg/act nasal spray 1 spray into each nostril daily   • glucose blood (OneTouch Ultra) test strip Use 1 each in the morning   • glucose blood test strip Use 1 each daily as needed (once a day) One touch ultra  Test strips   • metoprolol succinate (TOPROL-XL) 25 mg 24 hr tablet TAKE 1 TABLET EVERY DAY   • OneTouch Delica Lancets 33G MISC Use in the morning   • tamsulosin (FLOMAX) 0.4 mg TAKE  "1 CAPSULE AT BEDTIME   • traMADol (Ultram) 50 mg tablet 1 tab BID PRN mod-severe pain   • traZODone (DESYREL) 100 mg tablet Take 1 tablet (100 mg total) by mouth daily at bedtime   • [DISCONTINUED] Empagliflozin-metFORMIN HCl (Synjardy) 5-500 MG TABS 1 tab BID   • baclofen 20 mg tablet 1 tab BID PRN (Patient not taking: Reported on 7/31/2024)   • neomycin-polymyxin-hydrocortisone (CORTISPORIN) otic solution 4 drops in right ear 3 times daily x 7 days     Allergies   Allergen Reactions   • Bee Venom Edema     Immunization History   Administered Date(s) Administered   • COVID-19 MODERNA VACC 0.5 ML IM 03/12/2021, 04/09/2021, 11/09/2021   • INFLUENZA 11/18/2014, 10/05/2015, 11/28/2016, 01/04/2018, 10/15/2018, 10/18/2021, 10/25/2022, 10/18/2023   • Influenza Split High Dose Preservative Free IM 11/18/2014, 10/05/2015, 11/28/2016, 01/04/2018   • Influenza, high dose seasonal 0.7 mL 10/15/2018, 10/03/2019, 09/18/2020   • Influenza, seasonal, injectable 10/05/2012, 10/09/2013   • Pneumococcal Conjugate 13-Valent 12/08/2015, 12/09/2015   • Pneumococcal Polysaccharide PPV23 10/09/2013   • Tdap 11/15/2004, 10/16/2017   • Zoster 10/05/2012   • Zoster Vaccine Recombinant 10/30/2020, 01/25/2021     Objective     /70 (BP Location: Left arm, Patient Position: Sitting, Cuff Size: Adult)   Pulse 68   Temp 97.7 °F (36.5 °C)   Ht 5' 11\" (1.803 m)   Wt 83.6 kg (184 lb 3.2 oz)   SpO2 96%   BMI 25.69 kg/m²     Physical Exam  Constitutional:       Appearance: Normal appearance.   Eyes:      Pupils: Pupils are equal, round, and reactive to light.   Neck:      Vascular: No carotid bruit.   Cardiovascular:      Rate and Rhythm: Normal rate and regular rhythm.      Pulses: Normal pulses.      Heart sounds: Normal heart sounds. No murmur heard.     No gallop.   Pulmonary:      Effort: Pulmonary effort is normal.      Breath sounds: Normal breath sounds.   Neurological:      Mental Status: He is alert.   Psychiatric:         Mood " "and Affect: Mood normal.         Behavior: Behavior normal.         Answers submitted by the patient for this visit:  Medicare Annual Wellness Visit (Submitted on 7/24/2024)  How would you rate your overall health?: excellent  Compared to last year, how is your physical health?: much better  In general, how satisfied are you with your life?: very satisfied  Compared to last year, how is your eyesight?: same  Compared to last year, how is your hearing?: same  Compared to last year, how is your emotional/mental health?: same  How often is anger a problem for you?: never, rarely  How often do you feel unusually tired/fatigued?: never, rarely  In the past 7 days, how much pain have you experienced?: some  If you answered \"some\" or \"a lot\", please rate the severity of your pain on a scale of 1 to 10 (1 being the least severe pain and 10 being the most intense pain).: 3/10  In the past 6 months, have you lost or gained 10 pounds without trying?: No  One or more falls in the last year: No  Do you have trouble with the stairs inside or outside your home?: No  Does your home have working smoke alarms?: Yes  Does your home have a carbon monoxide monitor?: Yes  Which safety hazards (if any) have you experienced in your home? Please select all that apply.: none  How would you describe your current diet? Please select all that apply.: Diabetic  In addition to prescription medications, are you taking any over-the-counter supplements?: No  Can you manage your medications?: Yes  Are you currently taking any opioid medications?: No  Can you walk and transfer into and out of your bed and chair?: Yes  Can you dress and groom yourself?: Yes  Can you bathe or shower yourself?: Yes  Can you feed yourself?: Yes  Can you do your laundry/ housekeeping?: Yes  Can you manage your money, pay your bills, and track your expenses?: Yes  Can you make your own meals?: Yes  Can you do your own shopping?: Yes  Within the last 12 months, have you had " any hospitalizations or Emergency Department visits?: No  Do you have a living will?: No  Do you have a Durable POA (Power of ) for healthcare decisions?: Yes  Do you have an Advanced Directive for end of life decisions?: No  How often have you used an illegal drug (including marijuana) or a prescription medication for non-medical reasons in the past year?: never  What is the typical number of drinks you consume in a day?: 0  What is the typical number of drinks you consume in a week?: 0  How often did you have a drink containing alcohol in the past year?: never  How many drinks did you have on a typical day  when you were drinking in the past year?: 0  How often did you have 6 or more drinks on one occasion in the past year?: never

## 2024-07-31 NOTE — ASSESSMENT & PLAN NOTE
Waning of flareup of chronic neck pain.  Now on left side.  Denies any radiculopathy.  On examination, patient exhibits reduced range of motion, especially with left rotation and flexion.  Will send for x-ray cervical spine.  Possible referral to comprehensive spine PT.

## 2024-07-31 NOTE — PATIENT INSTRUCTIONS
Medicare Preventive Visit Patient Instructions  Thank you for completing your Welcome to Medicare Visit or Medicare Annual Wellness Visit today. Your next wellness visit will be due in one year (8/1/2025).  The screening/preventive services that you may require over the next 5-10 years are detailed below. Some tests may not apply to you based off risk factors and/or age. Screening tests ordered at today's visit but not completed yet may show as past due. Also, please note that scanned in results may not display below.  Preventive Screenings:  Service Recommendations Previous Testing/Comments   Colorectal Cancer Screening  Colonoscopy    Fecal Occult Blood Test (FOBT)/Fecal Immunochemical Test (FIT)  Fecal DNA/Cologuard Test  Flexible Sigmoidoscopy Age: 45-75 years old   Colonoscopy: every 10 years (May be performed more frequently if at higher risk)  OR  FOBT/FIT: every 1 year  OR  Cologuard: every 3 years  OR  Sigmoidoscopy: every 5 years  Screening may be recommended earlier than age 45 if at higher risk for colorectal cancer. Also, an individualized decision between you and your healthcare provider will decide whether screening between the ages of 76-85 would be appropriate. Colonoscopy: 06/05/2014  FOBT/FIT: Not on file  Cologuard: Not on file  Sigmoidoscopy: Not on file          Prostate Cancer Screening Individualized decision between patient and health care provider in men between ages of 55-69   Medicare will cover every 12 months beginning on the day after your 50th birthday PSA: 4.313 ng/mL     Screening Not Indicated     Hepatitis C Screening Once for adults born between 1945 and 1965  More frequently in patients at high risk for Hepatitis C Hep C Antibody: Not on file        Diabetes Screening 1-2 times per year if you're at risk for diabetes or have pre-diabetes Fasting glucose: 124 mg/dL (7/23/2024)  A1C: 7.3 % (7/23/2024)  Screening Not Indicated  History Diabetes   Cholesterol Screening Once every 5  years if you don't have a lipid disorder. May order more often based on risk factors. Lipid panel: 07/23/2024  Screening Not Indicated  History Lipid Disorder      Other Preventive Screenings Covered by Medicare:  Abdominal Aortic Aneurysm (AAA) Screening: covered once if your at risk. You're considered to be at risk if you have a family history of AAA or a male between the age of 65-75 who smoking at least 100 cigarettes in your lifetime.  Lung Cancer Screening: covers low dose CT scan once per year if you meet all of the following conditions: (1) Age 55-77; (2) No signs or symptoms of lung cancer; (3) Current smoker or have quit smoking within the last 15 years; (4) You have a tobacco smoking history of at least 20 pack years (packs per day x number of years you smoked); (5) You get a written order from a healthcare provider.  Glaucoma Screening: covered annually if you're considered high risk: (1) You have diabetes OR (2) Family history of glaucoma OR (3)  aged 50 and older OR (4)  American aged 65 and older  Osteoporosis Screening: covered every 2 years if you meet one of the following conditions: (1) Have a vertebral abnormality; (2) On glucocorticoid therapy for more than 3 months; (3) Have primary hyperparathyroidism; (4) On osteoporosis medications and need to assess response to drug therapy.  HIV Screening: covered annually if you're between the age of 15-65. Also covered annually if you are younger than 15 and older than 65 with risk factors for HIV infection. For pregnant patients, it is covered up to 3 times per pregnancy.    Immunizations:  Immunization Recommendations   Influenza Vaccine Annual influenza vaccination during flu season is recommended for all persons aged >= 6 months who do not have contraindications   Pneumococcal Vaccine   * Pneumococcal conjugate vaccine = PCV13 (Prevnar 13), PCV15 (Vaxneuvance), PCV20 (Prevnar 20)  * Pneumococcal polysaccharide vaccine = PPSV23  (Pneumovax) Adults 19-65 yo with certain risk factors or if 65+ yo  If never received any pneumonia vaccine: recommend Prevnar 20 (PCV20)  Give PCV20 if previously received 1 dose of PCV13 or PPSV23   Hepatitis B Vaccine 3 dose series if at intermediate or high risk (ex: diabetes, end stage renal disease, liver disease)   Respiratory syncytial virus (RSV) Vaccine - COVERED BY MEDICARE PART D  * RSVPreF3 (Arexvy) CDC recommends that adults 60 years of age and older may receive a single dose of RSV vaccine using shared clinical decision-making (SCDM)   Tetanus (Td) Vaccine - COST NOT COVERED BY MEDICARE PART B Following completion of primary series, a booster dose should be given every 10 years to maintain immunity against tetanus. Td may also be given as tetanus wound prophylaxis.   Tdap Vaccine - COST NOT COVERED BY MEDICARE PART B Recommended at least once for all adults. For pregnant patients, recommended with each pregnancy.   Shingles Vaccine (Shingrix) - COST NOT COVERED BY MEDICARE PART B  2 shot series recommended in those 19 years and older who have or will have weakened immune systems or those 50 years and older     Health Maintenance Due:      Topic Date Due   • Hepatitis C Screening  Never done   • Colorectal Cancer Screening  Discontinued     Immunizations Due:      Topic Date Due   • COVID-19 Vaccine (4 - 2023-24 season) 09/01/2023   • Influenza Vaccine (1) 09/01/2024     Advance Directives   What are advance directives?  Advance directives are legal documents that state your wishes and plans for medical care. These plans are made ahead of time in case you lose your ability to make decisions for yourself. Advance directives can apply to any medical decision, such as the treatments you want, and if you want to donate organs.   What are the types of advance directives?  There are many types of advance directives, and each state has rules about how to use them. You may choose a combination of any of the  following:  Living will:  This is a written record of the treatment you want. You can also choose which treatments you do not want, which to limit, and which to stop at a certain time. This includes surgery, medicine, IV fluid, and tube feedings.   Durable power of  for healthcare (DPAHC):  This is a written record that states who you want to make healthcare choices for you when you are unable to make them for yourself. This person, called a proxy, is usually a family member or a friend. You may choose more than 1 proxy.  Do not resuscitate (DNR) order:  A DNR order is used in case your heart stops beating or you stop breathing. It is a request not to have certain forms of treatment, such as CPR. A DNR order may be included in other types of advance directives.  Medical directive:  This covers the care that you want if you are in a coma, near death, or unable to make decisions for yourself. You can list the treatments you want for each condition. Treatment may include pain medicine, surgery, blood transfusions, dialysis, IV or tube feedings, and a ventilator (breathing machine).  Values history:  This document has questions about your views, beliefs, and how you feel and think about life. This information can help others choose the care that you would choose.  Why are advance directives important?  An advance directive helps you control your care. Although spoken wishes may be used, it is better to have your wishes written down. Spoken wishes can be misunderstood, or not followed. Treatments may be given even if you do not want them. An advance directive may make it easier for your family to make difficult choices about your care.   Weight Management   Why it is important to manage your weight:  Being overweight increases your risk of health conditions such as heart disease, high blood pressure, type 2 diabetes, and certain types of cancer. It can also increase your risk for osteoarthritis, sleep apnea, and  other respiratory problems. Aim for a slow, steady weight loss. Even a small amount of weight loss can lower your risk of health problems.  How to lose weight safely:  A safe and healthy way to lose weight is to eat fewer calories and get regular exercise. You can lose up about 1 pound a week by decreasing the number of calories you eat by 500 calories each day.   Healthy meal plan for weight management:  A healthy meal plan includes a variety of foods, contains fewer calories, and helps you stay healthy. A healthy meal plan includes the following:  Eat whole-grain foods more often.  A healthy meal plan should contain fiber. Fiber is the part of grains, fruits, and vegetables that is not broken down by your body. Whole-grain foods are healthy and provide extra fiber in your diet. Some examples of whole-grain foods are whole-wheat breads and pastas, oatmeal, brown rice, and bulgur.  Eat a variety of vegetables every day.  Include dark, leafy greens such as spinach, kale, pramod greens, and mustard greens. Eat yellow and orange vegetables such as carrots, sweet potatoes, and winter squash.   Eat a variety of fruits every day.  Choose fresh or canned fruit (canned in its own juice or light syrup) instead of juice. Fruit juice has very little or no fiber.  Eat low-fat dairy foods.  Drink fat-free (skim) milk or 1% milk. Eat fat-free yogurt and low-fat cottage cheese. Try low-fat cheeses such as mozzarella and other reduced-fat cheeses.  Choose meat and other protein foods that are low in fat.  Choose beans or other legumes such as split peas or lentils. Choose fish, skinless poultry (chicken or turkey), or lean cuts of red meat (beef or pork). Before you cook meat or poultry, cut off any visible fat.   Use less fat and oil.  Try baking foods instead of frying them. Add less fat, such as margarine, sour cream, regular salad dressing and mayonnaise to foods. Eat fewer high-fat foods. Some examples of high-fat foods  include french fries, doughnuts, ice cream, and cakes.  Eat fewer sweets.  Limit foods and drinks that are high in sugar. This includes candy, cookies, regular soda, and sweetened drinks.  Exercise:  Exercise at least 30 minutes per day on most days of the week. Some examples of exercise include walking, biking, dancing, and swimming. You can also fit in more physical activity by taking the stairs instead of the elevator or parking farther away from stores. Ask your healthcare provider about the best exercise plan for you.      © Copyright [employer] 2018 Information is for End User's use only and may not be sold, redistributed or otherwise used for commercial purposes. All illustrations and images included in CareNotes® are the copyrighted property of [employer] or [employer]

## 2024-07-31 NOTE — ASSESSMENT & PLAN NOTE
Lab Results   Component Value Date    HGBA1C 7.3 (H) 07/23/2024   A1c from July 23 continues to slowly improve at 7.3% (was 7.4%).  Patient compliant on Synjardy 5/500 twice daily, but med is much more expensive since switching to combination.  Will switch back to individual components (Jardiance 10 mg daily along with metformin 500 twice daily).  Continue to work on diet and exercise.  Consider increasing Jardiance to 25 mg daily if A1c does not continue to improve.

## 2024-07-31 NOTE — ASSESSMENT & PLAN NOTE
Most recent PSA from July 23 was slightly elevated at 4.313.  This is higher than patient's baseline of 3's.  Urologist has advised patient to repeat in near future.

## 2024-07-31 NOTE — ASSESSMENT & PLAN NOTE
Cholesterol from July 23, 1969, LDL 23.  Patient currently taking atorvastatin 80 and Zetia 10.  Being managed by cardiology

## 2024-07-31 NOTE — ASSESSMENT & PLAN NOTE
History of right renal cell carcinoma status post robotic partial nephrectomy in 2017.  Stable.  Continue follow-up with urology as directed

## 2024-08-01 DIAGNOSIS — M54.2 NECK PAIN: Primary | ICD-10-CM

## 2024-08-02 ENCOUNTER — APPOINTMENT (OUTPATIENT)
Dept: LAB | Facility: CLINIC | Age: 79
End: 2024-08-02
Payer: MEDICARE

## 2024-08-02 DIAGNOSIS — R97.20 ELEVATED PSA: ICD-10-CM

## 2024-08-02 LAB
PSA FREE MFR SERPL: 21.65 %
PSA FREE SERPL-MCNC: 0.79 NG/ML
PSA SERPL-MCNC: 3.63 NG/ML (ref 0–4)

## 2024-08-02 PROCEDURE — 84154 ASSAY OF PSA FREE: CPT

## 2024-08-02 PROCEDURE — 36415 COLL VENOUS BLD VENIPUNCTURE: CPT

## 2024-08-02 PROCEDURE — 84153 ASSAY OF PSA TOTAL: CPT

## 2024-08-05 ENCOUNTER — TELEPHONE (OUTPATIENT)
Dept: UROLOGY | Facility: CLINIC | Age: 79
End: 2024-08-05

## 2024-08-05 DIAGNOSIS — R97.20 ELEVATED PSA: Primary | ICD-10-CM

## 2024-08-05 NOTE — PROGRESS NOTES
PT Evaluation     Today's date: 2024  Patient name: Chuck Borja  : 1945  MRN: [id number]  Referring provider: Niranjan Kline DO  Dx:   Encounter Diagnosis     ICD-10-CM    1. Neck pain  M54.2 Ambulatory Referral to Comprehensive [facility] PT          Start Time: 945  Stop Time: 102  Total time in clinic (min): 36 minutes    Assessment  Impairments: abnormal or restricted ROM, participation limitations and activity limitations    Assessment details: Que is a 79 year old patient presenting to OPPT for evaluation of chronic neck pain. Upon evaluation they show impairments in the following areas: decreased cervical mobility into L rotation and sidebending bilaterally along with associated increased in symptomology into these motions. Decreased cervical joint mobility evident with PAIVMs. Will likely benefit from supplementation of further postural strengthening to already robust home exercise program. No further referral appears necessary at this time. These impairments limit their ability to perform daily activities as well as their previous level of function causing decreased quality of life. Had improved cervical mobility into L rotation with less related symptoms following manual therapy.    Patient requires continued skilled PT services in order to improve aforementioned impairments so that they are able to return to highest level of function possible. Without initiation of skilled PT services, patient will continue to have neck pain leading to impaired quality of life.     Understanding of Dx/Px/POC: good     Prognosis: good    Goals  Short-Term Goals (4 weeks)   1.  Patient will decrease best rating of pain by 2/10 to improve quality of life    2. Pt will increase strength 4/5 in scapular stabilizer musculature to improve quality of life with improved efficiency of transfers and daily activities  3. Patient will be able to perform home and household duties with 2/10 reduction in pain indicating  improved QOL   4. Patient will improve C/S AROM by 25% indicating improved mobility of affected area       Long-Term Goals (8 weeks)   1. Patient's L/S will be WFL indicating improvement in C/S AROM capacities   2. Patient will decrease pain by 4/10 at best in comparison to IE indicating significant reduction in pain and improved quality of life   3. Patient will improve greater than predicted FOTO score   4. Patient will be able to perform household and hobby activities without increase in pain > or equal to 2/10 indicating ability to independently manage pain symptoms to accomplish daily activities.   5. Patient will be independent with HEP with good form accomplished   6. Patient DNF endurance testing will be WNL compared to age related norms.         Plan  Patient would benefit from: skilled physical therapy  Planned modality interventions: cryotherapy and thermotherapy: hydrocollator packs    Planned therapy interventions: manual therapy, strengthening, therapeutic activities, therapeutic exercise, stretching, home exercise program, graded exercise, gait training and balance    Frequency: 1-3x/wk.  Plan of Care beginning date: 8/6/2024  Plan of Care expiration date: 10/5/2024  Treatment plan discussed with: patient        Subjective Evaluation    History of Present Illness  Mechanism of injury: Que is a 79 year old patient presenting to OPPT for evaluation regarding flare up of chronic neck pain. Notes that he has some trouble in the back of the neck. Woke him up last night where it was bad in the middle of the night. Notes that it just starts to feel like an intense ache. Notes that it is really symptomatic right in the muscle. Has been going on for about 2 months to this point, no precipitating event. Notes that he is having a hard time while driving and looking behind him because it hurts a lot. Blood sugar has been good, BP has been great.     Cardio 3x/wk. Strength training 3x per week. All different kinds  "of exercises, mostly upper body. Big fishing wilian, just got back from a trip.     Per EMR: \"Waning of flareup of chronic neck pain.  Now on left side.  Denies any radiculopathy.  On examination, patient exhibits reduced range of motion, especially with left rotation and flexion.  Will send for x-ray cervical spine.  Possible referral to comprehensive spine PT.\"   Pain  Current pain ratin  At best pain rating: 3  At worst pain ratin (Driving and has to look towards the L)  Location: Levator scapulae  Relieving factors: relaxation  Exacerbated by: turning head.  Progression: no change          Objective    Standing Posture: slighty forward head and rounded shoulders     Cervical Spine ROM:   Flexion 63   Extension: 34  SB:   L 4  R 8   Rotation:  L 48 R 59     Shoulder Active ROM: WNL     Shoulder Passive ROM: WNL     Thoracic Mobility   Flexion   Extension 25% limitation   Rotation (R) 25% limitation pain low back   Rotation (L) 25% limitation pain low back     Palpation: TTP levator scap on L, anterior scalenes     Special Tests:   Distraction (-)   Spurling A (-)     Upper Limb Tension Testing (-)   Alfonso kit (-)   Speeds NT   Drop Arm (-)   Infraspinatus (-)    Painful Arc (-)   Apprehension NT     Upper Quarter Screen:   Dermatomes: wnl   Myotomes: wnl   Reflexes: 2+ triceps and biceps     Upper Quarter Strength:   Shoulder   Flexion  L 4+ R 4+    Abduction L 4+  R 4+   ER   L 4+  R 4+   IR   L 4+  R 4+   Extension  L 4+  R   4+        Precautions: OA R knee, Hx B TKA, Hx STEMI, CAD, Hx cancer      Manuals             C/S STM  MH             C/S SOR               C/S distraction              Sideglides,   grd III             Downglides   grd III                          Neuro Re-Ed             DNF motor control              Row             LPD              C/S retraction  X10             Bilat ER  x10 pink TB                                       Ther Ex             C/S Snag  10x5\"           " "  C/S snag ext  15x5\"             UT S              LS S                                                                  Ther Activity                                       Gait Training                                       Modalities                                            "

## 2024-08-05 NOTE — TELEPHONE ENCOUNTER
PSA is decreased to 3.6.  I think we can continue to observe at this point in time.  Can obtain a PSA in 6 months.  Keep follow-up as currently scheduled

## 2024-08-06 ENCOUNTER — EVALUATION (OUTPATIENT)
Dept: PHYSICAL THERAPY | Facility: CLINIC | Age: 79
End: 2024-08-06
Payer: MEDICARE

## 2024-08-06 DIAGNOSIS — M54.2 NECK PAIN: ICD-10-CM

## 2024-08-06 PROCEDURE — 97112 NEUROMUSCULAR REEDUCATION: CPT

## 2024-08-06 PROCEDURE — 97162 PT EVAL MOD COMPLEX 30 MIN: CPT

## 2024-08-13 ENCOUNTER — OFFICE VISIT (OUTPATIENT)
Dept: PHYSICAL THERAPY | Facility: CLINIC | Age: 79
End: 2024-08-13
Payer: MEDICARE

## 2024-08-13 DIAGNOSIS — M54.2 NECK PAIN: Primary | ICD-10-CM

## 2024-08-13 PROCEDURE — 97112 NEUROMUSCULAR REEDUCATION: CPT | Performed by: PHYSICAL THERAPIST

## 2024-08-13 PROCEDURE — 97140 MANUAL THERAPY 1/> REGIONS: CPT | Performed by: PHYSICAL THERAPIST

## 2024-08-13 PROCEDURE — 97110 THERAPEUTIC EXERCISES: CPT | Performed by: PHYSICAL THERAPIST

## 2024-08-13 NOTE — PROGRESS NOTES
"Daily Note     Today's date: 2024  Patient name: Chuck Borja  : 1945  MRN: [id number]  Referring provider: Niranjan Kline DO  Dx:   Encounter Diagnosis     ICD-10-CM    1. Neck pain  M54.2                      Subjective: Patient reports HEP compliance.  Notes that his left sided cervical spine pain remains significant, most noted in the morning/with sleeping.       Objective: See treatment diary below      Assessment: Patient demonstrates fair tolerance to manuals with end range joint pain on the left with closing.  Modified SNAG to isolate C2 and patient demonstrated appropriate mechanics.  Will start focusing strength training on postural muscles and postural awareness.       Plan: Continue per plan of care.      Precautions: OA R knee, Hx B TKA, Hx STEMI, CAD, Hx cancer      Manuals             C/S STM  MH             C/S SOR  MH  3'           C/S distraction   G4           Sideglides C2-C5 MH grd III  G4           Downglides  C2-C5 MH grd III  G4                        Neuro Re-Ed             DNF motor control   2x10 x5\"           Row             LPD              C/S retraction  X10  2x10 x5\"           Bilat ER  x10 pink TB  3x10 x3\"                                     Ther Ex             C/S Snag  10x5\"  10 x10\" B/L           C/S snag ext  15x5\"  10 x10\"           UT S              LS S              UBE warm up  L2  2'/2'                                                  Ther Activity                                       Gait Training                                       Modalities                                            "

## 2024-08-16 ENCOUNTER — APPOINTMENT (OUTPATIENT)
Dept: PHYSICAL THERAPY | Facility: CLINIC | Age: 79
End: 2024-08-16
Payer: MEDICARE

## 2024-10-01 DIAGNOSIS — E11.69 TYPE 2 DIABETES MELLITUS WITH OTHER SPECIFIED COMPLICATION, WITHOUT LONG-TERM CURRENT USE OF INSULIN (HCC): ICD-10-CM

## 2024-10-01 DIAGNOSIS — I49.3 SYMPTOMATIC PVCS: ICD-10-CM

## 2024-10-01 DIAGNOSIS — I25.10 CORONARY ARTERY DISEASE INVOLVING NATIVE CORONARY ARTERY OF NATIVE HEART WITHOUT ANGINA PECTORIS: ICD-10-CM

## 2024-10-01 RX ORDER — METOPROLOL SUCCINATE 25 MG/1
25 TABLET, EXTENDED RELEASE ORAL DAILY
Qty: 90 TABLET | Refills: 1 | OUTPATIENT
Start: 2024-10-01

## 2024-10-02 DIAGNOSIS — I25.10 CORONARY ARTERY DISEASE INVOLVING NATIVE CORONARY ARTERY OF NATIVE HEART WITHOUT ANGINA PECTORIS: ICD-10-CM

## 2024-10-02 DIAGNOSIS — I49.3 SYMPTOMATIC PVCS: ICD-10-CM

## 2024-10-02 RX ORDER — METOPROLOL SUCCINATE 25 MG/1
25 TABLET, EXTENDED RELEASE ORAL DAILY
Qty: 90 TABLET | Refills: 3 | Status: SHIPPED | OUTPATIENT
Start: 2024-10-02

## 2024-10-03 RX ORDER — METOPROLOL SUCCINATE 25 MG/1
25 TABLET, EXTENDED RELEASE ORAL DAILY
Qty: 90 TABLET | Refills: 1 | Status: SHIPPED | OUTPATIENT
Start: 2024-10-03

## 2024-10-23 DIAGNOSIS — I21.3 STEMI (ST ELEVATION MYOCARDIAL INFARCTION) (HCC): ICD-10-CM

## 2024-10-24 RX ORDER — ATORVASTATIN CALCIUM 80 MG/1
TABLET, FILM COATED ORAL
Qty: 90 TABLET | Refills: 1 | Status: SHIPPED | OUTPATIENT
Start: 2024-10-24

## 2024-11-01 ENCOUNTER — TELEPHONE (OUTPATIENT)
Age: 79
End: 2024-11-01

## 2024-11-01 DIAGNOSIS — N40.1 BENIGN PROSTATIC HYPERPLASIA WITH URINARY FREQUENCY: ICD-10-CM

## 2024-11-01 DIAGNOSIS — R35.0 BENIGN PROSTATIC HYPERPLASIA WITH URINARY FREQUENCY: ICD-10-CM

## 2024-11-01 RX ORDER — TAMSULOSIN HYDROCHLORIDE 0.4 MG/1
0.4 CAPSULE ORAL
Qty: 90 CAPSULE | Refills: 1 | Status: SHIPPED | OUTPATIENT
Start: 2024-11-01

## 2024-11-01 NOTE — TELEPHONE ENCOUNTER
Pt called his Jardiance going to cost $1500 month and he would need to pay 25% = $400 roughly.    Pt would like to know if he can just do Metformen or something else more cost effective or if the Jardiance is medically necessary.    Please advise

## 2024-11-02 DIAGNOSIS — E11.69 TYPE 2 DIABETES MELLITUS WITH OTHER SPECIFIED COMPLICATION, WITHOUT LONG-TERM CURRENT USE OF INSULIN (HCC): ICD-10-CM

## 2024-11-02 NOTE — TELEPHONE ENCOUNTER
Please call patient.  He can try to discontinue Jardiance.  I would recommend increasing his metformin dosage (currently at 500 mg twice daily).  I would recommend increasing to 500 mg a.m., 1000 mg with dinner.  Recheck labs 1 week prior to his next appointment in February.

## 2024-11-04 DIAGNOSIS — E11.69 TYPE 2 DIABETES MELLITUS WITH OTHER SPECIFIED COMPLICATION, WITHOUT LONG-TERM CURRENT USE OF INSULIN (HCC): ICD-10-CM

## 2024-11-04 NOTE — TELEPHONE ENCOUNTER
Patient notified. Patient ask for a refill since dosage is going to be changing sent rx to [facility].

## 2024-11-05 DIAGNOSIS — F51.01 PRIMARY INSOMNIA: ICD-10-CM

## 2024-11-06 RX ORDER — TRAZODONE HYDROCHLORIDE 100 MG/1
100 TABLET ORAL
Qty: 90 TABLET | Refills: 1 | Status: SHIPPED | OUTPATIENT
Start: 2024-11-06

## 2024-12-09 ENCOUNTER — APPOINTMENT (OUTPATIENT)
Dept: RADIOLOGY | Facility: CLINIC | Age: 79
End: 2024-12-09
Payer: MEDICARE

## 2024-12-09 ENCOUNTER — OFFICE VISIT (OUTPATIENT)
Dept: URGENT CARE | Facility: CLINIC | Age: 79
End: 2024-12-09
Payer: MEDICARE

## 2024-12-09 VITALS
SYSTOLIC BLOOD PRESSURE: 132 MMHG | DIASTOLIC BLOOD PRESSURE: 78 MMHG | TEMPERATURE: 96.1 F | RESPIRATION RATE: 18 BRPM | OXYGEN SATURATION: 100 % | HEART RATE: 58 BPM | WEIGHT: 191 LBS | BODY MASS INDEX: 26.64 KG/M2

## 2024-12-09 DIAGNOSIS — R07.81 RIB PAIN ON RIGHT SIDE: Primary | ICD-10-CM

## 2024-12-09 DIAGNOSIS — M94.0 COSTOCHONDRITIS: ICD-10-CM

## 2024-12-09 DIAGNOSIS — R07.81 RIB PAIN ON RIGHT SIDE: ICD-10-CM

## 2024-12-09 PROCEDURE — 71101 X-RAY EXAM UNILAT RIBS/CHEST: CPT

## 2024-12-09 PROCEDURE — G0463 HOSPITAL OUTPT CLINIC VISIT: HCPCS | Performed by: NURSE PRACTITIONER

## 2024-12-09 PROCEDURE — 99213 OFFICE O/P EST LOW 20 MIN: CPT | Performed by: NURSE PRACTITIONER

## 2024-12-09 NOTE — PATIENT INSTRUCTIONS
"Lidoderm patches topically  Patient Education     Costochondritis   The Basics   Written by the doctors and editors at [facility]   What is costochondritis? -- This is a condition that causes pain and tenderness in the chest. The pain happens in an area called the \"costosternal\" joints, where the ribs meet the breastbone (figure 1). The pain from costochondritis affects only a small area and does not always get worse when you move around.  What causes costochondritis? -- Most of the time, doctors don't know why people get costochondritis. But in some people, it might be caused by:   A blow to the chest   Heavy lifting or hard exercise   An illness that causes you to cough and sneeze  What are the symptoms of costochondritis? -- The symptoms include:   Pain and tenderness in the chest - The pain can be sharp, or it might be dull and gnawing.   Pain when you take a deep breath   Pain when you cough  Is there a test for costochondritis? -- No. But your doctor or nurse should be able to tell if you have it by learning about your symptoms and doing an exam. Sometimes, they might do other tests to make sure that you do not have a different problem.  What can I do on my own to feel better? -- Costochondritis usually goes away without any treatment. But there are things that can help you feel better sooner. Some people feel better if they:   Do stretching exercises - Ask your doctor or nurse to show you what to do.   Put a heating pad on the painful area a few times a day.   Rest - Avoid doing activities that strain the area for 1 to 2 weeks, including lifting or pushing heavy objects.  Some over-the-counter medicines can also help ease pain, including:   Pain-relieving creams or gels that contain medicines called salicylates, for example, 1% diclofenac gel (brand name: Voltaren)   Patches, creams, or gels that contain a numbing medicine called lidocaine or a pain-relieving substance called capsaicin   Pain-relieving pills " such as acetaminophen (sample brand name: Tylenol) or ibuprofen (sample brand names: Advil, Motrin)  When should I call the doctor? -- Call for advice if your pain does not get better after a week or 2.  All topics are updated as new evidence becomes available and our peer review process is complete.  This topic retrieved from [employer] on: Apr 12, 2024.  Topic 57878 Version 15.0  Release: 32.3.2 - C32.101  © 2024 Up[employer], Inc. and/or its affiliates. All rights reserved.  figure 1: Costochrondritis     Costochondritis causes pain and tenderness in the costosternal joints.  Graphic 55476 Version 2.0  Consumer Information Use and Disclaimer   Disclaimer: This generalized information is a limited summary of diagnosis, treatment, and/or medication information. It is not meant to be comprehensive and should be used as a tool to help the user understand and/or assess potential diagnostic and treatment options. It does NOT include all information about conditions, treatments, medications, side effects, or risks that may apply to a specific patient. It is not intended to be medical advice or a substitute for the medical advice, diagnosis, or treatment of a health care provider based on the health care provider's examination and assessment of a patient's specific and unique circumstances. Patients must speak with a health care provider for complete information about their health, medical questions, and treatment options, including any risks or benefits regarding use of medications. This information does not endorse any treatments or medications as safe, effective, or approved for treating a specific patient. Up[employer]Date, Inc. and its affiliates disclaim any warranty or liability relating to this information or the use thereof.The use of this information is governed by the Terms of Use, available at https://www.woltersBridgeCouwer.com/en/know/clinical-effectiveness-terms. 2024© UpToDate, Inc. and its affiliates and/or licensors. All  rights reserved.  Copyright   © 2024 [employer] and/or its affiliates. All rights reserved.

## 2024-12-09 NOTE — PROGRESS NOTES
[facility]        NAME: Chuck Borja is a 79 y.o. male  : 1945    MRN: [id number]  DATE: 2024  TIME: 12:31 PM    Assessment and Plan   Rib pain on right side [R07.81]  1. Rib pain on right side  XR ribs right w pa chest min 3 views        X-ray done in office.  Images reviewed by myself.  No evidence of fracture noted.  Pain on palpation of intercostal space.  Recommend anti-inflammatories and Lidoderm patches.  Follow-up PCP.  Discussed bracing the area with coughing or sneezing.  Discussed expectations and healing process.  Patient agreement plan.    Patient Instructions     Follow up with PCP in 3-5 days.  Proceed to  ER if symptoms worsen.    Chief Complaint     Chief Complaint   Patient presents with    Fall     Happened 2 weeks ago. Had a fall in garage. Fell on right side. Was seeing chiropractor for back pain; suggested getting an x-ray. Pain is constant; but worse with movement.          History of Present Illness   Chuck Borja presents to the clinic c/o    Patient states about 2 and half weeks ago he fell in his garage has had some proximal pain since.  Was seeing a chiropractor as he suspected musculoskeletal pain.  Chiropractor suggested x-ray imaging to make sure there are no fractured ribs.  Patient has been using topical rub with no improvement.  Notes discomfort with coughing sneezing twisting turning bending.        Review of Systems   Review of Systems   All other systems reviewed and are negative.        Current Medications     Long-Term Medications   Medication Sig Dispense Refill    Aspirin Low Dose 81 MG EC tablet TAKE 1 TABLET EVERY DAY 90 tablet 3    atorvastatin (LIPITOR) 80 mg tablet TAKE 1 TABLET EVERY DAY WITH DINNER 90 tablet 1    Blood Glucose Monitoring Suppl (ONE TOUCH ULTRA 2) w/Device KIT Use 2 (two) times a day 1 kit 0    ezetimibe (ZETIA) 10 mg tablet TAKE 1 TABLET AT BEDTIME 90 tablet 3    fluticasone (FLONASE) 50 mcg/act nasal spray  1 spray into each nostril daily 16 g 3    metFORMIN (GLUCOPHAGE) 500 mg tablet 1 tab AM, 2 tabs with dinner 270 tablet 1    metoprolol succinate (TOPROL-XL) 25 mg 24 hr tablet Take 1 tablet (25 mg total) by mouth daily 90 tablet 1    metoprolol succinate (TOPROL-XL) 25 mg 24 hr tablet TAKE 1 TABLET EVERY DAY 90 tablet 3    OneTouch Delica Lancets 33G MISC Use in the morning 100 each 3    tamsulosin (FLOMAX) 0.4 mg TAKE 1 CAPSULE AT BEDTIME 90 capsule 1    traZODone (DESYREL) 100 mg tablet TAKE 1 TABLET AT BEDTIME 90 tablet 1    baclofen 20 mg tablet 1 tab BID PRN (Patient not taking: Reported on 7/31/2024) 20 tablet 1    ciclopirox (PENLAC) 8 % solution APPLY TOPICALLY ONCE DAILY AT BEDTIME 7 mL 0    neomycin-polymyxin-hydrocortisone (CORTISPORIN) otic solution 4 drops in right ear 3 times daily x 7 days 10 mL 1       Current Allergies     Allergies as of 12/09/2024 - Reviewed 12/09/2024   Allergen Reaction Noted    Bee venom Edema 02/04/2019            The following portions of the patient's history were reviewed and updated as appropriate: allergies, current medications, past family history, past medical history, past social history, past surgical history and problem list.    Objective   /78   Pulse 58   Temp (!) 96.1 °F (35.6 °C) (Tympanic)   Resp 18   Wt 86.6 kg (191 lb)   SpO2 100%   BMI 26.64 kg/m²        Physical Exam     Physical Exam  Vitals and nursing note reviewed.   Constitutional:       Appearance: Normal appearance. He is well-developed.   HENT:      Head: Normocephalic and atraumatic.   Eyes:      General: Lids are normal.      Conjunctiva/sclera: Conjunctivae normal.      Pupils: Pupils are equal, round, and reactive to light.   Cardiovascular:      Rate and Rhythm: Normal rate and regular rhythm.      Heart sounds: Normal heart sounds, S1 normal and S2 normal.   Pulmonary:      Effort: Pulmonary effort is normal.      Breath sounds: Normal breath sounds.       Skin:     General: Skin is  warm and dry.   Neurological:      Mental Status: He is alert.   Psychiatric:         Speech: Speech normal.         Behavior: Behavior normal.         Thought Content: Thought content normal.         Judgment: Judgment normal.

## 2024-12-19 ENCOUNTER — APPOINTMENT (OUTPATIENT)
Dept: RADIOLOGY | Facility: CLINIC | Age: 79
End: 2024-12-19
Payer: MEDICARE

## 2024-12-19 ENCOUNTER — OFFICE VISIT (OUTPATIENT)
Dept: FAMILY MEDICINE CLINIC | Facility: CLINIC | Age: 79
End: 2024-12-19
Payer: MEDICARE

## 2024-12-19 VITALS
SYSTOLIC BLOOD PRESSURE: 122 MMHG | BODY MASS INDEX: 26.88 KG/M2 | OXYGEN SATURATION: 99 % | HEART RATE: 59 BPM | DIASTOLIC BLOOD PRESSURE: 74 MMHG | TEMPERATURE: 97.9 F | WEIGHT: 192 LBS | HEIGHT: 71 IN

## 2024-12-19 DIAGNOSIS — R20.2 PARESTHESIA OF RIGHT LEG: ICD-10-CM

## 2024-12-19 DIAGNOSIS — M54.50 ACUTE LOW BACK PAIN, UNSPECIFIED BACK PAIN LATERALITY, UNSPECIFIED WHETHER SCIATICA PRESENT: ICD-10-CM

## 2024-12-19 DIAGNOSIS — R20.2 PARESTHESIA OF RIGHT LEG: Primary | ICD-10-CM

## 2024-12-19 PROCEDURE — 99214 OFFICE O/P EST MOD 30 MIN: CPT | Performed by: FAMILY MEDICINE

## 2024-12-19 PROCEDURE — 73502 X-RAY EXAM HIP UNI 2-3 VIEWS: CPT

## 2024-12-19 PROCEDURE — G2211 COMPLEX E/M VISIT ADD ON: HCPCS | Performed by: FAMILY MEDICINE

## 2024-12-19 PROCEDURE — 72100 X-RAY EXAM L-S SPINE 2/3 VWS: CPT

## 2024-12-19 RX ORDER — BACLOFEN 10 MG/1
10 TABLET ORAL 2 TIMES DAILY PRN
Qty: 30 TABLET | Refills: 0 | Status: SHIPPED | OUTPATIENT
Start: 2024-12-19

## 2024-12-19 NOTE — PROGRESS NOTES
Name: Chuck Borja      : 1945      MRN: [id number]  Encounter Provider: Samir Le DO  Encounter Date: 2024   Encounter department: [facility]  :  Assessment & Plan  Acute low back pain, unspecified back pain laterality, unspecified whether sciatica present  Reviewed patient's symptoms today.  At this time, it is unclear as exact cause of his right lower leg paresthesia.  Reviewed the differential possible causes.  Symptoms may likely be due to a lumbar spine pathology versus meralgia paresthetica.  He was educated on pathophysiology's problem.  Will check x-rays to rule out gross abnormalities.  He may take baclofen for further symptom relief.  Follow-up if any symptoms worsen.  Orders:    baclofen 10 mg tablet; Take 1 tablet (10 mg total) by mouth 2 (two) times a day as needed for muscle spasms 1 tab BID PRN    Paresthesia of right leg    Orders:    baclofen 10 mg tablet; Take 1 tablet (10 mg total) by mouth 2 (two) times a day as needed for muscle spasms 1 tab BID PRN           History of Present Illness     HPI  Patient is a 79-year-old male presents today for a cc of right lateral thigh numbness.  He has had this problem for the past week.  Symptoms started gradually.  He denies any trauma to the area.  He states that he has been having intermittent back pain as well.  He denies any pain or muscle weakness.  He has not taken anything for symptom relief.  Review of Systems   Constitutional:  Negative for activity change, chills, fatigue and fever.   HENT:  Negative for congestion, ear pain, sinus pressure and sore throat.    Eyes:  Negative for redness, itching and visual disturbance.   Respiratory:  Negative for cough and shortness of breath.    Cardiovascular:  Negative for chest pain and palpitations.   Gastrointestinal:  Negative for abdominal pain, diarrhea and nausea.   Endocrine: Negative for cold intolerance and heat intolerance.   Genitourinary:   "Negative for dysuria, flank pain and frequency.   Musculoskeletal:  Negative for arthralgias, back pain, gait problem and myalgias.   Skin:  Negative for color change.   Allergic/Immunologic: Negative for environmental allergies.   Neurological:  Positive for numbness. Negative for dizziness and headaches.   Psychiatric/Behavioral:  Negative for behavioral problems and sleep disturbance.        Objective   /74 (BP Location: Left arm, Patient Position: Sitting, Cuff Size: Adult)   Pulse 59   Temp 97.9 °F (36.6 °C) (Temporal)   Ht 5' 11\" (1.803 m)   Wt 87.1 kg (192 lb)   SpO2 99%   BMI 26.78 kg/m²      Physical Exam  Vitals reviewed.   Constitutional:       Appearance: Normal appearance. He is well-developed.   HENT:      Head: Normocephalic and atraumatic.      Right Ear: Hearing normal.      Left Ear: Hearing normal.      Nose: Nose normal. No septal deviation.   Eyes:      General: Lids are normal.      Pupils: Pupils are equal, round, and reactive to light.   Neck:      Thyroid: No thyroid mass or thyromegaly.      Trachea: Trachea normal.   Cardiovascular:      Rate and Rhythm: Normal rate.   Pulmonary:      Effort: Pulmonary effort is normal. No respiratory distress.      Breath sounds: No decreased breath sounds.   Abdominal:      Tenderness: There is no guarding.   Musculoskeletal:         General: Normal range of motion.      Cervical back: Normal range of motion.      Right hip: No tenderness or bony tenderness. Normal range of motion.        Legs:    Skin:     General: Skin is warm and dry.   Neurological:      Mental Status: He is alert and oriented to person, place, and time.      Cranial Nerves: No cranial nerve deficit.      Sensory: No sensory deficit.   Psychiatric:         Speech: Speech normal.         Behavior: Behavior normal.         Thought Content: Thought content normal.         Judgment: Judgment normal.         "

## 2024-12-20 ENCOUNTER — RESULTS FOLLOW-UP (OUTPATIENT)
Dept: FAMILY MEDICINE CLINIC | Facility: CLINIC | Age: 79
End: 2024-12-20

## 2024-12-23 DIAGNOSIS — M48.062 SPINAL STENOSIS OF LUMBAR REGION WITH NEUROGENIC CLAUDICATION: Primary | ICD-10-CM

## 2024-12-31 DIAGNOSIS — I21.3 STEMI (ST ELEVATION MYOCARDIAL INFARCTION) (HCC): ICD-10-CM

## 2025-01-02 RX ORDER — ASPIRIN 81 MG/1
81 TABLET, COATED ORAL DAILY
Qty: 90 TABLET | Refills: 1 | Status: SHIPPED | OUTPATIENT
Start: 2025-01-02

## 2025-01-03 ENCOUNTER — OFFICE VISIT (OUTPATIENT)
Dept: CARDIOLOGY CLINIC | Facility: CLINIC | Age: 80
End: 2025-01-03
Payer: MEDICARE

## 2025-01-03 VITALS
WEIGHT: 192 LBS | BODY MASS INDEX: 26.78 KG/M2 | HEART RATE: 64 BPM | SYSTOLIC BLOOD PRESSURE: 120 MMHG | DIASTOLIC BLOOD PRESSURE: 60 MMHG

## 2025-01-03 DIAGNOSIS — E11.69 TYPE 2 DIABETES MELLITUS WITH OTHER SPECIFIED COMPLICATION, WITHOUT LONG-TERM CURRENT USE OF INSULIN (HCC): ICD-10-CM

## 2025-01-03 DIAGNOSIS — I25.119 ATHEROSCLEROSIS OF NATIVE CORONARY ARTERY OF NATIVE HEART WITH ANGINA PECTORIS (HCC): ICD-10-CM

## 2025-01-03 DIAGNOSIS — I25.10 CORONARY ARTERY DISEASE INVOLVING NATIVE CORONARY ARTERY OF NATIVE HEART WITHOUT ANGINA PECTORIS: Primary | ICD-10-CM

## 2025-01-03 DIAGNOSIS — I49.3 SYMPTOMATIC PVCS: ICD-10-CM

## 2025-01-03 DIAGNOSIS — I34.0 NONRHEUMATIC MITRAL VALVE REGURGITATION: ICD-10-CM

## 2025-01-03 DIAGNOSIS — E78.2 MIXED HYPERLIPIDEMIA: ICD-10-CM

## 2025-01-03 PROCEDURE — 99214 OFFICE O/P EST MOD 30 MIN: CPT | Performed by: INTERNAL MEDICINE

## 2025-01-03 NOTE — PROGRESS NOTES
Cardiology Office Note  MD Conor Jimenez MD Jason Kaplan, DO, [facility]  MD Abdi Bee DO, [facility]  Asad Patel DO, [facility]  ----------------------------------------------------------------  [address] [address]    Chuck Borja 79 y.o. male MRN: [id number]  Unit/Bed#:  Encounter: [id number]      History of Present Illness:  It was a pleasure to see Chuck Borja in the office today for follow-up CV evaluation.  He has a past medical history of CAD with high lateral STEMI and 1st diagonal stent placement in April 2022, dyslipidemia, type 2 diabetes mellitus and renal cell carcinoma status post right nephrectomy in May 2017.  He established care with us in April 2022. The patient was in his usual state of health when he began to experienced acute chest discomfort.  His symptoms began to wax and wane over the course of 24-48 hours prior to his presentation to the emergency department.  His symptoms then worsened and he came to [facility] for evaluation.  In the emergency department, he was found to have ST segment elevation in leads 1 and aVL with reciprocal changes in the inferior leads.  Additionally, there was AV dissociation noted on the ECG.  MI alert was called and he was taken to the cath lab.  He was found have occlusion of the 1st diagonal and underwent stent placement.  Echocardiogram demonstrated low-normal systolic function with regional wall motion abnormalities of the lateral wall.  AV dissociation resolved and the patient was placed on beta-blocker as well as ACE-inhibitor.  Ezetimibe was added to his regimen.  He was recommended for cardiac rehabilitation and discharged home.    Following discharge, he had undergone cardiac rehabilitation and has now completed rehabilitation as of August 2022. Due to it PVCs he was experiencing, he was increased on his metoprolol in August 2022.  Event  recorder demonstrated sinus rhythm with rare atrial and ventricular ectopy.  With no evidence of bradycardia arrhythmia.  Triggered events correlated with PVCs.   Due to the patient's PVCs and history of mitral valve regurgitation, the patient was sent for echocardiogram and Holter monitor in June 2023.  He is here today to review the results.   Overall, the patient had continued to feel well in his usual state of health.  He had been very active with continued weight training and cardiovascular/aerobic activity.  If the patient will patient self too hard, he would experience some shortness of breath and feeling of tachycardia.  The symptoms were unchanged following his heart attack.  He states that he is somewhat less active than he had been in the past during the winter season.    He denies any chest pain, pressure, tightness or squeezing.  Denies lightheadedness, dizziness or palpitations.  Denies lower extremity swelling, orthopnea or paroxysmal nocturnal dyspnea.    Review of Systems:  Review of Systems   Constitutional: Negative for decreased appetite, fever, weight gain and weight loss.   HENT:  Negative for congestion and sore throat.    Eyes:  Negative for visual disturbance.   Cardiovascular:  Negative for chest pain, dyspnea on exertion, leg swelling, near-syncope and palpitations.   Respiratory:  Negative for cough and shortness of breath.    Hematologic/Lymphatic: Negative for bleeding problem.   Skin:  Negative for rash.   Musculoskeletal:  Negative for myalgias and neck pain.   Gastrointestinal:  Negative for abdominal pain and nausea.   Neurological:  Negative for light-headedness and weakness.   Psychiatric/Behavioral:  Negative for depression.        Past Medical History:   Diagnosis Date    Allergic     Allergic reaction     last assessed: 5/26/2015    Arthritis     right knee    Bee sting allergy     last assessed: 6/30/2015    BPH (benign prostatic hypertrophy)     Cancer (HCC) 2017    Right  kidney    Diabetes mellitus (HCC)     Type II, NIDDM    Enlarged prostate with lower urinary tract symptoms (LUTS)     last assessed: 8/17/2015    Herpes zoster     last assessed: 11/21/2013    History of colon polyps     Hyperlipidemia     Leukocytosis     last assessed: 11/18/2014    Myocardial infarction (HCC) 4/13/22    Osteoarthrosis, localized, primary, knee     last assessed: 10/25/2016    Renal cancer (HCC)     Seasonal allergies     Shoulder pain, right     Wears glasses     Wears partial dentures     Upper and Lower       Past Surgical History:   Procedure Laterality Date    CARDIAC CATHETERIZATION N/A 04/13/2022    Procedure: Cardiac pci;  Surgeon: José Cameron MD;  [facility];  Service: Cardiology    CARPAL TUNNEL RELEASE Left     CHOLECYSTECTOMY      Open    COLONOSCOPY      FL GUIDED NEEDLE PLAC BX/ASP/INJ  06/01/2020    FL GUIDED NEEDLE PLAC BX/ASP/INJ  11/13/2020    GANGLION CYST EXCISION Left     Left wrist    JOINT REPLACEMENT Left 08/2015    Left TKR    JOINT REPLACEMENT Right 09/2017    Right TKR    KNEE SURGERY  9/2017    Knee replacement    NEPHRECTOMY  05/08/2017    Partial right nephrectomy    OH ARTHRP INTERPOS INTERCARPAL/METACARPAL JOINTS Left 12/05/2016    Procedure: ARTHROPLASTY THUMB CMC ;  Surgeon: Dominguez Velazco MD;  [facility];  Service: Orthopedics    OH ARTHRP KNE CONDYLE&PLATU MEDIAL&LAT COMPARTMENTS Right 09/11/2017    Procedure: ARTHROPLASTY KNEE TOTAL;  Surgeon: Dominguez Velazco MD;  [facility];  Service: Orthopedics    OH LAPAROSCOPY SURG PARTIAL NEPHRECTOMY Right 05/08/2017    Procedure: LAPAROSCOPIC LYSIS OF ADHESIONS, ROBOTIC PARTIAL NEPHRECTOMY, WITH INTRA-OP LAPAROSCOPIC ULTRASOUND;  Surgeon: Jose Hidalgo MD;  [facility];  Service: Urology    OH NEUROPLASTY &/TRANSPOS MEDIAN NRV CARPAL TUNNE Left 12/05/2016    Procedure: RELEASE CARPAL TUNNEL;  Surgeon: Dominguez Velazco MD;  [facility];  Service: Orthopedics     SD SURGICAL ARTHROSCOPY SHOULDER W/ROTATOR CUFF RPR Right 2019    Procedure: REPAIR ROTATOR CUFF ARTHROSCOPIC; SUBACROMIAL DECOMPRESSION; DISTAL CLAVICLE EXCISION;  Surgeon: Dominguez Velazco MD;  [facility];  Service: Orthopedics    SD TENODESIS LONG TENDON BICEPS  2019    Procedure: TENODESIS BICEPS OPEN PROXIMAL;  Surgeon: Dominguez Velazco MD;  [facility];  Service: Orthopedics    TONSILLECTOMY AND ADENOIDECTOMY         Social History     Socioeconomic History    Marital status: /Civil Union     Spouse name: Not on file    Number of children: Not on file    Years of education: Not on file    Highest education level: Not on file   Occupational History    Not on file   Tobacco Use    Smoking status: Former     Current packs/day: 0.00     Average packs/day: 0.3 packs/day for 12.0 years (3.0 ttl pk-yrs)     Types: Cigarettes     Start date: 1959     Quit date: 1971     Years since quittin.0     Passive exposure: Never    Smokeless tobacco: Never    Tobacco comments:     Smoked from ages 13 to 22.   Vaping Use    Vaping status: Never Used   Substance and Sexual Activity    Alcohol use: Not Currently    Drug use: No    Sexual activity: Not Currently     Partners: Female     Birth control/protection: None   Other Topics Concern    Not on file   Social History Narrative    Not on file     Social Drivers of Health     Financial Resource Strain: Low Risk  (2023)    Overall Financial Resource Strain (CARDIA)     Difficulty of Paying Living Expenses: Not hard at all   Food Insecurity: No Food Insecurity (2024)    Nursing - Inadequate Food Risk Classification     Worried About Running Out of Food in the Last Year: Never true     Ran Out of Food in the Last Year: Never true     Ran Out of Food in the Last Year: Not on file   Transportation Needs: No Transportation Needs (2024)    PRAPARE - Transportation     Lack of Transportation (Medical): No     Lack of  Transportation (Non-Medical): No   Physical Activity: Not on file   Stress: Not on file   Social Connections: Not on file   Intimate Partner Violence: Not on file   Housing Stability: Low Risk  (7/31/2024)    Housing Stability Vital Sign     Unable to Pay for Housing in the Last Year: No     Number of Times Moved in the Last Year: 0     Homeless in the Last Year: No       Family History   Problem Relation Age of Onset    Cancer Mother     Stroke Father        Allergies   Allergen Reactions    Bee Venom Edema         Current Outpatient Medications:     aspirin (Aspirin Low Dose) 81 mg EC tablet, TAKE 1 TABLET EVERY DAY, Disp: 90 tablet, Rfl: 1    atorvastatin (LIPITOR) 80 mg tablet, TAKE 1 TABLET EVERY DAY WITH DINNER, Disp: 90 tablet, Rfl: 1    baclofen 10 mg tablet, Take 1 tablet (10 mg total) by mouth 2 (two) times a day as needed for muscle spasms 1 tab BID PRN, Disp: 30 tablet, Rfl: 0    Blood Glucose Monitoring Suppl (ONE TOUCH ULTRA 2) w/Device KIT, Use 2 (two) times a day, Disp: 1 kit, Rfl: 0    Cholecalciferol (VITAMIN D-3 PO), Take 2,000 Units by mouth daily, Disp: , Rfl:     ciclopirox (PENLAC) 8 % solution, APPLY TOPICALLY ONCE DAILY AT BEDTIME, Disp: 7 mL, Rfl: 0    EPINEPHrine (EPIPEN) 0.3 mg/0.3 mL SOAJ, Inject as directed, Disp: , Rfl:     ezetimibe (ZETIA) 10 mg tablet, TAKE 1 TABLET AT BEDTIME, Disp: 90 tablet, Rfl: 3    fluticasone (FLONASE) 50 mcg/act nasal spray, 1 spray into each nostril daily, Disp: 16 g, Rfl: 3    glucose blood (OneTouch Ultra) test strip, Use 1 each in the morning, Disp: 100 strip, Rfl: 3    glucose blood test strip, Use 1 each daily as needed (once a day) One touch ultra  Test strips, Disp: 100 strip, Rfl: 3    metFORMIN (GLUCOPHAGE) 500 mg tablet, 1 tab AM, 2 tabs with dinner, Disp: 270 tablet, Rfl: 1    metoprolol succinate (TOPROL-XL) 25 mg 24 hr tablet, Take 1 tablet (25 mg total) by mouth daily, Disp: 90 tablet, Rfl: 1    metoprolol succinate (TOPROL-XL) 25 mg 24 hr  tablet, TAKE 1 TABLET EVERY DAY, Disp: 90 tablet, Rfl: 3    neomycin-polymyxin-hydrocortisone (CORTISPORIN) otic solution, 4 drops in right ear 3 times daily x 7 days, Disp: 10 mL, Rfl: 1    OneTouch Delica Lancets 33G MISC, Use in the morning, Disp: 100 each, Rfl: 3    tamsulosin (FLOMAX) 0.4 mg, TAKE 1 CAPSULE AT BEDTIME, Disp: 90 capsule, Rfl: 1    traMADol (Ultram) 50 mg tablet, 1 tab BID PRN mod-severe pain, Disp: 14 tablet, Rfl: 0    traZODone (DESYREL) 100 mg tablet, TAKE 1 TABLET AT BEDTIME, Disp: 90 tablet, Rfl: 1    There were no vitals filed for this visit.        There is no height or weight on file to calculate BMI.    PHYSICAL EXAMINATION:  Gen: Awake, Alert, NAD   Head/eyes: AT/NC, pupils equal and round, Anicteric  ENT: mmm  Neck: Supple, No elevated JVP, trachea midline  Resp: CTA bilaterally no w/r/r  CV: RRR +S1, S2, I/VI KRYSTAL @ RUSB  Abd: Soft, NT/ND + BS  Ext: no LE edema bilaterally  Neuro: Follows commands, moves all extermities  Psych: Appropriate affect, happy mood, cooperative attitude, non-combative  Skin: warm; no rash, erythema or venous stasis changes on exposed skin    --------------------------------------------------------------------------------  TREADMILL STRESS  No results found for this or any previous visit.     --------------------------------------------------------------------------------  NUCLEAR STRESS TEST: No results found for this or any previous visit.    No results found for this or any previous visit.      --------------------------------------------------------------------------------  CATH:  No results found for this or any previous visit.    --------------------------------------------------------------------------------  ECHO:   LVEF 52% inferolateral and anterolateral hypokinesis from base to apex mild LA dilatation, AV sclerosis, trace AR, MV sclerosis, mild MR, trace TR, April  "2022  --------------------------------------------------------------------------------  HOLTER  No results found for this or any previous visit.    No results found for this or any previous visit.    --------------------------------------------------------------------------------  CAROTIDS  No results found for this or any previous visit.     --------------------------------------------------------------------------------  ECGs:  No results found for this visit on 01/03/25.       Lab Results   Component Value Date    WBC 6.92 07/23/2024    HGB 14.5 07/23/2024    HCT 44.4 07/23/2024    MCV 94 07/23/2024     07/23/2024      Lab Results   Component Value Date    SODIUM 138 07/23/2024    K 3.9 07/23/2024     07/23/2024    CO2 26 07/23/2024    BUN 30 (H) 07/23/2024    CREATININE 0.92 07/23/2024    GLUC 141 (H) 04/14/2022    CALCIUM 9.2 07/23/2024      Lab Results   Component Value Date    HGBA1C 7.3 (H) 07/23/2024      Lab Results   Component Value Date    CHOL 149 12/27/2017    CHOL 129 06/21/2017    CHOL 140 12/12/2016     Lab Results   Component Value Date    HDL 31 (L) 07/23/2024    HDL 32 (L) 07/26/2023    HDL 35 (L) 01/20/2023     Lab Results   Component Value Date    LDLCALC 23 07/23/2024    LDLCALC 21 07/26/2023    LDLCALC 23 01/20/2023     Lab Results   Component Value Date    TRIG 76 07/23/2024    TRIG 53 07/26/2023    TRIG 69 01/20/2023     No results found for: \"CHOLHDL\"   Lab Results   Component Value Date    INR 0.97 04/13/2022    INR 0.97 08/18/2017    INR 0.97 11/21/2016    PROTIME 12.7 04/13/2022    PROTIME 12.9 08/18/2017    PROTIME 12.9 11/21/2016        1. Coronary artery disease involving native coronary artery of native heart without angina pectoris  2. Symptomatic PVCs  3. Type 2 diabetes mellitus with other specified complication, without long-term current use of insulin (HCC)  4. Mixed hyperlipidemia  5. Nonrheumatic mitral valve regurgitation    IMPRESSION:  CAD  Lateral STEMI s/p " SURJIT-D1 w/ residual 10% ostial LCx and 20% mid RCA, April 2022  LVEF 65%, grade 1 diastolic dysfunction, AV sclerosis, mild AR, MV sclerosis, mild MR, trace TR, June 2024  Symptomatic PVCs  Holter w/ SR avg 55 bpm, occasional PVCs (burden 3.7%), no VT, rare PACs, rare atrial couplets, June 2024  Event recorder w/ SR avg HR 60 bpm, rare PACs, rare atrial couplets/triplets, rare PVCs, rare ventricular couplets, nonsustained PSVT (x1) 9 beat 128 bpm, triggered events correlated with PVCs, August 2022  Transient AV dissociation with junctional rhythm during STEMI likely vagal - resolved  Type 2 diabetes mellitus  Dyslipidemia w/ LDL 23 mg/dL, HDL 31 mg/dL, TG 76 mg/dL, July 2024  History of renal cell carcinoma status post right nephrectomy, 2007    PLAN:  It was a pleasure to see Que in the office today for follow-up CV evaluation.  He is here today for routine CV follow-up.  Since her last encounter, he has been feeling well overall.  He admits to somewhat less physical activity on regular basis, but does not have decreased effort tolerance.  He has no chest pain concerning for angina and no signs or symptoms of heart failure.  Clinically he examines to be euvolemic.  Blood pressure and heart rate are currently stable.  He is tolerating his current medications without any reported adverse effects.  He can perform greater than 4 METS on a daily basis without significant exertional symptoms.  Based on his clinical presentation, I have the following recommendations:    1.  Will check exercise nuclear stress test in 6 months prior to our follow-up encounter given his known history of STEMI to screen for any evidence of obstructive coronary disease.  Recommend holding metoprolol on day of testing and can take after exercise portion of test.  2.  Would encourage heart healthy diet low in sodium and carbohydrate.  3.  Recommend 30 minutes a day, 5 days a week of moderate intensity activity to build cardiovascular  "endurance.  4.  Lifelong aspirin  5.  Continue high intensity statin and Zetia.  Goal LDL is less than 70 mg/dL.  Patient is currently to goal.  Repeat lipid panel in 6 months.  6.  Continue Toprol-XL for antihypertensive control  7.  Restart Jardiance when reasonable from financial perspective based on insurance.  8.  We will follow-up with him in 6 months to review the results of testing.    As always, please do not hesitate to call with any questions.    Portions of the record may have been created with voice recognition software. Occasional wrong word or \"sound a like\" substitutions may have occurred due to the inherent limitations of voice recognition software. Read the chart carefully and recognize, using context, where substitutions have occurred.      Signed: David Gaitan DO, FACC, FASE, FASNC, FACP  "

## 2025-01-06 ENCOUNTER — OFFICE VISIT (OUTPATIENT)
Dept: FAMILY MEDICINE CLINIC | Facility: CLINIC | Age: 80
End: 2025-01-06
Payer: MEDICARE

## 2025-01-06 VITALS
HEART RATE: 64 BPM | OXYGEN SATURATION: 97 % | SYSTOLIC BLOOD PRESSURE: 122 MMHG | DIASTOLIC BLOOD PRESSURE: 72 MMHG | WEIGHT: 193.8 LBS | BODY MASS INDEX: 27.03 KG/M2 | TEMPERATURE: 98.2 F

## 2025-01-06 DIAGNOSIS — C64.1 MALIGNANT NEOPLASM OF RIGHT KIDNEY (HCC): ICD-10-CM

## 2025-01-06 DIAGNOSIS — R20.2 PARESTHESIA OF RIGHT LOWER EXTREMITY: Primary | ICD-10-CM

## 2025-01-06 DIAGNOSIS — E11.69 TYPE 2 DIABETES MELLITUS WITH OTHER SPECIFIED COMPLICATION, WITHOUT LONG-TERM CURRENT USE OF INSULIN (HCC): Primary | ICD-10-CM

## 2025-01-06 DIAGNOSIS — I21.3 STEMI (ST ELEVATION MYOCARDIAL INFARCTION) (HCC): ICD-10-CM

## 2025-01-06 PROCEDURE — 99213 OFFICE O/P EST LOW 20 MIN: CPT | Performed by: FAMILY MEDICINE

## 2025-01-06 PROCEDURE — G2211 COMPLEX E/M VISIT ADD ON: HCPCS | Performed by: FAMILY MEDICINE

## 2025-01-06 NOTE — TELEPHONE ENCOUNTER
Reason for call:   [x] Refill   [] Prior Auth  [] Other:     Office:   [] PCP/Provider -   [x] Specialty/Provider - Cardio    Medication: ezetimibe (ZETIA) 10 mg     Dose/Frequency: TAKE 1 TABLET AT BEDTIME     Quantity: 90    Pharmacy: [facility] - [facility] [address]     Does the patient have enough for 3 days?   [x] Yes   [] No - Send as HP to POD

## 2025-01-06 NOTE — PROGRESS NOTES
Name: Chuck Borja      : 1945      MRN: [id number]  Encounter Provider: Niranjan Kline DO  Encounter Date: 2025   Encounter department: [facility]    Assessment & Plan  Paresthesia of right lower extremity  Patient complains of 1 month history of numb patch right lateral thigh.  He also has been experiencing right-sided low back pain.  He will be seeing a spinal specialist on .  Examination unremarkable today.  Possible radiculopathy due to disc disease versus cutaneous nerve irritation/impingement.  Would appreciate advice from spinal surgeon at his upcoming appointment.  If specialist thinks that these are 2 separate problems, we will move ahead with EMG right lower extremity. (Order placed).  Orders:  •  EMG 1 Limb; Future    Malignant neoplasm of right kidney (HCC)              History of Present Illness     Patient complains of 1 month history of numb patch right lateral thigh.  He also has been experiencing right-sided low back pain.  He will be seeing a spinal specialist on .      Review of Systems   Respiratory: Negative.     Cardiovascular: Negative.    Gastrointestinal: Negative.    Genitourinary: Negative.    Musculoskeletal:  Positive for back pain.   Neurological:  Positive for numbness.     Past Medical History:   Diagnosis Date   • Allergic    • Allergic reaction     last assessed: 2015   • Arthritis     right knee   • Bee sting allergy     last assessed: 2015   • BPH (benign prostatic hypertrophy)    • Cancer (HCC) 2017    Right kidney   • Diabetes mellitus (HCC)     Type II, NIDDM   • Enlarged prostate with lower urinary tract symptoms (LUTS)     last assessed: 2015   • Herpes zoster     last assessed: 2013   • History of colon polyps    • Hyperlipidemia    • Leukocytosis     last assessed: 2014   • Myocardial infarction (HCC) 22   • Osteoarthrosis, localized, primary, knee     last assessed: 10/25/2016    • Renal cancer (HCC)    • Seasonal allergies    • Shoulder pain, right    • Wears glasses    • Wears partial dentures     Upper and Lower     Past Surgical History:   Procedure Laterality Date   • CARDIAC CATHETERIZATION N/A 04/13/2022    Procedure: Cardiac pci;  Surgeon: José Cameron MD;  [facility];  Service: Cardiology   • CARPAL TUNNEL RELEASE Left    • CHOLECYSTECTOMY      Open   • COLONOSCOPY     • FL GUIDED NEEDLE PLAC BX/ASP/INJ  06/01/2020   • FL GUIDED NEEDLE PLAC BX/ASP/INJ  11/13/2020   • GANGLION CYST EXCISION Left     Left wrist   • JOINT REPLACEMENT Left 08/2015    Left TKR   • JOINT REPLACEMENT Right 09/2017    Right TKR   • KNEE SURGERY  9/2017    Knee replacement   • NEPHRECTOMY  05/08/2017    Partial right nephrectomy   • NE ARTHRP INTERCARPAL/CARP/MTCRPL JT INTERPOSITION Left 12/05/2016    Procedure: ARTHROPLASTY THUMB CMC ;  Surgeon: Dominguez Velazco MD;  [facility];  Service: Orthopedics   • NE ARTHRP KNE CONDYLE&PLATU MEDIAL&LAT COMPARTMENTS Right 09/11/2017    Procedure: ARTHROPLASTY KNEE TOTAL;  Surgeon: Dominguez Velazco MD;  [facility];  Service: Orthopedics   • NE LAPAROSCOPY SURG PARTIAL NEPHRECTOMY Right 05/08/2017    Procedure: LAPAROSCOPIC LYSIS OF ADHESIONS, ROBOTIC PARTIAL NEPHRECTOMY, WITH INTRA-OP LAPAROSCOPIC ULTRASOUND;  Surgeon: Jose Hidalgo MD;  [facility];  Service: Urology   • NE NEUROPLASTY &/TRANSPOS MEDIAN NRV CARPAL TUNNE Left 12/05/2016    Procedure: RELEASE CARPAL TUNNEL;  Surgeon: Dominguez Velazco MD;  [facility];  Service: Orthopedics   • NE SURGICAL ARTHROSCOPY SHOULDER W/ROTATOR CUFF RPR Right 02/04/2019    Procedure: REPAIR ROTATOR CUFF ARTHROSCOPIC; SUBACROMIAL DECOMPRESSION; DISTAL CLAVICLE EXCISION;  Surgeon: Dominguez Velazco MD;  [facility];  Service: Orthopedics   • NE TENODESIS LONG TENDON BICEPS  02/04/2019    Procedure: TENODESIS BICEPS OPEN PROXIMAL;  Surgeon: Dominguez Velazco MD;   Location: [facility] OR;  Service: Orthopedics   • TONSILLECTOMY AND ADENOIDECTOMY       Family History   Problem Relation Age of Onset   • Cancer Mother    • Stroke Father      Social History     Tobacco Use   • Smoking status: Former     Current packs/day: 0.00     Average packs/day: 0.3 packs/day for 12.0 years (3.0 ttl pk-yrs)     Types: Cigarettes     Start date: 1959     Quit date: 1971     Years since quittin.0     Passive exposure: Never   • Smokeless tobacco: Never   • Tobacco comments:     Smoked from ages 13 to 22.   Vaping Use   • Vaping status: Never Used   Substance and Sexual Activity   • Alcohol use: Not Currently   • Drug use: No   • Sexual activity: Not Currently     Partners: Female     Birth control/protection: None     Current Outpatient Medications on File Prior to Visit   Medication Sig   • aspirin (Aspirin Low Dose) 81 mg EC tablet TAKE 1 TABLET EVERY DAY   • atorvastatin (LIPITOR) 80 mg tablet TAKE 1 TABLET EVERY DAY WITH DINNER   • baclofen 10 mg tablet Take 1 tablet (10 mg total) by mouth 2 (two) times a day as needed for muscle spasms 1 tab BID PRN   • Blood Glucose Monitoring Suppl (ONE TOUCH ULTRA 2) w/Device KIT Use 2 (two) times a day   • Cholecalciferol (VITAMIN D-3 PO) Take 2,000 Units by mouth daily   • ciclopirox (PENLAC) 8 % solution APPLY TOPICALLY ONCE DAILY AT BEDTIME   • EPINEPHrine (EPIPEN) 0.3 mg/0.3 mL SOAJ Inject as directed   • ezetimibe (ZETIA) 10 mg tablet TAKE 1 TABLET AT BEDTIME   • fluticasone (FLONASE) 50 mcg/act nasal spray 1 spray into each nostril daily   • glucose blood (OneTouch Ultra) test strip Use 1 each in the morning   • glucose blood test strip Use 1 each daily as needed (once a day) One touch ultra  Test strips   • metFORMIN (GLUCOPHAGE) 500 mg tablet 1 tab AM, 2 tabs with dinner   • metoprolol succinate (TOPROL-XL) 25 mg 24 hr tablet Take 1 tablet (25 mg total) by mouth daily (Patient not taking: Reported on 1/3/2025)   • metoprolol  succinate (TOPROL-XL) 25 mg 24 hr tablet TAKE 1 TABLET EVERY DAY   • neomycin-polymyxin-hydrocortisone (CORTISPORIN) otic solution 4 drops in right ear 3 times daily x 7 days   • OneTouch Delica Lancets 33G MISC Use in the morning   • tamsulosin (FLOMAX) 0.4 mg TAKE 1 CAPSULE AT BEDTIME   • traMADol (Ultram) 50 mg tablet 1 tab BID PRN mod-severe pain   • traZODone (DESYREL) 100 mg tablet TAKE 1 TABLET AT BEDTIME     Allergies   Allergen Reactions   • Bee Venom Edema     Immunization History   Administered Date(s) Administered   • COVID-19 MODERNA VACC 0.5 ML IM 03/12/2021, 04/09/2021, 11/09/2021   • INFLUENZA 11/18/2014, 10/05/2015, 11/28/2016, 01/04/2018, 10/15/2018, 10/18/2021, 10/25/2022, 10/18/2023   • Influenza Split High Dose Preservative Free IM 11/18/2014, 10/05/2015, 11/28/2016, 01/04/2018   • Influenza, high dose seasonal 0.7 mL 10/15/2018, 10/03/2019, 09/18/2020   • Influenza, seasonal, injectable 10/05/2012, 10/09/2013   • Pneumococcal Conjugate 13-Valent 12/08/2015, 12/09/2015   • Pneumococcal Polysaccharide PPV23 10/09/2013   • Tdap 11/15/2004, 10/16/2017   • Zoster 10/05/2012   • Zoster Vaccine Recombinant 10/30/2020, 01/25/2021     Objective   /72 (BP Location: Left arm, Patient Position: Sitting, Cuff Size: Adult)   Pulse 64   Temp 98.2 °F (36.8 °C)   Wt 87.9 kg (193 lb 12.8 oz)   SpO2 97%   BMI 27.03 kg/m²     Physical Exam

## 2025-01-06 NOTE — ASSESSMENT & PLAN NOTE
Patient complains of 1 month history of numb patch right lateral thigh.  He also has been experiencing right-sided low back pain.  He will be seeing a spinal specialist on January 20.  Examination unremarkable today.  Possible radiculopathy due to disc disease versus cutaneous nerve irritation/impingement.  Would appreciate advice from spinal surgeon at his upcoming appointment.  If specialist thinks that these are 2 separate problems, we will move ahead with EMG right lower extremity. (Order placed).  Orders:  •  EMG 1 Limb; Future

## 2025-01-08 RX ORDER — EZETIMIBE 10 MG/1
10 TABLET ORAL
Qty: 90 TABLET | Refills: 1 | Status: SHIPPED | OUTPATIENT
Start: 2025-01-08

## 2025-01-08 NOTE — TELEPHONE ENCOUNTER
Pharmacy called to request a refill for ezetimibe (ZETIA) 10 mg  advised a refill was requested on 1/6/2025 and is pending approval. Pharmacy verbalized understanding and is in agreement.

## 2025-01-19 ENCOUNTER — TELEPHONE (OUTPATIENT)
Dept: OBGYN CLINIC | Facility: CLINIC | Age: 80
End: 2025-01-19

## 2025-01-20 NOTE — TELEPHONE ENCOUNTER
Called patient and left message to cancel and reschedule appointment for tomorrow with Dr. Jean due to weather.

## 2025-01-22 DIAGNOSIS — E11.69 TYPE 2 DIABETES MELLITUS WITH OTHER SPECIFIED COMPLICATION, WITHOUT LONG-TERM CURRENT USE OF INSULIN (HCC): ICD-10-CM

## 2025-01-23 ENCOUNTER — HOSPITAL ENCOUNTER (OUTPATIENT)
Dept: NEUROLOGY | Facility: CLINIC | Age: 80
End: 2025-01-23
Payer: MEDICARE

## 2025-01-23 DIAGNOSIS — R20.2 PARESTHESIA OF RIGHT LOWER EXTREMITY: ICD-10-CM

## 2025-01-23 PROBLEM — M54.16 RADICULOPATHY, LUMBAR REGION: Status: ACTIVE | Noted: 2025-01-23

## 2025-01-23 PROCEDURE — 95909 NRV CNDJ TST 5-6 STUDIES: CPT | Performed by: PSYCHIATRY & NEUROLOGY

## 2025-01-23 PROCEDURE — 95886 MUSC TEST DONE W/N TEST COMP: CPT | Performed by: PSYCHIATRY & NEUROLOGY

## 2025-01-25 ENCOUNTER — RESULTS FOLLOW-UP (OUTPATIENT)
Dept: FAMILY MEDICINE CLINIC | Facility: CLINIC | Age: 80
End: 2025-01-25

## 2025-02-04 ENCOUNTER — OFFICE VISIT (OUTPATIENT)
Age: 80
End: 2025-02-04
Payer: MEDICARE

## 2025-02-04 VITALS — HEIGHT: 71 IN | BODY MASS INDEX: 27.02 KG/M2 | WEIGHT: 193 LBS

## 2025-02-04 DIAGNOSIS — M54.50 ACUTE LOW BACK PAIN WITHOUT SCIATICA, UNSPECIFIED BACK PAIN LATERALITY: Primary | ICD-10-CM

## 2025-02-04 DIAGNOSIS — S20.211A CONTUSION OF RIB ON RIGHT SIDE, INITIAL ENCOUNTER: ICD-10-CM

## 2025-02-04 DIAGNOSIS — M54.16 RADICULOPATHY, LUMBAR REGION: ICD-10-CM

## 2025-02-04 PROCEDURE — 99214 OFFICE O/P EST MOD 30 MIN: CPT | Performed by: ORTHOPAEDIC SURGERY

## 2025-02-04 RX ORDER — SIMVASTATIN 100 %
POWDER (GRAM) MISCELLANEOUS
COMMUNITY

## 2025-02-04 NOTE — PROGRESS NOTES
"Name: Chuck Borja      : 1945       MRN: [id number]   Encounter Provider: Aisha Jean MD   Encounter Date: 25  Encounter department: [facility] ORTHOPEDIC SPINE SURGERY      History of Present Illness    Chuck Borja is a 79 y.o. male who presents for initial evaluation of lumbar spine. Pain began around 24. The patient fell in his garage at that time, landing backwards. He was treating with his chiropractor initially. He was evaluated at urgent care on 24 where they recommended anti-inflammatories and Lidoderm patches. The patient has shooting pains and numbness from the right mid, lateral thigh to the lateral knee. When this is present it is rated an 8-9/10. He notes back pain with an increase in activity. He does have a history of bilateral total knees performed by Dr. Velazco and is unsure if the numbness is associated with his knee surgery.  He denies any previous spine surgery. He does have a history of kidney cancer with partial removal of kidney.        Diabetic: Yes   Nicotine use: No  BMI: Estimated body mass index is 26.92 kg/m² as calculated from the following:    Height as of this encounter: 5' 11\" (1.803 m).    Weight as of this encounter: 87.5 kg (193 lb).  Blood thinners: Aspirin      ALLERGIES:   Allergies   Allergen Reactions    Bee Venom Edema       MEDICATIONS:    Current Outpatient Medications:     aspirin (Aspirin Low Dose) 81 mg EC tablet, TAKE 1 TABLET EVERY DAY, Disp: 90 tablet, Rfl: 1    atorvastatin (LIPITOR) 80 mg tablet, TAKE 1 TABLET EVERY DAY WITH DINNER, Disp: 90 tablet, Rfl: 1    baclofen 10 mg tablet, Take 1 tablet (10 mg total) by mouth 2 (two) times a day as needed for muscle spasms 1 tab BID PRN, Disp: 30 tablet, Rfl: 0    Blood Glucose Monitoring Suppl (ONE TOUCH ULTRA 2) w/Device KIT, Use 2 (two) times a day, Disp: 1 kit, Rfl: 0    Cholecalciferol (VITAMIN D-3 PO), Take 2,000 Units by " mouth daily, Disp: , Rfl:     Empagliflozin (JARDIANCE) 10 MG TABS tablet, Take 1 tablet (10 mg total) by mouth daily, Disp: 90 tablet, Rfl: 3    EPINEPHrine (EPIPEN) 0.3 mg/0.3 mL SOAJ, Inject as directed, Disp: , Rfl:     ezetimibe (ZETIA) 10 mg tablet, Take 1 tablet (10 mg total) by mouth daily at bedtime, Disp: 90 tablet, Rfl: 1    fluticasone (FLONASE) 50 mcg/act nasal spray, 1 spray into each nostril daily, Disp: 16 g, Rfl: 3    glucose blood (OneTouch Ultra) test strip, Use 1 each in the morning, Disp: 100 strip, Rfl: 3    glucose blood test strip, Use 1 each daily as needed (once a day) One touch ultra  Test strips, Disp: 100 strip, Rfl: 3    metFORMIN (GLUCOPHAGE) 500 mg tablet, 1 tab AM, 1 tab with dinner, Disp: 180 tablet, Rfl: 1    metoprolol succinate (TOPROL-XL) 25 mg 24 hr tablet, TAKE 1 TABLET EVERY DAY, Disp: 90 tablet, Rfl: 3    OneTouch Delica Lancets 33G MISC, Use in the morning, Disp: 100 each, Rfl: 3    Simvastatin POWD, , Disp: , Rfl:     traMADol (Ultram) 50 mg tablet, 1 tab BID PRN mod-severe pain, Disp: 14 tablet, Rfl: 0    traZODone (DESYREL) 100 mg tablet, TAKE 1 TABLET AT BEDTIME, Disp: 90 tablet, Rfl: 1    ciclopirox (PENLAC) 8 % solution, APPLY TOPICALLY ONCE DAILY AT BEDTIME, Disp: 7 mL, Rfl: 0    metoprolol succinate (TOPROL-XL) 25 mg 24 hr tablet, Take 1 tablet (25 mg total) by mouth daily (Patient not taking: Reported on 2/4/2025), Disp: 90 tablet, Rfl: 1    neomycin-polymyxin-hydrocortisone (CORTISPORIN) otic solution, 4 drops in right ear 3 times daily x 7 days, Disp: 10 mL, Rfl: 1    tamsulosin (FLOMAX) 0.4 mg, TAKE 1 CAPSULE AT BEDTIME, Disp: 90 capsule, Rfl: 1     PAST MEDICAL HISTORY:   Past Medical History:   Diagnosis Date    Allergic     Allergic reaction     last assessed: 5/26/2015    Arthritis     right knee    Bee sting allergy     last assessed: 6/30/2015    BPH (benign prostatic hypertrophy)     Cancer (HCC) 2017    Right kidney    Diabetes mellitus (HCC)     Type  II, NIDDM    Enlarged prostate with lower urinary tract symptoms (LUTS)     last assessed: 8/17/2015    Herpes zoster     last assessed: 11/21/2013    History of colon polyps     Hyperlipidemia     Leukocytosis     last assessed: 11/18/2014    Myocardial infarction (HCC) 4/13/22    Osteoarthrosis, localized, primary, knee     last assessed: 10/25/2016    Renal cancer (HCC)     Seasonal allergies     Shoulder pain, right     Wears glasses     Wears partial dentures     Upper and Lower       PAST SURGICAL HISTORY:  Past Surgical History:   Procedure Laterality Date    CARDIAC CATHETERIZATION N/A 04/13/2022    Procedure: Cardiac pci;  Surgeon: José Cameron MD;  [facility]: [facility];  Service: Cardiology    CARPAL TUNNEL RELEASE Left     CHOLECYSTECTOMY      Open    COLONOSCOPY      FL GUIDED NEEDLE PLAC BX/ASP/INJ  06/01/2020    FL GUIDED NEEDLE PLAC BX/ASP/INJ  11/13/2020    GANGLION CYST EXCISION Left     Left wrist    JOINT REPLACEMENT Left 08/2015    Left TKR    JOINT REPLACEMENT Right 09/2017    Right TKR    KNEE SURGERY  9/2017    Knee replacement    NEPHRECTOMY  05/08/2017    Partial right nephrectomy    AR ARTHRP INTERCARPAL/CARP/MTCRPL JT INTERPOSITION Left 12/05/2016    Procedure: ARTHROPLASTY THUMB CMC ;  Surgeon: Dominguez Velazco MD;  [facility];  Service: Orthopedics    AR ARTHRP KNE CONDYLE&PLATU MEDIAL&LAT COMPARTMENTS Right 09/11/2017    Procedure: ARTHROPLASTY KNEE TOTAL;  Surgeon: Dominguez Velazco MD;  [facility];  Service: Orthopedics    AR LAPAROSCOPY SURG PARTIAL NEPHRECTOMY Right 05/08/2017    Procedure: LAPAROSCOPIC LYSIS OF ADHESIONS, ROBOTIC PARTIAL NEPHRECTOMY, WITH INTRA-OP LAPAROSCOPIC ULTRASOUND;  Surgeon: Jose Hidalgo MD;  [facility];  Service: Urology    AR NEUROPLASTY &/TRANSPOS MEDIAN NRV CARPAL TUNNE Left 12/05/2016    Procedure: RELEASE CARPAL TUNNEL;  Surgeon: Dominguez Velazco MD;  [facility];  Service: Orthopedics    AR SURGICAL  ARTHROSCOPY SHOULDER W/ROTATOR CUFF RPR Right 2019    Procedure: REPAIR ROTATOR CUFF ARTHROSCOPIC; SUBACROMIAL DECOMPRESSION; DISTAL CLAVICLE EXCISION;  Surgeon: Dominguez Velazco MD;  [facility];  Service: Orthopedics    WV TENODESIS LONG TENDON BICEPS  2019    Procedure: TENODESIS BICEPS OPEN PROXIMAL;  Surgeon: Dominguez Velazco MD;  [facility];  Service: Orthopedics    TONSILLECTOMY AND ADENOIDECTOMY         SOCIAL HISTORY:  Social History     Tobacco Use   Smoking Status Former    Current packs/day: 0.00    Average packs/day: 0.3 packs/day for 12.0 years (3.0 ttl pk-yrs)    Types: Cigarettes    Start date: 1959    Quit date: 1971    Years since quittin.1    Passive exposure: Never   Smokeless Tobacco Never   Tobacco Comments    Smoked from ages 13 to 22.        Physical Exam    79 y.o. male sitting comfortably on exam chair in no apparent distress.   Patient ambulates leaning mildly forward.   No TTP over thoracic, or lumbar spine.     Sensation intact to light touch left L2 through S1 dermatomes.   Sensation intact to light touch right L2 through S1 dermatomes     Left Motor: 5/5 iliopsoas, 5/5 quadriceps, 5/5 tibialis anterior, 5/5 extensor hallucis longus, and 5/5 gastrocsoleus.   Right Motor: 5/5 iliopsoas, 5/5 quadriceps, 5/5 tibialis anterior, 5/5 extensor hallucis longus, and 5/5 gastrocsoleus.     ROM:  Pain free ROM of bilateral hips  Bilateral TKA     Straight leg raise test is negative Bilateral   The patient is well perfused distally.        RADIOGRAPHIC STUDIES:  XR, lumbar spine, 2024: Mild multilevel lumbar degenerative disc disease.  Normal alignment.  No evidence of instability.  EMG, right lower extremity, 2025: Findings: 1) Normal right tibial motor conduction study. The right peroneal motor conduction study is significant for a low CMAP amplitude while recording from the EDB muscle but is normal while recording from the tibialis anterior  muscle 2) Normal right sural and superficial peroneal sensory conduction studies 3) Normal bilateral H reflexes 4) EMG of the right arm reveals mild reinnervation most in keeping with an L5 distribution Impression: EMG and nerve conduction studies are consistent with a chronic right L5 radiculopathy        Assessment & Plan  Acute low back pain without sciatica, unspecified back pain laterality    Orders:    Ambulatory Referral to Orthopedic Surgery   Low back pain.   Radiculopathy, lumbar region       Right-sided lateral mid thigh to lateral knee.   Chronic L5 radiculopathy. Does not correlate with patient's current symptoms.   Contusion of rib on right side, initial encounter       Right-sided middle back pain.          PLAN:  79 y.o. male with acute low back pain, lumbar radiculopathy, rib contusion on the right status post fall around 11/25/24.     The patient's x-rays, and EMG study were reviewed today. The patient has chronic L5 radiculopathy. This does not correlate with his current symptoms. The patient was offered a referral to physical therapy. If physical therapy was not effective in improving his symptoms, an MRI study would be ordered. The patient does not wish to attend physical therapy at this time.     Patient informed his right-sided mid back pain is due to a rib contusion. He was informed this can take a few months in order to resolve.     It was discussed it is reasonable for the patient to follow-up with Dr. Velazco to ensure his total knee arthroplasties are in stable condition statu post fall with onset of numbness.     Due to patient's history of kidney cancer, and age, patient is encouraged to discuss the use of NSAID's with his PCP.     I will see the patient back as needed.       Scribe Attestation      I,:  Jen Deshpande am acting as a scribe while in the presence of the attending physician.:       I,:  Aisha Jean MD personally performed the services described in this documentation    as  scribed in my presence.:

## 2025-02-04 NOTE — ASSESSMENT & PLAN NOTE
Right-sided lateral mid thigh to lateral knee.   Chronic L5 radiculopathy. Does not correlate with patient's current symptoms.

## 2025-02-06 ENCOUNTER — RA CDI HCC (OUTPATIENT)
Dept: OTHER | Facility: HOSPITAL | Age: 80
End: 2025-02-06

## 2025-02-07 ENCOUNTER — APPOINTMENT (OUTPATIENT)
Dept: LAB | Facility: CLINIC | Age: 80
End: 2025-02-07
Payer: MEDICARE

## 2025-02-07 DIAGNOSIS — E11.69 TYPE 2 DIABETES MELLITUS WITH OTHER SPECIFIED COMPLICATION, WITHOUT LONG-TERM CURRENT USE OF INSULIN (HCC): ICD-10-CM

## 2025-02-07 LAB
ALBUMIN SERPL BCG-MCNC: 4.2 G/DL (ref 3.5–5)
ALP SERPL-CCNC: 48 U/L (ref 34–104)
ALT SERPL W P-5'-P-CCNC: 82 U/L (ref 7–52)
ANION GAP SERPL CALCULATED.3IONS-SCNC: 15 MMOL/L (ref 4–13)
AST SERPL W P-5'-P-CCNC: 67 U/L (ref 13–39)
BILIRUB SERPL-MCNC: 0.78 MG/DL (ref 0.2–1)
BUN SERPL-MCNC: 24 MG/DL (ref 5–25)
CALCIUM SERPL-MCNC: 9.4 MG/DL (ref 8.4–10.2)
CHLORIDE SERPL-SCNC: 103 MMOL/L (ref 96–108)
CO2 SERPL-SCNC: 22 MMOL/L (ref 21–32)
CREAT SERPL-MCNC: 1.1 MG/DL (ref 0.6–1.3)
CREAT UR-MCNC: 103.7 MG/DL
EST. AVERAGE GLUCOSE BLD GHB EST-MCNC: 169 MG/DL
GFR SERPL CREATININE-BSD FRML MDRD: 63 ML/MIN/1.73SQ M
GLUCOSE P FAST SERPL-MCNC: 132 MG/DL (ref 65–99)
HBA1C MFR BLD: 7.5 %
MICROALBUMIN UR-MCNC: 11.2 MG/L
MICROALBUMIN/CREAT 24H UR: 11 MG/G CREATININE (ref 0–30)
POTASSIUM SERPL-SCNC: 3.9 MMOL/L (ref 3.5–5.3)
PROT SERPL-MCNC: 7.5 G/DL (ref 6.4–8.4)
SODIUM SERPL-SCNC: 140 MMOL/L (ref 135–147)

## 2025-02-07 PROCEDURE — 82570 ASSAY OF URINE CREATININE: CPT

## 2025-02-07 PROCEDURE — 80053 COMPREHEN METABOLIC PANEL: CPT

## 2025-02-07 PROCEDURE — 83036 HEMOGLOBIN GLYCOSYLATED A1C: CPT

## 2025-02-07 PROCEDURE — 36415 COLL VENOUS BLD VENIPUNCTURE: CPT

## 2025-02-07 PROCEDURE — 82043 UR ALBUMIN QUANTITATIVE: CPT

## 2025-02-09 ENCOUNTER — RESULTS FOLLOW-UP (OUTPATIENT)
Dept: FAMILY MEDICINE CLINIC | Facility: CLINIC | Age: 80
End: 2025-02-09

## 2025-02-13 ENCOUNTER — OFFICE VISIT (OUTPATIENT)
Dept: FAMILY MEDICINE CLINIC | Facility: CLINIC | Age: 80
End: 2025-02-13
Payer: MEDICARE

## 2025-02-13 VITALS
HEIGHT: 71 IN | HEART RATE: 58 BPM | DIASTOLIC BLOOD PRESSURE: 56 MMHG | WEIGHT: 182.4 LBS | TEMPERATURE: 98.5 F | BODY MASS INDEX: 25.54 KG/M2 | SYSTOLIC BLOOD PRESSURE: 102 MMHG | OXYGEN SATURATION: 97 %

## 2025-02-13 DIAGNOSIS — M79.651 PAIN OF RIGHT THIGH: ICD-10-CM

## 2025-02-13 DIAGNOSIS — I25.119 ATHEROSCLEROSIS OF NATIVE CORONARY ARTERY OF NATIVE HEART WITH ANGINA PECTORIS (HCC): ICD-10-CM

## 2025-02-13 DIAGNOSIS — R20.2 PARESTHESIA OF RIGHT LOWER EXTREMITY: ICD-10-CM

## 2025-02-13 DIAGNOSIS — Z85.528 HISTORY OF RENAL CELL CARCINOMA: ICD-10-CM

## 2025-02-13 DIAGNOSIS — R74.01 TRANSAMINITIS: ICD-10-CM

## 2025-02-13 DIAGNOSIS — E11.69 TYPE 2 DIABETES MELLITUS WITH OTHER SPECIFIED COMPLICATION, WITHOUT LONG-TERM CURRENT USE OF INSULIN (HCC): Primary | ICD-10-CM

## 2025-02-13 DIAGNOSIS — E78.2 MIXED HYPERLIPIDEMIA: ICD-10-CM

## 2025-02-13 DIAGNOSIS — N40.1 BENIGN PROSTATIC HYPERPLASIA WITH URINARY FREQUENCY: ICD-10-CM

## 2025-02-13 DIAGNOSIS — Z13.29 SCREENING FOR THYROID DISORDER: ICD-10-CM

## 2025-02-13 DIAGNOSIS — G47.00 INSOMNIA, UNSPECIFIED TYPE: ICD-10-CM

## 2025-02-13 DIAGNOSIS — R35.0 BENIGN PROSTATIC HYPERPLASIA WITH URINARY FREQUENCY: ICD-10-CM

## 2025-02-13 DIAGNOSIS — R97.20 ELEVATED PSA: ICD-10-CM

## 2025-02-13 DIAGNOSIS — Z13.0 SCREENING FOR DEFICIENCY ANEMIA: ICD-10-CM

## 2025-02-13 PROCEDURE — 99214 OFFICE O/P EST MOD 30 MIN: CPT | Performed by: FAMILY MEDICINE

## 2025-02-13 PROCEDURE — G2211 COMPLEX E/M VISIT ADD ON: HCPCS | Performed by: FAMILY MEDICINE

## 2025-02-13 NOTE — ASSESSMENT & PLAN NOTE
A1c from February 7 was 7.5% (was 7.3%).  Patient just restarted Jardiance 10 a few weeks ago due to cost.  He is also taking metformin 500 twice daily.  Will maintain current doses/meds for now.  Recheck level prior to next appointment.  If not improving, will increase Jardiance to 25 mg  Lab Results   Component Value Date    HGBA1C 7.5 (H) 02/07/2025     Orders:  •  Comprehensive metabolic panel; Future  •  Hemoglobin A1C; Future

## 2025-02-13 NOTE — ASSESSMENT & PLAN NOTE
Mild elevated transaminases (AST 67, ALT 82).  No prior elevations.  Patient does admit to having recent virus.  He is asymptomatic.  He does not drink alcohol.  Will recheck labs prior to next appointment.  Patient advised to look out for symptoms/signs of jaundice, abdominal pain, nausea.

## 2025-02-13 NOTE — PROGRESS NOTES
Name: Chuck Borja      : 1945      MRN: [id number]  Encounter Provider: Niranjan Kline DO  Encounter Date: 2025   Encounter department: [facility]    Assessment & Plan  Type 2 diabetes mellitus with other specified complication, without long-term current use of insulin (HCC)  A1c from  was 7.5% (was 7.3%).  Patient just restarted Jardiance 10 a few weeks ago due to cost.  He is also taking metformin 500 twice daily.  Will maintain current doses/meds for now.  Recheck level prior to next appointment.  If not improving, will increase Jardiance to 25 mg  Lab Results   Component Value Date    HGBA1C 7.5 (H) 2025     Orders:  •  Comprehensive metabolic panel; Future  •  Hemoglobin A1C; Future    Paresthesia of right lower extremity  Ongoing paresthesias right lateral thigh.  Patient complains of numb painful patch right anterior lateral thigh.  Symptoms have been present for the last 3 months.  Spinal specialist stated that there was not much he could do for him other than physical therapy.  He will be seeing Dr. Velazco in near future to rule out any involvement.  Will refer to neurology for second opinion.  Orders:  •  Ambulatory Referral to Neurology; Future    Transaminitis  Mild elevated transaminases (AST 67, ALT 82).  No prior elevations.  Patient does admit to having recent virus.  He is asymptomatic.  He does not drink alcohol.  Will recheck labs prior to next appointment.  Patient advised to look out for symptoms/signs of jaundice, abdominal pain, nausea.       Atherosclerosis of native coronary artery of native heart with angina pectoris (HCC)  Status post STEMI 2022.  Status post drug-eluting stent.  Patient continues to do well without chest pain or shortness of breath.  Continue follow-up with cardiology as directed       Mixed hyperlipidemia  Continue atorvastatin 80 and Zetia 10  Orders:  •  Lipid Panel with Direct LDL reflex;  Future    Insomnia, unspecified type  Continue trazodone 100       Elevated PSA  Last PSA from August 2024 was 3.6 (previously 4.3).  Still being followed by urology       History of renal cell carcinoma  History of right renal cell carcinoma.  Status post robotic partial nephrectomy in 2017.  Continue follow-up with urology as directed       Benign prostatic hyperplasia with urinary frequency         Screening for deficiency anemia    Orders:  •  CBC and differential; Future    Screening for thyroid disorder    Orders:  •  TSH, 3rd generation with Free T4 reflex; Future    Pain of right thigh                Patient had flu shot this season  Current with pneumonia vaccination  Last tetanus booster 2017  Patient had Shingrix  Recommend RSV at pharmacy  Patient declines COVID booster    Lab results from February 7 reviewed    6 months, fasting blood work prior      History of Present Illness     Patient presents for recheck chronic medical problems today.  Still having numb patch in right lateral thigh.  Otherwise he is feeling well.  He is compliant with prescribed medications.  He had labs done in February 7      Review of Systems   Respiratory: Negative.     Cardiovascular: Negative.    Gastrointestinal: Negative.    Genitourinary: Negative.      Past Medical History:   Diagnosis Date   • Allergic    • Allergic reaction     last assessed: 5/26/2015   • Arthritis     right knee   • Bee sting allergy     last assessed: 6/30/2015   • BPH (benign prostatic hypertrophy)    • Cancer (HCC) 2017    Right kidney   • Diabetes mellitus (HCC)     Type II, NIDDM   • Enlarged prostate with lower urinary tract symptoms (LUTS)     last assessed: 8/17/2015   • Heart disease 4/13/22    Heart attack   • Herpes zoster     last assessed: 11/21/2013   • History of colon polyps    • Hyperlipidemia    • Leukocytosis     last assessed: 11/18/2014   • Myocardial infarction (HCC) 4/13/22   • Osteoarthrosis, localized, primary, knee     last  assessed: 10/25/2016   • Renal cancer (HCC)    • Seasonal allergies    • Shoulder pain, right    • Wears glasses    • Wears partial dentures     Upper and Lower     Past Surgical History:   Procedure Laterality Date   • CARDIAC CATHETERIZATION N/A 04/13/2022    Procedure: Cardiac pci;  Surgeon: José Cameron MD;  [facility]: [facility];  Service: Cardiology   • CARPAL TUNNEL RELEASE Left    • CHOLECYSTECTOMY      Open   • COLONOSCOPY     • FL GUIDED NEEDLE PLAC BX/ASP/INJ  06/01/2020   • FL GUIDED NEEDLE PLAC BX/ASP/INJ  11/13/2020   • GANGLION CYST EXCISION Left     Left wrist   • JOINT REPLACEMENT Left 08/2015    Left TKR   • JOINT REPLACEMENT Right 09/2017    Right TKR   • KIDNEY SURGERY  5/17    Partial kidney removal for cancer   • KNEE SURGERY  9/2017    Knee replacement   • NEPHRECTOMY  05/08/2017    Partial right nephrectomy   • IN ARTHRP INTERCARPAL/CARP/MTCRPL JT INTERPOSITION Left 12/05/2016    Procedure: ARTHROPLASTY THUMB CMC ;  Surgeon: Dominguez Velazco MD;  [facility];  Service: Orthopedics   • IN ARTHRP KNE CONDYLE&PLATU MEDIAL&LAT COMPARTMENTS Right 09/11/2017    Procedure: ARTHROPLASTY KNEE TOTAL;  Surgeon: Dominguez Velazco MD;  [facility];  Service: Orthopedics   • IN LAPAROSCOPY SURG PARTIAL NEPHRECTOMY Right 05/08/2017    Procedure: LAPAROSCOPIC LYSIS OF ADHESIONS, ROBOTIC PARTIAL NEPHRECTOMY, WITH INTRA-OP LAPAROSCOPIC ULTRASOUND;  Surgeon: Jose Hidalgo MD;  [facility];  Service: Urology   • IN NEUROPLASTY &/TRANSPOS MEDIAN NRV CARPAL TUNNE Left 12/05/2016    Procedure: RELEASE CARPAL TUNNEL;  Surgeon: Dominguez Velazco MD;  [facility];  Service: Orthopedics   • IN SURGICAL ARTHROSCOPY SHOULDER W/ROTATOR CUFF RPR Right 02/04/2019    Procedure: REPAIR ROTATOR CUFF ARTHROSCOPIC; SUBACROMIAL DECOMPRESSION; DISTAL CLAVICLE EXCISION;  Surgeon: Dominguez Velazco MD;  [facility];  Service: Orthopedics   • IN TENODESIS LONG TENDON BICEPS  02/04/2019     Procedure: TENODESIS BICEPS OPEN PROXIMAL;  Surgeon: Dominguez Velazco MD;  [facility];  Service: Orthopedics   • SHOULDER SURGERY      Rotator cuff   • TONSILLECTOMY AND ADENOIDECTOMY       Family History   Problem Relation Age of Onset   • Cancer Mother    • Stroke Father      Social History     Tobacco Use   • Smoking status: Former     Current packs/day: 0.00     Average packs/day: 0.3 packs/day for 12.0 years (3.0 ttl pk-yrs)     Types: Cigarettes     Start date: 1959     Quit date: 1971     Years since quittin.1     Passive exposure: Never   • Smokeless tobacco: Never   • Tobacco comments:     Smoked from ages 13 to 22.   Vaping Use   • Vaping status: Never Used   Substance and Sexual Activity   • Alcohol use: Not Currently   • Drug use: No   • Sexual activity: Not Currently     Partners: Female     Birth control/protection: None     Current Outpatient Medications on File Prior to Visit   Medication Sig   • aspirin (Aspirin Low Dose) 81 mg EC tablet TAKE 1 TABLET EVERY DAY   • atorvastatin (LIPITOR) 80 mg tablet TAKE 1 TABLET EVERY DAY WITH DINNER   • baclofen 10 mg tablet Take 1 tablet (10 mg total) by mouth 2 (two) times a day as needed for muscle spasms 1 tab BID PRN   • Blood Glucose Monitoring Suppl (ONE TOUCH ULTRA 2) w/Device KIT Use 2 (two) times a day   • Cholecalciferol (VITAMIN D-3 PO) Take 2,000 Units by mouth daily   • Empagliflozin (JARDIANCE) 10 MG TABS tablet Take 1 tablet (10 mg total) by mouth daily   • EPINEPHrine (EPIPEN) 0.3 mg/0.3 mL SOAJ Inject as directed   • ezetimibe (ZETIA) 10 mg tablet Take 1 tablet (10 mg total) by mouth daily at bedtime   • fluticasone (FLONASE) 50 mcg/act nasal spray 1 spray into each nostril daily   • glucose blood (OneTouch Ultra) test strip Use 1 each in the morning   • glucose blood test strip Use 1 each daily as needed (once a day) One touch ultra  Test strips   • metFORMIN (GLUCOPHAGE) 500 mg tablet 1 tab AM, 1 tab with dinner  "  • metoprolol succinate (TOPROL-XL) 25 mg 24 hr tablet TAKE 1 TABLET EVERY DAY   • OneTouch Delica Lancets 33G MISC Use in the morning   • Simvastatin POWD    • traMADol (Ultram) 50 mg tablet 1 tab BID PRN mod-severe pain   • traZODone (DESYREL) 100 mg tablet TAKE 1 TABLET AT BEDTIME   • ciclopirox (PENLAC) 8 % solution APPLY TOPICALLY ONCE DAILY AT BEDTIME   • metoprolol succinate (TOPROL-XL) 25 mg 24 hr tablet Take 1 tablet (25 mg total) by mouth daily (Patient not taking: Reported on 1/3/2025)   • neomycin-polymyxin-hydrocortisone (CORTISPORIN) otic solution 4 drops in right ear 3 times daily x 7 days   • tamsulosin (FLOMAX) 0.4 mg TAKE 1 CAPSULE AT BEDTIME     Allergies   Allergen Reactions   • Bee Venom Edema     Immunization History   Administered Date(s) Administered   • COVID-19 MODERNA VACC 0.5 ML IM 03/12/2021, 04/09/2021, 11/09/2021   • INFLUENZA 11/18/2014, 10/05/2015, 11/28/2016, 01/04/2018, 10/15/2018, 10/18/2021, 10/25/2022, 10/18/2023   • Influenza Split High Dose Preservative Free IM 11/18/2014, 10/05/2015, 11/28/2016, 01/04/2018, 10/22/2024   • Influenza, high dose seasonal 0.7 mL 10/15/2018, 10/03/2019, 09/18/2020   • Influenza, seasonal, injectable 10/05/2012, 10/09/2013   • Pneumococcal Conjugate 13-Valent 12/08/2015, 12/09/2015   • Pneumococcal Polysaccharide PPV23 10/09/2013   • Tdap 11/15/2004, 10/16/2017   • Zoster 10/05/2012   • Zoster Vaccine Recombinant 10/30/2020, 01/25/2021     Objective   /56 (BP Location: Left arm, Patient Position: Sitting, Cuff Size: Adult)   Pulse 58   Temp 98.5 °F (36.9 °C)   Ht 5' 11\" (1.803 m)   Wt 82.7 kg (182 lb 6.4 oz)   SpO2 97%   BMI 25.44 kg/m²     Physical Exam  Constitutional:       Appearance: Normal appearance.   Eyes:      Pupils: Pupils are equal, round, and reactive to light.   Neck:      Vascular: No carotid bruit.   Cardiovascular:      Rate and Rhythm: Normal rate and regular rhythm.      Pulses: Normal pulses.      Heart sounds: " Normal heart sounds. No murmur heard.     No gallop.   Pulmonary:      Effort: Pulmonary effort is normal.      Breath sounds: Normal breath sounds.   Neurological:      Mental Status: He is alert.   Psychiatric:         Mood and Affect: Mood normal.         Behavior: Behavior normal.

## 2025-02-13 NOTE — ASSESSMENT & PLAN NOTE
Ongoing paresthesias right lateral thigh.  Patient complains of numb painful patch right anterior lateral thigh.  Symptoms have been present for the last 3 months.  Spinal specialist stated that there was not much he could do for him other than physical therapy.  He will be seeing Dr. Velazco in near future to rule out any involvement.  Will refer to neurology for second opinion.  Orders:  •  Ambulatory Referral to Neurology; Future

## 2025-02-13 NOTE — ASSESSMENT & PLAN NOTE
Status post STEMI April 2022.  Status post drug-eluting stent.  Patient continues to do well without chest pain or shortness of breath.  Continue follow-up with cardiology as directed

## 2025-02-13 NOTE — ASSESSMENT & PLAN NOTE
History of right renal cell carcinoma.  Status post robotic partial nephrectomy in 2017.  Continue follow-up with urology as directed

## 2025-03-03 ENCOUNTER — OFFICE VISIT (OUTPATIENT)
Dept: OBGYN CLINIC | Facility: MEDICAL CENTER | Age: 80
End: 2025-03-03
Payer: MEDICARE

## 2025-03-03 VITALS — HEIGHT: 71 IN | WEIGHT: 182 LBS | BODY MASS INDEX: 25.48 KG/M2

## 2025-03-03 DIAGNOSIS — Z01.89 ENCOUNTER FOR LOWER EXTREMITY COMPARISON IMAGING STUDY: ICD-10-CM

## 2025-03-03 DIAGNOSIS — M25.561 RIGHT KNEE PAIN, UNSPECIFIED CHRONICITY: Primary | ICD-10-CM

## 2025-03-03 PROCEDURE — 99213 OFFICE O/P EST LOW 20 MIN: CPT | Performed by: ORTHOPAEDIC SURGERY

## 2025-03-03 NOTE — PROGRESS NOTES
"Orthopaedic Surgery - Office Note  Chuck Borja (79 y.o. 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 Patient acknowledged understanding  Patient is going to follow up with neurology   He could also follow up with spine and pain if needed  I offered a referral to PT but patient decline today   Reviewed notes from Dr. Jean  Reviewed EMG during the visit   Reviewed images during the visit   Follow-up:  Return if symptoms worsen or fail to improve.      Chief Complaint / Date of Onset  Lateral thigh numbness since 2024   Injury Mechanism / Date  He did fall around 2024 with no onset of numbness at that time   Surgery / Date  None    History of Present Illness   Chuck Borja is a 79 y.o. male who presents for right lateral thigh numbness. He states this began on 2024 with no inciting event. He describes this as mid lateral thigh down just passed his knee. He denies any pain in his knee or instability in the knee. He denies any swelling in the knee. He states this numbness in leg has been consistent and not changed since its onset. Dr. Jean sent him here to ensure his TKA was stable.     Treatment Summary  Medications / Modalities  None  Bracing / Immobilization  None  Physical Therapy  None  Injections  None  Prior Surgeries  Bilateral TKA  Other Treatments  Chiropractic care       Review of Systems  Pertinent items are noted in HPI.  All other systems were reviewed and are negative.      Physical Exam  Ht 5' 11\" (1.803 m)   Wt 82.6 kg (182 lb)   BMI 25.38 kg/m²   Cons: Appears well.  No apparent distress.  Psych: Alert. Oriented x3.  Mood and affect normal.  Eyes: PERRLA, EOMI  Resp: Normal effort.  No audible wheezing or stridor.  CV: Palpable pulse. "  No discernable arrhythmia.  No LE edema.  Lymph:  No palpable cervical, axillary, or inguinal lymphadenopathy.  Skin: Warm.  No palpable masses.  No visible lesions.  Neuro: Normal muscle tone.  Normal and symmetric DTR's.     Right Hip Exam  Alignment / Posture:  Normal resting hip posture.  Inspection:  No swelling. No ecchymosis.  Palpation:  No tenderness. No effusion.  ROM:  Normal hip ROM. Normal knee ROM.  Strength:  5/5 hip flexors and abductors. 5/5 quadriceps and hamstrings. 5/5 AT, GSC, PT, and peroneals.  Stability:  No objective hip instability.  Tests:  No pertinent positive or negative tests.  Neurovascular:  Sensation intact in DP/SP/Martin/Sa/T nerve distributions. 2+ DP & PT pulses.  Gait:  Smooth.       Studies Reviewed  I have personally reviewed pertinent films in PACS.  XR of lumbar spine- images from December 2024 showing moderate facet disease lower lumbar spine and minimal spondylotic changes  XR of right hip- images from December 2024 showing mild arthritic changes   EMG and nerve conduction studies are consistent with a chronic right L5 radiculopathy    Procedures  No procedures today.    Medical, Surgical, Family, and Social History  The patient's medical history, family history, and social history, were reviewed and updated as appropriate.    Past Medical History:   Diagnosis Date    Allergic     Allergic reaction     last assessed: 5/26/2015    Arthritis     right knee    Bee sting allergy     last assessed: 6/30/2015    BPH (benign prostatic hypertrophy)     Cancer (HCC) 2017    Right kidney    Diabetes mellitus (HCC)     Type II, NIDDM    Enlarged prostate with lower urinary tract symptoms (LUTS)     last assessed: 8/17/2015    Heart disease 4/13/22    Heart attack    Herpes zoster     last assessed: 11/21/2013    History of colon polyps     Hyperlipidemia     Leukocytosis     last assessed: 11/18/2014    Myocardial infarction (HCC) 4/13/22    Osteoarthrosis, localized, primary, knee      last assessed: 10/25/2016    Renal cancer (HCC)     Seasonal allergies     Shoulder pain, right     Wears glasses     Wears partial dentures     Upper and Lower       Past Surgical History:   Procedure Laterality Date    CARDIAC CATHETERIZATION N/A 04/13/2022    Procedure: Cardiac pci;  Surgeon: José Cameron MD;  Location: [facility];  Service: Cardiology    CARPAL TUNNEL RELEASE Left     CHOLECYSTECTOMY      Open    COLONOSCOPY      FL GUIDED NEEDLE PLAC BX/ASP/INJ  06/01/2020    FL GUIDED NEEDLE PLAC BX/ASP/INJ  11/13/2020    GANGLION CYST EXCISION Left     Left wrist    JOINT REPLACEMENT Left 08/2015    Left TKR    JOINT REPLACEMENT Right 09/2017    Right TKR    KIDNEY SURGERY  5/17    Partial kidney removal for cancer    KNEE SURGERY  9/2017    Knee replacement    NEPHRECTOMY  05/08/2017    Partial right nephrectomy    PA ARTHRP INTERCARPAL/CARP/MTCRPL JT INTERPOSITION Left 12/05/2016    Procedure: ARTHROPLASTY THUMB CMC ;  Surgeon: Dominguez Velazco MD;  Location: [facility];  Service: Orthopedics    PA ARTHRP KNE CONDYLE&PLATU MEDIAL&LAT COMPARTMENTS Right 09/11/2017    Procedure: ARTHROPLASTY KNEE TOTAL;  Surgeon: Dominguez Velazco MD;  Location: [facility];  Service: Orthopedics    PA LAPAROSCOPY SURG PARTIAL NEPHRECTOMY Right 05/08/2017    Procedure: LAPAROSCOPIC LYSIS OF ADHESIONS, ROBOTIC PARTIAL NEPHRECTOMY, WITH INTRA-OP LAPAROSCOPIC ULTRASOUND;  Surgeon: Jose Hidalgo MD;  Location: [facility];  Service: Urology    PA NEUROPLASTY &/TRANSPOS MEDIAN NRV CARPAL TUNNE Left 12/05/2016    Procedure: RELEASE CARPAL TUNNEL;  Surgeon: Dominguez Velazco MD;  Location: [facility];  Service: Orthopedics    PA SURGICAL ARTHROSCOPY SHOULDER W/ROTATOR CUFF RPR Right 02/04/2019    Procedure: REPAIR ROTATOR CUFF ARTHROSCOPIC; SUBACROMIAL DECOMPRESSION; DISTAL CLAVICLE EXCISION;  Surgeon: Dominguez Velazco MD;  Location: [facility];  Service: Orthopedics    PA TENODESIS LONG TENDON BICEPS  02/04/2019    Procedure:  TENODESIS BICEPS OPEN PROXIMAL;  Surgeon: Dominguez Velazco MD;  Location: [facility];  Service: Orthopedics    SHOULDER SURGERY      Rotator cuff    TONSILLECTOMY AND ADENOIDECTOMY         Family History   Problem Relation Age of Onset    Cancer Mother     Stroke Father        Social History     Occupational History    Not on file   Tobacco Use    Smoking status: Former     Current packs/day: 0.00     Average packs/day: 0.3 packs/day for 12.0 years (3.0 ttl pk-yrs)     Types: Cigarettes     Start date: 1959     Quit date: 1971     Years since quittin.1     Passive exposure: Never    Smokeless tobacco: Never    Tobacco comments:     Smoked from ages 13 to 22.   Vaping Use    Vaping status: Never Used   Substance and Sexual Activity    Alcohol use: Not Currently    Drug use: No    Sexual activity: Not Currently     Partners: Female     Birth control/protection: None       Allergies   Allergen Reactions    Bee Venom Edema         Current Outpatient Medications:     aspirin (Aspirin Low Dose) 81 mg EC tablet, TAKE 1 TABLET EVERY DAY, Disp: 90 tablet, Rfl: 1    atorvastatin (LIPITOR) 80 mg tablet, TAKE 1 TABLET EVERY DAY WITH DINNER, Disp: 90 tablet, Rfl: 1    baclofen 10 mg tablet, Take 1 tablet (10 mg total) by mouth 2 (two) times a day as needed for muscle spasms 1 tab BID PRN, Disp: 30 tablet, Rfl: 0    Blood Glucose Monitoring Suppl (ONE TOUCH ULTRA 2) w/Device KIT, Use 2 (two) times a day, Disp: 1 kit, Rfl: 0    Cholecalciferol (VITAMIN D-3 PO), Take 2,000 Units by mouth daily, Disp: , Rfl:     Empagliflozin (JARDIANCE) 10 MG TABS tablet, Take 1 tablet (10 mg total) by mouth daily, Disp: 90 tablet, Rfl: 3    EPINEPHrine (EPIPEN) 0.3 mg/0.3 mL SOAJ, Inject as directed, Disp: , Rfl:     ezetimibe (ZETIA) 10 mg tablet, Take 1 tablet (10 mg total) by mouth daily at bedtime, Disp: 90 tablet, Rfl: 1    fluticasone (FLONASE) 50 mcg/act nasal spray, 1 spray into each nostril daily, Disp: 16 g, Rfl: 3     glucose blood (OneTouch Ultra) test strip, Use 1 each in the morning, Disp: 100 strip, Rfl: 3    glucose blood test strip, Use 1 each daily as needed (once a day) One touch ultra  Test strips, Disp: 100 strip, Rfl: 3    metFORMIN (GLUCOPHAGE) 500 mg tablet, 1 tab AM, 1 tab with dinner, Disp: 180 tablet, Rfl: 1    OneTouch Delica Lancets 33G MISC, Use in the morning, Disp: 100 each, Rfl: 3    Simvastatin POWD, , Disp: , Rfl:     traMADol (Ultram) 50 mg tablet, 1 tab BID PRN mod-severe pain, Disp: 14 tablet, Rfl: 0    traZODone (DESYREL) 100 mg tablet, TAKE 1 TABLET AT BEDTIME, Disp: 90 tablet, Rfl: 1    ciclopirox (PENLAC) 8 % solution, APPLY TOPICALLY ONCE DAILY AT BEDTIME, Disp: 7 mL, Rfl: 0    metoprolol succinate (TOPROL-XL) 25 mg 24 hr tablet, Take 1 tablet (25 mg total) by mouth daily (Patient not taking: Reported on 3/3/2025), Disp: 90 tablet, Rfl: 1    metoprolol succinate (TOPROL-XL) 25 mg 24 hr tablet, TAKE 1 TABLET EVERY DAY, Disp: 90 tablet, Rfl: 3    neomycin-polymyxin-hydrocortisone (CORTISPORIN) otic solution, 4 drops in right ear 3 times daily x 7 days, Disp: 10 mL, Rfl: 1    tamsulosin (FLOMAX) 0.4 mg, TAKE 1 CAPSULE AT BEDTIME, Disp: 90 capsule, Rfl: 1      Cha Serrano    Scribe Attestation      I,:   am acting as a scribe while in the presence of the attending physician.:       I,:   personally performed the services described in this documentation    as scribed in my presence.:

## 2025-03-24 ENCOUNTER — OFFICE VISIT (OUTPATIENT)
Dept: NEUROLOGY | Facility: CLINIC | Age: 80
End: 2025-03-24
Payer: MEDICARE

## 2025-03-24 VITALS
HEART RATE: 63 BPM | SYSTOLIC BLOOD PRESSURE: 110 MMHG | OXYGEN SATURATION: 99 % | WEIGHT: 179 LBS | RESPIRATION RATE: 18 BRPM | HEIGHT: 71 IN | BODY MASS INDEX: 25.06 KG/M2 | DIASTOLIC BLOOD PRESSURE: 66 MMHG | TEMPERATURE: 97.5 F

## 2025-03-24 DIAGNOSIS — R20.2 PARESTHESIA OF RIGHT LOWER EXTREMITY: ICD-10-CM

## 2025-03-24 DIAGNOSIS — M54.16 RADICULOPATHY, LUMBAR REGION: Primary | ICD-10-CM

## 2025-03-24 PROCEDURE — 99203 OFFICE O/P NEW LOW 30 MIN: CPT | Performed by: NURSE PRACTITIONER

## 2025-03-24 NOTE — ASSESSMENT & PLAN NOTE
- right L5  -no significant symptoms requiring further workup at this time; will do home exercises and if worsening will contact office as further workup/treatment can be completed if necessary

## 2025-03-24 NOTE — ASSESSMENT & PLAN NOTE
- had fall 11/25; no major injury but had back pain and then developed 12/11/2024 this right lateral lower thigh extending down below the lateral knee intermittent 20-30 second electrical pains which have continued since then; emg/xray l spine completed; differential includes meralgia paresthetica vs lumbar radiculopathy; based off emg findings, xray and clinical evaluation this is more likely related to lumbar radiculopathy.  Orders:    Ambulatory Referral to Neurology

## 2025-03-24 NOTE — PATIENT INSTRUCTIONS
"Suggest back exercises and could consider formal pt in the future if needed  Let us know if pain worsens in anyway  Followup in 3 months time    Patient Education     Back exercises   The Basics   Written by the doctors and editors at [facility]   Why do I need to do back exercises? -- Back exercises can help ease back pain and might help prevent future back pain. Long term, it is important to strengthen the muscles in your lower back, buttocks, and belly. These are your \"core muscles.\" Stretching exercises are also important to keep your muscles flexible.  Below are some stretching and strengthening exercises that might help you. Other forms of movement can help ease or prevent back pain, too. For example, some people like to walk, do aerobic exercise, or do yoga or dane chi. The most important thing is to move your body. Your doctor, nurse, or physical therapist can help you find different types of activity that work for you.  What stretching exercises should I do? -- Below are some examples of stretching exercises. Warm up your muscles before stretching to help prevent injury. To warm up, you can walk, jog, or cycle for a few minutes.  Start by repeating each of these stretches 2 to 3 times. Try to hold each stretch for 5 to 10 seconds, and try to do the stretches 2 to 3 times each day. Breathe slowly and deeply as you do the exercises. Never bounce when doing stretches.   Cat-cow stretch (figure 1) - Start on all fours on the floor, with your hands under your shoulders, knees under your hips, and your back flat. First, tuck your chin and tighten your stomach muscles as you round your back (like a \"cat\"). Hold the stretch for about 10 seconds. Rest for a few seconds as you flatten your back. Next, lift your chin and let your belly and lower back sink toward the floor (like a \"cow\"). Hold the stretch for about 10 seconds.   Single knee-to-chest stretches (figure 2) ? While lying on your back, bend your knees with " your feet flat on the floor. Pull 1 knee toward your chest until you feel a stretch in your lower back and buttock area. Lower, and repeat with the other knee. If you have knee problems, pull your knee up by grabbing the back of your thigh instead of the front of your knee. You can also do this exercise by grabbing both knees at the same time.   Lower trunk rotations (figure 3) ? While lying on your back, bend your knees with your feet flat on the floor. Keep your knees and ankles together, and then drop them to 1 side. Keep both of your shoulders touching the floor until you feel a stretch in the muscles at the side of the back. Repeat on the other side.   Lower back stretches seated (figure 4) ? Sit in a chair with your feet spread about shoulder width apart. Then, lean forward until you feel a stretch in your lower back.  What strengthening exercises should I do? -- Below are some examples of strengthening exercises.  Start by doing each exercise 2 to 3 times. Work up to doing each exercise 10 times. Hold each exercise for 3 to 5 seconds, and try to do the exercises 2 to 3 times each day. Do all exercises slowly.   Shoulder blade squeezes (figure 5) ? Pinch your shoulder blades together on your upper back, and hold 3 to 5 seconds. You can also do these 1 side at a time. Sit with good posture, and make sure that your shoulders do not rise up when you do this exercise. Relax.   Pelvic tilts (figure 6) ? Lie on your back with your knees bent and feet flat on the floor. Tighten your stomach muscles, and press your lower back down to the floor. Relax. You should be able to breathe comfortably during this exercise.   Hip lifts (figure 7) ? Lie on your back with your knees bent and feet flat on the floor. Tighten your stomach muscles, keep your back flat, and lift your buttocks off of the floor. Relax. You should feel this in your buttocks, not in your lower back.  What else should I know?    Exercise, including  "stretching, might be slightly uncomfortable. But you should not have sharp or severe pain. If you do get severe pain, stop what you are doing. If severe pain continues, call your doctor or nurse.   Do not hold your breath when exercising. If you tend to hold your breath, try counting out loud when exercising. If any exercise bothers you, stop right away.   Always warm up before exercising. Warm muscles stretch much easier than cool muscles. Stretching cool muscles can lead to injury.   Doing exercises before a meal can be a good way to get into a routine.  All topics are updated as new evidence becomes available and our peer review process is complete.  This topic retrieved from [employer] on: Apr 03, 2024.  Topic 280708 Version 2.0  Release: 32.2.4 - C32.92  © 2024 Up[employer]Date, Inc. and/or its affiliates. All rights reserved.  figure 1: Cat-cow stretch     Start on all fours on the floor, with hands under your shoulders, knees under your hips, and your back flat. First, tuck your chin and tighten your stomach muscles as you round your back (like a \"cat\"). Hold the stretch for about 10 seconds. Rest for a few seconds as you flatten your back. Next, lift your chin and let your belly and lower back sink toward the floor (like a \"cow\"). Hold the stretch for about 10 seconds.  Graphic 636289 Version 1.0  figure 2: Single knee-to-chest stretch     Lie on your back, bend your knees, and have your feet flat on the floor. Pull 1 knee toward your chest until you feel a stretch in your lower back and buttock area. Repeat with the other knee. If you have knee problems, pull your knee up by grabbing the back of your thigh instead of the front of your knee. You can also do this exercise by grabbing both knees at the same time.  Graphic [id number] Version 1.0  figure 3: Lower trunk rotation     While lying on your back, bend your knees and have your feet flat on the floor. Keep your legs together and then drop them to 1 side. Keep both of " your shoulders touching the floor until you feel a stretch in the muscles at the side of the back. Repeat on the other side.  Graphic [id number] Version 1.0  figure 4: Lower back stretch     Sit in a chair with your feet spread about shoulder width apart. Then, lean forward until you feel a stretch in your lower back.  Graphic [id number] Version 1.0  figure 5: Shoulder blade squeezes     Pinch your shoulder blades together on your upper back and hold for 3 to 5 seconds. Make sure that you are sitting with good posture and that your shoulders do not raise up when you do this exercise. Relax.  Graphic [id number] Version 1.0  figure 6: Pelvic tilts     Lie on your back with your knees bent and feet flat on the floor. Tighten your stomach muscles and press your lower back down to the floor. Relax.  Graphic [id number] Version 1.0  figure 7: Hip lifts     Lie on your back with your knees bent and feet flat on the floor. Tighten your stomach muscles and lift your buttocks off of the floor. Relax.  Graphic [id number] Version 1.0  Consumer Information Use and Disclaimer   Disclaimer: This generalized information is a limited summary of diagnosis, treatment, and/or medication information. It is not meant to be comprehensive and should be used as a tool to help the user understand and/or assess potential diagnostic and treatment options. It does NOT include all information about conditions, treatments, medications, side effects, or risks that may apply to a specific patient. It is not intended to be medical advice or a substitute for the medical advice, diagnosis, or treatment of a health care provider based on the health care provider's examination and assessment of a patient's specific and unique circumstances. Patients must speak with a health care provider for complete information about their health, medical questions, and treatment options, including any risks or benefits regarding use of medications. This information does not endorse any  treatments or medications as safe, effective, or approved for treating a specific patient. UpToDate, Inc. and its affiliates disclaim any warranty or liability relating to this information or the use thereof.The use of this information is governed by the Terms of Use, available at https://www."[employer].com/en/know/clinical-effectiveness-terms. 2024© UpToDate, Inc. and its affiliates and/or licensors. All rights reserved.  Copyright   © 2024 Up[employer]Date, Inc. and/or its affiliates. All rights reserved.

## 2025-03-24 NOTE — PROGRESS NOTES
"Name: Chuck Borja      : 1945      MRN: [id number]  Encounter Provider: VIRGIE Tran  Encounter Date: 3/24/2025   Encounter department: [facility] [place]  :  Assessment & Plan  Paresthesia of right lower extremity  - had fall ; no major injury but had back pain and then developed 2024 this right lateral lower thigh extending down below the lateral knee intermittent 20-30 second electrical pains which have continued since then; emg/xray l spine completed; differential includes meralgia paresthetica vs lumbar radiculopathy; based off emg findings, xray and clinical evaluation this is more likely related to lumbar radiculopathy.  Orders:    Ambulatory Referral to Neurology    Radiculopathy, lumbar region  - right L5  -no significant symptoms requiring further workup at this time; will do home exercises and if worsening will contact office as further workup/treatment can be completed if necessary         Patient Instructions;   Suggest back exercises and could consider formal pt in the future if needed  Let us know if pain worsens in anyway  Followup in 3 months time    History of Present Illness   Chuck Borja is a 79 year old male with past medical history of DM, hyperlipidemia, right kidney carcinoma s/p partial nephrectomy, cad, insomnia, bph who presents for new patient evaluation of right thigh numbness and intermittent pains. He states he did have a fall late November (nothing major, fell in garage- was having back pain after and was treated by chiropractic care). He states since 2024 he has had right lateral thigh, extending down just below knee intermittent pains/decreased sensation to touch. He had EMG and xray. He states he will get intermittent sharp \"electrical\" pains a few times per day (he had one in the office today-lasted a short time and he talked through it); he states about 2x/week he will wake up b/c of pain to the right thigh " but he is able to fall back asleep quickly. He does not take anything for these pains and is not interested in pain medications. He does contract work about 4 hours/week for [employer] but otherwise is retired. He denies weakness, bowel/bladder change.     EMG 1/23/2025:  Impression: EMG and nerve conduction studies are consistent with a chronic right L5 radiculopathy    Xray 12/19/2024:  There is moderate facet disease in the lower lumbar spine worse at L5-S1. There is mild multilevel osteophytosis. There is no disc space narrowing   IMPRESSION:  Moderate facet disease lower lumbar spine. Minimal spondylotic changes    HPI   Review of Systems   Constitutional: Negative.  Negative for chills and fever.   HENT: Negative.  Negative for ear pain and sore throat.    Eyes: Negative.  Negative for pain and visual disturbance.   Respiratory: Negative.  Negative for cough and shortness of breath.    Cardiovascular: Negative.  Negative for chest pain and palpitations.   Gastrointestinal: Negative.  Negative for abdominal pain and vomiting.   Endocrine: Negative.    Genitourinary: Negative.  Negative for dysuria and hematuria.   Musculoskeletal: Negative.  Negative for arthralgias and back pain.   Skin: Negative.  Negative for color change and rash.   Allergic/Immunologic: Negative.    Neurological:  Positive for numbness (numbness on outside right thigh, painful). Negative for seizures and syncope.   Hematological: Negative.    Psychiatric/Behavioral: Negative.     All other systems reviewed and are negative.   I have personally reviewed the MA's review of systems and made changes as necessary.    Current Outpatient Medications on File Prior to Visit   Medication Sig Dispense Refill    aspirin (Aspirin Low Dose) 81 mg EC tablet TAKE 1 TABLET EVERY DAY 90 tablet 1    atorvastatin (LIPITOR) 80 mg tablet TAKE 1 TABLET EVERY DAY WITH DINNER 90 tablet 1    Blood Glucose Monitoring Suppl (ONE TOUCH ULTRA 2) w/Device KIT Use 2  (two) times a day 1 kit 0    Cholecalciferol (VITAMIN D-3 PO) Take 2,000 Units by mouth daily      Empagliflozin (JARDIANCE) 10 MG TABS tablet Take 1 tablet (10 mg total) by mouth daily 90 tablet 3    EPINEPHrine (EPIPEN) 0.3 mg/0.3 mL SOAJ Inject as directed      ezetimibe (ZETIA) 10 mg tablet Take 1 tablet (10 mg total) by mouth daily at bedtime 90 tablet 1    fluticasone (FLONASE) 50 mcg/act nasal spray 1 spray into each nostril daily 16 g 3    glucose blood (OneTouch Ultra) test strip Use 1 each in the morning 100 strip 3    glucose blood test strip Use 1 each daily as needed (once a day) One touch ultra  Test strips 100 strip 3    metFORMIN (GLUCOPHAGE) 500 mg tablet 1 tab AM, 1 tab with dinner 180 tablet 1    OneTouch Delica Lancets 33G MISC Use in the morning 100 each 3    Simvastatin POWD       traMADol (Ultram) 50 mg tablet 1 tab BID PRN mod-severe pain 14 tablet 0    traZODone (DESYREL) 100 mg tablet TAKE 1 TABLET AT BEDTIME 90 tablet 1    [DISCONTINUED] baclofen 10 mg tablet Take 1 tablet (10 mg total) by mouth 2 (two) times a day as needed for muscle spasms 1 tab BID PRN 30 tablet 0    ciclopirox (PENLAC) 8 % solution APPLY TOPICALLY ONCE DAILY AT BEDTIME 7 mL 0    metoprolol succinate (TOPROL-XL) 25 mg 24 hr tablet Take 1 tablet (25 mg total) by mouth daily (Patient not taking: Reported on 1/3/2025) 90 tablet 1    metoprolol succinate (TOPROL-XL) 25 mg 24 hr tablet TAKE 1 TABLET EVERY DAY 90 tablet 3    neomycin-polymyxin-hydrocortisone (CORTISPORIN) otic solution 4 drops in right ear 3 times daily x 7 days 10 mL 1    tamsulosin (FLOMAX) 0.4 mg TAKE 1 CAPSULE AT BEDTIME 90 capsule 1     No current facility-administered medications on file prior to visit.      Social History     Tobacco Use    Smoking status: Former     Current packs/day: 0.00     Average packs/day: 0.3 packs/day for 12.0 years (3.0 ttl pk-yrs)     Types: Cigarettes     Start date: 1/13/1959     Quit date: 1/9/1971     Years since  "quittin.2     Passive exposure: Never    Smokeless tobacco: Never    Tobacco comments:     Smoked from ages 13 to 22.   Vaping Use    Vaping status: Never Used   Substance and Sexual Activity    Alcohol use: Not Currently    Drug use: No    Sexual activity: Not Currently     Partners: Female     Birth control/protection: None        Objective   /66 (BP Location: Right arm, Patient Position: Sitting, Cuff Size: Adult)   Pulse 63   Temp 97.5 °F (36.4 °C) (Temporal)   Resp 18   Ht 5' 11\" (1.803 m)   Wt 81.2 kg (179 lb)   SpO2 99%   BMI 24.97 kg/m²     Physical Exam  Vitals reviewed.   Constitutional:       General: He is not in acute distress.  HENT:      Head: Normocephalic.      Right Ear: External ear normal.      Left Ear: External ear normal.      Nose: Nose normal.      Mouth/Throat:      Mouth: Mucous membranes are moist.      Pharynx: Oropharynx is clear.   Eyes:      Extraocular Movements: Extraocular movements intact.      Pupils: Pupils are equal, round, and reactive to light.   Neck:      Comments: No signiificant low back tenderness or hip tenderness on exam  Cardiovascular:      Rate and Rhythm: Normal rate.      Pulses: Normal pulses.   Pulmonary:      Effort: Pulmonary effort is normal.   Abdominal:      General: There is no distension.      Tenderness: There is no abdominal tenderness.   Musculoskeletal:      Cervical back: No tenderness.      Right lower leg: No edema.      Left lower leg: No edema.        Legs:       Comments: Area of decreased pinprick sensation and area where pain is most often felt intermittently.   Skin:     Findings: No rash.   Neurological:      Cranial Nerves: No cranial nerve deficit.      Sensory: Sensory deficit present.      Motor: No weakness.      Coordination: Coordination normal.      Gait: Gait abnormal (slight out toeing of right foot; gait is steady/no ataxia; not using any assistive device.).      Deep Tendon Reflexes: Reflexes normal. "   Psychiatric:         Mood and Affect: Mood normal.         Behavior: Behavior normal.       Neurological Exam    Cranial Nerves  CN III, IV, VI: Extraocular movements intact bilaterally. Pupils equal round and reactive to light bilaterally.    Gait   Abnormal gait (slight out toeing of right foot; gait is steady/no ataxia; not using any assistive device.).

## 2025-04-03 DIAGNOSIS — I21.3 STEMI (ST ELEVATION MYOCARDIAL INFARCTION) (HCC): ICD-10-CM

## 2025-04-04 RX ORDER — ATORVASTATIN CALCIUM 80 MG/1
TABLET, FILM COATED ORAL
Qty: 90 TABLET | Refills: 1 | Status: SHIPPED | OUTPATIENT
Start: 2025-04-04

## 2025-04-09 DIAGNOSIS — F51.01 PRIMARY INSOMNIA: ICD-10-CM

## 2025-04-10 RX ORDER — TRAZODONE HYDROCHLORIDE 100 MG/1
100 TABLET ORAL
Qty: 90 TABLET | Refills: 1 | Status: SHIPPED | OUTPATIENT
Start: 2025-04-10

## 2025-04-14 DIAGNOSIS — E11.69 TYPE 2 DIABETES MELLITUS WITH OTHER SPECIFIED COMPLICATION, WITHOUT LONG-TERM CURRENT USE OF INSULIN (HCC): ICD-10-CM

## 2025-04-16 RX ORDER — BLOOD SUGAR DIAGNOSTIC
1 STRIP MISCELLANEOUS DAILY
Qty: 100 STRIP | Refills: 1 | Status: SHIPPED | OUTPATIENT
Start: 2025-04-16

## 2025-05-01 ENCOUNTER — HOSPITAL ENCOUNTER (OUTPATIENT)
Dept: ULTRASOUND IMAGING | Facility: MEDICAL CENTER | Age: 80
Discharge: HOME | End: 2025-05-01
Payer: MEDICARE

## 2025-05-01 DIAGNOSIS — C64.1 MALIGNANT NEOPLASM OF RIGHT KIDNEY (HCC): ICD-10-CM

## 2025-05-01 PROCEDURE — 76770 US EXAM ABDO BACK WALL COMP: CPT

## 2025-05-12 DIAGNOSIS — E11.69 TYPE 2 DIABETES MELLITUS WITH OTHER SPECIFIED COMPLICATION, WITHOUT LONG-TERM CURRENT USE OF INSULIN (HCC): ICD-10-CM

## 2025-05-13 DIAGNOSIS — E11.69 TYPE 2 DIABETES MELLITUS WITH OTHER SPECIFIED COMPLICATION, WITHOUT LONG-TERM CURRENT USE OF INSULIN (HCC): ICD-10-CM

## 2025-05-13 NOTE — PROGRESS NOTES
Name: Chuck Borja      : 1945      MRN: [id number]  Encounter Provider: VIRGIE Sin  Encounter Date: 2025   Encounter department: [facility] [place] [facility]  :  Assessment & Plan  Renal cell carcinoma of right kidney (HCC)  Right RCC   S/p robot-assisted laparoscopic right partial nephrectomy   Pathology with pT1a disease  Last renal US 2025-unremarkable  UA-negative for blood and infection   Can continue to follow with occasional imaging     Elevated PSA  Hx of PSA elevation with return to baseline.   Needs updated PSA level   Planning to get through PCP in      Lab Results   Component Value Date    PSA 3.635 2024    PSA 4.313 (H) 2024    PSA 3.1 2023     Orders:  •  POCT urine dip    Benign prostatic hyperplasia with urinary frequency  Stopped Flomax for BPH- did not notice a difference   Same AUA score of 8 when on medication   Continues with frequency, urgency and weakened stream at times   UA- negative for blood and infection   PVR was 60  Plan to trial silodosin  Patient wishes to call if medication is not working and wishes to try something new  Otherwise follow-up in 1 year  Orders:  •  POCT urine dip  •  Silodosin 8 MG CAPS; Take 1 capsule by mouth daily with breakfast        History of Present Illness   Chuck Borja is a 79 y.o. male who presents Prostate cancer screening, history of right renal cell carcinoma status post robotic assisted laparoscopic partial nephrectomy in . Pathology with pT1a disease.  Subsequent imaging did not reveal any evidence of recurrence.  He had a CT scan in 2024 due to abdominal pain and at that point in time there is stable post partial nephrectomy changes the right kidney with no discrete mass or hydronephrosis.  No dysuria, gross hematuria, flank pain    Patient was previously on Flomax but stopped medication due to no improvement with urinary symptoms.  Last year his AUA score was  8.  This year his AUA score is also 8.  He reports weakened urinary stream at times.  He does have nocturia 1-2 times per night along with daytime frequency and urgency at times.  He does drink decaf coffee during the day along with water.  He is interested in trialing any medication.  He continues with prostate cancer screening through his PCP.                 AUA SYMPTOM SCORE      Flowsheet Row Most Recent Value   AUA SYMPTOM SCORE    How often have you had a sensation of not emptying your bladder completely after you finished urinating? 0 (P)     How often have you had to urinate again less than two hours after you finished urinating? 3 (P)     How often have you found you stopped and started again several times when you urinate? 0 (P)     How often have you found it difficult to postpone urination? 3 (P)     How often have you had a weak urinary stream? 0 (P)     How often have you had to push or strain to begin urination? 0 (P)     How many times did you most typically get up to urinate from the time you went to bed at night until the time you got up in the morning? 2 (P)     Quality of Life: If you were to spend the rest of your life with your urinary condition just the way it is now, how would you feel about that? 2 (P)     AUA SYMPTOM SCORE 8 (P)            Review of Systems   Constitutional:  Negative for chills and fever.   HENT:  Negative for ear pain and sore throat.    Eyes:  Negative for pain and visual disturbance.   Respiratory:  Negative for cough and shortness of breath.    Cardiovascular:  Negative for chest pain and palpitations.   Gastrointestinal:  Negative for abdominal pain and vomiting.   Genitourinary:  Positive for difficulty urinating, frequency and urgency. Negative for decreased urine volume, dysuria, flank pain and hematuria.   Musculoskeletal:  Negative for arthralgias and back pain.   Skin:  Negative for color change and rash.   Neurological:  Negative for seizures and syncope.  "  All other systems reviewed and are negative.         Objective   /64 (BP Location: Left arm, Patient Position: Sitting, Cuff Size: Adult)   Pulse 59   Ht 5' 11\" (1.803 m)   Wt 81.6 kg (180 lb)   SpO2 97%   BMI 25.10 kg/m²     Physical Exam  Vitals and nursing note reviewed.   Constitutional:       General: He is not in acute distress.     Appearance: He is well-developed.   HENT:      Head: Normocephalic and atraumatic.     Eyes:      Conjunctiva/sclera: Conjunctivae normal.       Cardiovascular:      Rate and Rhythm: Normal rate and regular rhythm.      Heart sounds: No murmur heard.  Pulmonary:      Effort: Pulmonary effort is normal. No respiratory distress.      Breath sounds: Normal breath sounds.   Abdominal:      Palpations: Abdomen is soft.      Tenderness: There is no abdominal tenderness.     Musculoskeletal:         General: No swelling.      Cervical back: Neck supple.     Skin:     General: Skin is warm and dry.      Capillary Refill: Capillary refill takes less than 2 seconds.     Neurological:      Mental Status: He is alert.     Psychiatric:         Mood and Affect: Mood normal.            Results   Lab Results   Component Value Date    PSA 3.635 08/02/2024    PSA 4.313 (H) 07/23/2024    PSA 3.1 01/20/2023     Lab Results   Component Value Date    GLUCOSE 195 (H) 05/08/2017    CALCIUM 9.4 02/07/2025     12/27/2017    K 3.9 02/07/2025    CO2 22 02/07/2025     02/07/2025    BUN 24 02/07/2025    CREATININE 1.10 02/07/2025     Lab Results   Component Value Date    WBC 6.92 07/23/2024    HGB 14.5 07/23/2024    HCT 44.4 07/23/2024    MCV 94 07/23/2024     07/23/2024       Office Urine Dip  Recent Results (from the past hour)   POCT urine dip    Collection Time: 05/14/25  9:32 AM   Result Value Ref Range    LEUKOCYTE ESTERASE,UA -     NITRITE,UA -     SL AMB POCT UROBILINOGEN 0.2     POCT URINE PROTEIN -      PH,UA 5.0     BLOOD,UA -     SPECIFIC GRAVITY,UA 1.010     " KETONES,UA -     BILIRUBIN,UA -     GLUCOSE, UA 1,000      COLOR,UA yellow     CLARITY,UA clear

## 2025-05-13 NOTE — TELEPHONE ENCOUNTER
THIS IS NOT A DUPLICATE    Reason for call: Sent to wrong pharmacy,please resend to [facility]  [x] Refill   [] Prior Auth  [] Other:     Office:   [x] PCP/Provider - Niranjan Kline, DO [facility]  [] Specialty/Provider -     Medication: Metformin    Dose/Frequency: 500mg     One in the morning and One with dinner daily    Quantity: 180    Pharmacy: [facility]    Local Pharmacy   Does the patient have enough for 3 days?   [] Yes   [] No - Send as HP to POD    Mail Away Pharmacy   Does the patient have enough for 10 days?   [] Yes   [x] No - Send as HP to POD

## 2025-05-14 ENCOUNTER — OFFICE VISIT (OUTPATIENT)
Dept: UROLOGY | Facility: CLINIC | Age: 80
End: 2025-05-14
Payer: MEDICARE

## 2025-05-14 VITALS
WEIGHT: 180 LBS | HEIGHT: 71 IN | DIASTOLIC BLOOD PRESSURE: 64 MMHG | HEART RATE: 59 BPM | BODY MASS INDEX: 25.2 KG/M2 | OXYGEN SATURATION: 97 % | SYSTOLIC BLOOD PRESSURE: 112 MMHG

## 2025-05-14 DIAGNOSIS — R97.20 ELEVATED PSA: ICD-10-CM

## 2025-05-14 DIAGNOSIS — C64.1 RENAL CELL CARCINOMA OF RIGHT KIDNEY (HCC): Primary | ICD-10-CM

## 2025-05-14 DIAGNOSIS — N40.1 BENIGN PROSTATIC HYPERPLASIA WITH URINARY FREQUENCY: ICD-10-CM

## 2025-05-14 DIAGNOSIS — R35.0 BENIGN PROSTATIC HYPERPLASIA WITH URINARY FREQUENCY: ICD-10-CM

## 2025-05-14 LAB
SL AMB  POCT GLUCOSE, UA: 1000
SL AMB LEUKOCYTE ESTERASE,UA: ABNORMAL
SL AMB POCT BILIRUBIN,UA: ABNORMAL
SL AMB POCT BLOOD,UA: ABNORMAL
SL AMB POCT CLARITY,UA: CLEAR
SL AMB POCT COLOR,UA: YELLOW
SL AMB POCT KETONES,UA: ABNORMAL
SL AMB POCT NITRITE,UA: ABNORMAL
SL AMB POCT PH,UA: 5
SL AMB POCT SPECIFIC GRAVITY,UA: 1.01
SL AMB POCT URINE PROTEIN: ABNORMAL
SL AMB POCT UROBILINOGEN: 0.2

## 2025-05-14 PROCEDURE — 81002 URINALYSIS NONAUTO W/O SCOPE: CPT

## 2025-05-14 PROCEDURE — 99213 OFFICE O/P EST LOW 20 MIN: CPT

## 2025-05-14 RX ORDER — SILODOSIN 8 MG/1
8 CAPSULE ORAL
Qty: 90 CAPSULE | Refills: 3 | Status: SHIPPED | OUTPATIENT
Start: 2025-05-14

## 2025-05-14 NOTE — ASSESSMENT & PLAN NOTE
Hx of PSA elevation with return to baseline.   Needs updated PSA level   Planning to get through PCP in June     Lab Results   Component Value Date    PSA 3.635 08/02/2024    PSA 4.313 (H) 07/23/2024    PSA 3.1 01/20/2023     Orders:  •  POCT urine dip

## 2025-05-14 NOTE — ASSESSMENT & PLAN NOTE
Right RCC   S/p robot-assisted laparoscopic right partial nephrectomy 2017  Pathology with pT1a disease  Last renal US 5/2025-unremarkable  UA-negative for blood and infection   Can continue to follow with occasional imaging

## 2025-05-14 NOTE — ASSESSMENT & PLAN NOTE
Stopped Flomax for BPH- did not notice a difference   Same AUA score of 8 when on medication   Continues with frequency, urgency and weakened stream at times   UA- negative for blood and infection   PVR was 60  Plan to trial silodosin  Patient wishes to call if medication is not working and wishes to try something new  Otherwise follow-up in 1 year  Orders:  •  POCT urine dip  •  Silodosin 8 MG CAPS; Take 1 capsule by mouth daily with breakfast

## 2025-05-19 ENCOUNTER — TELEPHONE (OUTPATIENT)
Age: 80
End: 2025-05-19

## 2025-05-19 DIAGNOSIS — I49.3 SYMPTOMATIC PVCS: ICD-10-CM

## 2025-05-19 DIAGNOSIS — I25.10 CORONARY ARTERY DISEASE INVOLVING NATIVE CORONARY ARTERY OF NATIVE HEART WITHOUT ANGINA PECTORIS: ICD-10-CM

## 2025-05-19 RX ORDER — METOPROLOL SUCCINATE 25 MG/1
25 TABLET, EXTENDED RELEASE ORAL DAILY
Qty: 90 TABLET | Refills: 1 | Status: SHIPPED | OUTPATIENT
Start: 2025-05-19

## 2025-05-19 RX ORDER — METOPROLOL SUCCINATE 25 MG/1
25 TABLET, EXTENDED RELEASE ORAL DAILY
Qty: 14 TABLET | Refills: 0 | Status: SHIPPED | OUTPATIENT
Start: 2025-05-19

## 2025-05-19 NOTE — TELEPHONE ENCOUNTER
Pt has an exercise nuclear stress scheduled for tomorrow and asking if he needs to stop any medications prior to testing?    He is taking Toprol 25 mg

## 2025-05-19 NOTE — TELEPHONE ENCOUNTER
Patient states he has only 5 days left of metoprolol. He gets his medications through [facility], however, would also need a short term supply sent to [facility] so he doesn't run out of medication while waiting for mail order.

## 2025-05-19 NOTE — TELEPHONE ENCOUNTER
Spoke to Dr. Mayberry, patient can hold metoprolol 24 hours prior to testing. He verbalized understanding.     Patient stated he also needs a refill of metoprolol, only has about 5 days left. He uses [facility] for his pharmacy. I asked patient if he wanted a short term supply sent to [facility] so he doesn't run out of pills while waiting for the mail order. He states that is ok to do. Will make separate encounter for refill requests.

## 2025-05-20 ENCOUNTER — HOSPITAL ENCOUNTER (OUTPATIENT)
Dept: NON INVASIVE DIAGNOSTICS | Facility: CLINIC | Age: 80
Discharge: HOME | End: 2025-05-20
Attending: INTERNAL MEDICINE
Payer: MEDICARE

## 2025-05-20 VITALS
WEIGHT: 179 LBS | SYSTOLIC BLOOD PRESSURE: 130 MMHG | HEIGHT: 71 IN | DIASTOLIC BLOOD PRESSURE: 72 MMHG | BODY MASS INDEX: 25.06 KG/M2

## 2025-05-20 DIAGNOSIS — I25.10 CORONARY ARTERY DISEASE INVOLVING NATIVE CORONARY ARTERY OF NATIVE HEART WITHOUT ANGINA PECTORIS: ICD-10-CM

## 2025-05-20 LAB
CHEST PAIN STATEMENT: NORMAL
MAX DIASTOLIC BP: 60 MMHG
MAX HR PERCENT: 92 %
MAX HR: 131 BPM
MAX PREDICTED HEART RATE: 142 BPM
PROTOCOL NAME: NORMAL
RATE PRESSURE PRODUCT: NORMAL
REASON FOR TERMINATION: NORMAL
SL CV REST NUCLEAR ISOTOPE DOSE: 11 MCI
SL CV STRESS NUCLEAR ISOTOPE DOSE: 31.6 MCI
SL CV STRESS RECOVERY BP: NORMAL MMHG
SL CV STRESS RECOVERY HR: 64 BPM
SL CV STRESS RECOVERY O2 SAT: 99 %
SL CV STRESS STAGE REACHED: 3
SPECT HRT GATED+EF W RNC IV: 64 %
STRESS ANGINA INDEX: 0
STRESS BASELINE BP: NORMAL MMHG
STRESS BASELINE HR: 52 BPM
STRESS O2 SAT REST: 99 %
STRESS PEAK HR: 131 BPM
STRESS POST ESTIMATED WORKLOAD: 10.1 METS
STRESS POST EXERCISE DUR MIN: 9 MIN
STRESS POST EXERCISE DUR MIN: 9 MIN
STRESS POST EXERCISE DUR SEC: 0 SEC
STRESS POST EXERCISE DUR SEC: 0 SEC
STRESS POST O2 SAT PEAK: 97 %
STRESS POST PEAK BP: 168 MMHG
STRESS POST PEAK HR: 131 BPM
STRESS POST PEAK SYSTOLIC BP: 172 MMHG
STRESS/REST PERFUSION RATIO: 0.95
TARGET HR FORMULA: NORMAL
TEST INDICATION: NORMAL

## 2025-05-20 PROCEDURE — 93017 CV STRESS TEST TRACING ONLY: CPT

## 2025-05-20 PROCEDURE — 93016 CV STRESS TEST SUPVJ ONLY: CPT | Performed by: INTERNAL MEDICINE

## 2025-05-20 PROCEDURE — 93018 CV STRESS TEST I&R ONLY: CPT | Performed by: INTERNAL MEDICINE

## 2025-05-20 PROCEDURE — 78452 HT MUSCLE IMAGE SPECT MULT: CPT

## 2025-05-20 PROCEDURE — A9502 TC99M TETROFOSMIN: HCPCS

## 2025-05-20 PROCEDURE — 78452 HT MUSCLE IMAGE SPECT MULT: CPT | Performed by: INTERNAL MEDICINE

## 2025-06-20 DIAGNOSIS — I21.3 STEMI (ST ELEVATION MYOCARDIAL INFARCTION) (HCC): ICD-10-CM

## 2025-06-20 RX ORDER — ASPIRIN 81 MG/1
81 TABLET, COATED ORAL DAILY
Qty: 90 TABLET | Refills: 1 | Status: SHIPPED | OUTPATIENT
Start: 2025-06-20

## 2025-06-26 ENCOUNTER — TELEPHONE (OUTPATIENT)
Age: 80
End: 2025-06-26

## 2025-06-26 NOTE — TELEPHONE ENCOUNTER
06/26/25    Patient called office today for some clarification of what time was his appt for tomorrow 06/27/25.    Inform patient that he appt for tomorrow is at 1:00 PM with Mr. Pleitez at the [facility].    Patient UNDERSTOOD and Expressed his Thanks.       Any questions, please contact Patient.  Thank You.       06/27/25 Appt CONFIRMED.

## 2025-06-26 NOTE — PROGRESS NOTES
Cardiology Follow Up    [facility] [address] [address]  PHONE: [phone]  FAX: [phone]    Chuck Borja  1945  [id number]    Assessment/Plan:  CAD  Lateral STEMI s/p SURJIT-D1 w/ residual 10% ostial LCx and 20% mid RCA, April 2022  LVEF 65%, grade 1 diastolic dysfunction, AV sclerosis, mild AR, MV sclerosis, mild MR, trace TR, June 2024  Exercise NM stress test has artifact from the diaphragm. No infarct or ischemia, May 2025  Symptomatic PVCs  Holter w/ SR avg 55 bpm, occasional PVCs (burden 3.7%), no VT, rare PACs, rare atrial couplets, June 2024  Event recorder w/ SR avg HR 60 bpm, rare PACs, rare atrial couplets/triplets, rare PVCs, rare ventricular couplets, nonsustained PSVT (x1) 9 beat 128 bpm, triggered events correlated with PVCs, August 2022  Transient AV dissociation with junctional rhythm during STEMI likely vagal - resolved  Type 2 diabetes mellitus  Dyslipidemia w/ LDL 23 mg/dL, HDL 31 mg/dL, TG 76 mg/dL, July 2024  History of renal cell carcinoma status post right nephrectomy, 2007    Exercise nuclear stress test was ordered due to decreased exercise tolerance. Thankfully the result was normal. Que walks at least 7 k steps daily and lifts weights 3x/week. No exertional chest pain or CH. No cardiac complaints.    Continue all medications. No changes made today.    Dr. Kline has annual wellness labs ordered which includes Lipid panel. I will await result.    RTO in 1 year w/ Dr. David Gaitan     Interval History:   Chuck Borja is a 80 y.o. male who presents to the office for routine CV f/u. Patient just got back from fishing trip with his family in [place] which was great fun and included fishing on a lake and in a boat. 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 He denies chest pain, chest pressure, chest discomfort or burning, shortness of breath, dyspnea on  "exertion, palpitations, skipped heartbeats, lightheadedness, dizziness, presyncope or syncope.  Patient denies abdominal distention or peripheral edema.    Patient has been taking his medications without access issues, missed doses or side effects.    Physical Exam:  Vitals:    07/02/25 1026   BP: 120/58   Pulse: 60     Vitals:    07/02/25 1026   Weight: 80.2 kg (176 lb 12.8 oz)     Height: 5' 11\" (180.3 cm) Body mass index is 24.66 kg/m².    Physical Exam  Vitals and nursing note reviewed.   Constitutional:       General: He is not in acute distress.     Appearance: He is not ill-appearing.      Comments: He looks great especially for his age   HENT:      Head: Normocephalic and atraumatic.      Nose: No congestion.      Mouth/Throat:      Mouth: Mucous membranes are moist.     Eyes:      Conjunctiva/sclera: Conjunctivae normal.     Neck:      Comments: - JVD  Cardiovascular:      Rate and Rhythm: Normal rate and regular rhythm.      Heart sounds: S1 normal and S2 normal. Murmur heard.      Crescendo decrescendo systolic murmur is present with a grade of 2/6.   Pulmonary:      Effort: Pulmonary effort is normal.   Abdominal:      General: Abdomen is flat.     Musculoskeletal:         General: No swelling.     Skin:     General: Skin is warm and dry.     Neurological:      General: No focal deficit present.      Mental Status: He is alert.     Psychiatric:         Behavior: Behavior normal.     Jessie Kirby PA-C  [facility]  "

## 2025-06-27 ENCOUNTER — OFFICE VISIT (OUTPATIENT)
Dept: NEUROLOGY | Facility: CLINIC | Age: 80
End: 2025-06-27
Payer: MEDICARE

## 2025-06-27 VITALS
HEART RATE: 65 BPM | WEIGHT: 181.4 LBS | SYSTOLIC BLOOD PRESSURE: 118 MMHG | DIASTOLIC BLOOD PRESSURE: 78 MMHG | TEMPERATURE: 97.5 F | OXYGEN SATURATION: 98 % | BODY MASS INDEX: 25.3 KG/M2

## 2025-06-27 DIAGNOSIS — M54.16 RADICULOPATHY, LUMBAR REGION: Primary | ICD-10-CM

## 2025-06-27 PROCEDURE — 99213 OFFICE O/P EST LOW 20 MIN: CPT | Performed by: NURSE PRACTITIONER

## 2025-06-27 NOTE — PROGRESS NOTES
"Name: Chuck Borja      : 1945      MRN: [id number]  Encounter Provider: VIRGIE Tran  Encounter Date: 2025   Encounter department: [facility]  :  Assessment & Plan  Radiculopathy, lumbar region         Discussed back exercises; discussed if worsening pain would consider MRI L spine and spine/pain referral. He is not wanting any other pain control at this time and will monitor symptoms closely in the future to see if frequency increases.  He is not interested in PT at this time but may consider repeat chiropractic care  He will let the office know if any new or worsening symptoms and follow up if needed    History of Present Illness   Que Borja presents for 3 month follow up. He denies any change to symptoms; he states he still gets sharp/electrical right lower lateral thigh pains that last for seconds and he still has numbness to that area. He denies low back pain lately. He states he does notice the symptoms more some days more than other (has not had in the last couple days but recent trip/long driving made it worse for a period of time). He denies difficulty sleeping because of pain. He denies any new neurological symptoms.     Previous History:  past medical history of DM, hyperlipidemia, right kidney carcinoma s/p partial nephrectomy, cad, insomnia, bph who presents for new patient evaluation of right thigh numbness and intermittent pains. He states he did have a fall late November (nothing major, fell in garage- was having back pain after and was treated by chiropractic care). He states since 2024 he has had right lateral thigh, extending down just below knee intermittent pains/decreased sensation to touch. He had EMG and xray. He states he will get intermittent sharp \"electrical\" pains a few times per day (he had one in the office today-lasted a short time and he talked through it); he states about 2x/week he will wake up b/c of pain to " the right thigh but he is able to fall back asleep quickly. He does not take anything for these pains and is not interested in pain medications. He does contract work about 4 hours/week for [employer] but otherwise is retired. He denies weakness, bowel/bladder change.     had fall 11/25; no major injury but had back pain and then developed 12/11/2024 this right lateral lower thigh extending down below the lateral knee intermittent 20-30 second electrical pains which have continued since then; emg/xray l spine completed; differential includes meralgia paresthetica vs lumbar radiculopathy; based off emg findings, xray and clinical evaluation this is more likely related to lumbar radiculopathy.     EMG 1/23/2025:  Impression: EMG and nerve conduction studies are consistent with a chronic right L5 radiculopathy     Xray 12/19/2024:  There is moderate facet disease in the lower lumbar spine worse at L5-S1. There is mild multilevel osteophytosis. There is no disc space narrowing   IMPRESSION:  Moderate facet disease lower lumbar spine. Minimal spondylotic changes    HPI   Review of Systems   Constitutional:  Negative for appetite change, fatigue and fever.   HENT: Negative.  Negative for hearing loss, tinnitus, trouble swallowing and voice change.    Eyes: Negative.  Negative for photophobia, pain and visual disturbance.   Respiratory: Negative.  Negative for shortness of breath.    Cardiovascular: Negative.  Negative for palpitations.   Gastrointestinal: Negative.  Negative for nausea and vomiting.   Endocrine: Negative.  Negative for cold intolerance.   Genitourinary: Negative.  Negative for dysuria, frequency and urgency.   Musculoskeletal:  Negative for back pain, gait problem, myalgias, neck pain and neck stiffness.   Skin: Negative.  Negative for rash.   Allergic/Immunologic: Negative.    Neurological:  Negative for dizziness, tremors, seizures, syncope, facial asymmetry, speech difficulty, weakness,  light-headedness, numbness and headaches.        Symptoms stable   Hematological: Negative.  Does not bruise/bleed easily.   Psychiatric/Behavioral: Negative.  Negative for confusion, hallucinations and sleep disturbance.     I have personally reviewed the MA's review of systems and made changes as necessary.    Medications Ordered Prior to Encounter[1]   Social History[2]     Objective   /78 (BP Location: Left arm, Patient Position: Sitting, Cuff Size: Standard)   Pulse 65   Temp 97.5 °F (36.4 °C) (Temporal)   Wt 82.3 kg (181 lb 6.4 oz)   SpO2 98%   BMI 25.30 kg/m²     Physical Exam  Vitals reviewed.   HENT:      Head: Normocephalic.      Right Ear: External ear normal.      Left Ear: External ear normal.      Nose: Nose normal.      Mouth/Throat:      Pharynx: Oropharynx is clear.     Eyes:      Extraocular Movements: Extraocular movements intact.      Pupils: Pupils are equal, round, and reactive to light.       Cardiovascular:      Rate and Rhythm: Normal rate.   Pulmonary:      Effort: Pulmonary effort is normal.   Abdominal:      General: There is no distension.      Tenderness: There is no abdominal tenderness.     Musculoskeletal:      Cervical back: Normal range of motion.      Right lower leg: No edema.      Left lower leg: No edema.     Skin:     Findings: No rash.     Neurological:      Mental Status: He is alert.      Cranial Nerves: No cranial nerve deficit.      Sensory: Sensory deficit (same area as previous L5 distribution) present.      Motor: No weakness.      Gait: Gait normal.     Psychiatric:         Mood and Affect: Mood normal.       Neurological Exam  Mental Status  Alert.    Cranial Nerves  CN III, IV, VI: Extraocular movements intact bilaterally. Pupils equal round and reactive to light bilaterally.    Gait   Normal gait.             [1]   Current Outpatient Medications on File Prior to Visit   Medication Sig Dispense Refill    aspirin (Aspirin Low Dose) 81 mg EC tablet TAKE 1  TABLET EVERY DAY 90 tablet 1    atorvastatin (LIPITOR) 80 mg tablet TAKE 1 TABLET EVERY DAY WITH DINNER 90 tablet 1    Blood Glucose Monitoring Suppl (ONE TOUCH ULTRA 2) w/Device KIT Use 2 (two) times a day 1 kit 0    Cholecalciferol (VITAMIN D-3 PO) Take 2,000 Units by mouth in the morning.      Empagliflozin (JARDIANCE) 10 MG TABS tablet Take 1 tablet (10 mg total) by mouth daily 90 tablet 3    EPINEPHrine (EPIPEN) 0.3 mg/0.3 mL SOAJ Inject as directed      ezetimibe (ZETIA) 10 mg tablet Take 1 tablet (10 mg total) by mouth daily at bedtime 90 tablet 1    fluticasone (FLONASE) 50 mcg/act nasal spray 1 spray into each nostril daily 16 g 3    glucose blood (OneTouch Ultra) test strip Use 1 each in the morning 100 strip 1    glucose blood test strip Use 1 each daily as needed (once a day) One touch ultra  Test strips 100 strip 3    metFORMIN (GLUCOPHAGE) 500 mg tablet 1 tab AM, 1 tab with dinner 180 tablet 1    metoprolol succinate (TOPROL-XL) 25 mg 24 hr tablet Take 1 tablet (25 mg total) by mouth daily 90 tablet 1    metoprolol succinate (TOPROL-XL) 25 mg 24 hr tablet Take 1 tablet (25 mg total) by mouth daily 14 tablet 0    OneTouch Delica Lancets 33G MISC Use in the morning 100 each 3    Silodosin 8 MG CAPS Take 1 capsule by mouth daily with breakfast 90 capsule 3    Simvastatin POWD       traMADol (Ultram) 50 mg tablet 1 tab BID PRN mod-severe pain 14 tablet 0    traZODone (DESYREL) 100 mg tablet TAKE 1 TABLET AT BEDTIME 90 tablet 1     No current facility-administered medications on file prior to visit.   [2]   Social History  Tobacco Use    Smoking status: Former     Current packs/day: 0.00     Average packs/day: 0.3 packs/day for 12.0 years (3.0 ttl pk-yrs)     Types: Cigarettes     Start date: 1959     Quit date: 1971     Years since quittin.5     Passive exposure: Never    Smokeless tobacco: Never    Tobacco comments:     Smoked from ages 13 to 22.   Vaping Use    Vaping status: Never Used    Substance and Sexual Activity    Alcohol use: Not Currently    Drug use: No    Sexual activity: Not Currently     Partners: Female     Birth control/protection: None

## 2025-06-27 NOTE — PATIENT INSTRUCTIONS
"Patient Education     Exercises for sciatic pain   The Basics   Written by the doctors and editors at [facility], [place]   What is sciatica? -- The sciatic nerve is a large nerve that starts in the lower back. It runs all the way down the back of the leg.  Something like a disc or bone spur can pinch or damage the sciatic nerve. Tight, inflamed muscles can also pinch or damage it. This can cause pain, weakness, numbness, or tingling that goes from the buttock down the leg toward the heel. When these symptoms happen, people often call it \"sciatica.\" The medical name for this is \"radiculopathy.\"  You can have sciatic pain on 1 side or both. Most of the time, it gets better without surgery.  Why do I need to do exercises if I have sciatica? -- Stretching and strengthening exercises can help ease back pain. It might also help prevent future back pain. Long term, it is important to strengthen the muscles in your lower back, buttocks, and belly. These are your \"core muscles.\" Stretching exercises are also important to keep your muscles flexible.  Below are some stretching and strengthening exercises that might help you. Other forms of movement can help ease or prevent back pain, too. For example, some people like to walk or do aerobic exercise, yoga, or dane chi. The most important thing is to move your body. Your doctor, nurse, or physical therapist can help you find different types of activity that work for you.  What stretching exercises should I do? -- Warm up your muscles before stretching. This helps prevent injury. To warm up, you can walk, jog, or cycle. Below are some examples of stretching exercises.  Start by repeating each of these stretches 2 to 3 times. For your body to make changes, try to hold each stretch for 5 to 10 seconds. Try to do the stretches 2 to 3 times each day. Breathe slowly and deeply as you do the exercises. Never bounce when doing stretches.   Single knee-to-chest stretches (figure 1) ? While lying on " your back, bend your knees with your feet flat on the floor. Pull 1 knee toward your chest until you feel a stretch in your lower back and buttock area. Lower, and repeat with the other knee. If you have knee problems, pull your knee up by grabbing the back of your thigh instead of the front of your knee. You can also do this exercise by grabbing both knees at the same time.   Deep hip stretches lying down (figure 2) ? Lie on your back, and bend 1 knee, keeping that foot flat on the floor. Cross the other leg over your knee. Grab the thigh of the leg that has the foot on the floor. Slowly, pull the bottom leg toward your chest until you feel a stretch in the other buttock. Repeat using the opposite leg as the bottom leg.   Deep hip stretches sitting (figure 3) ? Sit on the floor with both legs straight. Take 1 leg and cross it over the other leg so that the ankle or foot of your top leg is next to your other knee. Now, take the elbow on the opposite side of your bent knee and bring it to the outside of the bent knee. With your elbow, slowly push the bent knee further across your body to get a good stretch in the hip.   Sit backs (figure 4) - Start on your hands and knees. Your hands should be directly under your shoulders. Your knees should be spread slightly and directly under your hips. Slowly stretch your back as you bring your hips toward your ankles. Your arms are extended forward in a relaxed position, as your upper body sinks toward the floor.  What strengthening exercises should I do? -- Below are some examples of strengthening exercises.  Start by doing each exercise 2 to 3 times. Work up to doing each exercise 10 times. Hold each exercise for 3 to 5 seconds. Try to do the exercises 2 to 3 times each day. Do all exercises slowly.   Pelvic tilts (figure 5) ? Lie on your back with your knees bent and feet flat on the floor. Breathe slowly and deeply. Press your lower back down to the floor. Tighten your  stomach muscles as you breathe deeply and slowly, then relax.   Hip lifts (figure 6) ? Lie on your back with your knees bent and feet flat on the floor. Breathe deeply and slowly. Tighten your stomach muscles, keep your back flat, and lift your buttocks off the floor. Relax. You should feel this in your buttocks, not in your lower back.  What else should I know?    Exercise, including stretching, might be slightly uncomfortable, but you should not have sharp or severe pain. If you do get severe pain, stop what you are doing. If severe pain continues, call your doctor or nurse.   Do not hold your breath when exercising. If you tend to hold your breath, try counting out loud when exercising.   Always warm up your muscles before exercising. Stretching before warming up can lead to injury.   Doing exercises before a meal can help you get into a routine.  All topics are updated as new evidence becomes available and our peer review process is complete.  This topic retrieved from [employer] on: May 15, 2024.  Topic 271996 Version 1.0  Release: 32.4.3 - C32.134  © 2024 UpToDate, Inc. and/or its affiliates. All rights reserved.  figure 1: Single knee-to-chest stretch     Lie on your back, bend your knees, and have your feet flat on the floor. Pull 1 knee toward your chest until you feel a stretch in your lower back and buttock area. Repeat with the other knee. If you have knee problems, pull your knee up by grabbing the back of your thigh instead of the front of your knee. You can also do this exercise by grabbing both knees at the same time.  Graphic [id number] Version 1.0  figure 2: Deep hip stretch lying down     Lieon your back, and bend 1 knee, keeping that foot flat on the floor. Cross theother leg over your knee. Grab the thigh of the leg that has the foot on thefloor. Slowly, pull the bottom leg toward your chest until you feel a stretchin the other buttock. Repeat using the opposite leg as the bottom leg.  Graphic [id number]  Version 1.0  figure 3: Deep hip stretch sitting     Siton the floor with both legs straight. Take 1 leg and cross it over the otherleg so that the foot of your top leg is next to your outer knee. Now, take theelbow on the opposite side of your bent knee and bring it to the outside of thebent knee. With your elbow, slowly push the bent knee further across your bodyto get a good stretch in the hip.  Graphic [id number] Version 1.0  figure 4: Sit backs     Starton your hands and knees. Your hands should be directly under your shoulders.Your knees should be spread slightly and directly under your hips. Slowly stretchyour back as you bring your hips toward your ankles. Your arms should be extendedforward in a relaxed position, as your upper body sinks toward the floor.  Graphic [id number] Version 1.0  figure 5: Pelvic tilts     Lie on your back with your knees bent and feet flat on the floor. Tighten your stomach muscles and press your lower back down to the floor. Relax.  Graphic [id number] Version 1.0  figure 6: Hip lifts     Lie on your back with your knees bent and feet flat on the floor. Tighten your stomach muscles and lift your buttocks off of the floor. Relax.  Graphic [id number] Version 1.0  Consumer Information Use and Disclaimer   Disclaimer: This generalized information is a limited summary of diagnosis, treatment, and/or medication information. It is not meant to be comprehensive and should be used as a tool to help the user understand and/or assess potential diagnostic and treatment options. It does NOT include all information about conditions, treatments, medications, side effects, or risks that may apply to a specific patient. It is not intended to be medical advice or a substitute for the medical advice, diagnosis, or treatment of a health care provider based on the health care provider's examination and assessment of a patient's specific and unique circumstances. Patients must speak with a health care provider for  complete information about their health, medical questions, and treatment options, including any risks or benefits regarding use of medications. This information does not endorse any treatments or medications as safe, effective, or approved for treating a specific patient. UpToDate, Inc. and its affiliates disclaim any warranty or liability relating to this information or the use thereof.The use of this information is governed by the Terms of Use, available at https://www.IM5uwer.com/en/know/clinical-effectiveness-terms. 2024© UpToDate, Inc. and its affiliates and/or licensors. All rights reserved.  Copyright   © 2024 UpToDate, Inc. and/or its affiliates. All rights reserved.      Patient Education     Herniated Disc Exercises   About this topic   A herniated disc causes pain, numbness, weakness, or tingling in the back or legs. Your back has discs in it that sit between the bones of your spine. These add cushion and allow movement. A herniated disc happens when the center of the disc pushes against the outer shell of the disc. The outer shell may break and the jelly material inside the disc leaks out. This jelly can bother nearby nerves. Most often, the disc pushes towards the back or sides of the spinal bones.  Some people have more problems when they bend forward. Other people have more problems when they bend backward. The exercises may be different based on your problem. This set of exercises is designed for someone whose problems get worse when bending forward.  General   Before starting with a program, ask your doctor if you are healthy enough to do these exercises. Your doctor may have you work with a chiropractor, , or physical therapist to make a safe exercise program to meet your needs.  Stretching Exercises   Stretching exercises keep your muscles flexible. They also stop them from getting tight. Start by doing each of these stretches 2 to 3 times. In order for your body to make changes, you  will need to hold these stretches for 20 to 30 seconds. Try to do the stretches 2 to 3 times each day. Do all exercises slowly.  Elbow props on stomach ? Lie on your stomach, resting on your lower arms. Rise up on your elbows as high as you are able. Keep your hips on the floor. Then, lower your back and shoulders down.  Strengthening Exercises   Strengthening exercises keep your muscles firm and strong. Start by repeating each exercise 2 to 3 times. Work up to doing each exercise 10 times. Try to do the exercises 2 to 3 times each day. Do all exercises slowly.  Back bends standing ? Stand with feet slightly apart. Put your hands on your hips. Lean back and look towards the ceiling until you feel a stretch. For a disc problem, you can do this exercise without holding it for 10 times in a row.  Prone press-ups ? Lie on your stomach with your arms bent and your hands near your shoulders. Raise your upper body by straightening your arms. Keep your hips on the floor. Hold 3 to 5 seconds, then lower back to the ground.  Leg lifts on stomach ? Lie on your stomach. Keeping the knee straight, lift one leg towards the ceiling. Hold 3 to 5 seconds, then lower back to the ground. Repeat with the other leg.  Alternate opposite arm and leg lifts on stomach ? Lie on your stomach. Extend your arms over your head so your elbows are by your ears. Keep your head aligned with your back and lift one arm and the opposite leg at the same time. Hold 3 to 5 seconds, then return to the start position. Repeat with the other arm and leg.             What will the results be?   Better strength and flexibility  Less back pain  Less leg pain  Better posture  Easier to walk and do other activities  Less numbness and tingling  Helpful tips   Stay active and work out to keep your muscles strong and flexible.  Keep a healthy weight to avoid putting too much stress on your spine. Eat a healthy diet to keep your muscles healthy.  Be sure you do not  hold your breath when exercising. This can raise your blood pressure. If you tend to hold your breath, try counting out loud when exercising. If any exercise bothers you, stop right away.  Always warm up before stretching. Heated muscles stretch much easier than cool muscles. Stretching cool muscles can lead to injury.  Try walking or cycling at an easy pace for a few minutes to warm up your muscles. Do this again after exercising.  Activities like running or basketball may make this problem worse. Swimming and cycling may be better exercise choices if you have this problem.  Never bounce when doing stretches.  Doing exercises before a meal may be a good way to get into a routine.  Apply ice after you exercise.  Exercise may be slightly uncomfortable, but you should not have sharp pains. If you do get sharp pains, stop what you are doing. If the sharp pains continue, call your doctor.  Last Reviewed Date   2021-08-30  Consumer Information Use and Disclaimer   This generalized information is a limited summary of diagnosis, treatment, and/or medication information. It is not meant to be comprehensive and should be used as a tool to help the user understand and/or assess potential diagnostic and treatment options. It does NOT include all information about conditions, treatments, medications, side effects, or risks that may apply to a specific patient. It is not intended to be medical advice or a substitute for the medical advice, diagnosis, or treatment of a health care provider based on the health care provider's examination and assessment of a patient’s specific and unique circumstances. Patients must speak with a health care provider for complete information about their health, medical questions, and treatment options, including any risks or benefits regarding use of medications. This information does not endorse any treatments or medications as safe, effective, or approved for treating a specific patient. UpToDate,  Inc. and its affiliates disclaim any warranty or liability relating to this information or the use thereof. The use of this information is governed by the Terms of Use, available at https://www.woltersSolar Tower Technologiesuwer.com/en/know/clinical-effectiveness-terms   Copyright   Copyright © 2024 UpToDate, Inc. and its affiliates and/or licensors. All rights reserved.

## 2025-07-02 ENCOUNTER — OFFICE VISIT (OUTPATIENT)
Dept: CARDIOLOGY CLINIC | Facility: CLINIC | Age: 80
End: 2025-07-02
Payer: MEDICARE

## 2025-07-02 VITALS
BODY MASS INDEX: 24.75 KG/M2 | DIASTOLIC BLOOD PRESSURE: 58 MMHG | WEIGHT: 176.8 LBS | HEIGHT: 71 IN | HEART RATE: 60 BPM | SYSTOLIC BLOOD PRESSURE: 120 MMHG

## 2025-07-02 DIAGNOSIS — I21.3 STEMI (ST ELEVATION MYOCARDIAL INFARCTION) (HCC): Primary | ICD-10-CM

## 2025-07-02 PROCEDURE — 99214 OFFICE O/P EST MOD 30 MIN: CPT | Performed by: PHYSICIAN ASSISTANT

## 2025-08-02 ENCOUNTER — APPOINTMENT (EMERGENCY)
Dept: RADIOLOGY | Facility: HOSPITAL | Age: 80
End: 2025-08-02
Payer: MEDICARE

## 2025-08-02 ENCOUNTER — HOSPITAL ENCOUNTER (EMERGENCY)
Facility: HOSPITAL | Age: 80
Discharge: HOME | End: 2025-08-02
Attending: EMERGENCY MEDICINE
Payer: MEDICARE

## 2025-08-05 ENCOUNTER — TELEPHONE (OUTPATIENT)
Age: 80
End: 2025-08-05

## 2025-08-19 ENCOUNTER — APPOINTMENT (OUTPATIENT)
Dept: LAB | Facility: CLINIC | Age: 80
End: 2025-08-19
Payer: MEDICARE

## 2025-08-19 DIAGNOSIS — E78.2 MIXED HYPERLIPIDEMIA: ICD-10-CM

## 2025-08-19 DIAGNOSIS — Z13.29 SCREENING FOR THYROID DISORDER: ICD-10-CM

## 2025-08-19 DIAGNOSIS — Z13.0 SCREENING FOR DEFICIENCY ANEMIA: ICD-10-CM

## 2025-08-19 DIAGNOSIS — E11.69 TYPE 2 DIABETES MELLITUS WITH OTHER SPECIFIED COMPLICATION, WITHOUT LONG-TERM CURRENT USE OF INSULIN (HCC): ICD-10-CM

## 2025-08-19 LAB
ALBUMIN SERPL BCG-MCNC: 4 G/DL (ref 3.5–5)
ALP SERPL-CCNC: 39 U/L (ref 34–104)
ALT SERPL W P-5'-P-CCNC: 25 U/L (ref 7–52)
ANION GAP SERPL CALCULATED.3IONS-SCNC: 10 MMOL/L (ref 4–13)
AST SERPL W P-5'-P-CCNC: 22 U/L (ref 13–39)
BASOPHILS # BLD AUTO: 0.04 THOUSANDS/ÂΜL (ref 0–0.1)
BASOPHILS NFR BLD AUTO: 1 % (ref 0–1)
BILIRUB SERPL-MCNC: 0.9 MG/DL (ref 0.2–1)
BUN SERPL-MCNC: 25 MG/DL (ref 5–25)
CALCIUM SERPL-MCNC: 8.9 MG/DL (ref 8.4–10.2)
CHLORIDE SERPL-SCNC: 104 MMOL/L (ref 96–108)
CHOLEST SERPL-MCNC: 68 MG/DL (ref ?–200)
CO2 SERPL-SCNC: 27 MMOL/L (ref 21–32)
CREAT SERPL-MCNC: 0.82 MG/DL (ref 0.6–1.3)
EOSINOPHIL # BLD AUTO: 0.46 THOUSAND/ÂΜL (ref 0–0.61)
EOSINOPHIL NFR BLD AUTO: 8 % (ref 0–6)
ERYTHROCYTE [DISTWIDTH] IN BLOOD BY AUTOMATED COUNT: 14.3 % (ref 11.6–15.1)
EST. AVERAGE GLUCOSE BLD GHB EST-MCNC: 160 MG/DL
GFR SERPL CREATININE-BSD FRML MDRD: 84 ML/MIN/1.73SQ M
GLUCOSE P FAST SERPL-MCNC: 136 MG/DL (ref 65–99)
HBA1C MFR BLD: 7.2 %
HCT VFR BLD AUTO: 42.5 % (ref 36.5–49.3)
HDLC SERPL-MCNC: 33 MG/DL
HGB BLD-MCNC: 14.3 G/DL (ref 12–17)
IMM GRANULOCYTES # BLD AUTO: 0.01 THOUSAND/UL (ref 0–0.2)
IMM GRANULOCYTES NFR BLD AUTO: 0 % (ref 0–2)
LDLC SERPL CALC-MCNC: 24 MG/DL (ref 0–100)
LYMPHOCYTES # BLD AUTO: 1.66 THOUSANDS/ÂΜL (ref 0.6–4.47)
LYMPHOCYTES NFR BLD AUTO: 28 % (ref 14–44)
MCH RBC QN AUTO: 31.6 PG (ref 26.8–34.3)
MCHC RBC AUTO-ENTMCNC: 33.6 G/DL (ref 31.4–37.4)
MCV RBC AUTO: 94 FL (ref 82–98)
MONOCYTES # BLD AUTO: 0.6 THOUSAND/ÂΜL (ref 0.17–1.22)
MONOCYTES NFR BLD AUTO: 10 % (ref 4–12)
NEUTROPHILS # BLD AUTO: 3.27 THOUSANDS/ÂΜL (ref 1.85–7.62)
NEUTS SEG NFR BLD AUTO: 53 % (ref 43–75)
NRBC BLD AUTO-RTO: 0 /100 WBCS
PLATELET # BLD AUTO: 151 THOUSANDS/UL (ref 149–390)
PMV BLD AUTO: 12.4 FL (ref 8.9–12.7)
POTASSIUM SERPL-SCNC: 4 MMOL/L (ref 3.5–5.3)
PROT SERPL-MCNC: 6.8 G/DL (ref 6.4–8.4)
RBC # BLD AUTO: 4.53 MILLION/UL (ref 3.88–5.62)
SODIUM SERPL-SCNC: 141 MMOL/L (ref 135–147)
TRIGL SERPL-MCNC: 53 MG/DL (ref ?–150)
TSH SERPL DL<=0.05 MIU/L-ACNC: 1.95 UIU/ML (ref 0.45–4.5)
WBC # BLD AUTO: 6.04 THOUSAND/UL (ref 4.31–10.16)

## 2025-08-19 PROCEDURE — 80061 LIPID PANEL: CPT

## 2025-08-19 PROCEDURE — 36415 COLL VENOUS BLD VENIPUNCTURE: CPT

## 2025-08-19 PROCEDURE — 83036 HEMOGLOBIN GLYCOSYLATED A1C: CPT

## 2025-08-19 PROCEDURE — 84443 ASSAY THYROID STIM HORMONE: CPT

## 2025-08-19 PROCEDURE — 80053 COMPREHEN METABOLIC PANEL: CPT

## 2025-08-19 PROCEDURE — 85025 COMPLETE CBC W/AUTO DIFF WBC: CPT

## 2025-08-21 ENCOUNTER — TELEPHONE (OUTPATIENT)
Dept: FAMILY MEDICINE CLINIC | Facility: CLINIC | Age: 80
End: 2025-08-21

## 2025-08-25 PROBLEM — M79.672 LEFT FOOT PAIN: Status: ACTIVE | Noted: 2025-08-25

## (undated) DEVICE — ROBOT ACCESSORY KIT 4 ARM

## (undated) DEVICE — LARGE NEEDLE DRIVER: Brand: ENDOWRIST;DAVINCI SI

## (undated) DEVICE — TIP COVER ACCESSORY

## (undated) DEVICE — TWIST DRILL 3.2MM DIA 127.0MM LONG

## (undated) DEVICE — CUFF TOURNIQUET 30 X 4 IN QUICK CONNECT DISP 1BLA

## (undated) DEVICE — JACKSON-PRATT 100CC BULB RESERVOIR: Brand: CARDINAL HEALTH

## (undated) DEVICE — PROXIMATE SKIN STAPLERS (35 WIDE) CONTAINS 35 STAINLESS STEEL STAPLES (FIXED HEAD): Brand: PROXIMATE

## (undated) DEVICE — JP PERF DRN SIL FLT 10MM FULL: Brand: CARDINAL HEALTH

## (undated) DEVICE — TUBING EXTENSION 6 FT CONTIN WAVE

## (undated) DEVICE — IRRIG ENDO FLO TUBING

## (undated) DEVICE — DRAPE EQUIPMENT RF WAND

## (undated) DEVICE — JP 3-SPRING RES W/10FR PVC DRAIN/TR: Brand: CARDINAL HEALTH

## (undated) DEVICE — CYSTO TUBING TUR Y IRRIGATION

## (undated) DEVICE — CHLORAPREP HI-LITE 26ML ORANGE

## (undated) DEVICE — THE SIMPULSE SOLO SYSTEM WITH ULTREX RETRACTABLE SPLASH SHIELD TIP: Brand: SIMPULSE SOLO

## (undated) DEVICE — SCD SEQUENTIAL COMPRESSION COMFORT SLEEVE MEDIUM KNEE LENGTH: Brand: KENDALL SCD

## (undated) DEVICE — DGW .035 FC J3MM 260CM TEF: Brand: EMERALD

## (undated) DEVICE — COTTON TIP APPLICTOR 2 PK

## (undated) DEVICE — 3M™ IOBAN™ 2 ANTIMICROBIAL INCISE DRAPE 6648EZ: Brand: IOBAN™ 2

## (undated) DEVICE — PUDDLE VAC

## (undated) DEVICE — 3M™ STERI-DRAPE™ U-DRAPE 1015: Brand: STERI-DRAPE™

## (undated) DEVICE — INTENDED FOR TISSUE SEPARATION, AND OTHER PROCEDURES THAT REQUIRE A SHARP SURGICAL BLADE TO PUNCTURE OR CUT.: Brand: BARD-PARKER SAFETY BLADES SIZE 15, STERILE

## (undated) DEVICE — INSUFLATION TUBING INSUFLOW (LEXION)

## (undated) DEVICE — GAUZE SPONGES,USP TYPE VII GAUZE, 12 PLY: Brand: CURITY

## (undated) DEVICE — TRAY FOLEY 16FR URIMETER SURESTEP

## (undated) DEVICE — RADIFOCUS OPTITORQUE ANGIOGRAPHIC CATHETER: Brand: OPTITORQUE

## (undated) DEVICE — SUT MONOCRYL 2-0 CT-1 36 IN Y945H

## (undated) DEVICE — HEAVY DUTY TABLE COVER: Brand: CONVERTORS

## (undated) DEVICE — CEMENT MIXING CARTRIDGE PRISM II

## (undated) DEVICE — MEDI-VAC YANKAUER SUCTION HANDLE W/BULBOUS AND CONTROL VENT: Brand: CARDINAL HEALTH

## (undated) DEVICE — GUIDEWIRE WHOLEY HI TORQUE INTERM MOD J .035 145CM

## (undated) DEVICE — SUT MONOCRYL 4-0 PS-2 27 IN Y426H

## (undated) DEVICE — CHEST ROLL FOAM POSITIONER: Brand: CARDINAL HEALTH

## (undated) DEVICE — SUT VICRYL 1 CTX 36 IN J977H

## (undated) DEVICE — SUT STRATAFIX SPIRAL 2-0 CT-1 30 CM SXPD1B401

## (undated) DEVICE — PK DISSECTING FORCEPS: Brand: ENDOWRIST;DAVINCI SI

## (undated) DEVICE — DRAPE FLUID WARMER (BIRD BATH)

## (undated) DEVICE — GLOVE INDICATOR PI UNDERGLOVE SZ 8 BLUE

## (undated) DEVICE — SURGICEL 4 X 8

## (undated) DEVICE — SUT VICRYL 3-0 CT-2 27 IN J232H

## (undated) DEVICE — IMPERVIOUS STOCKINETTE: Brand: DEROYAL

## (undated) DEVICE — POSITIONER TRIMANO LIMB BEACH CHAIR

## (undated) DEVICE — CATH GUIDE LAUNCHER 6FR EBU 3.5

## (undated) DEVICE — COOL TEMP PAD

## (undated) DEVICE — ENDOPATH 5MM ENDOSCOPIC BLUNT TIP DISSECTORS (12 POUCHES CONTAINING 3 DISSECTORS EACH): Brand: ENDOPATH

## (undated) DEVICE — LUBRICANT INST ELECTROLUBE ANTISTK WO PAD

## (undated) DEVICE — CAPIT KNEE ATTUNE FB CEMENT - DEPUY

## (undated) DEVICE — COBAN 6 IN STERILE

## (undated) DEVICE — SUT VICRYL 0 REEL 54 IN J287G

## (undated) DEVICE — SKIN MARKER DUAL TIP WITH RULER CAP, FLEXIBLE RULER AND LABELS: Brand: DEVON

## (undated) DEVICE — STRL ALLENTOWN HIP SHOULDER PK: Brand: CARDINAL HEALTH

## (undated) DEVICE — REM POLYHESIVE ADULT PATIENT RETURN ELECTRODE: Brand: VALLEYLAB

## (undated) DEVICE — EXPRESSEW III SUTURE NEEDLE FOR USE WITH EXPRESSEW II OR III SUTURE PASSER: Brand: EXPRESSEW

## (undated) DEVICE — FLOSEAL MATRIX IS INDICATED IN SURGICAL PROCEDURES (OTHER THAN IN OPHTHALMIC) AS AN ADJUNCT TO HEMOSTASIS WHEN CONTROL OF BLEEDING BY LIGATURE OR CONVENTIONALPROCEDURES IS INEFFECTIVE OR IMPRACTICAL.: Brand: FLOSEAL HEMOSTATIC MATRIX

## (undated) DEVICE — THREADED CLEAR CANNULA WITH OBTURATOR 7MM X 75MM

## (undated) DEVICE — BLADE SHAVER DISSECTOR 4MM 13CM COOLCUT

## (undated) DEVICE — WEBRIL 6 IN UNSTERILE

## (undated) DEVICE — RUNTHROUGH NS EXTRA FLOPPY PTCA GUIDEWIRE: Brand: RUNTHROUGH

## (undated) DEVICE — NEEDLE 18 G X 1 1/2

## (undated) DEVICE — ENDOPATH XCEL UNIVERSAL TROCAR STABLILITY SLEEVES: Brand: ENDOPATH XCEL

## (undated) DEVICE — 10FR FRAZIER SUCTION HANDLE: Brand: CARDINAL HEALTH

## (undated) DEVICE — ENDOPOUCH RETRIEVER SPECIMEN RETRIEVAL BAGS: Brand: ENDOPOUCH RETRIEVER

## (undated) DEVICE — DRAIN SPONGES,6 PLY: Brand: EXCILON

## (undated) DEVICE — TUBING SUCTION 5MM X 12 FT

## (undated) DEVICE — PENCIL ELECTROSURG E-Z CLEAN -0035H

## (undated) DEVICE — SMALL GRASPING RETRACTOR: Brand: ENDOWRIST;DAVINCI SI

## (undated) DEVICE — MONOPOLAR CURVED SCISSORS: Brand: ENDOWRIST;DAVINCI SI

## (undated) DEVICE — GLOVE SRG BIOGEL 7.5

## (undated) DEVICE — PACK TOTAL KNEE PBDS

## (undated) DEVICE — GLOVE INDICATOR PI UNDERGLOVE SZ 8.5 BLUE

## (undated) DEVICE — NEEDLE SPINAL18G X 3.5 IN QUINCKE

## (undated) DEVICE — INTENDED FOR TISSUE SEPARATION, AND OTHER PROCEDURES THAT REQUIRE A SHARP SURGICAL BLADE TO PUNCTURE OR CUT.: Brand: BARD-PARKER ® CARBON RIB-BACK BLADES

## (undated) DEVICE — PROGRASP FORCEPS: Brand: ENDOWRIST;DAVINCI SI

## (undated) DEVICE — GLIDESHEATH SLENDER STAINLESS STEEL KIT: Brand: GLIDESHEATH SLENDER

## (undated) DEVICE — THREADED CLEAR CANNULA WITH OBTURATOR 5.5MM X 75MM

## (undated) DEVICE — GLOVE SRG BIOGEL 8

## (undated) DEVICE — VAPR COOLPULSE 90 ELECTRODE 90 DEGREES SUCTION WITH INTEGRATED HANDPIECE: Brand: VAPR COOLPULSE

## (undated) DEVICE — STERILE MAJOR GENERAL PACK: Brand: CARDINAL HEALTH

## (undated) DEVICE — SUT ETHILON 3-0 FS-1 18 IN 663G

## (undated) DEVICE — POSITIONER OSI BEACH CHAIR FOAM

## (undated) DEVICE — SAW BLADE RECIPROCATING 179

## (undated) DEVICE — SUT MONOCRYL 2-0 SH 27 IN Y417H

## (undated) DEVICE — TREK CORONARY DILATATION CATHETER 2.50 MM X 15 MM / RAPID-EXCHANGE: Brand: TREK

## (undated) DEVICE — Device

## (undated) DEVICE — PLUMEPEN PRO 10FT

## (undated) DEVICE — SYRINGE 20ML LL

## (undated) DEVICE — 3M™ IOBAN™ 2 ANTIMICROBIAL INCISE DRAPE 6650EZ: Brand: IOBAN™ 2

## (undated) DEVICE — HEM-O-LOK CLIP CARTRIDGE LARGE DA VINCI SI/XI

## (undated) DEVICE — INTENDED FOR TISSUE SEPARATION, AND OTHER PROCEDURES THAT REQUIRE A SHARP SURGICAL BLADE TO PUNCTURE OR CUT.: Brand: BARD-PARKER SAFETY BLADES SIZE 11, STERILE

## (undated) DEVICE — OCCLUSIVE GAUZE STRIP,3% BISMUTH TRIBROMOPHENATE IN PETROLATUM BLEND: Brand: XEROFORM

## (undated) DEVICE — KERLIX BANDAGE ROLL: Brand: KERLIX

## (undated) DEVICE — TR BAND RADIAL ARTERY COMPRESSION DEVICE: Brand: TR BAND

## (undated) DEVICE — DRAPE SHEET X-LG

## (undated) DEVICE — ABDOMINAL PAD: Brand: DERMACEA

## (undated) DEVICE — GUARDIAN LVC: Brand: GUARDIAN

## (undated) DEVICE — HOOD: Brand: FLYTE

## (undated) DEVICE — INTENDED FOR TISSUE SEPARATION, AND OTHER PROCEDURES THAT REQUIRE A SHARP SURGICAL BLADE TO PUNCTURE OR CUT.: Brand: BARD-PARKER SAFETY BLADES SIZE 10, STERILE

## (undated) DEVICE — SUT VICRYL 0 UR-6 27 IN J603H

## (undated) DEVICE — 3000CC GUARDIAN II: Brand: GUARDIAN

## (undated) DEVICE — ADHESIVE SKN CLSR HISTOACRYL FLEX 0.5ML LF

## (undated) DEVICE — SUT VLOC 90 3-0 V-20 9IN VLOCM0644

## (undated) DEVICE — SAW BLADE OSCILLATING BRAZOL 167

## (undated) DEVICE — BURR  OVAL 4MM 13CM 8 FLUTE COOLCUT

## (undated) DEVICE — PADDING CAST 6IN COTTON STRL

## (undated) DEVICE — 3M™ STERI-STRIP™ REINFORCED ADHESIVE SKIN CLOSURES, R1547, 1/2 IN X 4 IN (12 MM X 100 MM), 6 STRIPS/ENVELOPE: Brand: 3M™ STERI-STRIP™

## (undated) DEVICE — AEM CORD

## (undated) DEVICE — ENDOPATH XCEL BLADELESS TROCARS WITH STABILITY SLEEVES: Brand: ENDOPATH XCEL

## (undated) DEVICE — SYRINGE 50ML LL

## (undated) DEVICE — VEST SURGEON DISPOSABLE

## (undated) DEVICE — GLOVE SRG BIOGEL ECLIPSE 7.5